# Patient Record
Sex: FEMALE | Race: WHITE | NOT HISPANIC OR LATINO | Employment: OTHER | URBAN - METROPOLITAN AREA
[De-identification: names, ages, dates, MRNs, and addresses within clinical notes are randomized per-mention and may not be internally consistent; named-entity substitution may affect disease eponyms.]

---

## 2017-03-01 ENCOUNTER — APPOINTMENT (OUTPATIENT)
Dept: GENERAL RADIOLOGY | Facility: HOSPITAL | Age: 52
End: 2017-03-01

## 2017-03-01 ENCOUNTER — HOSPITAL ENCOUNTER (INPATIENT)
Facility: HOSPITAL | Age: 52
LOS: 24 days | Discharge: HOME | End: 2017-03-25
Attending: EMERGENCY MEDICINE | Admitting: HOSPITALIST

## 2017-03-01 ENCOUNTER — APPOINTMENT (OUTPATIENT)
Dept: CT IMAGING | Facility: HOSPITAL | Age: 52
End: 2017-03-01

## 2017-03-01 DIAGNOSIS — J18.9 COMMUNITY ACQUIRED PNEUMONIA: Primary | ICD-10-CM

## 2017-03-01 DIAGNOSIS — J44.1 COPD EXACERBATION (HCC): ICD-10-CM

## 2017-03-01 DIAGNOSIS — J18.9 HCAP (HEALTHCARE-ASSOCIATED PNEUMONIA): ICD-10-CM

## 2017-03-01 DIAGNOSIS — J44.9 CHRONIC OBSTRUCTIVE PULMONARY DISEASE, UNSPECIFIED COPD TYPE (HCC): ICD-10-CM

## 2017-03-01 DIAGNOSIS — Z78.9 IMPAIRED MOBILITY AND ADLS: ICD-10-CM

## 2017-03-01 DIAGNOSIS — J96.20 ACUTE ON CHRONIC RESPIRATORY FAILURE, UNSPECIFIED WHETHER WITH HYPOXIA OR HYPERCAPNIA (HCC): ICD-10-CM

## 2017-03-01 DIAGNOSIS — Z74.09 IMPAIRED FUNCTIONAL MOBILITY, BALANCE, GAIT, AND ENDURANCE: ICD-10-CM

## 2017-03-01 DIAGNOSIS — Z74.09 IMPAIRED MOBILITY AND ADLS: ICD-10-CM

## 2017-03-01 DIAGNOSIS — R09.02 HYPOXIA: ICD-10-CM

## 2017-03-01 PROBLEM — F41.9 ANXIETY AND DEPRESSION: Status: ACTIVE | Noted: 2017-03-01

## 2017-03-01 PROBLEM — D72.829 LEUKOCYTOSIS: Status: ACTIVE | Noted: 2017-03-01

## 2017-03-01 PROBLEM — E03.9 HYPOTHYROID: Status: ACTIVE | Noted: 2017-03-01

## 2017-03-01 PROBLEM — F32.A ANXIETY AND DEPRESSION: Status: ACTIVE | Noted: 2017-03-01

## 2017-03-01 PROBLEM — Z72.0 TOBACCO ABUSE: Status: ACTIVE | Noted: 2017-03-01

## 2017-03-01 PROBLEM — Z59.00 HOMELESSNESS: Status: ACTIVE | Noted: 2017-03-01

## 2017-03-01 PROBLEM — A41.9 SEPSIS (HCC): Status: ACTIVE | Noted: 2017-03-01

## 2017-03-01 LAB
ALBUMIN SERPL-MCNC: 3.7 G/DL (ref 3.2–4.8)
ALBUMIN/GLOB SERPL: 1.1 G/DL (ref 1.5–2.5)
ALP SERPL-CCNC: 91 U/L (ref 25–100)
ALT SERPL W P-5'-P-CCNC: 11 U/L (ref 7–40)
ANION GAP SERPL CALCULATED.3IONS-SCNC: 4 MMOL/L (ref 3–11)
ARTERIAL PATENCY WRIST A: ABNORMAL
AST SERPL-CCNC: 19 U/L (ref 0–33)
ATMOSPHERIC PRESS: ABNORMAL MMHG
BASE EXCESS BLDA CALC-SCNC: 2.5 MMOL/L (ref 0–2)
BASOPHILS # BLD AUTO: 0.02 10*3/MM3 (ref 0–0.2)
BASOPHILS NFR BLD AUTO: 0.1 % (ref 0–1)
BDY SITE: ABNORMAL
BILIRUB SERPL-MCNC: 0.3 MG/DL (ref 0.3–1.2)
BNP SERPL-MCNC: 110 PG/ML (ref 0–100)
BUN BLD-MCNC: 6 MG/DL (ref 9–23)
BUN/CREAT SERPL: 6 (ref 7–25)
CALCIUM SPEC-SCNC: 9.4 MG/DL (ref 8.7–10.4)
CHLORIDE SERPL-SCNC: 98 MMOL/L (ref 99–109)
CO2 BLDA-SCNC: 28.3 MMOL/L (ref 22–33)
CO2 SERPL-SCNC: 31 MMOL/L (ref 20–31)
COHGB MFR BLD: 2.9 % (ref 0–2)
CREAT BLD-MCNC: 1 MG/DL (ref 0.6–1.3)
D-LACTATE SERPL-SCNC: 1.1 MMOL/L (ref 0.5–2)
DEPRECATED RDW RBC AUTO: 53.3 FL (ref 37–54)
EOSINOPHIL # BLD AUTO: 0 10*3/MM3 (ref 0.1–0.3)
EOSINOPHIL NFR BLD AUTO: 0 % (ref 0–3)
ERYTHROCYTE [DISTWIDTH] IN BLOOD BY AUTOMATED COUNT: 15.4 % (ref 11.3–14.5)
GFR SERPL CREATININE-BSD FRML MDRD: 58 ML/MIN/1.73
GLOBULIN UR ELPH-MCNC: 3.4 GM/DL
GLUCOSE BLD-MCNC: 118 MG/DL (ref 70–100)
GLUCOSE BLDC GLUCOMTR-MCNC: 161 MG/DL (ref 70–130)
HCO3 BLDA-SCNC: 27.1 MMOL/L (ref 20–26)
HCT VFR BLD AUTO: 38.2 % (ref 34.5–44)
HCT VFR BLD CALC: 38 %
HGB BLD-MCNC: 12.6 G/DL (ref 11.5–15.5)
HGB BLDA-MCNC: 12.4 G/DL (ref 14–18)
HOLD SPECIMEN: NORMAL
HOLD SPECIMEN: NORMAL
HOROWITZ INDEX BLD+IHG-RTO: 36 %
IMM GRANULOCYTES # BLD: 0.12 10*3/MM3 (ref 0–0.03)
IMM GRANULOCYTES NFR BLD: 0.8 % (ref 0–0.6)
LYMPHOCYTES # BLD AUTO: 1.26 10*3/MM3 (ref 0.6–4.8)
LYMPHOCYTES NFR BLD AUTO: 8.2 % (ref 24–44)
MCH RBC QN AUTO: 30.8 PG (ref 27–31)
MCHC RBC AUTO-ENTMCNC: 33 G/DL (ref 32–36)
MCV RBC AUTO: 93.4 FL (ref 80–99)
METHGB BLD QL: 0.9 % (ref 0–1.5)
MODALITY: ABNORMAL
MONOCYTES # BLD AUTO: 2.03 10*3/MM3 (ref 0–1)
MONOCYTES NFR BLD AUTO: 13.2 % (ref 0–12)
NEUTROPHILS # BLD AUTO: 11.98 10*3/MM3 (ref 1.5–8.3)
NEUTROPHILS NFR BLD AUTO: 77.7 % (ref 41–71)
OXYHGB MFR BLDV: 87.9 % (ref 94–99)
PCO2 BLDA: 40.8 MM HG (ref 35–45)
PH BLDA: 7.43 PH UNITS (ref 7.35–7.45)
PLATELET # BLD AUTO: 196 10*3/MM3 (ref 150–450)
PMV BLD AUTO: 9.2 FL (ref 6–12)
PO2 BLDA: 59 MM HG (ref 83–108)
POTASSIUM BLD-SCNC: 3.7 MMOL/L (ref 3.5–5.5)
PROT SERPL-MCNC: 7.1 G/DL (ref 5.7–8.2)
RBC # BLD AUTO: 4.09 10*6/MM3 (ref 3.89–5.14)
SODIUM BLD-SCNC: 133 MMOL/L (ref 132–146)
TROPONIN I SERPL-MCNC: <0.006 NG/ML
WBC NRBC COR # BLD: 15.41 10*3/MM3 (ref 3.5–10.8)
WHOLE BLOOD HOLD SPECIMEN: NORMAL
WHOLE BLOOD HOLD SPECIMEN: NORMAL

## 2017-03-01 PROCEDURE — 83880 ASSAY OF NATRIURETIC PEPTIDE: CPT | Performed by: EMERGENCY MEDICINE

## 2017-03-01 PROCEDURE — 82962 GLUCOSE BLOOD TEST: CPT

## 2017-03-01 PROCEDURE — 25010000002 AZITHROMYCIN: Performed by: EMERGENCY MEDICINE

## 2017-03-01 PROCEDURE — 25010000002 PIPERACILLIN-TAZOBACTAM: Performed by: INTERNAL MEDICINE

## 2017-03-01 PROCEDURE — 80053 COMPREHEN METABOLIC PANEL: CPT | Performed by: EMERGENCY MEDICINE

## 2017-03-01 PROCEDURE — 25010000002 HYDROMORPHONE PER 4 MG: Performed by: EMERGENCY MEDICINE

## 2017-03-01 PROCEDURE — 25010000002 ONDANSETRON PER 1 MG: Performed by: EMERGENCY MEDICINE

## 2017-03-01 PROCEDURE — 85025 COMPLETE CBC W/AUTO DIFF WBC: CPT | Performed by: EMERGENCY MEDICINE

## 2017-03-01 PROCEDURE — 25010000002 METHYLPREDNISOLONE PER 125 MG: Performed by: NURSE PRACTITIONER

## 2017-03-01 PROCEDURE — 86480 TB TEST CELL IMMUN MEASURE: CPT | Performed by: INTERNAL MEDICINE

## 2017-03-01 PROCEDURE — 25010000002 METHYLPREDNISOLONE PER 125 MG: Performed by: EMERGENCY MEDICINE

## 2017-03-01 PROCEDURE — 99223 1ST HOSP IP/OBS HIGH 75: CPT | Performed by: INTERNAL MEDICINE

## 2017-03-01 PROCEDURE — 94799 UNLISTED PULMONARY SVC/PX: CPT

## 2017-03-01 PROCEDURE — 94760 N-INVAS EAR/PLS OXIMETRY 1: CPT

## 2017-03-01 PROCEDURE — 99285 EMERGENCY DEPT VISIT HI MDM: CPT

## 2017-03-01 PROCEDURE — 71275 CT ANGIOGRAPHY CHEST: CPT

## 2017-03-01 PROCEDURE — 94640 AIRWAY INHALATION TREATMENT: CPT

## 2017-03-01 PROCEDURE — 84484 ASSAY OF TROPONIN QUANT: CPT | Performed by: EMERGENCY MEDICINE

## 2017-03-01 PROCEDURE — 0 IOPAMIDOL PER 1 ML: Performed by: EMERGENCY MEDICINE

## 2017-03-01 PROCEDURE — 25010000002 ENOXAPARIN PER 10 MG: Performed by: INTERNAL MEDICINE

## 2017-03-01 PROCEDURE — 25010000002 VANCOMYCIN: Performed by: INTERNAL MEDICINE

## 2017-03-01 PROCEDURE — 36600 WITHDRAWAL OF ARTERIAL BLOOD: CPT | Performed by: EMERGENCY MEDICINE

## 2017-03-01 PROCEDURE — 82805 BLOOD GASES W/O2 SATURATION: CPT | Performed by: EMERGENCY MEDICINE

## 2017-03-01 PROCEDURE — 25010000003 CEFTRIAXONE PER 250 MG: Performed by: EMERGENCY MEDICINE

## 2017-03-01 PROCEDURE — 83605 ASSAY OF LACTIC ACID: CPT | Performed by: EMERGENCY MEDICINE

## 2017-03-01 PROCEDURE — 87040 BLOOD CULTURE FOR BACTERIA: CPT | Performed by: EMERGENCY MEDICINE

## 2017-03-01 PROCEDURE — 93005 ELECTROCARDIOGRAM TRACING: CPT

## 2017-03-01 PROCEDURE — 71010 HC CHEST PA OR AP: CPT

## 2017-03-01 RX ORDER — ZIPRASIDONE HYDROCHLORIDE 60 MG/1
60 CAPSULE ORAL 2 TIMES DAILY WITH MEALS
COMMUNITY
End: 2017-03-01

## 2017-03-01 RX ORDER — METHYLPREDNISOLONE SODIUM SUCCINATE 125 MG/2ML
60 INJECTION, POWDER, LYOPHILIZED, FOR SOLUTION INTRAMUSCULAR; INTRAVENOUS EVERY 8 HOURS
Status: DISCONTINUED | OUTPATIENT
Start: 2017-03-01 | End: 2017-03-02

## 2017-03-01 RX ORDER — CEFTRIAXONE SODIUM 1 G/50ML
1 INJECTION, SOLUTION INTRAVENOUS EVERY 24 HOURS
Status: DISCONTINUED | OUTPATIENT
Start: 2017-03-02 | End: 2017-03-01

## 2017-03-01 RX ORDER — SENNA AND DOCUSATE SODIUM 50; 8.6 MG/1; MG/1
2 TABLET, FILM COATED ORAL 2 TIMES DAILY
Status: DISCONTINUED | OUTPATIENT
Start: 2017-03-01 | End: 2017-03-25 | Stop reason: HOSPADM

## 2017-03-01 RX ORDER — CEFTRIAXONE SODIUM 1 G/50ML
1 INJECTION, SOLUTION INTRAVENOUS ONCE
Status: COMPLETED | OUTPATIENT
Start: 2017-03-01 | End: 2017-03-01

## 2017-03-01 RX ORDER — TIZANIDINE 4 MG/1
4 TABLET ORAL EVERY 12 HOURS SCHEDULED
Status: DISCONTINUED | OUTPATIENT
Start: 2017-03-01 | End: 2017-03-15

## 2017-03-01 RX ORDER — GABAPENTIN 400 MG/1
400 CAPSULE ORAL EVERY 12 HOURS SCHEDULED
Status: DISCONTINUED | OUTPATIENT
Start: 2017-03-01 | End: 2017-03-12

## 2017-03-01 RX ORDER — PANTOPRAZOLE SODIUM 40 MG/1
40 TABLET, DELAYED RELEASE ORAL EVERY 24 HOURS
Status: DISCONTINUED | OUTPATIENT
Start: 2017-03-02 | End: 2017-03-22

## 2017-03-01 RX ORDER — LANSOPRAZOLE 15 MG/1
15 CAPSULE, DELAYED RELEASE ORAL DAILY
COMMUNITY
End: 2017-03-25 | Stop reason: HOSPADM

## 2017-03-01 RX ORDER — METHYLPREDNISOLONE SODIUM SUCCINATE 125 MG/2ML
125 INJECTION, POWDER, LYOPHILIZED, FOR SOLUTION INTRAMUSCULAR; INTRAVENOUS ONCE
Status: COMPLETED | OUTPATIENT
Start: 2017-03-01 | End: 2017-03-01

## 2017-03-01 RX ORDER — HYDROMORPHONE HYDROCHLORIDE 1 MG/ML
0.5 INJECTION, SOLUTION INTRAMUSCULAR; INTRAVENOUS; SUBCUTANEOUS ONCE
Status: COMPLETED | OUTPATIENT
Start: 2017-03-01 | End: 2017-03-01

## 2017-03-01 RX ORDER — DULOXETIN HYDROCHLORIDE 60 MG/1
60 CAPSULE, DELAYED RELEASE ORAL 2 TIMES DAILY
COMMUNITY
End: 2017-03-25 | Stop reason: HOSPADM

## 2017-03-01 RX ORDER — TIZANIDINE 4 MG/1
4 TABLET ORAL 3 TIMES DAILY
COMMUNITY
End: 2017-03-25 | Stop reason: HOSPADM

## 2017-03-01 RX ORDER — FUROSEMIDE 40 MG/1
40 TABLET ORAL DAILY
COMMUNITY
End: 2017-03-25 | Stop reason: HOSPADM

## 2017-03-01 RX ORDER — LEVOTHYROXINE SODIUM 0.05 MG/1
50 TABLET ORAL DAILY
COMMUNITY

## 2017-03-01 RX ORDER — DIVALPROEX SODIUM 500 MG/1
500 TABLET, EXTENDED RELEASE ORAL 2 TIMES DAILY
COMMUNITY
End: 2017-03-01

## 2017-03-01 RX ORDER — ACETAMINOPHEN 325 MG/1
650 TABLET ORAL EVERY 4 HOURS PRN
Status: DISCONTINUED | OUTPATIENT
Start: 2017-03-01 | End: 2017-03-25 | Stop reason: HOSPADM

## 2017-03-01 RX ORDER — VANCOMYCIN/0.9 % SOD CHLORIDE 1.5G/250ML
1500 PLASTIC BAG, INJECTION (ML) INTRAVENOUS EVERY 12 HOURS
Status: DISCONTINUED | OUTPATIENT
Start: 2017-03-02 | End: 2017-03-03

## 2017-03-01 RX ORDER — SODIUM CHLORIDE 0.9 % (FLUSH) 0.9 %
10 SYRINGE (ML) INJECTION AS NEEDED
Status: DISCONTINUED | OUTPATIENT
Start: 2017-03-01 | End: 2017-03-25 | Stop reason: HOSPADM

## 2017-03-01 RX ORDER — LEVOTHYROXINE SODIUM 0.03 MG/1
50 TABLET ORAL
Status: DISCONTINUED | OUTPATIENT
Start: 2017-03-02 | End: 2017-03-25 | Stop reason: HOSPADM

## 2017-03-01 RX ORDER — DULOXETIN HYDROCHLORIDE 60 MG/1
60 CAPSULE, DELAYED RELEASE ORAL EVERY 12 HOURS SCHEDULED
Status: DISCONTINUED | OUTPATIENT
Start: 2017-03-01 | End: 2017-03-25 | Stop reason: HOSPADM

## 2017-03-01 RX ORDER — ACETAMINOPHEN 500 MG
1000 TABLET ORAL ONCE
Status: DISCONTINUED | OUTPATIENT
Start: 2017-03-01 | End: 2017-03-01

## 2017-03-01 RX ORDER — IPRATROPIUM BROMIDE 42 UG/1
2 SPRAY, METERED NASAL 3 TIMES DAILY
COMMUNITY
End: 2017-03-25 | Stop reason: HOSPADM

## 2017-03-01 RX ORDER — BENZTROPINE MESYLATE 2 MG/1
2 TABLET ORAL 2 TIMES DAILY PRN
COMMUNITY
End: 2017-03-01

## 2017-03-01 RX ORDER — FLUTICASONE PROPIONATE 50 MCG
2 SPRAY, SUSPENSION (ML) NASAL DAILY
Status: DISCONTINUED | OUTPATIENT
Start: 2017-03-02 | End: 2017-03-25 | Stop reason: HOSPADM

## 2017-03-01 RX ORDER — GABAPENTIN 800 MG/1
800 TABLET ORAL 4 TIMES DAILY
COMMUNITY
End: 2017-03-25 | Stop reason: HOSPADM

## 2017-03-01 RX ORDER — ALBUTEROL SULFATE 2.5 MG/3ML
2.5 SOLUTION RESPIRATORY (INHALATION) EVERY 4 HOURS PRN
Status: ON HOLD | COMMUNITY
End: 2017-03-25

## 2017-03-01 RX ORDER — IPRATROPIUM BROMIDE AND ALBUTEROL SULFATE 2.5; .5 MG/3ML; MG/3ML
3 SOLUTION RESPIRATORY (INHALATION) ONCE
Status: COMPLETED | OUTPATIENT
Start: 2017-03-01 | End: 2017-03-01

## 2017-03-01 RX ORDER — HYDROCODONE BITARTRATE AND ACETAMINOPHEN 5; 325 MG/1; MG/1
1 TABLET ORAL EVERY 4 HOURS PRN
Status: DISPENSED | OUTPATIENT
Start: 2017-03-01 | End: 2017-03-11

## 2017-03-01 RX ORDER — QUETIAPINE 300 MG/1
300 TABLET, FILM COATED, EXTENDED RELEASE ORAL NIGHTLY
COMMUNITY
End: 2017-03-25 | Stop reason: HOSPADM

## 2017-03-01 RX ORDER — HYDROXYZINE 50 MG/1
50 TABLET, FILM COATED ORAL 4 TIMES DAILY PRN
Status: ON HOLD | COMMUNITY
End: 2017-03-25

## 2017-03-01 RX ORDER — ACETAMINOPHEN 500 MG
1000 TABLET ORAL ONCE
Status: COMPLETED | OUTPATIENT
Start: 2017-03-01 | End: 2017-03-01

## 2017-03-01 RX ORDER — ONDANSETRON 2 MG/ML
4 INJECTION INTRAMUSCULAR; INTRAVENOUS ONCE
Status: COMPLETED | OUTPATIENT
Start: 2017-03-01 | End: 2017-03-01

## 2017-03-01 RX ORDER — ONDANSETRON 2 MG/ML
4 INJECTION INTRAMUSCULAR; INTRAVENOUS EVERY 6 HOURS PRN
Status: DISCONTINUED | OUTPATIENT
Start: 2017-03-01 | End: 2017-03-25 | Stop reason: HOSPADM

## 2017-03-01 RX ORDER — NICOTINE 21 MG/24HR
1 PATCH, TRANSDERMAL 24 HOURS TRANSDERMAL
Status: DISCONTINUED | OUTPATIENT
Start: 2017-03-01 | End: 2017-03-25 | Stop reason: HOSPADM

## 2017-03-01 RX ORDER — IPRATROPIUM BROMIDE AND ALBUTEROL SULFATE 2.5; .5 MG/3ML; MG/3ML
3 SOLUTION RESPIRATORY (INHALATION)
Status: DISCONTINUED | OUTPATIENT
Start: 2017-03-01 | End: 2017-03-25 | Stop reason: HOSPADM

## 2017-03-01 RX ORDER — ONDANSETRON 4 MG/1
4 TABLET, FILM COATED ORAL EVERY 6 HOURS PRN
Status: DISCONTINUED | OUTPATIENT
Start: 2017-03-01 | End: 2017-03-25 | Stop reason: HOSPADM

## 2017-03-01 RX ORDER — MONTELUKAST SODIUM 10 MG/1
10 TABLET ORAL NIGHTLY
Status: DISCONTINUED | OUTPATIENT
Start: 2017-03-01 | End: 2017-03-25 | Stop reason: HOSPADM

## 2017-03-01 RX ORDER — FUROSEMIDE 40 MG/1
40 TABLET ORAL DAILY
Status: DISCONTINUED | OUTPATIENT
Start: 2017-03-02 | End: 2017-03-25 | Stop reason: HOSPADM

## 2017-03-01 RX ORDER — MONTELUKAST SODIUM 10 MG/1
10 TABLET ORAL NIGHTLY
Status: ON HOLD | COMMUNITY
End: 2017-03-25

## 2017-03-01 RX ORDER — CETIRIZINE HYDROCHLORIDE 10 MG/1
10 TABLET ORAL DAILY
Status: DISCONTINUED | OUTPATIENT
Start: 2017-03-02 | End: 2017-03-25 | Stop reason: HOSPADM

## 2017-03-01 RX ORDER — CLOMIPRAMINE HYDROCHLORIDE 50 MG/1
50 CAPSULE ORAL 2 TIMES DAILY
COMMUNITY
End: 2017-03-01

## 2017-03-01 RX ORDER — FLUTICASONE PROPIONATE 50 MCG
2 SPRAY, SUSPENSION (ML) NASAL DAILY
Status: ON HOLD | COMMUNITY
End: 2017-03-25

## 2017-03-01 RX ORDER — SODIUM CHLORIDE 0.9 % (FLUSH) 0.9 %
1-10 SYRINGE (ML) INJECTION AS NEEDED
Status: DISCONTINUED | OUTPATIENT
Start: 2017-03-01 | End: 2017-03-25 | Stop reason: HOSPADM

## 2017-03-01 RX ORDER — CETIRIZINE HYDROCHLORIDE 10 MG/1
10 TABLET ORAL DAILY
Status: ON HOLD | COMMUNITY
End: 2017-03-25

## 2017-03-01 RX ADMIN — GABAPENTIN 400 MG: 400 CAPSULE ORAL at 20:52

## 2017-03-01 RX ADMIN — Medication 2 TABLET: at 18:34

## 2017-03-01 RX ADMIN — QUETIAPINE 300 MG: 200 TABLET, EXTENDED RELEASE ORAL at 20:52

## 2017-03-01 RX ADMIN — TIZANIDINE 4 MG: 4 TABLET ORAL at 20:52

## 2017-03-01 RX ADMIN — CEFTRIAXONE SODIUM 1 G: 1 INJECTION, SOLUTION INTRAVENOUS at 13:47

## 2017-03-01 RX ADMIN — METHYLPREDNISOLONE SODIUM SUCCINATE 125 MG: 125 INJECTION, POWDER, FOR SOLUTION INTRAMUSCULAR; INTRAVENOUS at 13:47

## 2017-03-01 RX ADMIN — ONDANSETRON 4 MG: 2 INJECTION INTRAMUSCULAR; INTRAVENOUS at 14:38

## 2017-03-01 RX ADMIN — VANCOMYCIN HYDROCHLORIDE 2000 MG: 1 INJECTION, POWDER, LYOPHILIZED, FOR SOLUTION INTRAVENOUS at 19:47

## 2017-03-01 RX ADMIN — MONTELUKAST SODIUM 10 MG: 10 TABLET, FILM COATED ORAL at 20:52

## 2017-03-01 RX ADMIN — IPRATROPIUM BROMIDE AND ALBUTEROL SULFATE 3 ML: .5; 3 SOLUTION RESPIRATORY (INHALATION) at 13:28

## 2017-03-01 RX ADMIN — NICOTINE 1 PATCH: 21 PATCH, EXTENDED RELEASE TRANSDERMAL at 18:34

## 2017-03-01 RX ADMIN — AZITHROMYCIN 500 MG: 500 INJECTION, POWDER, LYOPHILIZED, FOR SOLUTION INTRAVENOUS at 14:30

## 2017-03-01 RX ADMIN — METHYLPREDNISOLONE SODIUM SUCCINATE 60 MG: 125 INJECTION, POWDER, FOR SOLUTION INTRAMUSCULAR; INTRAVENOUS at 20:57

## 2017-03-01 RX ADMIN — TAZOBACTAM SODIUM AND PIPERACILLIN SODIUM 4.5 G: .5; 4 INJECTION, POWDER, LYOPHILIZED, FOR SOLUTION INTRAVENOUS at 18:35

## 2017-03-01 RX ADMIN — ENOXAPARIN SODIUM 40 MG: 40 INJECTION SUBCUTANEOUS at 20:56

## 2017-03-01 RX ADMIN — HYDROMORPHONE HYDROCHLORIDE 0.5 MG: 1 INJECTION, SOLUTION INTRAMUSCULAR; INTRAVENOUS; SUBCUTANEOUS at 14:38

## 2017-03-01 RX ADMIN — IPRATROPIUM BROMIDE AND ALBUTEROL SULFATE 3 ML: .5; 3 SOLUTION RESPIRATORY (INHALATION) at 19:20

## 2017-03-01 RX ADMIN — ACETAMINOPHEN 1000 MG: 500 TABLET ORAL at 14:38

## 2017-03-01 RX ADMIN — DULOXETINE 60 MG: 60 CAPSULE, DELAYED RELEASE ORAL at 20:52

## 2017-03-01 RX ADMIN — HYDROCODONE BITARTRATE AND ACETAMINOPHEN 1 TABLET: 5; 325 TABLET ORAL at 18:34

## 2017-03-01 RX ADMIN — IOPAMIDOL 65 ML: 755 INJECTION, SOLUTION INTRAVENOUS at 13:57

## 2017-03-01 NOTE — PROGRESS NOTES
"Discharge Planning Assessment  [facility]     Patient Name: Colleen Gonzalez  MRN: [id number]  Today's Date: 3/1/2017    Admit Date: 3/1/2017          Discharge Needs Assessment       03/01/17 1548    Living Environment    Living Arrangements homeless    Provides Primary Care For no one, unable/limited ability to care for self    Quality Of Family Relationships helpful    Able to Return to Prior Living Arrangements no    Discharge Needs Assessment    Concerns To Be Addressed discharge planning concerns;basic needs concerns;coping/stress concerns;homelessness;financial/insurance concerns    Readmission Within The Last 30 Days no previous admission in last 30 days    Anticipated Changes Related to Illness none    Equipment Currently Used at Home none    Transportation Available car    Current Discharge Risk homeless            Discharge Plan       03/01/17 1550    Case Management/Social Work Plan    Plan D/C Plan    Patient/Family In Agreement With Plan yes    Additional Comments Spoke with patient. She tells me she is homeless. She stays with her brother sometimes and \"wherever I can lay my head\". She asked about assisted living and when I told her it is private pay, she responded she couldn't afford that. I spoke with Jennifer Garcia, , who said she will follow this patient and offer available resources.CM will continue to follow.        Discharge Placement     No information found                Demographic Summary       03/01/17 1546    Referral Information    Arrived From home or self-care    Referral Source emergency department    Reason For Consult discharge planning    Record Reviewed medical record    Primary Care Physician Information    Name RADHA Garcia at [facility]            Functional Status       03/01/17 1547    Functional Status Current    Current Functional Level Comment See Nursing and PT assessment    Functional Status Prior    Ambulation 0-->independent    Transferring " 0-->independent    Toileting 0-->independent    Bathing 0-->independent    Dressing 0-->independent    Eating 0-->independent    Communication 0-->understands/communicates without difficulty    Swallowing 0-->swallows foods/liquids without difficulty    IADL    Medications independent    Meal Preparation independent    Housekeeping independent    Laundry independent    Shopping independent    Oral Care independent    Activity Tolerance    Current Activity Limitations none    Usual Activity Tolerance good    Current Activity Tolerance moderate    Employment/Financial    Employment/Finance Comments Insurance info verified            Psychosocial     None            Abuse/Neglect     None            Legal     None            Substance Abuse     None            Patient Forms     None          Val Schafer, APRN

## 2017-03-01 NOTE — ED PROVIDER NOTES
"Subjective   HPI Comments: Colleen Gonzalez is a 52 y.o.female with a hx of COPD who presents to the ED c/o worsening SOA for the past month with it significantly worsening this past week. Pt has a moderately productive cough with greenish yellow sputum. Pt additionally reports severe lower back pain and is requesting \"muscle rub.\" She is a smoker and states she smokes 2 packs a day, but is trying to quit. She does not wear home O2.    PMHx of PNA.    Patient is a 52 y.o. female presenting with shortness of breath.   History provided by:  Patient  Shortness of Breath   Severity:  Moderate  Duration:  1 month  Timing:  Constant  Progression:  Worsening  Chronicity:  Chronic  Associated symptoms: cough and wheezing    Associated symptoms: no abdominal pain    Cough:     Cough characteristics:  Productive    Sputum characteristics:  Green and yellow    Severity:  Moderate    Timing:  Constant    Chronicity:  Chronic  Risk factors: tobacco use        Review of Systems   Constitutional: Positive for appetite change and fatigue.   HENT: Positive for congestion.    Respiratory: Positive for cough, shortness of breath and wheezing.    Cardiovascular: Negative for leg swelling.   Gastrointestinal: Negative for abdominal pain.   Musculoskeletal: Positive for back pain (Severe lower back pain.).   All other systems reviewed and are negative.      History reviewed. No pertinent past medical history.    Allergies   Allergen Reactions   • Aspirin GI Intolerance   • Ibuprofen GI Intolerance       History reviewed. No pertinent past surgical history.    History reviewed. No pertinent family history.    Social History     Social History   • Marital status: Single     Spouse name: N/A   • Number of children: N/A   • Years of education: N/A     Social History Main Topics   • Smoking status: Heavy Tobacco Smoker     Packs/day: 3.00   • Smokeless tobacco: None   • Alcohol use No   • Drug use: None   • Sexual activity: Defer     Other Topics " Concern   • None     Social History Narrative   • None         Objective   Physical Exam   Constitutional: She is oriented to person, place, and time. She appears well-developed and well-nourished.   Pt appears distressed, sitting in tripod position.   HENT:   Head: Normocephalic and atraumatic.   Right Ear: External ear normal.   Left Ear: External ear normal.   Nose: Nose normal.   Mouth/Throat: Mucous membranes are dry.   Eyes: Conjunctivae and EOM are normal.   Neck: Normal range of motion. Neck supple.   Cardiovascular: Regular rhythm and normal heart sounds.  Tachycardia present.  Exam reveals no gallop and no friction rub.    No murmur heard.  Pulmonary/Chest: She is in respiratory distress. She has decreased breath sounds. She has wheezes (Expiratory wheezing in all fields.). She has rhonchi (Scattered rhonchi in all lung fields.). She has no rales.   She is speaking in several word phrases. Distant breath sounds in all fields.   Abdominal: Soft. There is no tenderness.   Musculoskeletal: Normal range of motion. She exhibits tenderness (Left lower lumbar paramuscular tenderness to palpation with no palpable spasm.).   Neurological: She is alert and oriented to person, place, and time.   Skin: Skin is warm and dry.   Psychiatric: She has a normal mood and affect. Her behavior is normal. Judgment and thought content normal.   Nursing note and vitals reviewed.      Critical Care  Performed by: LISSETH LOERA  Authorized by: LISSETH LOERA   Total critical care time: 30 minutes  Critical care time was exclusive of separately billable procedures and treating other patients and teaching time.  Critical care was necessary to treat or prevent imminent or life-threatening deterioration of the following conditions: respiratory failure.  Critical care was time spent personally by me on the following activities: blood draw for specimens, development of treatment plan with patient or surrogate, interpretation of  cardiac output measurements, examination of patient, ordering and performing treatments and interventions, ordering and review of radiographic studies, re-evaluation of patient's condition, pulse oximetry, ordering and review of laboratory studies, obtaining history from patient or surrogate, evaluation of patient's response to treatment and review of old charts.               ED Course  ED Course   Comment By Time   I discussed the pt's case in detail with Dr. Sloan, hospitalist, who will admit the pt to the hospital.  OLGA LIDIA Montez 03/01 1452       Recent Results (from the past 24 hour(s))   Comprehensive Metabolic Panel    Collection Time: 03/01/17 12:48 PM   Result Value Ref Range    Glucose 118 (H) 70 - 100 mg/dL    BUN 6 (L) 9 - 23 mg/dL    Creatinine 1.00 0.60 - 1.30 mg/dL    Sodium 133 132 - 146 mmol/L    Potassium 3.7 3.5 - 5.5 mmol/L    Chloride 98 (L) 99 - 109 mmol/L    CO2 31.0 20.0 - 31.0 mmol/L    Calcium 9.4 8.7 - 10.4 mg/dL    Total Protein 7.1 5.7 - 8.2 g/dL    Albumin 3.70 3.20 - 4.80 g/dL    ALT (SGPT) 11 7 - 40 U/L    AST (SGOT) 19 0 - 33 U/L    Alkaline Phosphatase 91 25 - 100 U/L    Total Bilirubin 0.3 0.3 - 1.2 mg/dL    eGFR Non African Amer 58 (L) >60 mL/min/1.73    Globulin 3.4 gm/dL    A/G Ratio 1.1 (L) 1.5 - 2.5 g/dL    BUN/Creatinine Ratio 6.0 (L) 7.0 - 25.0    Anion Gap 4.0 3.0 - 11.0 mmol/L   BNP    Collection Time: 03/01/17 12:48 PM   Result Value Ref Range    .0 (H) 0.0 - 100.0 pg/mL   CBC Auto Differential    Collection Time: 03/01/17 12:48 PM   Result Value Ref Range    WBC 15.41 (H) 3.50 - 10.80 10*3/mm3    RBC 4.09 3.89 - 5.14 10*6/mm3    Hemoglobin 12.6 11.5 - 15.5 g/dL    Hematocrit 38.2 34.5 - 44.0 %    MCV 93.4 80.0 - 99.0 fL    MCH 30.8 27.0 - 31.0 pg    MCHC 33.0 32.0 - 36.0 g/dL    RDW 15.4 (H) 11.3 - 14.5 %    RDW-SD 53.3 37.0 - 54.0 fl    MPV 9.2 6.0 - 12.0 fL    Platelets 196 150 - 450 10*3/mm3    Neutrophil % 77.7 (H) 41.0 - 71.0 %    Lymphocyte  "% 8.2 (L) 24.0 - 44.0 %    Monocyte % 13.2 (H) 0.0 - 12.0 %    Eosinophil % 0.0 0.0 - 3.0 %    Basophil % 0.1 0.0 - 1.0 %    Immature Grans % 0.8 (H) 0.0 - 0.6 %    Neutrophils, Absolute 11.98 (H) 1.50 - 8.30 10*3/mm3    Lymphocytes, Absolute 1.26 0.60 - 4.80 10*3/mm3    Monocytes, Absolute 2.03 (H) 0.00 - 1.00 10*3/mm3    Eosinophils, Absolute 0.00 (L) 0.10 - 0.30 10*3/mm3    Basophils, Absolute 0.02 0.00 - 0.20 10*3/mm3    Immature Grans, Absolute 0.12 (H) 0.00 - 0.03 10*3/mm3   Lactic Acid, Plasma    Collection Time: 03/01/17 12:48 PM   Result Value Ref Range    Lactate 1.1 0.5 - 2.0 mmol/L   Troponin    Collection Time: 03/01/17 12:48 PM   Result Value Ref Range    Troponin I <0.006 <=0.039 ng/mL     Note: In addition to lab results from this visit, the labs listed above may include labs taken at another facility or during a different encounter within the last 24 hours. Please correlate lab times with ED admission and discharge times for further clarification of the services performed during this visit.    XR Chest 1 View    (Results Pending)   CT Angiogram Chest With Contrast    (Results Pending)     Vitals:    03/01/17 1218 03/01/17 1330 03/01/17 1430 03/01/17 1430   BP: 114/69  116/69    BP Location: Right arm      Patient Position: Sitting      Pulse: 107 105 104 105   Resp: 20   28   Temp: (!) 100.6 °F (38.1 °C)   (!) 102.3 °F (39.1 °C)   TempSrc: Oral   Oral   SpO2: 91% (!) 83% 91% 91%   Weight: 250 lb (113 kg)      Height: 68\" (172.7 cm)        Medications   sodium chloride 0.9 % flush 10 mL (not administered)   AZITHROMYCIN 500 MG/250 ML 0.9% NS IVPB (MBP) (500 mg Intravenous New Bag 3/1/17 1430)   ipratropium-albuterol (DUO-NEB) nebulizer solution 3 mL (3 mL Nebulization Given 3/1/17 1328)   methylPREDNISolone sodium succinate (SOLU-Medrol) injection 125 mg (125 mg Intravenous Given 3/1/17 1347)   cefTRIAXone (ROCEPHIN) IVPB 1 g (0 g Intravenous Stopped 3/1/17 1422)   iopamidol (ISOVUE-370) 76 % " injection 100 mL (65 mL Intravenous Given 3/1/17 1357)   acetaminophen (TYLENOL) tablet 1,000 mg (1,000 mg Oral Given 3/1/17 1438)   HYDROmorphone (DILAUDID) injection 0.5 mg (0.5 mg Intravenous Given 3/1/17 1438)   ondansetron (ZOFRAN) injection 4 mg (4 mg Intravenous Given 3/1/17 1438)     ECG/EMG Results (last 24 hours)     Procedure Component Value Units Date/Time    ECG 12 Lead [[id number]] Collected:  03/01/17 1218     Updated:  03/01/17 1228                      MDM    Final diagnoses:   Community acquired pneumonia   COPD exacerbation   Hypoxia       Documentation assistance provided by nancy Montez.  Information recorded by the nancy was done at my direction and has been verified and validated by me.     Rashid Montez  03/01/17 1337       Rashid Montez  03/01/17 1505       Rashid Montez  03/01/17 1506       Carlos Park MD  03/01/17 1786

## 2017-03-01 NOTE — H&P
"    [facility] Medicine Services  HISTORY AND PHYSICAL    Primary Care Physician: RADHA Pedraza    Subjective     Chief Complaint:  Shortness of breath    History of Present Illness:   Patient is a 52 year old  female with past medical history of COPD, HTN, HLD, IBS, chronic back pain, and a psychiatric disorder.  She reports anxiety, depression, bipolar disorder and OCD.  She sees a psychiatrist at [facility] on [address]  She reports that she stopped taking her \"crazy meds\" about 1 week ago because they were causing her to have hallucinations and she is not going to take them anymore.  She reports that she was suppose to be on home oxygen but she refuses to wear it because it aggravates her.  Patient is homeless and stays with friends and family.  She was most recently [facility] at [facility].  She smokes 2-3 packs of cigarettes a day but she states she is going to quit because it's not worth not being able to breathe.  Takes lasix daily for BLE edema.  She reports they have mention CHF in the past but she doesn't think she has it.  She states her doctor wanted her to have a heart cath in the past 2 months but she refused.  Currently denies chest pain.      Patient presents to [facility] ED via ambulance with c/o increased shortness of breath and productive cough.  She states her breathing has gotten worse over the past month but significantly worse over the past week.  She has a productive cough of yellowish brown sputum.  She reports subjective fever and chills.  She also reports N/V and has not been eating for the past week due to feeling so bad.  She also states that she has not had a BM in about a week.  She reports that she falls frequently due to her legs just giving out on her.  She is requesting assistance with placement with nursing home or assisted living.  Patient was noted to have hypoxia, tachycardia, and temperature of 102.3.  CXR showed " RUL pneumonia.  CTA completed but results pending.  WBC 15.41.  Patient will be admitted to the hospital medicine service for further evaluation and management.        Review of Systems   Constitutional: Positive for appetite change, chills and fever.   Eyes: Negative.    Respiratory: Positive for cough, shortness of breath and wheezing. Negative for chest tightness.    Cardiovascular: Positive for leg swelling. Negative for chest pain and palpitations.   Gastrointestinal: Positive for constipation, nausea and vomiting. Negative for abdominal pain.   Endocrine: Negative.    Genitourinary: Negative for difficulty urinating and dysuria.   Musculoskeletal: Positive for back pain and gait problem.   Skin: Negative.    Allergic/Immunologic: Negative.    Neurological: Positive for weakness and headaches. Negative for dizziness, tremors and seizures.   Psychiatric/Behavioral: Positive for hallucinations. Negative for agitation and confusion. The patient is nervous/anxious.         Past Medical History:   Past Medical History   Diagnosis Date   • Anxiety    • COPD (chronic obstructive pulmonary disease)    • Depression    • Hyperlipidemia    • Hypertension    • Hypothyroid    • Obesity        Past Surgical History:  Past Surgical History   Procedure Laterality Date   • Hysterectomy       partial   • Cholecystectomy     • Back surgery     • Knee surgery Bilateral    • Cyst removal         Family History: family history is not on file.    Social History:  reports that she has been smoking.  She has been smoking about 3.00 packs per day. She does not have any smokeless tobacco history on file. She reports that she does not drink alcohol or use illicit drugs.    Medications:  Prescriptions Prior to Admission   Medication Sig Dispense Refill Last Dose   • albuterol (PROVENTIL) (2.5 MG/3ML) 0.083% nebulizer solution Take 2.5 mg by nebulization Every 4 (Four) Hours As Needed for wheezing.      • cetirizine (zyrTEC) 10 MG tablet  "Take 10 mg by mouth Daily.      • DULoxetine (CYMBALTA) 60 MG capsule Take 60 mg by mouth 2 (Two) Times a Day.      • fluticasone (FLONASE) 50 MCG/ACT nasal spray 2 sprays into each nostril Daily.      • fluticasone-salmeterol (ADVAIR) 100-50 MCG/DOSE DISKUS Inhale 1 puff 2 (Two) Times a Day.      • furosemide (LASIX) 40 MG tablet Take 40 mg by mouth Daily.      • gabapentin (NEURONTIN) 800 MG tablet Take 800 mg by mouth 2 (Two) Times a Day.      • hydrOXYzine (ATARAX) 50 MG tablet Take 50 mg by mouth 4 (Four) Times a Day As Needed for itching.      • ipratropium (ATROVENT) 0.06 % nasal spray 2 sprays into each nostril 3 (Three) Times a Day.      • lansoprazole (PREVACID) 15 MG capsule Take 15 mg by mouth Daily.      • levothyroxine (SYNTHROID, LEVOTHROID) 50 MCG tablet Take 50 mcg by mouth Daily.      • montelukast (SINGULAIR) 10 MG tablet Take 10 mg by mouth Every Night.      • QUEtiapine XR (SEROquel XR) 300 MG 24 hr tablet Take 300 mg by mouth Every Night.      • tiotropium (SPIRIVA) 18 MCG per inhalation capsule Place 1 capsule into inhaler and inhale Daily.      • tiZANidine (ZANAFLEX) 4 MG tablet Take 4 mg by mouth 2 (Two) Times a Day.          Allergies:  Allergies   Allergen Reactions   • Aspirin GI Intolerance   • Ibuprofen GI Intolerance         Objective     Physical Exam:  Vital Signs:   Visit Vitals   • /69   • Pulse 105   • Temp (!) 102.3 °F (39.1 °C) (Oral)   • Resp 28   • Ht 68\" (172.7 cm)   • Wt 250 lb (113 kg)   • SpO2 91%   • BMI 38.01 kg/m2     Physical Exam   Constitutional: She is oriented to person, place, and time. She appears well-developed and well-nourished. She has a sickly appearance. She appears distressed (shortness of breath with talking).   Diaphoretic  Obese   HENT:   Head: Normocephalic and atraumatic.   Eyes: Conjunctivae are normal.   Neck: Normal range of motion. Neck supple.   Cardiovascular: Regular rhythm and normal heart sounds.    No murmur heard.  Sinus tachycardia "   Pulmonary/Chest: She is in respiratory distress. She has wheezes.   Decreased air exchange throughout, few scattered expiratory wheezes, increased labored breathing with exertion, O2 at 4 liters   Abdominal: Soft. Bowel sounds are normal. She exhibits no distension. There is no tenderness. There is no guarding.   Musculoskeletal: Normal range of motion. She exhibits edema (mild BLE).   Neurological: She is alert and oriented to person, place, and time.   Skin: Skin is warm and dry. No erythema.   Psychiatric: She has a normal mood and affect. Her behavior is normal. Judgment and thought content normal.     Results Reviewed:    Results from last 7 days  Lab Units 03/01/17  1248   WBC 10*3/mm3 15.41*   HEMOGLOBIN g/dL 12.6   PLATELETS 10*3/mm3 196       Results from last 7 days  Lab Units 03/01/17  1248   SODIUM mmol/L 133   POTASSIUM mmol/L 3.7   TOTAL CO2 mmol/L 31.0   CREATININE mg/dL 1.00   GLUCOSE mg/dL 118*   CALCIUM mg/dL 9.4       I have personally reviewed and interpreted available lab data, radiology studies and ECG obtained at time of admission.     Assessment / Plan     Assessment/Problem List:   Active Problems:    COPD (chronic obstructive pulmonary disease)    Tobacco abuse    Acute on chronic respiratory failure    Leukocytosis    Community acquired pneumonia    Hypothyroid    Sepsis    Anxiety and depression    Patient is a 52-year-old  female with history of noncompliance.  She smokes 2-3 packs of cigarettes per day.  She presented to [facility] ED with increased shortness of breath and cough progressively worse over the past week.  Reports recent hospitalization to [facility] about a week ago but has not gotten any better.      Plan:    Sepsis  --meets criteria due to febrile, hypoxia, tachycardia, leukocytosis and infectious source  --monitor    Pneumonia  --IV azithromycin, vancomycin and zosyn  --tylenol PRN   --oxygen PRN  --solumedrol 60 mg every 8 hours  --sputum cx to  "r/o TB  --CXR with opacification in [facility] suggesting either infiltrate or underlying lesion  --CTA final report pending    Leukocytosis  --2/2 above  --WBC 15.41   --monitor    Acute on chronic respiratory failure  --has been told she needs oxygen at home in the past but refuses to wear it  --PO2 59.0  --oxygen PRN    Hypothyroidism  --takes levothyroxine 50 mcg   --can't remember when last level was checked  --TSH in the AM    Tobacco abuse  --smoking cessation education  --nicotine patch    Anxiety and depression  --continue home meds  --has other psychiatric history but reports she stopped taking her \"crazy pills\" a week ago because they were causing her to hallucinate      CM consulted for help with social issues.  Patient is homeless and lives with friends and family.  Most recently has been staying at [facility].  May need help with placement at discharge.  She also is requesting assistance with finding a new PCP.  Patient reports recent admission to [facility] and have requested records.      DVT prophylaxis:  Lovenox, SCDs/TEDs  Code Status:  AND  Admission Status: Patient will be admitted to (LEXOBS or LEXINPT)     Ashlee Lucero, APRN 03/01/17 4:08 PM      Brief Attending Note       I have seen and examined the patient, performing an independent face-to-face diagnostic evaluation with plan of care reviewed and developed with the advanced practice clinician (APC).      Brief Summary Statement/HPI:   52 year old female presenting from [facility] with fever, chills, cough. Patient was discharged from [facility] around 1 month ago where she was treated for community acquired pneumonia. She was discharged on 3L NC but has not been using this. She has not felt any better since she was discharged from [facility]. She has had fever/chills and ? weight loss. She is currently still complaining of dyspnea. She does feel slightly better since arrival.       Attending Physical Exam:  Gen-mod distress, labored " breathing  HEENT-atraumatic, normocephalic, PERRLA, EOMI, moist mucous membranes present  CV-slightly tachy, no murmur  Resp-increased WOB, wheezes throughout  Abd-soft, NT, ND, +BS; no rebound or guarding, obese  Ext-tr edema bilaterally  Skin-multiple skin lesions throughout extremities presumably from picking. vitiligo on chest  Neuro-A&Ox3, CN II-XII grossly intact; equal strength in upper and lower extremities; no focal deficits  Psych-appropriate mood and behavior    CT chest personally reviewed with [facility] nodular consolidation with surrounding airspace disease. Agree with interpretation.    Brief Assessment/Plan :    Sepsis due to presumed HCAP  --Will treat for HCAP with vanc/zosyn/azithro. Obtain recent records from [facility].  --Sputum cx, sputum for AFB (given homelessness and persistent sx), urine ag, blood cultures.  --Will need repeat imaging in 3-4 weeks to ensure resolution of airspace disease.  --Would have low threshold for pulmonary consult if patient doesn't improve given recent hospitalization and concerns noted above.    COPD exacerbation  --Abx, nebs, steroids, O2    Homelessness  --SW + CM consult.    See above for further detailed assessment and plan developed with APC which I have reviewed and/or edited.    I believe this patient meets inpatient.      Effie Cardenas II, DO  03/01/17  5:28 PM

## 2017-03-02 LAB
ANION GAP SERPL CALCULATED.3IONS-SCNC: 4 MMOL/L (ref 3–11)
BUN BLD-MCNC: 12 MG/DL (ref 9–23)
BUN/CREAT SERPL: 13.3 (ref 7–25)
CALCIUM SPEC-SCNC: 9.5 MG/DL (ref 8.7–10.4)
CHLORIDE SERPL-SCNC: 101 MMOL/L (ref 99–109)
CO2 SERPL-SCNC: 32 MMOL/L (ref 20–31)
CREAT BLD-MCNC: 0.9 MG/DL (ref 0.6–1.3)
DEPRECATED RDW RBC AUTO: 54.4 FL (ref 37–54)
ERYTHROCYTE [DISTWIDTH] IN BLOOD BY AUTOMATED COUNT: 15.6 % (ref 11.3–14.5)
GFR SERPL CREATININE-BSD FRML MDRD: 65 ML/MIN/1.73
GLUCOSE BLD-MCNC: 147 MG/DL (ref 70–100)
HCT VFR BLD AUTO: 36 % (ref 34.5–44)
HGB BLD-MCNC: 11.7 G/DL (ref 11.5–15.5)
MCH RBC QN AUTO: 30.6 PG (ref 27–31)
MCHC RBC AUTO-ENTMCNC: 32.5 G/DL (ref 32–36)
MCV RBC AUTO: 94.2 FL (ref 80–99)
PLATELET # BLD AUTO: 191 10*3/MM3 (ref 150–450)
PMV BLD AUTO: 9.4 FL (ref 6–12)
POTASSIUM BLD-SCNC: 3.9 MMOL/L (ref 3.5–5.5)
RBC # BLD AUTO: 3.82 10*6/MM3 (ref 3.89–5.14)
SODIUM BLD-SCNC: 137 MMOL/L (ref 132–146)
TSH SERPL DL<=0.05 MIU/L-ACNC: 0.48 MIU/ML (ref 0.35–5.35)
WBC NRBC COR # BLD: 16.45 10*3/MM3 (ref 3.5–10.8)

## 2017-03-02 PROCEDURE — 87186 SC STD MICRODIL/AGAR DIL: CPT | Performed by: NURSE PRACTITIONER

## 2017-03-02 PROCEDURE — 25010000002 VANCOMYCIN HCL IN NACL 1.5-0.9 GM/500ML-% SOLUTION: Performed by: INTERNAL MEDICINE

## 2017-03-02 PROCEDURE — 25010000002 KETOROLAC TROMETHAMINE PER 15 MG: Performed by: INTERNAL MEDICINE

## 2017-03-02 PROCEDURE — 87449 NOS EACH ORGANISM AG IA: CPT | Performed by: INTERNAL MEDICINE

## 2017-03-02 PROCEDURE — 84443 ASSAY THYROID STIM HORMONE: CPT | Performed by: INTERNAL MEDICINE

## 2017-03-02 PROCEDURE — 99233 SBSQ HOSP IP/OBS HIGH 50: CPT | Performed by: INTERNAL MEDICINE

## 2017-03-02 PROCEDURE — 94799 UNLISTED PULMONARY SVC/PX: CPT

## 2017-03-02 PROCEDURE — 85027 COMPLETE CBC AUTOMATED: CPT | Performed by: INTERNAL MEDICINE

## 2017-03-02 PROCEDURE — 25010000002 AZITHROMYCIN: Performed by: NURSE PRACTITIONER

## 2017-03-02 PROCEDURE — 94760 N-INVAS EAR/PLS OXIMETRY 1: CPT

## 2017-03-02 PROCEDURE — 94640 AIRWAY INHALATION TREATMENT: CPT

## 2017-03-02 PROCEDURE — 87070 CULTURE OTHR SPECIMN AEROBIC: CPT | Performed by: NURSE PRACTITIONER

## 2017-03-02 PROCEDURE — 25010000002 ENOXAPARIN PER 10 MG: Performed by: INTERNAL MEDICINE

## 2017-03-02 PROCEDURE — 87205 SMEAR GRAM STAIN: CPT | Performed by: NURSE PRACTITIONER

## 2017-03-02 PROCEDURE — 87077 CULTURE AEROBIC IDENTIFY: CPT | Performed by: NURSE PRACTITIONER

## 2017-03-02 PROCEDURE — 87147 CULTURE TYPE IMMUNOLOGIC: CPT | Performed by: NURSE PRACTITIONER

## 2017-03-02 PROCEDURE — 25010000002 PIPERACILLIN-TAZOBACTAM: Performed by: INTERNAL MEDICINE

## 2017-03-02 PROCEDURE — 80048 BASIC METABOLIC PNL TOTAL CA: CPT | Performed by: INTERNAL MEDICINE

## 2017-03-02 PROCEDURE — 87206 SMEAR FLUORESCENT/ACID STAI: CPT | Performed by: INTERNAL MEDICINE

## 2017-03-02 PROCEDURE — 25010000002 METHYLPREDNISOLONE PER 40 MG: Performed by: INTERNAL MEDICINE

## 2017-03-02 PROCEDURE — 87116 MYCOBACTERIA CULTURE: CPT | Performed by: INTERNAL MEDICINE

## 2017-03-02 PROCEDURE — 25010000002 METHYLPREDNISOLONE PER 125 MG: Performed by: NURSE PRACTITIONER

## 2017-03-02 RX ORDER — KETOROLAC TROMETHAMINE 30 MG/ML
30 INJECTION, SOLUTION INTRAMUSCULAR; INTRAVENOUS EVERY 6 HOURS PRN
Status: DISCONTINUED | OUTPATIENT
Start: 2017-03-02 | End: 2017-03-02

## 2017-03-02 RX ORDER — IPRATROPIUM BROMIDE AND ALBUTEROL SULFATE 2.5; .5 MG/3ML; MG/3ML
3 SOLUTION RESPIRATORY (INHALATION) EVERY 4 HOURS PRN
Status: DISCONTINUED | OUTPATIENT
Start: 2017-03-02 | End: 2017-03-25 | Stop reason: HOSPADM

## 2017-03-02 RX ORDER — METHYLPREDNISOLONE SODIUM SUCCINATE 40 MG/ML
20 INJECTION, POWDER, LYOPHILIZED, FOR SOLUTION INTRAMUSCULAR; INTRAVENOUS EVERY 8 HOURS
Status: DISCONTINUED | OUTPATIENT
Start: 2017-03-02 | End: 2017-03-03

## 2017-03-02 RX ORDER — KETOROLAC TROMETHAMINE 15 MG/ML
15 INJECTION, SOLUTION INTRAMUSCULAR; INTRAVENOUS ONCE
Status: COMPLETED | OUTPATIENT
Start: 2017-03-02 | End: 2017-03-02

## 2017-03-02 RX ADMIN — DULOXETINE 60 MG: 60 CAPSULE, DELAYED RELEASE ORAL at 08:31

## 2017-03-02 RX ADMIN — DULOXETINE 60 MG: 60 CAPSULE, DELAYED RELEASE ORAL at 20:52

## 2017-03-02 RX ADMIN — AZITHROMYCIN 500 MG: 500 INJECTION, POWDER, LYOPHILIZED, FOR SOLUTION INTRAVENOUS at 08:30

## 2017-03-02 RX ADMIN — VANCOMYCIN HYDROCHLORIDE 1500 MG: 1 INJECTION, POWDER, LYOPHILIZED, FOR SOLUTION INTRAVENOUS at 05:42

## 2017-03-02 RX ADMIN — LEVOTHYROXINE SODIUM 50 MCG: 25 TABLET ORAL at 05:42

## 2017-03-02 RX ADMIN — TAZOBACTAM SODIUM AND PIPERACILLIN SODIUM 4.5 G: .5; 4 INJECTION, POWDER, LYOPHILIZED, FOR SOLUTION INTRAVENOUS at 01:28

## 2017-03-02 RX ADMIN — Medication 2 TABLET: at 08:29

## 2017-03-02 RX ADMIN — METHYLPREDNISOLONE SODIUM SUCCINATE 20 MG: 40 INJECTION, POWDER, FOR SOLUTION INTRAMUSCULAR; INTRAVENOUS at 20:53

## 2017-03-02 RX ADMIN — GABAPENTIN 400 MG: 400 CAPSULE ORAL at 08:31

## 2017-03-02 RX ADMIN — ENOXAPARIN SODIUM 40 MG: 40 INJECTION SUBCUTANEOUS at 08:29

## 2017-03-02 RX ADMIN — TAZOBACTAM SODIUM AND PIPERACILLIN SODIUM 4.5 G: .5; 4 INJECTION, POWDER, LYOPHILIZED, FOR SOLUTION INTRAVENOUS at 14:15

## 2017-03-02 RX ADMIN — TAZOBACTAM SODIUM AND PIPERACILLIN SODIUM 4.5 G: .5; 4 INJECTION, POWDER, LYOPHILIZED, FOR SOLUTION INTRAVENOUS at 17:21

## 2017-03-02 RX ADMIN — IPRATROPIUM BROMIDE AND ALBUTEROL SULFATE 3 ML: .5; 3 SOLUTION RESPIRATORY (INHALATION) at 13:20

## 2017-03-02 RX ADMIN — HYDROCODONE BITARTRATE AND ACETAMINOPHEN 1 TABLET: 5; 325 TABLET ORAL at 11:58

## 2017-03-02 RX ADMIN — FLUTICASONE PROPIONATE 2 SPRAY: 50 SPRAY, METERED NASAL at 08:31

## 2017-03-02 RX ADMIN — HYDROCODONE POLISTIREX AND CHLORPHENIRAMINE POLISTIREX 5 ML: 10; 8 SUSPENSION, EXTENDED RELEASE ORAL at 16:31

## 2017-03-02 RX ADMIN — QUETIAPINE 300 MG: 200 TABLET, EXTENDED RELEASE ORAL at 20:52

## 2017-03-02 RX ADMIN — VANCOMYCIN HYDROCHLORIDE 1500 MG: 1 INJECTION, POWDER, LYOPHILIZED, FOR SOLUTION INTRAVENOUS at 17:21

## 2017-03-02 RX ADMIN — HYDROCODONE BITARTRATE AND ACETAMINOPHEN 1 TABLET: 5; 325 TABLET ORAL at 01:32

## 2017-03-02 RX ADMIN — GABAPENTIN 400 MG: 400 CAPSULE ORAL at 20:52

## 2017-03-02 RX ADMIN — IPRATROPIUM BROMIDE AND ALBUTEROL SULFATE 3 ML: .5; 3 SOLUTION RESPIRATORY (INHALATION) at 20:48

## 2017-03-02 RX ADMIN — METHYLPREDNISOLONE SODIUM SUCCINATE 60 MG: 125 INJECTION, POWDER, FOR SOLUTION INTRAMUSCULAR; INTRAVENOUS at 14:15

## 2017-03-02 RX ADMIN — KETOROLAC TROMETHAMINE 15 MG: 15 INJECTION, SOLUTION INTRAMUSCULAR; INTRAVENOUS at 16:31

## 2017-03-02 RX ADMIN — CETIRIZINE HYDROCHLORIDE 10 MG: 10 TABLET, FILM COATED ORAL at 08:31

## 2017-03-02 RX ADMIN — IPRATROPIUM BROMIDE AND ALBUTEROL SULFATE 3 ML: .5; 3 SOLUTION RESPIRATORY (INHALATION) at 07:29

## 2017-03-02 RX ADMIN — MONTELUKAST SODIUM 10 MG: 10 TABLET, FILM COATED ORAL at 20:52

## 2017-03-02 RX ADMIN — NICOTINE 1 PATCH: 21 PATCH, EXTENDED RELEASE TRANSDERMAL at 08:32

## 2017-03-02 RX ADMIN — IPRATROPIUM BROMIDE AND ALBUTEROL SULFATE 3 ML: .5; 3 SOLUTION RESPIRATORY (INHALATION) at 15:31

## 2017-03-02 RX ADMIN — PANTOPRAZOLE SODIUM 40 MG: 40 TABLET, DELAYED RELEASE ORAL at 08:29

## 2017-03-02 RX ADMIN — METHYLPREDNISOLONE SODIUM SUCCINATE 60 MG: 125 INJECTION, POWDER, FOR SOLUTION INTRAMUSCULAR; INTRAVENOUS at 05:42

## 2017-03-02 RX ADMIN — TIZANIDINE 4 MG: 4 TABLET ORAL at 08:31

## 2017-03-02 RX ADMIN — ONDANSETRON 4 MG: 4 TABLET, FILM COATED ORAL at 16:47

## 2017-03-02 RX ADMIN — TAZOBACTAM SODIUM AND PIPERACILLIN SODIUM 4.5 G: .5; 4 INJECTION, POWDER, LYOPHILIZED, FOR SOLUTION INTRAVENOUS at 05:42

## 2017-03-02 RX ADMIN — TIZANIDINE 4 MG: 4 TABLET ORAL at 20:52

## 2017-03-02 RX ADMIN — FUROSEMIDE 40 MG: 40 TABLET ORAL at 08:31

## 2017-03-02 NOTE — PLAN OF CARE
Problem: Patient Care Overview (Adult)  Goal: Plan of Care Review  Outcome: Ongoing (interventions implemented as appropriate)    03/02/17 0251   Coping/Psychosocial Response Interventions   Plan Of Care Reviewed With patient   Patient Care Overview   Progress improving         Problem: Pneumonia (Adult)  Goal: Signs and Symptoms of Listed Potential Problems Will be Absent or Manageable (Pneumonia)  Outcome: Ongoing (interventions implemented as appropriate)

## 2017-03-02 NOTE — PROGRESS NOTES
Continued Stay Note  [facility]     Patient Name: Colleen Gonzalez  MRN: [id number]  Today's Date: 3/2/2017    Admit Date: 3/1/2017          Discharge Plan       03/02/17 0942    Case Management/Social Work Plan    Plan Social work met with Mrs. Gonzalez because she is homeless and she was staying at the [facility].  She said that she had her car towed and it is now in the [employer] lot at [phone].  Social work called the lot to see if they offer assistance if a person is admitted to the hospital and they do not.  They said it would cost $35 per day that the car is in the [employer] lot.  Social work provided homeless information to the patient inclusing food, shelter, clothing, and medical assistance.      Patient/Family In Agreement With Plan yes              Discharge Codes     None        Expected Discharge Date and Time     Expected Discharge Date Expected Discharge Time    Mar 4, 2017             SOPHIE Rivera

## 2017-03-02 NOTE — PROGRESS NOTES
Continued Stay Note  [facility]     Patient Name: Colleen Gonzalez  MRN: [id number]  Today's Date: 3/2/2017    Admit Date: 3/1/2017          Discharge Plan       03/02/17 1055    Case Management/Social Work Plan    Additional Comments Left VM with Monica to assist with obtaining new PCP per pt request.        03/02/17 0935    Case Management/Social Work Plan    Plan Social work met with Mrs. Gonzalez because she is homeless and she was staying at the [facility].  She said that she had her car towed and it is now in the [employer] at [phone].  Social work called the lot to see if they offer assistance if a person is admitted to the hospital and they do not.  They said it would cost $35 per day that the car is in the [employer] lot.  Social work provided homeless information to the patient inclusing food, shelter, clothing, and medical assistance.      Patient/Family In Agreement With Plan yes              Discharge Codes     None        Expected Discharge Date and Time     Expected Discharge Date Expected Discharge Time    Mar 4, 2017             Jessie Bennett

## 2017-03-02 NOTE — PAYOR COMM NOTE
"Colleen Gonzalez (52 y.o. Female) Initial notification and clinicals ( 1965)    Date of Birth Social Security Number Address Home Phone MRN    1965  [id number] [facility] [address] [phone] [id number]    Congregational Marital Status          None Single       Admission Date Admission Type Admitting Provider Attending Provider Department, Room/Bed    3/1/17 Emergency Effie Cardenas II, Effie Sr II,  [facility] 4G, S448/1    Discharge Date Discharge Disposition Discharge Destination                      Attending Provider: Effie Cardenas II, DO     Allergies:  Aspirin, Ibuprofen    Isolation:  None   Infection:  None   Code Status:  FULL    Ht:  68\" (172.7 cm)   Wt:  250 lb (113 kg)    Admission Cmt:  None   Principal Problem:  Sepsis [A41.9]                 Active Insurance as of 3/1/2017     Primary Coverage     Payor Plan Insurance Group Employer/Plan Group    [employer] [place] [employer] [place]ICA[place]      Payor Plan Address Payor Plan Phone Number Effective From Effective To    [address] [address] [phone] 3/1/2017     [place] [address]       Subscriber Name Subscriber Birth Date Member ID       COLLEEN GONZALEZ 1965 [id number]                 Emergency Contacts      (Rel.) Home Phone Work Phone Mobile Phone    Eneida Way (Sister) -- -- [phone]    Raquel Dumont (Sister) -- -- [phone]               Emergency Department Notes      Carlos Park MD at 3/1/2017  1:23 PM      Procedure Orders:    1. Critical Care [[id number]] ordered by Carlos Park MD at 17 1505                Subjective   HPI Comments: Colleen Gonzalez is a 52 y.o.female with a hx of COPD who presents to the ED c/o worsening SOA for the past month with it significantly worsening this past week. Pt has a moderately productive cough with greenish yellow sputum. Pt additionally reports severe lower back pain and is requesting \"muscle rub.\" She is a smoker and states she " smokes 2 packs a day, but is trying to quit. She does not wear home O2.    PMHx of PNA.    Patient is a 52 y.o. female presenting with shortness of breath.   History provided by:  Patient  Shortness of Breath   Severity:  Moderate  Duration:  1 month  Timing:  Constant  Progression:  Worsening  Chronicity:  Chronic  Associated symptoms: cough and wheezing    Associated symptoms: no abdominal pain    Cough:     Cough characteristics:  Productive    Sputum characteristics:  Green and yellow    Severity:  Moderate    Timing:  Constant    Chronicity:  Chronic  Risk factors: tobacco use        Review of Systems   Constitutional: Positive for appetite change and fatigue.   HENT: Positive for congestion.    Respiratory: Positive for cough, shortness of breath and wheezing.    Cardiovascular: Negative for leg swelling.   Gastrointestinal: Negative for abdominal pain.   Musculoskeletal: Positive for back pain (Severe lower back pain.). other systems reviewed and are negative.      History reviewed. No pertinent past medical history.    Allergies   Allergen Reactions   • Aspirin GI Intolerance   • Ibuprofen GI Intolerance       History reviewed. No pertinent past surgical history.    History reviewed. No pertinent family history.    Social History     Social History   • Marital status: Single     Spouse name: N/A   • Number of children: N/A   • Years of education: N/A     Social History Main Topics   • Smoking status: Heavy Tobacco Smoker     Packs/day: 3.00   • Smokeless tobacco: None   • Alcohol use No   • Drug use: None   • Sexual activity: Defer     Other Topics Concern   • None     Social History Narrative   • None         Objective   Physical Exam   Constitutional: She is oriented to person, place, and time. She appears well-developed and well-nourished.   Pt appears distressed, sitting in tripod position.   HENT:   Head: Normocephalic and atraumatic.   Right Ear: External ear normal.   Left Ear: External ear normal.    Nose: Nose normal.   Mouth/Throat: Mucous membranes are dry.   Eyes: Conjunctivae and EOM are normal.   Neck: Normal range of motion. Neck supple.   Cardiovascular: Regular rhythm and normal heart sounds.  Tachycardia present.  Exam reveals no gallop and no friction rub.  murmur heard.  Pulmonary/Chest: She is in respiratory distress. She has decreased breath sounds. She has wheezes (Expiratory wheezing in all fields.). She has rhonchi (Scattered rhonchi in all lung fields.). She has no rales.   She is speaking in several word phrases. Distant breath sounds in all fields.   Abdominal: Soft. There is no tenderness.   Musculoskeletal: Normal range of motion. She exhibits tenderness (Left lower lumbar paramuscular tenderness to palpation with no palpable spasm.).   Neurological: She is alert and oriented to person, place, and time.   Skin: Skin is warm and dry.   Psychiatric: She has a normal mood and affect. Her behavior is normal. Judgment and thought content normal. note and vitals reviewed.      Critical Careby: LISSETH LOERA  Authorized by: LISSETH LOERA   Total critical care time: 30 minutes  Critical care time was exclusive of separately billable procedures and treating other patients and teaching time.  Critical care was necessary to treat or prevent imminent or life-threatening deterioration of the following conditions: respiratory failure.  Critical care was time spent personally by me on the following activities: blood draw for specimens, development of treatment plan with patient or surrogate, interpretation of cardiac output measurements, examination of patient, ordering and performing treatments and interventions, ordering and review of radiographic studies, re-evaluation of patient's condition, pulse oximetry, ordering and review of laboratory studies, obtaining history from patient or surrogate, evaluation of patient's response to treatment and review of old charts.              ED Course  ED  Course   Comment By Time   I discussed the pt's case in detail with Dr. Sloan, hospitalist, who will admit the pt to the hospital.  OLGA LIDIA Montez 03/01 1452       Recent Results (from the past 24 hour(s))   Comprehensive Metabolic Panel    Collection Time: 03/01/17 12:48 PM   Result Value Ref Range    Glucose 118 (H) 70 - 100 mg/dL    BUN 6 (L) 9 - 23 mg/dL    Creatinine 1.00 0.60 - 1.30 mg/dL    Sodium 133 132 - 146 mmol/L    Potassium 3.7 3.5 - 5.5 mmol/L    Chloride 98 (L) 99 - 109 mmol/L    CO2 31.0 20.0 - 31.0 mmol/L    Calcium 9.4 8.7 - 10.4 mg/dL    Total Protein 7.1 5.7 - 8.2 g/dL    Albumin 3.70 3.20 - 4.80 g/dL    ALT (SGPT) 11 7 - 40 U/L    AST (SGOT) 19 0 - 33 U/L    Alkaline Phosphatase 91 25 - 100 U/L    Total Bilirubin 0.3 0.3 - 1.2 mg/dL    eGFR Non African Amer 58 (L) >60 mL/min/1.73    Globulin 3.4 gm/dL    A/G Ratio 1.1 (L) 1.5 - 2.5 g/dL    BUN/Creatinine Ratio 6.0 (L) 7.0 - 25.0    Anion Gap 4.0 3.0 - 11.0 mmol/L   BNP    Collection Time: 03/01/17 12:48 PM   Result Value Ref Range    .0 (H) 0.0 - 100.0 pg/mL   CBC Auto Differential    Collection Time: 03/01/17 12:48 PM   Result Value Ref Range    WBC 15.41 (H) 3.50 - 10.80 10*3/mm3    RBC 4.09 3.89 - 5.14 10*6/mm3    Hemoglobin 12.6 11.5 - 15.5 g/dL    Hematocrit 38.2 34.5 - 44.0 %    MCV 93.4 80.0 - 99.0 fL    MCH 30.8 27.0 - 31.0 pg    MCHC 33.0 32.0 - 36.0 g/dL    RDW 15.4 (H) 11.3 - 14.5 %    RDW-SD 53.3 37.0 - 54.0 fl    MPV 9.2 6.0 - 12.0 fL    Platelets 196 150 - 450 10*3/mm3    Neutrophil % 77.7 (H) 41.0 - 71.0 %    Lymphocyte % 8.2 (L) 24.0 - 44.0 %    Monocyte % 13.2 (H) 0.0 - 12.0 %    Eosinophil % 0.0 0.0 - 3.0 %    Basophil % 0.1 0.0 - 1.0 %    Immature Grans % 0.8 (H) 0.0 - 0.6 %    Neutrophils, Absolute 11.98 (H) 1.50 - 8.30 10*3/mm3    Lymphocytes, Absolute 1.26 0.60 - 4.80 10*3/mm3    Monocytes, Absolute 2.03 (H) 0.00 - 1.00 10*3/mm3    Eosinophils, Absolute 0.00 (L) 0.10 - 0.30 10*3/mm3    Basophils,  "Absolute 0.02 0.00 - 0.20 10*3/mm3    Immature Grans, Absolute 0.12 (H) 0.00 - 0.03 10*3/mm3   Lactic Acid, Plasma    Collection Time: 03/01/17 12:48 PM   Result Value Ref Range    Lactate 1.1 0.5 - 2.0 mmol/L   Troponin    Collection Time: 03/01/17 12:48 PM   Result Value Ref Range    Troponin I <0.006 <=0.039 ng/mL     Note: In addition to lab results from this visit, the labs listed above may include labs taken at another facility or during a different encounter within the last 24 hours. Please correlate lab times with ED admission and discharge times for further clarification of the services performed during this visit.    XR Chest 1 View    (Results Pending)   CT Angiogram Chest With Contrast    (Results Pending)     Vitals:    03/01/17 1218 03/01/17 1330 03/01/17 1430 03/01/17 1430   BP: 114/69  116/69    BP Location: Right arm      Patient Position: Sitting      Pulse: 107 105 104 105   Resp: 20   28   Temp: (!) 100.6 °F (38.1 °C)   (!) 102.3 °F (39.1 °C)   TempSrc: Oral   Oral   SpO2: 91% (!) 83% 91% 91%   Weight: 250 lb (113 kg)      Height: 68\" (172.7 cm)        Medications   sodium chloride 0.9 % flush 10 mL (not administered)   AZITHROMYCIN 500 MG/250 ML 0.9% NS IVPB (MBP) (500 mg Intravenous New Bag 3/1/17 1430)   ipratropium-albuterol (DUO-NEB) nebulizer solution 3 mL (3 mL Nebulization Given 3/1/17 1328)   methylPREDNISolone sodium succinate (SOLU-Medrol) injection 125 mg (125 mg Intravenous Given 3/1/17 1347)   cefTRIAXone (ROCEPHIN) IVPB 1 g (0 g Intravenous Stopped 3/1/17 1422)   iopamidol (ISOVUE-370) 76 % injection 100 mL (65 mL Intravenous Given 3/1/17 1357)   acetaminophen (TYLENOL) tablet 1,000 mg (1,000 mg Oral Given 3/1/17 1438)   HYDROmorphone (DILAUDID) injection 0.5 mg (0.5 mg Intravenous Given 3/1/17 1438)   ondansetron (ZOFRAN) injection 4 mg (4 mg Intravenous Given 3/1/17 1438)     ECG/EMG Results (last 24 hours)     Procedure Component Value Units Date/Time    ECG 12 Lead [[id number]] " Collected:  03/01/17 1218     Updated:  03/01/17 1228                      [facility]    Final diagnoses:   Community acquired pneumonia   COPD exacerbation   Hypoxia       Documentation assistance provided by nancy Montez.  Information recorded by the elaineibe was done at my direction and has been verified and validated by me.    Rashid Montez  03/01/17 1337      Rashid Montez  03/01/17 1505      Rashid Montez  03/01/17 1506      Carlos Park MD  03/01/17 2244       Electronically signed by Carlos Park MD at 3/1/2017 10:44 PM        Vital Signs (last 24 hours)       02/28 0700  -  03/01 0659 03/01 0700  -  03/02 0400   Most Recent    Temp (°F)   97.5 -  (!)102.3     97.5 (36.4)    Heart Rate   81 -  107     81    Resp   20 -  28     20    BP   100/70 -  116/69     108/72    SpO2 (%)   (!)83 -  92     92          Hospital Medications (all)       Dose Frequency Start End    ! Vanc trough  Once 3/3/2017     Sig - Route: 1 (One) Time. - Does not apply    acetaminophen (TYLENOL) tablet 1,000 mg 1,000 mg Once 3/1/2017 3/1/2017    Sig - Route: Take 2 tablets by mouth 1 (One) Time. - Oral    acetaminophen (TYLENOL) tablet 650 mg 650 mg Every 4 Hours PRN 3/1/2017     Sig - Route: Take 2 tablets by mouth Every 4 (Four) Hours As Needed for mild pain (1-3). - Oral    AZITHROMYCIN 500 MG/250 ML 0.9% NS IVPB (MBP) 500 mg Once 3/1/2017 3/1/2017    Sig - Route: Infuse 500 mg into a venous catheter 1 (One) Time. - Intravenous    AZITHROMYCIN 500 MG/250 ML 0.9% NS IVPB (MBP) 500 mg Every 24 Hours 3/2/2017     Sig - Route: Infuse 500 mg into a venous catheter Daily. - Intravenous    bisacodyl (DULCOLAX) EC tablet 5 mg 5 mg Daily PRN 3/1/2017     Sig - Route: Take 1 tablet by mouth Daily As Needed for constipation. - Oral    cefTRIAXone (ROCEPHIN) IVPB 1 g 1 g Once 3/1/2017 3/1/2017    Sig - Route: Infuse 50 mL into a venous catheter 1 (One) Time. - Intravenous    cetirizine (zyrTEC)  tablet 10 mg 10 mg Daily 3/2/2017     Sig - Route: Take 1 tablet by mouth Daily. - Oral    DULoxetine (CYMBALTA) DR capsule 60 mg 60 mg Every 12 Hours Scheduled 3/1/2017     Sig - Route: Take 1 capsule by mouth Every 12 (Twelve) Hours. - Oral    enoxaparin (LOVENOX) syringe 40 mg 40 mg Daily 3/1/2017     Sig - Route: Inject 0.4 mL under the skin Daily. - Subcutaneous    fluticasone (FLONASE) 50 MCG/ACT nasal spray 2 spray 2 spray Daily 3/2/2017     Sig - Route: 2 sprays into each nostril Daily. - Nasal    furosemide (LASIX) tablet 40 mg 40 mg Daily 3/2/2017     Sig - Route: Take 1 tablet by mouth Daily. - Oral    gabapentin (NEURONTIN) capsule 400 mg 400 mg Every 12 Hours Scheduled 3/1/2017     Sig - Route: Take 1 capsule by mouth Every 12 (Twelve) Hours. - Oral    HYDROcodone-acetaminophen (NORCO) 5-325 MG per tablet 1 tablet 1 tablet Every 4 Hours PRN 3/1/2017 3/11/2017    Sig - Route: Take 1 tablet by mouth Every 4 (Four) Hours As Needed for moderate pain (4-6). - Oral    HYDROmorphone (DILAUDID) injection 0.5 mg 0.5 mg Once 3/1/2017 3/1/2017    Sig - Route: Infuse 0.5 mg into a venous catheter 1 (One) Time. - Intravenous    iopamidol (ISOVUE-370) 76 % injection 100 mL 100 mL Once in Imaging 3/1/2017 3/1/2017    Sig - Route: Infuse 100 mL into a venous catheter Once. - Intravenous    ipratropium-albuterol (DUO-NEB) nebulizer solution 3 mL 3 mL Once 3/1/2017 3/1/2017    Sig - Route: Take 3 mL by nebulization 1 (One) Time. - Nebulization    ipratropium-albuterol (DUO-NEB) nebulizer solution 3 mL 3 mL 4 Times Daily - RT 3/1/2017     Sig - Route: Take 3 mL by nebulization 4 (Four) Times a Day. - Nebulization    ipratropium-albuterol (DUO-NEB) nebulizer solution 3 mL 3 mL Every 4 Hours PRN 3/2/2017     Sig - Route: Take 3 mL by nebulization Every 4 (Four) Hours As Needed for wheezing or shortness of air. - Nebulization    levothyroxine (SYNTHROID, LEVOTHROID) tablet 50 mcg 50 mcg Every Early Morning 3/2/2017     Sig -  "Route: Take 1 tablet by mouth Every Morning. - Oral    methylPREDNISolone sodium succinate (SOLU-Medrol) injection 125 mg 125 mg Once 3/1/2017 3/1/2017    Sig - Route: Infuse 2 mL into a venous catheter 1 (One) Time. - Intravenous    methylPREDNISolone sodium succinate (SOLU-Medrol) injection 60 mg 60 mg Every 8 Hours 3/1/2017     Sig - Route: Infuse 0.96 mL into a venous catheter Every 8 (Eight) Hours. - Intravenous    montelukast (SINGULAIR) tablet 10 mg 10 mg Nightly 3/1/2017     Sig - Route: Take 1 tablet by mouth Every Night. - Oral    nicotine (NICODERM CQ) 21 MG/24HR patch 1 patch 1 patch Every 24 Hours Scheduled 3/1/2017     Sig - Route: Place 1 patch on the skin Daily. - Transdermal    ondansetron (ZOFRAN) injection 4 mg 4 mg Once 3/1/2017 3/1/2017    Sig - Route: Infuse 2 mL into a venous catheter 1 (One) Time. - Intravenous    ondansetron (ZOFRAN) injection 4 mg 4 mg Every 6 Hours PRN 3/1/2017     Sig - Route: Infuse 2 mL into a venous catheter Every 6 (Six) Hours As Needed for nausea or vomiting. - Intravenous    Linked Group 1:  \"Or\" Linked Group Details        ondansetron (ZOFRAN) tablet 4 mg 4 mg Every 6 Hours PRN 3/1/2017     Sig - Route: Take 1 tablet by mouth Every 6 (Six) Hours As Needed for nausea or vomiting. - Oral    Linked Group 1:  \"Or\" Linked Group Details        pantoprazole (PROTONIX) EC tablet 40 mg 40 mg Every 24 Hours 3/2/2017     Sig - Route: Take 1 tablet by mouth Daily. - Oral    Pharmacy to dose vancomycin  Continuous PRN 3/1/2017     Sig - Route: Continuous As Needed for consult. - Does not apply    piperacillin-tazobactam (ZOSYN) 4.5 g/100 mL 0.9% NS IVPB (mbp) 4.5 g Every 6 Hours 3/1/2017     Sig - Route: Infuse 100 mL into a venous catheter Every 6 (Six) Hours. - Intravenous    QUEtiapine fumarate ER (SEROquel XR) tablet 300 mg 300 mg Nightly 3/1/2017     Sig - Route: Take 300 mg by mouth Every Night. - Oral    sennosides-docusate sodium (SENOKOT-S) 8.6-50 MG tablet 2 tablet 2 " tablet 2 Times Daily 3/1/2017     Sig - Route: Take 2 tablets by mouth 2 (Two) Times a Day. - Oral    sodium chloride 0.9 % flush 1-10 mL 1-10 mL As Needed 3/1/2017     Sig - Route: Infuse 1-10 mL into a venous catheter As Needed for line care. - Intravenous    sodium chloride 0.9 % flush 10 mL 10 mL As Needed 3/1/2017     Sig - Route: Infuse 10 mL into a venous catheter As Needed for line care. - Intravenous    Cosign for Ordering: Accepted by Carlos Park MD on 3/1/2017 10:28 PM    tiZANidine (ZANAFLEX) tablet 4 mg 4 mg Every 12 Hours Scheduled 3/1/2017     Sig - Route: Take 1 tablet by mouth Every 12 (Twelve) Hours. - Oral    vancomycin 2000 mg/500 mL 0.9% NS IVPB ([facility]) 2,000 mg Once 3/1/2017 3/1/2017    Sig - Route: Infuse 500 mL into a venous catheter 1 (One) Time. - Intravenous    vancomycin IVPB 1500 mg in 0.9% NaCl (Premix) 500 mL 1,500 mg Every 12 Hours 3/2/2017     Sig - Route: Infuse 500 mL into a venous catheter Every 12 (Twelve) Hours. - Intravenous    acetaminophen (TYLENOL) tablet 1,000 mg (Discontinued) 1,000 mg Once 3/1/2017 3/1/2017    Sig - Route: Take 2 tablets by mouth 1 (One) Time. - Oral    cefTRIAXone (ROCEPHIN) IVPB 1 g (Discontinued) 1 g Every 24 Hours 3/2/2017 3/1/2017    Sig - Route: Infuse 50 mL into a venous catheter Daily. - Intravenous    piperacillin-tazobactam (ZOSYN) 4.5 g in dextrose 100 mL IVPB (premix) (Discontinued) 4.5 g Every 6 Hours 3/1/2017 3/1/2017    Sig - Route: Infuse 100 mL into a venous catheter Every 6 (Six) Hours. - Intravenous    piperacillin-tazobactam (ZOSYN) 4.5 g/100 mL 0.9% NS IVPB (mbp) (Discontinued) 4.5 g Every 6 Hours 3/1/2017 3/1/2017    Sig - Route: Infuse 100 mL into a venous catheter Every 6 (Six) Hours. - Intravenous            Lab Results (last 24 hours)     Procedure Component Value Units Date/Time    CBC & Differential [[id number]] Collected:  03/01/17 1248    Specimen:  Blood Updated:  03/01/17 1303    Narrative:       The following orders were  created for panel order CBC & Differential.  Procedure                               Abnormality         Status                     ---------                               -----------         ------                     CBC Auto Differential[[id number]]         Abnormal            Final result                 Please view results for these tests on the individual orders.    CBC Auto Differential [[id number]]  (Abnormal) Collected:  03/01/17 1248    Specimen:  Blood Updated:  03/01/17 1303     WBC 15.41 (H) 10*3/mm3      RBC 4.09 10*6/mm3      Hemoglobin 12.6 g/dL      Hematocrit 38.2 %      MCV 93.4 fL      MCH 30.8 pg      MCHC 33.0 g/dL      RDW 15.4 (H) %      RDW-SD 53.3 fl      MPV 9.2 fL      Platelets 196 10*3/mm3      Neutrophil % 77.7 (H) %      Lymphocyte % 8.2 (L) %      Monocyte % 13.2 (H) %      Eosinophil % 0.0 %      Basophil % 0.1 %      Immature Grans % 0.8 (H) %      Neutrophils, Absolute 11.98 (H) 10*3/mm3      Lymphocytes, Absolute 1.26 10*3/mm3      Monocytes, Absolute 2.03 (H) 10*3/mm3      Eosinophils, Absolute 0.00 (L) 10*3/mm3      Basophils, Absolute 0.02 10*3/mm3      Immature Grans, Absolute 0.12 (H) 10*3/mm3     Lactic Acid, Plasma [[id number]]  (Normal) Collected:  03/01/17 1248    Specimen:  Blood Updated:  03/01/17 1320     Lactate 1.1 mmol/L       Falsely depressed results may occur on samples drawn from patients receiving N-Acetylcysteine (NAC) or Metamizole.       BNP [[id number]]  (Abnormal) Collected:  03/01/17 1248    Specimen:  Blood Updated:  03/01/17 1334     .0 (H) pg/mL     Comprehensive Metabolic Panel [[id number]]  (Abnormal) Collected:  03/01/17 1248    Specimen:  Blood Updated:  03/01/17 1337     Glucose 118 (H) mg/dL      BUN 6 (L) mg/dL      Creatinine 1.00 mg/dL      Sodium 133 mmol/L      Potassium 3.7 mmol/L      Chloride 98 (L) mmol/L      CO2 31.0 mmol/L      Calcium 9.4 mg/dL      Total Protein 7.1 g/dL      Albumin 3.70 g/dL      ALT (SGPT) 11 U/L      AST (SGOT) 19  U/L      Alkaline Phosphatase 91 U/L      Total Bilirubin 0.3 mg/dL      eGFR Non African Amer 58 (L) mL/min/1.73      Globulin 3.4 gm/dL      A/G Ratio 1.1 (L) g/dL      BUN/Creatinine Ratio 6.0 (L)      Anion Gap 4.0 mmol/L     Narrative:       National Kid[employer] Guidelines    Stage                           Description                             GFR                      1                               Normal or High                          90+  2                               Mild decrease                            60-89  3                               Moderate decrease                   30-59  4                               Severe decrease                       15-29  5                               Kidney failure                             <15    Troponin [[id number]]  (Normal) Collected:  03/01/17 1248    Specimen:  Blood Updated:  03/01/17 1337     Troponin I <0.006 ng/mL     Narrative:       Ultra Troponin I Reference Range:    <=0.039 ng/mL: Negative  0.04-0.779 ng/mL: Indeterminate Range. Clinical correlation required.  >=0.78  ng/mL: Consistent with myocardial injury. Clinical correlation required.    Blood Gas, Arterial [[id number]]  (Abnormal) Collected:  03/01/17 1510    Specimen:  Arterial Blood Updated:  03/01/17 1520     Site Arterial: left radial      Aramis's Test N/A      pH, Arterial 7.430 pH units      pCO2, Arterial 40.8 mm Hg      pO2, Arterial 59.0 (L) mm Hg      HCO3, Arterial 27.1 (H) mmol/L      Base Excess, Arterial 2.5 (H) mmol/L      Hemoglobin, Blood Gas 12.4 (L) g/dL      Hematocrit, Blood Gas 38.0 %      Oxyhemoglobin 87.9 (L) %      Methemoglobin 0.9 %      Carboxyhemoglobin 2.9 (H) %      CO2 Content 28.3      Barometric Pressure for Blood Gas -- mmHg       N/A        Modality Cannula - Nasal      FIO2 36 %     POC Glucose Fingerstick [[id number]]  (Abnormal) Collected:  03/01/17 1622    Specimen:  Blood Updated:  03/01/17 1629     Glucose 161 (H) mg/dL     Narrative:        Meter: [id number] : [id number] Raghavendra Smith    Light Blue Top [[id number]] Collected:  03/01/17 1248    Specimen:  Blood Updated:  03/01/17 1701     Extra Tube hold for add-on       Auto resulted       Green Top (Gel) [[id number]] Collected:  03/01/17 1248    Specimen:  Blood Updated:  03/01/17 1701     Extra Tube Hold for add-ons.       Auto resulted.       Lavender Top [[id number]] Collected:  03/01/17 1248    Specimen:  Blood Updated:  03/01/17 1701     Extra Tube hold for add-on       Auto resulted       Gold Top - SST [[id number]] Collected:  03/01/17 1248    Specimen:  Blood Updated:  03/01/17 1701     Extra Tube Hold for add-ons.       Auto resulted.       [place] Draw [[id number]] Collected:  03/01/17 1248    Specimen:  Blood Updated:  03/01/17 1701    Narrative:       The following orders were created for panel order [place] Draw.  Procedure                               Abnormality         Status                     ---------                               -----------         ------                     Light Blue Top[[id number]]                                    Final result               Green Top (Gel)[[id number]]                                   Final result               Lavender Top[[id number]]                                      Final result               Gold Top - SST[[id number]]                                    Final result               Green Top (No Gel)[[id number]]                                                             Please view results for these tests on the individual orders.    QuantiFERON TB Client Incubated [[id number]] Collected:  03/01/17 1934    Specimen:  Blood Updated:  03/01/17 1948    Blood Culture [[id number]]  (Normal) Collected:  03/01/17 1248    Specimen:  Blood from Hand, Left Updated:  03/02/17 0101     Blood Culture No growth at less than 24 hours     Blood Culture [[id number]]  (Normal) Collected:  03/01/17 1248    Specimen:  Blood from Hand, Right Updated:  03/02/17 0101     Blood  Culture No growth at less than 24 hours     Respiratory Culture [[id number]] Collected:  03/02/17 0251    Specimen:  Sputum from Cough Updated:  03/02/17 0251        Imaging Results (last 24 hours)     Procedure Component Value Units Date/Time    XR Chest 1 View [[id number]] Collected:  03/01/17 1625     Updated:  03/01/17 1626    Narrative:       EXAMINATION: XR CHEST 1 VW- 03/01/2017     INDICATION: SOA triage protocol      COMPARISON: NONE     FINDINGS: Portable chest reveals masslike density in the left upper lobe  which may represent infiltrate or underlying lesion. Consider CT scan of  the chest for further evaluation. Some increased markings at the lung  bases bilaterally. Heart is borderline enlarged. Pulmonary vascularity  is within normal limits. Degenerative changes seen within the spine.           Impression:       Opacification seen in the left upper lobe suggesting either  infiltrate or underlying lesion. Consider CT scan of the chest for  further evaluation.     D:  03/01/2017  E:  03/01/2017          CT Angiogram Chest With Contrast [[id number]] Collected:  03/01/17 2234     Updated:  03/01/17 2234    Narrative:       EXAMINATION: CT ANGIOGRAM CHEST W/CONTRAST - 03/01/2017     INDICATION: Left upper lobe mass.     TECHNIQUE: CT scan of the chest was performed following intravenous  contrast.     The radiation dose reduction device was turned on for each scan per the  ALARA (As Low as Reasonably Achievable) protocol.     COMPARISON: Chest x-ray of the same day.     FINDINGS: There is no axillary lymphadenopathy. There are several  minimally enlarged middle mediastinal lymph nodes. There is also mild  left hilar lymphadenopathy. There is no pericardial or pleural effusion.  Limited images of the upper abdomen are normal. Lung window settings  demonstrate a large consolidative mass in the left upper lobe measuring  6.3 x 6.6 cm. Interestingly, opacified branches of the left pulmonary  artery pass through this  "\"mass\" without occlusion or narrowing. The  overall appearance would be more consistent with an inflammatory and  neoplastic process. However this determination cannot be made with  certainty.       Impression:       There is a large, 6.3 x 6.6 cm left upper lobe area of  consolidation with mild left hilar and mediastinal lymphadenopathy. CT  appearance would be slightly more suggestive of an inflammatory as  opposed to a neoplastic process, but this distinction cannot be made  with certainty.     DICTATED:     03/01/2017  EDITED:         03/01/2017             ECG/EMG Results (last 24 hours)     Procedure Component Value Units Date/Time    ECG 12 Lead [[id number]] Collected:  03/01/17 1218     Updated:  03/01/17 1228          Orders (last 24 hrs)     Start     Ordered    03/03/17 0600  ! Vanc trough  Once      03/01/17 1731 03/03/17 0530  Vancomycin, Trough  Timed      03/01/17 1731 03/02/17 0900  cetirizine (zyrTEC) tablet 10 mg  Daily      03/01/17 1941 03/02/17 0900  fluticasone (FLONASE) 50 MCG/ACT nasal spray 2 spray  Daily      03/01/17 1941 03/02/17 0900  furosemide (LASIX) tablet 40 mg  Daily      03/01/17 1941 03/02/17 0900  pantoprazole (PROTONIX) EC tablet 40 mg  Every 24 Hours      03/01/17 1941 03/02/17 0900  cefTRIAXone (ROCEPHIN) IVPB 1 g  Every 24 Hours,   Status:  Discontinued      03/01/17 1646    03/02/17 0900  AZITHROMYCIN 500 MG/250 ML 0.9% NS IVPB (MBP)  Every 24 Hours      03/01/17 1646    03/02/17 0600  levothyroxine (SYNTHROID, LEVOTHROID) tablet 50 mcg  Every Early Morning      03/01/17 1941 03/02/17 0600  TSH  Morning Draw      03/01/17 1724    03/02/17 0600  Basic Metabolic Panel  Morning Draw      03/01/17 1724    03/02/17 0600  CBC (No Diff)  Morning Draw      03/01/17 1724    03/02/17 0600  vancomycin IVPB 1500 mg in 0.9% NaCl (Premix) 500 mL  Every 12 Hours      03/01/17 1731 03/02/17 0150  ipratropium-albuterol (DUO-NEB) nebulizer solution 3 mL  Every 4 Hours " PRN      03/02/17 0150    03/01/17 2200  methylPREDNISolone sodium succinate (SOLU-Medrol) injection 60 mg  Every 8 Hours      03/01/17 1648    03/01/17 2100  DULoxetine (CYMBALTA) DR capsule 60 mg  Every 12 Hours Scheduled      03/01/17 1941 03/01/17 2100  gabapentin (NEURONTIN) capsule 400 mg  Every 12 Hours Scheduled      03/01/17 1941 03/01/17 2100  montelukast (SINGULAIR) tablet 10 mg  Nightly      03/01/17 1941 03/01/17 2100  QUEtiapine fumarate ER (SEROquel XR) tablet 300 mg  Nightly      03/01/17 1941 03/01/17 2100  tiZANidine (ZANAFLEX) tablet 4 mg  Every 12 Hours Scheduled      03/01/17 1941 03/01/17 2030  ipratropium-albuterol (DUO-NEB) nebulizer solution 3 mL  4 Times Daily - RT      03/01/17 1648 03/01/17 2000  Vital Signs  Every 4 Hours      03/01/17 1724 03/01/17 2000  enoxaparin (LOVENOX) syringe 40 mg  Daily      03/01/17 1724 03/01/17 1816  HYDROcodone-acetaminophen (NORCO) 5-325 MG per tablet 1 tablet  Every 4 Hours PRN      03/01/17 1816    03/01/17 1815  vancomycin 2000 mg/500 mL 0.9% NS IVPB ([facility])  Once      03/01/17 1731 03/01/17 1800  Strict Intake and Output  Every Hour      03/01/17 1724    03/01/17 1800  sennosides-docusate sodium (SENOKOT-S) 8.6-50 MG tablet 2 tablet  2 Times Daily      03/01/17 1724    03/01/17 1800  piperacillin-tazobactam (ZOSYN) 4.5 g in dextrose 100 mL IVPB (premix)  Every 6 Hours,   Status:  Discontinued      03/01/17 1724    03/01/17 1800  piperacillin-tazobactam (ZOSYN) 4.5 g/100 mL 0.9% NS IVPB (mbp)  Every 6 Hours,   Status:  Discontinued      03/01/17 1731    03/01/17 1800  piperacillin-tazobactam (ZOSYN) 4.5 g/100 mL 0.9% NS IVPB (mbp)  Every 6 Hours      03/01/17 1734    03/01/17 1746  QuantiFERON TB Client Incubated  Once      03/01/17 1745    03/01/17 1730  nicotine (NICODERM CQ) 21 MG/24HR patch 1 patch  Every 24 Hours Scheduled      03/01/17 1724    03/01/17 1728  AFB Culture  Daily      03/01/17 1727    03/01/17 1728   Legionella Antigen, Urine  Once      03/01/17 1727    03/01/17 1728  S. Pneumo Ag Urine or CSF  Once      03/01/17 1727    03/01/17 1725  Weigh patient  Once      03/01/17 1724    03/01/17 1725  Oxygen Therapy-  Continuous      03/01/17 1724    03/01/17 1725  Insert Peripheral IV  Once      03/01/17 1724    03/01/17 1725  Saline Lock & Maintain IV Access  Continuous      03/01/17 1724    03/01/17 1725  Full Code  Continuous      03/01/17 1724    03/01/17 1725  VTE Risk Assessment - Moderate Risk  Once      03/01/17 1724    03/01/17 1725  Place Compression Stockings (TEDs)  Once      03/01/17 1724    03/01/17 1725  Remove & Replace Compression Stockings (TEDS) Daily  Daily      03/01/17 1724    03/01/17 1725  Place Sequential Compression Device  Once      03/01/17 1724    03/01/17 1725  Maintain Sequential Compression Device  Continuous      03/01/17 1724    03/01/17 1725  Fall Precautions  Continuous      03/01/17 1724    03/01/17 1725  Tobacco Cessation Education  Once      03/01/17 1724    03/01/17 1725  Diet Regular; Cardiac  Diet Effective Now      03/01/17 1724    03/01/17 1725  Inpatient consult to Case Management   Once     Provider:  (Not yet assigned)    03/01/17 1724    03/01/17 1725  OT Consult: Eval & Treat weakness  Once      03/01/17 1724    03/01/17 1725  PT Consult: Eval & Treat  Once      03/01/17 1724    03/01/17 1725  Tobacco Cessation Education  Prior to Discharge      03/01/17 1724    03/01/17 1725  Respiratory Culture  Once      03/01/17 1724    03/01/17 1725  Obtain Medical Records  Until Discontinued     Comments:  From recent admission to [facility], discharge summary    03/01/17 1724    03/01/17 1725  Comfort Measures, Allow Natural Death  Continuous,   Status:  Canceled      03/01/17 1724    03/01/17 1724  ondansetron (ZOFRAN) tablet 4 mg  Every 6 Hours PRN      03/01/17 1724    03/01/17 1724  ondansetron (ZOFRAN) injection 4 mg  Every 6 Hours PRN      03/01/17 6158     03/01/17 1724  sodium chloride 0.9 % flush 1-10 mL  As Needed      03/01/17 1724    03/01/17 1724  acetaminophen (TYLENOL) tablet 650 mg  Every 4 Hours PRN      03/01/17 1724    03/01/17 1724  bisacodyl (DULCOLAX) EC tablet 5 mg  Daily PRN      03/01/17 1724    03/01/17 1724  Pharmacy to dose vancomycin  Continuous PRN      03/01/17 1724    03/01/17 1641  Inpatient Admission  Once      03/01/17 1644    03/01/17 1630  POC Glucose Fingerstick  Once      03/01/17 1629    03/01/17 1506  Critical Care  Once     Comments:  This order was created via procedure documentation    03/01/17 1505    03/01/17 1506  Tele Bed Request  Once      03/01/17 1507    03/01/17 1505  Blood Gas, Arterial  STAT      03/01/17 1504    03/01/17 1434  acetaminophen (TYLENOL) tablet 1,000 mg  Once,   Status:  Discontinued      03/01/17 1432    03/01/17 1434  acetaminophen (TYLENOL) tablet 1,000 mg  Once      03/01/17 1432    03/01/17 1434  HYDROmorphone (DILAUDID) injection 0.5 mg  Once      03/01/17 1432    03/01/17 1434  ondansetron (ZOFRAN) injection 4 mg  Once      03/01/17 1432    03/01/17 1357  iopamidol (ISOVUE-370) 76 % injection 100 mL  Once in Imaging      03/01/17 1355    03/01/17 1338  cefTRIAXone (ROCEPHIN) IVPB 1 g  Once      03/01/17 1336    03/01/17 1338  AZITHROMYCIN 500 MG/250 ML 0.9% NS IVPB (MBP)  Once      03/01/17 1336    03/01/17 1336  methylPREDNISolone sodium succinate (SOLU-Medrol) injection 125 mg  Once      03/01/17 1334    03/01/17 1322  CT Angiogram Chest With Contrast  1 Time Imaging      03/01/17 1321    03/01/17 1321  ipratropium-albuterol (DUO-NEB) nebulizer solution 3 mL  Once      03/01/17 1319    03/01/17 1259  Troponin  STAT      03/01/17 1258    03/01/17 1223  Blood Culture  Once      03/01/17 1223    03/01/17 1223  Blood Culture  Once     Comments:  30 minutes after first collection, or from a different site    03/01/17 1223    03/01/17 1223  Lactic Acid, Plasma  STAT      03/01/17 1223    03/01/17 1218   NPO Diet  Diet Effective Now,   Status:  Canceled      03/01/17 1217    03/01/17 1218  Undress and Gown  Once      03/01/17 1217    03/01/17 1218  Cardiac monitoring  Per Hospital Policy      03/01/17 1217    03/01/17 1218  Continuous Pulse Oximetry  Per Hospital Policy      03/01/17 1217    03/01/17 1218  Vital Signs  Per Hospital Policy      03/01/17 1217    03/01/17 1218  ECG 12 Lead  Once      03/01/17 1217    03/01/17 1218  XR Chest 1 View  1 Time Imaging      03/01/17 1217    03/01/17 1218  Insert peripheral IV  Once      03/01/17 1217    03/01/17 1218  [place] Draw  Once      03/01/17 1217    03/01/17 1218  CBC & Differential  Once      03/01/17 1217    03/01/17 1218  Comprehensive Metabolic Panel  Once      03/01/17 1217    03/01/17 1218  BNP  Once      03/01/17 1217    03/01/17 1218  POCT Troponin, Rapid  Once,   Status:  Canceled      03/01/17 1217    03/01/17 1218  POCT pregnancy, urine  Once      03/01/17 1217    03/01/17 1218  Light Blue Top  PROCEDURE ONCE      03/01/17 1217    03/01/17 1218  Green Top (Gel)  PROCEDURE ONCE      03/01/17 1217    03/01/17 1218  Lavender Top  PROCEDURE ONCE      03/01/17 1217    03/01/17 1218  Gold Top - SST  PROCEDURE ONCE      03/01/17 1217    03/01/17 1218  Green Top (No Gel)  PROCEDURE ONCE,   Status:  Canceled      03/01/17 1217    03/01/17 1218  CBC Auto Differential  PROCEDURE ONCE      03/01/17 1217    03/01/17 1217  sodium chloride 0.9 % flush 10 mL  As Needed      03/01/17 1217    Unscheduled  Oxygen Therapy- Nasal Cannula; 2 LPM; Titrate for spO2: equal to or greater than, 92%  As Needed      03/01/17 1217    --  clomiPRAMINE (ANAFRANIL) 50 MG capsule  2 Times Daily,   Status:  Discontinued      03/01/17 1242    --  furosemide (LASIX) 40 MG tablet  Daily      03/01/17 1242    --  hydrOXYzine (ATARAX) 50 MG tablet  4 Times Daily PRN      03/01/17 1242    --  ziprasidone (GEODON) 60 MG capsule  2 Times Daily With Meals,   Status:  Discontinued      03/01/17 1242     --  fluticasone-salmeterol (ADVAIR) 100-50 MCG/DOSE DISKUS  2 Times Daily - RT      03/01/17 1242    --  tiZANidine (ZANAFLEX) 4 MG tablet  2 Times Daily      03/01/17 1242    --  QUEtiapine XR (SEROquel XR) 300 MG 24 hr tablet  Nightly      03/01/17 1242    --  benztropine (COGENTIN) 2 MG tablet  2 Times Daily PRN,   Status:  Discontinued      03/01/17 1242    --  ipratropium (ATROVENT) 0.06 % nasal spray  3 Times Daily      03/01/17 1242    --  cetirizine (zyrTEC) 10 MG tablet  Daily      03/01/17 1242    --  DULoxetine (CYMBALTA) 60 MG capsule  2 Times Daily      03/01/17 1242    --  divalproex (DEPAKOTE) 500 MG 24 hr tablet  2 Times Daily,   Status:  Discontinued      03/01/17 1242    --  gabapentin (NEURONTIN) 800 MG tablet  2 Times Daily      03/01/17 1242    --  levothyroxine (SYNTHROID, LEVOTHROID) 50 MCG tablet  Daily      03/01/17 1242    --  fluticasone (FLONASE) 50 MCG/ACT nasal spray  Daily      03/01/17 1519    --  lansoprazole (PREVACID) 15 MG capsule  Daily      03/01/17 1519    --  montelukast (SINGULAIR) 10 MG tablet  Nightly      03/01/17 1519    --  albuterol (PROVENTIL) (2.5 MG/3ML) 0.083% nebulizer solution  Every 4 Hours PRN      03/01/17 1519    --  tiotropium (SPIRIVA) 18 MCG per inhalation capsule  Daily - RT      03/01/17 1519             Physician Progress Notes (last 24 hours) (Notes from 3/1/2017  4:00 AM through 3/2/2017  4:00 AM)      RADHA Rajan at 3/1/2017  3:54 PM  Version 1 of 1         Discharge Planning Assessment   Felicia     Patient Name: Colleen Gonzalez  MRN: [id number]  Today's Date: 3/1/2017    Admit Date: 3/1/2017          Discharge Needs Assessment       03/01/17 9332    Living Environment    Living Arrangements homeless    Provides Primary Care For no one, unable/limited ability to care for self    Quality Of Family Relationships helpful    Able to Return to Prior Living Arrangements no    Discharge Needs Assessment    Concerns To Be Addressed discharge  "planning concerns;basic needs concerns;coping/stress concerns;homelessness;financial/insurance concerns    Readmission Within The Last 30 Days no previous admission in last 30 days    Anticipated Changes Related to Illness none    Equipment Currently Used at Home none    Transportation Available car    Current Discharge Risk homeless            Discharge Plan       03/01/17 1550    Case Management/Social Work Plan    Plan D/C Plan    Patient/Family In Agreement With Plan yes    Additional Comments Spoke with patient. She tells me she is homeless. She stays with her brother sometimes and \"wherever I can lay my head\". She asked about assisted living and when I told her it is private pay, she responded she couldn't afford that. I spoke with Jennifer Garcia, , who said she will follow this patient and offer available resources.CM will continue to follow.        Discharge Placement     No information found                Demographic Summary       03/01/17 1546    Referral Information    Arrived From home or self-care    Referral Source emergency department    Reason For Consult discharge planning    Record Reviewed medical record    Primary Care Physician Information    Name RADHA Garcia at [facility]            Functional Status       03/01/17 1547    Functional Status Current    Current Functional Level Comment See Nursing and PT assessment    Functional Status Prior    Ambulation 0-->independent    Transferring 0-->independent    Toileting 0-->independent    Bathing 0-->independent    Dressing 0-->independent    Eating 0-->independent    Communication 0-->understands/communicates without difficulty    Swallowing 0-->swallows foods/liquids without difficulty    IADL    Medications independent    Meal Preparation independent    Housekeeping independent    Laundry independent    Shopping independent    Oral Care independent    Activity Tolerance    Current Activity Limitations none    Usual " Activity Tolerance good    Current Activity Tolerance moderate    Employment/Financial    Employment/Finance Comments Insurance info verified            Psychosocial     None            Abuse/Neglect     None            Legal     None            Substance Abuse     None            Patient Forms     None          RADHA Jason       Electronically signed by RADHA Rajan at 3/1/2017  3:54 PM        Consult Notes (last 24 hours) (Notes from 3/1/2017  4:00 AM through 3/2/2017  4:00 AM)     No notes of this type exist for this encounter.

## 2017-03-02 NOTE — PROGRESS NOTES
"    [facility]  INPATIENT PROGRESS NOTE    Date of Admission: 3/1/2017  Length of Stay: 1  Primary Care Physician: RADHA Pedraza    Subjective   CC: soa    HPI:  Left chest hurts  Coughing   No hemoptysis no weight loss  Has stayed in [facility], denies senior care  Denies TB exposures.  Was admitted at [facility] for a week, details still unclear  xrays but no cT scans.   Feels hot/cold at home \"burning up\" at times.    Review Of Systems:   Review of Systems      Objective      Temp:  [97.5 °F (36.4 °C)-99.3 °F (37.4 °C)] 98 °F (36.7 °C)  Heart Rate:  [81-97] 90  Resp:  [18-20] 18  BP: (100-116)/(58-86) 105/69  Physical Exam  Gen:  WD/WN obese woman aslep but wakes easily   Neuro: alert and oriented, clear speech, follows commands, grossly nonfocal  HEENT:  NC/AT PERRL, OP benign  Neck:  Supple, no LAD  Heart RRR no murmur, rub, or gallop  Lungs prolonged exp phase, wheeze, difuse rhonchi.  No access muscle use.   Abd:  Soft, nontender, no rebound or guarding, pos BS  Extrem:  No c/c/e      Results Review:    I have reviewed the labs, radiology results and diagnostic studies.      Results from last 7 days  Lab Units 03/02/17  0617   WBC 10*3/mm3 16.45*   HEMOGLOBIN g/dL 11.7   PLATELETS 10*3/mm3 191       Results from last 7 days  Lab Units 03/02/17  0617   SODIUM mmol/L 137   POTASSIUM mmol/L 3.9   CHLORIDE mmol/L 101   TOTAL CO2 mmol/L 32.0*   BUN mg/dL 12   CREATININE mg/dL 0.90   GLUCOSE mg/dL 147*   CALCIUM mg/dL 9.5       Culture Data: Cultures:    BLOOD CULTURE   Date Value Ref Range Status   03/01/2017 No growth at 24 hours  Preliminary   03/01/2017 No growth at 24 hours  Preliminary       Radiology Data:     I have reviewed the medications.    Current Facility-Administered Medications:   •  [START ON 3/3/2017] ! Vanc trough, , Does not apply, Once, Effie Cardenas II, DO  •  acetaminophen (TYLENOL) tablet 650 mg, 650 mg, Oral, Q4H PRN, Effie Cardenas II, " DO  •  AZITHROMYCIN 500 MG/250 ML 0.9% NS IVPB (MBP), 500 mg, Intravenous, Q24H, Ashlee Lucero, APRN, 500 mg at 03/02/17 0830  •  bisacodyl (DULCOLAX) EC tablet 5 mg, 5 mg, Oral, Daily PRN, Effie M Theresa II, DO  •  cetirizine (zyrTEC) tablet 10 mg, 10 mg, Oral, Daily, Effie M Theresa II, DO, 10 mg at 03/02/17 0831  •  DULoxetine (CYMBALTA) DR capsule 60 mg, 60 mg, Oral, Q12H, Effie M Theresa II, DO, 60 mg at 03/02/17 0831  •  enoxaparin (LOVENOX) syringe 40 mg, 40 mg, Subcutaneous, Daily, Effie M Theresa II, DO, 40 mg at 03/02/17 0829  •  fluticasone (FLONASE) 50 MCG/ACT nasal spray 2 spray, 2 spray, Nasal, Daily, Effie M Theresa II, DO, 2 spray at 03/02/17 0831  •  furosemide (LASIX) tablet 40 mg, 40 mg, Oral, Daily, Effie M Theresa II, DO, 40 mg at 03/02/17 0831  •  gabapentin (NEURONTIN) capsule 400 mg, 400 mg, Oral, Q12H, Effie M Theresa II, DO, 400 mg at 03/02/17 0831  •  HYDROcodone-acetaminophen (NORCO) 5-325 MG per tablet 1 tablet, 1 tablet, Oral, Q4H PRN, Effie M Theresa II, DO, 1 tablet at 03/02/17 1158  •  ipratropium-albuterol (DUO-NEB) nebulizer solution 3 mL, 3 mL, Nebulization, 4x Daily - RT, Ashlee Lucero APRN, 3 mL at 03/02/17 1320  •  ipratropium-albuterol (DUO-NEB) nebulizer solution 3 mL, 3 mL, Nebulization, Q4H PRN, RADHA Sin  •  levothyroxine (SYNTHROID, LEVOTHROID) tablet 50 mcg, 50 mcg, Oral, Q AM, Effie M Theresa II, DO, 50 mcg at 03/02/17 0542  •  methylPREDNISolone sodium succinate (SOLU-Medrol) injection 60 mg, 60 mg, Intravenous, Q8H, Ashlee Lucero, APRN, 60 mg at 03/02/17 1415  •  montelukast (SINGULAIR) tablet 10 mg, 10 mg, Oral, Nightly, Effie Cardenas II, DO, 10 mg at 03/01/17 2052  •  nicotine (NICODERM CQ) 21 MG/24HR patch 1 patch, 1 patch, Transdermal, Q24H, Effie Cardenas II, DO, 1 patch at 03/02/17 0832  •  ondansetron (ZOFRAN) tablet 4 mg, 4 mg, Oral, Q6H PRN **OR** ondansetron (ZOFRAN) injection 4 mg, 4 mg, Intravenous, Q6H PRN, Effie Cardenas II, DO  •   pantoprazole (PROTONIX) EC tablet 40 mg, 40 mg, Oral, Q24H, Effie M Theresa II, DO, 40 mg at 03/02/17 0829  •  Pharmacy Consult - MT, , Does not apply, Daily, Marcelo Recinos, [facility]  •  Pharmacy to dose vancomycin, , Does not apply, Continuous PRN, RADHA Ji  •  piperacillin-tazobactam (ZOSYN) 4.5 g/100 mL 0.9% NS IVPB (mbp), 4.5 g, Intravenous, Q6H, Effie M Theresa II, DO, 4.5 g at 03/02/17 1415  •  QUEtiapine fumarate ER (SEROquel XR) tablet 300 mg, 300 mg, Oral, Nightly, Effie M Theresa II, DO, 300 mg at 03/01/17 2052  •  sennosides-docusate sodium (SENOKOT-S) 8.6-50 MG tablet 2 tablet, 2 tablet, Oral, BID, Effie M Theresa II, DO, 2 tablet at 03/02/17 0829  •  sodium chloride 0.9 % flush 1-10 mL, 1-10 mL, Intravenous, PRN, Effie M Theresa II, DO  •  sodium chloride 0.9 % flush 10 mL, 10 mL, Intravenous, PRN, Carlos Park MD  •  tiZANidine (ZANAFLEX) tablet 4 mg, 4 mg, Oral, Q12H, Effie M Theresa II, DO, 4 mg at 03/02/17 0831  •  vancomycin IVPB 1500 mg in 0.9% NaCl (Premix) 500 mL, 1,500 mg, Intravenous, Q12H, Effie M Theresa II, DO, 1,500 mg at 03/02/17 0542    Assessment/Plan     Problem List  Principal Problem:    Sepsis  Active Problems:    COPD (chronic obstructive pulmonary disease)    Tobacco abuse    Acute on chronic respiratory failure    Leukocytosis    HCAP (healthcare-associated pneumonia)    Hypothyroid    Anxiety and depression    Homelessness         Patient is a 52-year-old  female with history of noncompliance. She smokes 2-3 packs of cigarettes per day. She reently went off her psych meds.  She presented to [facility] ED with increased shortness of breath and cough and fever. Reports recent hospitalization to [facility] about a week ago but has not gotten any better.      Plan:     Sepsis  --meets criteria due to febrile, hypoxia, tachycardia, leukocytosis and infectious source  --monitor     Pneumonia NASIM( vs neoplasm less likely)  --IV azithromycin, vancomycin  "and zosyn  --solumedrol 60 mg every 8 hours:  wean  --sputum cx and AFB cx  -- will likely need followup imaging     TB:  Unlikely  -- upper lobe but deneis hemoptysis or weight loss or known exposures   - sick for about a month.  -- will send afb for completeness but suspcion so low i will not consult pulm  -- suspicion so low, will leave in regular room.     Acute on chronic respiratory failure  --has been told she needs oxygen at home in the past but refuses to wear it  --PO2 59.0  --oxygen PRN  -- oinging tob     Anxiety and depression and homeless  --continue home meds  --has other psychiatric history but reports she stopped taking her \"crazy pills\" a week ago because they were causing her to hallucinate     Left chest pain  -- prob pleurisy  -- add toradol prn     DVT prophylaxis:  Discharge Planning: I expect patient to be discharged to home in 5 days.    Aisha Lorenzo MD   03/02/17   2:38 PM    Please note that portions of this note may have been completed with a voice recognition program. Efforts were made to edit the dictations, but occasionally words are mistranscribed.    "

## 2017-03-02 NOTE — PLAN OF CARE
Problem: Patient Care Overview (Adult)  Goal: Plan of Care Review  Outcome: Ongoing (interventions implemented as appropriate)  Goal: Adult Individualization and Mutuality  Outcome: Ongoing (interventions implemented as appropriate)    03/02/17 1620   Individualization   Patient Specific Preferences red jello   Patient Specific Goals wants placement at dc   Patient Specific Interventions cm notified and sw   Mutuality/Individual Preferences   What Anxieties, Fears or Concerns Do You Have About Your Health or Care? concerned about her cough and pain    What Information Would Help Us Give You More Personalized Care? is homeless, not been treated for her cough or symptoms       Goal: Discharge Needs Assessment  Outcome: Ongoing (interventions implemented as appropriate)    03/01/17 1548 03/01/17 1610 03/02/17 1620   Discharge Needs Assessment   Concerns To Be Addressed discharge planning concerns;basic needs concerns;coping/stress concerns;homelessness;financial/insurance concerns --  --    Readmission Within The Last 30 Days no previous admission in last 30 days --  --    Current Discharge Risk homeless --  --    Discharge Disposition --  --  still a patient   Current Health   Anticipated Changes Related to Illness --  none --    Living Environment   Transportation Available --  car;family or friend will provide --    Self-Care   Equipment Currently Used at Home none --  --          Problem: Pneumonia (Adult)  Goal: Signs and Symptoms of Listed Potential Problems Will be Absent or Manageable (Pneumonia)  Outcome: Ongoing (interventions implemented as appropriate)    03/02/17 1620   Pneumonia   Problems Assessed (Pneumonia) all   Problems Present (Pneumonia) progression of infection;respiratory compromise         Problem: COPD, Chronic Bronchitis/Emphysema (Adult)  Goal: Signs and Symptoms of Listed Potential Problems Will be Absent or Manageable (COPD, Chronic Bronchitis/Emphysema)  Outcome: Ongoing (interventions  implemented as appropriate)    03/02/17 1620   COPD, Chronic Bronchitis/Emphysema   Problems Assessed (COPD, Chronic Bronchitis/Emphysema) all   Problems Present (COPD, Chronic Bronchitis/Emphysema) functional decline/self-care deficit;hypoxia/hypoxemia

## 2017-03-03 LAB
ANION GAP SERPL CALCULATED.3IONS-SCNC: 6 MMOL/L (ref 3–11)
BUN BLD-MCNC: 15 MG/DL (ref 9–23)
BUN/CREAT SERPL: 18.8 (ref 7–25)
CALCIUM SPEC-SCNC: 8.9 MG/DL (ref 8.7–10.4)
CHLORIDE SERPL-SCNC: 102 MMOL/L (ref 99–109)
CO2 SERPL-SCNC: 30 MMOL/L (ref 20–31)
CREAT BLD-MCNC: 0.8 MG/DL (ref 0.6–1.3)
DEPRECATED RDW RBC AUTO: 56.1 FL (ref 37–54)
ERYTHROCYTE [DISTWIDTH] IN BLOOD BY AUTOMATED COUNT: 15.8 % (ref 11.3–14.5)
GFR SERPL CREATININE-BSD FRML MDRD: 75 ML/MIN/1.73
GLUCOSE BLD-MCNC: 126 MG/DL (ref 70–100)
HCT VFR BLD AUTO: 35.7 % (ref 34.5–44)
HGB BLD-MCNC: 11.2 G/DL (ref 11.5–15.5)
MCH RBC QN AUTO: 30.3 PG (ref 27–31)
MCHC RBC AUTO-ENTMCNC: 31.4 G/DL (ref 32–36)
MCV RBC AUTO: 96.5 FL (ref 80–99)
PLATELET # BLD AUTO: 261 10*3/MM3 (ref 150–450)
PMV BLD AUTO: 9.7 FL (ref 6–12)
POTASSIUM BLD-SCNC: 3.9 MMOL/L (ref 3.5–5.5)
RBC # BLD AUTO: 3.7 10*6/MM3 (ref 3.89–5.14)
SODIUM BLD-SCNC: 138 MMOL/L (ref 132–146)
VANCOMYCIN TROUGH SERPL-MCNC: 25 MCG/ML (ref 10–20)
WBC NRBC COR # BLD: 17.64 10*3/MM3 (ref 3.5–10.8)

## 2017-03-03 PROCEDURE — 87206 SMEAR FLUORESCENT/ACID STAI: CPT | Performed by: INTERNAL MEDICINE

## 2017-03-03 PROCEDURE — 87116 MYCOBACTERIA CULTURE: CPT | Performed by: INTERNAL MEDICINE

## 2017-03-03 PROCEDURE — 80048 BASIC METABOLIC PNL TOTAL CA: CPT | Performed by: INTERNAL MEDICINE

## 2017-03-03 PROCEDURE — 87205 SMEAR GRAM STAIN: CPT | Performed by: INTERNAL MEDICINE

## 2017-03-03 PROCEDURE — 87147 CULTURE TYPE IMMUNOLOGIC: CPT | Performed by: INTERNAL MEDICINE

## 2017-03-03 PROCEDURE — 25010000002 METHYLPREDNISOLONE PER 40 MG: Performed by: INTERNAL MEDICINE

## 2017-03-03 PROCEDURE — 94640 AIRWAY INHALATION TREATMENT: CPT

## 2017-03-03 PROCEDURE — 25010000002 PIPERACILLIN-TAZOBACTAM: Performed by: INTERNAL MEDICINE

## 2017-03-03 PROCEDURE — 63710000001 PREDNISONE PER 5 MG: Performed by: INTERNAL MEDICINE

## 2017-03-03 PROCEDURE — 87186 SC STD MICRODIL/AGAR DIL: CPT | Performed by: INTERNAL MEDICINE

## 2017-03-03 PROCEDURE — 87070 CULTURE OTHR SPECIMN AEROBIC: CPT | Performed by: INTERNAL MEDICINE

## 2017-03-03 PROCEDURE — 25010000002 ENOXAPARIN PER 10 MG: Performed by: INTERNAL MEDICINE

## 2017-03-03 PROCEDURE — 80202 ASSAY OF VANCOMYCIN: CPT | Performed by: INTERNAL MEDICINE

## 2017-03-03 PROCEDURE — 87149 DNA/RNA DIRECT PROBE: CPT | Performed by: INTERNAL MEDICINE

## 2017-03-03 PROCEDURE — 25010000002 CEFEPIME: Performed by: INTERNAL MEDICINE

## 2017-03-03 PROCEDURE — 25010000002 ONDANSETRON PER 1 MG: Performed by: INTERNAL MEDICINE

## 2017-03-03 PROCEDURE — 25010000002 VANCOMYCIN HCL IN NACL 1.5-0.9 GM/500ML-% SOLUTION

## 2017-03-03 PROCEDURE — 87077 CULTURE AEROBIC IDENTIFY: CPT | Performed by: INTERNAL MEDICINE

## 2017-03-03 PROCEDURE — 25010000002 AZITHROMYCIN: Performed by: NURSE PRACTITIONER

## 2017-03-03 PROCEDURE — 99232 SBSQ HOSP IP/OBS MODERATE 35: CPT | Performed by: INTERNAL MEDICINE

## 2017-03-03 PROCEDURE — 94799 UNLISTED PULMONARY SVC/PX: CPT

## 2017-03-03 PROCEDURE — 85027 COMPLETE CBC AUTOMATED: CPT | Performed by: INTERNAL MEDICINE

## 2017-03-03 RX ORDER — PREDNISONE 20 MG/1
20 TABLET ORAL 2 TIMES DAILY WITH MEALS
Status: DISCONTINUED | OUTPATIENT
Start: 2017-03-03 | End: 2017-03-08

## 2017-03-03 RX ORDER — VANCOMYCIN/0.9 % SOD CHLORIDE 1.5G/250ML
1500 PLASTIC BAG, INJECTION (ML) INTRAVENOUS EVERY 24 HOURS
Status: DISCONTINUED | OUTPATIENT
Start: 2017-03-03 | End: 2017-03-07

## 2017-03-03 RX ADMIN — QUETIAPINE 300 MG: 200 TABLET, EXTENDED RELEASE ORAL at 21:32

## 2017-03-03 RX ADMIN — Medication 2 TABLET: at 09:11

## 2017-03-03 RX ADMIN — HYDROCODONE BITARTRATE AND ACETAMINOPHEN 1 TABLET: 5; 325 TABLET ORAL at 04:29

## 2017-03-03 RX ADMIN — HYDROCODONE BITARTRATE AND ACETAMINOPHEN 1 TABLET: 5; 325 TABLET ORAL at 10:48

## 2017-03-03 RX ADMIN — FUROSEMIDE 40 MG: 40 TABLET ORAL at 09:11

## 2017-03-03 RX ADMIN — Medication 2 TABLET: at 18:06

## 2017-03-03 RX ADMIN — ONDANSETRON 4 MG: 2 INJECTION INTRAMUSCULAR; INTRAVENOUS at 09:11

## 2017-03-03 RX ADMIN — DULOXETINE 60 MG: 60 CAPSULE, DELAYED RELEASE ORAL at 09:11

## 2017-03-03 RX ADMIN — NICOTINE 1 PATCH: 21 PATCH, EXTENDED RELEASE TRANSDERMAL at 09:43

## 2017-03-03 RX ADMIN — GABAPENTIN 400 MG: 400 CAPSULE ORAL at 21:33

## 2017-03-03 RX ADMIN — IPRATROPIUM BROMIDE AND ALBUTEROL SULFATE 3 ML: .5; 3 SOLUTION RESPIRATORY (INHALATION) at 21:14

## 2017-03-03 RX ADMIN — PREDNISONE 20 MG: 20 TABLET ORAL at 18:06

## 2017-03-03 RX ADMIN — FLUTICASONE PROPIONATE 2 SPRAY: 50 SPRAY, METERED NASAL at 10:41

## 2017-03-03 RX ADMIN — IPRATROPIUM BROMIDE AND ALBUTEROL SULFATE 3 ML: .5; 3 SOLUTION RESPIRATORY (INHALATION) at 07:54

## 2017-03-03 RX ADMIN — HYDROCODONE POLISTIREX AND CHLORPHENIRAMINE POLISTIREX 5 ML: 10; 8 SUSPENSION, EXTENDED RELEASE ORAL at 04:29

## 2017-03-03 RX ADMIN — TAZOBACTAM SODIUM AND PIPERACILLIN SODIUM 4.5 G: .5; 4 INJECTION, POWDER, LYOPHILIZED, FOR SOLUTION INTRAVENOUS at 01:16

## 2017-03-03 RX ADMIN — IPRATROPIUM BROMIDE AND ALBUTEROL SULFATE 3 ML: .5; 3 SOLUTION RESPIRATORY (INHALATION) at 13:49

## 2017-03-03 RX ADMIN — IPRATROPIUM BROMIDE AND ALBUTEROL SULFATE 3 ML: .5; 3 SOLUTION RESPIRATORY (INHALATION) at 15:50

## 2017-03-03 RX ADMIN — GABAPENTIN 400 MG: 400 CAPSULE ORAL at 09:11

## 2017-03-03 RX ADMIN — TIZANIDINE 4 MG: 4 TABLET ORAL at 21:33

## 2017-03-03 RX ADMIN — CEFEPIME 2 G: 2 INJECTION, POWDER, FOR SOLUTION INTRAVENOUS at 21:34

## 2017-03-03 RX ADMIN — AZITHROMYCIN 500 MG: 500 INJECTION, POWDER, LYOPHILIZED, FOR SOLUTION INTRAVENOUS at 09:11

## 2017-03-03 RX ADMIN — METHYLPREDNISOLONE SODIUM SUCCINATE 20 MG: 40 INJECTION, POWDER, FOR SOLUTION INTRAMUSCULAR; INTRAVENOUS at 07:03

## 2017-03-03 RX ADMIN — MONTELUKAST SODIUM 10 MG: 10 TABLET, FILM COATED ORAL at 21:32

## 2017-03-03 RX ADMIN — TAZOBACTAM SODIUM AND PIPERACILLIN SODIUM 4.5 G: .5; 4 INJECTION, POWDER, LYOPHILIZED, FOR SOLUTION INTRAVENOUS at 07:04

## 2017-03-03 RX ADMIN — ENOXAPARIN SODIUM 40 MG: 40 INJECTION SUBCUTANEOUS at 09:11

## 2017-03-03 RX ADMIN — CETIRIZINE HYDROCHLORIDE 10 MG: 10 TABLET, FILM COATED ORAL at 09:11

## 2017-03-03 RX ADMIN — VANCOMYCIN HYDROCHLORIDE 1500 MG: 1 INJECTION, POWDER, LYOPHILIZED, FOR SOLUTION INTRAVENOUS at 18:57

## 2017-03-03 RX ADMIN — HYDROCODONE POLISTIREX AND CHLORPHENIRAMINE POLISTIREX 5 ML: 10; 8 SUSPENSION, EXTENDED RELEASE ORAL at 16:32

## 2017-03-03 RX ADMIN — CEFEPIME 2 G: 2 INJECTION, POWDER, FOR SOLUTION INTRAVENOUS at 15:40

## 2017-03-03 RX ADMIN — TIZANIDINE 4 MG: 4 TABLET ORAL at 09:00

## 2017-03-03 RX ADMIN — TAZOBACTAM SODIUM AND PIPERACILLIN SODIUM 4.5 G: .5; 4 INJECTION, POWDER, LYOPHILIZED, FOR SOLUTION INTRAVENOUS at 12:11

## 2017-03-03 RX ADMIN — DULOXETINE 60 MG: 60 CAPSULE, DELAYED RELEASE ORAL at 21:33

## 2017-03-03 RX ADMIN — HYDROCODONE BITARTRATE AND ACETAMINOPHEN 1 TABLET: 5; 325 TABLET ORAL at 21:33

## 2017-03-03 RX ADMIN — LEVOTHYROXINE SODIUM 50 MCG: 25 TABLET ORAL at 07:04

## 2017-03-03 RX ADMIN — PANTOPRAZOLE SODIUM 40 MG: 40 TABLET, DELAYED RELEASE ORAL at 09:11

## 2017-03-03 NOTE — PLAN OF CARE
Problem: Patient Care Overview (Adult)  Goal: Adult Individualization and Mutuality  Outcome: Ongoing (interventions implemented as appropriate)    03/03/17 0650   Individualization   Patient Specific Preferences red jeyeceniao, lunch boxes   Patient Specific Goals to have a place to go when she leaves here   Patient Specific Interventions reassurance, explanation, expression of feelings/concerns encouraged   Mutuality/Individual Preferences   What Anxieties, Fears or Concerns Do You Have About Your Health or Care? concerned about medicine and where she will go when she leaves here   What Questions Do You Have About Your Health or Care? none at this time   What Information Would Help Us Give You More Personalized Care? none at this time       Goal: Discharge Needs Assessment  Outcome: Ongoing (interventions implemented as appropriate)    Problem: Pneumonia (Adult)  Goal: Signs and Symptoms of Listed Potential Problems Will be Absent or Manageable (Pneumonia)  Outcome: Outcome(s) achieved Date Met:  03/03/17    Problem: COPD, Chronic Bronchitis/Emphysema (Adult)  Goal: Signs and Symptoms of Listed Potential Problems Will be Absent or Manageable (COPD, Chronic Bronchitis/Emphysema)  Outcome: Ongoing (interventions implemented as appropriate)

## 2017-03-03 NOTE — PROGRESS NOTES
Pharmacokinetic Consult - Vancomycin Dosing    Colleen Gonzalez is a 52 y.o. female who is receiving IV azithromycin, vancomycin, and Zosyn for sepsis/PNA.  Pharmacy consulted to dose vancomycin.      Relevant clinical data and objective history reviewed:    WBC   Date Value Ref Range Status   03/03/2017 17.64 (H) 3.50 - 10.80 10*3/mm3 Final   03/02/2017 16.45 (H) 3.50 - 10.80 10*3/mm3 Final   03/01/2017 15.41 (H) 3.50 - 10.80 10*3/mm3 Final     CREATININE   Date Value Ref Range Status   03/03/2017 0.80 0.60 - 1.30 mg/dL Final   03/02/2017 0.90 0.60 - 1.30 mg/dL Final   03/01/2017 1.00 0.60 - 1.30 mg/dL Final     BUN   Date Value Ref Range Status   03/03/2017 15 9 - 23 mg/dL Final   03/02/2017 12 9 - 23 mg/dL Final   03/01/2017 6 (L) 9 - 23 mg/dL Final     Estimated Creatinine Clearance: 105.7 mL/min (by C-G formula based on Cr of 0.8).  I/O last 3 completed shifts:  In: 800 [IV Piggyback:800]  Out: 500 [Urine:500]    Temp Readings from Last 3 Encounters:   03/03/17 98.1 °F (36.7 °C) (Oral)     Weight: 243 lb 9.6 oz (110 kg)    BLOOD CULTURE   Date Value Ref Range Status   03/01/2017 No growth at 24 hours  Preliminary   03/01/2017 No growth at 24 hours  Preliminary     Lab Results   Component Value Date    [facility] 25.00 (H) 03/03/2017   This is a 12 hour level, drawn appropriately    Assessment/Plan  Pt received vancomycin 2000 mg IV x 1 loading dose, then was initiated on vancomycin 1500 mg IV q12h.  A trough was obtained today prior to 4th total dose of vancomycin.  This level is supratherapeutic and expect that there would be further accumulation on a q12h regimen as patient is not yet at steady state.      Will plan to reduce dose to vancomycin 1500 mg IV q24h.  Will plan to repeat trough on 3/6 to ensure adequate dosing.  WBC remains elevated, renal function stable.    Pharmacy will continue to follow and adjust plan as needed.    Thank you for this consult,  Angela Cedeño, PharmD  03/03/17  7:36 AM

## 2017-03-03 NOTE — PROGRESS NOTES
[facility] Medicine Services  INPATIENT PROGRESS NOTE    Date of Admission: 3/1/2017  Length of Stay: 2  Primary Care Physician: RADHA Pedraza    Subjective   CC: soa    HPI:  No change or maybe a bit better  Comfortable  Still coughing  No hemoptysis  Eating okay  History of GI intolerance to NSAIDs and got a bit nauseous yest after low dose toradol    Review Of Systems:   Review of Systems      Objective      Temp:  [98.1 °F (36.7 °C)] 98.1 °F (36.7 °C)  Heart Rate:  [86-90] 90  Resp:  [18] 18  BP: (131)/(87) 131/87  Physical Exam  Gen:  WD/WN women sitting up in bed awaiting lunch  Neuro: alert and oriented, clear speech, follows commands, grossly nonfocal  HEENT:  NC/AT PERRL, OP benign, voice hoarse  Neck:  Supple, no LAD  Heart RRR no murmur, rub, or gallop  Lungs breathing easily, no wheeze today, diffuse rhonchi.  No access muscle use.   Abd:  Soft, nontender, no rebound or guarding, pos BS  Extrem:  No c/c/e      Results Review:    I have reviewed the labs, radiology results and diagnostic studies.      Results from last 7 days  Lab Units 03/03/17  0522   WBC 10*3/mm3 17.64*   HEMOGLOBIN g/dL 11.2*   PLATELETS 10*3/mm3 261       Results from last 7 days  Lab Units 03/03/17  0522   SODIUM mmol/L 138   POTASSIUM mmol/L 3.9   CHLORIDE mmol/L 102   TOTAL CO2 mmol/L 30.0   BUN mg/dL 15   CREATININE mg/dL 0.80   GLUCOSE mg/dL 126*   CALCIUM mg/dL 8.9       Culture Data: Cultures:    BLOOD CULTURE   Date Value Ref Range Status   03/01/2017 No growth at 24 hours  Preliminary   03/01/2017 No growth at 24 hours  Preliminary       Radiology Data:     I have reviewed the medications.    Current Facility-Administered Medications:   •  [START ON 3/6/2017] !vancomycin trough 3/6 @ 1700 - please hold 1800 dose, , Does not apply, Continuous PRN, Angela Cedeño [facility]  •  acetaminophen (TYLENOL) tablet 650 mg, 650 mg, Oral, Q4H PRN, Effie Cardenas II, DO  •  AZITHROMYCIN 500 MG/250 ML 0.9%  NS IVPB (MBP), 500 mg, Intravenous, Q24H, RADHA Ji, Last Rate: 250 mL/hr at 03/03/17 0911, 500 mg at 03/03/17 0911  •  bisacodyl (DULCOLAX) EC tablet 5 mg, 5 mg, Oral, Daily PRN, Effie M Theresa II, DO  •  cefepime (MAXIPIME) 2 g/100 mL 0.9% NS (mbp), 2 g, Intravenous, Q8H, Aisha Lorenzo MD  •  cetirizine (zyrTEC) tablet 10 mg, 10 mg, Oral, Daily, Effie M Theresa II, DO, 10 mg at 03/03/17 0911  •  DULoxetine (CYMBALTA) DR capsule 60 mg, 60 mg, Oral, Q12H, Effie M Theresa II, DO, 60 mg at 03/03/17 0911  •  enoxaparin (LOVENOX) syringe 40 mg, 40 mg, Subcutaneous, Daily, Effie M Theresa II, DO, 40 mg at 03/03/17 0911  •  fluticasone (FLONASE) 50 MCG/ACT nasal spray 2 spray, 2 spray, Nasal, Daily, Effie M Theresa II, DO, 2 spray at 03/03/17 1041  •  furosemide (LASIX) tablet 40 mg, 40 mg, Oral, Daily, Effie M Theresa II, DO, 40 mg at 03/03/17 0911  •  gabapentin (NEURONTIN) capsule 400 mg, 400 mg, Oral, Q12H, Effie M Theresa II, DO, 400 mg at 03/03/17 0911  •  HYDROcod Polst-CPM Polst ER (TUSSIONEX PENNKINETIC) 10-8 MG/5ML ER suspension 5 mL, 5 mL, Oral, Q12H, Aisha Lorenzo MD, 5 mL at 03/03/17 0429  •  HYDROcodone-acetaminophen (NORCO) 5-325 MG per tablet 1 tablet, 1 tablet, Oral, Q4H PRN, Effie M Theresa II, DO, 1 tablet at 03/03/17 1048  •  ipratropium-albuterol (DUO-NEB) nebulizer solution 3 mL, 3 mL, Nebulization, 4x Daily - RT, RADHA Ji, 3 mL at 03/03/17 0754  •  ipratropium-albuterol (DUO-NEB) nebulizer solution 3 mL, 3 mL, Nebulization, Q4H PRN, RADHA Sin  •  levothyroxine (SYNTHROID, LEVOTHROID) tablet 50 mcg, 50 mcg, Oral, Q AM, Effie Cardenas II, DO, 50 mcg at 03/03/17 0704  •  methylPREDNISolone sodium succinate (SOLU-Medrol) injection 20 mg, 20 mg, Intravenous, Q8H, Aisha Lorenzo MD, 20 mg at 03/03/17 0703  •  montelukast (SINGULAIR) tablet 10 mg, 10 mg, Oral, Nightly, Effie Cardenas II, DO, 10 mg at 03/02/17 2052  •  nicotine (NICODERM CQ) 21 MG/24HR patch 1 patch, 1 patch,  Transdermal, Q24H, Effie M Theresa II, DO, 1 patch at 03/03/17 0943  •  ondansetron (ZOFRAN) tablet 4 mg, 4 mg, Oral, Q6H PRN, 4 mg at 03/02/17 1647 **OR** ondansetron (ZOFRAN) injection 4 mg, 4 mg, Intravenous, Q6H PRN, Effie M Theresa II, DO, 4 mg at 03/03/17 0911  •  pantoprazole (PROTONIX) EC tablet 40 mg, 40 mg, Oral, Q24H, Effie M Theresa II, DO, 40 mg at 03/03/17 0911  •  Pharmacy Consult - [facility], , Does not apply, Daily, Marcelo Recinos [facility]  •  Pharmacy to dose vancomycin, , Does not apply, Continuous PRN, Ashlee Lucero, APRN  •  QUEtiapine fumarate ER (SEROquel XR) tablet 300 mg, 300 mg, Oral, Nightly, Effie M Theresa II, DO, 300 mg at 03/02/17 2052  •  sennosides-docusate sodium (SENOKOT-S) 8.6-50 MG tablet 2 tablet, 2 tablet, Oral, BID, Effie M Theresa II, DO, 2 tablet at 03/03/17 0911  •  sodium chloride 0.9 % flush 1-10 mL, 1-10 mL, Intravenous, PRN, Effie M Theresa II, DO  •  sodium chloride 0.9 % flush 10 mL, 10 mL, Intravenous, PRN, Carlos Park MD  •  tiZANidine (ZANAFLEX) tablet 4 mg, 4 mg, Oral, Q12H, Effie M Theresa II, DO, 4 mg at 03/03/17 0900  •  vancomycin IVPB 1500 mg in 0.9% NaCl (Premix) 500 mL, 1,500 mg, Intravenous, Q24H, Angela Cedeño [facility]    Assessment/Plan     Problem List  Principal Problem:    Sepsis  Active Problems:    COPD (chronic obstructive pulmonary disease)    Tobacco abuse    Acute on chronic respiratory failure    Leukocytosis    HCAP (healthcare-associated pneumonia)    Hypothyroid    Anxiety and depression    Homelessness         Patient is a 52-year-old  female with history of noncompliance. She smokes 2-3 packs of cigarettes per day. She reently went off her psych meds.  She presented to [facility] ED with increased shortness of breath and cough and fever. Reports recent hospitalization to [facility] about a week ago but has not gotten any better.      Plan:     Sepsis  --meets criteria due to febrile, hypoxia, tachycardia, leukocytosis and  "infectious source  --monitor     Pneumonia NASIM( vs neoplasm less likely)  --IV azithromycin, vancomycin and zosyn - growing a nonlactose ; change to vanc/cefepime for the moment  -- no wheeze; stop steroids  --sputum cx and AFB cx pending  -- will likely need followup imaging eventually     TB:  Unlikely  -- upper lobe but deneis hemoptysis or weight loss or known exposures   - sick for about a month.  -- will send afb for completeness but suspcion so low i will not consult pulm  -- moved to [facility] room    Acute on chronic respiratory failure  --has been told she needs oxygen at home in the past but refuses to wear it  --PO2 59.0  --oxygen PRN  -- oinging tob     Anxiety and depression and homeless  --continue home meds  --has other psychiatric history but reports she stopped taking her \"crazy pills\" a week ago because they were causing her to hallucinate     Left chest pain  -- prob pleurisy  -- add toradol prn     DVT prophylaxis:  Discharge Planning: I expect patient to be discharged to home in 5 days.    Aisha Lorenzo MD   03/03/17   12:40 PM    Please note that portions of this note may have been completed with a voice recognition program. Efforts were made to edit the dictations, but occasionally words are mistranscribed.    "

## 2017-03-03 NOTE — PROGRESS NOTES
Continued Stay Note  [facility]     Patient Name: Colleen Gonzalez  MRN: [id number]  Today's Date: 3/3/2017    Admit Date: 3/1/2017          Discharge Plan       03/03/17 1423    Case Management/Social Work Plan    Plan discharge plan    Patient/Family In Agreement With Plan yes    Additional Comments Per MD note, pt not medically ready for discharge today, 3/3.  CM/SW will cont to work together on discharge planning.               Discharge Codes     None        Expected Discharge Date and Time     Expected Discharge Date Expected Discharge Time    Mar 4, 2017             Sun Jose RN

## 2017-03-04 LAB
BACTERIA FLD CULT: NORMAL
BASOPHILS # BLD AUTO: 0.02 10*3/MM3 (ref 0–0.2)
BASOPHILS NFR BLD AUTO: 0.2 % (ref 0–1)
DEPRECATED RDW RBC AUTO: 56.2 FL (ref 37–54)
EOSINOPHIL # BLD AUTO: 0 10*3/MM3 (ref 0.1–0.3)
EOSINOPHIL NFR BLD AUTO: 0 % (ref 0–3)
ERYTHROCYTE [DISTWIDTH] IN BLOOD BY AUTOMATED COUNT: 15.8 % (ref 11.3–14.5)
HCT VFR BLD AUTO: 36.1 % (ref 34.5–44)
HGB BLD-MCNC: 11.6 G/DL (ref 11.5–15.5)
IMM GRANULOCYTES # BLD: 0.13 10*3/MM3 (ref 0–0.03)
IMM GRANULOCYTES NFR BLD: 1 % (ref 0–0.6)
LYMPHOCYTES # BLD AUTO: 1.4 10*3/MM3 (ref 0.6–4.8)
LYMPHOCYTES NFR BLD AUTO: 11 % (ref 24–44)
Lab: NORMAL
MCH RBC QN AUTO: 31.2 PG (ref 27–31)
MCHC RBC AUTO-ENTMCNC: 32.1 G/DL (ref 32–36)
MCV RBC AUTO: 97 FL (ref 80–99)
MONOCYTES # BLD AUTO: 1.35 10*3/MM3 (ref 0–1)
MONOCYTES NFR BLD AUTO: 10.7 % (ref 0–12)
NEUTROPHILS # BLD AUTO: 9.77 10*3/MM3 (ref 1.5–8.3)
NEUTROPHILS NFR BLD AUTO: 77.1 % (ref 41–71)
ORGANISM ID: NORMAL
PLATELET # BLD AUTO: 281 10*3/MM3 (ref 150–450)
PMV BLD AUTO: 9.1 FL (ref 6–12)
RBC # BLD AUTO: 3.72 10*6/MM3 (ref 3.89–5.14)
S PNEUM AG SPEC QL LA: NEGATIVE
SPECIMEN SOURCE: NORMAL
WBC NRBC COR # BLD: 12.67 10*3/MM3 (ref 3.5–10.8)

## 2017-03-04 PROCEDURE — 99233 SBSQ HOSP IP/OBS HIGH 50: CPT | Performed by: HOSPITALIST

## 2017-03-04 PROCEDURE — 94799 UNLISTED PULMONARY SVC/PX: CPT

## 2017-03-04 PROCEDURE — 25010000002 AZITHROMYCIN: Performed by: NURSE PRACTITIONER

## 2017-03-04 PROCEDURE — 85025 COMPLETE CBC W/AUTO DIFF WBC: CPT | Performed by: INTERNAL MEDICINE

## 2017-03-04 PROCEDURE — 94640 AIRWAY INHALATION TREATMENT: CPT

## 2017-03-04 PROCEDURE — 97162 PT EVAL MOD COMPLEX 30 MIN: CPT

## 2017-03-04 PROCEDURE — 63710000001 PREDNISONE PER 5 MG: Performed by: INTERNAL MEDICINE

## 2017-03-04 PROCEDURE — 25010000002 ENOXAPARIN PER 10 MG: Performed by: INTERNAL MEDICINE

## 2017-03-04 PROCEDURE — 25010000002 VANCOMYCIN HCL IN NACL 1.5-0.9 GM/500ML-% SOLUTION

## 2017-03-04 PROCEDURE — 25010000002 CEFEPIME: Performed by: INTERNAL MEDICINE

## 2017-03-04 PROCEDURE — 25010000002 ONDANSETRON PER 1 MG: Performed by: INTERNAL MEDICINE

## 2017-03-04 RX ORDER — THEOPHYLLINE 600 MG/1
600 TABLET, EXTENDED RELEASE ORAL 2 TIMES DAILY WITH MEALS
COMMUNITY
End: 2017-03-25 | Stop reason: HOSPADM

## 2017-03-04 RX ADMIN — CEFEPIME 2 G: 2 INJECTION, POWDER, FOR SOLUTION INTRAVENOUS at 14:23

## 2017-03-04 RX ADMIN — HYDROCODONE BITARTRATE AND ACETAMINOPHEN 1 TABLET: 5; 325 TABLET ORAL at 08:54

## 2017-03-04 RX ADMIN — GABAPENTIN 400 MG: 400 CAPSULE ORAL at 19:33

## 2017-03-04 RX ADMIN — CETIRIZINE HYDROCHLORIDE 10 MG: 10 TABLET, FILM COATED ORAL at 08:16

## 2017-03-04 RX ADMIN — IPRATROPIUM BROMIDE AND ALBUTEROL SULFATE 3 ML: .5; 3 SOLUTION RESPIRATORY (INHALATION) at 20:08

## 2017-03-04 RX ADMIN — Medication 2 TABLET: at 08:16

## 2017-03-04 RX ADMIN — TIZANIDINE 4 MG: 4 TABLET ORAL at 08:16

## 2017-03-04 RX ADMIN — PANTOPRAZOLE SODIUM 40 MG: 40 TABLET, DELAYED RELEASE ORAL at 08:16

## 2017-03-04 RX ADMIN — HYDROCODONE BITARTRATE AND ACETAMINOPHEN 1 TABLET: 5; 325 TABLET ORAL at 13:35

## 2017-03-04 RX ADMIN — ACETAMINOPHEN 650 MG: 325 TABLET, FILM COATED ORAL at 11:21

## 2017-03-04 RX ADMIN — CEFEPIME 2 G: 2 INJECTION, POWDER, FOR SOLUTION INTRAVENOUS at 05:10

## 2017-03-04 RX ADMIN — HYDROCODONE POLISTIREX AND CHLORPHENIRAMINE POLISTIREX 5 ML: 10; 8 SUSPENSION, EXTENDED RELEASE ORAL at 03:41

## 2017-03-04 RX ADMIN — TIZANIDINE 4 MG: 4 TABLET ORAL at 19:32

## 2017-03-04 RX ADMIN — DULOXETINE 60 MG: 60 CAPSULE, DELAYED RELEASE ORAL at 08:16

## 2017-03-04 RX ADMIN — ENOXAPARIN SODIUM 40 MG: 40 INJECTION SUBCUTANEOUS at 08:17

## 2017-03-04 RX ADMIN — DULOXETINE 60 MG: 60 CAPSULE, DELAYED RELEASE ORAL at 19:33

## 2017-03-04 RX ADMIN — IPRATROPIUM BROMIDE AND ALBUTEROL SULFATE 3 ML: .5; 3 SOLUTION RESPIRATORY (INHALATION) at 07:33

## 2017-03-04 RX ADMIN — ONDANSETRON 4 MG: 2 INJECTION INTRAMUSCULAR; INTRAVENOUS at 08:54

## 2017-03-04 RX ADMIN — AZITHROMYCIN 500 MG: 500 INJECTION, POWDER, LYOPHILIZED, FOR SOLUTION INTRAVENOUS at 08:15

## 2017-03-04 RX ADMIN — GABAPENTIN 400 MG: 400 CAPSULE ORAL at 08:16

## 2017-03-04 RX ADMIN — IPRATROPIUM BROMIDE AND ALBUTEROL SULFATE 3 ML: .5; 3 SOLUTION RESPIRATORY (INHALATION) at 16:06

## 2017-03-04 RX ADMIN — Medication 2 TABLET: at 17:25

## 2017-03-04 RX ADMIN — QUETIAPINE 300 MG: 200 TABLET, EXTENDED RELEASE ORAL at 19:32

## 2017-03-04 RX ADMIN — LEVOTHYROXINE SODIUM 50 MCG: 25 TABLET ORAL at 05:10

## 2017-03-04 RX ADMIN — CEFEPIME 2 G: 2 INJECTION, POWDER, FOR SOLUTION INTRAVENOUS at 20:40

## 2017-03-04 RX ADMIN — FLUTICASONE PROPIONATE 2 SPRAY: 50 SPRAY, METERED NASAL at 14:26

## 2017-03-04 RX ADMIN — FUROSEMIDE 40 MG: 40 TABLET ORAL at 08:16

## 2017-03-04 RX ADMIN — IPRATROPIUM BROMIDE AND ALBUTEROL SULFATE 3 ML: .5; 3 SOLUTION RESPIRATORY (INHALATION) at 13:01

## 2017-03-04 RX ADMIN — VANCOMYCIN HYDROCHLORIDE 1500 MG: 1 INJECTION, POWDER, LYOPHILIZED, FOR SOLUTION INTRAVENOUS at 17:25

## 2017-03-04 RX ADMIN — HYDROCODONE BITARTRATE AND ACETAMINOPHEN 1 TABLET: 5; 325 TABLET ORAL at 19:32

## 2017-03-04 RX ADMIN — NICOTINE 1 PATCH: 21 PATCH, EXTENDED RELEASE TRANSDERMAL at 08:20

## 2017-03-04 RX ADMIN — PREDNISONE 20 MG: 20 TABLET ORAL at 17:25

## 2017-03-04 RX ADMIN — HYDROCODONE POLISTIREX AND CHLORPHENIRAMINE POLISTIREX 5 ML: 10; 8 SUSPENSION, EXTENDED RELEASE ORAL at 16:00

## 2017-03-04 RX ADMIN — MONTELUKAST SODIUM 10 MG: 10 TABLET, FILM COATED ORAL at 19:32

## 2017-03-04 RX ADMIN — PREDNISONE 20 MG: 20 TABLET ORAL at 08:16

## 2017-03-04 NOTE — PROGRESS NOTES
[facility] Medicine Services  INPATIENT PROGRESS NOTE    Date of Admission: 3/1/2017  Length of Stay: 3  Primary Care Physician: RADHA Pedraza    Subjective-- HPI/Events overnight/ROS/CC- Hospital follow visit.  Detailed ROS not detailed as performed below      C/o cough, headaches, and nausea without vomiting. No fever or chills.    Objective      Temp:  [97.6 °F (36.4 °C)] 97.6 °F (36.4 °C)  Heart Rate:  [] 82  Resp:  [16-18] 18  BP: (130-141)/(85-94) 130/94    Physical Exam:  Physical Exam    General Assessment: No acute cardiopulmonary distress. Well developed and well nourished.    HEENT: NCAT, PERRL, MM moist    Neck: Supple    CVS: RRR, S1S2 normal    Resp: + Bibasilar crackles, no wheezing, resp non-labored    Abd: soft, NT, ND, normal BS, no guarding or peritoneal signs    Ext: No edema, both calves are symmetric and NTTP    Neuro: Nonfocal    Skin: W/D/I. No rash.    Psych: Affect is appropriate      Results Review:    I have reviewed the labs, radiology results and diagnostic studies.      Results from last 7 days  Lab Units 03/04/17  0654   WBC 10*3/mm3 12.67*   HEMOGLOBIN g/dL 11.6   PLATELETS 10*3/mm3 281       Results from last 7 days  Lab Units 03/03/17  0522   SODIUM mmol/L 138   POTASSIUM mmol/L 3.9   TOTAL CO2 mmol/L 30.0   CREATININE mg/dL 0.80   GLUCOSE mg/dL 126*       Culture Data:  Radiology Data:   Cultures:    BLOOD CULTURE   Date Value Ref Range Status   03/01/2017 No growth at 3 days  Preliminary   03/01/2017 No growth at 3 days  Preliminary     RESPIRATORY CULTURE   Date Value Ref Range Status   03/03/2017 Moderate growth (3+) Staphylococcus aureus (A)  Preliminary   03/03/2017 Scant growth (1+) Normal Respiratory Shannan  Preliminary   03/02/2017 Light growth (2+) Pseudomonas aeruginosa (A)  Preliminary   03/02/2017 Moderate growth (3+) Staphylococcus aureus (A)  Preliminary       I have reviewed the medications.        Assessment/Plan      Assessment/Problem List       Patient is a 52-year-old  female with history of noncompliance. She smokes 2-3 packs of cigarettes per day. She reently went off her psych (Bipolar) meds. She presented to [facility] ED with increased shortness of breath and cough and fever. Reports recent hospitalization to [facility] about a week ago but has not gotten any better.           Sepsis  --meets criteria due to febrile, hypoxia, tachycardia, leukocytosis and infectious source  --as below, due to social situation, will get ID involve also      A/C hypoxic resp failure/Pneumonia NASIM( vs neoplasm less likely)  --IV azithromycin, vancomycin and zosyn   -- resp culture + S. Aureus and Pseudomona  -- no wheeze; stop steroids  -- AFB cx pending, cont isolation  -- will likely need followup imaging eventually     TB  -- upper lobe but deneis hemoptysis or weight loss or known exposures  - sick for about a month.  -- will send afb for completeness but suspcion so low i will not consult pulm  -- moved to [facility] pressure room     Acute on chronic respiratory failure  --has been told she needs oxygen at home in the past but refuses to wear it  --PO2 59.0  --oxygen PRN  -- on goinging tobacco abuse      Bipolar disorder/homeless  --suspect schizophrenia also given paranoia and hallucination  --pt recently stopped psych meds a week ago because they were causing her to hallucinate  --pt needs mirna lucas, but unfortunately we do not have inpt psych service.   --if stable for dc, she'll need to follow up with primary psychiatrist, otherwise, may need to have the Hammad lucas  -- SW consulted     Left chest pain  -- prob pleurisy  -- add toradol prn    Hypothyroidism  -- TSH WNL  -- cont home meds    Tobacco abuse  -- counseled, cessation advised    Nausea/HA  -- could be withdrawing from psych meds    Kristopher Samayoa MD   03/04/17   8:24 AM    Please note that portions of this note may have been completed with a  voice recognition program. Efforts were made to edit the dictations, but occasionally words are mistranscribed.

## 2017-03-04 NOTE — PLAN OF CARE
Problem: Patient Care Overview (Adult)  Goal: Plan of Care Review  Outcome: Ongoing (interventions implemented as appropriate)    03/04/17 0711   Coping/Psychosocial Response Interventions   Plan Of Care Reviewed With patient   Patient Care Overview   Progress no change       Goal: Adult Individualization and Mutuality  Outcome: Ongoing (interventions implemented as appropriate)  Goal: Discharge Needs Assessment  Outcome: Ongoing (interventions implemented as appropriate)    Problem: COPD, Chronic Bronchitis/Emphysema (Adult)  Goal: Signs and Symptoms of Listed Potential Problems Will be Absent or Manageable (COPD, Chronic Bronchitis/Emphysema)  Outcome: Ongoing (interventions implemented as appropriate)

## 2017-03-04 NOTE — PROGRESS NOTES
"Acute Care - Physical Therapy Initial Evaluation  [facility]     Patient Name: Colleen Gonzalez  : 1965  MRN: [id number]  Today's Date: 3/4/2017      Date of Referral to PT: 17  Referring Physician: DO Theresa      Admit Date: 3/1/2017     Visit Dx:    ICD-10-CM ICD-9-CM   1. Community acquired pneumonia J18.9 486   2. COPD exacerbation J44.1 491.21   3. Hypoxia R09.02 799.02   4. Impaired functional mobility, balance, gait, and endurance Z74.09 V49.89     Patient Active Problem List   Diagnosis   • COPD (chronic obstructive pulmonary disease)   • Tobacco abuse   • Acute on chronic respiratory failure   • Leukocytosis   • HCAP (healthcare-associated pneumonia)   • Hypothyroid   • Sepsis   • Anxiety and depression   • Homelessness     Past Medical History   Diagnosis Date   • Anxiety    • COPD (chronic obstructive pulmonary disease)    • Depression    • Hyperlipidemia    • Hypertension    • Hypothyroid    • Obesity      Past Surgical History   Procedure Laterality Date   • Hysterectomy       partial   • Cholecystectomy     • Back surgery     • Knee surgery Bilateral    • Cyst removal            PT ASSESSMENT (last 72 hours)      PT Evaluation       17 1432 17 1606    Rehab Evaluation    Document Type evaluation  -LS     Subjective Information agree to therapy   pt said uses RW sometimes; would like RW w/seat so can rest  -LS     Evaluation Not Performed  patient/family declined evaluation   pt again refusing eval; \"maybe tomorrow;I don't feel good today\";will check back tomorrow  -DM    Symptoms Noted Comment pt said van has been towed to [employer] & asked if there is a program to help get it out. Nsg notified & said would see if pt has a .  -LS     General Information    Patient Profile Review yes  -LS     Referring Physician DO Theresa  -LS     Pertinent History Of Current Problem To [facility] ED via ambulance with c/o increased shortness of breath and productive cough. She " said her breathing had gotten worse over the past month but significantly worse over the past week. She has had a productive cough. She reported subjective fever and chills, N/V, and inability to eat for the past week due to feeling so bad. Pt reported frequent falls due to her legs just giving out on her. Pt requesting assistance with placement with nursing home or assisted living. Patient was noted to have hypoxia, tachycardia, and temperature of 102.3. CXR showed RUL pneumonia. Adm w/bacteremia & ESRD.  -LS     Precautions/Limitations fall precautions;oxygen therapy device and L/min   4L O2/NC. testing for TB (airborne precautions)  -LS     Prior Level of Function independent:;bed mobility;transfer;gait   uses RW sometimes; hx of falls per pt  -LS     Equipment Currently Used at Home walker, rolling   pt said she would like to have a RW w/a seat  -LS     Plans/Goals Discussed With patient;agreed upon  -LS     Risks Reviewed patient:;increased discomfort;change in vital signs;LOB  -LS     Benefits Reviewed patient:;improve function;increase independence  -LS     Barriers to Rehab medically complex;previous functional deficit   homeless; no family support per pt  -LS     Living Environment    Lives With alone   has been staying at homeless shelter  -LS     Living Environment Comment pt was at her brother's on night she was brought to hospital but said that her brother does not want her there.  -LS     Clinical Impression    Date of Referral to PT 03/01/17  -LS     PT Diagnosis Impaired functional mobility  -LS     Patient/Family Goals Statement pt would like to go a [facility] home to live; said she is too sick to be alone.  -     Criteria for Skilled Therapeutic Interventions Met yes;treatment indicated  -LS     Rehab Potential good, to achieve stated therapy goals  -LS     Vital Signs    Pre SpO2 (%) 93  -LS     O2 Delivery Pre Treatment supplemental O2   4L  -LS     Intra SpO2 (%) 88   4L (88% after walk)  -LS      "Post SpO2 (%) 91  -LS     O2 Delivery Post Treatment supplemental O2   4L  -LS     Pre Patient Position Supine  -LS     Post Patient Position Sitting  -LS     Recovery Time 1 min  -LS     Pain Assessment    Pain Assessment 0-10   pt said not hurting, just feels sick.  -LS     Pain Score 0  -LS     Post Pain Score 0  -LS     ROM (Range of Motion)    General ROM no range of motion deficits identified   BLEs, BUEs  -LS     MMT (Manual Muscle Testing)    General MMT Assessment --   B sh flexors 4-. B elbow flexors & extensors 4.  -LS     General MMT Assessment Detail --   B hip flexors & B ankle dorsiflexors 4-. B knee extensors 4.  -LS     Bed Mobility, Assessment/Treatment    Bed Mob, Supine to Sit, [place] independent  -LS     Bed Mobility, Comment Pt did not demonstrated sit to supine but said she is able to lay down on her own. Based on PT's clinical judgement, pt would be indep sit to supine.  -LS     Transfer Assessment/Treatment    Transfers, Sit-Stand [place] minimum assist (75% patient effort)   CGA to min A  -LS     Transfers, Stand-Sit [place] minimum assist (75% patient effort)   CGA to min A  -LS     Gait Assessment/Treatment    Gait, [place] Level minimum assist (75% patient effort)   CGA to min A; occasional slight LOB but pt able to recover  -LS     Gait, Distance (Feet) 60  -LS     Gait, Safety Issues balance decreased during turns;sequencing ability decreased;step length decreased  -LS     Gait, Impairments impaired balance;strength decreased  -LS     Gait, Comment pt able to recover balance on her own today while walking  -LS     Positioning and Restraints    Pre-Treatment Position in bed  -LS     Post Treatment Position bed  -LS     In Bed notified nsg;sitting EOB;call light within reach  -LS       03/03/17 0915 03/02/17 1120    Rehab Evaluation    Evaluation Not Performed patient/family declined evaluation   ref. eval d/t \"not feeling well\";nsg/P.T. tried to coax but again " declined;will check in pm  -DM patient/family declined evaluation   Pt reporting she does not want therapy while she is in the hospital  -AC      03/01/17 1610 03/01/17 1548    General Information    Equipment Currently Used at Home  none  -JL    Living Environment    Lives With alone  -DAVID     Living Arrangements homeless  -DAVID homeless  -JL    Home Accessibility no concerns  -DAVID     Stair Railings at Home none  -DAVID     Type of Financial/Environmental Concern none  -DAVID     Transportation Available car;family or friend will provide  -DAVID car  -      User Key  (r) = Recorded By, (t) = Taken By, (c) = Cosigned By    Initials Name Provider Type    AC Alba Kamara, OT Occupational Therapist    ANA PAULA Redd, PT Physical Therapist    RENALDO Sheffield, PT Physical Therapist    RADHA Butcher Nurse Practitioner    DAVID Noonan, RN Registered Nurse          Physical Therapy Education     Title: PT OT SLP Therapies (Active)     Topic: Physical Therapy (Active)     Point: Home exercise program (Done)    Learning Progress Summary    Learner Readiness Method Response Comment Documented by Status   Patient Acceptance E VU Discussed benefits of activity.  03/04/17 1523 Done                      User Key     Initials Effective Dates Name Provider Type Discipline     06/19/15 -  Cassie Sheffield, PT Physical Therapist PT                PT Recommendation and Plan  Anticipated Equipment Needs At Discharge:  (to be determined. assess w/rollator walker)  Anticipated Discharge Disposition: skilled nursing facility, extended care facility (to be determined)  Planned Therapy Interventions: balance training, gait training, home exercise program, patient/family education, strengthening, transfer training  PT Frequency: daily, per priority policy  Plan of Care Review  Plan Of Care Reviewed With: patient  Outcome Summary/Follow up Plan: Pt does present with strength, balance, and mobility deficits and  a reported hx of falls. O2 sats did drop w/amb as well. Pt sometimes uses RW & said she would like to have one w/a seat so she can rest when she needs to do so; therapist agrees that this would be beneficial as O2 sats did drop today w/amb despite pt being on 4L O2/NC. Pt would benefit from skilled PT services to improve function & maximize independence.          IP PT Goals       03/04/17 1524          Transfer Training PT LTG    Transfer Training PT LTG, Date Established 03/04/17  -LS      Transfer Training PT LTG, Time to Achieve 2 wks  -LS      Transfer Training PT LTG, Activity Type sit to stand/stand to sit  -LS      Transfer Training PT LTG, [place] Level independent  -LS      Transfer Training PT LTG, Assist Device walker, rolling   walker as needed  -LS      Transfer Training PT LTG, Outcome goal ongoing  -LS      Gait Training PT LTG    Gait Training Goal PT LTG, Date Established 03/04/17  -LS      Gait Training Goal PT LTG, Time to Achieve 2 wks  -LS      Gait Training Goal PT LTG, [place] Level independent  -LS      Gait Training Goal PT LTG, Assist Device walker, rolling   RW or rollator (as needed)  -LS      Gait Training Goal PT LTG, Distance to Achieve 200   maintaining O2 sats at least 90%  -LS      Gait Training Goal PT LTG, Outcome goal ongoing  -LS      Patient Education PT STG    Patient Education PT STG, Date Established 03/04/17  -LS      Patient Education PT STG, Time to Achieve 2 wks  -LS      Patient Education PT STG, Education Type energy conservation;HEP  -LS      Patient Education PT STG Outcome goal ongoing  -LS        User Key  (r) = Recorded By, (t) = Taken By, (c) = Cosigned By    Initials Name Provider Type    LS Cassie Sheffield, PT Physical Therapist                Outcome Measures       03/04/17 1432          How much help from another person do you currently need...    Turning from your back to your side while in flat bed without using bedrails? 4  -LS      Moving  from lying on back to sitting on the side of a flat bed without bedrails? 4  -LS      Moving to and from a bed to a chair (including a wheelchair)? 3  -LS      Standing up from a chair using your arms (e.g., wheelchair, bedside chair)? 3  -LS      Climbing 3-5 steps with a railing? 3  -LS      To walk in hospital room? 3  -LS      AM-PAC 6 Clicks Score 20  -LS      Functional Assessment    Outcome Measure Options AM-PAC 6 Clicks Basic Mobility (PT)  -LS        User Key  (r) = Recorded By, (t) = Taken By, (c) = Cosigned By    Initials Name Provider Type    RENALDO Sheffield, PT Physical Therapist           Time Calculation:         PT Charges       03/04/17 1532          Time Calculation    Start Time 1432  -      PT Received On 03/04/17  -      PT Goal Re-Cert Due Date 03/14/17  -        User Key  (r) = Recorded By, (t) = Taken By, (c) = Cosigned By    Initials Name Provider Type     Cassie Sheffield, PT Physical Therapist          Therapy Charges for Today     Code Description Service Date Service Provider Modifiers Qty    [id number]  PT EVAL MOD COMPLEXITY 4 3/4/2017 Cassie Sheffield, PT GP 1          PT G-Codes  Outcome Measure Options: AM-PAC 6 Clicks Basic Mobility (PT)      Cassie Sheffield, PT  3/4/2017

## 2017-03-04 NOTE — PLAN OF CARE
Problem: Patient Care Overview (Adult)  Goal: Plan of Care Review  Outcome: Ongoing (interventions implemented as appropriate)    Problem: COPD, Chronic Bronchitis/Emphysema (Adult)  Goal: Signs and Symptoms of Listed Potential Problems Will be Absent or Manageable (COPD, Chronic Bronchitis/Emphysema)  Outcome: Ongoing (interventions implemented as appropriate)

## 2017-03-04 NOTE — PLAN OF CARE
Problem: Patient Care Overview (Adult)  Goal: Plan of Care Review  Outcome: Ongoing (interventions implemented as appropriate)    03/04/17 1524   Coping/Psychosocial Response Interventions   Plan Of Care Reviewed With patient   Outcome Evaluation   Outcome Summary/Follow up Plan Pt does present with strength, balance, and mobility deficits and a reported hx of falls. O2 sats did drop w/amb as well. Pt sometimes uses RW & said she would like to have one w/a seat so she can rest when she needs to do so; therapist agrees that this would be beneficial as O2 sats did drop today w/amb despite pt being on 4L O2/NC. Pt would benefit from skilled PT services to improve function & maximize independence.         Problem: Inpatient Physical Therapy  Goal: Transfer Training Goal 1 LTG- PT  Outcome: Ongoing (interventions implemented as appropriate)    03/04/17 1524   Transfer Training PT LTG   Transfer Training PT LTG, Date Established 03/04/17   Transfer Training PT LTG, Time to Achieve 2 wks   Transfer Training PT LTG, Activity Type sit to stand/stand to sit   Transfer Training PT LTG, [place] Level independent   Transfer Training PT LTG, Assist Device walker, rolling  (walker as needed)   Transfer Training PT LTG, Outcome goal ongoing       Goal: Gait Training Goal LTG- PT  Outcome: Ongoing (interventions implemented as appropriate)    03/04/17 1524   Gait Training PT LTG   Gait Training Goal PT LTG, Date Established 03/04/17   Gait Training Goal PT LTG, Time to Achieve 2 wks   Gait Training Goal PT LTG, [place] Level independent   Gait Training Goal PT LTG, Assist Device walker, rolling  (RW or rollator (as needed))   Gait Training Goal PT LTG, Outcome goal ongoing         03/04/17 1524   Gait Training PT LTG   Gait Training Goal PT LTG, Date Established 03/04/17   Gait Training Goal PT LTG, Time to Achieve 2 wks   Gait Training Goal PT LTG, [place] Level independent   Gait Training Goal PT LTG, Assist Device  walker, rolling  (RW or rollator (as needed))   Gait Training Goal PT LTG, Distance to Achieve 200  (maintaining O2 sats at least 90%)   Gait Training Goal PT LTG, Outcome goal ongoing       Goal: Patient Education Goal STG- PT  Outcome: Ongoing (interventions implemented as appropriate)    03/04/17 1524   Patient Education PT STG   Patient Education PT STG, Date Established 03/04/17   Patient Education PT STG, Time to Achieve 2 wks   Patient Education PT STG, Education Type energy conservation;HEP   Patient Education PT STG Outcome goal ongoing

## 2017-03-05 LAB
ANION GAP SERPL CALCULATED.3IONS-SCNC: 3 MMOL/L (ref 3–11)
BACTERIA SPEC RESP CULT: ABNORMAL
BACTERIA SPEC RESP CULT: ABNORMAL
BUN BLD-MCNC: 11 MG/DL (ref 9–23)
BUN/CREAT SERPL: 18.3 (ref 7–25)
CALCIUM SPEC-SCNC: 8.9 MG/DL (ref 8.7–10.4)
CHLORIDE SERPL-SCNC: 102 MMOL/L (ref 99–109)
CO2 SERPL-SCNC: 37 MMOL/L (ref 20–31)
CREAT BLD-MCNC: 0.6 MG/DL (ref 0.6–1.3)
GFR SERPL CREATININE-BSD FRML MDRD: 105 ML/MIN/1.73
GLUCOSE BLD-MCNC: 77 MG/DL (ref 70–100)
GRAM STN SPEC: ABNORMAL
POTASSIUM BLD-SCNC: 4.6 MMOL/L (ref 3.5–5.5)
SODIUM BLD-SCNC: 142 MMOL/L (ref 132–146)

## 2017-03-05 PROCEDURE — 94640 AIRWAY INHALATION TREATMENT: CPT

## 2017-03-05 PROCEDURE — 25010000002 ENOXAPARIN PER 10 MG: Performed by: INTERNAL MEDICINE

## 2017-03-05 PROCEDURE — 94799 UNLISTED PULMONARY SVC/PX: CPT

## 2017-03-05 PROCEDURE — 25010000002 CEFEPIME: Performed by: INTERNAL MEDICINE

## 2017-03-05 PROCEDURE — 94760 N-INVAS EAR/PLS OXIMETRY 1: CPT

## 2017-03-05 PROCEDURE — 99232 SBSQ HOSP IP/OBS MODERATE 35: CPT | Performed by: HOSPITALIST

## 2017-03-05 PROCEDURE — 25010000002 VANCOMYCIN HCL IN NACL 1.5-0.9 GM/500ML-% SOLUTION

## 2017-03-05 PROCEDURE — 25010000002 AZITHROMYCIN: Performed by: NURSE PRACTITIONER

## 2017-03-05 PROCEDURE — 80048 BASIC METABOLIC PNL TOTAL CA: CPT | Performed by: HOSPITALIST

## 2017-03-05 PROCEDURE — 63710000001 PREDNISONE PER 5 MG: Performed by: INTERNAL MEDICINE

## 2017-03-05 RX ADMIN — HYDROCODONE BITARTRATE AND ACETAMINOPHEN 1 TABLET: 5; 325 TABLET ORAL at 12:52

## 2017-03-05 RX ADMIN — TIZANIDINE 4 MG: 4 TABLET ORAL at 21:44

## 2017-03-05 RX ADMIN — CEFEPIME 2 G: 2 INJECTION, POWDER, FOR SOLUTION INTRAVENOUS at 12:53

## 2017-03-05 RX ADMIN — NICOTINE 1 PATCH: 21 PATCH, EXTENDED RELEASE TRANSDERMAL at 08:18

## 2017-03-05 RX ADMIN — AZITHROMYCIN 500 MG: 500 INJECTION, POWDER, LYOPHILIZED, FOR SOLUTION INTRAVENOUS at 08:16

## 2017-03-05 RX ADMIN — HYDROCODONE POLISTIREX AND CHLORPHENIRAMINE POLISTIREX 5 ML: 10; 8 SUSPENSION, EXTENDED RELEASE ORAL at 02:59

## 2017-03-05 RX ADMIN — LEVOTHYROXINE SODIUM 50 MCG: 25 TABLET ORAL at 05:12

## 2017-03-05 RX ADMIN — QUETIAPINE 300 MG: 200 TABLET, EXTENDED RELEASE ORAL at 21:44

## 2017-03-05 RX ADMIN — DULOXETINE 60 MG: 60 CAPSULE, DELAYED RELEASE ORAL at 08:18

## 2017-03-05 RX ADMIN — Medication 2 TABLET: at 08:17

## 2017-03-05 RX ADMIN — CEFEPIME 2 G: 2 INJECTION, POWDER, FOR SOLUTION INTRAVENOUS at 05:12

## 2017-03-05 RX ADMIN — PREDNISONE 20 MG: 20 TABLET ORAL at 08:18

## 2017-03-05 RX ADMIN — PANTOPRAZOLE SODIUM 40 MG: 40 TABLET, DELAYED RELEASE ORAL at 08:17

## 2017-03-05 RX ADMIN — CEFEPIME 2 G: 2 INJECTION, POWDER, FOR SOLUTION INTRAVENOUS at 21:43

## 2017-03-05 RX ADMIN — DULOXETINE 60 MG: 60 CAPSULE, DELAYED RELEASE ORAL at 21:44

## 2017-03-05 RX ADMIN — VANCOMYCIN HYDROCHLORIDE 1500 MG: 1 INJECTION, POWDER, LYOPHILIZED, FOR SOLUTION INTRAVENOUS at 19:25

## 2017-03-05 RX ADMIN — HYDROCODONE POLISTIREX AND CHLORPHENIRAMINE POLISTIREX 5 ML: 10; 8 SUSPENSION, EXTENDED RELEASE ORAL at 16:33

## 2017-03-05 RX ADMIN — FLUTICASONE PROPIONATE 2 SPRAY: 50 SPRAY, METERED NASAL at 08:17

## 2017-03-05 RX ADMIN — ACETAMINOPHEN 650 MG: 325 TABLET, FILM COATED ORAL at 13:55

## 2017-03-05 RX ADMIN — HYDROCODONE BITARTRATE AND ACETAMINOPHEN 1 TABLET: 5; 325 TABLET ORAL at 05:13

## 2017-03-05 RX ADMIN — IPRATROPIUM BROMIDE AND ALBUTEROL SULFATE 3 ML: .5; 3 SOLUTION RESPIRATORY (INHALATION) at 20:02

## 2017-03-05 RX ADMIN — Medication 2 TABLET: at 18:16

## 2017-03-05 RX ADMIN — CETIRIZINE HYDROCHLORIDE 10 MG: 10 TABLET, FILM COATED ORAL at 08:17

## 2017-03-05 RX ADMIN — ENOXAPARIN SODIUM 40 MG: 40 INJECTION SUBCUTANEOUS at 08:17

## 2017-03-05 RX ADMIN — IPRATROPIUM BROMIDE AND ALBUTEROL SULFATE 3 ML: .5; 3 SOLUTION RESPIRATORY (INHALATION) at 12:28

## 2017-03-05 RX ADMIN — IPRATROPIUM BROMIDE AND ALBUTEROL SULFATE 3 ML: .5; 3 SOLUTION RESPIRATORY (INHALATION) at 08:26

## 2017-03-05 RX ADMIN — GABAPENTIN 400 MG: 400 CAPSULE ORAL at 08:17

## 2017-03-05 RX ADMIN — HYDROCODONE BITARTRATE AND ACETAMINOPHEN 1 TABLET: 5; 325 TABLET ORAL at 20:12

## 2017-03-05 RX ADMIN — GABAPENTIN 400 MG: 400 CAPSULE ORAL at 21:44

## 2017-03-05 RX ADMIN — FUROSEMIDE 40 MG: 40 TABLET ORAL at 08:17

## 2017-03-05 RX ADMIN — TIZANIDINE 4 MG: 4 TABLET ORAL at 08:17

## 2017-03-05 RX ADMIN — IPRATROPIUM BROMIDE AND ALBUTEROL SULFATE 3 ML: .5; 3 SOLUTION RESPIRATORY (INHALATION) at 16:12

## 2017-03-05 RX ADMIN — PREDNISONE 20 MG: 20 TABLET ORAL at 18:16

## 2017-03-05 RX ADMIN — MONTELUKAST SODIUM 10 MG: 10 TABLET, FILM COATED ORAL at 21:44

## 2017-03-05 NOTE — CONSULTS
Colleen Gonzalez  1965  [id number]    Date of Consult: 3/5/2017    Date of Admission: 3/1/2017      Requesting Provider: Aisha Lorenzo MD  Evaluating Physician: Robert Self MD    CC:   Chief Complaint   Patient presents with   • Shortness of Breath       Reason for Consultation: NASIM pneumonia with Staph aureus    History of present illness:   Patient is a 52 y.o.  Yr old female with history of 5-6 week history of cough and sputum production which is worsening over the past 1 week and associated with chills/sweats. No fever. She reports she is homeless and her living situation is stressful although she is compliant with most of her medications- aside from her psych medications for bipolar d/o and OCD that she takes intermittently.   Patient smokes 3 PPD and has no known hx of cancer in her past. She is concerned about the possibility of cancer. No weight loss, night sweats, hemoptysis reported.   No recent antibiotics. Came to ED with SOA and BLAKE with concern for pna.   Chest CT with 6x6cm NASIM opacity and concern for inflammatory process.  The M culture with Pseudomonas aeruginosa and MRSA present.  AFB ×2 have been collected one of 2 negative thus far.  Currently on empiric antibiotic therapy with vancomycin, cefepime, azithromycin.  ID asked to see the patient for further recommendations.    Past Medical History   Diagnosis Date   • Anxiety    • COPD (chronic obstructive pulmonary disease)    • Depression    • Hyperlipidemia    • Hypertension    • Hypothyroid    • Obesity        Past Surgical History   Procedure Laterality Date   • Hysterectomy       partial   • Cholecystectomy     • Back surgery     • Knee surgery Bilateral    • Cyst removal       Social History Narrative    Patient is homeless and moves around staying with friends and family.  Recently was staying at the [facility].       Father: Heart attack    Allergies   Allergen Reactions   • Aspirin GI Intolerance   • Ibuprofen GI Intolerance        Medication:  Current Facility-Administered Medications   Medication Dose Route Frequency Provider Last Rate Last Dose   • [START ON 3/6/2017] !vancomycin trough 3/6 @ 1700 - please hold 1800 dose   Does not apply Continuous PRN Angela Cedeño DOMINGA       • acetaminophen (TYLENOL) tablet 650 mg  650 mg Oral Q4H PRN [facility] Bratton II, DO   650 mg at 03/04/17 1121   • AZITHROMYCIN 500 MG/250 ML 0.9% NS IVPB (MBP)  500 mg Intravenous Q24H RADHA Ji 250 mL/hr at 03/04/17 0815 500 mg at 03/05/17 0816   • bisacodyl (DULCOLAX) EC tablet 5 mg  5 mg Oral Daily PRN Effie M Bratton II, DO       • cefepime (MAXIPIME) 2 g/100 mL 0.9% NS (mbp)  2 g Intravenous Q8H Aisha Lorenzo MD   2 g at 03/05/17 1253   • cetirizine (zyrTEC) tablet 10 mg  10 mg Oral Daily Tracy M Bratton II, DO   10 mg at 03/05/17 0817   • DULoxetine (CYMBALTA) DR capsule 60 mg  60 mg Oral Q12H [facility] Bratton II, DO   60 mg at 03/05/17 0818   • enoxaparin (LOVENOX) syringe 40 mg  40 mg Subcutaneous Daily Tracy M Bratton II, DO   40 mg at 03/05/17 0817   • fluticasone (FLONASE) 50 MCG/ACT nasal spray 2 spray  2 spray Nasal Daily Effie M Bratton II, DO   2 spray at 03/05/17 0817   • furosemide (LASIX) tablet 40 mg  40 mg Oral Daily Effie  Bratton II, DO   40 mg at 03/05/17 0817   • gabapentin (NEURONTIN) capsule 400 mg  400 mg Oral Q12H Effie M Bratton II, DO   400 mg at 03/05/17 0817   • HYDROcod Polst-CPM Polst ER (TUSSIONEX PENNKINETIC) 10-8 MG/5ML ER suspension 5 mL  5 mL Oral Q12H Aisha Lorenzo MD   5 mL at 03/05/17 0259   • HYDROcodone-acetaminophen (NORCO) 5-325 MG per tablet 1 tablet  1 tablet Oral Q4H PRN Effie Cardenas II, DO   1 tablet at 03/05/17 1252   • ipratropium-albuterol (DUO-NEB) nebulizer solution 3 mL  3 mL Nebulization 4x Daily - RT RDAHA Ji   3 mL at 03/05/17 1228   • ipratropium-albuterol (DUO-NEB) nebulizer solution 3 mL  3 mL Nebulization Q4H PRN RADHA Sin       • levothyroxine (SYNTHROID,  LEVOTHROID) tablet 50 mcg  50 mcg Oral Q AM Tracy M Bratton II, DO   50 mcg at 03/05/17 0512   • montelukast (SINGULAIR) tablet 10 mg  10 mg Oral Nightly Effiecy M Bratton II, DO   10 mg at 03/04/17 1932   • nicotine (NICODERM CQ) 21 MG/24HR patch 1 patch  1 patch Transdermal Q24H Tracy M Bratton II, DO   1 patch at 03/05/17 0818   • ondansetron (ZOFRAN) tablet 4 mg  4 mg Oral Q6H PRN Effiecy M Bratton II, DO   4 mg at 03/02/17 1647    Or   • ondansetron (ZOFRAN) injection 4 mg  4 mg Intravenous Q6H PRN Tracy M Bratton II, DO   4 mg at 03/04/17 0854   • pantoprazole (PROTONIX) EC tablet 40 mg  40 mg Oral Q24H Tracy M Bratton II, DO   40 mg at 03/05/17 0817   • Pharmacy Consult - MTM   Does not apply Daily Marcelo Recinos [facility]       • Pharmacy to dose vancomycin   Does not apply Continuous PRN RADHA Ji       • predniSONE (DELTASONE) tablet 20 mg  20 mg Oral BID With Meals Aisha Lorenzo MD   20 mg at 03/05/17 0818   • QUEtiapine fumarate ER (SEROquel XR) tablet 300 mg  300 mg Oral Nightly Tracy M Bratton II, DO   300 mg at 03/04/17 1932   • sennosides-docusate sodium (SENOKOT-S) 8.6-50 MG tablet 2 tablet  2 tablet Oral BID Tracy M Bratton II, DO   2 tablet at 03/05/17 0817   • sodium chloride 0.9 % flush 1-10 mL  1-10 mL Intravenous PRN Effie M Theresa II, DO       • sodium chloride 0.9 % flush 10 mL  10 mL Intravenous PRN Carlos Park MD       • tiZANidine (ZANAFLEX) tablet 4 mg  4 mg Oral Q12H Effie M Theresa II, DO   4 mg at 03/05/17 0817   • vancomycin IVPB 1500 mg in 0.9% NaCl (Premix) 500 mL  1,500 mg Intravenous Q24H Angela Cedeño [facility]   1,500 mg at 03/04/17 0137       Antibiotics:  Vanc  Cefepime  Azithromycin  Prednisone 20 mg daily     Review of Systems  Full 12 point review of systems reviewed and negative except for cough, SOA, wt loss, poor appetite, productive sputum.    Physical Exam:   Vital Signs   Visit Vitals   • /99 (BP Location: Right arm, Patient Position: Lying)  Comment: nurse  "advised   • Pulse 100   • Temp 97.5 °F (36.4 °C) (Oral)   • Resp 18   • Ht 68\" (172.7 cm)   • Wt 243 lb 9.6 oz (110 kg)   • SpO2 93%   • BMI 37.04 kg/m2     Temp (24hrs), Av.7 °F (36.5 °C), Min:96.9 °F (36.1 °C), Max:98.7 °F (37.1 °C)      GENERAL: Chronically ill appearing, unkept appearance. Sitting at bedside.   HEENT: Normocephalic, atraumatic.  PERRL. EOMI. No conjunctival injection. No icterus. Oropharynx clear without evidence of thrush or exudate. Dental disease with missing teeth.   NECK: Supple without nuchal rigidity  LYMPH: No cervical, axillary or inguinal lymphadenopathy. No neck masses  HEART: RRR; No murmur, rubs, gallops.   LUNGS: Diminished throughout. Rales in bases. Exp wheezing. Supplemental O2 and increased work of breathing.   ABDOMEN: Soft, nontender, nondistended. Positive bowel sounds. No rebound or guarding.   EXT:  No cyanosis, clubbing or edema  : Normal appearing genitalia without Perdomo catheter.  MSK: FROM without joint effusions noted    SKIN: Plaque like skin lesions of the chest, arms and legs of varied healing. Some appear blister-like. Others crusted over. Healed lesions have left hypopigmentation.   NEURO: Oriented to PPT. No focal deficits.   PSYCHIATRIC: tearful although cooperative with exam     Laboratory Data      Results from last 7 days  Lab Units 17  0654 17  0522 17  0617   WBC 10*3/mm3 12.67* 17.64* 16.45*   HEMOGLOBIN g/dL 11.6 11.2* 11.7   HEMATOCRIT % 36.1 35.7 36.0   PLATELETS 10*3/mm3 281 261 191       Results from last 7 days  Lab Units 17  0614   SODIUM mmol/L 142   POTASSIUM mmol/L 4.6   CHLORIDE mmol/L 102   TOTAL CO2 mmol/L 37.0*   BUN mg/dL 11   CREATININE mg/dL 0.60   GLUCOSE mg/dL 77   CALCIUM mg/dL 8.9       Results from last 7 days  Lab Units 17  1248   ALK PHOS U/L 91   BILIRUBIN mg/dL 0.3   ALT (SGPT) U/L 11   AST (SGOT) U/L 19               Estimated Creatinine Clearance: 140.9 mL/min (by C-G formula based on Cr of " 0.6).      Microbiology:  Sputum culture on 3/2: PSA and MRSA  Sputum culture on 3/3: MRSA   AFB x 3 ordered and first is negative    Radiology:  Imaging Results (last 24 hours)     ** No results found for the last 24 hours. **        PROBLEM LIST:   NASIM pneumonia with MRSA and PSA   Leukocytosis  Concern for potential post-obstructive pneumonia with hx of cough x 6 weeks and 6x6cm nature of NASIM pna (3 PPD history)  Tobacco abuse  Chronic skin lesions  HTN  HLD  Hypothyroidism  Psych disorder    ASSESSMENT:  52 -year-old female with multiple medical conditions including hypertension, hyperlipidemia, hypothyroidism in the setting of psychiatric disease with OCD and bipolar disorder who presents with worsening cough, shortness of breath, sputum production of brown coloration of the past 5-6 weeks however worsening over the last 1 week.  She reports associated chills and sweats however hasn't checked her temperature. she came to the ED for evaluation  with leukocytosis initially noted, chest CT angiogram with a left upper lobe with a 6 x 6 cm opacity concerning for infectious versus neoplastic etiology.  Sputum production ×2 has been collected with pseudomonas and MRSA.  Blood culture negative.  AFB ×2 collected and thus far negative.    She does have some diffuse cutaneous lesions of the arms, legs, chest wall concerning for possibility of fungal pneumonia but will continue current therapy for now and f/u repeat imaging to determine need for further w/u.     With being homeless and NASIM dense consolidation agree with r/o TB before even considering bronch.    PLAN:   Continue Vanc and Cefepime  D/c Azithromycin  CBC, BMp, ESR, CRP in am   Consider histo, blasto, crypto if fails to improve.   F/u AFB      Dr. Robert Self saw the patient, performed the physical exam, reviewed the laboratory data and guided with the formulation of the above problem list, assessment and treatment plan.     Robert Self,  MD  3/5/2017

## 2017-03-05 NOTE — PROGRESS NOTES
[facility] Medicine Services  INPATIENT PROGRESS NOTE    Date of Admission: 3/1/2017  Length of Stay: 4  Primary Care Physician: RADHA Pedraza    Subjective-- HPI/Events overnight/ROS/CC- Hospital follow visit.  Detailed ROS not detailed as performed below      Feeling better today. No HA or dizziness. No chest pain or SOA. Still having cough. No nausea, vomiting or abd pain. No diarrhea.    Objective      Temp:  [96.9 °F (36.1 °C)-98.7 °F (37.1 °C)] 97.5 °F (36.4 °C)  Heart Rate:  [] 96  Resp:  [18-28] 20  BP: (140-158)/(91-99) 158/99    Physical Exam:  Physical Exam    General Assessment: No acute cardiopulmonary distress. Well developed and well nourished.     CVS: RRR, S1S2 normal     Resp: + Bibasilar crackles, no wheezing, resp non-labored     Abd: soft, NT, ND, normal BS, no guarding or peritoneal signs     Ext: No edema, both calves are symmetric and NTTP     Neuro: Nonfocal     Skin: W/D/I. No rash.     Psych: Affect is appropriate        Results Review:    I have reviewed the labs, radiology results and diagnostic studies.  Lab Results (last 24 hours)     Procedure Component Value Units Date/Time    Blood Culture [[id number]]  (Normal) Collected:  03/01/17 1248    Specimen:  Blood from Hand, Left Updated:  03/04/17 1301     Blood Culture No growth at 3 days     Blood Culture [[id number]]  (Normal) Collected:  03/01/17 1248    Specimen:  Blood from Hand, Right Updated:  03/04/17 1301     Blood Culture No growth at 3 days     Respiratory Culture [[id number]]  (Abnormal)  (Susceptibility) Collected:  03/02/17 0251    Specimen:  Sputum from Cough Updated:  03/04/17 1332     Respiratory Culture Light growth (2+) Pseudomonas aeruginosa (A)              Moderate growth (3+) Staphylococcus aureus (A)     Gram Stain Result        Few (2+) Epithelial cells per low power field      Few (2+) WBCs per low power field       Rare (1+) Endogenous respiratory patience     Susceptibility       Pseudomonas aeruginosa     AMY (Preliminary)     Aztreonam <=8 ug/ml Susceptible     Cefepime <=8 ug/ml Susceptible     Ceftazidime 4 ug/ml Susceptible     Gentamicin <=4 ug/ml Susceptible     Levofloxacin >4 ug/ml Resistant     Meropenem <=1 ug/ml Susceptible     Piperacillin + Tazobactam <=16 ug/ml Susceptible     Tobramycin <=4 ug/ml Susceptible                    S. Pneumo Ag Urine or CSF [[id number]] Collected:  03/02/17 0210    Specimen:  Urine from Urine, Clean Catch Updated:  03/04/17 1625     Specimen Source Urine      STREP PNEUMONIAE ANTIGEN Negative      Body Fluid Culture, Sterile Not Indicated      Organism ID Not indicated.      Please note Comment       College of American [employer]ologists guidelines require a culture to be  performed on CSF specimens negative by bacterial antigen testing  (CAP AMY.80470).       Narrative:       Performed at:  [address] [place] [address]  [id number]  : Ender Sanchez MD, Phone:  [phone]    Basic Metabolic Panel [[id number]]  (Abnormal) Collected:  03/05/17 0614    Specimen:  Blood Updated:  03/05/17 0705     Glucose 77 mg/dL      BUN 11 mg/dL      Creatinine 0.60 mg/dL      Sodium 142 mmol/L      Potassium 4.6 mmol/L      Chloride 102 mmol/L      CO2 37.0 (H) mmol/L      Calcium 8.9 mg/dL      eGFR Non African Amer 105 mL/min/1.73      BUN/Creatinine Ratio 18.3      Anion Gap 3.0 mmol/L     Narrative:       National Kidney Foundation Guidelines    Stage                           Description                             GFR                      1                               Normal or High                          90+  2                               Mild decrease                            60-89  3                               Moderate decrease                   30-59  4                               Severe decrease                       15-29  5                               Kidney failure                             <15     Respiratory Culture [[id number]]  (Abnormal) Collected:  03/03/17 0800    Specimen:  Sputum from Unknown Updated:  03/05/17 1005     Respiratory Culture        Moderate growth (3+) Staphylococcus aureus (A)      Scant growth (1+) Normal Respiratory Shannan      Gram Stain Result Many (4+) WBCs seen       Many (4+) Epithelial cells seen       Few (2+) Gram positive cocci in pairs             Culture Data:  Radiology Data:   Cultures:    BLOOD CULTURE   Date Value Ref Range Status   03/01/2017 No growth at 3 days  Preliminary   03/01/2017 No growth at 3 days  Preliminary     RESPIRATORY CULTURE   Date Value Ref Range Status   03/03/2017 Moderate growth (3+) Staphylococcus aureus (A)  Preliminary   03/03/2017 Scant growth (1+) Normal Respiratory Shannan  Preliminary   03/02/2017 Light growth (2+) Pseudomonas aeruginosa (A)  Preliminary   03/02/2017 Moderate growth (3+) Staphylococcus aureus (A)  Preliminary       I have reviewed the medications.        Assessment/Plan     Assessment/Problem List  Patient is a 52-year-old  female with history of noncompliance. She smokes 2-3 packs of cigarettes per day. She reently went off her psych (Bipolar) meds. She presented to [facility] ED with increased shortness of breath and cough and fever. Reports recent hospitalization to [facility] about a week ago but has not gotten any better.           Sepsis  --meets criteria due to febrile, hypoxia, tachycardia, leukocytosis and infectious source  --as below, due to social situation, and now cultures positive for MRSA/pseudomona, ID consulted, awaiting eval/rec      A/C hypoxic resp failure/Pneumonia NASIM( vs neoplasm less likely)  --IV azithromycin, vancomycin and zosyn   -- resp culture + MRSA and Pseudomona  -- no wheeze; stop steroids  -- AFB cx pending, cont isolation  -- will likely need followup imaging eventually      TB  -- upper lobe but deneis hemoptysis or weight loss or known exposures  - sick for about a  month.  -- will send afb for completeness but suspcion so low i will not consult pulm  -- moved to [facility] pressure room      Acute on chronic respiratory failure/COPD  --has been told she needs oxygen at home in the past but refuses to wear it  --PO2 59.0  --oxygen PRN  -- on goinging tobacco abuse      Bipolar disorder/homeless  --suspect schizophrenia also given paranoia and hallucination  --pt recently stopped psych meds a week ago because they were causing her to hallucinate  --pt needs spych eval, but unfortunately we do not have inpt psych service.   --if stable for dc, she'll need to follow up with primary psychiatrist, otherwise, may need to have [facility]  -- SW consulted     Left chest pain  -- prob pleurisy  -- add toradol prn     Hypothyroidism  -- TSH WNL  -- cont home meds     Tobacco abuse  -- counseled, cessation advised     Nausea/HA  -- could be withdrawing from psych meds  -- resolved now    Dispo: TBD. Will be difficult, pt homeless.     3/5/17- nothing significant to add, awaiting ID rec. Pt is improving, but had Pseudomas and MRSA in sputum, so once final abx rec from ID. CM following for DC planning.    Kristopher Samayoa MD   03/05/17   11:22 AM    Please note that portions of this note may have been completed with a voice recognition program. Efforts were made to edit the dictations, but occasionally words are mistranscribed.

## 2017-03-05 NOTE — PLAN OF CARE
Problem: Patient Care Overview (Adult)  Goal: Plan of Care Review  Outcome: Ongoing (interventions implemented as appropriate)    Problem: Pneumonia (Adult)  Goal: Signs and Symptoms of Listed Potential Problems Will be Absent or Manageable (Pneumonia)  Outcome: Ongoing (interventions implemented as appropriate)    Problem: COPD, Chronic Bronchitis/Emphysema (Adult)  Goal: Signs and Symptoms of Listed Potential Problems Will be Absent or Manageable (COPD, Chronic Bronchitis/Emphysema)  Outcome: Ongoing (interventions implemented as appropriate)

## 2017-03-05 NOTE — PLAN OF CARE
Problem: Patient Care Overview (Adult)  Goal: Plan of Care Review  Outcome: Ongoing (interventions implemented as appropriate)  Goal: Adult Individualization and Mutuality  Outcome: Ongoing (interventions implemented as appropriate)  Goal: Discharge Needs Assessment  Outcome: Ongoing (interventions implemented as appropriate)    Problem: COPD, Chronic Bronchitis/Emphysema (Adult)  Goal: Signs and Symptoms of Listed Potential Problems Will be Absent or Manageable (COPD, Chronic Bronchitis/Emphysema)  Outcome: Ongoing (interventions implemented as appropriate)

## 2017-03-06 LAB
ANION GAP SERPL CALCULATED.3IONS-SCNC: 5 MMOL/L (ref 3–11)
BACTERIA SPEC AEROBE CULT: NORMAL
BACTERIA SPEC AEROBE CULT: NORMAL
BACTERIA SPEC RESP CULT: ABNORMAL
BACTERIA SPEC RESP CULT: ABNORMAL
BASOPHILS # BLD AUTO: 0.05 10*3/MM3 (ref 0–0.2)
BASOPHILS NFR BLD AUTO: 0.4 % (ref 0–1)
BUN BLD-MCNC: 14 MG/DL (ref 9–23)
BUN/CREAT SERPL: 20 (ref 7–25)
CALCIUM SPEC-SCNC: 9 MG/DL (ref 8.7–10.4)
CHLORIDE SERPL-SCNC: 98 MMOL/L (ref 99–109)
CO2 SERPL-SCNC: 38 MMOL/L (ref 20–31)
CREAT BLD-MCNC: 0.7 MG/DL (ref 0.6–1.3)
CRP SERPL-MCNC: 46.1 MG/DL (ref 0–10)
DEPRECATED RDW RBC AUTO: 49.9 FL (ref 37–54)
EOSINOPHIL # BLD AUTO: 0.02 10*3/MM3 (ref 0.1–0.3)
EOSINOPHIL NFR BLD AUTO: 0.2 % (ref 0–3)
ERYTHROCYTE [DISTWIDTH] IN BLOOD BY AUTOMATED COUNT: 14.6 % (ref 11.3–14.5)
ERYTHROCYTE [SEDIMENTATION RATE] IN BLOOD: 104 MM/HR (ref 0–30)
GFR SERPL CREATININE-BSD FRML MDRD: 88 ML/MIN/1.73
GLUCOSE BLD-MCNC: 65 MG/DL (ref 70–100)
GRAM STN SPEC: ABNORMAL
HCT VFR BLD AUTO: 40.4 % (ref 34.5–44)
HGB BLD-MCNC: 13.2 G/DL (ref 11.5–15.5)
IMM GRANULOCYTES # BLD: 0.5 10*3/MM3 (ref 0–0.03)
IMM GRANULOCYTES NFR BLD: 3.8 % (ref 0–0.6)
L PNEUMO1 AG UR QL IA: NEGATIVE
LYMPHOCYTES # BLD AUTO: 2.14 10*3/MM3 (ref 0.6–4.8)
LYMPHOCYTES NFR BLD AUTO: 16.3 % (ref 24–44)
MCH RBC QN AUTO: 30.6 PG (ref 27–31)
MCHC RBC AUTO-ENTMCNC: 32.7 G/DL (ref 32–36)
MCV RBC AUTO: 93.7 FL (ref 80–99)
MONOCYTES # BLD AUTO: 1 10*3/MM3 (ref 0–1)
MONOCYTES NFR BLD AUTO: 7.6 % (ref 0–12)
NEUTROPHILS # BLD AUTO: 9.38 10*3/MM3 (ref 1.5–8.3)
NEUTROPHILS NFR BLD AUTO: 71.7 % (ref 41–71)
PLATELET # BLD AUTO: 404 10*3/MM3 (ref 150–450)
PMV BLD AUTO: 9 FL (ref 6–12)
POTASSIUM BLD-SCNC: 3.9 MMOL/L (ref 3.5–5.5)
RBC # BLD AUTO: 4.31 10*6/MM3 (ref 3.89–5.14)
SODIUM BLD-SCNC: 141 MMOL/L (ref 132–146)
VANCOMYCIN TROUGH SERPL-MCNC: 8.2 MCG/ML (ref 10–20)
WBC NRBC COR # BLD: 13.09 10*3/MM3 (ref 3.5–10.8)

## 2017-03-06 PROCEDURE — 99233 SBSQ HOSP IP/OBS HIGH 50: CPT | Performed by: FAMILY MEDICINE

## 2017-03-06 PROCEDURE — 25010000002 KETOROLAC TROMETHAMINE PER 15 MG: Performed by: INTERNAL MEDICINE

## 2017-03-06 PROCEDURE — 85025 COMPLETE CBC W/AUTO DIFF WBC: CPT | Performed by: HOSPITALIST

## 2017-03-06 PROCEDURE — 94640 AIRWAY INHALATION TREATMENT: CPT

## 2017-03-06 PROCEDURE — 80048 BASIC METABOLIC PNL TOTAL CA: CPT | Performed by: HOSPITALIST

## 2017-03-06 PROCEDURE — 94760 N-INVAS EAR/PLS OXIMETRY 1: CPT

## 2017-03-06 PROCEDURE — 80202 ASSAY OF VANCOMYCIN: CPT

## 2017-03-06 PROCEDURE — 97110 THERAPEUTIC EXERCISES: CPT

## 2017-03-06 PROCEDURE — 63710000001 PREDNISONE PER 5 MG: Performed by: INTERNAL MEDICINE

## 2017-03-06 PROCEDURE — 97116 GAIT TRAINING THERAPY: CPT

## 2017-03-06 PROCEDURE — 94799 UNLISTED PULMONARY SVC/PX: CPT

## 2017-03-06 PROCEDURE — 25010000002 CEFEPIME: Performed by: INTERNAL MEDICINE

## 2017-03-06 PROCEDURE — 86140 C-REACTIVE PROTEIN: CPT | Performed by: INTERNAL MEDICINE

## 2017-03-06 PROCEDURE — 85652 RBC SED RATE AUTOMATED: CPT | Performed by: INTERNAL MEDICINE

## 2017-03-06 PROCEDURE — 25010000002 VANCOMYCIN HCL IN NACL 1.5-0.9 GM/500ML-% SOLUTION

## 2017-03-06 PROCEDURE — 25010000002 ENOXAPARIN PER 10 MG: Performed by: INTERNAL MEDICINE

## 2017-03-06 RX ORDER — LORAZEPAM 0.5 MG/1
0.5 TABLET ORAL EVERY 12 HOURS PRN
Status: DISCONTINUED | OUTPATIENT
Start: 2017-03-06 | End: 2017-03-14

## 2017-03-06 RX ORDER — KETOROLAC TROMETHAMINE 30 MG/ML
30 INJECTION, SOLUTION INTRAMUSCULAR; INTRAVENOUS ONCE
Status: COMPLETED | OUTPATIENT
Start: 2017-03-06 | End: 2017-03-06

## 2017-03-06 RX ADMIN — LEVOTHYROXINE SODIUM 50 MCG: 25 TABLET ORAL at 05:03

## 2017-03-06 RX ADMIN — HYDROCODONE BITARTRATE AND ACETAMINOPHEN 1 TABLET: 5; 325 TABLET ORAL at 23:24

## 2017-03-06 RX ADMIN — DULOXETINE 60 MG: 60 CAPSULE, DELAYED RELEASE ORAL at 08:44

## 2017-03-06 RX ADMIN — PANTOPRAZOLE SODIUM 40 MG: 40 TABLET, DELAYED RELEASE ORAL at 08:44

## 2017-03-06 RX ADMIN — CETIRIZINE HYDROCHLORIDE 10 MG: 10 TABLET, FILM COATED ORAL at 08:44

## 2017-03-06 RX ADMIN — IPRATROPIUM BROMIDE AND ALBUTEROL SULFATE 3 ML: .5; 3 SOLUTION RESPIRATORY (INHALATION) at 07:34

## 2017-03-06 RX ADMIN — HYDROCODONE BITARTRATE AND ACETAMINOPHEN 1 TABLET: 5; 325 TABLET ORAL at 01:59

## 2017-03-06 RX ADMIN — VANCOMYCIN HYDROCHLORIDE 1500 MG: 1 INJECTION, POWDER, LYOPHILIZED, FOR SOLUTION INTRAVENOUS at 19:34

## 2017-03-06 RX ADMIN — LORAZEPAM 0.5 MG: 0.5 TABLET ORAL at 17:19

## 2017-03-06 RX ADMIN — HYDROCODONE BITARTRATE AND ACETAMINOPHEN 1 TABLET: 5; 325 TABLET ORAL at 10:00

## 2017-03-06 RX ADMIN — HYDROCODONE POLISTIREX AND CHLORPHENIRAMINE POLISTIREX 5 ML: 10; 8 SUSPENSION, EXTENDED RELEASE ORAL at 02:00

## 2017-03-06 RX ADMIN — PREDNISONE 20 MG: 20 TABLET ORAL at 17:19

## 2017-03-06 RX ADMIN — Medication 2 TABLET: at 08:43

## 2017-03-06 RX ADMIN — PREDNISONE 20 MG: 20 TABLET ORAL at 08:44

## 2017-03-06 RX ADMIN — ENOXAPARIN SODIUM 40 MG: 40 INJECTION SUBCUTANEOUS at 08:43

## 2017-03-06 RX ADMIN — CEFEPIME 2 G: 2 INJECTION, POWDER, FOR SOLUTION INTRAVENOUS at 23:23

## 2017-03-06 RX ADMIN — KETOROLAC TROMETHAMINE 30 MG: 30 INJECTION, SOLUTION INTRAMUSCULAR at 03:13

## 2017-03-06 RX ADMIN — TIZANIDINE 4 MG: 4 TABLET ORAL at 23:23

## 2017-03-06 RX ADMIN — TIZANIDINE 4 MG: 4 TABLET ORAL at 08:45

## 2017-03-06 RX ADMIN — GABAPENTIN 400 MG: 400 CAPSULE ORAL at 23:24

## 2017-03-06 RX ADMIN — DULOXETINE 60 MG: 60 CAPSULE, DELAYED RELEASE ORAL at 23:24

## 2017-03-06 RX ADMIN — IPRATROPIUM BROMIDE AND ALBUTEROL SULFATE 3 ML: .5; 3 SOLUTION RESPIRATORY (INHALATION) at 19:57

## 2017-03-06 RX ADMIN — CEFEPIME 2 G: 2 INJECTION, POWDER, FOR SOLUTION INTRAVENOUS at 15:09

## 2017-03-06 RX ADMIN — MONTELUKAST SODIUM 10 MG: 10 TABLET, FILM COATED ORAL at 23:24

## 2017-03-06 RX ADMIN — QUETIAPINE 300 MG: 200 TABLET, EXTENDED RELEASE ORAL at 23:23

## 2017-03-06 RX ADMIN — FLUTICASONE PROPIONATE 2 SPRAY: 50 SPRAY, METERED NASAL at 08:44

## 2017-03-06 RX ADMIN — GABAPENTIN 400 MG: 400 CAPSULE ORAL at 08:44

## 2017-03-06 RX ADMIN — CEFEPIME 2 G: 2 INJECTION, POWDER, FOR SOLUTION INTRAVENOUS at 05:03

## 2017-03-06 RX ADMIN — NICOTINE 1 PATCH: 21 PATCH, EXTENDED RELEASE TRANSDERMAL at 08:44

## 2017-03-06 RX ADMIN — IPRATROPIUM BROMIDE AND ALBUTEROL SULFATE 3 ML: .5; 3 SOLUTION RESPIRATORY (INHALATION) at 12:03

## 2017-03-06 RX ADMIN — HYDROCODONE BITARTRATE AND ACETAMINOPHEN 1 TABLET: 5; 325 TABLET ORAL at 15:57

## 2017-03-06 RX ADMIN — FUROSEMIDE 40 MG: 40 TABLET ORAL at 08:44

## 2017-03-06 RX ADMIN — IPRATROPIUM BROMIDE AND ALBUTEROL SULFATE 3 ML: .5; 3 SOLUTION RESPIRATORY (INHALATION) at 16:52

## 2017-03-06 RX ADMIN — Medication 2 TABLET: at 17:19

## 2017-03-06 RX ADMIN — HYDROCODONE POLISTIREX AND CHLORPHENIRAMINE POLISTIREX 5 ML: 10; 8 SUSPENSION, EXTENDED RELEASE ORAL at 15:09

## 2017-03-06 NOTE — PROGRESS NOTES
[facility] Medicine Services  INPATIENT PROGRESS NOTE    Date of Admission: 3/1/2017  Length of Stay: 5  Primary Care Physician: RADHA Pedraza    Subjective-- HPI/Events overnight/ROS/CC- Hospital follow visit.  Detailed ROS not detailed as performed below      Pt lying in bed, feels bad today but denies specific complaints. Denies cp or sob but is wheezing today.     Objective      Temp:  [98.3 °F (36.8 °C)] 98.3 °F (36.8 °C)  Heart Rate:  [] 96  Resp:  [16-18] 16  BP: (124-163)/(86-93) 124/86    Physical Exam:  Physical Exam    General Assessment: No acute cardiopulmonary distress. Well developed and well nourished.     CVS: RRR, S1S2 normal     Resp: + Bibasilar crackles, pos wheezing, resp non-labored     Abd: soft, NT, ND, normal BS, no guarding or peritoneal signs     Ext: No edema, both calves are symmetric and NTTP     Neuro: Nonfocal     Skin: W/D/I. No rash.     Psych: Affect is appropriate        Results Review:    I have reviewed the labs, radiology results and diagnostic studies.  Lab Results (last 24 hours)     Procedure Component Value Units Date/Time    CBC Auto Differential [[id number]]  (Abnormal) Collected:  03/06/17 0707    Specimen:  Blood Updated:  03/06/17 0809     WBC 13.09 (H) 10*3/mm3      RBC 4.31 10*6/mm3      Hemoglobin 13.2 g/dL      Hematocrit 40.4 %      MCV 93.7 fL      MCH 30.6 pg      MCHC 32.7 g/dL      RDW 14.6 (H) %      RDW-SD 49.9 fl      MPV 9.0 fL      Platelets 404 10*3/mm3      Neutrophil % 71.7 (H) %      Lymphocyte % 16.3 (L) %      Monocyte % 7.6 %      Eosinophil % 0.2 %      Basophil % 0.4 %      Immature Grans % 3.8 (H) %      Neutrophils, Absolute 9.38 (H) 10*3/mm3      Lymphocytes, Absolute 2.14 10*3/mm3      Monocytes, Absolute 1.00 10*3/mm3      Eosinophils, Absolute 0.02 (L) 10*3/mm3      Basophils, Absolute 0.05 10*3/mm3      Immature Grans, Absolute 0.50 (H) 10*3/mm3     CBC & Differential [[id number]] Collected:  03/06/17  0707    Specimen:  Blood Updated:  03/06/17 0809    Narrative:       The following orders were created for panel order CBC & Differential.  Procedure                               Abnormality         Status                     ---------                               -----------         ------                     CBC Auto Differential[[id number]]         Abnormal            Final result                 Please view results for these tests on the individual orders.    C-reactive Protein [[id number]]  (Abnormal) Collected:  03/06/17 0708    Specimen:  Blood Updated:  03/06/17 0819     C-Reactive Protein 46.10 (H) mg/dL     Sedimentation Rate [[id number]]  (Abnormal) Collected:  03/06/17 0707    Specimen:  Blood Updated:  03/06/17 0912     Sed Rate 104 (H) mm/hr     Basic Metabolic Panel [[id number]]  (Abnormal) Collected:  03/06/17 0823    Specimen:  Blood Updated:  03/06/17 0932     Glucose 65 (L) mg/dL      BUN 14 mg/dL      Creatinine 0.70 mg/dL      Sodium 141 mmol/L      Potassium 3.9 mmol/L      Chloride 98 (L) mmol/L      CO2 38.0 (H) mmol/L      Calcium 9.0 mg/dL      eGFR Non African Amer 88 mL/min/1.73      BUN/Creatinine Ratio 20.0      Anion Gap 5.0 mmol/L     Narrative:       National Kidney Foundation Guidelines    Stage                           Description                             GFR                      1                               Normal or High                          90+  2                               Mild decrease                            60-89  3                               Moderate decrease                   30-59  4                               Severe decrease                       15-29  5                               Kidney failure                             <15    Blood Culture [[id number]]  (Normal) Collected:  03/01/17 1248    Specimen:  Blood from Hand, Left Updated:  03/06/17 1301     Blood Culture No growth at 5 days     Blood Culture [[id number]]  (Normal) Collected:  03/01/17 1248     Specimen:  Blood from Hand, Right Updated:  03/06/17 1301     Blood Culture No growth at 5 days     Respiratory Culture [[id number]]  (Abnormal)  (Susceptibility) Collected:  03/03/17 0800    Specimen:  Sputum from Unknown Updated:  03/06/17 1405     Respiratory Culture        Moderate growth (3+) Staphylococcus aureus, MRSA (C)      This isolate does not demonstrate inducible clindamycin resistance in vitro.    Methicillin resistant Staphylococcus aureus, Patient may be an isolation risk.         Scant growth (1+) Normal Respiratory Shannan      Gram Stain Result Many (4+) WBCs seen       Many (4+) Epithelial cells seen       Few (2+) Gram positive cocci in pairs     Susceptibility      Staphylococcus aureus, MRSA     AMY     Ciprofloxacin >2 ug/ml Resistant     Clindamycin <=0.5 ug/ml Susceptible     Erythromycin >4 ug/ml Resistant     Gentamicin <=4 ug/ml Susceptible     Levofloxacin 4 ug/ml Intermediate     Linezolid 2 ug/ml Susceptible     Oxacillin >2 ug/ml Resistant     Penicillin G >8 ug/ml Resistant     Quinupristin + Dalfopristin <=0.5 ug/ml Susceptible     Rifampin <=1 ug/ml Susceptible     Tetracycline <=4 ug/ml Susceptible     Trimethoprim + Sulfamethoxazole <=0.5/9.5 ug/ml Susceptible     Vancomycin 1 ug/ml Susceptible                    Legionella Antigen, Urine [[id number]] Collected:  03/02/17 0210    Specimen:  Urine from Urine, Clean Catch Updated:  03/06/17 1525     L. pneumophila Serogp 1 Ur Ag Negative       Presumptive negative for L. pneumophila serogroup 1 antigen in urine,  suggesting no recent or current infection. Legionnaires' disease  cannot be ruled out since other serogroups and species may also cause  disease.       Narrative:       Performed at:  [address] [place] [address]  [id number]  : Ender Sanchez MD, Phone:  [phone]            Culture Data:  Radiology Data:   Cultures:    BLOOD CULTURE   Date Value Ref Range Status   03/01/2017 No  growth at 3 days  Preliminary   03/01/2017 No growth at 3 days  Preliminary     RESPIRATORY CULTURE   Date Value Ref Range Status   03/03/2017 Moderate growth (3+) Staphylococcus aureus (A)  Preliminary   03/03/2017 Scant growth (1+) Normal Respiratory Shannan  Preliminary   03/02/2017 Light growth (2+) Pseudomonas aeruginosa (A)  Preliminary   03/02/2017 Moderate growth (3+) Staphylococcus aureus (A)  Preliminary       I have reviewed the medications.        Assessment/Plan     Assessment/Problem List  Patient is a 52-year-old  female with history of noncompliance. She smokes 2-3 packs of cigarettes per day. She reently went off her psych (Bipolar) meds. She presented to [facility] ED with increased shortness of breath and cough and fever. Reports recent hospitalization to [facility] about a week ago but has not gotten any better.           Sepsis  --meets criteria due to febrile, hypoxia, tachycardia, leukocytosis and infectious source  --as below, due to social situation, and now cultures positive for MRSA/pseudomona, ID consulted, awaiting eval/rec      A/C hypoxic resp failure/Pneumonia NASIM( vs neoplasm less likely)  --IV cefepime and vanc   -- resp culture + MRSA and Pseudomona  -- AFB cx pending, cont isolation  -- wili will need Bronchoscopy       TB  -- upper lobe but denies hemoptysis or weight loss or known exposures  - sick for about a month.  --  AFB sent for completeness but  suspicion  low  -- moved to [facility] pressure room      Acute on chronic respiratory failure/COPD  --has been told she needs oxygen at home in the past but  Has refused to wear it  --oxygen PRN  -- on goinging tobacco abuse      Bipolar disorder/homeless  --suspect schizophrenia also given paranoia and hallucination  --pt recently stopped psych meds a week ago because they were causing her to hallucinate  --pt needs spych eval, but unfortunately we do not have inpt psych service.   --if stable for dc, she'll need  to follow up with primary psychiatrist, otherwise, may need to have [facility]  -- SW consulted     Left chest pain  -- prob pleurisy  -- add toradol prn     Hypothyroidism  -- TSH WNL  -- cont home meds     Tobacco abuse  -- counseled, cessation advised    Elevated SED and CRP      Nausea/HA  -- could  Have been withdrawing from psych meds  -- resolved now    Dispo: TBD. Will be difficult, pt homeless.     Mary Moore, DO   03/06/17   5:01 PM    Please note that portions of this note may have been completed with a voice recognition program. Efforts were made to edit the dictations, but occasionally words are mistranscribed.

## 2017-03-06 NOTE — PLAN OF CARE
Problem: Patient Care Overview (Adult)  Goal: Plan of Care Review  Outcome: Ongoing (interventions implemented as appropriate)    03/06/17 1645   Coping/Psychosocial Response Interventions   Plan Of Care Reviewed With patient   Patient Care Overview   Progress improving   Outcome Evaluation   Outcome Summary/Follow up Plan Pt increased gait distance to 75 feet with SBA and required less assist for all functional mobility. Pt still limited by fatigue. Will continue to progress mobility as able.          Problem: Inpatient Physical Therapy  Goal: Transfer Training Goal 1 LTG- PT  Outcome: Ongoing (interventions implemented as appropriate)    03/04/17 1524 03/06/17 1645   Transfer Training PT LTG   Transfer Training PT LTG, Date Established 03/04/17 --    Transfer Training PT LTG, Time to Achieve 2 wks --    Transfer Training PT LTG, Activity Type sit to stand/stand to sit --    Transfer Training PT LTG, [place] Level independent --    Transfer Training PT LTG, Assist Device walker, rolling  (walker as needed) --    Transfer Training PT LTG, Date Goal Reviewed --  03/06/17   Transfer Training PT LTG, Outcome --  goal ongoing       Goal: Gait Training Goal LTG- PT  Outcome: Ongoing (interventions implemented as appropriate)    03/04/17 1524 03/06/17 1645   Gait Training PT LTG   Gait Training Goal PT LTG, Date Established 03/04/17 --    Gait Training Goal PT LTG, Time to Achieve 2 wks --    Gait Training Goal PT LTG, [place] Level independent --    Gait Training Goal PT LTG, Assist Device walker, rolling  (RW or rollator (as needed)) --    Gait Training Goal PT LTG, Distance to Achieve 200  (maintaining O2 sats at least 90%) --    Gait Training Goal PT LTG, Date Goal Reviewed --  03/06/17   Gait Training Goal PT LTG, Outcome --  goal ongoing       Goal: Patient Education Goal STG- PT  Outcome: Ongoing (interventions implemented as appropriate)    03/04/17 1524 03/06/17 1645   Patient Education PT STG   Patient  Education PT STG, Date Established 03/04/17 --    Patient Education PT STG, Time to Achieve 2 wks --    Patient Education PT STG, Education Type energy conservation;HEP --    Patient Education PT STG, Date Goal Reviewed --  03/06/17   Patient Education PT STG Outcome --  goal ongoing

## 2017-03-06 NOTE — PROGRESS NOTES
Continued Stay Note  [facility]     Patient Name: Colleen Gonzalez  MRN: [id number]  Today's Date: 3/6/2017    Admit Date: 3/1/2017          Discharge Plan       03/06/17 1608    Case Management/Social Work Plan    Plan discharge plan    Patient/Family In Agreement With Plan yes    Additional Comments Pt not medically ready for discharge today.  CM/SW will cont to work on discharge plan.               Discharge Codes     None        Expected Discharge Date and Time     Expected Discharge Date Expected Discharge Time    Mar 4, 2017             Sun Jose RN

## 2017-03-06 NOTE — PAYOR COMM NOTE
"Colleen Gonzalez (52 y.o. Female)     UPDATED CLINICALS      Date of Birth Social Security Number Address Home Phone MRN    [id number]  [id number] [facility] [address] [phone] [id number]    Mandaen Marital Status          None Single       Admission Date Admission Type Admitting Provider Attending Provider Department, Room/Bed    3/1/17 Emergency Kristopher Samayoa MD Khamvanthong, Phonekeo, MD [facility] 5G, S567/1    Discharge Date Discharge Disposition Discharge Destination                      Attending Provider: Kristopher Samayoa MD     Allergies:  Aspirin, Ibuprofen    Isolation:  Airborne   Infection:  MRSA (03/05/17), Tuberculosis (rule out) (03/03/17)   Code Status:  FULL    Ht:  68\" (172.7 cm)   Wt:  243 lb 9.6 oz (110 kg)    Admission Cmt:  None   Principal Problem:  Sepsis [A41.9]                 Active Insurance as of 3/1/2017     Primary Coverage     Payor Plan Insurance Group Employer/Plan Group    [employer] [place] [employer] [place]ICA[place]      Payor Plan Address Payor Plan Phone Number Effective From Effective To    [address] [address] [phone] 3/1/2017     [place] [address]       Subscriber Name Subscriber Birth Date Member ID       COLLEEN GONZALEZ 1965 [id number]                 Emergency Contacts      (Rel.) Home Phone Work Phone Mobile Phone    Eneida [address] (Sister) -- -- [phone]    Raquel Dumont (Sister) -- -- [phone]            Insurance Information                [facility] MEDICAID Phone: [phone]    Subscriber: Colleen Gonzalez Subscriber#: [id number]    Group#:  Precert#:           Hospital Medications (active)       Dose Frequency Start End    !vancomycin trough 3/6 @ 1700 - please hold 1800 dose  Continuous PRN 3/6/2017     Sig - Route: Continuous As Needed for consult. - Does not apply    acetaminophen (TYLENOL) tablet 650 mg 650 mg Every 4 Hours PRN 3/1/2017     Sig - Route: Take 2 tablets by mouth Every 4 " (Four) Hours As Needed for mild pain (1-3). - Oral    bisacodyl (DULCOLAX) EC tablet 5 mg 5 mg Daily PRN 3/1/2017     Sig - Route: Take 1 tablet by mouth Daily As Needed for constipation. - Oral    cefepime (MAXIPIME) 2 g/100 mL 0.9% NS (mbp) 2 g Every 8 Hours 3/3/2017     Sig - Route: Infuse 100 mL into a venous catheter Every 8 (Eight) Hours. - Intravenous    cetirizine (zyrTEC) tablet 10 mg 10 mg Daily 3/2/2017     Sig - Route: Take 1 tablet by mouth Daily. - Oral    DULoxetine (CYMBALTA) DR capsule 60 mg 60 mg Every 12 Hours Scheduled 3/1/2017     Sig - Route: Take 1 capsule by mouth Every 12 (Twelve) Hours. - Oral    enoxaparin (LOVENOX) syringe 40 mg 40 mg Daily 3/1/2017     Sig - Route: Inject 0.4 mL under the skin Daily. - Subcutaneous    fluticasone (FLONASE) 50 MCG/ACT nasal spray 2 spray 2 spray Daily 3/2/2017     Sig - Route: 2 sprays into each nostril Daily. - Nasal    furosemide (LASIX) tablet 40 mg 40 mg Daily 3/2/2017     Sig - Route: Take 1 tablet by mouth Daily. - Oral    gabapentin (NEURONTIN) capsule 400 mg 400 mg Every 12 Hours Scheduled 3/1/2017     Sig - Route: Take 1 capsule by mouth Every 12 (Twelve) Hours. - Oral    HYDROcod Polst-CPM Polst ER (TUSSIONEX PENNKINETIC) 10-8 MG/5ML ER suspension 5 mL 5 mL Every 12 Hours 3/2/2017 3/12/2017    Sig - Route: Take 5 mL by mouth Every 12 (Twelve) Hours. - Oral    HYDROcodone-acetaminophen (NORCO) 5-325 MG per tablet 1 tablet 1 tablet Every 4 Hours PRN 3/1/2017 3/11/2017    Sig - Route: Take 1 tablet by mouth Every 4 (Four) Hours As Needed for moderate pain (4-6). - Oral    ipratropium-albuterol (DUO-NEB) nebulizer solution 3 mL 3 mL 4 Times Daily - RT 3/1/2017     Sig - Route: Take 3 mL by nebulization 4 (Four) Times a Day. - Nebulization    ipratropium-albuterol (DUO-NEB) nebulizer solution 3 mL 3 mL Every 4 Hours PRN 3/2/2017     Sig - Route: Take 3 mL by nebulization Every 4 (Four) Hours As Needed for wheezing or shortness of air. - Nebulization  "   levothyroxine (SYNTHROID, LEVOTHROID) tablet 50 mcg 50 mcg Every Early Morning 3/2/2017     Sig - Route: Take 2 tablets by mouth Every Morning. - Oral    montelukast (SINGULAIR) tablet 10 mg 10 mg Nightly 3/1/2017     Sig - Route: Take 1 tablet by mouth Every Night. - Oral    nicotine (NICODERM CQ) 21 MG/24HR patch 1 patch 1 patch Every 24 Hours Scheduled 3/1/2017     Sig - Route: Place 1 patch on the skin Daily. - Transdermal    ondansetron (ZOFRAN) injection 4 mg 4 mg Every 6 Hours PRN 3/1/2017     Sig - Route: Infuse 2 mL into a venous catheter Every 6 (Six) Hours As Needed for nausea or vomiting. - Intravenous    Linked Group 1:  \"Or\" Linked Group Details        ondansetron (ZOFRAN) tablet 4 mg 4 mg Every 6 Hours PRN 3/1/2017     Sig - Route: Take 1 tablet by mouth Every 6 (Six) Hours As Needed for nausea or vomiting. - Oral    Linked Group 1:  \"Or\" Linked Group Details        pantoprazole (PROTONIX) EC tablet 40 mg 40 mg Every 24 Hours 3/2/2017     Sig - Route: Take 1 tablet by mouth Daily. - Oral    Pharmacy Consult - [facility]  Daily 3/2/2017     Sig - Route: Daily. - Does not apply    Pharmacy to dose vancomycin  Continuous PRN 3/1/2017     Sig - Route: Continuous As Needed for consult. - Does not apply    predniSONE (DELTASONE) tablet 20 mg 20 mg 2 Times Daily With Meals 3/3/2017     Sig - Route: Take 1 tablet by mouth 2 (Two) Times a Day With Meals. - Oral    QUEtiapine fumarate ER (SEROquel XR) tablet 300 mg 300 mg Nightly 3/1/2017     Sig - Route: Take 300 mg by mouth Every Night. - Oral    sennosides-docusate sodium (SENOKOT-S) 8.6-50 MG tablet 2 tablet 2 tablet 2 Times Daily 3/1/2017     Sig - Route: Take 2 tablets by mouth 2 (Two) Times a Day. - Oral    sodium chloride 0.9 % flush 1-10 mL 1-10 mL As Needed 3/1/2017     Sig - Route: Infuse 1-10 mL into a venous catheter As Needed for line care. - Intravenous    sodium chloride 0.9 % flush 10 mL 10 mL As Needed 3/1/2017     Sig - Route: Infuse 10 mL into a " venous catheter As Needed for line care. - Intravenous    Cosign for Ordering: Accepted by Carlos Park MD on 3/1/2017 10:28 PM    tiZANidine (ZANAFLEX) tablet 4 mg 4 mg Every 12 Hours Scheduled 3/1/2017     Sig - Route: Take 1 tablet by mouth Every 12 (Twelve) Hours. - Oral    vancomycin IVPB 1500 mg in 0.9% NaCl (Premix) 500 mL 1,500 mg Every 24 Hours 3/3/2017     Sig - Route: Infuse 500 mL into a venous catheter Daily. - Intravenous    AZITHROMYCIN 500 MG/250 ML 0.9% NS IVPB (MBP) (Discontinued) 500 mg Every 24 Hours 3/2/2017 3/5/2017    Sig - Route: Infuse 500 mg into a venous catheter Daily. - Intravenous          Lab Results (last 72 hours)     Procedure Component Value Units Date/Time    CBC (No Diff) [[id number]]  (Abnormal) Collected:  03/03/17 0522    Specimen:  Blood Updated:  03/03/17 0620     WBC 17.64 (H) 10*3/mm3      RBC 3.70 (L) 10*6/mm3      Hemoglobin 11.2 (L) g/dL      Hematocrit 35.7 %      MCV 96.5 fL      MCH 30.3 pg      MCHC 31.4 (L) g/dL      RDW 15.8 (H) %      RDW-SD 56.1 (H) fl      MPV 9.7 fL      Platelets 261 10*3/mm3     Basic Metabolic Panel [[id number]]  (Abnormal) Collected:  03/03/17 0522    Specimen:  Blood Updated:  03/03/17 0640     Glucose 126 (H) mg/dL      BUN 15 mg/dL      Creatinine 0.80 mg/dL      Sodium 138 mmol/L      Potassium 3.9 mmol/L      Chloride 102 mmol/L      CO2 30.0 mmol/L      Calcium 8.9 mg/dL      eGFR Non African Amer 75 mL/min/1.73      BUN/Creatinine Ratio 18.8      Anion Gap 6.0 mmol/L     Narrative:       National Kidney Foundation Guidelines    Stage                           Description                             GFR                      1                               Normal or High                          90+  2                               Mild decrease                            60-89  3                               Moderate decrease                   30-59  4                               Severe decrease                       15-29  5                                Kidney failure                             <15    Vancomycin, Trough [[id number]]  (Abnormal) Collected:  03/03/17 0522    Specimen:  Blood Updated:  03/03/17 0741     Vancomycin Trough 25.00 (H) mcg/mL     AFB Culture [[id number]] Collected:  03/03/17 0800    Specimen:  Sputum from Oropharynx Updated:  03/03/17 0909    AFB Culture [[id number]] Collected:  03/02/17 1514    Specimen:  Sputum from Oropharynx Updated:  03/03/17 1303     AFB Stain        No acid fast bacilli seen on concentrated smear    CBC & Differential [[id number]] Collected:  03/04/17 0654    Specimen:  Blood Updated:  03/04/17 0720    Narrative:       The following orders were created for panel order CBC & Differential.  Procedure                               Abnormality         Status                     ---------                               -----------         ------                     CBC Auto Differential[[id number]]         Abnormal            Final result                 Please view results for these tests on the individual orders.    CBC Auto Differential [[id number]]  (Abnormal) Collected:  03/04/17 0654    Specimen:  Blood Updated:  03/04/17 0720     WBC 12.67 (H) 10*3/mm3      RBC 3.72 (L) 10*6/mm3      Hemoglobin 11.6 g/dL      Hematocrit 36.1 %      MCV 97.0 fL      MCH 31.2 (H) pg      MCHC 32.1 g/dL      RDW 15.8 (H) %      RDW-SD 56.2 (H) fl      MPV 9.1 fL      Platelets 281 10*3/mm3      Neutrophil % 77.1 (H) %      Lymphocyte % 11.0 (L) %      Monocyte % 10.7 %      Eosinophil % 0.0 %      Basophil % 0.2 %      Immature Grans % 1.0 (H) %      Neutrophils, Absolute 9.77 (H) 10*3/mm3      Lymphocytes, Absolute 1.40 10*3/mm3      Monocytes, Absolute 1.35 (H) 10*3/mm3      Eosinophils, Absolute 0.00 (L) 10*3/mm3      Basophils, Absolute 0.02 10*3/mm3      Immature Grans, Absolute 0.13 (H) 10*3/mm3     S. Pneumo Ag Urine or CSF [[id number]] Collected:  03/02/17 0210    Specimen:  Urine from Urine, Clean Catch Updated:   03/04/17 1625     Specimen Source Urine      STREP PNEUMONIAE ANTIGEN Negative      Body Fluid Culture, Sterile Not Indicated      Organism ID Not indicated.      Please note Comment       College of American Pathologists guidelines require a culture to be  performed on CSF specimens negative by bacterial antigen testing  (CAP [id number].[id number]).       Narrative:       Performed at:  [facility]  [id number]  : Ender Sanchez MD, Phone:  [phone]    Basic Metabolic Panel [[id number]]  (Abnormal) Collected:  03/05/17 0614    Specimen:  Blood Updated:  03/05/17 0705     Glucose 77 mg/dL      BUN 11 mg/dL      Creatinine 0.60 mg/dL      Sodium 142 mmol/L      Potassium 4.6 mmol/L      Chloride 102 mmol/L      CO2 37.0 (H) mmol/L      Calcium 8.9 mg/dL      eGFR Non African Amer 105 mL/min/1.73      BUN/Creatinine Ratio 18.3      Anion Gap 3.0 mmol/L     Narrative:       National Kidney Foundation Guidelines    Stage                           Description                             GFR                      1                               Normal or High                          90+  2                               Mild decrease                            60-89  3                               Moderate decrease                   30-59  4                               Severe decrease                       15-29  5                               Kidney failure                             <15    Respiratory Culture [[id number]]  (Abnormal) Collected:  03/03/17 0800    Specimen:  Sputum from Unknown Updated:  03/05/17 1005     Respiratory Culture        Moderate growth (3+) Staphylococcus aureus (A)      Scant growth (1+) Normal Respiratory Shannan      Gram Stain Result Many (4+) WBCs seen       Many (4+) Epithelial cells seen       Few (2+) Gram positive cocci in pairs     Blood Culture [[id number]]  (Normal) Collected:  03/01/17 1248    Specimen:  Blood from Hand, Left Updated:   03/05/17 1301     Blood Culture No growth at 4 days     Blood Culture [[id number]]  (Normal) Collected:  03/01/17 1248    Specimen:  Blood from Hand, Right Updated:  03/05/17 1301     Blood Culture No growth at 4 days     Respiratory Culture [[id number]]  (Abnormal)  (Susceptibility) Collected:  03/02/17 0251    Specimen:  Sputum from Cough Updated:  03/05/17 1338     Respiratory Culture Light growth (2+) Pseudomonas aeruginosa (A)              Moderate growth (3+) Staphylococcus aureus, MRSA (C)        Methicillin resistant Staphylococcus aureus, Patient may be an isolation risk.  This isolate does not demonstrate inducible clindamycin resistance in vitro.          Gram Stain Result        Few (2+) Epithelial cells per low power field      Few (2+) WBCs per low power field       Rare (1+) Endogenous respiratory patience     Susceptibility      Pseudomonas aeruginosa     AMY     Aztreonam <=8 ug/ml Susceptible     Cefepime <=8 ug/ml Susceptible     Ceftazidime 4 ug/ml Susceptible     Gentamicin <=4 ug/ml Susceptible     Levofloxacin >4 ug/ml Resistant     Meropenem <=1 ug/ml Susceptible     Piperacillin + Tazobactam <=16 ug/ml Susceptible     Tobramycin <=4 ug/ml Susceptible                Susceptibility      Staphylococcus aureus, MRSA     AMY     Ciprofloxacin >2 ug/ml Resistant     Clindamycin <=0.5 ug/ml Susceptible     Erythromycin >4 ug/ml Resistant     Gentamicin <=4 ug/ml Susceptible     Levofloxacin >4 ug/ml Resistant     Linezolid 2 ug/ml Susceptible     Oxacillin >2 ug/ml Resistant     Penicillin G >8 ug/ml Resistant     Quinupristin + Dalfopristin <=0.5 ug/ml Susceptible     Rifampin <=1 ug/ml Susceptible     Tetracycline <=4 ug/ml Susceptible     Trimethoprim + Sulfamethoxazole <=0.5/9.5 ug/ml Susceptible     Vancomycin 1 ug/ml Susceptible                          Imaging Results (last 72 hours)     Procedure Component Value Units Date/Time    XR Chest 1 View [[id number]] Collected:  03/01/17 1625     Updated:   03/03/17 0948    Narrative:       EXAMINATION: XR CHEST 1 VW- 03/01/2017     INDICATION: SOA triage protocol      COMPARISON: NONE     FINDINGS: Portable chest reveals masslike density in the left upper lobe  which may represent infiltrate or underlying lesion. Consider CT scan of  the chest for further evaluation. Some increased markings at the lung  bases bilaterally. Heart is borderline enlarged. Pulmonary vascularity  is within normal limits. Degenerative changes seen within the spine.           Impression:       Opacification seen in the left upper lobe suggesting either  infiltrate or underlying lesion. Consider CT scan of the chest for  further evaluation.     D:  03/01/2017  E:  03/01/2017     This report was finalized on 3/3/2017 9:46 AM by Dr. Iman Ware MD.             Operative/Procedure Notes (last 72 hours) (Notes from 3/2/2017  8:37 PM through 3/5/2017  8:37 PM)     No notes of this type exist for this encounter.           Physician Progress Notes (last 72 hours) (Notes from 3/2/2017  8:37 PM through 3/5/2017  8:37 PM)      Aisha Lorenzo MD at 3/3/2017 12:40 PM  Version 1 of 1             [facility] Medicine Services  INPATIENT PROGRESS NOTE    Date of Admission: 3/1/2017  Length of Stay: 2  Primary Care Physician: RADHA Pedraza    Subjective   CC: soa    HPI:  No change or maybe a bit better  Comfortable  Still coughing  No hemoptysis  Eating okay  History of GI intolerance to NSAIDs and got a bit nauseous yest after low dose toradol    Review Of Systems:   Review of Systems      Objective      Temp:  [98.1 °F (36.7 °C)] 98.1 °F (36.7 °C)  Heart Rate:  [86-90] 90  Resp:  [18] 18  BP: (131)/(87) 131/87  Physical Exam  Gen:  WD/WN women sitting up in bed awaiting lunch  Neuro: alert and oriented, clear speech, follows commands, grossly nonfocal  HEENT:  NC/AT PERRL, OP benign, voice hoarse  Neck:  Supple, no LAD  Heart RRR no murmur, rub, or gallop  Lungs  breathing easily, no wheeze today, diffuse rhonchi.  No access muscle use.   Abd:  Soft, nontender, no rebound or guarding, pos BS  Extrem:  No c/c/e      Results Review:    I have reviewed the labs, radiology results and diagnostic studies.      Results from last 7 days  Lab Units 03/03/17  0522   WBC 10*3/mm3 17.64*   HEMOGLOBIN g/dL 11.2*   PLATELETS 10*3/mm3 261       Results from last 7 days  Lab Units 03/03/17  0522   SODIUM mmol/L 138   POTASSIUM mmol/L 3.9   CHLORIDE mmol/L 102   TOTAL CO2 mmol/L 30.0   BUN mg/dL 15   CREATININE mg/dL 0.80   GLUCOSE mg/dL 126*   CALCIUM mg/dL 8.9       Culture Data: Cultures:    BLOOD CULTURE   Date Value Ref Range Status   03/01/2017 No growth at 24 hours  Preliminary   03/01/2017 No growth at 24 hours  Preliminary       Radiology Data:     I have reviewed the medications.    Current Facility-Administered Medications:   •  [START ON 3/6/2017] !vancomycin trough 3/6 @ 1700 - please hold 1800 dose, , Does not apply, Continuous PRN, Angela Cedeño, [facility]  •  acetaminophen (TYLENOL) tablet 650 mg, 650 mg, Oral, Q4H PRN, Effie Cardenas II, DO  •  AZITHROMYCIN 500 MG/250 ML 0.9% NS IVPB (MBP), 500 mg, Intravenous, Q24H, Ashlee Lucero APRN, Last Rate: 250 mL/hr at 03/03/17 0911, 500 mg at 03/03/17 0911  •  bisacodyl (DULCOLAX) EC tablet 5 mg, 5 mg, Oral, Daily PRN, Effie Herreraon II, DO  •  cefepime (MAXIPIME) 2 g/100 mL 0.9% NS (mbp), 2 g, Intravenous, Q8H, Aisha Lorenzo MD  •  cetirizine (zyrTEC) tablet 10 mg, 10 mg, Oral, Daily, Effie Herreraon II, DO, 10 mg at 03/03/17 0911  •  DULoxetine (CYMBALTA) DR capsule 60 mg, 60 mg, Oral, Q12H, Effie Herreraon II, DO, 60 mg at 03/03/17 0911  •  enoxaparin (LOVENOX) syringe 40 mg, 40 mg, Subcutaneous, Daily, Effie ELIOT Theresa II, DO, 40 mg at 03/03/17 0911  •  fluticasone (FLONASE) 50 MCG/ACT nasal spray 2 spray, 2 spray, Nasal, Daily, Effie M Theresa II, DO, 2 spray at 03/03/17 1041  •  furosemide (LASIX) tablet 40 mg, 40 mg, Oral,  Daily, Effie ELIOT Theresa II, DO, 40 mg at 03/03/17 0911  •  gabapentin (NEURONTIN) capsule 400 mg, 400 mg, Oral, Q12H, Effie M Theresa II, DO, 400 mg at 03/03/17 0911  •  HYDROcod Polst-CPM Polst ER (TUSSIONEX PENNKINETIC) 10-8 MG/5ML ER suspension 5 mL, 5 mL, Oral, Q12H, Aisha Lorenzo MD, 5 mL at 03/03/17 0429  •  HYDROcodone-acetaminophen (NORCO) 5-325 MG per tablet 1 tablet, 1 tablet, Oral, Q4H PRN, Effie M Theresa II, DO, 1 tablet at 03/03/17 1048  •  ipratropium-albuterol (DUO-NEB) nebulizer solution 3 mL, 3 mL, Nebulization, 4x Daily - RT, RADHA Ji, 3 mL at 03/03/17 0754  •  ipratropium-albuterol (DUO-NEB) nebulizer solution 3 mL, 3 mL, Nebulization, Q4H PRN, RADHA Sin  •  levothyroxine (SYNTHROID, LEVOTHROID) tablet 50 mcg, 50 mcg, Oral, Q AM, Effie MCCABE Theresa II, DO, 50 mcg at 03/03/17 0704  •  methylPREDNISolone sodium succinate (SOLU-Medrol) injection 20 mg, 20 mg, Intravenous, Q8H, Aisha Lorenzo MD, 20 mg at 03/03/17 0703  •  montelukast (SINGULAIR) tablet 10 mg, 10 mg, Oral, Nightly, Effie MCCABE Theresa II, DO, 10 mg at 03/02/17 2052  •  nicotine (NICODERM CQ) 21 MG/24HR patch 1 patch, 1 patch, Transdermal, Q24H, Effie MCCABE Theresa II, DO, 1 patch at 03/03/17 0943  •  ondansetron (ZOFRAN) tablet 4 mg, 4 mg, Oral, Q6H PRN, 4 mg at 03/02/17 1647 **OR** ondansetron (ZOFRAN) injection 4 mg, 4 mg, Intravenous, Q6H PRN, Effie M Theresa II, DO, 4 mg at 03/03/17 0911  •  pantoprazole (PROTONIX) EC tablet 40 mg, 40 mg, Oral, Q24H, Effie Herreraon II, DO, 40 mg at 03/03/17 0911  •  Pharmacy Consult - [facility], , Does not apply, Daily, Marcelo Recinos, [facility]  •  Pharmacy to dose vancomycin, , Does not apply, Continuous PRN, Ashlee Lucero, APRN  •  QUEtiapine fumarate ER (SEROquel XR) tablet 300 mg, 300 mg, Oral, Nightly, Effie Herreraon II, DO, 300 mg at 03/02/17 2052  •  sennosides-docusate sodium (SENOKOT-S) 8.6-50 MG tablet 2 tablet, 2 tablet, Oral, BID, Effie Herreraon II, DO, 2 tablet at 03/03/17 0911  •   sodium chloride 0.9 % flush 1-10 mL, 1-10 mL, Intravenous, PRN, Effie Herreraon II, DO  •  sodium chloride 0.9 % flush 10 mL, 10 mL, Intravenous, PRN, Carlos Park MD  •  tiZANidine (ZANAFLEX) tablet 4 mg, 4 mg, Oral, Q12H, Effie Herreraon II, DO, 4 mg at 03/03/17 0900  •  vancomycin IVPB 1500 mg in 0.9% NaCl (Premix) 500 mL, 1,500 mg, Intravenous, Q24H, Angela Cedeño [facility]    Assessment/Plan     Problem List  Principal Problem:    Sepsis  Active Problems:    COPD (chronic obstructive pulmonary disease)    Tobacco abuse    Acute on chronic respiratory failure    Leukocytosis    HCAP (healthcare-associated pneumonia)    Hypothyroid    Anxiety and depression    Homelessness         Patient is a 52-year-old  female with history of noncompliance. She smokes 2-3 packs of cigarettes per day. She reently went off her psych meds.  She presented to [facility] ED with increased shortness of breath and cough and fever. Reports recent hospitalization to [facility] about a week ago but has not gotten any better.      Plan:     Sepsis  --meets criteria due to febrile, hypoxia, tachycardia, leukocytosis and infectious source  --monitor     Pneumonia NASIM( vs neoplasm less likely)  --IV azithromycin, vancomycin and zosyn - growing a nonlactose ; change to vanc/cefepime for the moment  -- no wheeze; stop steroids  --sputum cx and AFB cx pending  -- will likely need followup imaging eventually     TB:  Unlikely  -- upper lobe but deneis hemoptysis or weight loss or known exposures   - sick for about a month.  -- will send afb for completeness but suspcion so low i will not consult pulm  -- moved to [facility] pressure room    Acute on chronic respiratory failure  --has been told she needs oxygen at home in the past but refuses to wear it  --PO2 59.0  --oxygen PRN  -- oinging tob     Anxiety and depression and homeless  --continue home meds  --has other psychiatric history but reports she stopped taking her  "\"crazy pills\" a week ago because they were causing her to hallucinate     Left chest pain  -- prob pleurisy  -- add toradol prn     DVT prophylaxis:  Discharge Planning: I expect patient to be discharged to home in 5 days.    Aisha Lorenzo MD   03/03/17   12:40 PM    Please note that portions of this note may have been completed with a voice recognition program. Efforts were made to edit the dictations, but occasionally words are mistranscribed.       Electronically signed by Aisha Lorenzo MD at 3/3/2017 12:43 PM      Kristopher Samayoa MD at 3/4/2017  8:24 AM  Version 2 of 2             [facility] Medicine Services  INPATIENT PROGRESS NOTE    Date of Admission: 3/1/2017  Length of Stay: 3  Primary Care Physician: RADHA Pedraza    Subjective-- HPI/Events overnight/ROS/CC- Hospital follow visit.  Detailed ROS not detailed as performed below      C/o cough, headaches, and nausea without vomiting. No fever or chills.    Objective      Temp:  [97.6 °F (36.4 °C)] 97.6 °F (36.4 °C)  Heart Rate:  [] 82  Resp:  [16-18] 18  BP: (130-141)/(85-94) 130/94    Physical Exam:  Physical Exam    General Assessment: No acute cardiopulmonary distress. Well developed and well nourished.    HEENT: NCAT, PERRL, MM moist    Neck: Supple    CVS: RRR, S1S2 normal    Resp: + Bibasilar crackles, no wheezing, resp non-labored    Abd: soft, NT, ND, normal BS, no guarding or peritoneal signs    Ext: No edema, both calves are symmetric and NTTP    Neuro: Nonfocal    Skin: W/D/I. No rash.    Psych: Affect is appropriate      Results Review:    I have reviewed the labs, radiology results and diagnostic studies.      Results from last 7 days  Lab Units 03/04/17  0654   WBC 10*3/mm3 12.67*   HEMOGLOBIN g/dL 11.6   PLATELETS 10*3/mm3 281       Results from last 7 days  Lab Units 03/03/17  0522   SODIUM mmol/L 138   POTASSIUM mmol/L 3.9   TOTAL CO2 mmol/L 30.0   CREATININE mg/dL 0.80   GLUCOSE mg/dL 126* "       Culture Data:  Radiology Data:   Cultures:    BLOOD CULTURE   Date Value Ref Range Status   03/01/2017 No growth at 3 days  Preliminary   03/01/2017 No growth at 3 days  Preliminary     RESPIRATORY CULTURE   Date Value Ref Range Status   03/03/2017 Moderate growth (3+) Staphylococcus aureus (A)  Preliminary   03/03/2017 Scant growth (1+) Normal Respiratory Shannan  Preliminary   03/02/2017 Light growth (2+) Pseudomonas aeruginosa (A)  Preliminary   03/02/2017 Moderate growth (3+) Staphylococcus aureus (A)  Preliminary       I have reviewed the medications.        Assessment/Plan     Assessment/Problem List       Patient is a 52-year-old  female with history of noncompliance. She smokes 2-3 packs of cigarettes per day. She reently went off her psych (Bipolar) meds. She presented to [facility] ED with increased shortness of breath and cough and fever. Reports recent hospitalization to [facility] about a week ago but has not gotten any better.           Sepsis  --meets criteria due to febrile, hypoxia, tachycardia, leukocytosis and infectious source  --as below, due to social situation, will get ID involve also      A/C hypoxic resp failure/Pneumonia NASIM( vs neoplasm less likely)  --IV azithromycin, vancomycin and zosyn   -- resp culture + S. Aureus and Pseudomona  -- no wheeze; stop steroids  -- AFB cx pending, cont isolation  -- will likely need followup imaging eventually     TB  -- upper lobe but deneis hemoptysis or weight loss or known exposures  - sick for about a month.  -- will send afb for completeness but suspcion so low i will not consult pulm  -- moved to [facility] room     Acute on chronic respiratory failure  --has been told she needs oxygen at home in the past but refuses to wear it  --PO2 59.0  --oxygen PRN  -- on goinging tobacco abuse      Bipolar disorder/homeless  --suspect schizophrenia also given paranoia and hallucination  --pt recently stopped psych meds a week ago  because they were causing her to hallucinate  --pt needs mirna lucsa, but unfortunately we do not have inpt psych service.   --if stable for dc, she'll need to follow up with primary psychiatrist, otherwise, may need to have [facility] eval  -- SW consulted     Left chest pain  -- prob pleurisy  -- add toradol prn    Hypothyroidism  -- TSH WNL  -- cont home meds    Tobacco abuse  -- counseled, cessation advised    Nausea/HA  -- could be withdrawing from psych meds    Kristopher Samayoa MD   03/04/17   8:24 AM    Please note that portions of this note may have been completed with a voice recognition program. Efforts were made to edit the dictations, but occasionally words are mistranscribed.         Electronically signed by Kristopher Samayoa MD at 3/4/2017  4:14 PM      Kristopher Samayoa MD at 3/4/2017  8:24 AM  Version 1 of 2             [facility] Medicine Services  INPATIENT PROGRESS NOTE    Date of Admission: 3/1/2017  Length of Stay: 3  Primary Care Physician: RADHA Pedraza    Subjective-- HPI/Events overnight/ROS/CC- Hospital follow visit.  Detailed ROS not detailed as performed below      C/o cough, headaches, and nausea without vomiting. No fever or chills.    Objective      Temp:  [97.6 °F (36.4 °C)] 97.6 °F (36.4 °C)  Heart Rate:  [] 82  Resp:  [16-18] 18  BP: (130-141)/(85-94) 130/94    Physical Exam:  Physical Exam    General Assessment: No acute cardiopulmonary distress. Well developed and well nourished.    HEENT: NCAT, PERRL, MM moist    Neck: Supple    CVS: RRR, S1S2 normal    Resp: + Bibasilar crackles, no wheezing, resp non-labored    Abd: soft, NT, ND, normal BS, no guarding or peritoneal signs    Ext: No edema, both calves are symmetric and NTTP    Neuro: Nonfocal    Skin: W/D/I. No rash.    Psych: Affect is appropriate      Results Review:    I have reviewed the labs, radiology results and diagnostic studies.      Results from last 7 days  Lab  Units 03/04/17  0654   WBC 10*3/mm3 12.67*   HEMOGLOBIN g/dL 11.6   PLATELETS 10*3/mm3 281       Results from last 7 days  Lab Units 03/03/17  0522   SODIUM mmol/L 138   POTASSIUM mmol/L 3.9   TOTAL CO2 mmol/L 30.0   CREATININE mg/dL 0.80   GLUCOSE mg/dL 126*       Culture Data:  Radiology Data:     I have reviewed the medications.        Assessment/Plan     Assessment/Problem List       Patient is a 52-year-old  female with history of noncompliance. She smokes 2-3 packs of cigarettes per day. She reently went off her psych (Bipolar) meds. She presented to [facility] ED with increased shortness of breath and cough and fever. Reports recent hospitalization to [facility] about a week ago but has not gotten any better.           Sepsis  --meets criteria due to febrile, hypoxia, tachycardia, leukocytosis and infectious source  --as below      A/C hypoxic resp failure/Pneumonia NASIM( vs neoplasm less likely)  --IV azithromycin, vancomycin and zosyn   -- resp culture + S. Aureus and Pseudomona  -- no wheeze; stop steroids  -- AFB cx pending, cont isolation  -- will likely need followup imaging eventually     TB  -- upper lobe but deneis hemoptysis or weight loss or known exposures  - sick for about a month.  -- will send afb for completeness but suspcion so low i will not consult pulm  -- moved to [facility] pressure room     Acute on chronic respiratory failure  --has been told she needs oxygen at home in the past but refuses to wear it  --PO2 59.0  --oxygen PRN  -- on goinging tobacco abuse      Bipolar disorder/homeless  --suspect schizophrenia also given paranoia and hallucination  --pt recently stopped psych meds a week ago because they were causing her to hallucinate  --pt needs mirna lucas, but unfortunately we do not have inpt psych service.   --if stable for dc, she'll need to follow up with primary psychiatrist, otherwise, may need to have the [facility] luc[facility]  -- SW consulted     Left chest pain  -- prob  pleurisy  -- add toradol prn    Hypothyroidism  -- TSH WNL  -- cont home meds    Tobacco abuse  -- counseled, cessation advised    Nausea/HA  -- could be withdrawing from psych meds    Kristopher Samayoa MD   03/04/17   8:24 AM    Please note that portions of this note may have been completed with a voice recognition program. Efforts were made to edit the dictations, but occasionally words are mistranscribed.         Electronically signed by Kristopher Samayoa MD at 3/4/2017  4:12 PM      Kristopher Samayoa MD at 3/5/2017 11:22 AM  Version 1 of 1             [facility] Medicine Services  INPATIENT PROGRESS NOTE    Date of Admission: 3/1/2017  Length of Stay: 4  Primary Care Physician: RADHA Pedraza    Subjective-- HPI/Events overnight/ROS/CC- Hospital follow visit.  Detailed ROS not detailed as performed below      Feeling better today. No HA or dizziness. No chest pain or SOA. Still having cough. No nausea, vomiting or abd pain. No diarrhea.    Objective      Temp:  [96.9 °F (36.1 °C)-98.7 °F (37.1 °C)] 97.5 °F (36.4 °C)  Heart Rate:  [] 96  Resp:  [18-28] 20  BP: (140-158)/(91-99) 158/99    Physical Exam:  Physical Exam    General Assessment: No acute cardiopulmonary distress. Well developed and well nourished.     CVS: RRR, S1S2 normal     Resp: + Bibasilar crackles, no wheezing, resp non-labored     Abd: soft, NT, ND, normal BS, no guarding or peritoneal signs     Ext: No edema, both calves are symmetric and NTTP     Neuro: Nonfocal     Skin: W/D/I. No rash.     Psych: Affect is appropriate        Results Review:    I have reviewed the labs, radiology results and diagnostic studies.  Lab Results (last 24 hours)     Procedure Component Value Units Date/Time    Blood Culture [[id number]]  (Normal) Collected:  03/01/17 1248    Specimen:  Blood from Hand, Left Updated:  03/04/17 1301     Blood Culture No growth at 3 days     Blood Culture [[id number]]  (Normal) Collected:   03/01/17 1248    Specimen:  Blood from Hand, Right Updated:  03/04/17 1301     Blood Culture No growth at 3 days     Respiratory Culture [[id number]]  (Abnormal)  (Susceptibility) Collected:  03/02/17 0251    Specimen:  Sputum from Cough Updated:  03/04/17 1332     Respiratory Culture Light growth (2+) Pseudomonas aeruginosa (A)              Moderate growth (3+) Staphylococcus aureus (A)     Gram Stain Result        Few (2+) Epithelial cells per low power field      Few (2+) WBCs per low power field       Rare (1+) Endogenous respiratory patience     Susceptibility      Pseudomonas aeruginosa     AMY (Preliminary)     Aztreonam <=8 ug/ml Susceptible     Cefepime <=8 ug/ml Susceptible     Ceftazidime 4 ug/ml Susceptible     Gentamicin <=4 ug/ml Susceptible     Levofloxacin >4 ug/ml Resistant     Meropenem <=1 ug/ml Susceptible     Piperacillin + Tazobactam <=16 ug/ml Susceptible     Tobramycin <=4 ug/ml Susceptible                    S. Pneumo Ag Urine or CSF [[id number]] Collected:  03/02/17 0210    Specimen:  Urine from Urine, Clean Catch Updated:  03/04/17 1625     Specimen Source Urine      STREP PNEUMONIAE ANTIGEN Negative      Body Fluid Culture, Sterile Not Indicated      Organism ID Not indicated.      Please note Comment       College of American Pathologists guidelines require a culture to be  performed on CSF specimens negative by bacterial antigen testing  (CAP [id number].34420).       Narrative:       Performed at:  [address] [place] [address]  [id number]  : Ender Sanchez MD, Phone:  [phone]    Basic Metabolic Panel [[id number]]  (Abnormal) Collected:  03/05/17 0614    Specimen:  Blood Updated:  03/05/17 0705     Glucose 77 mg/dL      BUN 11 mg/dL      Creatinine 0.60 mg/dL      Sodium 142 mmol/L      Potassium 4.6 mmol/L      Chloride 102 mmol/L      CO2 37.0 (H) mmol/L      Calcium 8.9 mg/dL      eGFR Non African Amer 105 mL/min/1.73      BUN/Creatinine Ratio 18.3       Anion Gap 3.0 mmol/L     Narrative:       National Kid[employer] Guidelines    Stage                           Description                             GFR                      1                               Normal or High                          90+  2                               Mild decrease                            60-89  3                               Moderate decrease                   30-59  4                               Severe decrease                       15-29  5                               Kidney failure                             <15    Respiratory Culture [[id number]]  (Abnormal) Collected:  03/03/17 0800    Specimen:  Sputum from Unknown Updated:  03/05/17 1005     Respiratory Culture        Moderate growth (3+) Staphylococcus aureus (A)      Scant growth (1+) Normal Respiratory Shannan      Gram Stain Result Many (4+) WBCs seen       Many (4+) Epithelial cells seen       Few (2+) Gram positive cocci in pairs             Culture Data:  Radiology Data:   Cultures:    BLOOD CULTURE   Date Value Ref Range Status   03/01/2017 No growth at 3 days  Preliminary   03/01/2017 No growth at 3 days  Preliminary     RESPIRATORY CULTURE   Date Value Ref Range Status   03/03/2017 Moderate growth (3+) Staphylococcus aureus (A)  Preliminary   03/03/2017 Scant growth (1+) Normal Respiratory Shannan  Preliminary   03/02/2017 Light growth (2+) Pseudomonas aeruginosa (A)  Preliminary   03/02/2017 Moderate growth (3+) Staphylococcus aureus (A)  Preliminary       I have reviewed the medications.        Assessment/Plan     Assessment/Problem List  Patient is a 52-year-old  female with history of noncompliance. She smokes 2-3 packs of cigarettes per day. She reently went off her psych (Bipolar) meds. She presented to [facility] ED with increased shortness of breath and cough and fever. Reports recent hospitalization to [facility] about a week ago but has not gotten any better.            Sepsis  --meets criteria due to febrile, hypoxia, tachycardia, leukocytosis and infectious source  --as below, due to social situation, and now cultures positive for MRSA/pseudomona, ID consulted, awaiting eval/rec      A/C hypoxic resp failure/Pneumonia NASIM( vs neoplasm less likely)  --IV azithromycin, vancomycin and zosyn   -- resp culture + MRSA and Pseudomona  -- no wheeze; stop steroids  -- AFB cx pending, cont isolation  -- will likely need followup imaging eventually      TB  -- upper lobe but deneis hemoptysis or weight loss or known exposures  - sick for about a month.  -- will send afb for completeness but suspcion so low i will not consult pulm  -- moved to [facility] pressure room      Acute on chronic respiratory failure/COPD  --has been told she needs oxygen at home in the past but refuses to wear it  --PO2 59.0  --oxygen PRN  -- on goinging tobacco abuse      Bipolar disorder/homeless  --suspect schizophrenia also given paranoia and hallucination  --pt recently stopped psych meds a week ago because they were causing her to hallucinate  --pt needs spych eval, but unfortunately we do not have inpt psych service.   --if stable for dc, she'll need to follow up with primary psychiatrist, otherwise, may need to have [facility] eval  -- SW consulted     Left chest pain  -- prob pleurisy  -- add toradol prn     Hypothyroidism  -- TSH WNL  -- cont home meds     Tobacco abuse  -- counseled, cessation advised     Nausea/HA  -- could be withdrawing from psych meds  -- resolved now    Dispo: TBD. Will be difficult, pt homeless.     3/5/17- nothing significant to add, awaiting ID rec. Pt is improving, but had Pseudomas and MRSA in sputum, so once final abx rec from ID. CM following for DC planning.    Kristopher Samayoa MD   03/05/17   11:22 AM    Please note that portions of this note may have been completed with a voice recognition program. Efforts were made to edit the dictations, but occasionally words  are mistranscribed.         Electronically signed by Kristopher Samayoa MD at 3/5/2017 11:30 AM           Consult Notes (last 72 hours) (Notes from 3/2/2017  8:37 PM through 3/5/2017  8:37 PM)      Robert Self MD at 3/5/2017  1:48 PM  Version 2 of 2     Consult Orders:    1. Inpatient Consult to Infectious Diseases [[id number]] ordered by Kristopher Samayoa MD at 03/05/17 0837                Colleen Gonzalez  1965  [id number]    Date of Consult: 3/5/2017    Date of Admission: 3/1/2017      Requesting Provider: Aisha Lorenzo MD  Evaluating Physician: Robert Self MD    CC:   Chief Complaint   Patient presents with   • Shortness of Breath       Reason for Consultation: NASIM pneumonia with Staph aureus    History of present illness:   Patient is a 52 y.o.  Yr old female with history of 5-6 week history of cough and sputum production which is worsening over the past 1 week and associated with chills/sweats. No fever. She reports she is homeless and her living situation is stressful although she is compliant with most of her medications- aside from her psych medications for bipolar d/o and OCD that she takes intermittently.   Patient smokes 3 PPD and has no known hx of cancer in her past. She is concerned about the possibility of cancer. No weight loss, night sweats, hemoptysis reported.   No recent antibiotics. Came to ED with SOA and BLAKE with concern for pna.   Chest CT with 6x6cm NASIM opacity and concern for inflammatory process.  The M culture with Pseudomonas aeruginosa and MRSA present.  AFB ×2 have been collected one of 2 negative thus far.  Currently on empiric antibiotic therapy with vancomycin, cefepime, azithromycin.  ID asked to see the patient for further recommendations.    Past Medical History   Diagnosis Date   • Anxiety    • COPD (chronic obstructive pulmonary disease)    • Depression    • Hyperlipidemia    • Hypertension    • Hypothyroid    • Obesity        Past Surgical History   Procedure  Laterality Date   • Hysterectomy       partial   • Cholecystectomy     • Back surgery     • Knee surgery Bilateral    • Cyst removal       Social History Narrative    Patient is homeless and moves around staying with friends and family.  Recently was staying at the [facility].       Father: Heart attack    Allergies   Allergen Reactions   • Aspirin GI Intolerance   • Ibuprofen GI Intolerance       Medication:  Current Facility-Administered Medications   Medication Dose Route Frequency Provider Last Rate Last Dose   • [START ON 3/6/2017] !vancomycin trough 3/6 @ 1700 - please hold 1800 dose   Does not apply Continuous PRN Angela Cedeño [facility]       • acetaminophen (TYLENOL) tablet 650 mg  650 mg Oral Q4H PRN [facility] Theresa II, DO   650 mg at 03/04/17 1121   • AZITHROMYCIN 500 MG/250 ML 0.9% NS IVPB (MBP)  500 mg Intravenous Q24H RADHA Ji 250 mL/hr at 03/04/17 0815 500 mg at 03/05/17 0816   • bisacodyl (DULCOLAX) EC tablet 5 mg  5 mg Oral Daily PRN [facility] Bratton II, DO       • cefepime (MAXIPIME) 2 g/100 mL 0.9% NS (mbp)  2 g Intravenous Q8H Aisha Lorenzo MD   2 g at 03/05/17 1253   • cetirizine (zyrTEC) tablet 10 mg  10 mg Oral Daily Effie  Bratton II, DO   10 mg at 03/05/17 0817   • DULoxetine (CYMBALTA) DR capsule 60 mg  60 mg Oral Q12H [facility] Bratton II, DO   60 mg at 03/05/17 0818   • enoxaparin (LOVENOX) syringe 40 mg  40 mg Subcutaneous Daily [facility] Bratton II, DO   40 mg at 03/05/17 0817   • fluticasone (FLONASE) 50 MCG/ACT nasal spray 2 spray  2 spray Nasal Daily [facility] Bratton II, DO   2 spray at 03/05/17 0817   • furosemide (LASIX) tablet 40 mg  40 mg Oral Daily Effie M Bratton II, DO   40 mg at 03/05/17 0817   • gabapentin (NEURONTIN) capsule 400 mg  400 mg Oral Q12H [facility] Bratton II, DO   400 mg at 03/05/17 0817   • HYDROcod Polst-CPM Polst ER (TUSSIONEX PENNKINETIC) 10-8 MG/5ML ER suspension 5 mL  5 mL Oral Q12H Aisha Lorenzo MD   5 mL at 03/05/17 0259   • HYDROcodone-acetaminophen  (NORCO) 5-325 MG per tablet 1 tablet  1 tablet Oral Q4H PRN [facility] Theresa II, DO   1 tablet at 03/05/17 1252   • ipratropium-albuterol (DUO-NEB) nebulizer solution 3 mL  3 mL Nebulization 4x Daily - RT RADHA Ji   3 mL at 03/05/17 1228   • ipratropium-albuterol (DUO-NEB) nebulizer solution 3 mL  3 mL Nebulization Q4H PRN RADHA Sin       • levothyroxine (SYNTHROID, LEVOTHROID) tablet 50 mcg  50 mcg Oral Q AM Tracy M Bratton II, DO   50 mcg at 03/05/17 0512   • montelukast (SINGULAIR) tablet 10 mg  10 mg Oral Nightly Tracy M Bratton II, DO   10 mg at 03/04/17 1932   • nicotine (NICODERM CQ) 21 MG/24HR patch 1 patch  1 patch Transdermal Q24H Effie  Bratton II, DO   1 patch at 03/05/17 0818   • ondansetron (ZOFRAN) tablet 4 mg  4 mg Oral Q6H PRN [facility] Theresa II, DO   4 mg at 03/02/17 1647    Or   • ondansetron (ZOFRAN) injection 4 mg  4 mg Intravenous Q6H PRN [facility] Theresa II, DO   4 mg at 03/04/17 0854   • pantoprazole (PROTONIX) EC tablet 40 mg  40 mg Oral Q24H Effie M Theresa II, DO   40 mg at 03/05/17 0817   • Pharmacy Consult - MTM   Does not apply Daily Marcelo Recinos RPH       • Pharmacy to dose vancomycin   Does not apply Continuous PRN Ashlee Lucero, APRN       • predniSONE (DELTASONE) tablet 20 mg  20 mg Oral BID With Meals Aisha Lorenzo MD   20 mg at 03/05/17 0818   • QUEtiapine fumarate ER (SEROquel XR) tablet 300 mg  300 mg Oral Nightly Effie M Theresa II, DO   300 mg at 03/04/17 1932   • sennosides-docusate sodium (SENOKOT-S) 8.6-50 MG tablet 2 tablet  2 tablet Oral BID Effie M Theresa II, DO   2 tablet at 03/05/17 0817   • sodium chloride 0.9 % flush 1-10 mL  1-10 mL Intravenous PRN Effie Cardenas II, DO       • sodium chloride 0.9 % flush 10 mL  10 mL Intravenous PRN Carlos Park MD       • tiZANidine (ZANAFLEX) tablet 4 mg  4 mg Oral Q12H Effie Cardenas II, DO   4 mg at 03/05/17 0817   • vancomycin IVPB 1500 mg in 0.9% NaCl (Premix) 500 mL  1,500 mg Intravenous Q24H Angela  "ELIOT Cedeño, [facility]   1,500 mg at 17 1726       Antibiotics:  Vanc  Cefepime  Azithromycin  Prednisone 20 mg daily     Review of Systems  Full 12 point review of systems reviewed and negative except for cough, SOA, wt loss, poor appetite, productive sputum.    Physical Exam:   Vital Signs   Visit Vitals   • /99 (BP Location: Right arm, Patient Position: Lying)  Comment: nurse advised   • Pulse 100   • Temp 97.5 °F (36.4 °C) (Oral)   • Resp 18   • Ht 68\" (172.7 cm)   • Wt 243 lb 9.6 oz (110 kg)   • SpO2 93%   • BMI 37.04 kg/m2     Temp (24hrs), Av.7 °F (36.5 °C), Min:96.9 °F (36.1 °C), Max:98.7 °F (37.1 °C)      GENERAL: Chronically ill appearing, unkept appearance. Sitting at bedside.   HEENT: Normocephalic, atraumatic.  PERRL. EOMI. No conjunctival injection. No icterus. Oropharynx clear without evidence of thrush or exudate. Dental disease with missing teeth.   NECK: Supple without nuchal rigidity  LYMPH: No cervical, axillary or inguinal lymphadenopathy. No neck masses  HEART: RRR; No murmur, rubs, gallops.   LUNGS: Diminished throughout. Rales in bases. Exp wheezing. Supplemental O2 and increased work of breathing.   ABDOMEN: Soft, nontender, nondistended. Positive bowel sounds. No rebound or guarding.   EXT:  No cyanosis, clubbing or edema  : Normal appearing genitalia without Perdomo catheter.  MSK: FROM without joint effusions noted    SKIN: Plaque like skin lesions of the chest, arms and legs of varied healing. Some appear blister-like. Others crusted over. Healed lesions have left hypopigmentation.   NEURO: Oriented to PPT. No focal deficits.   PSYCHIATRIC: tearful although cooperative with exam     Laboratory Data      Results from last 7 days  Lab Units 17  0654 17  0522 17  0617   WBC 10*3/mm3 12.67* 17.64* 16.45*   HEMOGLOBIN g/dL 11.6 11.2* 11.7   HEMATOCRIT % 36.1 35.7 36.0   PLATELETS 10*3/mm3 281 261 191       Results from last 7 days  Lab Units 17  0614   SODIUM " mmol/L 142   POTASSIUM mmol/L 4.6   CHLORIDE mmol/L 102   TOTAL CO2 mmol/L 37.0*   BUN mg/dL 11   CREATININE mg/dL 0.60   GLUCOSE mg/dL 77   CALCIUM mg/dL 8.9       Results from last 7 days  Lab Units 03/01/17  1248   ALK PHOS U/L 91   BILIRUBIN mg/dL 0.3   ALT (SGPT) U/L 11   AST (SGOT) U/L 19               Estimated Creatinine Clearance: 140.9 mL/min (by C-G formula based on Cr of 0.6).      Microbiology:  Sputum culture on 3/2: PSA and MRSA  Sputum culture on 3/3: MRSA   AFB x 3 ordered and first is negative    Radiology:  Imaging Results (last 24 hours)     ** No results found for the last 24 hours. **        PROBLEM LIST:   NASIM pneumonia with MRSA and PSA   Leukocytosis  Concern for potential post-obstructive pneumonia with hx of cough x 6 weeks and 6x6cm nature of NASIM pna (3 PPD history)  Tobacco abuse  Chronic skin lesions  HTN  HLD  Hypothyroidism  Psych disorder    ASSESSMENT:  52 -year-old female with multiple medical conditions including hypertension, hyperlipidemia, hypothyroidism in the setting of psychiatric disease with OCD and bipolar disorder who presents with worsening cough, shortness of breath, sputum production of brown coloration of the past 5-6 weeks however worsening over the last 1 week.  She reports associated chills and sweats however hasn't checked her temperature. she came to the ED for evaluation  with leukocytosis initially noted, chest CT angiogram with a left upper lobe with a 6 x 6 cm opacity concerning for infectious versus neoplastic etiology.  Sputum production ×2 has been collected with pseudomonas and MRSA.  Blood culture negative.  AFB ×2 collected and thus far negative.    She does have some diffuse cutaneous lesions of the arms, legs, chest wall concerning for possibility of fungal pneumonia but will continue current therapy for now and f/u repeat imaging to determine need for further w/u.     With being homeless and NASIM dense consolidation agree with r/o TB before even  considering bronch.    PLAN:   Continue Vanc and Cefepime  D/c Azithromycin  CBC, BMp, ESR, CRP in am   Consider histo, blasto, crypto if fails to improve.   F/u AFB      Dr. Robert Self saw the patient, performed the physical exam, reviewed the laboratory data and guided with the formulation of the above problem list, assessment and treatment plan.     Robert Self MD  3/5/2017                 Electronically signed by Robert Self MD at 3/5/2017  3:36 PM      MARY Wilcox at 3/5/2017  1:48 PM  Version 1 of 2         Colleen Gonzalez  1965  [id number]    Date of Consult: 3/5/2017    Date of Admission: 3/1/2017      Requesting Provider: Aisha Lorenzo MD  Evaluating Physician: Robert Self MD    CC:   Chief Complaint   Patient presents with   • Shortness of Breath       Reason for Consultation: NASIM pneumonia with Staph aureus    History of present illness:   Patient is a 52 y.o.  Yr old female with history of 5-6 week history of cough and sputum production which is worsening over the past 1 week and associated with chills/sweats. No fever. She reports she is homeless and her living situation is stressful although she is compliant with most of her medications- aside from her psych medications for bipolar d/o and OCD that she takes intermittently.   Patient smokes 3 PPD and has no known hx of cancer in her past. She is concerned about the possibility of cancer. No weight loss, night sweats, hemoptysis reported.   No recent antibiotics. Came to ED with SOA and BLAKE with concern for pna.   Chest CT with 6x6cm NASIM opacity and concern for inflammatory process.  The M culture with Pseudomonas aeruginosa and MRSA present.  AFB ×2 have been collected one of 2 negative thus far.  Currently on empiric antibiotic therapy with vancomycin, cefepime, azithromycin.  ID asked to see the patient for further recommendations.    Past Medical History   Diagnosis Date   • Anxiety    • COPD (chronic obstructive pulmonary disease)    •  Depression    • Hyperlipidemia    • Hypertension    • Hypothyroid    • Obesity        Past Surgical History   Procedure Laterality Date   • Hysterectomy       partial   • Cholecystectomy     • Back surgery     • Knee surgery Bilateral    • Cyst removal       Social History Narrative    Patient is homeless and moves around staying with friends and family.  [facility]       Father: Heart attack    Allergies   Allergen Reactions   • Aspirin GI Intolerance   • Ibuprofen GI Intolerance       Medication:  Current Facility-Administered Medications   Medication Dose Route Frequency Provider Last Rate Last Dose   • [START ON 3/6/2017] !vancomycin trough 3/6 @ 1700 - please hold 1800 dose   Does not apply Continuous PRN Angela Cedeño [facility]       • acetaminophen (TYLENOL) tablet 650 mg  650 mg Oral Q4H PRN [facility] II, DO   650 mg at 03/04/17 1121   • AZITHROMYCIN 500 MG/250 ML 0.9% NS IVPB (MBP)  500 mg Intravenous Q24H RADHA Ji 250 mL/hr at 03/04/17 0815 500 mg at 03/05/17 0816   • bisacodyl (DULCOLAX) EC tablet 5 mg  5 mg Oral Daily PRN [facility] II, DO       • cefepime (MAXIPIME) 2 g/100 mL 0.9% NS (mbp)  2 g Intravenous Q8H Aisha Lorenzo MD   2 g at 03/05/17 1253   • cetirizine (zyrTEC) tablet 10 mg  10 mg Oral Daily Effie  Bratton II, DO   10 mg at 03/05/17 0817   • DULoxetine (CYMBALTA) DR capsule 60 mg  60 mg Oral Q12H Effie  Bratton II, DO   60 mg at 03/05/17 0818   • enoxaparin (LOVENOX) syringe 40 mg  40 mg Subcutaneous Daily Effie  Bratton II, DO   40 mg at 03/05/17 0817   • fluticasone (FLONASE) 50 MCG/ACT nasal spray 2 spray  2 spray Nasal Daily Effie  Bratton II, DO   2 spray at 03/05/17 0817   • furosemide (LASIX) tablet 40 mg  40 mg Oral Daily Effie  Bratton II, DO   40 mg at 03/05/17 0817   • gabapentin (NEURONTIN) capsule 400 mg  400 mg Oral Q12H [facility] II, DO   400 mg at 03/05/17 0817   • HYDROcod Polst-CPM Polst ER ([facility]  PENNKINETIC) 10-8 MG/5ML ER suspension 5 mL  5 mL Oral Q12H Aisha Lorenzo MD   5 mL at 03/05/17 0259   • HYDROcodone-acetaminophen (NORCO) 5-325 MG per tablet 1 tablet  1 tablet Oral Q4H PRN Effie M Theresa II, DO   1 tablet at 03/05/17 1252   • ipratropium-albuterol (DUO-NEB) nebulizer solution 3 mL  3 mL Nebulization 4x Daily - RT RADHA Ji   3 mL at 03/05/17 1228   • ipratropium-albuterol (DUO-NEB) nebulizer solution 3 mL  3 mL Nebulization Q4H PRN RADHA Sin       • levothyroxine (SYNTHROID, LEVOTHROID) tablet 50 mcg  50 mcg Oral Q AM Effie M Theresa II, DO   50 mcg at 03/05/17 0512   • montelukast (SINGULAIR) tablet 10 mg  10 mg Oral Nightly Effie  Theresa II, DO   10 mg at 03/04/17 1932   • nicotine (NICODERM CQ) 21 MG/24HR patch 1 patch  1 patch Transdermal Q24H Effie M Theresa II, DO   1 patch at 03/05/17 0818   • ondansetron (ZOFRAN) tablet 4 mg  4 mg Oral Q6H PRN [facility] Theresa II, DO   4 mg at 03/02/17 1647    Or   • ondansetron (ZOFRAN) injection 4 mg  4 mg Intravenous Q6H PRN [facility] Theresa II, DO   4 mg at 03/04/17 0854   • pantoprazole (PROTONIX) EC tablet 40 mg  40 mg Oral Q24H Effie M Theresa II, DO   40 mg at 03/05/17 0817   • Pharmacy Consult - MTM   Does not apply Daily Marcelo Recinos RPH       • Pharmacy to dose vancomycin   Does not apply Continuous PRN RADHA Ji       • predniSONE (DELTASONE) tablet 20 mg  20 mg Oral BID With Meals Aisha Lorenzo MD   20 mg at 03/05/17 0818   • QUEtiapine fumarate ER (SEROquel XR) tablet 300 mg  300 mg Oral Nightly Tracy M Theresa II, DO   300 mg at 03/04/17 1932   • sennosides-docusate sodium (SENOKOT-S) 8.6-50 MG tablet 2 tablet  2 tablet Oral BID Effie Cardenas II DO   2 tablet at 03/05/17 0817   • sodium chloride 0.9 % flush 1-10 mL  1-10 mL Intravenous PRN Effie Cardenas II, DO       • sodium chloride 0.9 % flush 10 mL  10 mL Intravenous PRN Carlos Park MD       • tiZANidine (ZANAFLEX) tablet 4 mg  4 mg Oral Q12H Effie  "ELIOT Cardenas II, DO   4 mg at 17 0817   • vancomycin IVPB 1500 mg in 0.9% NaCl (Premix) 500 mL  1,500 mg Intravenous Q24H Angela Cedeño RPH   1,500 mg at 17 1725       Antibiotics:  Vanc  Cefepime  Azithromycin  Prednisone 20 mg daily     Review of Systems  Full 12 point review of systems reviewed and negative except for    Physical Exam:   Vital Signs   Visit Vitals   • /99 (BP Location: Right arm, Patient Position: Lying)  Comment: nurse advised   • Pulse 100   • Temp 97.5 °F (36.4 °C) (Oral)   • Resp 18   • Ht 68\" (172.7 cm)   • Wt 243 lb 9.6 oz (110 kg)   • SpO2 93%   • BMI 37.04 kg/m2     Temp (24hrs), Av.7 °F (36.5 °C), Min:96.9 °F (36.1 °C), Max:98.7 °F (37.1 °C)      GENERAL: Chronically ill appearing, unkept appearance. Sitting at bedside.   HEENT: Normocephalic, atraumatic.  PERRL. EOMI. No conjunctival injection. No icterus. Oropharynx clear without evidence of thrush or exudate. Dental disease with missing teeth.   NECK: Supple without nuchal rigidity  LYMPH: No cervical, axillary or inguinal lymphadenopathy. No neck masses  HEART: RRR; No murmur, rubs, gallops.   LUNGS: Diminished throughout. Rales in bases. Exp wheezing. Supplemental O2 and increased work of breathing.   ABDOMEN: Soft, nontender, nondistended. Positive bowel sounds. No rebound or guarding.   EXT:  No cyanosis, clubbing or edema  : Normal appearing genitalia without Perdomo catheter.  MSK: FROM without joint effusions noted    SKIN: Plaque like skin lesions of the chest, arms and legs of varied healing. Some appear blister-like. Others crusted over. Healed lesions have left hypopigmentation.   NEURO: Oriented to PPT. No focal deficits.   PSYCHIATRIC: tearful although cooperative with exam     Laboratory Data      Results from last 7 days  Lab Units 17  0654 17  0522 17  0617   WBC 10*3/mm3 12.67* 17.64* 16.45*   HEMOGLOBIN g/dL 11.6 11.2* 11.7   HEMATOCRIT % 36.1 35.7 36.0   PLATELETS 10*3/mm3 281 " 261 191       Results from last 7 days  Lab Units 03/05/17  0614   SODIUM mmol/L 142   POTASSIUM mmol/L 4.6   CHLORIDE mmol/L 102   TOTAL CO2 mmol/L 37.0*   BUN mg/dL 11   CREATININE mg/dL 0.60   GLUCOSE mg/dL 77   CALCIUM mg/dL 8.9       Results from last 7 days  Lab Units 03/01/17  1248   ALK PHOS U/L 91   BILIRUBIN mg/dL 0.3   ALT (SGPT) U/L 11   AST (SGOT) U/L 19               Estimated Creatinine Clearance: 140.9 mL/min (by C-G formula based on Cr of 0.6).      Microbiology:  Sputum culture on 3/2: PSA and MRSA  Sputum culture on 3/3: MRSA   AFB x 3 ordered and first is negative    Radiology:  Imaging Results (last 24 hours)     ** No results found for the last 24 hours. **        PROBLEM LIST:   NASIM pneumonia with MRSA and PSA   Leukocytosis  Concern for potential post-obstructive pneumonia with hx of cough x 6 weeks and 6x6cm nature of NASIM pna (3 PPD history)  Tobacco abuse  Chronic skin lesions  HTN  HLD  Hypothyroidism  Psych disorder    ASSESSMENT:  52 -year-old female with multiple medical conditions including hypertension, hyperlipidemia, hypothyroidism in the setting of psychiatric disease with OCD and bipolar disorder who presents with worsening cough, shortness of breath, sputum production of brown coloration of the past 5-6 weeks however worsening over the last 1 week.  She reports associated chills and sweats however hasn't checked her temperature. she came to the ED for evaluation  with leukocytosis initially noted, chest CT angiogram with a left upper lobe with a 6 x 6 cm opacity concerning for infectious versus neoplastic etiology.  Sputum production ×2 has been collected with pseudomonas and MRSA.  Blood culture negative.  AFB ×2 collected and thus far negative.    She does have some diffuse cutaneous lesions of the arms, legs, chest wall concerning for possibility of fungal pneumonia but will continue current therapy for now and f/u repeat imaging to determine need for further w/u.     PLAN:    Continue Vanc and Cefepime  D/c Azithromycin  CBC, BMp, ESR, CRP in am   Consider histo, blasto, crypto if fails to improve.       Dr. Robert Self saw the patient, performed the physical exam, reviewed the laboratory data and guided with the formulation of the above problem list, assessment and treatment plan.     Robert Self MD  3/5/2017                 Electronically signed by MARY Wilcox at 3/5/2017  2:29 PM

## 2017-03-06 NOTE — PROGRESS NOTES
Acute Care - Physical Therapy Treatment Note  [facility]     Patient Name: Colleen Gonzalez  : 1965  MRN: [id number]  Today's Date: 3/6/2017     Date of Referral to PT: 17  Referring Physician: DO Theresa    Admit Date: 3/1/2017    Visit Dx:    ICD-10-CM ICD-9-CM   1. Community acquired pneumonia J18.9 486   2. COPD exacerbation J44.1 491.21   3. Hypoxia R09.02 799.02   4. Impaired functional mobility, balance, gait, and endurance Z74.09 V49.89     Patient Active Problem List   Diagnosis   • COPD (chronic obstructive pulmonary disease)   • Tobacco abuse   • Acute on chronic respiratory failure   • Leukocytosis   • HCAP (healthcare-associated pneumonia)   • Hypothyroid   • Sepsis   • Anxiety and depression   • Homelessness               Adult Rehabilitation Note       17 1604          Rehab Assessment/Intervention    Discipline physical therapist  -MJ      Document Type therapy note (daily note)  -MJ      Subjective Information agree to therapy;no complaints  -MJ      Patient Effort, Rehab Treatment good  -MJ      Precautions/Limitations fall precautions;oxygen therapy device and L/min;other (see comments)   airborne precautions  -MJ      Recorded by [MJ] Abhilash Meier, PT      Vital Signs    Pre SpO2 (%) 93  -MJ      O2 Delivery Pre Treatment supplemental O2  -MJ      Post SpO2 (%) 90  -MJ      O2 Delivery Post Treatment supplemental O2  -MJ      Recorded by [MJ] Abhilash Meier, PT      Pain Assessment    Pain Assessment No/denies pain  -MJ      Recorded by [MJ] Abhilash Meier, PT      Bed Mobility, Assessment/Treatment    Bed Mob, Supine to Sit, [place] independent  -MJ      Bed Mob, Sit to Supine, [place] independent  -MJ      Recorded by [MJ] Abhilash Meier, STEPHANY      Transfer Assessment/Treatment    Transfers, Sit-Stand [place] stand by assist  -MJ      Transfers, Stand-Sit [place] stand by assist  -MJ      Recorded by [MJ] Abhilash Meier, PT      Gait Assessment/Treatment    Gait,  [place] Level stand by assist;verbal cues required  -MJ      Gait, Distance (Feet) 75   limited to room due to airborne precautions  -MJ      Gait, Gait Pattern Analysis swing-through gait  -MJ      Gait, Gait Deviations michaela decreased;step length decreased  -MJ      Gait, Safety Issues step length decreased  -MJ      Gait, Impairments strength decreased  -MJ      Gait, Comment Pt demo step through gait pattern, steady with no LOB. Cues for PLB. Gait limited by fatigue  -MJ      Recorded by [MJ] Abhilash Meier PT      Motor Skills/Interventions    Additional Documentation Balance Skills Training (Group)  -MJ      Recorded by [MJ] Abhilash Meier PT      Balance Skills Training    Sitting-Level of Assistance Independent  -MJ      Sitting-Balance Support Feet supported  -      Sitting-Balance Activities --   Sat at EOB to doff dirty gown and don clean one  -MJ      Recorded by [MJ] Abhilash Meier PT      Therapy Exercises    Bilateral Lower Extremities AROM:;15 reps;sitting;ankle pumps/circles;hip flexion;LAQ  -MJ      Recorded by [MJ] Abhilash Meier PT      Positioning and Restraints    Pre-Treatment Position in bed  -MJ      Post Treatment Position bed  -MJ      In Bed notified nsg;side lying right;call light within reach;encouraged to call for assist  -MJ      Recorded by [MJ] Abhilash Meier, PT        User Key  (r) = Recorded By, (t) = Taken By, (c) = Cosigned By    Initials Name Effective Dates    SHAHEED Meier, PT 03/14/16 -                 IP PT Goals       03/06/17 1645 03/04/17 1524       Transfer Training PT LTG    Transfer Training PT LTG, Date Established  03/04/17  -LS     Transfer Training PT LTG, Time to Achieve  2 wks  -LS     Transfer Training PT LTG, Activity Type  sit to stand/stand to sit  -LS     Transfer Training PT LTG, [place] Level  independent  -LS     Transfer Training PT LTG, Assist Device  walker, rolling   walker as needed  -LS     Transfer Training PT  LTG, Date Goal Reviewed  03/06/17  -      Transfer Training PT LTG, Outcome goal ongoing  - goal ongoing  -LS     Gait Training PT LTG    Gait Training Goal PT LTG, Date Established  03/04/17  -     Gait Training Goal PT LTG, Time to Achieve  2 wks  -LS     Gait Training Goal PT LTG, [place] Level  independent  -     Gait Training Goal PT LTG, Assist Device  walker, rolling   RW or rollator (as needed)  -     Gait Training Goal PT LTG, Distance to Achieve  200   maintaining O2 sats at least 90%  -LS     Gait Training Goal PT LTG, Date Goal Reviewed 03/06/17  -      Gait Training Goal PT LTG, Outcome goal ongoing  - goal ongoing  -LS     Patient Education PT STG    Patient Education PT STG, Date Established  03/04/17  -     Patient Education PT STG, Time to Achieve  2 wks  -LS     Patient Education PT STG, Education Type  energy conservation;HEP  -LS     Patient Education PT STG, Date Goal Reviewed 03/06/17  -      Patient Education PT STG Outcome goal ongoing  - goal ongoing  -       User Key  (r) = Recorded By, (t) = Taken By, (c) = Cosigned By    Initials Name Provider Type    LS Cassie Sheffield, PT Physical Therapist    SHAHEED Meier, PT Physical Therapist          Physical Therapy Education     Title: PT OT SLP Therapies (Done)     Topic: Physical Therapy (Done)     Point: Home exercise program (Done)    Learning Progress Summary    Learner Readiness Method Response Comment Documented by Status   Patient Acceptance ISMAEL NUR,NR   03/06/17 1645 Done    Acceptance E ALBARO Discussed benefits of activity.  03/04/17 1523 Done               Point: Precautions (Done)    Learning Progress Summary    Learner Readiness Method Response Comment Documented by Status   Patient Acceptance ISMAEL NUR,NR   03/06/17 1645 Done                      User Key     Initials Effective Dates Name Provider Type Discipline     06/19/15 -  Cassie Sheffield, PT Physical Therapist PT     03/14/16 -  Abhilash Meier, PT Physical  Therapist PT                    PT Recommendation and Plan  Anticipated Equipment Needs At Discharge:  (to be determined. assess w/rollator walker)  Anticipated Discharge Disposition: skilled nursing facility, extended care facility (to be determined)  Planned Therapy Interventions: balance training, gait training, home exercise program, patient/family education, strengthening, transfer training  PT Frequency: daily, per priority policy  Plan of Care Review  Plan Of Care Reviewed With: patient  Progress: improving  Outcome Summary/Follow up Plan: Pt increased gait distance to 75 feet with SBA and required less assist for all functional mobility. Pt still limited by fatigue. Will continue to progress mobility as able.           Outcome Measures       03/06/17 1604 03/04/17 1432       How much help from another person do you currently need...    Turning from your back to your side while in flat bed without using bedrails? 4  -MJ 4  -LS     Moving from lying on back to sitting on the side of a flat bed without bedrails? 4  -MJ 4  -LS     Moving to and from a bed to a chair (including a wheelchair)? 3  -MJ 3  -LS     Standing up from a chair using your arms (e.g., wheelchair, bedside chair)? 3  -MJ 3  -LS     Climbing 3-5 steps with a railing? 3  -MJ 3  -LS     To walk in hospital room? 3  -MJ 3  -LS     AM-PAC 6 Clicks Score 20  -MJ 20  -LS     Functional Assessment    Outcome Measure Options AM-PAC 6 Clicks Basic Mobility (PT)  -MJ AM-PAC 6 Clicks Basic Mobility (PT)  -LS       User Key  (r) = Recorded By, (t) = Taken By, (c) = Cosigned By    Initials Name Provider Type    LS Cassie Sheffield, PT Physical Therapist    SHAHEED Meier, PT Physical Therapist           Time Calculation:         PT Charges       03/06/17 1647          Time Calculation    Start Time 1604  -MJ      PT Received On 03/06/17  -MJ      PT Goal Re-Cert Due Date 03/14/17  -MJ      Time Calculation- PT    Total Timed Code Minutes- PT 25  minute(s)  -        User Key  (r) = Recorded By, (t) = Taken By, (c) = Cosigned By    Initials Name Provider Type    SHAHEED Meier PT Physical Therapist          Therapy Charges for Today     Code Description Service Date Service Provider Modifiers Qty    25901924625 HC GAIT TRAINING EA 15 MIN 3/6/2017 Abhilash Meier, PT GP 1    60778192565 HC PT THER PROC EA 15 MIN 3/6/2017 Abhilash Meier, PT GP 1          PT G-Codes  Outcome Measure Options: AM-PAC 6 Clicks Basic Mobility (PT)    Abhilash Meier PT  3/6/2017

## 2017-03-06 NOTE — PROGRESS NOTES
"[facility] Progress Note    Date of Admission: 3/1/2017      Antibiotics:  Cefepime, Vanc, Prednisone    CC:   Chief Complaint   Patient presents with   • Shortness of Breath       S: No f/c/s. Still with cough and SOA. No pleuritic CP. Sputum production. Anxious.     O:  Visit Vitals   • /86   • Pulse 64   • Temp 98.3 °F (36.8 °C) (Oral)   • Resp 16   • Ht 68\" (172.7 cm)   • Wt 243 lb 9.6 oz (110 kg)   • SpO2 93%   • BMI 37.04 kg/m2     Temp (24hrs), Av.3 °F (36.8 °C), Min:98.3 °F (36.8 °C), Max:98.3 °F (36.8 °C)      PE:   GENERAL: Chronically ill appearing, unkept appearance. Laying in bed comfortably  HEENT: Normocephalic, atraumatic. PERRL. EOMI. No conjunctival injection. No icterus. Oropharynx clear without evidence of thrush or exudate. Dental disease with missing teeth.   NECK: Supple without nuchal rigidity  LYMPH: No cervical, axillary or inguinal lymphadenopathy. No neck masses  HEART: RRR; No murmur, rubs, gallops.   LUNGS: Diminished throughout. Rales in bases. Exp wheezing. Supplemental O2 at 3L  ABDOMEN: Soft, nontender, nondistended. Positive bowel sounds. No rebound or guarding.   EXT: No cyanosis, clubbing or edema  : Normal appearing genitalia without Perdomo catheter.  MSK: FROM without joint effusions noted   SKIN: Plaque like skin lesions of the chest, arms and legs of varied healing. Some appear blister-like. Others crusted over. Healed lesions have left hypopigmentation.   NEURO: Oriented to PPT. No focal deficits.       Laboratory Data      Results from last 7 days  Lab Units 17  0707 17  0654 17  0522   WBC 10*3/mm3 13.09* 12.67* 17.64*   HEMOGLOBIN g/dL 13.2 11.6 11.2*   HEMATOCRIT % 40.4 36.1 35.7   PLATELETS 10*3/mm3 404 281 261       Results from last 7 days  Lab Units 17  0614   SODIUM mmol/L 142   POTASSIUM mmol/L 4.6   CHLORIDE mmol/L 102   TOTAL CO2 mmol/L 37.0*   BUN mg/dL 11   CREATININE mg/dL 0.60   GLUCOSE mg/dL 77   CALCIUM mg/dL 8.9       Results " from last 7 days  Lab Units 03/01/17  1248   ALK PHOS U/L 91   BILIRUBIN mg/dL 0.3   ALT (SGPT) U/L 11   AST (SGOT) U/L 19               Estimated Creatinine Clearance: 140.9 mL/min (by C-G formula based on Cr of 0.6).      Microbiology:  Sputum culture on 3/2: PSA and MRSA  Sputum culture on 3/3: MRSA   AFB x 3 ordered and first is negative  Blood culture negative        Radiology:  Imaging Results (last 24 hours)     ** No results found for the last 24 hours. **          PROBLEM LIST:   NASIM pneumonia with MRSA and PSA   Leukocytosis  Concern for potential post-obstructive pneumonia with hx of cough x 6 weeks and 6x6cm nature of NASIM pna (3 PPD history)  Tobacco abuse  Chronic skin lesions  HTN  HLD  Hypothyroidism  Psych disorder     ASSESSMENT:  52 -year-old female with multiple medical conditions including hypertension, hyperlipidemia, hypothyroidism in the setting of psychiatric disease with OCD and bipolar disorder who presents with worsening cough, shortness of breath, sputum production of brown coloration of the past 5-6 weeks however worsening over the last 1 week. She reports associated chills and sweats however hasn't checked her temperature. she came to the ED for evaluation with leukocytosis initially noted, chest CT angiogram with a left upper lobe with a 6 x 6 cm opacity concerning for infectious versus neoplastic etiology. Sputum production ×2 has been collected with pseudomonas and MRSA. Blood culture negative. AFB ×2 collected and thus far negative.     She does have some diffuse cutaneous lesions of the arms, legs, chest wall concerning for possibility of fungal pneumonia but will continue current therapy for now and f/u repeat imaging to determine need for further w/u.      With being homeless and NASIM dense consolidation agree with r/o TB before even considering bronch.     PLAN:  Continue Vanc and Cefepime for MRSA and PSA coverage  CBC in am  Get histo, blasto, crypto ag  F/u AFB and TB r/o  before considering bronchoscopy     Dr. Robert Self saw the patient, performed the physical exam, reviewed the laboratory data and guided with the formulation of the above problem list, assessment and treatment plan.     Robert Self MD  3/6/2017

## 2017-03-06 NOTE — PLAN OF CARE
Problem: Patient Care Overview (Adult)  Goal: Plan of Care Review  Outcome: Ongoing (interventions implemented as appropriate)    03/06/17 2484   Coping/Psychosocial Response Interventions   Plan Of Care Reviewed With patient   Patient Care Overview   Progress improving   Outcome Evaluation   Outcome Summary/Follow up Plan Pt c/o pain and anxiety. coughing some sputum up, new labs in per ID. VSS.       Goal: Adult Individualization and Mutuality  Outcome: Outcome(s) achieved Date Met:  03/06/17  Goal: Discharge Needs Assessment  Outcome: Ongoing (interventions implemented as appropriate)    Problem: Pneumonia (Adult)  Goal: Signs and Symptoms of Listed Potential Problems Will be Absent or Manageable (Pneumonia)  Outcome: Ongoing (interventions implemented as appropriate)    Problem: COPD, Chronic Bronchitis/Emphysema (Adult)  Goal: Signs and Symptoms of Listed Potential Problems Will be Absent or Manageable (COPD, Chronic Bronchitis/Emphysema)  Outcome: Ongoing (interventions implemented as appropriate)

## 2017-03-06 NOTE — PLAN OF CARE
Problem: Patient Care Overview (Adult)  Goal: Plan of Care Review  Outcome: Ongoing (interventions implemented as appropriate)  Goal: Adult Individualization and Mutuality  Outcome: Ongoing (interventions implemented as appropriate)  Goal: Discharge Needs Assessment  Outcome: Ongoing (interventions implemented as appropriate)    Problem: Pneumonia (Adult)  Goal: Signs and Symptoms of Listed Potential Problems Will be Absent or Manageable (Pneumonia)  Outcome: Ongoing (interventions implemented as appropriate)

## 2017-03-07 LAB
ANION GAP SERPL CALCULATED.3IONS-SCNC: 4 MMOL/L (ref 3–11)
BASOPHILS # BLD AUTO: 0.06 10*3/MM3 (ref 0–0.2)
BASOPHILS NFR BLD AUTO: 0.5 % (ref 0–1)
BUN BLD-MCNC: 17 MG/DL (ref 9–23)
BUN/CREAT SERPL: 21.3 (ref 7–25)
CALCIUM SPEC-SCNC: 9.3 MG/DL (ref 8.7–10.4)
CHLORIDE SERPL-SCNC: 99 MMOL/L (ref 99–109)
CO2 SERPL-SCNC: 37 MMOL/L (ref 20–31)
CREAT BLD-MCNC: 0.8 MG/DL (ref 0.6–1.3)
CRYPTOC AG CSF QL: NEGATIVE
DEPRECATED RDW RBC AUTO: 49.8 FL (ref 37–54)
EOSINOPHIL # BLD AUTO: 0.05 10*3/MM3 (ref 0.1–0.3)
EOSINOPHIL NFR BLD AUTO: 0.4 % (ref 0–3)
ERYTHROCYTE [DISTWIDTH] IN BLOOD BY AUTOMATED COUNT: 14.8 % (ref 11.3–14.5)
GFR SERPL CREATININE-BSD FRML MDRD: 75 ML/MIN/1.73
GLUCOSE BLD-MCNC: 71 MG/DL (ref 70–100)
HCT VFR BLD AUTO: 38.1 % (ref 34.5–44)
HGB BLD-MCNC: 12.6 G/DL (ref 11.5–15.5)
IMM GRANULOCYTES # BLD: 0.54 10*3/MM3 (ref 0–0.03)
IMM GRANULOCYTES NFR BLD: 4.1 % (ref 0–0.6)
INTERPRETATION: ABNORMAL
LYMPHOCYTES # BLD AUTO: 2.42 10*3/MM3 (ref 0.6–4.8)
LYMPHOCYTES NFR BLD AUTO: 18.2 % (ref 24–44)
M TB TUBERC IFN-G BLD QL: ABNORMAL
MCH RBC QN AUTO: 30.5 PG (ref 27–31)
MCHC RBC AUTO-ENTMCNC: 33.1 G/DL (ref 32–36)
MCV RBC AUTO: 92.3 FL (ref 80–99)
MONOCYTES # BLD AUTO: 1.39 10*3/MM3 (ref 0–1)
MONOCYTES NFR BLD AUTO: 10.5 % (ref 0–12)
NEUTROPHILS # BLD AUTO: 8.84 10*3/MM3 (ref 1.5–8.3)
NEUTROPHILS NFR BLD AUTO: 66.3 % (ref 41–71)
PLATELET # BLD AUTO: 380 10*3/MM3 (ref 150–450)
PMV BLD AUTO: 9.2 FL (ref 6–12)
POTASSIUM BLD-SCNC: 4.2 MMOL/L (ref 3.5–5.5)
QFT TB AG MINUS NIL VALUE: <0 IU/ML
QUANTIFERON CRITERIA: ABNORMAL
QUANTIFERON MITOGEN VALUE: 0.4 IU/ML
QUANTIFERON NIL VALUE: 0.04 IU/ML
QUANTIFERON TB AG VALUE: 0.03 IU/ML
RBC # BLD AUTO: 4.13 10*6/MM3 (ref 3.89–5.14)
SODIUM BLD-SCNC: 140 MMOL/L (ref 132–146)
WBC NRBC COR # BLD: 13.3 10*3/MM3 (ref 3.5–10.8)

## 2017-03-07 PROCEDURE — 87070 CULTURE OTHR SPECIMN AEROBIC: CPT | Performed by: PHYSICIAN ASSISTANT

## 2017-03-07 PROCEDURE — 87899 AGENT NOS ASSAY W/OPTIC: CPT | Performed by: INTERNAL MEDICINE

## 2017-03-07 PROCEDURE — 94799 UNLISTED PULMONARY SVC/PX: CPT

## 2017-03-07 PROCEDURE — 87077 CULTURE AEROBIC IDENTIFY: CPT | Performed by: PHYSICIAN ASSISTANT

## 2017-03-07 PROCEDURE — 87186 SC STD MICRODIL/AGAR DIL: CPT | Performed by: PHYSICIAN ASSISTANT

## 2017-03-07 PROCEDURE — 25010000002 CEFEPIME: Performed by: INTERNAL MEDICINE

## 2017-03-07 PROCEDURE — 25010000002 VANCOMYCIN PER 500 MG

## 2017-03-07 PROCEDURE — 85025 COMPLETE CBC W/AUTO DIFF WBC: CPT | Performed by: PHYSICIAN ASSISTANT

## 2017-03-07 PROCEDURE — 80048 BASIC METABOLIC PNL TOTAL CA: CPT | Performed by: FAMILY MEDICINE

## 2017-03-07 PROCEDURE — 99233 SBSQ HOSP IP/OBS HIGH 50: CPT | Performed by: FAMILY MEDICINE

## 2017-03-07 PROCEDURE — 63710000001 PREDNISONE PER 5 MG: Performed by: INTERNAL MEDICINE

## 2017-03-07 PROCEDURE — 87449 NOS EACH ORGANISM AG IA: CPT | Performed by: INTERNAL MEDICINE

## 2017-03-07 PROCEDURE — 87205 SMEAR GRAM STAIN: CPT | Performed by: PHYSICIAN ASSISTANT

## 2017-03-07 PROCEDURE — 25010000002 ENOXAPARIN PER 10 MG: Performed by: INTERNAL MEDICINE

## 2017-03-07 PROCEDURE — 94640 AIRWAY INHALATION TREATMENT: CPT

## 2017-03-07 PROCEDURE — 87147 CULTURE TYPE IMMUNOLOGIC: CPT | Performed by: PHYSICIAN ASSISTANT

## 2017-03-07 RX ORDER — VANCOMYCIN HYDROCHLORIDE 1 G/200ML
1000 INJECTION, SOLUTION INTRAVENOUS EVERY 12 HOURS
Status: DISCONTINUED | OUTPATIENT
Start: 2017-03-07 | End: 2017-03-15

## 2017-03-07 RX ORDER — BENZONATATE 100 MG/1
200 CAPSULE ORAL 3 TIMES DAILY PRN
Status: DISCONTINUED | OUTPATIENT
Start: 2017-03-07 | End: 2017-03-25 | Stop reason: HOSPADM

## 2017-03-07 RX ADMIN — TIZANIDINE 4 MG: 4 TABLET ORAL at 08:36

## 2017-03-07 RX ADMIN — IPRATROPIUM BROMIDE AND ALBUTEROL SULFATE 3 ML: .5; 3 SOLUTION RESPIRATORY (INHALATION) at 12:16

## 2017-03-07 RX ADMIN — Medication 2 TABLET: at 17:30

## 2017-03-07 RX ADMIN — DULOXETINE 60 MG: 60 CAPSULE, DELAYED RELEASE ORAL at 21:10

## 2017-03-07 RX ADMIN — FUROSEMIDE 40 MG: 40 TABLET ORAL at 08:36

## 2017-03-07 RX ADMIN — VANCOMYCIN HYDROCHLORIDE 1000 MG: 1 INJECTION, SOLUTION INTRAVENOUS at 09:12

## 2017-03-07 RX ADMIN — CETIRIZINE HYDROCHLORIDE 10 MG: 10 TABLET, FILM COATED ORAL at 08:36

## 2017-03-07 RX ADMIN — DULOXETINE 60 MG: 60 CAPSULE, DELAYED RELEASE ORAL at 08:36

## 2017-03-07 RX ADMIN — Medication 2 TABLET: at 08:35

## 2017-03-07 RX ADMIN — LORAZEPAM 0.5 MG: 0.5 TABLET ORAL at 17:30

## 2017-03-07 RX ADMIN — ENOXAPARIN SODIUM 40 MG: 40 INJECTION SUBCUTANEOUS at 08:35

## 2017-03-07 RX ADMIN — LORAZEPAM 0.5 MG: 0.5 TABLET ORAL at 05:05

## 2017-03-07 RX ADMIN — MONTELUKAST SODIUM 10 MG: 10 TABLET, FILM COATED ORAL at 21:09

## 2017-03-07 RX ADMIN — GABAPENTIN 400 MG: 400 CAPSULE ORAL at 08:36

## 2017-03-07 RX ADMIN — IPRATROPIUM BROMIDE AND ALBUTEROL SULFATE 3 ML: .5; 3 SOLUTION RESPIRATORY (INHALATION) at 08:20

## 2017-03-07 RX ADMIN — TIZANIDINE 4 MG: 4 TABLET ORAL at 21:09

## 2017-03-07 RX ADMIN — HYDROCODONE BITARTRATE AND ACETAMINOPHEN 1 TABLET: 5; 325 TABLET ORAL at 05:04

## 2017-03-07 RX ADMIN — NICOTINE 1 PATCH: 21 PATCH, EXTENDED RELEASE TRANSDERMAL at 09:11

## 2017-03-07 RX ADMIN — CEFEPIME 2 G: 2 INJECTION, POWDER, FOR SOLUTION INTRAVENOUS at 21:09

## 2017-03-07 RX ADMIN — QUETIAPINE 300 MG: 200 TABLET, EXTENDED RELEASE ORAL at 21:10

## 2017-03-07 RX ADMIN — PREDNISONE 20 MG: 20 TABLET ORAL at 08:36

## 2017-03-07 RX ADMIN — CEFEPIME 2 G: 2 INJECTION, POWDER, FOR SOLUTION INTRAVENOUS at 05:04

## 2017-03-07 RX ADMIN — FLUTICASONE PROPIONATE 2 SPRAY: 50 SPRAY, METERED NASAL at 08:35

## 2017-03-07 RX ADMIN — GABAPENTIN 400 MG: 400 CAPSULE ORAL at 21:09

## 2017-03-07 RX ADMIN — HYDROCODONE BITARTRATE AND ACETAMINOPHEN 1 TABLET: 5; 325 TABLET ORAL at 16:05

## 2017-03-07 RX ADMIN — PANTOPRAZOLE SODIUM 40 MG: 40 TABLET, DELAYED RELEASE ORAL at 08:35

## 2017-03-07 RX ADMIN — HYDROCODONE POLISTIREX AND CHLORPHENIRAMINE POLISTIREX 5 ML: 10; 8 SUSPENSION, EXTENDED RELEASE ORAL at 14:58

## 2017-03-07 RX ADMIN — IPRATROPIUM BROMIDE AND ALBUTEROL SULFATE 3 ML: .5; 3 SOLUTION RESPIRATORY (INHALATION) at 15:52

## 2017-03-07 RX ADMIN — CEFEPIME 2 G: 2 INJECTION, POWDER, FOR SOLUTION INTRAVENOUS at 14:16

## 2017-03-07 RX ADMIN — LEVOTHYROXINE SODIUM 50 MCG: 25 TABLET ORAL at 05:05

## 2017-03-07 RX ADMIN — PREDNISONE 20 MG: 20 TABLET ORAL at 17:30

## 2017-03-07 RX ADMIN — ACETAMINOPHEN 650 MG: 325 TABLET, FILM COATED ORAL at 08:36

## 2017-03-07 RX ADMIN — VANCOMYCIN HYDROCHLORIDE 1000 MG: 1 INJECTION, SOLUTION INTRAVENOUS at 21:09

## 2017-03-07 RX ADMIN — HYDROCODONE POLISTIREX AND CHLORPHENIRAMINE POLISTIREX 5 ML: 10; 8 SUSPENSION, EXTENDED RELEASE ORAL at 05:05

## 2017-03-07 RX ADMIN — IPRATROPIUM BROMIDE AND ALBUTEROL SULFATE 3 ML: .5; 3 SOLUTION RESPIRATORY (INHALATION) at 19:59

## 2017-03-07 NOTE — PROGRESS NOTES
[facility] Medicine Services  INPATIENT PROGRESS NOTE    Date of Admission: 3/1/2017  Length of Stay: 6  Primary Care Physician: RADHA Pedraza    Subjective-- HPI/Events overnight/ROS/CC- Hospital follow visit.  Detailed ROS not detailed as performed below      Sitting up on side of bed, not feeling well again today. C/o achy. Denies significant sob. Denies cp.     Objective      Temp:  [98.4 °F (36.9 °C)] 98.4 °F (36.9 °C)  Heart Rate:  [] 100  Resp:  [16-24] 20  BP: (133-140)/() 133/84    Physical Exam:  Physical Exam    General Assessment: No acute cardiopulmonary distress. Well developed and well nourished. Malaise      CVS: RRR, S1S2 normal     Resp: + Bibasilar crackles, pos wheezing, resp non-labored     Abd: soft, NT, ND, normal BS, no guarding or peritoneal signs     Ext: No edema, both calves are symmetric and NTTP     Neuro: Nonfocal     Skin: W/D/I. No rash.     Psych: flattened affect, anxious        Results Review:    I have reviewed the labs, radiology results and diagnostic studies.  Lab Results (last 24 hours)     Procedure Component Value Units Date/Time    Vancomycin, Trough Please draw at 1700 prior to 1800 dose [[id number]]  (Abnormal) Collected:  03/06/17 1728    Specimen:  Blood Updated:  03/06/17 1817     Vancomycin Trough 8.20 (L) mcg/mL     Histoplasma Galactomannan Ag Ur [[id number]] Collected:  03/06/17 1835    Specimen:  Urine Updated:  03/06/17 1835    CBC & Differential [[id number]] Collected:  03/07/17 0615    Specimen:  Blood Updated:  03/07/17 0736    Narrative:       The following orders were created for panel order CBC & Differential.  Procedure                               Abnormality         Status                     ---------                               -----------         ------                     CBC Auto Differential[[id number]]         Abnormal            Final result                 Please view results for these tests on the  individual orders.    CBC Auto Differential [[id number]]  (Abnormal) Collected:  03/07/17 0615    Specimen:  Blood Updated:  03/07/17 0736     WBC 13.30 (H) 10*3/mm3      RBC 4.13 10*6/mm3      Hemoglobin 12.6 g/dL      Hematocrit 38.1 %      MCV 92.3 fL      MCH 30.5 pg      MCHC 33.1 g/dL      RDW 14.8 (H) %      RDW-SD 49.8 fl      MPV 9.2 fL      Platelets 380 10*3/mm3      Neutrophil % 66.3 %      Lymphocyte % 18.2 (L) %      Monocyte % 10.5 %      Eosinophil % 0.4 %      Basophil % 0.5 %      Immature Grans % 4.1 (H) %      Neutrophils, Absolute 8.84 (H) 10*3/mm3      Lymphocytes, Absolute 2.42 10*3/mm3      Monocytes, Absolute 1.39 (H) 10*3/mm3      Eosinophils, Absolute 0.05 (L) 10*3/mm3      Basophils, Absolute 0.06 10*3/mm3      Immature Grans, Absolute 0.54 (H) 10*3/mm3     Basic Metabolic Panel [[id number]]  (Abnormal) Collected:  03/07/17 0615    Specimen:  Blood Updated:  03/07/17 0750     Glucose 71 mg/dL      BUN 17 mg/dL      Creatinine 0.80 mg/dL      Sodium 140 mmol/L      Potassium 4.2 mmol/L      Chloride 99 mmol/L      CO2 37.0 (H) mmol/L      Calcium 9.3 mg/dL      eGFR Non African Amer 75 mL/min/1.73      BUN/Creatinine Ratio 21.3      Anion Gap 4.0 mmol/L     Narrative:       National Kid[employer] Guidelines    Stage                           Description                             GFR                      1                               Normal or High                          90+  2                               Mild decrease                            60-89  3                               Moderate decrease                   30-59  4                               Severe decrease                       15-29  5                               Kidney failure                             <15    Cryptococcal Antigen [[id number]]  (Normal) Collected:  03/07/17 0615    Specimen:  Blood Updated:  03/07/17 0830     Crypto Ag Negative     QuantiFERON TB Client Incubated [[id number]]  (Abnormal) Collected:   03/01/17 1934    Specimen:  Blood Updated:  03/07/17 1311     QuantiFERON-TB Gold In Tube Indeterminate (A)       Mitogen (positive control) gave low response.  This may indicate anergy or immune suppression. Early draws and  extended transit time may also result in low positive control and  indeterminate results.  The specimen received for QuantiFERON testing was incubated by the  ordering institution.  Specific procedures outlined in our Directory  of Services and in the package insert for the QuantiFERON Gold  (In Tube) test must be followed to enable for proper stimulation of  cells for the production of interferon gamma.        QuantiFERON Criteria Comment       To be considered positive a specimen should have a TB Ag minus Nil  value greater than or equal to 0.35 IU/mL and in addition the TB Ag  minus Nil value must be greater than or equal to 25% of the Nil  value. There may be insufficient information in these values to  differentiate between some negative and some indeterminate test  values.        QuantiFERON TB Ag Value 0.03 IU/mL      QuantiFERON Nil Value 0.04 IU/mL      QuantiFERON Mitogen Value 0.40 IU/mL      QFT TB Ag minus Nil Value <0.00 IU/mL      Interpretation Comment       The QuantiFERON TB Gold (in Tube) assay is intended for use as an aid  in the diagnosis of TB infection. Negative results suggest that there  is no TB infection. In patients with high suspicion of exposure, a  negative test should be repeated. A positive test indicates infection  with Mycobacterium tuberculosis. Among individuals without  tuberculosis infection, a positive test may be due to exposure to  M. kansasii, M. szulgai or M. marinum. On the Internet, go to  cdc.gov/tb for further details.       Narrative:       Performed at:  [address]  [id number]  : Artie Wang PhD, Phone:  [phone]    Respiratory Culture [[id number]] Collected:  03/07/17 1000    Specimen:   Sputum from Cough Updated:  03/07/17 1422     Gram Stain Result Many (4+) WBCs seen       Rare (1+) Epithelial cells seen       Rare (1+) Yeast       Rare (1+) Endogenous respiratory patience     Blastomyces Antigen [[id number]] Collected:  03/07/17 1530    Specimen:  Urine from Urine, Clean Catch Updated:  03/07/17 1530            Culture Data:  Radiology Data:   Cultures:    BLOOD CULTURE   Date Value Ref Range Status   03/01/2017 No growth at 3 days  Preliminary   03/01/2017 No growth at 3 days  Preliminary     RESPIRATORY CULTURE   Date Value Ref Range Status   03/03/2017 Moderate growth (3+) Staphylococcus aureus (A)  Preliminary   03/03/2017 Scant growth (1+) Normal Respiratory Patience  Preliminary   03/02/2017 Light growth (2+) Pseudomonas aeruginosa (A)  Preliminary   03/02/2017 Moderate growth (3+) Staphylococcus aureus (A)  Preliminary       I have reviewed the medications.        Assessment/Plan     Assessment/Problem List  Patient is a 52-year-old  female with history of noncompliance. She smokes 2-3 packs of cigarettes per day. She reently went off her psych (Bipolar) meds. She presented to [facility] ED with increased shortness of breath and cough and fever. Reports recent hospitalization to [facility] about a week ago but has not gotten any better.           Sepsis  --meets criteria due to febrile, hypoxia, tachycardia, leukocytosis and infectious source  --as below, due to social situation, and now cultures positive for MRSA/pseudomona, ID consulted, awaiting eval/rec      A/C hypoxic resp failure/Pneumonia NASIM( vs neoplasm less likely)  --IV cefepime and vanc   -- resp culture + MRSA and Pseudomona  -- AFB cx pending, cont isolation  -- wili will need Bronchoscopy   --CXr in am     Concern for TB  -- upper lobe but denies hemoptysis or weight loss or known exposures  - sick for about a month.  --  AFB sent, follow AFB sputum #2 and #3  --  neg pressure room      Acute on chronic  respiratory failure/COPD  --has been told she needs oxygen at home in the past but  Has refused to wear it  --oxygen PRN  -- on goinging tobacco abuse      Bipolar disorder/homeless  --suspect schizophrenia also given paranoia and hallucination  --pt recently stopped psych meds a week ago because they were causing her to hallucinate  --pt needs psych eval, but unfortunately we do not have inpt psych service.   --if stable for dc, she'll need to follow up with primary psychiatrist, otherwise, may need to have the [facility] eval. i do feel at this time she will need [facility] eval.  She seems withdrawn and Depressed. I dont believe it would be safe to let her go home without eval.   -- SW consulted     Left chest pain  -- likely pleurisy  -push IS/FLutter   -- add toradol prn     Hypothyroidism  -- TSH WNL  -- cont home meds     Tobacco abuse  -- counseled, cessation advised    Elevated SED and CRP      Nausea/HA  -- could  Have been withdrawing from psych meds  -- resolved now    Dispo: TBD. Will be difficult, pt homeless.     Mary Moore,    03/07/17   3:48 PM    Please note that portions of this note may have been completed with a voice recognition program. Efforts were made to edit the dictations, but occasionally words are mistranscribed.

## 2017-03-07 NOTE — PLAN OF CARE
Problem: Patient Care Overview (Adult)  Goal: Plan of Care Review  Outcome: Ongoing (interventions implemented as appropriate)  Goal: Discharge Needs Assessment  Outcome: Ongoing (interventions implemented as appropriate)    Problem: Pneumonia (Adult)  Goal: Signs and Symptoms of Listed Potential Problems Will be Absent or Manageable (Pneumonia)  Outcome: Ongoing (interventions implemented as appropriate)    Problem: COPD, Chronic Bronchitis/Emphysema (Adult)  Goal: Signs and Symptoms of Listed Potential Problems Will be Absent or Manageable (COPD, Chronic Bronchitis/Emphysema)  Outcome: Ongoing (interventions implemented as appropriate)

## 2017-03-07 NOTE — PROGRESS NOTES
Pharmacokinetic Consult - Vancomycin Dosing    Colleen Gonzalez is a 52 y.o. female who is receiving vancomycin and cefepime for NASIM PNA with MRSA and P. Aeruginosa.  Pharmacy dosing vancomycin.      Relevant clinical data and objective history reviewed:    WBC   Date Value Ref Range Status   03/07/2017 13.30 (H) 3.50 - 10.80 10*3/mm3 Final   03/06/2017 13.09 (H) 3.50 - 10.80 10*3/mm3 Final     CREATININE   Date Value Ref Range Status   03/07/2017 0.80 0.60 - 1.30 mg/dL Final   03/06/2017 0.70 0.60 - 1.30 mg/dL Final   03/05/2017 0.60 0.60 - 1.30 mg/dL Final     BUN   Date Value Ref Range Status   03/07/2017 17 9 - 23 mg/dL Final   03/06/2017 14 9 - 23 mg/dL Final   03/05/2017 11 9 - 23 mg/dL Final     Estimated Creatinine Clearance: 105.7 mL/min (by C-G formula based on Cr of 0.8).  I/O last 3 completed shifts:  In: 2760 [P.O.:1360; IV Piggyback:1400]  Out: 5751 [Urine:5750; Stool:1]    Temp Readings from Last 3 Encounters:   03/07/17 98.4 °F (36.9 °C) (Oral)     Weight: 243 lb 9.6 oz (110 kg)    BLOOD CULTURE   Date Value Ref Range Status   03/01/2017 No growth at 24 hours  Preliminary   03/01/2017 No growth at 24 hours  Preliminary     Lab Results   Component Value Date    VANCOTROUGH 8.20 (L) 03/06/2017   This is ~22 hour level    Assessment/Plan  Pt received vancomycin 2000 mg IV x 1 loading dose, then was initiated on vancomycin 1500 mg IV q12h.  A trough was obtained today prior to 4th total dose of vancomycin and was supratherapeutic.  Vancomycin was subsequently decreased to vancomyin 1500 mg IV q24h.    A repeat trough was obtained to evaluate this regimen and now is subtherapeutic for indication with goal of 15-20 mcg/ml.      Will increase vancomycin to 1000 mg IV q12h.  Will reevaluate trough 3/9 @ 0830.    Pharmacy will continue to follow and adjust plan as needed.    Thank you for this consult,  Angela Cedeño, PharmD  03/07/17  7:36 AM

## 2017-03-07 NOTE — PROGRESS NOTES
"[facility] Progress Note    Date of Admission: 3/1/2017      Antibiotics:  Cefepime, Vanc, Prednisone    CC:   Chief Complaint   Patient presents with   • Shortness of Breath       S: No f/c/s. Still with cough and SOA. O2 sats between 90-94%. Trialing RA today.   Anxious and tearful at times  O:  Visit Vitals   • /84 (BP Location: Right arm, Patient Position: Lying)   • Pulse 88   • Temp 98.4 °F (36.9 °C) (Oral)   • Resp 24   • Ht 68\" (172.7 cm)   • Wt 243 lb 9.6 oz (110 kg)   • SpO2 91%   • BMI 37.04 kg/m2     Temp (24hrs), Av.4 °F (36.9 °C), Min:98.4 °F (36.9 °C), Max:98.4 °F (36.9 °C)      PE:   GENERAL: Chronically ill appearing, unkept appearance.  Supplemental oxygen  HEENT: Normocephalic, atraumatic. PERRL. EOMI. No conjunctival injection. No icterus. Oropharynx clear without evidence of thrush or exudate. Dental disease with missing teeth.   NECK: Supple without nuchal rigidity  LYMPH: No cervical, axillary or inguinal lymphadenopathy. No neck masses  HEART: RRR; No murmur, rubs, gallops.   LUNGS: Coarse breath sounds and Diminished throughout. Rales in bases. Exp wheezing. Supplemental O2 at 3L  ABDOMEN: Soft, nontender, nondistended. Positive bowel sounds. No rebound or guarding.   EXT: No cyanosis, clubbing or edema  : Normal appearing genitalia without Perdomo catheter.  MSK: FROM without joint effusions noted   SKIN: Plaque like skin lesions of the chest, arms and legs of varied healing. Some appear blister-like. Others crusted over. Healed lesions have left hypopigmentation.   NEURO: Oriented to PPT. No focal deficits.       Laboratory Data      Results from last 7 days  Lab Units 17  0615 17  0707 17  0654   WBC 10*3/mm3 13.30* 13.09* 12.67*   HEMOGLOBIN g/dL 12.6 13.2 11.6   HEMATOCRIT % 38.1 40.4 36.1   PLATELETS 10*3/mm3 380 404 281       Results from last 7 days  Lab Units 17  0615   SODIUM mmol/L 140   POTASSIUM mmol/L 4.2   CHLORIDE mmol/L 99   TOTAL CO2 mmol/L 37.0* "   BUN mg/dL 17   CREATININE mg/dL 0.80   GLUCOSE mg/dL 71   CALCIUM mg/dL 9.3       Results from last 7 days  Lab Units 03/01/17  1248   ALK PHOS U/L 91   BILIRUBIN mg/dL 0.3   ALT (SGPT) U/L 11   AST (SGOT) U/L 19       Results from last 7 days  Lab Units 03/06/17  0707   SED RATE mm/hr 104*       Results from last 7 days  Lab Units 03/06/17  0708   CRP mg/dL 46.10*       Estimated Creatinine Clearance: 105.7 mL/min (by C-G formula based on Cr of 0.8).      Microbiology:  Sputum culture on 3/2: PSA and MRSA  Sputum culture on 3/3: MRSA   AFB x 3 ordered and first is negative  Blood culture negative    Crypto negative  Strep pneumo and Legionella Uag negative    Radiology:  Imaging Results (last 24 hours)     ** No results found for the last 24 hours. **          PROBLEM LIST:   NASIM pneumonia with MRSA and PSA   Leukocytosis  Concern for potential post-obstructive pneumonia with hx of cough x 6 weeks and 6x6cm nature of NASIM pna (3 PPD history)  Tobacco abuse  Chronic skin lesions  HTN  HLD  Hypothyroidism  Psych disorder     ASSESSMENT:  52 -year-old female with multiple medical conditions including hypertension, hyperlipidemia, hypothyroidism in the setting of psychiatric disease with OCD and bipolar disorder who presents with worsening cough, shortness of breath, sputum production of brown coloration of the past 5-6 weeks however worsening over the last 1 week. She reports associated chills and sweats however hasn't checked her temperature. she came to the ED for evaluation with leukocytosis initially noted, chest CT angiogram with a left upper lobe with a 6 x 6 cm opacity concerning for infectious versus neoplastic etiology. Sputum production ×2 has been collected with pseudomonas and MRSA. Blood culture negative. AFB ×2 collected and thus far negative.     She does have some diffuse cutaneous lesions of the arms, legs, chest wall concerning for possibility of fungal pneumonia but will continue current therapy  for now and f/u repeat imaging to determine need for further w/u.      With being homeless and NASIM dense consolidation agree with r/o TB before even considering bronch.     PLAN:  Continue Vanc and Cefepime for MRSA and PSA coverage  F/u histo, blasto, crypto ag (neg)  F/u AFB and TB r/o before considering bronchoscopy   Will call lab for updates on AFB sputum #2 and #3  Strep pneumo and legionella Uag negative.   Patient adamantly refuses bronchoscopy at this time.  Our plan would be to treat for 2-3 weeks with antibiotics before repeating CT scan of the chest.  Follow-up all of her fungal and AFB workup    Dr. Robert Self saw the patient, performed the physical exam, reviewed the laboratory data and guided with the formulation of the above problem list, assessment and treatment plan.     Robert Self MD  3/7/2017

## 2017-03-07 NOTE — PLAN OF CARE
Problem: Patient Care Overview (Adult)  Goal: Plan of Care Review  Outcome: Ongoing (interventions implemented as appropriate)    03/07/17 0451   Coping/Psychosocial Response Interventions   Plan Of Care Reviewed With patient   Patient Care Overview   Progress improving         Problem: Pneumonia (Adult)  Goal: Signs and Symptoms of Listed Potential Problems Will be Absent or Manageable (Pneumonia)  Outcome: Ongoing (interventions implemented as appropriate)    03/07/17 0451   Pneumonia   Problems Assessed (Pneumonia) all   Problems Present (Pneumonia) respiratory compromise         Problem: COPD, Chronic Bronchitis/Emphysema (Adult)  Goal: Signs and Symptoms of Listed Potential Problems Will be Absent or Manageable (COPD, Chronic Bronchitis/Emphysema)  Outcome: Ongoing (interventions implemented as appropriate)    03/07/17 0451   COPD, Chronic Bronchitis/Emphysema   Problems Assessed (COPD, Chronic Bronchitis/Emphysema) all   Problems Present (COPD, Chronic Bronchitis/Emphysema) atelectasis;functional decline/self-care deficit

## 2017-03-08 LAB
ANION GAP SERPL CALCULATED.3IONS-SCNC: 3 MMOL/L (ref 3–11)
BUN BLD-MCNC: 19 MG/DL (ref 9–23)
BUN/CREAT SERPL: 23.8 (ref 7–25)
CALCIUM SPEC-SCNC: 10.3 MG/DL (ref 8.7–10.4)
CHLORIDE SERPL-SCNC: 102 MMOL/L (ref 99–109)
CO2 SERPL-SCNC: 34 MMOL/L (ref 20–31)
CREAT BLD-MCNC: 0.8 MG/DL (ref 0.6–1.3)
DEPRECATED RDW RBC AUTO: 50.9 FL (ref 37–54)
ERYTHROCYTE [DISTWIDTH] IN BLOOD BY AUTOMATED COUNT: 14.9 % (ref 11.3–14.5)
GFR SERPL CREATININE-BSD FRML MDRD: 75 ML/MIN/1.73
GLUCOSE BLD-MCNC: 116 MG/DL (ref 70–100)
HCT VFR BLD AUTO: 40.4 % (ref 34.5–44)
HGB BLD-MCNC: 13.2 G/DL (ref 11.5–15.5)
MCH RBC QN AUTO: 30.5 PG (ref 27–31)
MCHC RBC AUTO-ENTMCNC: 32.7 G/DL (ref 32–36)
MCV RBC AUTO: 93.3 FL (ref 80–99)
PLATELET # BLD AUTO: 407 10*3/MM3 (ref 150–450)
PMV BLD AUTO: 9.2 FL (ref 6–12)
POTASSIUM BLD-SCNC: 4.4 MMOL/L (ref 3.5–5.5)
RBC # BLD AUTO: 4.33 10*6/MM3 (ref 3.89–5.14)
SODIUM BLD-SCNC: 139 MMOL/L (ref 132–146)
WBC NRBC COR # BLD: 10.86 10*3/MM3 (ref 3.5–10.8)

## 2017-03-08 PROCEDURE — 25010000002 VANCOMYCIN PER 500 MG

## 2017-03-08 PROCEDURE — 99232 SBSQ HOSP IP/OBS MODERATE 35: CPT | Performed by: FAMILY MEDICINE

## 2017-03-08 PROCEDURE — 87206 SMEAR FLUORESCENT/ACID STAI: CPT | Performed by: FAMILY MEDICINE

## 2017-03-08 PROCEDURE — 94799 UNLISTED PULMONARY SVC/PX: CPT

## 2017-03-08 PROCEDURE — 25010000002 ENOXAPARIN PER 10 MG: Performed by: INTERNAL MEDICINE

## 2017-03-08 PROCEDURE — 25010000002 CEFEPIME: Performed by: INTERNAL MEDICINE

## 2017-03-08 PROCEDURE — 85027 COMPLETE CBC AUTOMATED: CPT | Performed by: FAMILY MEDICINE

## 2017-03-08 PROCEDURE — 87116 MYCOBACTERIA CULTURE: CPT | Performed by: FAMILY MEDICINE

## 2017-03-08 PROCEDURE — 63710000001 PREDNISONE PER 5 MG: Performed by: INTERNAL MEDICINE

## 2017-03-08 PROCEDURE — 80048 BASIC METABOLIC PNL TOTAL CA: CPT | Performed by: FAMILY MEDICINE

## 2017-03-08 RX ORDER — PREDNISONE 20 MG/1
20 TABLET ORAL
Status: DISCONTINUED | OUTPATIENT
Start: 2017-03-09 | End: 2017-03-20

## 2017-03-08 RX ORDER — ATENOLOL 50 MG/1
50 TABLET ORAL DAILY
COMMUNITY
End: 2017-03-25 | Stop reason: HOSPADM

## 2017-03-08 RX ORDER — PRAVASTATIN SODIUM 40 MG
80 TABLET ORAL DAILY
COMMUNITY
End: 2017-03-25 | Stop reason: HOSPADM

## 2017-03-08 RX ORDER — LORAZEPAM 0.5 MG/1
0.5 TABLET ORAL ONCE
Status: COMPLETED | OUTPATIENT
Start: 2017-03-08 | End: 2017-03-08

## 2017-03-08 RX ADMIN — CEFEPIME 2 G: 2 INJECTION, POWDER, FOR SOLUTION INTRAVENOUS at 05:36

## 2017-03-08 RX ADMIN — ENOXAPARIN SODIUM 40 MG: 40 INJECTION SUBCUTANEOUS at 09:17

## 2017-03-08 RX ADMIN — LORAZEPAM 0.5 MG: 0.5 TABLET ORAL at 01:25

## 2017-03-08 RX ADMIN — LORAZEPAM 0.5 MG: 0.5 TABLET ORAL at 18:56

## 2017-03-08 RX ADMIN — LEVOTHYROXINE SODIUM 50 MCG: 25 TABLET ORAL at 05:37

## 2017-03-08 RX ADMIN — BISACODYL 5 MG: 5 TABLET, COATED ORAL at 15:51

## 2017-03-08 RX ADMIN — QUETIAPINE 300 MG: 200 TABLET, EXTENDED RELEASE ORAL at 20:54

## 2017-03-08 RX ADMIN — Medication 2 TABLET: at 17:41

## 2017-03-08 RX ADMIN — GABAPENTIN 400 MG: 400 CAPSULE ORAL at 22:23

## 2017-03-08 RX ADMIN — CEFEPIME 2 G: 2 INJECTION, POWDER, FOR SOLUTION INTRAVENOUS at 22:23

## 2017-03-08 RX ADMIN — HYDROCODONE BITARTRATE AND ACETAMINOPHEN 1 TABLET: 5; 325 TABLET ORAL at 14:53

## 2017-03-08 RX ADMIN — PREDNISONE 20 MG: 20 TABLET ORAL at 09:17

## 2017-03-08 RX ADMIN — HYDROCODONE POLISTIREX AND CHLORPHENIRAMINE POLISTIREX 5 ML: 10; 8 SUSPENSION, EXTENDED RELEASE ORAL at 14:54

## 2017-03-08 RX ADMIN — DULOXETINE 60 MG: 60 CAPSULE, DELAYED RELEASE ORAL at 20:55

## 2017-03-08 RX ADMIN — TIZANIDINE 4 MG: 4 TABLET ORAL at 20:55

## 2017-03-08 RX ADMIN — TIZANIDINE 4 MG: 4 TABLET ORAL at 09:18

## 2017-03-08 RX ADMIN — GABAPENTIN 400 MG: 400 CAPSULE ORAL at 09:17

## 2017-03-08 RX ADMIN — FLUTICASONE PROPIONATE 2 SPRAY: 50 SPRAY, METERED NASAL at 09:18

## 2017-03-08 RX ADMIN — FUROSEMIDE 40 MG: 40 TABLET ORAL at 09:17

## 2017-03-08 RX ADMIN — HYDROCODONE POLISTIREX AND CHLORPHENIRAMINE POLISTIREX 5 ML: 10; 8 SUSPENSION, EXTENDED RELEASE ORAL at 04:11

## 2017-03-08 RX ADMIN — VANCOMYCIN HYDROCHLORIDE 1000 MG: 1 INJECTION, SOLUTION INTRAVENOUS at 20:54

## 2017-03-08 RX ADMIN — Medication 2 TABLET: at 09:17

## 2017-03-08 RX ADMIN — MONTELUKAST SODIUM 10 MG: 10 TABLET, FILM COATED ORAL at 22:23

## 2017-03-08 RX ADMIN — LORAZEPAM 0.5 MG: 0.5 TABLET ORAL at 06:03

## 2017-03-08 RX ADMIN — VANCOMYCIN HYDROCHLORIDE 1000 MG: 1 INJECTION, SOLUTION INTRAVENOUS at 09:29

## 2017-03-08 RX ADMIN — CETIRIZINE HYDROCHLORIDE 10 MG: 10 TABLET, FILM COATED ORAL at 09:18

## 2017-03-08 RX ADMIN — PANTOPRAZOLE SODIUM 40 MG: 40 TABLET, DELAYED RELEASE ORAL at 09:17

## 2017-03-08 RX ADMIN — CEFEPIME 2 G: 2 INJECTION, POWDER, FOR SOLUTION INTRAVENOUS at 14:21

## 2017-03-08 RX ADMIN — BENZONATATE 200 MG: 100 CAPSULE, LIQUID FILLED ORAL at 00:26

## 2017-03-08 RX ADMIN — HYDROCODONE BITARTRATE AND ACETAMINOPHEN 1 TABLET: 5; 325 TABLET ORAL at 05:36

## 2017-03-08 RX ADMIN — NICOTINE 1 PATCH: 21 PATCH, EXTENDED RELEASE TRANSDERMAL at 09:00

## 2017-03-08 RX ADMIN — DULOXETINE 60 MG: 60 CAPSULE, DELAYED RELEASE ORAL at 09:17
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
## 2017-03-08 NOTE — PROGRESS NOTES
[facility] Medicine Services  INPATIENT PROGRESS NOTE    Date of Admission: 3/1/2017  Length of Stay: 7  Primary Care Physician: RADHA Pedraza    Subjective-- HPI/Events overnight/ROS/CC- Hospital follow visit.  Detailed ROS not detailed as performed below      Lying in bed, feels better today, smiling. Would like more pain meds.      Objective      Temp:  [96.6 °F (35.9 °C)-98.9 °F (37.2 °C)] 96.6 °F (35.9 °C)  Heart Rate:  [] 102  Resp:  [20] 20  BP: (113-148)/(78-95) 113/78    Physical Exam:  Physical Exam    General Assessment: No acute cardiopulmonary distress. Well developed and well nourished. Malaise      CVS: RRR, S1S2 normal     Resp: + Bibasilar crackles, pos wheezing, resp non-labored     Abd: soft, NT, ND, normal BS, no guarding or peritoneal signs     Ext: No edema, both calves are symmetric and NTTP     Neuro: Nonfocal     Skin: W/D/I. No rash.     Psych: flattened affect, anxious        Results Review:    I have reviewed the labs, radiology results and diagnostic studies.  Lab Results (last 24 hours)     Procedure Component Value Units Date/Time    CBC (No Diff) [[id number]]  (Abnormal) Collected:  03/08/17 0505    Specimen:  Blood Updated:  03/08/17 0535     WBC 10.86 (H) 10*3/mm3      RBC 4.33 10*6/mm3      Hemoglobin 13.2 g/dL      Hematocrit 40.4 %      MCV 93.3 fL      MCH 30.5 pg      MCHC 32.7 g/dL      RDW 14.9 (H) %      RDW-SD 50.9 fl      MPV 9.2 fL      Platelets 407 10*3/mm3     Basic Metabolic Panel [[id number]]  (Abnormal) Collected:  03/08/17 0505    Specimen:  Blood Updated:  03/08/17 0619     Glucose 116 (H) mg/dL      BUN 19 mg/dL      Creatinine 0.80 mg/dL      Sodium 139 mmol/L      Potassium 4.4 mmol/L      Chloride 102 mmol/L      CO2 34.0 (H) mmol/L      Calcium 10.3 mg/dL      eGFR Non African Amer 75 mL/min/1.73      BUN/Creatinine Ratio 23.8      Anion Gap 3.0 mmol/L     Narrative:       National Kid[employer] Guidelines    Stage                            Description                             GFR                      1                               Normal or High                          90+  2                               Mild decrease                            60-89  3                               Moderate decrease                   30-59  4                               Severe decrease                       15-29  5                               Kidney failure                             <15    AFB Culture [[id number]]  (Normal) Collected:  03/03/17 0800    Specimen:  Sputum from Oropharynx Updated:  03/08/17 1001     AFB Culture No AFB isolated at less than 1 week      AFB Stain        No acid fast bacilli seen on concentrated smear    Respiratory Culture [[id number]] Collected:  03/07/17 1000    Specimen:  Sputum from Cough Updated:  03/08/17 1052     Respiratory Culture Culture in progress      Gram Stain Result Many (4+) WBCs seen       Rare (1+) Epithelial cells seen       Rare (1+) Yeast       Rare (1+) Endogenous respiratory patience             Culture Data:  Radiology Data:   Cultures:    BLOOD CULTURE   Date Value Ref Range Status   03/01/2017 No growth at 3 days  Preliminary   03/01/2017 No growth at 3 days  Preliminary     RESPIRATORY CULTURE   Date Value Ref Range Status   03/03/2017 Moderate growth (3+) Staphylococcus aureus (A)  Preliminary   03/03/2017 Scant growth (1+) Normal Respiratory Patience  Preliminary   03/02/2017 Light growth (2+) Pseudomonas aeruginosa (A)  Preliminary   03/02/2017 Moderate growth (3+) Staphylococcus aureus (A)  Preliminary       I have reviewed the medications.        Assessment/Plan     Assessment/Problem List  Patient is a 52-year-old  female with history of noncompliance. She smokes 2-3 packs of cigarettes per day. She reently went off her psych (Bipolar) meds. She presented to [facility] ED with increased shortness of breath and cough and fever. Reports recent hospitalization  to [facility] about a week ago but has not gotten any better.           Sepsis  --meets criteria due to febrile, hypoxia, tachycardia, leukocytosis and infectious source  --as below, due to social situation, and now cultures positive for MRSA/pseudomona, ID consulted, following       A/C hypoxic resp failure/Pneumonia NASIM( vs neoplasm less likely)  --IV cefepime and vanc per ID   -- resp culture + MRSA and Pseudomona  -- AFB sputum negative x 2, quant TB negative.   --needs Bronchoscopy , but hesitant at this time.  --check cxr today     Concern for TB  -- work up negative       Acute on chronic respiratory failure/COPD  --has been told she needs oxygen at home in the past but  Has refused to wear it  --oxygen PRN  -- ongoing tobacco abuse      Bipolar disorder/homeless  --suspect schizophrenia also given paranoia and hallucination  --pt recently stopped psych meds a week ago because they were causing her to hallucinate  --pt needs psych eval, but unfortunately we do not have inpt psych service. when stable for dc, may need to have the [facility] eval. i do feel at this time she will need [facility] eval.  She seems withdrawn and Depressed. I dont believe it would be safe to let her go home without eval.   -- SW consulted     Left chest pain  -- likely pleurisy  -push IS/FLutter   -- dc toradol, improved.      Hypothyroidism  -- TSH WNL  -- cont home meds     Tobacco abuse  -- counseled, cessation advised    Elevated SED and CRP      Nausea/HA  -- could  Have been withdrawing from psych meds  -- resolved now    Labial Cyst  --needs to follow up with Gyn     PT/OT  Dispo: TBD. Will be difficult, pt homeless. SS working on placement    Mary Moore, DO   03/08/17   4:09 PM    Please note that portions of this note may have been completed with a voice recognition program. Efforts were made to edit the dictations, but occasionally words are mistranscribed.

## 2017-03-08 NOTE — NURSING NOTE
"Pt requesting additional doses of seroquel, ativan and \"something to make me sleep.\"  I informed pt that she already received seroquel and ativan is once Q12H and will be due in morning.  Pt has been angry and yelling at myself and techs all evening.    "

## 2017-03-09 LAB
ANION GAP SERPL CALCULATED.3IONS-SCNC: 4 MMOL/L (ref 3–11)
BUN BLD-MCNC: 15 MG/DL (ref 9–23)
BUN/CREAT SERPL: 18.8 (ref 7–25)
CALCIUM SPEC-SCNC: 10.1 MG/DL (ref 8.7–10.4)
CHLORIDE SERPL-SCNC: 102 MMOL/L (ref 99–109)
CO2 SERPL-SCNC: 32 MMOL/L (ref 20–31)
CREAT BLD-MCNC: 0.8 MG/DL (ref 0.6–1.3)
DEPRECATED RDW RBC AUTO: 51.5 FL (ref 37–54)
ERYTHROCYTE [DISTWIDTH] IN BLOOD BY AUTOMATED COUNT: 14.9 % (ref 11.3–14.5)
GFR SERPL CREATININE-BSD FRML MDRD: 75 ML/MIN/1.73
GLUCOSE BLD-MCNC: 95 MG/DL (ref 70–100)
H CAPSUL AG UR-MCNC: 0 NG/ML (ref 0–0.49)
HCT VFR BLD AUTO: 42.3 % (ref 34.5–44)
HGB BLD-MCNC: 13.6 G/DL (ref 11.5–15.5)
MCH RBC QN AUTO: 30.3 PG (ref 27–31)
MCHC RBC AUTO-ENTMCNC: 32.2 G/DL (ref 32–36)
MCV RBC AUTO: 94.2 FL (ref 80–99)
PLATELET # BLD AUTO: 379 10*3/MM3 (ref 150–450)
PMV BLD AUTO: 9.3 FL (ref 6–12)
POTASSIUM BLD-SCNC: 4 MMOL/L (ref 3.5–5.5)
RBC # BLD AUTO: 4.49 10*6/MM3 (ref 3.89–5.14)
SODIUM BLD-SCNC: 138 MMOL/L (ref 132–146)
VANCOMYCIN TROUGH SERPL-MCNC: 16.6 MCG/ML (ref 10–20)
WBC NRBC COR # BLD: 12.57 10*3/MM3 (ref 3.5–10.8)

## 2017-03-09 PROCEDURE — 85027 COMPLETE CBC AUTOMATED: CPT | Performed by: FAMILY MEDICINE

## 2017-03-09 PROCEDURE — 25010000002 CEFEPIME: Performed by: INTERNAL MEDICINE

## 2017-03-09 PROCEDURE — 25010000002 ENOXAPARIN PER 10 MG: Performed by: INTERNAL MEDICINE

## 2017-03-09 PROCEDURE — 25010000002 VANCOMYCIN PER 500 MG

## 2017-03-09 PROCEDURE — 80048 BASIC METABOLIC PNL TOTAL CA: CPT | Performed by: FAMILY MEDICINE

## 2017-03-09 PROCEDURE — 94760 N-INVAS EAR/PLS OXIMETRY 1: CPT

## 2017-03-09 PROCEDURE — 94640 AIRWAY INHALATION TREATMENT: CPT

## 2017-03-09 PROCEDURE — 97110 THERAPEUTIC EXERCISES: CPT

## 2017-03-09 PROCEDURE — 99232 SBSQ HOSP IP/OBS MODERATE 35: CPT | Performed by: INTERNAL MEDICINE

## 2017-03-09 PROCEDURE — 94799 UNLISTED PULMONARY SVC/PX: CPT

## 2017-03-09 PROCEDURE — 80202 ASSAY OF VANCOMYCIN: CPT

## 2017-03-09 PROCEDURE — 63710000001 PREDNISONE PER 5 MG: Performed by: FAMILY MEDICINE

## 2017-03-09 RX ORDER — TROLAMINE SALICYLATE 10 G/100G
1 CREAM TOPICAL 2 TIMES DAILY PRN
Status: DISCONTINUED | OUTPATIENT
Start: 2017-03-09 | End: 2017-03-25 | Stop reason: HOSPADM

## 2017-03-09 RX ADMIN — GABAPENTIN 400 MG: 400 CAPSULE ORAL at 08:14

## 2017-03-09 RX ADMIN — VANCOMYCIN HYDROCHLORIDE 1000 MG: 1 INJECTION, SOLUTION INTRAVENOUS at 09:20

## 2017-03-09 RX ADMIN — LORAZEPAM 0.5 MG: 0.5 TABLET ORAL at 21:26

## 2017-03-09 RX ADMIN — QUETIAPINE 300 MG: 200 TABLET, EXTENDED RELEASE ORAL at 21:26

## 2017-03-09 RX ADMIN — HYDROCODONE BITARTRATE AND ACETAMINOPHEN 1 TABLET: 5; 325 TABLET ORAL at 19:32

## 2017-03-09 RX ADMIN — IPRATROPIUM BROMIDE AND ALBUTEROL SULFATE 3 ML: .5; 3 SOLUTION RESPIRATORY (INHALATION) at 19:58

## 2017-03-09 RX ADMIN — CETIRIZINE HYDROCHLORIDE 10 MG: 10 TABLET, FILM COATED ORAL at 08:14

## 2017-03-09 RX ADMIN — TIZANIDINE 4 MG: 4 TABLET ORAL at 21:26

## 2017-03-09 RX ADMIN — HYDROCODONE BITARTRATE AND ACETAMINOPHEN 1 TABLET: 5; 325 TABLET ORAL at 00:42

## 2017-03-09 RX ADMIN — MONTELUKAST SODIUM 10 MG: 10 TABLET, FILM COATED ORAL at 21:26

## 2017-03-09 RX ADMIN — ENOXAPARIN SODIUM 40 MG: 40 INJECTION SUBCUTANEOUS at 08:13

## 2017-03-09 RX ADMIN — DULOXETINE 60 MG: 60 CAPSULE, DELAYED RELEASE ORAL at 21:26

## 2017-03-09 RX ADMIN — GABAPENTIN 400 MG: 400 CAPSULE ORAL at 21:26

## 2017-03-09 RX ADMIN — IPRATROPIUM BROMIDE AND ALBUTEROL SULFATE 3 ML: .5; 3 SOLUTION RESPIRATORY (INHALATION) at 09:52

## 2017-03-09 RX ADMIN — FLUTICASONE PROPIONATE 2 SPRAY: 50 SPRAY, METERED NASAL at 08:15

## 2017-03-09 RX ADMIN — PANTOPRAZOLE SODIUM 40 MG: 40 TABLET, DELAYED RELEASE ORAL at 08:13

## 2017-03-09 RX ADMIN — CEFEPIME 2 G: 2 INJECTION, POWDER, FOR SOLUTION INTRAVENOUS at 21:25

## 2017-03-09 RX ADMIN — Medication 2 TABLET: at 08:14

## 2017-03-09 RX ADMIN — Medication 2 TABLET: at 17:04

## 2017-03-09 RX ADMIN — HYDROCODONE POLISTIREX AND CHLORPHENIRAMINE POLISTIREX 5 ML: 10; 8 SUSPENSION, EXTENDED RELEASE ORAL at 06:18

## 2017-03-09 RX ADMIN — FUROSEMIDE 40 MG: 40 TABLET ORAL at 08:14

## 2017-03-09 RX ADMIN — CEFEPIME 2 G: 2 INJECTION, POWDER, FOR SOLUTION INTRAVENOUS at 13:10

## 2017-03-09 RX ADMIN — TIZANIDINE 4 MG: 4 TABLET ORAL at 08:14

## 2017-03-09 RX ADMIN — MICONAZOLE NITRATE 200 MG: 200 SUPPOSITORY VAGINAL at 21:26

## 2017-03-09 RX ADMIN — CEFEPIME 2 G: 2 INJECTION, POWDER, FOR SOLUTION INTRAVENOUS at 06:18

## 2017-03-09 RX ADMIN — NICOTINE 1 PATCH: 21 PATCH, EXTENDED RELEASE TRANSDERMAL at 08:14

## 2017-03-09 RX ADMIN — VANCOMYCIN HYDROCHLORIDE 1000 MG: 1 INJECTION, SOLUTION INTRAVENOUS at 21:25

## 2017-03-09 RX ADMIN — HYDROCODONE BITARTRATE AND ACETAMINOPHEN 1 TABLET: 5; 325 TABLET ORAL at 07:46

## 2017-03-09 RX ADMIN — LORAZEPAM 0.5 MG: 0.5 TABLET ORAL at 09:19

## 2017-03-09 RX ADMIN — IPRATROPIUM BROMIDE AND ALBUTEROL SULFATE 3 ML: .5; 3 SOLUTION RESPIRATORY (INHALATION) at 13:30

## 2017-03-09 RX ADMIN — DULOXETINE 60 MG: 60 CAPSULE, DELAYED RELEASE ORAL at 08:14

## 2017-03-09 RX ADMIN — LEVOTHYROXINE SODIUM 50 MCG: 25 TABLET ORAL at 06:19

## 2017-03-09 RX ADMIN — HYDROCODONE POLISTIREX AND CHLORPHENIRAMINE POLISTIREX 5 ML: 10; 8 SUSPENSION, EXTENDED RELEASE ORAL at 17:08

## 2017-03-09 RX ADMIN — PREDNISONE 20 MG: 20 TABLET ORAL at 08:14

## 2017-03-09 NOTE — PROGRESS NOTES
"Adult Nutrition  Assessment/PES    Patient Name:  Colleen Gonzalez  YOB: 1965  MRN: [id number]  Admit Date:  3/1/2017    Assessment Date:  3/9/2017        Reason for Assessment       03/09/17 1159    Reason for Assessment    Reason For Assessment/Visit length of stay    Time Spent (min) 15    Diagnosis Diagnosis    Cardiac HTN;Hypercholesterolemia    Endocrine Hypothyroid    Infectious Disease Sepsis    Psychosocial Depression   Bipolar disorder    Pulmonary/Critical Care A/C respiratory failure;Pneumonia;COPD    Substance Use Tobacco   ongoing use 2/3 PPD              Nutrition/Diet History       03/09/17 1204    Nutrition/Diet History    Reported/Observed By Patient    Other Patient with airborne precaution - discussed over phone. Patient reports appetite is OK; reported eating most of breakfast. Gave preferences.            Anthropometrics       03/09/17 1205    Anthropometrics    Height 172.7 cm (68\")    Weight 110 kg (242 lb 8.1 oz)    Ideal Body Weight (IBW)    Ideal Body Weight (IBW), Female 64.55    % Ideal Body Weight 170.77    Body Mass Index (BMI)    BMI (kg/m2) 36.95            Labs/Tests/Procedures/Meds       03/09/17 1205    Labs/Tests/Procedures/Meds    Labs/Tests Review Reviewed                Nutrition Prescription Ordered       03/09/17 1205    Nutrition Prescription PO    Current PO Diet Regular    Common Modifiers Cardiac            Evaluation of Received Nutrient/Fluid Intake       03/09/17 1206    PO Evaluation    Number of Meals 5    % PO Intake 80%              Problem/Interventions:        Problem 1       03/09/17 1206    Nutrition Diagnoses Problem 1    Problem 1 Nutrition Appropriate for Condition at this Time    Etiology (related to) Medical Diagnosis   clinical condition    Signs/Symptoms (evidenced by) PO Intake    Percent (%) intake recorded 80 %    Over number of meals 5                Intervention Goal       03/09/17 1206    Intervention Goal    General Nutrition support " treatment            Nutrition Intervention       03/09/17 1206    Nutrition Intervention    RD/Tech Action Follow Tx progress;Care plan reviewd;Interview for preference              Education/Evaluation       03/09/17 1206    Monitor/Evaluation    Monitor Per protocol;PO intake        Electronically signed by:  Megan Duffy  03/09/17 12:07 PM

## 2017-03-09 NOTE — PROGRESS NOTES
"      HOSPITALIST DAILY PROGRESS NOTE    Chief Complaint: f/u for SOA    Subjective   SUBJECTIVE/OVERNIGHT EVENTS   No acute events overnight. Patient states that she had an ok night, requesting for more pain meds    Review of Systems:  Gen-no fevers, no chills  CV-no chest pain, no palpitations  Resp-no cough, no dyspnea  GI-no N/V/D, no abd pain    Objective   OBJECTIVE   I have reviewed the vital signs.  Visit Vitals   • /91 (BP Location: Right arm, Patient Position: Lying)   • Pulse 113   • Temp 98.6 °F (37 °C) (Oral)   • Resp 18   • Ht 68\" (172.7 cm)   • Wt 242 lb 8.1 oz (110 kg)   • SpO2 92%   • BMI 36.87 kg/m2       Physical Exam:  Gen-unkempt, no acute distress, seated in bed  CV-RRR, S1 S2 normal, no m/r/g  Resp-CTAB, no wheezes  Abd-obese, soft, NT, ND, +BS  Ext-no edema  Neuro-A&Ox3, no focal deficits  Psych-appropriate mood and affect    Results:  I have reviewed the labs, culture data      Results from last 7 days  Lab Units 03/09/17  0509 03/08/17  0505 03/07/17  0615   WBC 10*3/mm3 12.57* 10.86* 13.30*   HEMOGLOBIN g/dL 13.6 13.2 12.6   HEMATOCRIT % 42.3 40.4 38.1   PLATELETS 10*3/mm3 379 407 380       Results from last 7 days  Lab Units 03/09/17  0509   SODIUM mmol/L 138   POTASSIUM mmol/L 4.0   CHLORIDE mmol/L 102   TOTAL CO2 mmol/L 32.0*   BUN mg/dL 15   CREATININE mg/dL 0.80   GLUCOSE mg/dL 95   CALCIUM mg/dL 10.1       Culture Data:  Cultures:    RESPIRATORY CULTURE   Date Value Ref Range Status   03/07/2017 Culture in progress  Preliminary   03/03/2017 Moderate growth (3+) Staphylococcus aureus, MRSA (C)  Final     Comment:     This isolate does not demonstrate inducible clindamycin resistance in vitro.    Methicillin resistant Staphylococcus aureus, Patient may be an isolation risk.   03/03/2017 Scant growth (1+) Normal Respiratory Shannan  Final     I have reviewed the medications.    Assessment/Plan   ASSESSMENT/PLAN      52-year-old  female with history of noncompliance. She " smokes 2-3 packs of cigarettes per day. She reently went off her psych (Bipolar) meds. She presented to [facility] ED with increased shortness of breath and cough and fever. Reports recent hospitalization to [facility] about a week ago but has not gotten any better.       # Sepsis  --meets criteria due to febrile, hypoxia, tachycardia, leukocytosis and infectious source  --as below, due to social situation, and now cultures positive for MRSA/pseudomona, ID consulted, following       # A/C hypoxic resp failure- multifactorial COPD vs Pneumonia NASIM( vs neoplasm less likely)  --IV cefepime and vanc per ID   -- resp culture + MRSA and Pseudomona  -- AFB sputum negative x 2, quant TB negative.   --needs Bronchoscopy , but hesitant at this time.     # Concern for TB- work up negative, awaiting final sputum AFB     # Bipolar disorder/homeless  --suspect schizophrenia also given paranoia and hallucination  --pt recently stopped psych meds a week ago because they were causing her to hallucinate  --pt needs psych eval, but unfortunately we do not have inpt psych service. when stable for dc, may need to have the [facility] eval. i do feel at this time she will need [facility] eval. She seems withdrawn and Depressed. I dont believe it would be safe to let her go home without eval.   -- SW consulted      # Hypothyroidism- cont home meds      # Tobacco abuse  -- counseled, cessation advised      # Labial Cyst--needs to follow up with Gyn      PT/OT    Dispo: TBD. Will be difficult, pt homeless. SS working on placement    Robb Casiano MD  03/09/17  1:34 PM

## 2017-03-09 NOTE — PLAN OF CARE
Problem: Patient Care Overview (Adult)  Goal: Plan of Care Review  Outcome: Ongoing (interventions implemented as appropriate)    03/09/17 1520   Coping/Psychosocial Response Interventions   Plan Of Care Reviewed With patient   Patient Care Overview   Progress unable to show any progress toward functional goals   Outcome Evaluation   Outcome Summary/Follow up Plan During this visit SpO2 decreased during ther ex after return from bathroom with NSG. PT to continue as able.         Problem: Inpatient Physical Therapy  Goal: Transfer Training Goal 1 LTG- PT  Outcome: Ongoing (interventions implemented as appropriate)    03/04/17 1524 03/06/17 1645 03/09/17 1520   Transfer Training PT LTG   Transfer Training PT LTG, Date Established 03/04/17 --  --    Transfer Training PT LTG, Time to Achieve 2 wks --  --    Transfer Training PT LTG, Activity Type sit to stand/stand to sit --  --    Transfer Training PT LTG, [place] Level independent --  --    Transfer Training PT LTG, Assist Device walker, rolling  (walker as needed) --  --    Transfer Training PT LTG, Date Goal Reviewed --  03/06/17 --    Transfer Training PT LTG, Outcome --  --  goal ongoing       Goal: Gait Training Goal LTG- PT  Outcome: Ongoing (interventions implemented as appropriate)    03/04/17 1524 03/06/17 1645 03/09/17 1520   Gait Training PT LTG   Gait Training Goal PT LTG, Date Established 03/04/17 --  --    Gait Training Goal PT LTG, Time to Achieve 2 wks --  --    Gait Training Goal PT LTG, [place] Level independent --  --    Gait Training Goal PT LTG, Assist Device walker, rolling  (RW or rollator (as needed)) --  --    Gait Training Goal PT LTG, Distance to Achieve 200  (maintaining O2 sats at least 90%) --  --    Gait Training Goal PT LTG, Date Goal Reviewed --  03/06/17 --    Gait Training Goal PT LTG, Outcome --  --  goal ongoing       Goal: Patient Education Goal STG- PT  Outcome: Ongoing (interventions implemented as appropriate)     03/04/17 1524 03/06/17 1645 03/09/17 1520   Patient Education PT STG   Patient Education PT STG, Date Established 03/04/17 --  --    Patient Education PT STG, Time to Achieve 2 wks --  --    Patient Education PT STG, Education Type energy conservation;HEP --  --    Patient Education PT STG, Date Goal Reviewed --  03/06/17 --    Patient Education PT STG Outcome --  --  goal ongoing

## 2017-03-09 NOTE — PROGRESS NOTES
Acute Care - Physical Therapy Treatment Note  [facility]     Patient Name: Colleen Gonzalez  : 1965  MRN: [id number]  Today's Date: 3/9/2017     Date of Referral to PT: 17  Referring Physician: DO Theresa    Admit Date: 3/1/2017    Visit Dx:    ICD-10-CM ICD-9-CM   1. Community acquired pneumonia J18.9 486   2. COPD exacerbation J44.1 491.21   3. Hypoxia R09.02 799.02   4. Impaired functional mobility, balance, gait, and endurance Z74.09 V49.89     Patient Active Problem List   Diagnosis   • COPD (chronic obstructive pulmonary disease)   • Tobacco abuse   • Acute on chronic respiratory failure   • Leukocytosis   • HCAP (healthcare-associated pneumonia)   • Hypothyroid   • Sepsis   • Anxiety and depression   • Homelessness               Adult Rehabilitation Note       17 1443 17 1604       Rehab Assessment/Intervention    Discipline physical therapist  - physical therapist  -     Document Type therapy note (daily note)  - therapy note (daily note)  -     Subjective Information no complaints;agree to therapy   had just returned from bathroom with [facility]  - agree to therapy;no complaints  -     Patient Effort, Rehab Treatment good  - good  -MJ     Symptoms Noted During/After Treatment shortness of breath;significant change in vital signs  -      Precautions/Limitations  fall precautions;oxygen therapy device and L/min;other (see comments)   airborne precautions  -     Recorded by [] Ale Taylor, PT [MJ] Abhilash Meier, PT     Vital Signs    Pre SpO2 (%) 91  -EH 93  -MJ     O2 Delivery Pre Treatment supplemental O2  -EH supplemental O2  -MJ     Intra SpO2 (%) 79   with talking and ther ex  -EH      O2 Delivery Intra Treatment supplemental O2  -EH      Post SpO2 (%) 89  -EH 90  -MJ     O2 Delivery Post Treatment supplemental O2  -EH supplemental O2  -MJ     Pre Patient Position Sitting  -EH      Intra Patient Position Sitting  -EH      Post Patient Position Sitting  -EH       Recorded by [] Ale Taylor, PT [MJ] Abhilash Meier, PT     Pain Assessment    Pain Assessment No/denies pain  - No/denies pain  -     Pain Score 0  -      Post Pain Score 0  -      Recorded by [] Ale Taylor, PT [] Abhilash Meier, PT     Cognitive Assessment/Intervention    Current Cognitive/Communication Assessment functional  -      Orientation Status oriented x 4  -      Follows Commands/Answers Questions 100% of the time;able to follow single-step instructions  -      Recorded by [] Ale Taylor, PT      Bed Mobility, Assessment/Treatment    Bed Mob, Supine to Sit, [place]  independent  -     Bed Mob, Sit to Supine, [place]  independent  -     Recorded by  [] Abhilash Meier, PT     Transfer Assessment/Treatment    Transfers, Sit-Stand [place] stand by assist  - stand by assist  -     Transfers, Stand-Sit [place] stand by assist  - stand by assist  -     Recorded by [] Ale Taylor, PT [] Abhilash Meier, PT     Gait Assessment/Treatment    Gait, [place] Level  stand by assist;verbal cues required  -     Gait, Distance (Feet)  75   limited to room due to airborne precautions  -     Gait, Gait Pattern Analysis  swing-through gait  -     Gait, Gait Deviations  michaela decreased;step length decreased  -     Gait, Safety Issues  step length decreased  -     Gait, Impairments  strength decreased  -     Gait, Comment had just returned to sitting with NSG; ther ex dropped sats  - Pt demo step through gait pattern, steady with no LOB. Cues for PLB. Gait limited by fatigue  -     Recorded by [] Ale Taylor, PT [] Abhilash Meier, PT     Motor Skills/Interventions    Additional Documentation  Balance Skills Training (Group)  -     Recorded by  [] Abhilash Meier, PT     Balance Skills Training    Sitting-Level of Assistance Independent  - Independent  -     Sitting-Balance Support Feet supported;Feet unsupported;Left  upper extremity supported;Right upper extremity supported  - Feet supported  -     Sitting-Balance Activities Reaching for objects;Trunk control activities  - --   Sat at EOB to doff dirty gown and don clean one  -     Sitting # of Minutes 25  -      Recorded by [] Ale Taylor, PT [MJ] Abhilash Meier, PT     Therapy Exercises    Bilateral Lower Extremities AROM:;20 reps;ankle pumps/circles;hip flexion;LAQ   SPO2 drops to 29%  - AROM:;15 reps;sitting;ankle pumps/circles;hip flexion;LAQ  -MJ     Bilateral Upper Extremity 10 reps;elbow flexion/extension;hand pumps;shoulder extension/flexion;AROM:   SpO2 drops to 83 %  -      Exercise Protocols --   brushing hair; BUE; PLB technique with biofedback SpO2.  -      Recorded by [] Ale Taylor, PT [MJ] Abhilash Meier, PT     Positioning and Restraints    Pre-Treatment Position in bed  - in bed  -     Post Treatment Position bed  - bed  -     In Bed sitting EOB;call light within reach;encouraged to call for assist  - notified nsg;side lying right;call light within reach;encouraged to call for assist  -     Recorded by [EH] Ale Taylor, PT [MJ] Abhilash Meier, PT       User Key  (r) = Recorded By, (t) = Taken By, (c) = Cosigned By    Initials Name Effective Dates     Ale Taylor, PT 06/19/15 -     MJ Abhilash Meier, PT 03/14/16 -                 IP PT Goals       03/09/17 1520 03/06/17 1645 03/04/17 1524    Transfer Training PT LTG    Transfer Training PT LTG, Date Established   03/04/17  -LS    Transfer Training PT LTG, Time to Achieve   2 wks  -LS    Transfer Training PT LTG, Activity Type   sit to stand/stand to sit  -LS    Transfer Training PT LTG, [place] Level   independent  -LS    Transfer Training PT LTG, Assist Device   walker, rolling   walker as needed  -LS    Transfer Training PT  LTG, Date Goal Reviewed  03/06/17  -MJ     Transfer Training PT LTG, Outcome goal ongoing  -EH goal ongoing  - goal ongoing  -LS     Gait Training PT LTG    Gait Training Goal PT LTG, Date Established   03/04/17  -    Gait Training Goal PT LTG, Time to Achieve   2 wks  -LS    Gait Training Goal PT LTG, [place] Level   independent  -    Gait Training Goal PT LTG, Assist Device   walker, rolling   RW or rollator (as needed)  -    Gait Training Goal PT LTG, Distance to Achieve   200   maintaining O2 sats at least 90%  -    Gait Training Goal PT LTG, Date Goal Reviewed  03/06/17  -     Gait Training Goal PT LTG, Outcome goal ongoing  - goal ongoing  - goal ongoing  -LS    Patient Education PT STG    Patient Education PT STG, Date Established   03/04/17  -    Patient Education PT STG, Time to Achieve   2 wks  -LS    Patient Education PT STG, Education Type   energy conservation;HEP  -LS    Patient Education PT STG, Date Goal Reviewed  03/06/17  -     Patient Education PT STG Outcome goal ongoing  -EH goal ongoing  - goal ongoing  -LS      User Key  (r) = Recorded By, (t) = Taken By, (c) = Cosigned By    Initials Name Provider Type     Ale Taylor, PT Physical Therapist     Cassie Sheffield, PT Physical Therapist     Abhilash Meier, PT Physical Therapist          Physical Therapy Education     Title: PT OT SLP Therapies (Done)     Topic: Physical Therapy (Done)     Point: Home exercise program (Done)    Learning Progress Summary    Learner Readiness Method Response Comment Documented by Status   Patient Acceptance E VU,NR   03/09/17 1520 Done    Acceptance E,D VU,NR   03/06/17 1645 Done    Acceptance E VU Discussed benefits of activity.  03/04/17 1523 Done               Point: Precautions (Done)    Learning Progress Summary    Learner Readiness Method Response Comment Documented by Status   Patient Acceptance E VU,NR   03/09/17 1520 Done    Acceptance E,D VU,NR   03/06/17 1645 Done                      User Key     Initials Effective Dates Name Provider Type [facility] 06/19/15 -  Ale THOMPSON  Brandon, PT Physical Therapist PT    LS 06/19/15 -  Cassie Sheffield, PT Physical Therapist PT     03/14/16 -  Abhilash Meier, PT Physical Therapist PT                    PT Recommendation and Plan  Anticipated Equipment Needs At Discharge:  (to be determined. assess w/rollator walker)  Anticipated Discharge Disposition: skilled nursing facility, extended care facility (to be determined)  Planned Therapy Interventions: balance training, gait training, home exercise program, patient/family education, strengthening, transfer training  PT Frequency: daily, per priority policy  Plan of Care Review  Plan Of Care Reviewed With: patient  Progress: unable to show any progress toward functional goals  Outcome Summary/Follow up Plan: During this visit SpO2 decreased during ther ex after return from bathroom with NSG. PT to continue as able.          Outcome Measures       03/09/17 1443 03/06/17 1604       How much help from another person do you currently need...    Turning from your back to your side while in flat bed without using bedrails? 4  -EH 4  -MJ     Moving from lying on back to sitting on the side of a flat bed without bedrails? 4  -EH 4  -MJ     Moving to and from a bed to a chair (including a wheelchair)? 3  -EH 3  -MJ     Standing up from a chair using your arms (e.g., wheelchair, bedside chair)? 4  -EH 3  -MJ     Climbing 3-5 steps with a railing? 3  -EH 3  -MJ     To walk in hospital room? 3  -EH 3  -MJ     AM-PAC 6 Clicks Score 21  -EH 20  -MJ     Functional Assessment    Outcome Measure Options AM-PAC 6 Clicks Basic Mobility (PT)  -EH AM-PAC 6 Clicks Basic Mobility (PT)  -MJ       User Key  (r) = Recorded By, (t) = Taken By, (c) = Cosigned By    Initials Name Provider Type    RUMA Taylor, PT Physical Therapist    MJ Abhilash Meier, PT Physical Therapist           Time Calculation:         PT Charges       03/09/17 1522          Time Calculation    Start Time 1443  -EH      PT Received On 03/09/17   -      PT Goal Re-Cert Due Date 03/14/17  -      Time Calculation- PT    Total Timed Code Minutes- PT 25 minute(s)  -        User Key  (r) = Recorded By, (t) = Taken By, (c) = Cosigned By    Initials Name Provider Type     Ale Taylor, PT Physical Therapist          Therapy Charges for Today     Code Description Service Date Service Provider Modifiers Qty    [id number] HC PT THER PROC EA 15 MIN 3/9/2017 Ale Taylor, PT GP 2          PT G-Codes  Outcome Measure Options: AM-PAC 6 Clicks Basic Mobility (PT)    Ale Taylor, PT  3/9/2017

## 2017-03-09 NOTE — PROGRESS NOTES
"[facility] Progress Note    Date of Admission: 3/1/2017      Antibiotics:  Cefepime, Vanc, Prednisone 20 mg PO daily     CC:   Chief Complaint   Patient presents with   • Shortness of Breath       S: No f/c/s. Still with cough and SOA. Still with some pleuritic type CP. No n/v/d. Good appetite  O:  Visit Vitals   • /77   • Pulse 108   • Temp 98.7 °F (37.1 °C)   • Resp 18   • Ht 68\" (172.7 cm)   • Wt 242 lb 8.1 oz (110 kg)   • SpO2 92%   • BMI 36.87 kg/m2     Temp (24hrs), Av.7 °F (37.1 °C), Min:98.6 °F (37 °C), Max:98.7 °F (37.1 °C)      PE:   GENERAL: Chronically ill appearing, unkept appearance.  Emotional about current hospitalization.  HEENT: Normocephalic, atraumatic. PERRL. EOMI. No conjunctival injection. Moist MM  .  NECK: Supple without nuchal rigidity  LYMPH: No cervical, axillary or inguinal lymphadenopathy. No neck masses  HEART: Tachy; No murmur, rubs, gallops.   LUNGS: Coarse breath sounds and Diminished throughout.  Exp wheezing. Supplemental O2  present  ABDOMEN: Soft, nontender, nondistended. Positive bowel sounds. No rebound or guarding.   EXT: No cyanosis, clubbing or edema  : Normal appearing genitalia without Perdomo catheter.  MSK: FROM without joint effusions noted   SKIN: Plaque like skin lesions of the chest, arms and legs of varied healing.  NEURO: Oriented to PPT. No focal deficits.       Laboratory Data      Results from last 7 days  Lab Units 17  0509 17  0505 17  0615   WBC 10*3/mm3 12.57* 10.86* 13.30*   HEMOGLOBIN g/dL 13.6 13.2 12.6   HEMATOCRIT % 42.3 40.4 38.1   PLATELETS 10*3/mm3 379 407 380       Results from last 7 days  Lab Units 17  0509   SODIUM mmol/L 138   POTASSIUM mmol/L 4.0   CHLORIDE mmol/L 102   TOTAL CO2 mmol/L 32.0*   BUN mg/dL 15   CREATININE mg/dL 0.80   GLUCOSE mg/dL 95   CALCIUM mg/dL 10.1           Results from last 7 days  Lab Units 17  0707   SED RATE mm/hr 104*       Results from last 7 days  Lab Units 17  0708   CRP " mg/dL 46.10*       Estimated Creatinine Clearance: 105.7 mL/min (by C-G formula based on Cr of 0.8).      Microbiology:  Sputum culture on 3/2: PSA and MRSA  Sputum culture on 3/3: MRSA   AFB x 3 ordered  are negative  Sputum culture 3/7/17: Non-LFR, Staph aureus, Yeast  Blood culture negative    Crypto negative  Strep pneumo and Legionella Uag negative  Blasto P  Quant TB negative    Radiology:  Imaging Results (last 24 hours)     ** No results found for the last 24 hours. **          PROBLEM LIST:   NASIM pneumonia with MRSA and PSA   Leukocytosis  Concern for potential post-obstructive pneumonia with hx of cough x 6 weeks and 6x6cm nature of NASIM pna (3 PPD history)  Tobacco abuse  Chronic skin lesions  HTN  HLD  Hypothyroidism  Psych disorder     ASSESSMENT:  52 -year-old female with multiple medical conditions including hypertension, hyperlipidemia, hypothyroidism in the setting of psychiatric disease with OCD and bipolar disorder who presents with worsening cough, shortness of breath, sputum production of brown coloration of the past 5-6 weeks however worsening over the last 1 week. She reports associated chills and sweats however hasn't checked her temperature. she came to the ED for evaluation with leukocytosis initially noted, chest CT angiogram with a left upper lobe with a 6 x 6 cm opacity concerning for infectious versus neoplastic etiology. Sputum production ×3 has been collected with pseudomonas and MRSA.     Quant TB negative. Crypto Negative. Blast Pending.      PLAN:  Continue Vanc and Cefepime for MRSA and PSA coverage  F/u histo, blasto, crypto ag (neg)  AFB sputum x 3 negative  Quant TB negative.   Patient adamantly refuses bronchoscopy at this time.  Our plan would be to treat for 2-3 weeks with antibiotics before repeating CT scan of the chest.    Difficult dispo with social issues and will continue to follow  LTACH referral placed    Please call partners over weekend with any questions or  concerns. Otherwise, will see patient again on Monday for repeat evaluation.     Dr. Robert Self saw the patient, performed the physical exam, reviewed the laboratory data and guided with the formulation of the above problem list, assessment and treatment plan.     Robert Self MD  3/9/2017

## 2017-03-09 NOTE — PLAN OF CARE
Problem: Patient Care Overview (Adult)  Goal: Plan of Care Review  Outcome: Ongoing (interventions implemented as appropriate)    03/09/17 1230   Coping/Psychosocial Response Interventions   Plan Of Care Reviewed With patient   Patient Care Overview   Progress unable to show any progress toward functional goals   Outcome Evaluation   Outcome Summary/Follow up Plan Pt seen for wounds on RLE that per the pateint have been there for quite some time and keep reopening. She had surgery in this area and the scar tissue continues to reopen. There are two small open areas that were cleaned, covered with therahoney and covered with a nonadherant dressing. LEONA Burgess and LEONA reyes notified of POC. WOCN will s/o but please reconsult for any needs or concerns.         Problem: Wound, Traumatic, Nonburn (Adult)  Goal: Signs and Symptoms of Listed Potential Problems Will be Absent or Manageable (Wound, Traumatic, Nonburn)  Outcome: Ongoing (interventions implemented as appropriate)    03/09/17 1230   Wound, Traumatic, Nonburn   Problems Assessed (Wound) all   Problems Present (Wound) none

## 2017-03-09 NOTE — PROGRESS NOTES
Pharmacokinetic Consult - Vancomycin Dosing    Colleen Gonzalez is a 52 y.o. female who has been consulted for vancomycin dosing for pneumonia .    Relevant clinical data and objective history reviewed:  CREATININE   Date Value Ref Range Status   03/09/2017 0.80 0.60 - 1.30 mg/dL Final   03/08/2017 0.80 0.60 - 1.30 mg/dL Final   03/07/2017 0.80 0.60 - 1.30 mg/dL Final     BUN   Date Value Ref Range Status   03/09/2017 15 9 - 23 mg/dL Final   03/08/2017 19 9 - 23 mg/dL Final   03/07/2017 17 9 - 23 mg/dL Final     Estimated Creatinine Clearance: 105.7 mL/min (by C-G formula based on Cr of 0.8).  I/O last 3 completed shifts:  In: 860 [P.O.:360; IV Piggyback:500]  Out: 1700 [Urine:1700]  Lab Results   Component Value Date/Time    WBC 12.57 (H) 03/09/2017 05:09 AM    HGB 13.6 03/09/2017 05:09 AM    HCT 42.3 03/09/2017 05:09 AM    MCV 94.2 03/09/2017 05:09 AM     03/09/2017 05:09 AM     Temp Readings from Last 3 Encounters:   03/09/17 98.6 °F (37 °C) (Oral)     Weight: 243 lb 9.6 oz (110 kg)      Respiratory Culture 3/7  Order: [id number]   Status:  Preliminary result   Visible to patient:  No (Not Released)   Specimen Information: Cough; Sputum        Culture   Scant growth (1+) Non-Lactose  (A)      Light growth (2+) Staphylococcus aureus (A)      Light growth (2+) Yeast isolated (A)                Assessment  Lab Results   Component Value Date    VANCOTROUGH 16.60 03/09/2017     Vancomycin trough is therapeutic today at 16.6. The patients renal function remains stable.     Plan    Continue current dose of vancomycin 1 g IV every 12 hours. Recommend getting trough in 4-5 days. Will continue to monitor for signs of renal dysfunction. Pharmacy will continue to follow.    Ken Reaves, PharmD Candidate 2017    I have reviewed this and agree with everything written.   Hollie Pappas PharmD  3/9/2017  12:02 PM

## 2017-03-09 NOTE — PLAN OF CARE
Problem: Patient Care Overview (Adult)  Goal: Plan of Care Review  Outcome: Ongoing (interventions implemented as appropriate)    03/09/17 174   Coping/Psychosocial Response Interventions   Plan Of Care Reviewed With patient   Patient Care Overview   Progress progress towards functional goals is fair       Goal: Discharge Needs Assessment  Outcome: Ongoing (interventions implemented as appropriate)    Problem: Pneumonia (Adult)  Goal: Signs and Symptoms of Listed Potential Problems Will be Absent or Manageable (Pneumonia)  Outcome: Ongoing (interventions implemented as appropriate)    Problem: COPD, Chronic Bronchitis/Emphysema (Adult)  Goal: Signs and Symptoms of Listed Potential Problems Will be Absent or Manageable (COPD, Chronic Bronchitis/Emphysema)  Outcome: Ongoing (interventions implemented as appropriate)    Problem: Wound, Traumatic, Nonburn (Adult)  Goal: Signs and Symptoms of Listed Potential Problems Will be Absent or Manageable (Wound, Traumatic, Nonburn)  Outcome: Ongoing (interventions implemented as appropriate)

## 2017-03-10 LAB
BACTERIA SPEC RESP CULT: ABNORMAL
GRAM STN SPEC: ABNORMAL

## 2017-03-10 PROCEDURE — 97166 OT EVAL MOD COMPLEX 45 MIN: CPT

## 2017-03-10 PROCEDURE — 25010000002 VANCOMYCIN PER 500 MG

## 2017-03-10 PROCEDURE — 94760 N-INVAS EAR/PLS OXIMETRY 1: CPT

## 2017-03-10 PROCEDURE — 97116 GAIT TRAINING THERAPY: CPT

## 2017-03-10 PROCEDURE — 99232 SBSQ HOSP IP/OBS MODERATE 35: CPT | Performed by: INTERNAL MEDICINE

## 2017-03-10 PROCEDURE — 94799 UNLISTED PULMONARY SVC/PX: CPT

## 2017-03-10 PROCEDURE — 63710000001 PREDNISONE PER 5 MG: Performed by: FAMILY MEDICINE

## 2017-03-10 PROCEDURE — 97110 THERAPEUTIC EXERCISES: CPT

## 2017-03-10 PROCEDURE — 25010000002 CEFEPIME: Performed by: INTERNAL MEDICINE

## 2017-03-10 PROCEDURE — 94640 AIRWAY INHALATION TREATMENT: CPT

## 2017-03-10 RX ORDER — NYSTATIN 100000 [USP'U]/G
POWDER TOPICAL EVERY 8 HOURS SCHEDULED
Status: DISCONTINUED | OUTPATIENT
Start: 2017-03-10 | End: 2017-03-12

## 2017-03-10 RX ADMIN — CEFEPIME 2 G: 2 INJECTION, POWDER, FOR SOLUTION INTRAVENOUS at 05:38

## 2017-03-10 RX ADMIN — LORAZEPAM 0.5 MG: 0.5 TABLET ORAL at 20:13

## 2017-03-10 RX ADMIN — BENZONATATE 200 MG: 100 CAPSULE, LIQUID FILLED ORAL at 07:56

## 2017-03-10 RX ADMIN — VANCOMYCIN HYDROCHLORIDE 1000 MG: 1 INJECTION, SOLUTION INTRAVENOUS at 07:33

## 2017-03-10 RX ADMIN — HYDROCODONE BITARTRATE AND ACETAMINOPHEN 1 TABLET: 5; 325 TABLET ORAL at 05:38

## 2017-03-10 RX ADMIN — IPRATROPIUM BROMIDE AND ALBUTEROL SULFATE 3 ML: .5; 3 SOLUTION RESPIRATORY (INHALATION) at 20:22

## 2017-03-10 RX ADMIN — IPRATROPIUM BROMIDE AND ALBUTEROL SULFATE 3 ML: .5; 3 SOLUTION RESPIRATORY (INHALATION) at 11:02

## 2017-03-10 RX ADMIN — TIZANIDINE 4 MG: 4 TABLET ORAL at 20:13

## 2017-03-10 RX ADMIN — TIZANIDINE 4 MG: 4 TABLET ORAL at 07:25

## 2017-03-10 RX ADMIN — FUROSEMIDE 40 MG: 40 TABLET ORAL at 07:27

## 2017-03-10 RX ADMIN — CEFEPIME 2 G: 2 INJECTION, POWDER, FOR SOLUTION INTRAVENOUS at 12:56

## 2017-03-10 RX ADMIN — FLUTICASONE PROPIONATE 2 SPRAY: 50 SPRAY, METERED NASAL at 07:33

## 2017-03-10 RX ADMIN — HYDROCODONE BITARTRATE AND ACETAMINOPHEN 1 TABLET: 5; 325 TABLET ORAL at 17:02

## 2017-03-10 RX ADMIN — HYDROCODONE BITARTRATE AND ACETAMINOPHEN 1 TABLET: 5; 325 TABLET ORAL at 09:17

## 2017-03-10 RX ADMIN — DULOXETINE 60 MG: 60 CAPSULE, DELAYED RELEASE ORAL at 20:13

## 2017-03-10 RX ADMIN — HYDROCODONE POLISTIREX AND CHLORPHENIRAMINE POLISTIREX 5 ML: 10; 8 SUSPENSION, EXTENDED RELEASE ORAL at 15:28

## 2017-03-10 RX ADMIN — PANTOPRAZOLE SODIUM 40 MG: 40 TABLET, DELAYED RELEASE ORAL at 07:27

## 2017-03-10 RX ADMIN — CEFEPIME 2 G: 2 INJECTION, POWDER, FOR SOLUTION INTRAVENOUS at 20:15

## 2017-03-10 RX ADMIN — MICONAZOLE NITRATE 200 MG: 200 SUPPOSITORY VAGINAL at 22:45

## 2017-03-10 RX ADMIN — CETIRIZINE HYDROCHLORIDE 10 MG: 10 TABLET, FILM COATED ORAL at 07:27

## 2017-03-10 RX ADMIN — QUETIAPINE 300 MG: 200 TABLET, EXTENDED RELEASE ORAL at 20:13

## 2017-03-10 RX ADMIN — Medication 2 TABLET: at 17:12

## 2017-03-10 RX ADMIN — GABAPENTIN 400 MG: 400 CAPSULE ORAL at 07:25

## 2017-03-10 RX ADMIN — MONTELUKAST SODIUM 10 MG: 10 TABLET, FILM COATED ORAL at 20:13

## 2017-03-10 RX ADMIN — PREDNISONE 20 MG: 20 TABLET ORAL at 07:25

## 2017-03-10 RX ADMIN — GABAPENTIN 400 MG: 400 CAPSULE ORAL at 20:13

## 2017-03-10 RX ADMIN — NYSTATIN: 100000 POWDER TOPICAL at 23:30

## 2017-03-10 RX ADMIN — HYDROCODONE BITARTRATE AND ACETAMINOPHEN 1 TABLET: 5; 325 TABLET ORAL at 12:56

## 2017-03-10 RX ADMIN — IPRATROPIUM BROMIDE AND ALBUTEROL SULFATE 3 ML: .5; 3 SOLUTION RESPIRATORY (INHALATION) at 15:41

## 2017-03-10 RX ADMIN — VANCOMYCIN HYDROCHLORIDE 1000 MG: 1 INJECTION, SOLUTION INTRAVENOUS at 20:12

## 2017-03-10 RX ADMIN — HYDROCODONE POLISTIREX AND CHLORPHENIRAMINE POLISTIREX 5 ML: 10; 8 SUSPENSION, EXTENDED RELEASE ORAL at 03:44

## 2017-03-10 RX ADMIN — Medication 2 TABLET: at 07:27

## 2017-03-10 RX ADMIN — LORAZEPAM 0.5 MG: 0.5 TABLET ORAL at 07:56

## 2017-03-10 RX ADMIN — LEVOTHYROXINE SODIUM 50 MCG: 25 TABLET ORAL at 05:38

## 2017-03-10 RX ADMIN — FUROSEMIDE 40 MG: 40 TABLET ORAL at 07:24

## 2017-03-10 NOTE — PLAN OF CARE
Problem: Patient Care Overview (Adult)  Goal: Plan of Care Review  Outcome: Ongoing (interventions implemented as appropriate)    03/10/17 1146   Coping/Psychosocial Response Interventions   Plan Of Care Reviewed With patient   Outcome Evaluation   Outcome Summary/Follow up Plan Pt presents with deficits in occupational endurance, knowledge deficits re pulmonary techniques, fxl mobility; will benefit from skilled OT services to address deficits, facilitate increased fxl I, safe transition to discharge disposition. Recommend IP pulmonary rehab.         Problem: Inpatient Occupational Therapy  Goal: Transfer Training Goal 1 LTG- OT  Outcome: Ongoing (interventions implemented as appropriate)    03/10/17 1146   Transfer Training OT LTG   Transfer Training OT LTG, Date Established 03/10/17   Transfer Training OT LTG, Time to Achieve 1 wk   Transfer Training OT LTG, Activity Type bed to chair /chair to bed;sit to stand/stand to sit;toilet   Transfer Training OT LTG, [place] Level supervision required   Transfer Training OT LTG, Assist Device (AAD)   Transfer Training OT LTG, Outcome goal ongoing       Goal: Toileting Goal LTG- OT  Outcome: Ongoing (interventions implemented as appropriate)    03/10/17 1146   Toileting OT LTG   Toileting Goal OT LTG, Date Established 03/10/17   Toileting Goal OT LTG, Time to Achieve 1 wk   Toileting Goal OT LTG, [place] Level conditional independence   Toileting Goal OT LTG, Outcome goal ongoing       Goal: Activity Tolerance Goal LTG- OT  Outcome: Ongoing (interventions implemented as appropriate)    03/10/17 1146   Activity Tolerance OT LTG   Activity Tolerance Goal OT LTG, Date Established 03/10/17   Activity Tolerance Goal OT LTG, Time to Achieve 1 wk   Activity Tolerance Goal OT LTG, Activity Level 10 min activity  (with 1 rest break)   Activity Tolerance Goal OT LTG, Outcome goal ongoing

## 2017-03-10 NOTE — PROGRESS NOTES
"      HOSPITALIST DAILY PROGRESS NOTE    Chief Complaint: f/u for SOA    Subjective   SUBJECTIVE/OVERNIGHT EVENTS   No acute events overnight, patient endorses back pain, no n/v/d    Review of Systems:  Gen-no fevers, no chills  CV-no chest pain, no palpitations  Resp-no cough, no dyspnea  GI-no N/V/D, no abd pain    Objective   OBJECTIVE   I have reviewed the vital signs.  Visit Vitals   • /92 (BP Location: Right arm, Patient Position: Lying)   • Pulse 99   • Temp 98.2 °F (36.8 °C) (Oral)   • Resp 17   • Ht 68\" (172.7 cm)   • Wt 242 lb 8.1 oz (110 kg)   • SpO2 92%   • BMI 36.87 kg/m2       Physical Exam:  Gen-no acute distress, uncomfortable with back pain  CV-RRR, S1 S2 normal, no m/r/g  Resp-CTAB, no wheezes  Abd-soft, NT, ND, +BS  Ext-no edema  Neuro-A&Ox3, no focal deficits  Psych-appropriate mood and affect    Results:  I have reviewed the labs, culture data,      Results from last 7 days  Lab Units 03/09/17  0509 03/08/17  0505 03/07/17  0615   WBC 10*3/mm3 12.57* 10.86* 13.30*   HEMOGLOBIN g/dL 13.6 13.2 12.6   HEMATOCRIT % 42.3 40.4 38.1   PLATELETS 10*3/mm3 379 407 380       Results from last 7 days  Lab Units 03/09/17  0509   SODIUM mmol/L 138   POTASSIUM mmol/L 4.0   CHLORIDE mmol/L 102   TOTAL CO2 mmol/L 32.0*   BUN mg/dL 15   CREATININE mg/dL 0.80   GLUCOSE mg/dL 95   CALCIUM mg/dL 10.1       Culture Data:  Cultures:    RESPIRATORY CULTURE   Date Value Ref Range Status   03/07/2017 Scant growth (1+) Non-Lactose  (A)  Preliminary   03/07/2017 Light growth (2+) Staphylococcus aureus (A)  Preliminary   03/07/2017 Light growth (2+) Yeast isolated (A)  Preliminary   03/03/2017 Moderate growth (3+) Staphylococcus aureus, MRSA (C)  Final     Comment:     This isolate does not demonstrate inducible clindamycin resistance in vitro.    Methicillin resistant Staphylococcus aureus, Patient may be an isolation risk.   03/03/2017 Scant growth (1+) Normal Respiratory Shannan  Final     I have reviewed " the medications.    Assessment/Plan   ASSESSMENT/PLAN      52-year-old  female with history of noncompliance. She smokes 2-3 packs of cigarettes per day. She reently went off her psych (Bipolar) meds. She presented to [facility] ED with increased shortness of breath and cough and fever. Reports recent hospitalization to [facility] about a week with no improvement      # Sepsis  --meets criteria due to febrile, hypoxia, tachycardia, leukocytosis and infectious source  --as below, due to social situation, and now cultures positive for MRSA/pseudomona, ID consulted, following       # A/C hypoxic resp failure- multifactorial COPD vs Pneumonia NASIM( vs neoplasm less likely)  --IV cefepime and vanc per ID   -- resp culture + MRSA and Pseudomona  -- AFB sputum negative x 2, quant TB negative.   --needs Bronchoscopy , but declines.      # Concern for TB- work up negative      # Bipolar disorder/homeless  --suspect schizophrenia also given paranoia and hallucination  --pt recently stopped psych meds a week ago because they were causing her to hallucinate  --pt needs psych eval, but unfortunately we do not have inpt psych service. when stable for dc, may need to have the [facility] eval. i do feel at this time she will need [facility] eval. She seems withdrawn and Depressed. I dont believe it would be safe to let her go home without eval.   -- SW consulted and following      # Hypothyroidism- cont home meds      # Tobacco abuse  -- counseled, cessation advised      # Labial Cyst--needs to follow up with Gyn       PT/OT     Dispo: TBD. Will be difficult, pt homeless. SS working on placement    Robb Casiano MD  03/10/17  10:01 AM

## 2017-03-10 NOTE — PROGRESS NOTES
Acute Care - Physical Therapy Treatment Note  [facility]     Patient Name: Colleen Gonzalez  : 1965  MRN: [id number]  Today's Date: 3/10/2017  Onset of Illness/Injury or Date of Surgery Date: 17  Date of Referral to PT: 17  Referring Physician: Dr Moore    Admit Date: 3/1/2017    Visit Dx:    ICD-10-CM ICD-9-CM   1. Community acquired pneumonia J18.9 486   2. COPD exacerbation J44.1 491.21   3. Hypoxia R09.02 799.02   4. Impaired functional mobility, balance, gait, and endurance Z74.09 V49.89   5. Impaired mobility and ADLs Z74.09 799.89     Patient Active Problem List   Diagnosis   • COPD (chronic obstructive pulmonary disease)   • Tobacco abuse   • Acute on chronic respiratory failure   • Leukocytosis   • HCAP (healthcare-associated pneumonia)   • Hypothyroid   • Sepsis   • Anxiety and depression   • Homelessness               Adult Rehabilitation Note       03/10/17 1515 17 1443       Rehab Assessment/Intervention    Discipline physical therapist  -SR physical therapist  -EH     Document Type therapy note (daily note)  -SR therapy note (daily note)  -     Subjective Information agree to therapy;no complaints  -SR no complaints;agree to therapy   had just returned from bathroom with NSG  -EH     Patient Effort, Rehab Treatment good  -SR good  -EH     Symptoms Noted During/After Treatment fatigue   pt denies SOA  -SR shortness of breath;significant change in vital signs  -     Precautions/Limitations fall precautions;oxygen therapy device and L/min  -SR      Recorded by [SR] Jenifer Witt, PT [EH] Ale Taylor, PT     Vital Signs    Pretreatment Heart Rate (beats/min) 95  -SR      Intratreatment Heart Rate (beats/min) 130  -SR      Posttreatment Heart Rate (beats/min) 114  -SR      Pre SpO2 (%)  91  -EH     O2 Delivery Pre Treatment  supplemental O2  -EH     Intra SpO2 (%) 89  -SR 79   with talking and ther ex  -EH     O2 Delivery Intra Treatment supplemental O2  -SR supplemental  O2  -EH     Post SpO2 (%) 94  -SR 89  -EH     O2 Delivery Post Treatment supplemental O2  -SR supplemental O2  -EH     Pre Patient Position Supine  -SR Sitting  -EH     Intra Patient Position Standing  -SR Sitting  -EH     Post Patient Position Sitting  -SR Sitting  -EH     Recorded by [SR] Jenifer Witt, PT [] Ale Taylor, PT     Pain Assessment    Pain Assessment No/denies pain  -SR No/denies pain  -EH     Pain Score  0  -EH     Post Pain Score  0  -EH     Recorded by [SR] Jenifer Witt, PT [] Ale Taylor PT     Cognitive Assessment/Intervention    Current Cognitive/Communication Assessment functional  -SR functional  -EH     Orientation Status oriented x 4  -SR oriented x 4  -EH     Follows Commands/Answers Questions 100% of the time  -% of the time;able to follow single-step instructions  -     Personal Safety mild impairment;decreased awareness, need for assist;decreased awareness, need for safety  -SR      Personal Safety Interventions gait belt;nonskid shoes/slippers when out of bed  -SR      Recorded by [SR] Jenifer Witt, PT [] Ale Taylor, PT     Bed Mobility, Assessment/Treatment    Bed Mob, Supine to Sit, [place] independent  -SR      Recorded by [SR] Jenifer Witt PT      Transfer Assessment/Treatment    Transfers, Sit-Stand [place] supervision required;verbal cues required  -SR stand by assist  -     Transfers, Stand-Sit [place] supervision required;verbal cues required  -SR stand by assist  -     Transfer, Comment cues for safety/line awareness  -SR      Recorded by [SR] Jenifer Witt, PT [] Ale Taylor PT     Gait Assessment/Treatment    Gait, Comment  had just returned to sitting with NSG; ther ex dropped sats  -EH     Recorded by  [] Ale Taylor PT     Motor Skills/Interventions    Additional Documentation Balance Skills Training (Group)  -SR      Recorded by [SR] Jenifer Witt PT      Balance Skills Training     Sitting-Level of Assistance Independent  -SR Independent  -     Sitting-Balance Support Feet supported  -SR Feet supported;Feet unsupported;Left upper extremity supported;Right upper extremity supported  -     Sitting-Balance Activities  Reaching for objects;Trunk control activities  -     Sitting # of Minutes  25  -     Standing-Level of Assistance Close supervision  -SR      Static Standing Balance Support No upper extremity supported  -SR      Standing-Balance Activities Weight Shift A-P;Weight Shift R-L  -SR      Gait Balance-Level of Assistance Close supervision  -SR      Gait Balance Support No upper extremity supported  -SR      Gait Balance Activities scanning environment R/L;side-stepping  -SR      Recorded by [SR] Jenifer Witt, PT [] Ale Taylor, PT     Therapy Exercises    Bilateral Lower Extremities  AROM:;20 reps;ankle pumps/circles;hip flexion;LAQ   SPO2 drops to 29%  -     Bilateral Upper Extremity  10 reps;elbow flexion/extension;hand pumps;shoulder extension/flexion;AROM:   SpO2 drops to 83 %  -     Exercise Protocols  --   brushing hair; BUE; PLB technique with biofedback SpO2.  -EH     Recorded by  [] Ale Taylor PT     Positioning and Restraints    Pre-Treatment Position in bed  -SR in bed  -EH     Post Treatment Position chair  -SR bed  -EH     In Bed  sitting EOB;call light within reach;encouraged to call for assist  -     In Chair notified nsg;sitting;call light within reach;encouraged to call for assist;reclined;with nsg  -SR      Recorded by [SR] Jenifer Witt, PT [] Ale Taylor PT       User Key  (r) = Recorded By, (t) = Taken By, (c) = Cosigned By    Initials Name Effective Dates     Ale Taylor, PT 06/19/15 -     SR Jenifer Witt PT 06/19/15 -                 IP PT Goals       03/10/17 1557 03/09/17 1520 03/06/17 1645    Transfer Training PT LTG    Transfer Training PT  LTG, Date Goal Reviewed 03/10/17  -SR  03/06/17  -     Transfer Training PT LTG, Outcome  goal ongoing  - goal ongoing  -MJ    Gait Training PT LTG    Gait Training Goal PT LTG, Date Goal Reviewed 03/10/17  -SR  03/06/17  -MJ    Gait Training Goal PT LTG, Outcome  goal ongoing  - goal ongoing  -MJ    Patient Education PT STG    Patient Education PT STG, Date Goal Reviewed 03/10/17  -SR  03/06/17  -MJ    Patient Education PT STG Outcome  goal ongoing  - goal ongoing  -MJ      03/04/17 1524          Transfer Training PT LTG    Transfer Training PT LTG, Date Established 03/04/17  -LS      Transfer Training PT LTG, Time to Achieve 2 wks  -LS      Transfer Training PT LTG, Activity Type sit to stand/stand to sit  -LS      Transfer Training PT LTG, [place] Level independent  -LS      Transfer Training PT LTG, Assist Device walker, rolling   walker as needed  -LS      Transfer Training PT LTG, Outcome goal ongoing  -LS      Gait Training PT LTG    Gait Training Goal PT LTG, Date Established 03/04/17  -LS      Gait Training Goal PT LTG, Time to Achieve 2 wks  -LS      Gait Training Goal PT LTG, [place] Level independent  -LS      Gait Training Goal PT LTG, Assist Device walker, rolling   RW or rollator (as needed)  -LS      Gait Training Goal PT LTG, Distance to Achieve 200   maintaining O2 sats at least 90%  -LS      Gait Training Goal PT LTG, Outcome goal ongoing  -LS      Patient Education PT STG    Patient Education PT STG, Date Established 03/04/17  -LS      Patient Education PT STG, Time to Achieve 2 wks  -LS      Patient Education PT STG, Education Type energy conservation;HEP  -LS      Patient Education PT STG Outcome goal ongoing  -LS        User Key  (r) = Recorded By, (t) = Taken By, (c) = Cosigned By    Initials Name Provider Type    EH Ale Taylor, PT Physical Therapist    SR Jenifer Witt, PT Physical Therapist    LS Cassie Sheffield, PT Physical Therapist    MJ Abhilash Meier, PT Physical Therapist          Physical Therapy Education      Title: PT OT SLP Therapies (Active)     Topic: Physical Therapy (Active)     Point: Home exercise program (Active)    Learning Progress Summary    Learner Readiness Method Response Comment Documented by Status   Patient Acceptance E NR   03/10/17 1557 Active    Acceptance E VU,NR   03/09/17 1520 Done    Acceptance E,D VU,NR   03/06/17 1645 Done    Acceptance E VU Discussed benefits of activity.  03/04/17 1523 Done               Point: Precautions (Active)    Learning Progress Summary    Learner Readiness Method Response Comment Documented by Status   Patient Acceptance E NR   03/10/17 1557 Active    Acceptance E VU,NR   03/09/17 1520 Done    Acceptance E,D VU,NR   03/06/17 1645 Done                      User Key     Initials Effective Dates Name Provider Type Discipline     06/19/15 -  Ale Taylor, PT Physical Therapist PT     06/19/15 -  Jenifer Witt, PT Physical Therapist PT     06/19/15 -  Cassie Sheffield, PT Physical Therapist PT     03/14/16 -  Abhilash Meier, PT Physical Therapist PT                    PT Recommendation and Plan  Anticipated Equipment Needs At Discharge:  (to be determined. assess w/rollator walker)  Anticipated Discharge Disposition: skilled nursing facility, extended care facility (to be determined)  Planned Therapy Interventions: balance training, gait training, home exercise program, patient/family education, strengthening, transfer training  PT Frequency: daily, per priority policy  Plan of Care Review  Plan Of Care Reviewed With: patient  Progress: improving  Outcome Summary/Follow up Plan: Pt demo improved endurance with gait training, O2 sats did not drop below 89% on 3L O2 with gait training. PT educated pt with BLE ther ex to complete on her own to further improve endurance and strength. Cont with IPPT to increase strength and safety with mobility.           Outcome Measures       03/10/17 1515 03/10/17 1105 03/09/17 1443    How much help from another  person do you currently need...    Turning from your back to your side while in flat bed without using bedrails? 4  -SR  4  -EH    Moving from lying on back to sitting on the side of a flat bed without bedrails? 4  -SR  4  -EH    Moving to and from a bed to a chair (including a wheelchair)? 3  -SR  3  -EH    Standing up from a chair using your arms (e.g., wheelchair, bedside chair)? 3  -SR  4  -EH    Climbing 3-5 steps with a railing? 3  -SR  3  -EH    To walk in hospital room? 3  -SR  3  -EH    AM-PAC 6 Clicks Score 20  -SR  21  -EH    How much help from another is currently needed...    Putting on and taking off regular lower body clothing?  4  -TA     Bathing (including washing, rinsing, and drying)  3  -TA     Toileting (which includes using toilet bed pan or urinal)  3  -TA     Putting on and taking off regular upper body clothing  4  -TA     Taking care of personal grooming (such as brushing teeth)  3  -TA     Eating meals  4  -TA     Score  21  -TA     Functional Assessment    Outcome Measure Options AM-PAC 6 Clicks Basic Mobility (PT)  -SR AM-PAC 6 Clicks Daily Activity (OT)  -TA AM-PAC 6 Clicks Basic Mobility (PT)  -      User Key  (r) = Recorded By, (t) = Taken By, (c) = Cosigned By    Initials Name Provider Type     Ale Taylor, PT Physical Therapist    SR Jenifer Witt, PT Physical Therapist    TA Mike Eric, OT Occupational Therapist           Time Calculation:         PT Charges       03/10/17 1600          Time Calculation    Start Time 1515  -SR      PT Received On 03/10/17  -SR      Time Calculation- PT    Total Timed Code Minutes- PT 24 minute(s)  -SR        User Key  (r) = Recorded By, (t) = Taken By, (c) = Cosigned By    Initials Name Provider Type     Jenifer Witt, PT Physical Therapist          Therapy Charges for Today     Code Description Service Date Service Provider Modifiers Qty    [id number] HC GAIT TRAINING EA 15 MIN 3/10/2017 Jenifer Witt, PT GP 1     [id number]  PT THER PROC EA 15 MIN 3/10/2017 Jenifer Witt, PT GP 1          PT G-Codes  Outcome Measure Options: AM-PAC 6 Clicks Basic Mobility (PT)    Jenifer Witt, PT  3/10/2017

## 2017-03-10 NOTE — PROGRESS NOTES
Acute Care - Occupational Therapy Initial Evaluation  [facility]     Patient Name: Colleen Gonzalez  : 1965  MRN: [id number]  Today's Date: 3/10/2017  Onset of Illness/Injury or Date of Surgery Date: 17  Date of Referral to OT: 17  Referring Physician: Dr Moore    Admit Date: 3/1/2017       ICD-10-CM ICD-9-CM   1. Community acquired pneumonia J18.9 486   2. COPD exacerbation J44.1 491.21   3. Hypoxia R09.02 799.02   4. Impaired functional mobility, balance, gait, and endurance Z74.09 V49.89   5. Impaired mobility and ADLs Z74.09 799.89     Patient Active Problem List   Diagnosis   • COPD (chronic obstructive pulmonary disease)   • Tobacco abuse   • Acute on chronic respiratory failure   • Leukocytosis   • HCAP (healthcare-associated pneumonia)   • Hypothyroid   • Sepsis   • Anxiety and depression   • Homelessness     Past Medical History   Diagnosis Date   • Anxiety    • COPD (chronic obstructive pulmonary disease)    • Depression    • Hyperlipidemia    • Hypertension    • Hypothyroid    • Obesity      Past Surgical History   Procedure Laterality Date   • Hysterectomy       partial   • Cholecystectomy     • Back surgery     • Knee surgery Bilateral    • Cyst removal            OT ASSESSMENT FLOWSHEET (last 72 hours)      OT Evaluation       03/10/17 1105 17 1743 17 1443          Rehab Evaluation    Document Type evaluation  -TA  therapy note (daily note)  -      Subjective Information agree to therapy;complains of;pain  -TA  no complaints;agree to therapy   had just returned from bathroom with NSG  -      Patient Effort, Rehab Treatment good  -TA  good  -EH      Symptoms Noted During/After Treatment shortness of breath  -TA  shortness of breath;significant change in vital signs  -      General Information    Patient Profile Review yes  -TA        Onset of Illness/Injury or Date of Surgery Date 17  -TA        Referring Physician Dr Moore  -TA        General Observations Pt in  R geoff, 2L )2 per NC, IV intact LUE  -TA        Pertinent History Of Current Problem Pt presented to ED with dyspnea and productive cough; reported frequent falls d/t weakness over past few weeks; diagnosed with PNA, bacteremia, ESRD.  -TA        Precautions/Limitations fall precautions;oxygen therapy device and L/min  -TA        Prior Level of Function independent:;all household mobility;gait;transfer;bed mobility;ADL's  -TA        Equipment Currently Used at Home walker, rolling  -TA walker, rolling  -JOHN       Plans/Goals Discussed With patient;agreed upon  -TA        Risks Reviewed patient:;LOB;dizziness;increased discomfort;change in vital signs;lines disloged  -TA        Benefits Reviewed patient:;improve function;increase independence;increase strength;increase balance;decrease pain;increase knowledge  -TA        Barriers to Rehab medically complex;previous functional deficit   homeless, no family support per pt.  -TA        Living Environment    Lives With alone   Has been staying at homeless shelter  -TA        Living Arrangements homeless  -TA        Home Accessibility no concerns  -TA        Transportation Available  car;family or friend will provide  -JOHN       Clinical Impression    Date of Referral to OT 03/08/17  -TA        OT Diagnosis Impaired mobility and ADLs  -TA        Impairments Found (describe specific impairments) aerobic capacity/endurance;gait, locomotion, and balance;muscle performance  -TA        Patient/Family Goals Statement Breathe better  -TA        Criteria for Skilled Therapeutic Interventions Met yes;treatment indicated  -TA        Rehab Potential good, to achieve stated therapy goals  -TA        Therapy Frequency daily   Per priority policy  -TA        Anticipated Discharge Disposition inpatient rehabilitation facility;other (see comments)   Pulmonary rehab  -TA        Vital Signs    Post Systolic BP Rehab 117  -TA        Post Treatment Diastolic BP 92  -TA         Posttreatment Heart Rate (beats/min) 116  -TA        Pre SpO2 (%) 91  -TA  91  -EH      O2 Delivery Pre Treatment supplemental O2  -TA  supplemental O2  -EH      Intra SpO2 (%) 86  -TA  79   with talking and ther ex  -EH      O2 Delivery Intra Treatment supplemental O2  -TA  supplemental O2  -EH      Post SpO2 (%) 93  -TA  89  -EH      O2 Delivery Post Treatment supplemental O2  -TA  supplemental O2  -EH      Pre Patient Position Supine  -TA  Sitting  -EH      Intra Patient Position Standing  -TA  Sitting  -EH      Post Patient Position Side Lying  -TA  Sitting  -EH      Recovery Time 1 minute  -TA        Pain Assessment    Pain Assessment 0-10  -TA  No/denies pain  -EH      Pain Score 6  -TA  0  -EH      Post Pain Score 6  -TA  0  -EH      Pain Type Chronic pain  -TA        Pain Location Back  -TA        Pain Intervention(s) Medication (See MAR);Repositioned;Ambulation/increased activity  -TA        Response to Interventions tolerated  -TA        Vision Assessment/Intervention    Visual Impairment WFL  -TA        Cognitive Assessment/Intervention    Current Cognitive/Communication Assessment functional  -TA  functional  -EH      Orientation Status oriented x 4  -TA  oriented x 4  -EH      Follows Commands/Answers Questions 100% of the time  -TA  100% of the time;able to follow single-step instructions  -EH      Personal Safety mild impairment;decreased awareness, need for safety  -TA        Personal Safety Interventions elopement precautions initiated;fall prevention program maintained;gait belt;nonskid shoes/slippers when out of bed;supervised activity  -TA        ROM (Range of Motion)    General ROM no range of motion deficits identified  -TA        General ROM Detail BUEs  -TA        MMT (Manual Muscle Testing)    General MMT Assessment upper extremity strength deficits identified  -TA        General MMT Assessment Detail 4+/5 BUEs  -TA        Bed Mobility, Assessment/Treatment    Bed Mob, Supine to Sit,  [place] independent  -TA        Bed Mob, Sit to Supine, [place] independent  -TA        Transfer Assessment/Treatment    Transfers, Sit-Stand [place] stand by assist  -TA  stand by assist  -EH      Transfers, Stand-Sit [place] stand by assist  -TA  stand by assist  -EH      Transfer, Impairments other (see comments)   O2 desats with exertion  -TA        Lower Body Dressing Assessment/Training    LB Dressing Assess/Train, Clothing Type donning:;pants;slipper socks  -TA        LB Dressing Assess/Train, Position sitting;standing  -TA        LB Dressing Assess/Train, [place] set up required;supervision required  -TA        LB Dressing Assess/Train, Comment Pt's O2 desats with exertion on supplemental O2  -TA        Motor Skills/Interventions    Additional Documentation Balance Skills Training (Group)  -TA        Balance Skills Training    Sitting-Level of Assistance Independent  -TA  Independent  -      Sitting-Balance Support Feet supported;Feet unsupported  -TA  Feet supported;Feet unsupported;Left upper extremity supported;Right upper extremity supported  -      Sitting-Balance Activities Lateral lean;Forward lean;Reaching across midline;Trunk control activities  -TA  Reaching for objects;Trunk control activities  -      Sitting # of Minutes 14  -TA  25  -EH      Standing-Level of Assistance Close supervision  -TA        Static Standing Balance Support No upper extremity supported  -TA        Standing-Balance Activities Weight Shift A-P;Weight Shift R-L  -TA        Therapy Exercises    Bilateral Lower Extremities   AROM:;20 reps;ankle pumps/circles;hip flexion;LAQ   SPO2 drops to 29%  -      Bilateral Upper Extremity AROM:;10 reps;sitting;elbow flexion/extension;hand pumps;shoulder extension/flexion  -TA  10 reps;elbow flexion/extension;hand pumps;shoulder extension/flexion;AROM:   SpO2 drops to 83 %  -      Exercise Protocols   --   brushing hair; BUE; PLB technique with biofedback  SpO2.  -EH      Sensory Assessment/Intervention    Light Touch LUE;RUE  -TA        LUE Light Touch WNL  -TA        RUE Light Touch WNL  -TA        General Therapy Interventions    Planned Therapy Interventions activity intolerance;ADL retraining;energy conservation;home exercise program;strengthening;transfer training  -TA        Positioning and Restraints    Pre-Treatment Position in bed  -TA  in bed  -EH      Post Treatment Position bed  -TA  bed  -EH      In Bed side lying right;call light within reach;encouraged to call for assist;exit alarm on;side rails up x2  -TA  sitting EOB;call light within reach;encouraged to call for assist  -EH        User Key  (r) = Recorded By, (t) = Taken By, (c) = Cosigned By    Initials Name Effective Dates     Ale Taylor, PT 06/19/15 -     TA Mike Eric OT 03/14/16 -     JOHN Worrell RN 02/22/17 -            Occupational Therapy Education     Title: PT OT SLP Therapies (Active)     Topic: Occupational Therapy (Active)     Point: Home exercise program (Done)    Description: Instruct learner(s) on appropriate technique for monitoring, assisting and/or progressing therapeutic exercises/activities.    Learning Progress Summary    Learner Readiness Method Response Comment Documented by Status   Patient Acceptance E,TB,D VU,NR Initiated education with adaptive breathing, BUE HEP; reinforced need for call for assist with OOB activities. TA 03/10/17 1145 Done               Point: Precautions (Done)    Description: Instruct learner(s) on prescribed precautions during self-care and functional transfers.    Learning Progress Summary    Learner Readiness Method Response Comment Documented by Status   Patient Acceptance E,TB,D VU,NR Initiated education with adaptive breathing, BUE HEP; reinforced need for call for assist with OOB activities. TA 03/10/17 1145 Done                      User Key     Initials Effective Dates Name Provider Type Discipline    TA 03/14/16 -   Mike Eric, OT Occupational Therapist OT                  OT Recommendation and Plan  Anticipated Discharge Disposition: inpatient rehabilitation facility, other (see comments) (Pulmonary rehab)  Planned Therapy Interventions: activity intolerance, ADL retraining, energy conservation, home exercise program, strengthening, transfer training  Therapy Frequency: daily (Per priority policy)  Plan of Care Review  Plan Of Care Reviewed With: patient  Outcome Summary/Follow up Plan: Pt presents with deficits in occupational endurance, knowledge deficits re pulmonary techniques, fxl mobility; will benefit from skilled OT services to address deficits, facilitate increased fxl I, safe transition to discharge disposition. Recommend IP pulmonary rehab.          OT Goals       03/10/17 1146          Transfer Training OT LTG    Transfer Training OT LTG, Date Established 03/10/17  -TA      Transfer Training OT LTG, Time to Achieve 1 wk  -TA      Transfer Training OT LTG, Activity Type bed to chair /chair to bed;sit to stand/stand to sit;toilet  -TA      Transfer Training OT LTG, [place] Level supervision required  -TA      Transfer Training OT LTG, Assist Device --   AAD  -TA      Transfer Training OT LTG, Outcome goal ongoing  -TA      Toileting OT LTG    Toileting Goal OT LTG, Date Established 03/10/17  -TA      Toileting Goal OT LTG, Time to Achieve 1 wk  -TA      Toileting Goal OT LTG, [place] Level conditional independence  -TA      Toileting Goal OT LTG, Outcome goal ongoing  -TA      Activity Tolerance OT LTG    Activity Tolerance Goal OT LTG, Date Established 03/10/17  -TA      Activity Tolerance Goal OT LTG, Time to Achieve 1 wk  -TA      Activity Tolerance Goal OT LTG, Activity Level 10 min activity   with 1 rest break  -TA      Activity Tolerance Goal OT LTG, Outcome goal ongoing  -TA        User Key  (r) = Recorded By, (t) = Taken By, (c) = Cosigned By    Initials Name Provider Type    MICAH FLOOD  GRIFFIN Eric Occupational Therapist                Outcome Measures       03/10/17 1105 03/09/17 1443       How much help from another person do you currently need...    Turning from your back to your side while in flat bed without using bedrails?  4  -EH     Moving from lying on back to sitting on the side of a flat bed without bedrails?  4  -EH     Moving to and from a bed to a chair (including a wheelchair)?  3  -EH     Standing up from a chair using your arms (e.g., wheelchair, bedside chair)?  4  -EH     Climbing 3-5 steps with a railing?  3  -EH     To walk in hospital room?  3  -EH     AM-PAC 6 Clicks Score  21  -EH     How much help from another is currently needed...    Putting on and taking off regular lower body clothing? 4  -TA      Bathing (including washing, rinsing, and drying) 3  -TA      Toileting (which includes using toilet bed pan or urinal) 3  -TA      Putting on and taking off regular upper body clothing 4  -TA      Taking care of personal grooming (such as brushing teeth) 3  -TA      Eating meals 4  -TA      Score 21  -TA      Functional Assessment    Outcome Measure Options AM-PAC 6 Clicks Daily Activity (OT)  -TA AM-PAC 6 Clicks Basic Mobility (PT)  -EH       User Key  (r) = Recorded By, (t) = Taken By, (c) = Cosigned By    Initials Name Provider Type    RUMA Taylor, PT Physical Therapist    TA Mike Eric OT Occupational Therapist          Time Calculation:   OT Start Time: 1105 (ttc 0 minutes)    Therapy Charges for Today     Code Description Service Date Service Provider Modifiers Qty    [id number]  OT EVAL MOD COMPLEXITY 4 3/10/2017 Mike Eric OT GO 1               Mike Eric OT  3/10/2017

## 2017-03-10 NOTE — PROGRESS NOTES
"Continued Stay Note  [facility]     Patient Name: Colleen Gonzalez  MRN: [id number]  Today's Date: 3/10/2017    Admit Date: 3/1/2017          Discharge Plan       03/10/17 1518    Case Management/Social Work Plan    Plan discharge plan    Patient/Family In Agreement With Plan yes    Additional Comments SHANITA and CM have discussed discharge plans.  ID note indicated possible LTACH transfer which pt. also stated was a plan.  SHANITA spoke with Dr. Casiano regarding this discharge plan; he reported that pt. will likely be ready for discharge on Monday.  SHANITA gave report to CM.  SHANITA spoke with pt. about possible discharge on Monday, which pt. seemed agreeable with.  She reports that she must be able to pay for her storage unit 3/17/17 or she will lose all of her belongings.  Pt. states that she hopes she is able to stay with her brother when she is discharged so that she can work towards getting her car back and look for an apartment.  SHANITA stated that if pt. does not have a place to go stay, she would have to consider a homeless shelter.  Pt. stated she would never return to the homeless shelter because \"criminals\" are there.  Pt. also reported some difficulty with her SSDI income.  SHANITA will assist pt. in calling the [employer] on Monday.              Discharge Codes     None        Expected Discharge Date and Time     Expected Discharge Date Expected Discharge Time    Mar 4, 2017             SOPHIE Betancourt Dr. still requests the [facility] evaluate patient prior to discharge.  [facility] will be called to evaluate pt. On day of discharge.  "

## 2017-03-10 NOTE — PAYOR COMM NOTE
"Colleen Gonzalez (52 y.o. Female)     UPDATED CLINICALS FOR ADDITIONAL DAYS   THANK YOU   [phone]       Date of Birth Social Security Number Address Home Phone MRN    [id number]  [id number] [facility] [address] [phone] [id number]    [employer] Marital Status          None Single       Admission Date Admission Type Admitting Provider Attending Provider Department, Room/Bed    3/1/17 Emergency Robb Casiano MD Opii, [place], MD [facility] 5G, S567/1    Discharge Date Discharge Disposition Discharge Destination                      Attending Provider: Robb Casiano MD     Allergies:  Aspirin, Ibuprofen    Isolation:  None   Infection:  MRSA (03/09/17)   Code Status:  FULL    Ht:  68\" (172.7 cm)   Wt:  242 lb 8.1 oz (110 kg)    Admission Cmt:  None   Principal Problem:  Sepsis [A41.9]                 Active Insurance as of 3/1/2017     Primary Coverage     Payor Plan Insurance Group Employer/Plan Group    [employer] [place] [employer] MEDICAID      Payor Plan Address Payor Plan Phone Number Effective From Effective To    [address] [address] [phone] 3/1/2017     [place] [address]       Subscriber Name Subscriber Birth Date Member ID       COLLEEN GONZALEZ 1965 [id number]                 Emergency Contacts      (Rel.) Home Phone Work Phone Mobile Phone    Eneida Way (Sister) -- -- [phone]    Raquel Dumont (Sister) -- -- [phone]            Vital Signs (last 24 hours)       03/09 0700  -  03/10 0659 03/10 0700  -  03/10 1541   Most Recent    Temp (°F) 98.2 -  98.7       98.2 (36.8)    Heart Rate 99 -  113      113     113    Resp 17 -  18      18     18    /77 -  117/92       117/92    SpO2 (%) 90 -  92      93     93          Lab Results (last 72 hours)     Procedure Component Value Units Date/Time    CBC (No Diff) [[id number]]  (Abnormal) Collected:  03/08/17 0505    Specimen:  Blood Updated:  03/08/17 0535     WBC 10.86 (H) 10*3/mm3      RBC 4.33 " 10*6/mm3      Hemoglobin 13.2 g/dL      Hematocrit 40.4 %      MCV 93.3 fL      MCH 30.5 pg      MCHC 32.7 g/dL      RDW 14.9 (H) %      RDW-SD 50.9 fl      MPV 9.2 fL      Platelets 407 10*3/mm3     Basic Metabolic Panel [[id number]]  (Abnormal) Collected:  03/08/17 0505    Specimen:  Blood Updated:  03/08/17 0619     Glucose 116 (H) mg/dL      BUN 19 mg/dL      Creatinine 0.80 mg/dL      Sodium 139 mmol/L      Potassium 4.4 mmol/L      Chloride 102 mmol/L      CO2 34.0 (H) mmol/L      Calcium 10.3 mg/dL      eGFR Non African Amer 75 mL/min/1.73      BUN/Creatinine Ratio 23.8      Anion Gap 3.0 mmol/L     Narrative:       National Kidney Foundation Guidelines    Stage                           Description                             GFR                      1                               Normal or High                          90+  2                               Mild decrease                            60-89  3                               Moderate decrease                   30-59  4                               Severe decrease                       15-29  5                               Kidney failure                             <15    CBC (No Diff) [[id number]]  (Abnormal) Collected:  03/09/17 0509    Specimen:  Blood Updated:  03/09/17 0535     WBC 12.57 (H) 10*3/mm3      RBC 4.49 10*6/mm3      Hemoglobin 13.6 g/dL      Hematocrit 42.3 %      MCV 94.2 fL      MCH 30.3 pg      MCHC 32.2 g/dL      RDW 14.9 (H) %      RDW-SD 51.5 fl      MPV 9.3 fL      Platelets 379 10*3/mm3     Basic Metabolic Panel [[id number]]  (Abnormal) Collected:  03/09/17 0509    Specimen:  Blood Updated:  03/09/17 0609     Glucose 95 mg/dL      BUN 15 mg/dL      Creatinine 0.80 mg/dL      Sodium 138 mmol/L      Potassium 4.0 mmol/L      Chloride 102 mmol/L      CO2 32.0 (H) mmol/L      Calcium 10.1 mg/dL      eGFR Non African Amer 75 mL/min/1.73      BUN/Creatinine Ratio 18.8      Anion Gap 4.0 mmol/L     Narrative:       National Kidney  Foundation Guidelines    Stage                           Description                             GFR                      1                               Normal or High                          90+  2                               Mild decrease                            60-89  3                               Moderate decrease                   30-59  4                               Severe decrease                       15-29  5                               Kidney failure                             <15    Vancomycin, Trough Please draw at 0830 prior to 0900 dose [[id number]]  (Normal) Collected:  03/09/17 0830    Specimen:  Blood Updated:  03/09/17 0908     Vancomycin Trough 16.60 mcg/mL     AFB Culture [[id number]] Collected:  03/08/17 1615    Specimen:  Sputum from Lung Updated:  03/09/17 1053     AFB Stain        No acid fast bacilli seen on concentrated smear    Histoplasma Galactomannan Ag Ur [[id number]] Collected:  03/06/17 1835    Specimen:  Urine Updated:  03/09/17 1519     Histoplasma Galactomannan Ag Ur 0.00 ng/mL       The quantitative range of the assay is 0.30-25.00 ng/mL.  Antigen concentrations below 0.30 ng/mL or greater than 25.00 ng/mL  fall outside the linear range of the assay and therefore cannot be  quantified.  This test was developed and its performance characteristics  determined by [employer]. It has not been cleared or approved by the  Food and Drug Administration.       Narrative:       Performed at:  [address] [place] [address]  [id number]  : Ender Sanchez MD, Phone:  [phone]    AFB Culture [[id number]]  (Normal) Collected:  03/02/17 1514    Specimen:  Sputum from Oropharynx Updated:  03/09/17 1601     AFB Culture No AFB isolated at 1 week      AFB Stain        No acid fast bacilli seen on concentrated smear    AFB Culture [[id number]]  (Normal) Collected:  03/03/17 0800    Specimen:  Sputum from Oropharynx Updated:  03/10/17 1001     AFB Culture No  AFB isolated at 1 week      AFB Stain        No acid fast bacilli seen on concentrated smear    Respiratory Culture [[id number]]  (Abnormal)  (Susceptibility) Collected:  03/07/17 1000    Specimen:  Sputum from Cough Updated:  03/10/17 1329     Respiratory Culture Scant growth (1+) Pseudomonas aeruginosa (A)              Light growth (2+) Staphylococcus aureus, MRSA (C)        Methicillin resistant Staphylococcus aureus, Patient may be an isolation risk.  This isolate does not demonstrate inducible clindamycin resistance in vitro.           Light growth (2+) Yeast isolated (A)      Gram Stain Result Many (4+) WBCs seen       Rare (1+) Epithelial cells seen       Rare (1+) Yeast       Rare (1+) Endogenous respiratory patience     Susceptibility      Pseudomonas aeruginosa     AMY     Aztreonam <=8 ug/ml Susceptible     Cefepime <=8 ug/ml Susceptible     Ceftazidime 4 ug/ml Susceptible     Gentamicin <=4 ug/ml Susceptible     Levofloxacin >4 ug/ml Resistant     Meropenem <=1 ug/ml Susceptible     Piperacillin + Tazobactam <=16 ug/ml Susceptible     Tobramycin <=4 ug/ml Susceptible                Susceptibility      Staphylococcus aureus, MRSA     AMY     Ciprofloxacin >2 ug/ml Resistant     Clindamycin <=0.5 ug/ml Susceptible     Erythromycin >4 ug/ml Resistant     Gentamicin <=4 ug/ml Susceptible     Levofloxacin >4 ug/ml Resistant     Linezolid 2 ug/ml Susceptible     Oxacillin >2 ug/ml Resistant     Penicillin G >8 ug/ml Resistant     Quinupristin + Dalfopristin <=0.5 ug/ml Susceptible     Rifampin <=1 ug/ml Susceptible     Tetracycline <=4 ug/ml Susceptible     Trimethoprim + Sulfamethoxazole <=0.5/9.5 ug/ml Susceptible     Vancomycin 1 ug/ml Susceptible                          Imaging Results (last 72 hours)     ** No results found for the last 72 hours. **        Orders (last 24 hrs)     Start     Ordered    03/10/17 1400  nystatin (MYCOSTATIN) powder  Every 8 Hours Scheduled      03/10/17 1116    03/09/17 2100   miconazole (MICOTIN) vaginal suppository 200 mg  Nightly      03/09/17 1112    03/09/17 1800  DIET MESSAGE 3/9/17 Please send red jello and sprite with lunch and dinner trays. Thanks!  Daily With Lunch & Dinner     Comments:  3/9/17 Please send red jello and sprite with lunch and dinner trays. Thanks!    03/09/17 1208    03/09/17 1209  DIET MESSAGE 3/9/17 Please send oatmeal, rice krispies, milk, coffee, and muffin for bfast. Thanks!  Daily With Breakfast     Comments:  3/9/17 Please send oatmeal, rice krispies, milk, coffee, and muffin for bfast. Thanks!    03/09/17 1208    03/09/17 1045  trolamine salicylate (ASPERCREME) 10 % cream 1 application  2 Times Daily PRN      03/09/17 1045    03/09/17 0800  predniSONE (DELTASONE) tablet 20 mg  Daily With Breakfast      03/08/17 1618    03/07/17 2338  benzonatate (TESSALON) capsule 200 mg  3 Times Daily PRN      03/07/17 2338    03/07/17 0900  vancomycin (VANCOCIN) IVPB 1 g (premix) in Dextrose 5% 200 mL  Every 12 Hours      03/07/17 0749    03/06/17 1620  LORazepam (ATIVAN) tablet 0.5 mg  Every 12 Hours PRN      03/06/17 1701    03/03/17 1315  cefepime (MAXIPIME) 2 g/100 mL 0.9% NS (mbp)  Every 8 Hours      03/03/17 1236    03/02/17 1545  HYDROcod Polst-CPM Polst ER (TUSSIONEX PENNKINETIC) 10-8 MG/5ML ER suspension 5 mL  Every 12 Hours      03/02/17 1509    03/02/17 1215  Pharmacy Consult - MT  Daily      03/02/17 1132    03/02/17 0900  cetirizine (zyrTEC) tablet 10 mg  Daily      03/01/17 1941 03/02/17 0900  fluticasone (FLONASE) 50 MCG/ACT nasal spray 2 spray  Daily      03/01/17 1941 03/02/17 0900  furosemide (LASIX) tablet 40 mg  Daily      03/01/17 1941 03/02/17 0900  pantoprazole (PROTONIX) EC tablet 40 mg  Every 24 Hours      03/01/17 1941 03/02/17 0600  levothyroxine (SYNTHROID, LEVOTHROID) tablet 50 mcg  Every Early Morning      03/01/17 1941 03/02/17 0150  ipratropium-albuterol (DUO-NEB) nebulizer solution 3 mL  Every 4 Hours PRN      03/02/17  0150    03/01/17 2100  DULoxetine (CYMBALTA) DR capsule 60 mg  Every 12 Hours Scheduled      03/01/17 1941 03/01/17 2100  gabapentin (NEURONTIN) capsule 400 mg  Every 12 Hours Scheduled      03/01/17 1941 03/01/17 2100  montelukast (SINGULAIR) tablet 10 mg  Nightly      03/01/17 1941 03/01/17 2100  QUEtiapine fumarate ER (SEROquel XR) tablet 300 mg  Nightly      03/01/17 1941 03/01/17 2100  tiZANidine (ZANAFLEX) tablet 4 mg  Every 12 Hours Scheduled      03/01/17 1941 03/01/17 2030  ipratropium-albuterol (DUO-NEB) nebulizer solution 3 mL  4 Times Daily - RT      03/01/17 1648    03/01/17 2000  enoxaparin (LOVENOX) syringe 40 mg  Daily      03/01/17 1724 03/01/17 1816  HYDROcodone-acetaminophen (NORCO) 5-325 MG per tablet 1 tablet  Every 4 Hours PRN      03/01/17 1816    03/01/17 1800  sennosides-docusate sodium (SENOKOT-S) 8.6-50 MG tablet 2 tablet  2 Times Daily      03/01/17 1724    03/01/17 1730  nicotine (NICODERM CQ) 21 MG/24HR patch 1 patch  Every 24 Hours Scheduled      03/01/17 1724    03/01/17 1724  ondansetron (ZOFRAN) tablet 4 mg  Every 6 Hours PRN      03/01/17 1724    03/01/17 1724  ondansetron (ZOFRAN) injection 4 mg  Every 6 Hours PRN      03/01/17 1724    03/01/17 1724  sodium chloride 0.9 % flush 1-10 mL  As Needed      03/01/17 1724    03/01/17 1724  acetaminophen (TYLENOL) tablet 650 mg  Every 4 Hours PRN      03/01/17 1724    03/01/17 1724  bisacodyl (DULCOLAX) EC tablet 5 mg  Daily PRN      03/01/17 1724    03/01/17 1724  Pharmacy to dose vancomycin  Continuous PRN      03/01/17 1724    03/01/17 1217  sodium chloride 0.9 % flush 10 mL  As Needed      03/01/17 1217    Unscheduled  Oxygen Therapy- Nasal Cannula; 2 LPM; Titrate for spO2: equal to or greater than, 92%  As Needed      03/01/17 1217    Unscheduled  Wound care  As Needed      03/09/17 1301    --  furosemide (LASIX) 40 MG tablet  Daily      03/01/17 1242    --  hydrOXYzine (ATARAX) 50 MG tablet  4 Times Daily PRN       03/01/17 1242    --  tiZANidine (ZANAFLEX) 4 MG tablet  2 Times Daily      03/01/17 1242    --  QUEtiapine XR (SEROquel XR) 300 MG 24 hr tablet  Nightly      03/01/17 1242    --  ipratropium (ATROVENT) 0.06 % nasal spray  3 Times Daily      03/01/17 1242    --  cetirizine (zyrTEC) 10 MG tablet  Daily      03/01/17 1242    --  DULoxetine (CYMBALTA) 60 MG capsule  2 Times Daily      03/01/17 1242    --  gabapentin (NEURONTIN) 800 MG tablet  2 Times Daily      03/01/17 1242    --  levothyroxine (SYNTHROID, LEVOTHROID) 50 MCG tablet  Daily      03/01/17 1242    --  fluticasone (FLONASE) 50 MCG/ACT nasal spray  Daily      03/01/17 1519    --  lansoprazole (PREVACID) 15 MG capsule  Daily      03/01/17 1519    --  montelukast (SINGULAIR) 10 MG tablet  Nightly      03/01/17 1519    --  albuterol (PROVENTIL) (2.5 MG/3ML) 0.083% nebulizer solution  Every 4 Hours PRN      03/01/17 1519    --  tiotropium (SPIRIVA) 18 MCG per inhalation capsule  Daily - RT      03/01/17 1519    --  SCANNED - TELEMETRY        03/01/17 0000    --  fluticasone-salmeterol (ADVAIR) 250-50 MCG/DOSE DISKUS  2 Times Daily      03/02/17 1024    --  SCANNED - TELEMETRY        03/01/17 0000    --  SCANNED - TELEMETRY        03/01/17 0000    --  theophylline (UNIPHYL) 600 MG 24 hr tablet  2 Times Daily With Meals      03/04/17 0826    --  SCANNED - TELEMETRY        03/01/17 0000    --  SCANNED - TELEMETRY        03/01/17 0000    --  SCANNED - TELEMETRY        03/01/17 0000    --  SCANNED - TELEMETRY        03/01/17 0000    --  SCANNED - TELEMETRY        03/01/17 0000    --  SCANNED - TELEMETRY        03/01/17 0000    --  pravastatin (PRAVACHOL) 40 MG tablet  Daily      03/08/17 2313    --  atenolol (TENORMIN) 50 MG tablet  Daily      03/08/17 2313    --  SCANNED - TELEMETRY        03/01/17 0000             Physician Progress Notes (last 72 hours) (Notes from 3/7/2017  3:41 PM through 3/10/2017  3:41 PM)      Mary Moore DO at 3/7/2017  3:48 PM   Version 1 of 1             [facility] Medicine Services  INPATIENT PROGRESS NOTE    Date of Admission: 3/1/2017  Length of Stay: 6  Primary Care Physician: RADHA Pedraza    Subjective-- HPI/Events overnight/ROS/CC- Hospital follow visit.  Detailed ROS not detailed as performed below      Sitting up on side of bed, not feeling well again today. C/o achy. Denies significant sob. Denies cp.     Objective      Temp:  [98.4 °F (36.9 °C)] 98.4 °F (36.9 °C)  Heart Rate:  [] 100  Resp:  [16-24] 20  BP: (133-140)/() 133/84    Physical Exam:  Physical Exam    General Assessment: No acute cardiopulmonary distress. Well developed and well nourished. Malaise      CVS: RRR, S1S2 normal     Resp: + Bibasilar crackles, pos wheezing, resp non-labored     Abd: soft, NT, ND, normal BS, no guarding or peritoneal signs     Ext: No edema, both calves are symmetric and NTTP     Neuro: Nonfocal     Skin: W/D/I. No rash.     Psych: flattened affect, anxious        Results Review:    I have reviewed the labs, radiology results and diagnostic studies.  Lab Results (last 24 hours)     Procedure Component Value Units Date/Time    Vancomycin, Trough Please draw at 1700 prior to 1800 dose [[id number]]  (Abnormal) Collected:  03/06/17 1728    Specimen:  Blood Updated:  03/06/17 1817     Vancomycin Trough 8.20 (L) mcg/mL     Histoplasma Galactomannan Ag Ur [[id number]] Collected:  03/06/17 1835    Specimen:  Urine Updated:  03/06/17 1835    CBC & Differential [[id number]] Collected:  03/07/17 0615    Specimen:  Blood Updated:  03/07/17 0736    Narrative:       The following orders were created for panel order CBC & Differential.  Procedure                               Abnormality         Status                     ---------                               -----------         ------                     CBC Auto Differential[[id number]]         Abnormal            Final result                 Please view results for  these tests on the individual orders.    CBC Auto Differential [[id number]]  (Abnormal) Collected:  03/07/17 0615    Specimen:  Blood Updated:  03/07/17 0736     WBC 13.30 (H) 10*3/mm3      RBC 4.13 10*6/mm3      Hemoglobin 12.6 g/dL      Hematocrit 38.1 %      MCV 92.3 fL      MCH 30.5 pg      MCHC 33.1 g/dL      RDW 14.8 (H) %      RDW-SD 49.8 fl      MPV 9.2 fL      Platelets 380 10*3/mm3      Neutrophil % 66.3 %      Lymphocyte % 18.2 (L) %      Monocyte % 10.5 %      Eosinophil % 0.4 %      Basophil % 0.5 %      Immature Grans % 4.1 (H) %      Neutrophils, Absolute 8.84 (H) 10*3/mm3      Lymphocytes, Absolute 2.42 10*3/mm3      Monocytes, Absolute 1.39 (H) 10*3/mm3      Eosinophils, Absolute 0.05 (L) 10*3/mm3      Basophils, Absolute 0.06 10*3/mm3      Immature Grans, Absolute 0.54 (H) 10*3/mm3     Basic Metabolic Panel [[id number]]  (Abnormal) Collected:  03/07/17 0615    Specimen:  Blood Updated:  03/07/17 0750     Glucose 71 mg/dL      BUN 17 mg/dL      Creatinine 0.80 mg/dL      Sodium 140 mmol/L      Potassium 4.2 mmol/L      Chloride 99 mmol/L      CO2 37.0 (H) mmol/L      Calcium 9.3 mg/dL      eGFR Non African Amer 75 mL/min/1.73      BUN/Creatinine Ratio 21.3      Anion Gap 4.0 mmol/L     Narrative:       National Kidney Foundation Guidelines    Stage                           Description                             GFR                      1                               Normal or High                          90+  2                               Mild decrease                            60-89  3                               Moderate decrease                   30-59  4                               Severe decrease                       15-29  5                               Kidney failure                             <15    Cryptococcal Antigen [[id number]]  (Normal) Collected:  03/07/17 0615    Specimen:  Blood Updated:  03/07/17 0830     Crypto Ag Negative     QuantiFERON TB Client Incubated [[id number]]   (Abnormal) Collected:  03/01/17 1934    Specimen:  Blood Updated:  03/07/17 1311     QuantiFERON-TB Gold In Tube Indeterminate (A)       Mitogen (positive control) gave low response.  This may indicate anergy or immune suppression. Early draws and  extended transit time may also result in low positive control and  indeterminate results.  The specimen received for QuantiFERON testing was incubated by the  ordering institution.  Specific procedures outlined in our Directory  of Services and in the package insert for the QuantiFERON Gold  (In Tube) test must be followed to enable for proper stimulation of  cells for the production of interferon gamma.        QuantiFERON Criteria Comment       To be considered positive a specimen should have a TB Ag minus Nil  value greater than or equal to 0.35 IU/mL and in addition the TB Ag  minus Nil value must be greater than or equal to 25% of the Nil  value. There may be insufficient information in these values to  differentiate between some negative and some indeterminate test  values.        QuantiFERON TB Ag Value 0.03 IU/mL      QuantiFERON Nil Value 0.04 IU/mL      QuantiFERON Mitogen Value 0.40 IU/mL      QFT TB Ag minus Nil Value <0.00 IU/mL      Interpretation Comment       The QuantiFERON TB Gold (in Tube) assay is intended for use as an aid  in the diagnosis of TB infection. Negative results suggest that there  is no TB infection. In patients with high suspicion of exposure, a  negative test should be repeated. A positive test indicates infection  with Mycobacterium tuberculosis. Among individuals without  tuberculosis infection, a positive test may be due to exposure to  M. kansasii, M. szulgai or M. marinum. On the Internet, go to  cdc.gov/tb for further details.       Narrative:       Performed at:  [address]  [id number]  : Artie Wang PhD, Phone:  [phone]    Respiratory Culture [[id number]] Collected:   03/07/17 1000    Specimen:  Sputum from Cough Updated:  03/07/17 1422     Gram Stain Result Many (4+) WBCs seen       Rare (1+) Epithelial cells seen       Rare (1+) Yeast       Rare (1+) Endogenous respiratory patience     Blastomyces Antigen [[id number]] Collected:  03/07/17 1530    Specimen:  Urine from Urine, Clean Catch Updated:  03/07/17 1530            Culture Data:  Radiology Data:   Cultures:    BLOOD CULTURE   Date Value Ref Range Status   03/01/2017 No growth at 3 days  Preliminary   03/01/2017 No growth at 3 days  Preliminary     RESPIRATORY CULTURE   Date Value Ref Range Status   03/03/2017 Moderate growth (3+) Staphylococcus aureus (A)  Preliminary   03/03/2017 Scant growth (1+) Normal Respiratory Patience  Preliminary   03/02/2017 Light growth (2+) Pseudomonas aeruginosa (A)  Preliminary   03/02/2017 Moderate growth (3+) Staphylococcus aureus (A)  Preliminary       I have reviewed the medications.        Assessment/Plan     Assessment/Problem List  Patient is a 52-year-old  female with history of noncompliance. She smokes 2-3 packs of cigarettes per day. She reently went off her psych (Bipolar) meds. She [facility] ED with increased shortness of breath and cough and fever. Reports recent hospitalization to [facility] about a week ago but has not gotten any better.           Sepsis  --meets criteria due to febrile, hypoxia, tachycardia, leukocytosis and infectious source  --as below, due to social situation, and now cultures positive for MRSA/pseudomona, ID consulted, awaiting eval/rec      A/C hypoxic resp failure/Pneumonia NASIM( vs neoplasm less likely)  --IV cefepime and vanc   -- resp culture + MRSA and Pseudomona  -- AFB cx pending, cont isolation  -- wili will need Bronchoscopy   --CXr in am     Concern for TB  -- upper lobe but denies hemoptysis or weight loss or known exposures  - sick for about a month.  --  AFB sent, follow AFB sputum #2 and #3  --  neg pressure  "room      Acute on chronic respiratory failure/COPD  --has been told she needs oxygen at home in the past but  Has refused to wear it  --oxygen PRN  -- on goinging tobacco abuse      Bipolar disorder/homeless  --suspect schizophrenia also given paranoia and hallucination  --pt recently stopped psych meds a week ago because they were causing her to hallucinate  --pt needs psych eval, but unfortunately we do not have inpt psych service.   --if stable for dc, she'll need to follow up with primary psychiatrist, otherwise, may need to have the [facility] eval. i do feel at this time she will need [facility] eval.  She seems withdrawn and Depressed. I dont believe it would be safe to let her go home without eval.   -- SW consulted     Left chest pain  -- likely pleurisy  -push IS/FLutter   -- add toradol prn     Hypothyroidism  -- TSH WNL  -- cont home meds     Tobacco abuse  -- counseled, cessation advised    Elevated SED and CRP      Nausea/HA  -- could  Have been withdrawing from psych meds  -- resolved now    Dispo: TBD. Will be difficult, pt homeless.     Mary Moore DO   03/07/17   3:48 PM    Please note that portions of this note may have been completed with a voice recognition program. Efforts were made to edit the dictations, but occasionally words are mistranscribed.         Electronically signed by Mary Moore DO at 3/7/2017  4:00 PM      Robert Self MD at 3/8/2017 10:12 AM  Version 3 of 3         [facility] Progress Note    Date of Admission: 3/1/2017      Antibiotics:  Cefepime, Vanc, Prednisone    CC:   Chief Complaint   Patient presents with   • Shortness of Breath       S: No f/c/s. Still with cough and SOA. Has a hard time sleeping at night. O2 sats improving. Patient wants to go to her brother's house to complete IV ABX therapy. Concerned about her impounded car. \"Has all of my belongings in it\". Has pleuritic CP.   O:  Visit Vitals   • /78 (BP Location: Right arm, Patient Position: Lying)   • Pulse " "102   • Temp 96.6 °F (35.9 °C) (Oral)   • Resp 20   • Ht 68\" (172.7 cm)   • Wt 243 lb 9.6 oz (110 kg)   • SpO2 95%   • BMI 37.04 kg/m2     Temp (24hrs), Av.8 °F (36.6 °C), Min:96.6 °F (35.9 °C), Max:98.9 °F (37.2 °C)      PE:   GENERAL: Chronically ill appearing, unkept appearance.  Supplemental oxygen  HEENT: Normocephalic, atraumatic. PERRL. EOMI. No conjunctival injection. No icterus. Oropharynx clear without evidence of thrush or exudate. Dental disease with missing teeth.   NECK: Supple without nuchal rigidity  LYMPH: No cervical, axillary or inguinal lymphadenopathy. No neck masses  HEART: Tachy; No murmur, rubs, gallops.   LUNGS: Coarse breath sounds and Diminished throughout.  Exp wheezing. Supplemental O2 at 3L  ABDOMEN: Soft, nontender, nondistended. Positive bowel sounds. No rebound or guarding.   EXT: No cyanosis, clubbing or edema  : Normal appearing genitalia without Perdomo catheter.  MSK: FROM without joint effusions noted   SKIN: Plaque like skin lesions of the chest, arms and legs of varied healing. Some appear blister-like. Others crusted over. Healed lesions have left hypopigmentation.   NEURO: Oriented to PPT. No focal deficits.       Laboratory Data      Results from last 7 days  Lab Units 17  0505 17  0615 17  0707   WBC 10*3/mm3 10.86* 13.30* 13.09*   HEMOGLOBIN g/dL 13.2 12.6 13.2   HEMATOCRIT % 40.4 38.1 40.4   PLATELETS 10*3/mm3 407 380 404       Results from last 7 days  Lab Units 17  0505   SODIUM mmol/L 139   POTASSIUM mmol/L 4.4   CHLORIDE mmol/L 102   TOTAL CO2 mmol/L 34.0*   BUN mg/dL 19   CREATININE mg/dL 0.80   GLUCOSE mg/dL 116*   CALCIUM mg/dL 10.3       Results from last 7 days  Lab Units 17  1248   ALK PHOS U/L 91   BILIRUBIN mg/dL 0.3   ALT (SGPT) U/L 11   AST (SGOT) U/L 19       Results from last 7 days  Lab Units 17  0707   SED RATE mm/hr 104*       Results from last 7 days  Lab Units 17  0708   CRP mg/dL 46.10*       Estimated " Creatinine Clearance: 105.7 mL/min (by C-G formula based on Cr of 0.8).      Microbiology:  Sputum culture on 3/2: PSA and MRSA  Sputum culture on 3/3: MRSA   AFB x 3 ordered and first is negative  Blood culture negative    Crypto negative  Strep pneumo and Legionella Uag negative  Blasto P  Quant TB negative    Radiology:  Imaging Results (last 24 hours)     ** No results found for the last 24 hours. **          PROBLEM LIST:   NASIM pneumonia with MRSA and PSA   Leukocytosis  Concern for potential post-obstructive pneumonia with hx of cough x 6 weeks and 6x6cm nature of NASIM pna (3 PPD history)  Tobacco abuse  Chronic skin lesions  HTN  HLD  Hypothyroidism  Psych disorder     ASSESSMENT:  52 -year-old female with multiple medical conditions including hypertension, hyperlipidemia, hypothyroidism in the setting of psychiatric disease with OCD and bipolar disorder who presents with worsening cough, shortness of breath, sputum production of brown coloration of the past 5-6 weeks however worsening over the last 1 week. She reports associated chills and sweats however hasn't checked her temperature. she came to the ED for evaluation with leukocytosis initially noted, chest CT angiogram with a left upper lobe with a 6 x 6 cm opacity concerning for infectious versus neoplastic etiology. Sputum production ×2 has been collected with pseudomonas and MRSA.     Quant TB negative. Crypto Negative. Blast Pending.      PLAN:  Continue Vanc and Cefepime for MRSA and PSA coverage  F/u histo, blasto, crypto ag (neg)  AFB sputum x 2 negative  Quant TB negative.   Patient adamantly refuses bronchoscopy at this time.  Our plan would be to treat for 2-3 weeks with antibiotics before repeating CT scan of the chest.    Can d/c airborne precautions  May require [facility] referral when a bit more stable    Dr. Robert Self saw the patient, performed the physical exam, reviewed the laboratory data and guided with the formulation of the above problem  "list, assessment and treatment plan.     Robert Self MD  3/8/2017         Electronically signed by Robert Self MD at 3/8/2017  4:23 PM      MARY Wilcox at 3/8/2017 10:12 AM  Version 2 of 3         [facility] Progress Note    Date of Admission: 3/1/2017      Antibiotics:  Cefepime, Vanc, Prednisone    CC:   Chief Complaint   Patient presents with   • Shortness of Breath       S: No f/c/s. Still with cough and SOA. Has a hard time sleeping at night. O2 sats improving. Patient wants to go to her brother's house to complete IV ABX therapy. Concerned about her impounded car. \"Has all of my belongings in it\". Has pleuritic CP.   O:  Visit Vitals   • /78 (BP Location: Right arm, Patient Position: Lying)   • Pulse 102   • Temp 96.6 °F (35.9 °C) (Oral)   • Resp 20   • Ht 68\" (172.7 cm)   • Wt 243 lb 9.6 oz (110 kg)   • SpO2 95%   • BMI 37.04 kg/m2     Temp (24hrs), Av.8 °F (36.6 °C), Min:96.6 °F (35.9 °C), Max:98.9 °F (37.2 °C)      PE:   GENERAL: Chronically ill appearing, unkept appearance.  Supplemental oxygen  HEENT: Normocephalic, atraumatic. PERRL. EOMI. No conjunctival injection. No icterus. Oropharynx clear without evidence of thrush or exudate. Dental disease with missing teeth.   NECK: Supple without nuchal rigidity  LYMPH: No cervical, axillary or inguinal lymphadenopathy. No neck masses  HEART: Tachy; No murmur, rubs, gallops.   LUNGS: Coarse breath sounds and Diminished throughout.  Exp wheezing. Supplemental O2 at 3L  ABDOMEN: Soft, nontender, nondistended. Positive bowel sounds. No rebound or guarding.   EXT: No cyanosis, clubbing or edema  : Normal appearing genitalia without Perdomo catheter.  MSK: FROM without joint effusions noted   SKIN: Plaque like skin lesions of the chest, arms and legs of varied healing. Some appear blister-like. Others crusted over. Healed lesions have left hypopigmentation.   NEURO: Oriented to PPT. No focal deficits.       Laboratory Data      Results from last 7 " days  Lab Units 03/08/17  0505 03/07/17  0615 03/06/17  0707   WBC 10*3/mm3 10.86* 13.30* 13.09*   HEMOGLOBIN g/dL 13.2 12.6 13.2   HEMATOCRIT % 40.4 38.1 40.4   PLATELETS 10*3/mm3 407 380 404       Results from last 7 days  Lab Units 03/08/17  0505   SODIUM mmol/L 139   POTASSIUM mmol/L 4.4   CHLORIDE mmol/L 102   TOTAL CO2 mmol/L 34.0*   BUN mg/dL 19   CREATININE mg/dL 0.80   GLUCOSE mg/dL 116*   CALCIUM mg/dL 10.3       Results from last 7 days  Lab Units 03/01/17  1248   ALK PHOS U/L 91   BILIRUBIN mg/dL 0.3   ALT (SGPT) U/L 11   AST (SGOT) U/L 19       Results from last 7 days  Lab Units 03/06/17  0707   SED RATE mm/hr 104*       Results from last 7 days  Lab Units 03/06/17  0708   CRP mg/dL 46.10*       Estimated Creatinine Clearance: 105.7 mL/min (by C-G formula based on Cr of 0.8).      Microbiology:  Sputum culture on 3/2: PSA and MRSA  Sputum culture on 3/3: MRSA   AFB x 3 ordered and first is negative  Blood culture negative    Crypto negative  Strep pneumo and Legionella Uag negative  Blasto P  Quant TB negative    Radiology:  Imaging Results (last 24 hours)     ** No results found for the last 24 hours. **          PROBLEM LIST:   NASIM pneumonia with MRSA and PSA   Leukocytosis  Concern for potential post-obstructive pneumonia with hx of cough x 6 weeks and 6x6cm nature of NASIM pna (3 PPD history)  Tobacco abuse  Chronic skin lesions  HTN  HLD  Hypothyroidism  Psych disorder     ASSESSMENT:  52 -year-old female with multiple medical conditions including hypertension, hyperlipidemia, hypothyroidism in the setting of psychiatric disease with OCD and bipolar disorder who presents with worsening cough, shortness of breath, sputum production of brown coloration of the past 5-6 weeks however worsening over the last 1 week. She reports associated chills and sweats however hasn't checked her temperature. she came to the ED for evaluation with leukocytosis initially noted, chest CT angiogram with a left upper lobe  "with a 6 x 6 cm opacity concerning for infectious versus neoplastic etiology. Sputum production ×2 has been collected with pseudomonas and MRSA.     Quant TB negative. Crypto Negative. Blast Pending.      PLAN:  Continue Vanc and Cefepime for MRSA and PSA coverage  F/u histo, blasto, crypto ag (neg)  AFB sputum x 2 negative  Quant TB negative.   Patient adamantly refuses bronchoscopy at this time.  Our plan would be to treat for 2-3 weeks with antibiotics before repeating CT scan of the chest.    Can d/c airborne precautions  May require [facility] referral when a bit more stable    Dr. Robert Self saw the patient, performed the physical exam, reviewed the laboratory data and guided with the formulation of the above problem list, assessment and treatment plan.     Robert Self MD  3/8/2017         Electronically signed by MARY Wilcox at 3/8/2017 12:13 PM      MARY Wilcox at 3/8/2017 10:12 AM  Version 1 of 3         [facility] Progress Note    Date of Admission: 3/1/2017      Antibiotics:  Cefepime, Vanc, Prednisone    CC:   Chief Complaint   Patient presents with   • Shortness of Breath       S: No f/c/s. Still with cough and SOA. Has a hard time sleeping at night. O2 sats improving.   O:  Visit Vitals   • /78 (BP Location: Right arm, Patient Position: Lying)   • Pulse 102   • Temp 96.6 °F (35.9 °C) (Oral)   • Resp 20   • Ht 68\" (172.7 cm)   • Wt 243 lb 9.6 oz (110 kg)   • SpO2 95%   • BMI 37.04 kg/m2     Temp (24hrs), Av.8 °F (36.6 °C), Min:96.6 °F (35.9 °C), Max:98.9 °F (37.2 °C)      PE:   GENERAL: Chronically ill appearing, unkept appearance.  Supplemental oxygen  HEENT: Normocephalic, atraumatic. PERRL. EOMI. No conjunctival injection. No icterus. Oropharynx clear without evidence of thrush or exudate. Dental disease with missing teeth.   NECK: Supple without nuchal rigidity  LYMPH: No cervical, axillary or inguinal lymphadenopathy. No neck masses  HEART: RRR; No murmur, rubs, gallops.   LUNGS: " Coarse breath sounds and Diminished throughout.  Exp wheezing. Supplemental O2 at 3L  ABDOMEN: Soft, nontender, nondistended. Positive bowel sounds. No rebound or guarding.   EXT: No cyanosis, clubbing or edema  : Normal appearing genitalia without Perdomo catheter.  MSK: FROM without joint effusions noted   SKIN: Plaque like skin lesions of the chest, arms and legs of varied healing. Some appear blister-like. Others crusted over. Healed lesions have left hypopigmentation.   NEURO: Oriented to PPT. No focal deficits.       Laboratory Data      Results from last 7 days  Lab Units 03/08/17  0505 03/07/17  0615 03/06/17  0707   WBC 10*3/mm3 10.86* 13.30* 13.09*   HEMOGLOBIN g/dL 13.2 12.6 13.2   HEMATOCRIT % 40.4 38.1 40.4   PLATELETS 10*3/mm3 407 380 404       Results from last 7 days  Lab Units 03/08/17  0505   SODIUM mmol/L 139   POTASSIUM mmol/L 4.4   CHLORIDE mmol/L 102   TOTAL CO2 mmol/L 34.0*   BUN mg/dL 19   CREATININE mg/dL 0.80   GLUCOSE mg/dL 116*   CALCIUM mg/dL 10.3       Results from last 7 days  Lab Units 03/01/17  1248   ALK PHOS U/L 91   BILIRUBIN mg/dL 0.3   ALT (SGPT) U/L 11   AST (SGOT) U/L 19       Results from last 7 days  Lab Units 03/06/17  0707   SED RATE mm/hr 104*       Results from last 7 days  Lab Units 03/06/17  0708   CRP mg/dL 46.10*       Estimated Creatinine Clearance: 105.7 mL/min (by C-G formula based on Cr of 0.8).      Microbiology:  Sputum culture on 3/2: PSA and MRSA  Sputum culture on 3/3: MRSA   AFB x 3 ordered and first is negative  Blood culture negative    Crypto negative  Strep pneumo and Legionella Uag negative  Blasto P  Quant TB negative    Radiology:  Imaging Results (last 24 hours)     ** No results found for the last 24 hours. **          PROBLEM LIST:   NASIM pneumonia with MRSA and PSA   Leukocytosis  Concern for potential post-obstructive pneumonia with hx of cough x 6 weeks and 6x6cm nature of NASIM pna (3 PPD history)  Tobacco abuse  Chronic skin  lesions  HTN  HLD  Hypothyroidism  Psych disorder     ASSESSMENT:  52 -year-old female with multiple medical conditions including hypertension, hyperlipidemia, hypothyroidism in the setting of psychiatric disease with OCD and bipolar disorder who presents with worsening cough, shortness of breath, sputum production of brown coloration of the past 5-6 weeks however worsening over the last 1 week. She reports associated chills and sweats however hasn't checked her temperature. she came to the ED for evaluation with leukocytosis initially noted, chest CT angiogram with a left upper lobe with a 6 x 6 cm opacity concerning for infectious versus neoplastic etiology. Sputum production ×2 has been collected with pseudomonas and MRSA.     Quant TB negative. Crypto Negative. Blast Pending.      PLAN:  Continue Vanc and Cefepime for MRSA and PSA coverage  F/u histo, blasto, crypto ag (neg)  AFB sputum x 2 negative  Quant TB negative.   Patient adamantly refuses bronchoscopy at this time.  Our plan would be to treat for 2-3 weeks with antibiotics before repeating CT scan of the chest.    Can d/c airborne precautions    Dr. Roebrt Self saw the patient, performed the physical exam, reviewed the laboratory data and guided with the formulation of the above problem list, assessment and treatment plan.     Robert Self MD  3/8/2017         Electronically signed by MARY Wilcox at 3/8/2017 10:18 AM      Mary Moore DO at 3/8/2017  4:09 PM  Version 3 of 3             [facility] Medicine Services  INPATIENT PROGRESS NOTE    Date of Admission: 3/1/2017  Length of Stay: 7  Primary Care Physician: RADHA Pedraza    Subjective-- HPI/Events overnight/ROS/CC- Hospital follow visit.  Detailed ROS not detailed as performed below      Lying in bed, feels better today, smiling. Would like more pain meds.      Objective      Temp:  [96.6 °F (35.9 °C)-98.9 °F (37.2 °C)] 96.6 °F (35.9 °C)  Heart Rate:   [] 102  Resp:  [20] 20  BP: (113-148)/(78-95) 113/78    Physical Exam:  Physical Exam    General Assessment: No acute cardiopulmonary distress. Well developed and well nourished. Malaise      CVS: RRR, S1S2 normal     Resp: + Bibasilar crackles, pos wheezing, resp non-labored     Abd: soft, NT, ND, normal BS, no guarding or peritoneal signs     Ext: No edema, both calves are symmetric and NTTP     Neuro: Nonfocal     Skin: W/D/I. No rash.     Psych: flattened affect, anxious        Results Review:    I have reviewed the labs, radiology results and diagnostic studies.  Lab Results (last 24 hours)     Procedure Component Value Units Date/Time    CBC (No Diff) [[id number]]  (Abnormal) Collected:  03/08/17 0505    Specimen:  Blood Updated:  03/08/17 0535     WBC 10.86 (H) 10*3/mm3      RBC 4.33 10*6/mm3      Hemoglobin 13.2 g/dL      Hematocrit 40.4 %      MCV 93.3 fL      MCH 30.5 pg      MCHC 32.7 g/dL      RDW 14.9 (H) %      RDW-SD 50.9 fl      MPV 9.2 fL      Platelets 407 10*3/mm3     Basic Metabolic Panel [[id number]]  (Abnormal) Collected:  03/08/17 0505    Specimen:  Blood Updated:  03/08/17 0619     Glucose 116 (H) mg/dL      BUN 19 mg/dL      Creatinine 0.80 mg/dL      Sodium 139 mmol/L      Potassium 4.4 mmol/L      Chloride 102 mmol/L      CO2 34.0 (H) mmol/L      Calcium 10.3 mg/dL      eGFR Non African Amer 75 mL/min/1.73      BUN/Creatinine Ratio 23.8      Anion Gap 3.0 mmol/L     Narrative:       National Kidney [employer] Guidelines    Stage                           Description                             GFR                      1                               Normal or High                          90+  2                               Mild decrease                            60-89  3                               Moderate decrease                   30-59  4                               Severe decrease                       15-29  5                               Kidney failure                              <15    AFB Culture [[id number]]  (Normal) Collected:  03/03/17 0800    Specimen:  Sputum from Oropharynx Updated:  03/08/17 1001     AFB Culture No AFB isolated at less than 1 week      AFB Stain        No acid fast bacilli seen on concentrated smear    Respiratory Culture [[id number]] Collected:  03/07/17 1000    Specimen:  Sputum from Cough Updated:  03/08/17 1052     Respiratory Culture Culture in progress      Gram Stain Result Many (4+) WBCs seen       Rare (1+) Epithelial cells seen       Rare (1+) Yeast       Rare (1+) Endogenous respiratory patience             Culture Data:  Radiology Data:   Cultures:    BLOOD CULTURE   Date Value Ref Range Status   03/01/2017 No growth at 3 days  Preliminary   03/01/2017 No growth at 3 days  Preliminary     RESPIRATORY CULTURE   Date Value Ref Range Status   03/03/2017 Moderate growth (3+) Staphylococcus aureus (A)  Preliminary   03/03/2017 Scant growth (1+) Normal Respiratory Patience  Preliminary   03/02/2017 Light growth (2+) Pseudomonas aeruginosa (A)  Preliminary   03/02/2017 Moderate growth (3+) Staphylococcus aureus (A)  Preliminary       I have reviewed the medications.        Assessment/Plan     Assessment/Problem List  Patient is a 52-year-old  female with history of noncompliance. She smokes 2-3 packs of cigarettes per day. She reently went off her psych (Bipolar) meds. She presented to [facility] ED with increased shortness of breath and cough and fever. Reports recent hospitalization to [facility] about a week ago but has not gotten any better.           Sepsis  --meets criteria due to febrile, hypoxia, tachycardia, leukocytosis and infectious source  --as below, due to social situation, and now cultures positive for MRSA/pseudomona, ID consulted, following       A/C hypoxic resp failure/Pneumonia NASIM( vs neoplasm less likely)  --IV cefepime and vanc per ID   -- resp culture + MRSA and Pseudomona  -- AFB sputum negative x 2, quant TB  negative.   --needs Bronchoscopy , but hesitant at this time.  --check cxr today     Concern for TB  -- work up negative       Acute on chronic respiratory failure/COPD  --has been told she needs oxygen at home in the past but  Has refused to wear it  --oxygen PRN  -- ongoing tobacco abuse      Bipolar disorder/homeless  --suspect schizophrenia also given paranoia and hallucination  --pt recently stopped psych meds a week ago because they were causing her to hallucinate  --pt needs psych eval, but unfortunately we do not have inpt psych service. when stable for dc, may need to have [facility] eval. i do feel at this time she will need [facility] eval.  She seems withdrawn and Depressed. I dont believe it would be safe to let her go home without eval.   -- SW consulted     Left chest pain  -- likely pleurisy  -push IS/FLutter   -- dc toradol, improved.      Hypothyroidism  -- TSH WNL  -- cont home meds     Tobacco abuse  -- counseled, cessation advised    Elevated SED and CRP      Nausea/HA  -- could  Have been withdrawing from psych meds  -- resolved now    Labial Cyst  --needs to follow up with Gyn     PT/OT  Dispo: TBD. Will be difficult, pt homeless. SS working on placement    Mary Moore DO   03/08/17   4:09 PM    Please note that portions of this note may have been completed with a voice recognition program. Efforts were made to edit the dictations, but occasionally words are mistranscribed.         Electronically signed by Mary Moore DO at 3/8/2017  4:22 PM      Mary Moore DO at 3/8/2017  4:09 PM  Version 2 of 3             [facility] Medicine Services  INPATIENT PROGRESS NOTE    Date of Admission: 3/1/2017  Length of Stay: 7  Primary Care Physician: RADHA Pedraza    Subjective-- HPI/Events overnight/ROS/CC- Hospital follow visit.  Detailed ROS not detailed as performed below      Lying in bed, feels better today, smiling. Would like more pain meds.      Objective       Temp:  [96.6 °F (35.9 °C)-98.9 °F (37.2 °C)] 96.6 °F (35.9 °C)  Heart Rate:  [] 102  Resp:  [20] 20  BP: (113-148)/(78-95) 113/78    Physical Exam:  Physical Exam    General Assessment: No acute cardiopulmonary distress. Well developed and well nourished. Malaise      CVS: RRR, S1S2 normal     Resp: + Bibasilar crackles, pos wheezing, resp non-labored     Abd: soft, NT, ND, normal BS, no guarding or peritoneal signs     Ext: No edema, both calves are symmetric and NTTP     Neuro: Nonfocal     Skin: W/D/I. No rash.     Psych: flattened affect, anxious        Results Review:    I have reviewed the labs, radiology results and diagnostic studies.  Lab Results (last 24 hours)     Procedure Component Value Units Date/Time    CBC (No Diff) [[id number]]  (Abnormal) Collected:  03/08/17 0505    Specimen:  Blood Updated:  03/08/17 0535     WBC 10.86 (H) 10*3/mm3      RBC 4.33 10*6/mm3      Hemoglobin 13.2 g/dL      Hematocrit 40.4 %      MCV 93.3 fL      MCH 30.5 pg      MCHC 32.7 g/dL      RDW 14.9 (H) %      RDW-SD 50.9 fl      MPV 9.2 fL      Platelets 407 10*3/mm3     Basic Metabolic Panel [[id number]]  (Abnormal) Collected:  03/08/17 0505    Specimen:  Blood Updated:  03/08/17 0619     Glucose 116 (H) mg/dL      BUN 19 mg/dL      Creatinine 0.80 mg/dL      Sodium 139 mmol/L      Potassium 4.4 mmol/L      Chloride 102 mmol/L      CO2 34.0 (H) mmol/L      Calcium 10.3 mg/dL      eGFR Non African Amer 75 mL/min/1.73      BUN/Creatinine Ratio 23.8      Anion Gap 3.0 mmol/L     Narrative:       National Kidney Foundation Guidelines    Stage                           Description                             GFR                      1                               Normal or High                          90+  2                               Mild decrease                            60-89  3                               Moderate decrease                   30-59  4                               Severe decrease                        15-29  5                               Kidney failure                             <15    AFB Culture [[id number]]  (Normal) Collected:  03/03/17 0800    Specimen:  Sputum from Oropharynx Updated:  03/08/17 1001     AFB Culture No AFB isolated at less than 1 week      AFB Stain        No acid fast bacilli seen on concentrated smear    Respiratory Culture [[id number]] Collected:  03/07/17 1000    Specimen:  Sputum from Cough Updated:  03/08/17 1052     Respiratory Culture Culture in progress      Gram Stain Result Many (4+) WBCs seen       Rare (1+) Epithelial cells seen       Rare (1+) Yeast       Rare (1+) Endogenous respiratory patience             Culture Data:  Radiology Data:   Cultures:    BLOOD CULTURE   Date Value Ref Range Status   03/01/2017 No growth at 3 days  Preliminary   03/01/2017 No growth at 3 days  Preliminary     RESPIRATORY CULTURE   Date Value Ref Range Status   03/03/2017 Moderate growth (3+) Staphylococcus aureus (A)  Preliminary   03/03/2017 Scant growth (1+) Normal Respiratory Patience  Preliminary   03/02/2017 Light growth (2+) Pseudomonas aeruginosa (A)  Preliminary   03/02/2017 Moderate growth (3+) Staphylococcus aureus (A)  Preliminary       I have reviewed the medications.        Assessment/Plan     Assessment/Problem List  Patient is a 52-year-old  female with history of noncompliance. She smokes 2-3 packs of cigarettes per day. She reently went off her psych (Bipolar) meds. She presented to [facility] ED with increased shortness of breath and cough and fever. Reports recent hospitalization to [facility] about a week ago but has not gotten any better.           Sepsis  --meets criteria due to febrile, hypoxia, tachycardia, leukocytosis and infectious source  --as below, due to social situation, and now cultures positive for MRSA/pseudomona, ID consulted, following       A/C hypoxic resp failure/Pneumonia NASIM( vs neoplasm less likely)  --IV cefepime and vanc per ID    -- resp culture + MRSA and Pseudomona  -- AFB sputum negative x 2, quant TB negative.   --needs Bronchoscopy , but hesitant at this time.  --check cxr today     Concern for TB  -- work up negative       Acute on chronic respiratory failure/COPD  --has been told she needs oxygen at home in the past but  Has refused to wear it  --oxygen PRN  -- ongoing tobacco abuse      Bipolar disorder/homeless  --suspect schizophrenia also given paranoia and hallucination  --pt recently stopped psych meds a week ago because they were causing her to hallucinate  --pt needs psych eval, but unfortunately we do not have inpt psych service. when stable for dc, may need to have [facility] eval. i do feel at this time she will need [facility] eval.  She seems withdrawn and Depressed. I dont believe it would be safe to let her go home without eval.   -- SW consulted     Left chest pain  -- likely pleurisy  -push IS/FLutter   -- dc toradol, improved.      Hypothyroidism  -- TSH WNL  -- cont home meds     Tobacco abuse  -- counseled, cessation advised    Elevated SED and CRP      Nausea/HA  -- could  Have been withdrawing from psych meds  -- resolved now  PT/OT  Dispo: TBD. Will be difficult, pt homeless. SS working on placement    Mary Moore DO   03/08/17   4:09 PM    Please note that portions of this note may have been completed with a voice recognition program. Efforts were made to edit the dictations, but occasionally words are mistranscribed.         Electronically signed by Mary Moore DO at 3/8/2017  4:21 PM      Mary Moore DO at 3/8/2017  4:09 PM  Version 1 of 3             [facility] Medicine Services  INPATIENT PROGRESS NOTE    Date of Admission: 3/1/2017  Length of Stay: 7  Primary Care Physician: RADHA Pedraza    Subjective-- HPI/Events overnight/ROS/CC- Hospital follow visit.  Detailed ROS not detailed as performed below      Lying in bed, feels better today, smiling. Would like  more pain meds.      Objective      Temp:  [96.6 °F (35.9 °C)-98.9 °F (37.2 °C)] 96.6 °F (35.9 °C)  Heart Rate:  [] 102  Resp:  [20] 20  BP: (113-148)/(78-95) 113/78    Physical Exam:  Physical Exam    General Assessment: No acute cardiopulmonary distress. Well developed and well nourished. Malaise      CVS: RRR, S1S2 normal     Resp: + Bibasilar crackles, pos wheezing, resp non-labored     Abd: soft, NT, ND, normal BS, no guarding or peritoneal signs     Ext: No edema, both calves are symmetric and NTTP     Neuro: Nonfocal     Skin: W/D/I. No rash.     Psych: flattened affect, anxious        Results Review:    I have reviewed the labs, radiology results and diagnostic studies.  Lab Results (last 24 hours)     Procedure Component Value Units Date/Time    CBC (No Diff) [[id number]]  (Abnormal) Collected:  03/08/17 0505    Specimen:  Blood Updated:  03/08/17 0535     WBC 10.86 (H) 10*3/mm3      RBC 4.33 10*6/mm3      Hemoglobin 13.2 g/dL      Hematocrit 40.4 %      MCV 93.3 fL      MCH 30.5 pg      MCHC 32.7 g/dL      RDW 14.9 (H) %      RDW-SD 50.9 fl      MPV 9.2 fL      Platelets 407 10*3/mm3     Basic Metabolic Panel [[id number]]  (Abnormal) Collected:  03/08/17 0505    Specimen:  Blood Updated:  03/08/17 0619     Glucose 116 (H) mg/dL      BUN 19 mg/dL      Creatinine 0.80 mg/dL      Sodium 139 mmol/L      Potassium 4.4 mmol/L      Chloride 102 mmol/L      CO2 34.0 (H) mmol/L      Calcium 10.3 mg/dL      eGFR Non African Amer 75 mL/min/1.73      BUN/Creatinine Ratio 23.8      Anion Gap 3.0 mmol/L     Narrative:       [employer] Guidelines    Stage                           Description                             GFR                      1                               Normal or High                          90+  2                               Mild decrease                            60-89  3                               Moderate decrease                   30-59  4                                Severe decrease                       15-29  5                               Kidney failure                             <15    AFB Culture [[id number]]  (Normal) Collected:  03/03/17 0800    Specimen:  Sputum from Oropharynx Updated:  03/08/17 1001     AFB Culture No AFB isolated at less than 1 week      AFB Stain        No acid fast bacilli seen on concentrated smear    Respiratory Culture [[id number]] Collected:  03/07/17 1000    Specimen:  Sputum from Cough Updated:  03/08/17 1052     Respiratory Culture Culture in progress      Gram Stain Result Many (4+) WBCs seen       Rare (1+) Epithelial cells seen       Rare (1+) Yeast       Rare (1+) Endogenous respiratory patience             Culture Data:  Radiology Data:   Cultures:    BLOOD CULTURE   Date Value Ref Range Status   03/01/2017 No growth at 3 days  Preliminary   03/01/2017 No growth at 3 days  Preliminary     RESPIRATORY CULTURE   Date Value Ref Range Status   03/03/2017 Moderate growth (3+) Staphylococcus aureus (A)  Preliminary   03/03/2017 Scant growth (1+) Normal Respiratory Patience  Preliminary   03/02/2017 Light growth (2+) Pseudomonas aeruginosa (A)  Preliminary   03/02/2017 Moderate growth (3+) Staphylococcus aureus (A)  Preliminary       I have reviewed the medications.        Assessment/Plan     Assessment/Problem List  Patient is a 52-year-old  female with history of noncompliance. She smokes 2-3 packs of cigarettes per day. She reently went off her psych (Bipolar) meds. She presented to [facility] ED with increased shortness of breath and cough and fever. Reports recent hospitalization to [facility] about a week ago but has not gotten any better.           Sepsis  --meets criteria due to febrile, hypoxia, tachycardia, leukocytosis and infectious source  --as below, due to social situation, and now cultures positive for MRSA/pseudomona, ID consulted, following       A/C hypoxic resp failure/Pneumonia NASIM( vs neoplasm less  likely)  --IV cefepime and vanc per ID   -- resp culture + MRSA and Pseudomona  -- AFB sputum negative x 2, quant TB negative.   --needs Bronchoscopy , but hesitant at this time.  --check cxr today     Concern for TB  -- work up negative       Acute on chronic respiratory failure/COPD  --has been told she needs oxygen at home in the past but  Has refused to wear it  --oxygen PRN  -- ongoing tobacco abuse      Bipolar disorder/homeless  --suspect schizophrenia also given paranoia and hallucination  --pt recently stopped psych meds a week ago because they were causing her to hallucinate  --pt needs psych eval, but unfortunately we do not have inpt psych service.   --when stable for dc, she'll need to follow up with primary psychiatrist, otherwise, may need to have the [facility] eval. i do feel at this time she will need [facility] eval.  She seems withdrawn and Depressed. I dont believe it would be safe to let her go home without eval.   -- SW consulted     Left chest pain  -- likely pleurisy  -push IS/FLutter   -- dc toradol, improved.      Hypothyroidism  -- TSH WNL  -- cont home meds     Tobacco abuse  -- counseled, cessation advised    Elevated SED and CRP      Nausea/HA  -- could  Have been withdrawing from psych meds  -- resolved now    Dispo: TBD. Will be difficult, pt homeless. SS working on placement    Mary Moore DO   03/08/17   4:09 PM    Please note that portions of this note may have been completed with a voice recognition program. Efforts were made to edit the dictations, but occasionally words are mistranscribed.         Electronically signed by Mary Moore DO at 3/8/2017  4:16 PM      Robb Casiano MD at 3/9/2017  8:38 AM  Version 1 of 1               HOSPITALIST DAILY PROGRESS NOTE    Chief Complaint: f/u for SOA    Subjective   SUBJECTIVE/OVERNIGHT EVENTS   No acute events overnight. Patient states that she had an ok night, requesting for more pain meds    Review of Systems:  Gen-no fevers, no  "chills  CV-no chest pain, no palpitations  Resp-no cough, no dyspnea  GI-no N/V/D, no abd pain    Objective   OBJECTIVE   I have reviewed the vital signs.  Visit Vitals   • /91 (BP Location: Right arm, Patient Position: Lying)   • Pulse 113   • Temp 98.6 °F (37 °C) (Oral)   • Resp 18   • Ht 68\" (172.7 cm)   • Wt 242 lb 8.1 oz (110 kg)   • SpO2 92%   • BMI 36.87 kg/m2       Physical Exam:  Gen-unkempt, no acute distress, seated in bed  CV-RRR, S1 S2 normal, no m/r/g  Resp-CTAB, no wheezes  Abd-obese, soft, NT, ND, +BS  Ext-no edema  Neuro-A&Ox3, no focal deficits  Psych-appropriate mood and affect    Results:  I have reviewed the labs, culture data      Results from last 7 days  Lab Units 03/09/17  0509 03/08/17  0505 03/07/17  0615   WBC 10*3/mm3 12.57* 10.86* 13.30*   HEMOGLOBIN g/dL 13.6 13.2 12.6   HEMATOCRIT % 42.3 40.4 38.1   PLATELETS 10*3/mm3 379 407 380       Results from last 7 days  Lab Units 03/09/17  0509   SODIUM mmol/L 138   POTASSIUM mmol/L 4.0   CHLORIDE mmol/L 102   TOTAL CO2 mmol/L 32.0*   BUN mg/dL 15   CREATININE mg/dL 0.80   GLUCOSE mg/dL 95   CALCIUM mg/dL 10.1       Culture Data:  Cultures:    RESPIRATORY CULTURE   Date Value Ref Range Status   03/07/2017 Culture in progress  Preliminary   03/03/2017 Moderate growth (3+) Staphylococcus aureus, MRSA (C)  Final     Comment:     This isolate does not demonstrate inducible clindamycin resistance in vitro.    Methicillin resistant Staphylococcus aureus, Patient may be an isolation risk.   03/03/2017 Scant growth (1+) Normal Respiratory Shannan  Final     I have reviewed the medications.    Assessment&#47;Plan   ASSESSMENT/PLAN      52-year-old  female with history of noncompliance. She smokes 2-3 packs of cigarettes per day. She reently went off her psych (Bipolar) meds. She presented to [facility] ED with increased shortness of breath and cough and fever. Reports recent hospitalization to [facility] about a week ago but " "has not gotten any better.       # Sepsis  --meets criteria due to febrile, hypoxia, tachycardia, leukocytosis and infectious source  --as below, due to social situation, and now cultures positive for MRSA/pseudomona, ID consulted, following       # A/C hypoxic resp failure- multifactorial COPD vs Pneumonia NASIM( vs neoplasm less likely)  --IV cefepime and vanc per ID   -- resp culture + MRSA and Pseudomona  -- AFB sputum negative x 2, quant TB negative.   --needs Bronchoscopy , but hesitant at this time.     # Concern for TB- work up negative, awaiting final sputum AFB     # Bipolar disorder/homeless  --suspect schizophrenia also given paranoia and hallucination  --pt recently stopped psych meds a week ago because they were causing her to hallucinate  --pt needs psych eval, but unfortunately we do not have inpt psych service. when stable for dc, may need to have the [facility] eval. i do feel at this time she will need [facility] eval. She seems withdrawn and Depressed. I dont believe it would be safe to let her go home without eval.   -- SW consulted      # Hypothyroidism- cont home meds      # Tobacco abuse  -- counseled, cessation advised      # Labial Cyst--needs to follow up with Gyn      PT/OT    Dispo: TBD. Will be difficult, pt homeless. SS working on placement    Robb Casiano MD  17  1:34 PM             Electronically signed by Robb Casiano MD at 3/9/2017  1:38 PM      Robert Self MD at 3/9/2017 11:07 AM  Version 4 of 4         [facility] Progress Note    Date of Admission: 3/1/2017      Antibiotics:  Cefepime, Vanc, Prednisone 20 mg PO daily     CC:   Chief Complaint   Patient presents with   • Shortness of Breath       S: No f/c/s. Still with cough and SOA. Still with some pleuritic type CP. No n/v/d. Good appetite  O:  Visit Vitals   • /77   • Pulse 108   • Temp 98.7 °F (37.1 °C)   • Resp 18   • Ht 68\" (172.7 cm)   • Wt 242 lb 8.1 oz (110 kg)   • SpO2 92%   • BMI 36.87 kg/m2     Temp (24hrs), Av.7 " °F (37.1 °C), Min:98.6 °F (37 °C), Max:98.7 °F (37.1 °C)      PE:   GENERAL: Chronically ill appearing, unkept appearance.  Emotional about current hospitalization.  HEENT: Normocephalic, atraumatic. PERRL. EOMI. No conjunctival injection. Moist MM  .  NECK: Supple without nuchal rigidity  LYMPH: No cervical, axillary or inguinal lymphadenopathy. No neck masses  HEART: Tachy; No murmur, rubs, gallops.   LUNGS: Coarse breath sounds and Diminished throughout.  Exp wheezing. Supplemental O2  present  ABDOMEN: Soft, nontender, nondistended. Positive bowel sounds. No rebound or guarding.   EXT: No cyanosis, clubbing or edema  : Normal appearing genitalia without Perdomo catheter.  MSK: FROM without joint effusions noted   SKIN: Plaque like skin lesions of the chest, arms and legs of varied healing.  NEURO: Oriented to PPT. No focal deficits.       Laboratory Data      Results from last 7 days  Lab Units 03/09/17  0509 03/08/17  0505 03/07/17  0615   WBC 10*3/mm3 12.57* 10.86* 13.30*   HEMOGLOBIN g/dL 13.6 13.2 12.6   HEMATOCRIT % 42.3 40.4 38.1   PLATELETS 10*3/mm3 379 407 380       Results from last 7 days  Lab Units 03/09/17  0509   SODIUM mmol/L 138   POTASSIUM mmol/L 4.0   CHLORIDE mmol/L 102   TOTAL CO2 mmol/L 32.0*   BUN mg/dL 15   CREATININE mg/dL 0.80   GLUCOSE mg/dL 95   CALCIUM mg/dL 10.1           Results from last 7 days  Lab Units 03/06/17  0707   SED RATE mm/hr 104*       Results from last 7 days  Lab Units 03/06/17  0708   CRP mg/dL 46.10*       Estimated Creatinine Clearance: 105.7 mL/min (by C-G formula based on Cr of 0.8).      Microbiology:  Sputum culture on 3/2: PSA and MRSA  Sputum culture on 3/3: MRSA   AFB x 3 ordered  are negative  Sputum culture 3/7/17: Non-LFR, Staph aureus, Yeast  Blood culture negative    Crypto negative  Strep pneumo and Legionella Uag negative  Blasto P  Quant TB negative    Radiology:  Imaging Results (last 24 hours)     ** No results found for the last 24 hours. **           PROBLEM LIST:   NASIM pneumonia with MRSA and PSA   Leukocytosis  Concern for potential post-obstructive pneumonia with hx of cough x 6 weeks and 6x6cm nature of NASIM pna (3 PPD history)  Tobacco abuse  Chronic skin lesions  HTN  HLD  Hypothyroidism  Psych disorder     ASSESSMENT:  52 -year-old female with multiple medical conditions including hypertension, hyperlipidemia, hypothyroidism in the setting of psychiatric disease with OCD and bipolar disorder who presents with worsening cough, shortness of breath, sputum production of brown coloration of the past 5-6 weeks however worsening over the last 1 week. She reports associated chills and sweats however hasn't checked her temperature. she came to the ED for evaluation with leukocytosis initially noted, chest CT angiogram with a left upper lobe with a 6 x 6 cm opacity concerning for infectious versus neoplastic etiology. Sputum production × 3 has been collected with pseudomonas and MRSA.     Quant TB negative. Crypto Negative. Blast Pending.      PLAN:  Continue Vanc and Cefepime for MRSA and PSA coverage  F/u histo, blasto, crypto ag (neg)  AFB sputum x 3 negative  Quant TB negative.   Patient adamantly refuses bronchoscopy at this time.  Our plan would be to treat for 2-3 weeks with antibiotics before repeating CT scan of the chest.    Difficult dispo with social issues and will continue to follow  LTACH referral placed    Please call partners over weekend with any questions or concerns. Otherwise, will see patient again on Monday for repeat evaluation.     Dr. Robert Self saw the patient, performed the physical exam, reviewed the laboratory data and guided with the formulation of the above problem list, assessment and treatment plan.     Robert Self MD  3/9/2017         Electronically signed by Robert Self MD at 3/9/2017  8:25 PM      MARY Wilcox at 3/9/2017 11:07 AM  Version 3 of 4         [facility] Progress Note    Date of Admission:  "3/1/2017      Antibiotics:  Cefepime, Vanc, Prednisone 20 mg PO daily     CC:   Chief Complaint   Patient presents with   • Shortness of Breath       S: No f/c/s. Still with cough and SOA. Still with some pleuritic type CP. No n/v/d. Good appetite  O:  Visit Vitals   • /91 (BP Location: Right arm, Patient Position: Lying)   • Pulse 113   • Temp 98.6 °F (37 °C) (Oral)   • Resp 18   • Ht 68\" (172.7 cm)   • Wt 243 lb 9.6 oz (110 kg)   • SpO2 92%   • BMI 37.04 kg/m2     Temp (24hrs), Av.5 °F (36.9 °C), Min:98.4 °F (36.9 °C), Max:98.6 °F (37 °C)      PE:   GENERAL: Chronically ill appearing, unkept appearance.  Emotional about current hospitalization.  HEENT: Normocephalic, atraumatic. PERRL. EOMI. No conjunctival injection. Moist MM  .  NECK: Supple without nuchal rigidity  LYMPH: No cervical, axillary or inguinal lymphadenopathy. No neck masses  HEART: Tachy; No murmur, rubs, gallops.   LUNGS: Coarse breath sounds and Diminished throughout.  Exp wheezing. Supplemental O2  present  ABDOMEN: Soft, nontender, nondistended. Positive bowel sounds. No rebound or guarding.   EXT: No cyanosis, clubbing or edema  : Normal appearing genitalia without Perdomo catheter.  MSK: FROM without joint effusions noted   SKIN: Plaque like skin lesions of the chest, arms and legs of varied healing.  NEURO: Oriented to PPT. No focal deficits.       Laboratory Data      Results from last 7 days  Lab Units 17  0509 17  0505 17  0615   WBC 10*3/mm3 12.57* 10.86* 13.30*   HEMOGLOBIN g/dL 13.6 13.2 12.6   HEMATOCRIT % 42.3 40.4 38.1   PLATELETS 10*3/mm3 379 407 380       Results from last 7 days  Lab Units 17  0509   SODIUM mmol/L 138   POTASSIUM mmol/L 4.0   CHLORIDE mmol/L 102   TOTAL CO2 mmol/L 32.0*   BUN mg/dL 15   CREATININE mg/dL 0.80   GLUCOSE mg/dL 95   CALCIUM mg/dL 10.1           Results from last 7 days  Lab Units 17  0707   SED RATE mm/hr 104*       Results from last 7 days  Lab Units " 03/06/17  0708   CRP mg/dL 46.10*       Estimated Creatinine Clearance: 105.7 mL/min (by C-G formula based on Cr of 0.8).      Microbiology:  Sputum culture on 3/2: PSA and MRSA  Sputum culture on 3/3: MRSA   AFB x 3 ordered  are negative  Sputum culture 3/7/17: Non-LFR, Staph aureus, Yeast  Blood culture negative    Crypto negative  Strep pneumo and Legionella Uag negative  Blasto P  Quant TB negative    Radiology:  Imaging Results (last 24 hours)     ** No results found for the last 24 hours. **          PROBLEM LIST:   NASIM pneumonia with MRSA and PSA   Leukocytosis  Concern for potential post-obstructive pneumonia with hx of cough x 6 weeks and 6x6cm nature of NASIM pna (3 PPD history)  Tobacco abuse  Chronic skin lesions  HTN  HLD  Hypothyroidism  Psych disorder     ASSESSMENT:  52 -year-old female with multiple medical conditions including hypertension, hyperlipidemia, hypothyroidism in the setting of psychiatric disease with OCD and bipolar disorder who presents with worsening cough, shortness of breath, sputum production of brown coloration of the past 5-6 weeks however worsening over the last 1 week. She reports associated chills and sweats however hasn't checked her temperature. she came to the ED for evaluation with leukocytosis initially noted, chest CT angiogram with a left upper lobe with a 6 x 6 cm opacity concerning for infectious versus neoplastic etiology. Sputum production × 3 has been collected with pseudomonas and MRSA.     Quant TB negative. Crypto Negative. Blast Pending.      PLAN:  Continue Vanc and Cefepime for MRSA and PSA coverage  F/u histo, blasto, crypto ag (neg)  AFB sputum x 3 negative  Quant TB negative.   Patient adamantly refuses bronchoscopy at this time.  Our plan would be to treat for 2-3 weeks with antibiotics before repeating CT scan of the chest.    Difficult dispo with social issues and will continue to follow  LTACH referral placed  Please call partners over weekend with any  "questions or concerns. Otherwise, will see patient again on Monday for repeat evaluation.     Dr. Robert Self saw the patient, performed the physical exam, reviewed the laboratory data and guided with the formulation of the above problem list, assessment and treatment plan.     Robert Self MD  3/9/2017         Electronically signed by MARY Wilcox at 3/9/2017  2:14 PM      MARY Wilcox at 3/9/2017 11:07 AM  Version 2 of 4         [facility] Progress Note    Date of Admission: 3/1/2017      Antibiotics:  Cefepime, Vanc, Prednisone 20 mg PO daily     CC:   Chief Complaint   Patient presents with   • Shortness of Breath       S: No f/c/s. Still with cough and SOA. Still with some pleuritic type CP. No n/v/d. Good appetite  O:  Visit Vitals   • /91 (BP Location: Right arm, Patient Position: Lying)   • Pulse 113   • Temp 98.6 °F (37 °C) (Oral)   • Resp 18   • Ht 68\" (172.7 cm)   • Wt 243 lb 9.6 oz (110 kg)   • SpO2 92%   • BMI 37.04 kg/m2     Temp (24hrs), Av.5 °F (36.9 °C), Min:98.4 °F (36.9 °C), Max:98.6 °F (37 °C)      PE:   GENERAL: Chronically ill appearing, unkept appearance.  Emotional about current hospitalization.  HEENT: Normocephalic, atraumatic. PERRL. EOMI. No conjunctival injection. Moist MM  .  NECK: Supple without nuchal rigidity  LYMPH: No cervical, axillary or inguinal lymphadenopathy. No neck masses  HEART: Tachy; No murmur, rubs, gallops.   LUNGS: Coarse breath sounds and Diminished throughout.  Exp wheezing. Supplemental O2  present  ABDOMEN: Soft, nontender, nondistended. Positive bowel sounds. No rebound or guarding.   EXT: No cyanosis, clubbing or edema  : Normal appearing genitalia without Perdomo catheter.  MSK: FROM without joint effusions noted   SKIN: Plaque like skin lesions of the chest, arms and legs of varied healing.  NEURO: Oriented to PPT. No focal deficits.       Laboratory Data      Results from last 7 days  Lab Units 17  0509 17  0505 17  0615 "   WBC 10*3/mm3 12.57* 10.86* 13.30*   HEMOGLOBIN g/dL 13.6 13.2 12.6   HEMATOCRIT % 42.3 40.4 38.1   PLATELETS 10*3/mm3 379 407 380       Results from last 7 days  Lab Units 03/09/17  0509   SODIUM mmol/L 138   POTASSIUM mmol/L 4.0   CHLORIDE mmol/L 102   TOTAL CO2 mmol/L 32.0*   BUN mg/dL 15   CREATININE mg/dL 0.80   GLUCOSE mg/dL 95   CALCIUM mg/dL 10.1           Results from last 7 days  Lab Units 03/06/17  0707   SED RATE mm/hr 104*       Results from last 7 days  Lab Units 03/06/17  0708   CRP mg/dL 46.10*       Estimated Creatinine Clearance: 105.7 mL/min (by C-G formula based on Cr of 0.8).      Microbiology:  Sputum culture on 3/2: PSA and MRSA  Sputum culture on 3/3: MRSA   AFB x 3 ordered  are negative  Sputum culture 3/7/17: Non-LFR, Staph aureus, Yeast  Blood culture negative    Crypto negative  Strep pneumo and Legionella Uag negative  Blasto P  Quant TB negative    Radiology:  Imaging Results (last 24 hours)     ** No results found for the last 24 hours. **          PROBLEM LIST:   NASIM pneumonia with MRSA and PSA   Leukocytosis  Concern for potential post-obstructive pneumonia with hx of cough x 6 weeks and 6x6cm nature of NASIM pna (3 PPD history)  Tobacco abuse  Chronic skin lesions  HTN  HLD  Hypothyroidism  Psych disorder     ASSESSMENT:  52 -year-old female with multiple medical conditions including hypertension, hyperlipidemia, hypothyroidism in the setting of psychiatric disease with OCD and bipolar disorder who presents with worsening cough, shortness of breath, sputum production of brown coloration of the past 5-6 weeks however worsening over the last 1 week. She reports associated chills and sweats however hasn't checked her temperature. she came to the ED for evaluation with leukocytosis initially noted, chest CT angiogram with a left upper lobe with a 6 x 6 cm opacity concerning for infectious versus neoplastic etiology. Sputum production × 3 has been collected with pseudomonas and MRSA.  "    Quant TB negative. Crypto Negative. Blast Pending.      PLAN:  Continue Vanc and Cefepime for MRSA and PSA coverage  F/u histo, blasto, crypto ag (neg)  AFB sputum x 3 negative  Quant TB negative.   Patient adamantly refuses bronchoscopy at this time.  Our plan would be to treat for 2-3 weeks with antibiotics before repeating CT scan of the chest.    Difficult dispo with social issues and will continue to follow  LTACH referral placed    Dr. Robert Self saw the patient, performed the physical exam, reviewed the laboratory data and guided with the formulation of the above problem list, assessment and treatment plan.     Robert Self MD  3/9/2017         Electronically signed by MARY Wilcox at 3/9/2017  2:12 PM      MARY Wilcox at 3/9/2017 11:07 AM  Version 1 of 4         [facility] Progress Note    Date of Admission: 3/1/2017      Antibiotics:  Cefepime, Vanc, Prednisone 20 mg PO daily     CC:   Chief Complaint   Patient presents with   • Shortness of Breath       S: No f/c/s. Still with cough and SOA. Still with some pleuritic type CP. No n/v/d. Good appetite  O:  Visit Vitals   • /91 (BP Location: Right arm, Patient Position: Lying)   • Pulse 113   • Temp 98.6 °F (37 °C) (Oral)   • Resp 18   • Ht 68\" (172.7 cm)   • Wt 243 lb 9.6 oz (110 kg)   • SpO2 92%   • BMI 37.04 kg/m2     Temp (24hrs), Av.5 °F (36.9 °C), Min:98.4 °F (36.9 °C), Max:98.6 °F (37 °C)      PE:   GENERAL: Chronically ill appearing, unkept appearance.  Emotional about current hospitalization   HEENT: Normocephalic, atraumatic. PERRL. EOMI. No conjunctival injection. No icterus. Oropharynx clear without evidence of thrush or exudate.  NECK: Supple without nuchal rigidity  LYMPH: No cervical, axillary or inguinal lymphadenopathy. No neck masses  HEART: Tachy; No murmur, rubs, gallops.   LUNGS: Coarse breath sounds and Diminished throughout.  Exp wheezing. Supplemental O2  present  ABDOMEN: Soft, nontender, nondistended. " Positive bowel sounds. No rebound or guarding.   EXT: No cyanosis, clubbing or edema  : Normal appearing genitalia without Perdomo catheter.  MSK: FROM without joint effusions noted   SKIN: Plaque like skin lesions of the chest, arms and legs of varied healing.  NEURO: Oriented to PPT. No focal deficits.       Laboratory Data      Results from last 7 days  Lab Units 03/09/17  0509 03/08/17  0505 03/07/17  0615   WBC 10*3/mm3 12.57* 10.86* 13.30*   HEMOGLOBIN g/dL 13.6 13.2 12.6   HEMATOCRIT % 42.3 40.4 38.1   PLATELETS 10*3/mm3 379 407 380       Results from last 7 days  Lab Units 03/09/17  0509   SODIUM mmol/L 138   POTASSIUM mmol/L 4.0   CHLORIDE mmol/L 102   TOTAL CO2 mmol/L 32.0*   BUN mg/dL 15   CREATININE mg/dL 0.80   GLUCOSE mg/dL 95   CALCIUM mg/dL 10.1           Results from last 7 days  Lab Units 03/06/17  0707   SED RATE mm/hr 104*       Results from last 7 days  Lab Units 03/06/17  0708   CRP mg/dL 46.10*       Estimated Creatinine Clearance: 105.7 mL/min (by C-G formula based on Cr of 0.8).      Microbiology:  Sputum culture on 3/2: PSA and MRSA  Sputum culture on 3/3: MRSA   AFB x 3 ordered  are negative  Sputum culture 3/7/17: Non-LFR, Staph aureus, Yeast  Blood culture negative    Crypto negative  Strep pneumo and Legionella Uag negative  Blasto P  Quant TB negative    Radiology:  Imaging Results (last 24 hours)     ** No results found for the last 24 hours. **          PROBLEM LIST:   NASIM pneumonia with MRSA and PSA   Leukocytosis  Concern for potential post-obstructive pneumonia with hx of cough x 6 weeks and 6x6cm nature of NASIM pna (3 PPD history)  Tobacco abuse  Chronic skin lesions  HTN  HLD  Hypothyroidism  Psych disorder     ASSESSMENT:  52 -year-old female with multiple medical conditions including hypertension, hyperlipidemia, hypothyroidism in the setting of psychiatric disease with OCD and bipolar disorder who presents with worsening cough, shortness of breath, sputum production of brown  "coloration of the past 5-6 weeks however worsening over the last 1 week. She reports associated chills and sweats however hasn't checked her temperature. she came to the ED for evaluation with leukocytosis initially noted, chest CT angiogram with a left upper lobe with a 6 x 6 cm opacity concerning for infectious versus neoplastic etiology. Sputum production × 3 has been collected with pseudomonas and MRSA.     Quant TB negative. Crypto Negative. Blast Pending.      PLAN:  Continue Vanc and Cefepime for MRSA and PSA coverage  F/u histo, blasto, crypto ag (neg)  AFB sputum x 3 negative  Quant TB negative.   Patient adamantly refuses bronchoscopy at this time.  Our plan would be to treat for 2-3 weeks with antibiotics before repeating CT scan of the chest.    Difficult dispo with social issues and will continue to follow    . Robert Self saw the patient, performed the physical exam, reviewed the laboratory data and guided with the formulation of the above problem list, assessment and treatment plan.     Robert Self MD  3/9/2017         Electronically signed by MARY Wilcox at 3/9/2017 11:11 AM      Robb Casiano MD at 3/10/2017  7:37 AM  Version 1 of 1               HOSPITALIST DAILY PROGRESS NOTE    Chief Complaint: f/u for SOA    Subjective   SUBJECTIVE/OVERNIGHT EVENTS   No acute events overnight, patient endorses back pain, no n/v/d    Review of Systems:  Gen-no fevers, no chills  CV-no chest pain, no palpitations  Resp-no cough, no dyspnea  GI-no N/V/D, no abd pain    Objective   OBJECTIVE   I have reviewed the vital signs.  Visit Vitals   • /92 (BP Location: Right arm, Patient Position: Lying)   • Pulse 99   • Temp 98.2 °F (36.8 °C) (Oral)   • Resp 17   • Ht 68\" (172.7 cm)   • Wt 242 lb 8.1 oz (110 kg)   • SpO2 92%   • BMI 36.87 kg/m2       Physical Exam:  Gen-no acute distress, uncomfortable with back pain  CV-RRR, S1 S2 normal, no m/r/g  Resp-CTAB, no wheezes  Abd-soft, NT, ND, +BS  Ext-no " edema  Neuro-A&Ox3, no focal deficits  Psych-appropriate mood and affect    Results:  I have reviewed the labs, culture data,      Results from last 7 days  Lab Units 03/09/17  0509 03/08/17  0505 03/07/17  0615   WBC 10*3/mm3 12.57* 10.86* 13.30*   HEMOGLOBIN g/dL 13.6 13.2 12.6   HEMATOCRIT % 42.3 40.4 38.1   PLATELETS 10*3/mm3 379 407 380       Results from last 7 days  Lab Units 03/09/17  0509   SODIUM mmol/L 138   POTASSIUM mmol/L 4.0   CHLORIDE mmol/L 102   TOTAL CO2 mmol/L 32.0*   BUN mg/dL 15   CREATININE mg/dL 0.80   GLUCOSE mg/dL 95   CALCIUM mg/dL 10.1       Culture Data:  Cultures:    RESPIRATORY CULTURE   Date Value Ref Range Status   03/07/2017 Scant growth (1+) Non-Lactose  (A)  Preliminary   03/07/2017 Light growth (2+) Staphylococcus aureus (A)  Preliminary   03/07/2017 Light growth (2+) Yeast isolated (A)  Preliminary   03/03/2017 Moderate growth (3+) Staphylococcus aureus, MRSA (C)  Final     Comment:     This isolate does not demonstrate inducible clindamycin resistance in vitro.    Methicillin resistant Staphylococcus aureus, Patient may be an isolation risk.   03/03/2017 Scant growth (1+) Normal Respiratory Shannan  Final     I have reviewed the medications.    Assessment&#47;Plan   ASSESSMENT/PLAN      52-year-old  female with history of noncompliance. She smokes 2-3 packs of cigarettes per day. She reently went off her psych (Bipolar) meds. She presented to [facility] ED with increased shortness of breath and cough and fever. Reports recent hospitalization to [facility] about a week with no improvement      # Sepsis  --meets criteria due to febrile, hypoxia, tachycardia, leukocytosis and infectious source  --as below, due to social situation, and now cultures positive for MRSA/pseudomona, ID consulted, following       # A/C hypoxic resp failure- multifactorial COPD vs Pneumonia NASIM( vs neoplasm less likely)  --IV cefepime and vanc per ID   -- resp culture + MRSA  and Pseudomona  -- AFB sputum negative x 2, quant TB negative.   --needs Bronchoscopy , but declines.      # Concern for TB- work up negative      # Bipolar disorder/homeless  --suspect schizophrenia also given paranoia and hallucination  --pt recently stopped psych meds a week ago because they were causing her to hallucinate  --pt needs psych eval, but unfortunately we do not have inpt psych service. when stable for dc, may need to have the [facility] eval. i do feel at this time she will need [facility] eval. She seems withdrawn and Depressed. I dont believe it would be safe to let her go home without eval.   -- SW consulted and following      # Hypothyroidism- cont home meds      # Tobacco abuse  -- counseled, cessation advised      # Labial Cyst--needs to follow up with Gyn       PT/OT     Dispo: TBD. Will be difficult, pt homeless. SS working on placement    Robb Casiano MD  03/10/17  10:01 AM             Electronically signed by Robb Casiano MD at 3/10/2017 10:03 AM        Consult Notes (last 72 hours) (Notes from 3/7/2017  3:41 PM through 3/10/2017  3:41 PM)     No notes of this type exist for this encounter.        Nutrition Notes (last 72 hours) (Notes from 3/7/2017  3:41 PM through 3/10/2017  3:41 PM)     No notes of this type exist for this encounter.

## 2017-03-10 NOTE — PLAN OF CARE
Problem: Patient Care Overview (Adult)  Goal: Plan of Care Review  Outcome: Ongoing (interventions implemented as appropriate)    03/10/17 1557   Coping/Psychosocial Response Interventions   Plan Of Care Reviewed With patient   Patient Care Overview   Progress improving   Outcome Evaluation   Outcome Summary/Follow up Plan Pt demo improved endurance with gait training, O2 sats did not drop below 89% on 3L O2 with gait training. PT educated pt with BLE ther ex to complete on her own to further improve endurance and strength. Cont with IPPT to increase strength and safety with mobility.          Problem: Inpatient Physical Therapy  Goal: Transfer Training Goal 1 LTG- PT  Outcome: Ongoing (interventions implemented as appropriate)    03/10/17 1557   Transfer Training PT LTG   Transfer Training PT LTG, Date Goal Reviewed 03/10/17       Goal: Gait Training Goal LTG- PT  Outcome: Ongoing (interventions implemented as appropriate)    03/10/17 1557   Gait Training PT LTG   Gait Training Goal PT LTG, Date Goal Reviewed 03/10/17       Goal: Patient Education Goal STG- PT  Outcome: Ongoing (interventions implemented as appropriate)    03/10/17 1557   Patient Education PT STG   Patient Education PT STG, Date Goal Reviewed 03/10/17
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
## 2017-03-10 NOTE — PLAN OF CARE
Problem: Patient Care Overview (Adult)  Goal: Plan of Care Review  Outcome: Ongoing (interventions implemented as appropriate)    03/09/17 2200 03/10/17 0443   Coping/Psychosocial Response Interventions   Plan Of Care Reviewed With patient --    Patient Care Overview   Progress --  improving       Goal: Discharge Needs Assessment  Outcome: Ongoing (interventions implemented as appropriate)    03/01/17 1548 03/03/17 0650 03/09/17 1743   Discharge Needs Assessment   Concerns To Be Addressed --  homelessness;financial/insurance concerns;mental health concerns;transportation;medication concerns --    Readmission Within The Last 30 Days --  --  no previous admission in last 30 days   Current Discharge Risk homeless --  --    Discharge Disposition --  still a patient --    Current Health   Anticipated Changes Related to Illness --  --  none   Self-Care   Equipment Currently Used at Home --  --  walker, rolling   Living Environment   Transportation Available --  --  car;family or friend will provide         Problem: Pneumonia (Adult)  Goal: Signs and Symptoms of Listed Potential Problems Will be Absent or Manageable (Pneumonia)  Outcome: Ongoing (interventions implemented as appropriate)    03/09/17 1743   Pneumonia   Problems Assessed (Pneumonia) all   Problems Present (Pneumonia) respiratory compromise         Problem: COPD, Chronic Bronchitis/Emphysema (Adult)  Goal: Signs and Symptoms of Listed Potential Problems Will be Absent or Manageable (COPD, Chronic Bronchitis/Emphysema)  Outcome: Ongoing (interventions implemented as appropriate)    03/09/17 1743   COPD, Chronic Bronchitis/Emphysema   Problems Assessed (COPD, Chronic Bronchitis/Emphysema) all   Problems Present (COPD, Chronic Bronchitis/Emphysema) depression         Problem: Wound, Traumatic, Nonburn (Adult)  Goal: Signs and Symptoms of Listed Potential Problems Will be Absent or Manageable (Wound, Traumatic, Nonburn)  Outcome: Ongoing (interventions  implemented as appropriate)    03/09/17 1421   Wound, Traumatic, Nonburn   Problems Assessed (Wound) all   Problems Present (Wound) none

## 2017-03-10 NOTE — PROGRESS NOTES
Continued Stay Note  [facility]     Patient Name: Colleen Gonzalez  MRN: [id number]  Today's Date: 3/10/2017    Admit Date: 3/1/2017          Discharge Plan     CM and SW cont to work on discharge plan. Pt not medically ready for discharge today on 3/9              Discharge Codes     None        Expected Discharge Date and Time     Expected Discharge Date Expected Discharge Time    Mar 4, 2017             Sun Jose RN

## 2017-03-11 PROCEDURE — 63710000001 PREDNISONE PER 5 MG: Performed by: FAMILY MEDICINE

## 2017-03-11 PROCEDURE — 94799 UNLISTED PULMONARY SVC/PX: CPT

## 2017-03-11 PROCEDURE — 99232 SBSQ HOSP IP/OBS MODERATE 35: CPT | Performed by: INTERNAL MEDICINE

## 2017-03-11 PROCEDURE — 25010000002 VANCOMYCIN PER 500 MG

## 2017-03-11 PROCEDURE — 25010000002 CEFEPIME: Performed by: INTERNAL MEDICINE

## 2017-03-11 PROCEDURE — 25010000002 ENOXAPARIN PER 10 MG: Performed by: INTERNAL MEDICINE

## 2017-03-11 PROCEDURE — 94640 AIRWAY INHALATION TREATMENT: CPT

## 2017-03-11 RX ORDER — HYDROCODONE BITARTRATE AND ACETAMINOPHEN 5; 325 MG/1; MG/1
1 TABLET ORAL EVERY 4 HOURS PRN
Status: DISPENSED | OUTPATIENT
Start: 2017-03-11 | End: 2017-03-21

## 2017-03-11 RX ADMIN — IPRATROPIUM BROMIDE AND ALBUTEROL SULFATE 3 ML: .5; 3 SOLUTION RESPIRATORY (INHALATION) at 07:31

## 2017-03-11 RX ADMIN — NYSTATIN: 100000 POWDER TOPICAL at 22:30

## 2017-03-11 RX ADMIN — HYDROCODONE POLISTIREX AND CHLORPHENIRAMINE POLISTIREX 5 ML: 10; 8 SUSPENSION, EXTENDED RELEASE ORAL at 15:32

## 2017-03-11 RX ADMIN — HYDROCODONE BITARTRATE AND ACETAMINOPHEN 1 TABLET: 5; 325 TABLET ORAL at 05:29

## 2017-03-11 RX ADMIN — GABAPENTIN 400 MG: 400 CAPSULE ORAL at 20:07

## 2017-03-11 RX ADMIN — HYDROCODONE POLISTIREX AND CHLORPHENIRAMINE POLISTIREX 5 ML: 10; 8 SUSPENSION, EXTENDED RELEASE ORAL at 05:21

## 2017-03-11 RX ADMIN — TROLAMINE SALICYLATE 1 APPLICATION: 10 CREAM TOPICAL at 17:04

## 2017-03-11 RX ADMIN — NICOTINE 1 PATCH: 21 PATCH, EXTENDED RELEASE TRANSDERMAL at 09:49

## 2017-03-11 RX ADMIN — HYDROCODONE BITARTRATE AND ACETAMINOPHEN 1 TABLET: 5; 325 TABLET ORAL at 10:36

## 2017-03-11 RX ADMIN — LEVOTHYROXINE SODIUM 50 MCG: 25 TABLET ORAL at 05:21

## 2017-03-11 RX ADMIN — CETIRIZINE HYDROCHLORIDE 10 MG: 10 TABLET, FILM COATED ORAL at 09:51

## 2017-03-11 RX ADMIN — Medication 2 TABLET: at 17:10

## 2017-03-11 RX ADMIN — CEFEPIME 2 G: 2 INJECTION, POWDER, FOR SOLUTION INTRAVENOUS at 12:56

## 2017-03-11 RX ADMIN — PREDNISONE 20 MG: 20 TABLET ORAL at 09:51

## 2017-03-11 RX ADMIN — TROLAMINE SALICYLATE 1 APPLICATION: 10 CREAM TOPICAL at 12:58

## 2017-03-11 RX ADMIN — Medication 2 TABLET: at 09:50

## 2017-03-11 RX ADMIN — DULOXETINE 60 MG: 60 CAPSULE, DELAYED RELEASE ORAL at 20:08

## 2017-03-11 RX ADMIN — MICONAZOLE NITRATE 200 MG: 200 SUPPOSITORY VAGINAL at 20:08

## 2017-03-11 RX ADMIN — LORAZEPAM 0.5 MG: 0.5 TABLET ORAL at 17:10

## 2017-03-11 RX ADMIN — VANCOMYCIN HYDROCHLORIDE 1000 MG: 1 INJECTION, SOLUTION INTRAVENOUS at 20:09

## 2017-03-11 RX ADMIN — NYSTATIN: 100000 POWDER TOPICAL at 14:56

## 2017-03-11 RX ADMIN — FLUTICASONE PROPIONATE 2 SPRAY: 50 SPRAY, METERED NASAL at 09:49

## 2017-03-11 RX ADMIN — IPRATROPIUM BROMIDE AND ALBUTEROL SULFATE 3 ML: .5; 3 SOLUTION RESPIRATORY (INHALATION) at 16:13

## 2017-03-11 RX ADMIN — GABAPENTIN 400 MG: 400 CAPSULE ORAL at 09:51

## 2017-03-11 RX ADMIN — TIZANIDINE 4 MG: 4 TABLET ORAL at 20:08

## 2017-03-11 RX ADMIN — QUETIAPINE 300 MG: 200 TABLET, EXTENDED RELEASE ORAL at 20:07

## 2017-03-11 RX ADMIN — TIZANIDINE 4 MG: 4 TABLET ORAL at 09:50

## 2017-03-11 RX ADMIN — CEFEPIME 2 G: 2 INJECTION, POWDER, FOR SOLUTION INTRAVENOUS at 05:21

## 2017-03-11 RX ADMIN — VANCOMYCIN HYDROCHLORIDE 1000 MG: 1 INJECTION, SOLUTION INTRAVENOUS at 09:48

## 2017-03-11 RX ADMIN — PANTOPRAZOLE SODIUM 40 MG: 40 TABLET, DELAYED RELEASE ORAL at 09:49

## 2017-03-11 RX ADMIN — DULOXETINE 60 MG: 60 CAPSULE, DELAYED RELEASE ORAL at 09:51

## 2017-03-11 RX ADMIN — ENOXAPARIN SODIUM 40 MG: 40 INJECTION SUBCUTANEOUS at 09:49

## 2017-03-11 RX ADMIN — IPRATROPIUM BROMIDE AND ALBUTEROL SULFATE 3 ML: .5; 3 SOLUTION RESPIRATORY (INHALATION) at 22:16

## 2017-03-11 RX ADMIN — FUROSEMIDE 40 MG: 40 TABLET ORAL at 09:50

## 2017-03-11 RX ADMIN — HYDROCODONE BITARTATE AND ACETAMINOPHEN 1 TABLET: 5; 325 TABLET ORAL at 20:13

## 2017-03-11 RX ADMIN — MONTELUKAST SODIUM 10 MG: 10 TABLET, FILM COATED ORAL at 20:08

## 2017-03-11 RX ADMIN — CEFEPIME 2 G: 2 INJECTION, POWDER, FOR SOLUTION INTRAVENOUS at 20:18

## 2017-03-11 NOTE — PLAN OF CARE
Problem: Patient Care Overview (Adult)  Goal: Plan of Care Review  Outcome: Ongoing (interventions implemented as appropriate)    03/10/17 1557 03/11/17 0000   Coping/Psychosocial Response Interventions   Plan Of Care Reviewed With --  patient   Patient Care Overview   Progress improving --        Goal: Discharge Needs Assessment  Outcome: Ongoing (interventions implemented as appropriate)    03/01/17 1548 03/03/17 0650 03/09/17 1743   Discharge Needs Assessment   Concerns To Be Addressed --  homelessness;financial/insurance concerns;mental health concerns;transportation;medication concerns --    Readmission Within The Last 30 Days --  --  no previous admission in last 30 days   Current Discharge Risk homeless --  --    Discharge Disposition --  still a patient --    Current Health   Anticipated Changes Related to Illness --  --  none   Self-Care   Equipment Currently Used at Home --  --  --    Living Environment   Transportation Available --  --  car;family or friend will provide     03/10/17 1105   Discharge Needs Assessment   Concerns To Be Addressed --    Readmission Within The Last 30 Days --    Current Discharge Risk --    Discharge Disposition --    Current Health   Anticipated Changes Related to Illness --    Self-Care   Equipment Currently Used at Home walker, rolling   Living Environment   Transportation Available --          Problem: Pneumonia (Adult)  Goal: Signs and Symptoms of Listed Potential Problems Will be Absent or Manageable (Pneumonia)  Outcome: Ongoing (interventions implemented as appropriate)    03/10/17 2019   Pneumonia   Problems Assessed (Pneumonia) all   Problems Present (Pneumonia) fluid/electrolyte imbalance;progression of infection;respiratory compromise         Problem: COPD, Chronic Bronchitis/Emphysema (Adult)  Goal: Signs and Symptoms of Listed Potential Problems Will be Absent or Manageable (COPD, Chronic Bronchitis/Emphysema)  Outcome: Ongoing (interventions implemented as  appropriate)    03/09/17 1743   COPD, Chronic Bronchitis/Emphysema   Problems Assessed (COPD, Chronic Bronchitis/Emphysema) all   Problems Present (COPD, Chronic Bronchitis/Emphysema) depression         Problem: Wound, Traumatic, Nonburn (Adult)  Goal: Signs and Symptoms of Listed Potential Problems Will be Absent or Manageable (Wound, Traumatic, Nonburn)  Outcome: Ongoing (interventions implemented as appropriate)    03/09/17 1743   Wound, Traumatic, Nonburn   Problems Assessed (Wound) all   Problems Present (Wound) none

## 2017-03-11 NOTE — PROGRESS NOTES
"      HOSPITALIST DAILY PROGRESS NOTE    Chief Complaint: f/u for SOA    Subjective   SUBJECTIVE/OVERNIGHT EVENTS   No acute events overnight, patient with multiple complaints, needs muscle relaxant changes, want more pain meds for her back, requesting increase of her Seroquel dose.    Review of Systems:  Gen-no fevers, no chills  CV-no chest pain, no palpitations  Resp-no cough, no dyspnea  GI-no N/V/D, no abd pain    Objective   OBJECTIVE   I have reviewed the vital signs.  Visit Vitals   • /88 (BP Location: Right arm, Patient Position: Lying)   • Pulse 90   • Temp 97.8 °F (36.6 °C) (Oral)   • Resp 18   • Ht 68\" (172.7 cm)   • Wt 242 lb 8.1 oz (110 kg)   • SpO2 93%   • BMI 36.87 kg/m2     Physical Exam:  Gen-no acute distress, laying in bed comfortably  CV-RRR, S1 S2 normal, no m/r/g  Resp-CTAB, no wheezes  Abd-soft, NT, ND, +BS  Ext-BLE edema 1+  Neuro-A&Ox3, no focal deficits  Psych-appropriate mood and affect    Results:  I have reviewed the labs, culture data.      Results from last 7 days  Lab Units 03/09/17  0509 03/08/17  0505 03/07/17  0615   WBC 10*3/mm3 12.57* 10.86* 13.30*   HEMOGLOBIN g/dL 13.6 13.2 12.6   HEMATOCRIT % 42.3 40.4 38.1   PLATELETS 10*3/mm3 379 407 380       Results from last 7 days  Lab Units 03/09/17  0509   SODIUM mmol/L 138   POTASSIUM mmol/L 4.0   CHLORIDE mmol/L 102   TOTAL CO2 mmol/L 32.0*   BUN mg/dL 15   CREATININE mg/dL 0.80   GLUCOSE mg/dL 95   CALCIUM mg/dL 10.1       Culture Data:  Cultures:    RESPIRATORY CULTURE   Date Value Ref Range Status   03/07/2017 Scant growth (1+) Pseudomonas aeruginosa (A)  Final   03/07/2017 Light growth (2+) Staphylococcus aureus, MRSA (C)  Final     Comment:       Methicillin resistant Staphylococcus aureus, Patient may be an isolation risk.  This isolate does not demonstrate inducible clindamycin resistance in vitro.     03/07/2017 Light growth (2+) Yeast isolated (A)  Final     I have reviewed the medications.    Assessment/Plan "   ASSESSMENT/PLAN      52-year-old  female with history of noncompliance. She smokes 2-3 packs of cigarettes per day. She reently went off her psych (Bipolar) meds. She presented to [facility] ED with increased shortness of breath and cough and fever. Reports recent hospitalization to [facility] about a week with no improvement      # Sepsis  --met criteria due to fever, hypoxia, tachycardia, leukocytosis and cultures positive for MRSA/pseudomona, ID consulted, following, on abx as below      # A/C hypoxic resp failure-improving  -- multifactorial COPD vs Pneumonia NASIM( vs neoplasm less likely)  --IV cefepime and vanc per ID   --resp culture + MRSA and Pseudomona  --needs Bronchoscopy , but declines.     # Bipolar disorder/homeless  --suspect schizophrenia also given paranoia and hallucination  --pt recently stopped psych meds a week ago because they were causing her to hallucinate  --pt needs psych eval, but unfortunately we do not have inpt psych service. when stable for dc, may need to have the [facility] eval. i do feel at this time she will need [facility] eval. She seems withdrawn and Depressed. I dont believe it would be safe to let her go home without eval.   -- SW consulted and following      # Hypothyroidism- cont home meds      # Tobacco abuse-- counseled, cessation advised      # Labial Cyst--needs to follow up with Gyn       PT/OT      Dispo: difficult secondary to homeless. SS working on placement    Robb Casiano MD  03/11/17  5:56 AM

## 2017-03-11 NOTE — PLAN OF CARE
Problem: Patient Care Overview (Adult)  Goal: Plan of Care Review  Outcome: Ongoing (interventions implemented as appropriate)        03/11/17 1335   Coping/Psychosocial Response Interventions   Plan Of Care Reviewed With patient   Patient Care Overview   Progress improving       Goal: Discharge Needs Assessment  Outcome: Ongoing (interventions implemented as appropriate)    Problem: Pneumonia (Adult)  Goal: Signs and Symptoms of Listed Potential Problems Will be Absent or Manageable (Pneumonia)  Outcome: Ongoing (interventions implemented as appropriate)    Problem: COPD, Chronic Bronchitis/Emphysema (Adult)  Goal: Signs and Symptoms of Listed Potential Problems Will be Absent or Manageable (COPD, Chronic Bronchitis/Emphysema)  Outcome: Ongoing (interventions implemented as appropriate)    Problem: Wound, Traumatic, Nonburn (Adult)  Goal: Signs and Symptoms of Listed Potential Problems Will be Absent or Manageable (Wound, Traumatic, Nonburn)  Outcome: Ongoing (interventions implemented as appropriate)

## 2017-03-12 PROCEDURE — 25010000002 CEFEPIME: Performed by: INTERNAL MEDICINE

## 2017-03-12 PROCEDURE — 99233 SBSQ HOSP IP/OBS HIGH 50: CPT | Performed by: NURSE PRACTITIONER

## 2017-03-12 PROCEDURE — 94799 UNLISTED PULMONARY SVC/PX: CPT

## 2017-03-12 PROCEDURE — 25010000002 ENOXAPARIN PER 10 MG: Performed by: INTERNAL MEDICINE

## 2017-03-12 PROCEDURE — 94640 AIRWAY INHALATION TREATMENT: CPT

## 2017-03-12 PROCEDURE — 63710000001 PREDNISONE PER 5 MG: Performed by: FAMILY MEDICINE

## 2017-03-12 PROCEDURE — 25010000002 VANCOMYCIN PER 500 MG

## 2017-03-12 RX ORDER — BUDESONIDE AND FORMOTEROL FUMARATE DIHYDRATE 80; 4.5 UG/1; UG/1
2 AEROSOL RESPIRATORY (INHALATION)
Status: DISCONTINUED | OUTPATIENT
Start: 2017-03-12 | End: 2017-03-25 | Stop reason: HOSPADM

## 2017-03-12 RX ORDER — ATENOLOL 25 MG/1
12.5 TABLET ORAL DAILY
Status: DISCONTINUED | OUTPATIENT
Start: 2017-03-12 | End: 2017-03-14

## 2017-03-12 RX ORDER — THEOPHYLLINE 300 MG/1
600 TABLET, EXTENDED RELEASE ORAL EVERY 12 HOURS SCHEDULED
Status: DISCONTINUED | OUTPATIENT
Start: 2017-03-12 | End: 2017-03-25 | Stop reason: HOSPADM

## 2017-03-12 RX ORDER — PRAVASTATIN SODIUM 40 MG
80 TABLET ORAL NIGHTLY
Status: DISCONTINUED | OUTPATIENT
Start: 2017-03-12 | End: 2017-03-25 | Stop reason: HOSPADM

## 2017-03-12 RX ORDER — GABAPENTIN 400 MG/1
800 CAPSULE ORAL EVERY 12 HOURS SCHEDULED
Status: DISCONTINUED | OUTPATIENT
Start: 2017-03-12 | End: 2017-03-17

## 2017-03-12 RX ADMIN — HYDROCODONE POLISTIREX AND CHLORPHENIRAMINE POLISTIREX 5 ML: 10; 8 SUSPENSION, EXTENDED RELEASE ORAL at 04:13

## 2017-03-12 RX ADMIN — NYSTATIN: 100000 POWDER TOPICAL at 06:00

## 2017-03-12 RX ADMIN — BUDESONIDE AND FORMOTEROL FUMARATE DIHYDRATE 2 PUFF: 80; 4.5 AEROSOL RESPIRATORY (INHALATION) at 20:00

## 2017-03-12 RX ADMIN — GABAPENTIN 400 MG: 400 CAPSULE ORAL at 10:08

## 2017-03-12 RX ADMIN — TIZANIDINE 4 MG: 4 TABLET ORAL at 21:05

## 2017-03-12 RX ADMIN — TIZANIDINE 4 MG: 4 TABLET ORAL at 10:08

## 2017-03-12 RX ADMIN — DULOXETINE 60 MG: 60 CAPSULE, DELAYED RELEASE ORAL at 10:09

## 2017-03-12 RX ADMIN — LORAZEPAM 0.5 MG: 0.5 TABLET ORAL at 23:35

## 2017-03-12 RX ADMIN — TROLAMINE SALICYLATE 1 APPLICATION: 10 CREAM TOPICAL at 05:25

## 2017-03-12 RX ADMIN — HYDROCODONE BITARTATE AND ACETAMINOPHEN 1 TABLET: 5; 325 TABLET ORAL at 05:23

## 2017-03-12 RX ADMIN — HYDROCODONE BITARTATE AND ACETAMINOPHEN 1 TABLET: 5; 325 TABLET ORAL at 01:26

## 2017-03-12 RX ADMIN — IPRATROPIUM BROMIDE AND ALBUTEROL SULFATE 3 ML: .5; 3 SOLUTION RESPIRATORY (INHALATION) at 07:59

## 2017-03-12 RX ADMIN — HYDROCODONE BITARTATE AND ACETAMINOPHEN 1 TABLET: 5; 325 TABLET ORAL at 10:08

## 2017-03-12 RX ADMIN — CEFEPIME 2 G: 2 INJECTION, POWDER, FOR SOLUTION INTRAVENOUS at 14:42

## 2017-03-12 RX ADMIN — ONDANSETRON 4 MG: 4 TABLET, FILM COATED ORAL at 06:37

## 2017-03-12 RX ADMIN — CETIRIZINE HYDROCHLORIDE 10 MG: 10 TABLET, FILM COATED ORAL at 10:08

## 2017-03-12 RX ADMIN — PREDNISONE 20 MG: 20 TABLET ORAL at 10:08

## 2017-03-12 RX ADMIN — QUETIAPINE 300 MG: 200 TABLET, EXTENDED RELEASE ORAL at 21:05

## 2017-03-12 RX ADMIN — ENOXAPARIN SODIUM 40 MG: 40 INJECTION SUBCUTANEOUS at 10:09

## 2017-03-12 RX ADMIN — PRAVASTATIN SODIUM 80 MG: 40 TABLET ORAL at 21:05

## 2017-03-12 RX ADMIN — THEOPHYLLINE 600 MG: 300 TABLET, EXTENDED RELEASE ORAL at 21:05

## 2017-03-12 RX ADMIN — Medication 2 TABLET: at 10:08

## 2017-03-12 RX ADMIN — HYDROCODONE BITARTATE AND ACETAMINOPHEN 1 TABLET: 5; 325 TABLET ORAL at 21:05

## 2017-03-12 RX ADMIN — CEFEPIME 2 G: 2 INJECTION, POWDER, FOR SOLUTION INTRAVENOUS at 23:35

## 2017-03-12 RX ADMIN — DULOXETINE 60 MG: 60 CAPSULE, DELAYED RELEASE ORAL at 21:05

## 2017-03-12 RX ADMIN — NICOTINE 1 PATCH: 21 PATCH, EXTENDED RELEASE TRANSDERMAL at 10:09

## 2017-03-12 RX ADMIN — VANCOMYCIN HYDROCHLORIDE 1000 MG: 1 INJECTION, SOLUTION INTRAVENOUS at 10:09

## 2017-03-12 RX ADMIN — IPRATROPIUM BROMIDE AND ALBUTEROL SULFATE 3 ML: .5; 3 SOLUTION RESPIRATORY (INHALATION) at 16:23

## 2017-03-12 RX ADMIN — IPRATROPIUM BROMIDE AND ALBUTEROL SULFATE 3 ML: .5; 3 SOLUTION RESPIRATORY (INHALATION) at 20:00

## 2017-03-12 RX ADMIN — FLUTICASONE PROPIONATE 2 SPRAY: 50 SPRAY, METERED NASAL at 10:09

## 2017-03-12 RX ADMIN — PANTOPRAZOLE SODIUM 40 MG: 40 TABLET, DELAYED RELEASE ORAL at 10:08

## 2017-03-12 RX ADMIN — MONTELUKAST SODIUM 10 MG: 10 TABLET, FILM COATED ORAL at 21:05

## 2017-03-12 RX ADMIN — FUROSEMIDE 40 MG: 40 TABLET ORAL at 10:07

## 2017-03-12 RX ADMIN — MICONAZOLE NITRATE: 2 CREAM TOPICAL at 21:46

## 2017-03-12 RX ADMIN — LEVOTHYROXINE SODIUM 50 MCG: 25 TABLET ORAL at 05:23

## 2017-03-12 RX ADMIN — VANCOMYCIN HYDROCHLORIDE 1000 MG: 1 INJECTION, SOLUTION INTRAVENOUS at 21:05

## 2017-03-12 RX ADMIN — GABAPENTIN 800 MG: 400 CAPSULE ORAL at 21:05

## 2017-03-12 RX ADMIN — CEFEPIME 2 G: 2 INJECTION, POWDER, FOR SOLUTION INTRAVENOUS at 05:23

## 2017-03-12 RX ADMIN — LORAZEPAM 0.5 MG: 0.5 TABLET ORAL at 10:08

## 2017-03-12 RX ADMIN — IPRATROPIUM BROMIDE AND ALBUTEROL SULFATE 3 ML: .5; 3 SOLUTION RESPIRATORY (INHALATION) at 12:19

## 2017-03-12 RX ADMIN — HYDROCODONE BITARTATE AND ACETAMINOPHEN 1 TABLET: 5; 325 TABLET ORAL at 14:43

## 2017-03-12 RX ADMIN — ATENOLOL 12.5 MG: 25 TABLET ORAL at 14:43

## 2017-03-12 NOTE — PROGRESS NOTES
"      HOSPITALIST DAILY PROGRESS NOTE    Chief Complaint: f/u for SOA    Subjective   SUBJECTIVE/OVERNIGHT EVENTS   No acute events overnight, patient with multiple questions about home medications.  Requesting zanaflex and neurontin be increased to QID which is how she took them at home.  Also requesting more meds to rest during the day.      Review of Systems:  Gen-no fevers, no chills  CV-no chest pain, no palpitations  Resp-+ cough, no dyspnea  GI-no N/V/D, no abd pain    Objective   OBJECTIVE   I have reviewed the vital signs.  Visit Vitals   • /59 (BP Location: Right arm, Patient Position: Lying)   • Pulse 111   • Temp 98.1 °F (36.7 °C) (Axillary)   • Resp 18   • Ht 68\" (172.7 cm)   • Wt 242 lb 8.1 oz (110 kg)   • SpO2 92%   • BMI 36.87 kg/m2     Physical Exam:  Gen-no acute distress, laying in bed comfortably  CV-RRR, S1/S2 normal, no m/r/g  Resp-CTAB, no wheezes, coarse cough  Abd-soft, NT, ND, +BS, obese  Ext-minimal BLE edema  Neuro-A&Ox3, no focal deficits  Psych-appropriate mood and affect, seems depressed    Results:    No new labs/imaging      Results from last 7 days  Lab Units 03/09/17  0509 03/08/17  0505 03/07/17  0615   WBC 10*3/mm3 12.57* 10.86* 13.30*   HEMOGLOBIN g/dL 13.6 13.2 12.6   HEMATOCRIT % 42.3 40.4 38.1   PLATELETS 10*3/mm3 379 407 380       Results from last 7 days  Lab Units 03/09/17  0509   SODIUM mmol/L 138   POTASSIUM mmol/L 4.0   CHLORIDE mmol/L 102   TOTAL CO2 mmol/L 32.0*   BUN mg/dL 15   CREATININE mg/dL 0.80   GLUCOSE mg/dL 95   CALCIUM mg/dL 10.1       Culture Data:  Cultures:    RESPIRATORY CULTURE   Date Value Ref Range Status   03/07/2017 Scant growth (1+) Pseudomonas aeruginosa (A)  Final   03/07/2017 Light growth (2+) Staphylococcus aureus, MRSA (C)  Final     Comment:       Methicillin resistant Staphylococcus aureus, Patient may be an isolation risk.  This isolate does not demonstrate inducible clindamycin resistance in vitro.     03/07/2017 Light growth (2+) " Yeast isolated (A)  Final     I have reviewed the medications.    Assessment/Plan   ASSESSMENT/PLAN      52-year-old  female with history of noncompliance. She smokes 2-3 packs of cigarettes per day. She reently went off her psych (Bipolar) meds. She presented to [facility] ED with increased shortness of breath and cough and fever. Reports recent hospitalization to [facility] about a week with no improvement      # Sepsis  --met criteria due to fever, hypoxia, tachycardia, leukocytosis and cultures positive for MRSA/pseudomona, ID consulted, following, on abx as below      # A/C hypoxic resp failure-improving  --multifactorial COPD vs Pneumonia NASIM( vs neoplasm less likely)  --IV cefepime and vanc per ID 2-3 more weeks  --resp culture + MRSA and Pseudomona  --needs Bronchoscopy, but declines (she attributes brother's death to his bronchoscopy)  --restart theophylline    # Bipolar disorder/homeless  --suspect schizophrenia also given paranoia and hallucination  --pt recently stopped psych meds a week ago because they were causing her to hallucinate  --pt needs psych eval, but unfortunately we do not have inpt psych service. when stable for dc, may need to have the [facility] eval. i do feel at this time she will need [facility] eval. She seems withdrawn and Depressed. I dont believe it would be safe to let her go home without eval.   --SW consulted and following  --patient states she must check on her car, which is impounded, before she goes anywhere after she is discharged.    --nurse to call and confirm zanaflex/neurontin doses      # Hypothyroidism- cont home meds      # Tobacco abuse-- counseled, cessation advised      # Labial Cyst--needs to follow up with Gyn     # Tachycardia--restart atenolol    # Vaginal Yeast--s/p 3d of vaginal suppository.  Still with external symptoms--try micotin cream      PT/OT      Dispo: difficult secondary to homeless. SS working on placement...    Alison Jamison,  APRN  03/12/17  11:43 AM

## 2017-03-12 NOTE — PLAN OF CARE
Problem: Pneumonia (Adult)  Intervention: Maximize Oxygenation/Ventilation/Perfusion    03/04/17 0800 03/12/17 1000 03/12/17 1513   Activity   Activity Type --  --  activity adjusted per tolerance   Promote Aggressive Pulmonary Hygiene/Secretion Management   Cough And Deep Breathing --  done independently per patient --    Incentive Spirometer   Administration (Incentive Spirometer) done with encouragement --  --        Intervention: Prevent/Manage Infection Progression    03/12/17 1513   Safety Interventions   Infection Prevention environmental surveillance performed;single patient room provided       Intervention: Monitor/Manage Fluid Electrolyte Balance    03/12/17 1513   Positioning   Positioning semi-Fowlers   Nutrition Interventions   Fluid/Electrolyte Management fluids provided         Goal: Signs and Symptoms of Listed Potential Problems Will be Absent or Manageable (Pneumonia)  Outcome: Ongoing (interventions implemented as appropriate)    03/12/17 1513   Pneumonia   Problems Assessed (Pneumonia) all   Problems Present (Pneumonia) none

## 2017-03-12 NOTE — PLAN OF CARE
Problem: Patient Care Overview (Adult)  Goal: Plan of Care Review  Outcome: Ongoing (interventions implemented as appropriate)    03/11/17 1845 03/12/17 0126   Coping/Psychosocial Response Interventions   Plan Of Care Reviewed With --  patient   Patient Care Overview   Progress improving --        Goal: Discharge Needs Assessment  Outcome: Ongoing (interventions implemented as appropriate)    03/01/17 1548 03/03/17 0650 03/09/17 1743   Discharge Needs Assessment   Concerns To Be Addressed --  homelessness;financial/insurance concerns;mental health concerns;transportation;medication concerns --    Readmission Within The Last 30 Days --  --  no previous admission in last 30 days   Equipment Needed After Discharge --  --  --    Current Discharge Risk homeless --  --    Discharge Disposition --  still a patient --    Current Health   Anticipated Changes Related to Illness --  --  none   Self-Care   Equipment Currently Used at Home --  --  --    Living Environment   Transportation Available --  --  car;family or friend will provide     03/10/17 1105 03/11/17 1845   Discharge Needs Assessment   Concerns To Be Addressed --  --    Readmission Within The Last 30 Days --  --    Equipment Needed After Discharge --  none   Current Discharge Risk --  --    Discharge Disposition --  --    Current Health   Anticipated Changes Related to Illness --  --    Self-Care   Equipment Currently Used at Home walker, rolling --    Living Environment   Transportation Available --  --          Problem: Pneumonia (Adult)  Goal: Signs and Symptoms of Listed Potential Problems Will be Absent or Manageable (Pneumonia)  Outcome: Ongoing (interventions implemented as appropriate)    03/11/17 1845   Pneumonia   Problems Assessed (Pneumonia) all   Problems Present (Pneumonia) respiratory compromise         Problem: COPD, Chronic Bronchitis/Emphysema (Adult)  Goal: Signs and Symptoms of Listed Potential Problems Will be Absent or Manageable (COPD,  Chronic Bronchitis/Emphysema)  Outcome: Ongoing (interventions implemented as appropriate)    03/11/17 1845   COPD, Chronic Bronchitis/Emphysema   Problems Assessed (COPD, Chronic Bronchitis/Emphysema) all   Problems Present (COPD, Chronic Bronchitis/Emphysema) depression;functional decline/self-care deficit         Problem: Wound, Traumatic, Nonburn (Adult)  Goal: Signs and Symptoms of Listed Potential Problems Will be Absent or Manageable (Wound, Traumatic, Nonburn)  Outcome: Ongoing (interventions implemented as appropriate)    03/11/17 1845   Wound, Traumatic, Nonburn   Problems Assessed (Wound) all   Problems Present (Wound) none

## 2017-03-13 LAB
ANION GAP SERPL CALCULATED.3IONS-SCNC: 7 MMOL/L (ref 3–11)
BUN BLD-MCNC: 13 MG/DL (ref 9–23)
BUN/CREAT SERPL: 16.3 (ref 7–25)
CALCIUM SPEC-SCNC: 9.8 MG/DL (ref 8.7–10.4)
CHLORIDE SERPL-SCNC: 98 MMOL/L (ref 99–109)
CO2 SERPL-SCNC: 31 MMOL/L (ref 20–31)
CREAT BLD-MCNC: 0.8 MG/DL (ref 0.6–1.3)
GFR SERPL CREATININE-BSD FRML MDRD: 75 ML/MIN/1.73
GLUCOSE BLD-MCNC: 79 MG/DL (ref 70–100)
MVISTA(R) BLASTOMYCES AG: NORMAL NG/ML
POTASSIUM BLD-SCNC: 3.7 MMOL/L (ref 3.5–5.5)
SODIUM BLD-SCNC: 136 MMOL/L (ref 132–146)
SPECIMEN SOURCE: NORMAL

## 2017-03-13 PROCEDURE — 25010000002 VANCOMYCIN PER 500 MG

## 2017-03-13 PROCEDURE — 94799 UNLISTED PULMONARY SVC/PX: CPT

## 2017-03-13 PROCEDURE — 25010000002 CEFEPIME: Performed by: INTERNAL MEDICINE

## 2017-03-13 PROCEDURE — 94640 AIRWAY INHALATION TREATMENT: CPT

## 2017-03-13 PROCEDURE — 63710000001 PREDNISONE PER 5 MG: Performed by: FAMILY MEDICINE

## 2017-03-13 PROCEDURE — 99232 SBSQ HOSP IP/OBS MODERATE 35: CPT | Performed by: INTERNAL MEDICINE

## 2017-03-13 PROCEDURE — 97530 THERAPEUTIC ACTIVITIES: CPT

## 2017-03-13 PROCEDURE — 80048 BASIC METABOLIC PNL TOTAL CA: CPT | Performed by: NURSE PRACTITIONER

## 2017-03-13 PROCEDURE — 25010000002 ONDANSETRON PER 1 MG: Performed by: INTERNAL MEDICINE

## 2017-03-13 PROCEDURE — 25010000002 ENOXAPARIN PER 10 MG: Performed by: INTERNAL MEDICINE

## 2017-03-13 RX ADMIN — MONTELUKAST SODIUM 10 MG: 10 TABLET, FILM COATED ORAL at 20:31

## 2017-03-13 RX ADMIN — LORAZEPAM 0.5 MG: 0.5 TABLET ORAL at 13:52

## 2017-03-13 RX ADMIN — ONDANSETRON 4 MG: 2 INJECTION INTRAMUSCULAR; INTRAVENOUS at 06:33

## 2017-03-13 RX ADMIN — Medication 2 TABLET: at 08:34

## 2017-03-13 RX ADMIN — IPRATROPIUM BROMIDE AND ALBUTEROL SULFATE 3 ML: .5; 3 SOLUTION RESPIRATORY (INHALATION) at 08:00

## 2017-03-13 RX ADMIN — IPRATROPIUM BROMIDE AND ALBUTEROL SULFATE 3 ML: .5; 3 SOLUTION RESPIRATORY (INHALATION) at 04:44

## 2017-03-13 RX ADMIN — IPRATROPIUM BROMIDE AND ALBUTEROL SULFATE 3 ML: .5; 3 SOLUTION RESPIRATORY (INHALATION) at 12:44

## 2017-03-13 RX ADMIN — PREDNISONE 20 MG: 20 TABLET ORAL at 08:34

## 2017-03-13 RX ADMIN — VANCOMYCIN HYDROCHLORIDE 1000 MG: 1 INJECTION, SOLUTION INTRAVENOUS at 08:37

## 2017-03-13 RX ADMIN — PRAVASTATIN SODIUM 80 MG: 40 TABLET ORAL at 20:31

## 2017-03-13 RX ADMIN — CEFEPIME 2 G: 2 INJECTION, POWDER, FOR SOLUTION INTRAVENOUS at 20:31

## 2017-03-13 RX ADMIN — CEFEPIME 2 G: 2 INJECTION, POWDER, FOR SOLUTION INTRAVENOUS at 05:37

## 2017-03-13 RX ADMIN — GABAPENTIN 800 MG: 400 CAPSULE ORAL at 20:31

## 2017-03-13 RX ADMIN — HYDROCODONE BITARTATE AND ACETAMINOPHEN 1 TABLET: 5; 325 TABLET ORAL at 22:21

## 2017-03-13 RX ADMIN — LEVOTHYROXINE SODIUM 50 MCG: 25 TABLET ORAL at 05:36

## 2017-03-13 RX ADMIN — MICONAZOLE NITRATE: 2 CREAM TOPICAL at 08:39

## 2017-03-13 RX ADMIN — THEOPHYLLINE 600 MG: 300 TABLET, EXTENDED RELEASE ORAL at 20:31

## 2017-03-13 RX ADMIN — PANTOPRAZOLE SODIUM 40 MG: 40 TABLET, DELAYED RELEASE ORAL at 08:34

## 2017-03-13 RX ADMIN — QUETIAPINE 300 MG: 200 TABLET, EXTENDED RELEASE ORAL at 20:31

## 2017-03-13 RX ADMIN — HYDROCODONE BITARTATE AND ACETAMINOPHEN 1 TABLET: 5; 325 TABLET ORAL at 18:30

## 2017-03-13 RX ADMIN — MICONAZOLE NITRATE: 2 CREAM TOPICAL at 20:32

## 2017-03-13 RX ADMIN — TIZANIDINE 4 MG: 4 TABLET ORAL at 20:31

## 2017-03-13 RX ADMIN — CETIRIZINE HYDROCHLORIDE 10 MG: 10 TABLET, FILM COATED ORAL at 08:35

## 2017-03-13 RX ADMIN — CEFEPIME 2 G: 2 INJECTION, POWDER, FOR SOLUTION INTRAVENOUS at 13:39

## 2017-03-13 RX ADMIN — VANCOMYCIN HYDROCHLORIDE 1000 MG: 1 INJECTION, SOLUTION INTRAVENOUS at 20:30

## 2017-03-13 RX ADMIN — THEOPHYLLINE 600 MG: 300 TABLET, EXTENDED RELEASE ORAL at 08:33

## 2017-03-13 RX ADMIN — Medication 2 TABLET: at 18:29

## 2017-03-13 RX ADMIN — DULOXETINE 60 MG: 60 CAPSULE, DELAYED RELEASE ORAL at 08:34

## 2017-03-13 RX ADMIN — DULOXETINE 60 MG: 60 CAPSULE, DELAYED RELEASE ORAL at 20:32

## 2017-03-13 RX ADMIN — BUDESONIDE AND FORMOTEROL FUMARATE DIHYDRATE 2 PUFF: 80; 4.5 AEROSOL RESPIRATORY (INHALATION) at 20:10

## 2017-03-13 RX ADMIN — HYDROCODONE BITARTATE AND ACETAMINOPHEN 1 TABLET: 5; 325 TABLET ORAL at 13:39

## 2017-03-13 RX ADMIN — LORAZEPAM 0.5 MG: 0.5 TABLET ORAL at 22:21

## 2017-03-13 RX ADMIN — ATENOLOL 12.5 MG: 25 TABLET ORAL at 08:35

## 2017-03-13 RX ADMIN — NICOTINE 1 PATCH: 21 PATCH, EXTENDED RELEASE TRANSDERMAL at 08:38

## 2017-03-13 RX ADMIN — ENOXAPARIN SODIUM 40 MG: 40 INJECTION SUBCUTANEOUS at 08:36

## 2017-03-13 RX ADMIN — FUROSEMIDE 40 MG: 40 TABLET ORAL at 08:34

## 2017-03-13 RX ADMIN — IPRATROPIUM BROMIDE AND ALBUTEROL SULFATE 3 ML: .5; 3 SOLUTION RESPIRATORY (INHALATION) at 20:10

## 2017-03-13 RX ADMIN — TIZANIDINE 4 MG: 4 TABLET ORAL at 08:35

## 2017-03-13 RX ADMIN — FLUTICASONE PROPIONATE 2 SPRAY: 50 SPRAY, METERED NASAL at 08:33

## 2017-03-13 RX ADMIN — GABAPENTIN 800 MG: 400 CAPSULE ORAL at 08:34

## 2017-03-13 NOTE — PROGRESS NOTES
Continued Stay Note  [facility]     Patient Name: Colleen Gonzalez  MRN: [id number]  Today's Date: 3/13/2017    Admit Date: 3/1/2017          Discharge Plan       03/13/17 1204    Case Management/Social Work Plan    Plan discharge plan    Patient/Family In Agreement With Plan yes    Additional Comments Notes from [facility] indicated possible referral to LTAC. Spoke with Maria Antonia from [facility] and Gloria from [facility] and no referrals to LTAC facilities have been made.  Pt is homeless and both facilities are unable to accept pts with no discharge plan in place. Dr Self(at [facility])  and Dr Casiano  aware LTACs are unable to accept pt with no discharge plan in place and pt is homeless. Spoke with Serena Flores and  CM/SW will cont to work on discharge plan.              Discharge Codes     None        Expected Discharge Date and Time     Expected Discharge Date Expected Discharge Time    Mar 4, 2017             Sun Jose RN

## 2017-03-13 NOTE — PROGRESS NOTES
"      HOSPITALIST DAILY PROGRESS NOTE    Chief Complaint: f/u for soa    Subjective   SUBJECTIVE/OVERNIGHT EVENTS   No acute events overnight, patient more awake and alert, feels better, denies any fevers, no n/v    Review of Systems:  Gen-no fevers, no chills  CV-no chest pain, no palpitations  Resp-no cough, no dyspnea  GI-no N/V/D, no abd pain    Objective   OBJECTIVE   I have reviewed the vital signs.  Visit Vitals   • /75 (BP Location: Right arm, Patient Position: Lying)   • Pulse 80   • Temp 98.2 °F (36.8 °C) (Axillary)   • Resp 14   • Ht 68\" (172.7 cm)   • Wt 242 lb 8.1 oz (110 kg)   • SpO2 92%   • BMI 36.87 kg/m2       Physical Exam:  Gen-no acute distress, comfortable  CV-RRR, S1 S2 normal, no m/r/g  Resp-CTAB, no wheezes  Abd-soft, NT, ND, +BS  Ext-no edema  Neuro-A&Ox3, no focal deficits  Psych-appropriate mood and affect    Results:  I have reviewed the labs, culture data.      Results from last 7 days  Lab Units 03/09/17  0509 03/08/17  0505 03/07/17  0615   WBC 10*3/mm3 12.57* 10.86* 13.30*   HEMOGLOBIN g/dL 13.6 13.2 12.6   HEMATOCRIT % 42.3 40.4 38.1   PLATELETS 10*3/mm3 379 407 380       Results from last 7 days  Lab Units 03/13/17  0515   SODIUM mmol/L 136   POTASSIUM mmol/L 3.7   CHLORIDE mmol/L 98*   TOTAL CO2 mmol/L 31.0   BUN mg/dL 13   CREATININE mg/dL 0.80   GLUCOSE mg/dL 79   CALCIUM mg/dL 9.8     Culture Data:  Cultures:    RESPIRATORY CULTURE   Date Value Ref Range Status   03/07/2017 Scant growth (1+) Pseudomonas aeruginosa (A)  Final   03/07/2017 Light growth (2+) Staphylococcus aureus, MRSA (C)  Final     Comment:       Methicillin resistant Staphylococcus aureus, Patient may be an isolation risk.  This isolate does not demonstrate inducible clindamycin resistance in vitro.     03/07/2017 Light growth (2+) Yeast isolated (A)  Final     I have reviewed the medications.    Assessment/Plan   ASSESSMENT/PLAN    52-year-old  female with history of noncompliance. She smokes 2-3 " packs of cigarettes per day. She reently went off her psych (Bipolar) meds. She presented to [facility] ED with increased shortness of breath and cough and fever. Reports recent hospitalization to [facility] about a week with no improvement      # Sepsis  --met criteria due to fever, hypoxia, tachycardia, leukocytosis and cultures positive for MRSA/pseudomona, ID consulted, following, on abx as below      # A/C hypoxic resp failure-improving  --multifactorial COPD vs Pneumonia NASIM( vs neoplasm less likely)  --IV cefepime and vanc per ID 2-3 more weeks  --resp culture + MRSA and Pseudomona  --needs Bronchoscopy, but declines (she attributes brother's death to his bronchoscopy)  --restart theophylline     # Bipolar disorder/homeless  --suspect schizophrenia also given paranoia and hallucination  --pt recently stopped psych meds a week ago because they were causing her to hallucinate  --pt needs psych eval, but unfortunately we do not have inpt psych service. when stable for dc, may need to have the [facility] eval. i do feel at this time she will need [facility] eval. She seems withdrawn and Depressed. I dont believe it would be safe to let her go home without eval.   --SW consulted and following  --patient states she must check on her car, which is impounded, before she goes anywhere after she is discharged.   --nurse to call and confirm zanaflex/neurontin doses      # Hypothyroidism- cont home meds      # Tobacco abuse-- counseled, cessation advised      # Labial Cyst--needs to follow up with Gyn      # Tachycardia--restart atenolol     # Vaginal Yeast--s/p 3d of vaginal suppository. Still with external symptoms--try micotin cream     PT/OT      Dispo: difficult secondary to homeless. SS working on placement...    Robb Casiano MD  03/13/17  12:03 PM

## 2017-03-13 NOTE — PROGRESS NOTES
Acute Care - Occupational Therapy Treatment Note  [facility]     Patient Name: Colleen Gonzalez  : 1965  MRN: [id number]  Today's Date: 3/13/2017  Onset of Illness/Injury or Date of Surgery Date: 17  Date of Referral to OT: 17  Referring Physician: Dr Moore      Admit Date: 3/1/2017    Visit Dx:     ICD-10-CM ICD-9-CM   1. Community acquired pneumonia J18.9 486   2. COPD exacerbation J44.1 491.21   3. Hypoxia R09.02 799.02   4. Impaired functional mobility, balance, gait, and endurance Z74.09 V49.89   5. Impaired mobility and ADLs Z74.09 799.89     Patient Active Problem List   Diagnosis   • COPD (chronic obstructive pulmonary disease)   • Tobacco abuse   • Acute on chronic respiratory failure   • Leukocytosis   • HCAP (healthcare-associated pneumonia)   • Hypothyroid   • Sepsis   • Anxiety and depression   • Homelessness             Adult Rehabilitation Note       17 1310 03/10/17 1515       Rehab Assessment/Intervention    Discipline occupational therapist  -TA physical therapist  -SR     Document Type therapy note (daily note)  -TA therapy note (daily note)  -SR     Subjective Information agree to therapy;no complaints  -TA agree to therapy;no complaints  -SR     Patient Effort, Rehab Treatment good  -TA good  -SR     Symptoms Noted During/After Treatment  fatigue   pt denies SOA  -SR     Precautions/Limitations fall precautions  -TA fall precautions;oxygen therapy device and L/min  -SR     Recorded by [TA] Mike Eric, OT [SR] Jenifer Witt, PT     Vital Signs    Pre Systolic BP Rehab --   Nurse cleared pt for tx, vitals stable.  -TA      Pretreatment Heart Rate (beats/min)  95  -SR     Intratreatment Heart Rate (beats/min)  130  -SR     Posttreatment Heart Rate (beats/min)  114  -SR     Pre SpO2 (%) 92  -TA      O2 Delivery Pre Treatment room air  -TA      Intra SpO2 (%)  89  -SR     O2 Delivery Intra Treatment  supplemental O2  -SR     Post SpO2 (%) 93  -TA 94  -SR     O2  Delivery Post Treatment room air  -TA supplemental O2  -SR     Pre Patient Position Supine  -TA Supine  -SR     Intra Patient Position Standing  -TA Standing  -SR     Post Patient Position Sitting  -TA Sitting  -SR     Recorded by [TA] Mike Eric OT [SR] Jenifer Witt, PT     Pain Assessment    Pain Assessment 0-10  -TA No/denies pain  -SR     Pain Score 5  -TA      Post Pain Score 5  -TA      Pain Type Chronic pain  -TA      Pain Location Back  -TA      Pain Intervention(s) Repositioned;Ambulation/increased activity  -TA      Response to Interventions tolerated  -TA      Recorded by [TA] Mike Eric OT [SR] Jenifer Witt, PT     Cognitive Assessment/Intervention    Current Cognitive/Communication Assessment functional  -TA functional  -SR     Orientation Status oriented x 4  -TA oriented x 4  -SR     Follows Commands/Answers Questions 100% of the time  -% of the time  -SR     Personal Safety WNL/WFL  -TA mild impairment;decreased awareness, need for assist;decreased awareness, need for safety  -SR     Personal Safety Interventions gait belt;nonskid shoes/slippers when out of bed  -TA gait belt;nonskid shoes/slippers when out of bed  -SR     Recorded by [TA] Mike Eric OT [SR] Jenifer Witt, PT     Bed Mobility, Assessment/Treatment    Bed Mob, Supine to Sit, [place] independent  -TA independent  -SR     Bed Mob, Sit to Supine, [place] independent  -TA      Recorded by [TA] Mike Eric OT [SR] Jenifer Witt, PT     Transfer Assessment/Treatment    Transfers, Sit-Stand [place] supervision required  -TA supervision required;verbal cues required  -SR     Transfers, Stand-Sit [place] supervision required  -TA supervision required;verbal cues required  -SR     Transfers, Sit-Stand-Sit, Assist Device other (see comments)   none  -TA      Transfer, Comment Good safety awareness this date  -TA cues for safety/line awareness  -SR     Recorded by [TA] Mike FLOOD  GRIFFIN Eric [SR] Jenifer Witt, PT     Functional Mobility    Functional Mobility- Ind. Level supervision required;verbal cues required  -TA      Functional Mobility- Device other (see comments)   gait belt  -TA      Functional Mobility-Distance (Feet) 500  -TA      Functional Mobility- Comment Good safety awareness; VCs25% for adaptive breathing with ambulation  -TA      Recorded by [TA] Mike Eric OT      Toileting Assessment/Training    Toileting Assess/Train, Assistive Device grab bars  -TA      Toileting Assess/Train, Indepen Level conditional independence  -TA      Toileting Assess/Train, Comment Pt completed all aspects of toileting with Mod Ind.  -TA      Recorded by [TA] Mike Eric OT      Motor Skills/Interventions    Additional Documentation Balance Skills Training (Group)  -TA Balance Skills Training (Group)  -SR     Recorded by [TA] Mike Eric OT [SR] Jenifer Witt, PT     Balance Skills Training    Sitting-Level of Assistance Independent  -TA Independent  -SR     Sitting-Balance Support Feet supported  -TA Feet supported  -SR     Sitting-Balance Activities Lateral lean;Forward lean  -TA      Standing-Level of Assistance Distant supervision  -TA Close supervision  -SR     Static Standing Balance Support No upper extremity supported  -TA No upper extremity supported  -SR     Standing-Balance Activities Weight Shift A-P;Weight Shift R-L;Reaching for objects  -TA Weight Shift A-P;Weight Shift R-L  -SR     Gait Balance-Level of Assistance  Close supervision  -SR     Gait Balance Support  No upper extremity supported  -SR     Gait Balance Activities  scanning environment R/L;side-stepping  -SR     Recorded by [TA] Mike Eric OT [SR] Jenifer Witt, PT     Positioning and Restraints    Pre-Treatment Position in bed  -TA in bed  -SR     Post Treatment Position bed  -TA chair  -SR     In Bed sitting;call light within reach;encouraged to call for assist;side rails up x2   -TA      In Chair  notified nsg;sitting;call light within reach;encouraged to call for assist;reclined;with nsg  -SR     Recorded by [TA] Mike Eric OT [SR] Jenifer Witt PT       User Key  (r) = Recorded By, (t) = Taken By, (c) = Cosigned By    Initials Name Effective Dates    SR Jenifer Witt, PT 06/19/15 -     TA Mike Eric OT 03/14/16 -                 OT Goals       03/13/17 1332 03/10/17 1146       Transfer Training OT LTG    Transfer Training OT LTG, Date Established  03/10/17  -TA     Transfer Training OT LTG, Time to Achieve  1 wk  -TA     Transfer Training OT LTG, Activity Type  bed to chair /chair to bed;sit to stand/stand to sit;toilet  -TA     Transfer Training OT LTG, [place] Level  supervision required  -TA     Transfer Training OT LTG, Assist Device  --   AAD  -TA     Transfer Training OT LTG, Outcome goal partially met   Met for STS, toilet this date.  -TA goal ongoing  -TA     Toileting OT LTG    Toileting Goal OT LTG, Date Established  03/10/17  -TA     Toileting Goal OT LTG, Time to Achieve  1 wk  -TA     Toileting Goal OT LTG, [place] Level  conditional independence  -TA     Toileting Goal OT LTG, Outcome goal met  -TA goal ongoing  -TA     Activity Tolerance OT LTG    Activity Tolerance Goal OT LTG, Date Established  03/10/17  -TA     Activity Tolerance Goal OT LTG, Time to Achieve  1 wk  -TA     Activity Tolerance Goal OT LTG, Activity Level  10 min activity   with 1 rest break  -TA     Activity Tolerance Goal OT LTG, Outcome goal partially met   Met for 10 mins this date; cont' for consistency.  -TA goal ongoing  -TA       User Key  (r) = Recorded By, (t) = Taken By, (c) = Cosigned By    Initials Name Provider Type    TA Mike Eric OT Occupational Therapist          Occupational Therapy Education     Title: PT OT SLP Therapies (Active)     Topic: Occupational Therapy (Active)     Point: Home exercise program (Done)    Description: Instruct learner(s) on  appropriate technique for monitoring, assisting and/or progressing therapeutic exercises/activities.    Learning Progress Summary    Learner Readiness Method Response Comment Documented by Status   Patient Acceptance E,TB,D VU,DU,NR Didactic education re adaptive breathing with fxl ambulation this date. TA 03/13/17 1331 Done    Acceptance E,TB,D VU,NR Initiated education with adaptive breathing, BUE HEP; reinforced need for call for assist with OOB activities. TA 03/10/17 1145 Done               Point: Precautions (Done)    Description: Instruct learner(s) on prescribed precautions during self-care and functional transfers.    Learning Progress Summary    Learner Readiness Method Response Comment Documented by Status   Patient Acceptance E,TB,D VU,NR Initiated education with adaptive breathing, BUE HEP; reinforced need for call for assist with OOB activities. TA 03/10/17 1145 Done                      User Key     Initials Effective Dates Name Provider Type Discipline    TA 03/14/16 -  Mike Eric OT Occupational Therapist OT                  OT Recommendation and Plan  Anticipated Discharge Disposition: inpatient rehabilitation facility, other (see comments) (Pulmonary rehab)  Planned Therapy Interventions: activity intolerance, ADL retraining, energy conservation, home exercise program, strengthening, transfer training  Therapy Frequency: daily (Per priority policy)  Plan of Care Review  Plan Of Care Reviewed With: patient  Progress: improving  Outcome Summary/Follow up Plan: Pt progressing well with adaptive breathing, fxl transfers;  met goal for Mod [place] with toileting; will continue to benefit from skilled OT services to address deficits, facilitate increased fxl I, safe transition to next level of care.        Outcome Measures       03/13/17 1310 03/10/17 1515       How much help from another person do you currently need...    Turning from your back to your side while in flat bed without using  bedrails?  4  -SR     Moving from lying on back to sitting on the side of a flat bed without bedrails?  4  -SR     Moving to and from a bed to a chair (including a wheelchair)?  3  -SR     Standing up from a chair using your arms (e.g., wheelchair, bedside chair)?  3  -SR     Climbing 3-5 steps with a railing?  3  -SR     To walk in hospital room?  3  -SR     AM-PAC 6 Clicks Score  20  -SR     How much help from another is currently needed...    Putting on and taking off regular lower body clothing? 4  -TA      Bathing (including washing, rinsing, and drying) 3  -TA      Toileting (which includes using toilet bed pan or urinal) 4  -TA      Putting on and taking off regular upper body clothing 4  -TA      Taking care of personal grooming (such as brushing teeth) 3  -TA      Eating meals 4  -TA      Score 22  -TA      Functional Assessment    Outcome Measure Options AM-PAC 6 Clicks Daily Activity (OT)  -TA AM-PAC 6 Clicks Basic Mobility (PT)  -SR       User Key  (r) = Recorded By, (t) = Taken By, (c) = Cosigned By    Initials Name Provider Type    SR Jenifer Witt, PT Physical Therapist    TA Mike Eric OT Occupational Therapist           Time Calculation:         Time Calculation- OT       03/13/17 1336          Time Calculation- OT    OT Start Time 1310   ttc 15 minutes  -TA      Total Timed Code Minutes- OT 15 minute(s)  -TA      OT Received On 03/13/17  -TA      OT Goal Re-Cert Due Date 03/20/17  -TA        User Key  (r) = Recorded By, (t) = Taken By, (c) = Cosigned By    Initials Name Provider Type    TA Mike Eric OT Occupational Therapist           Therapy Charges for Today     Code Description Service Date Service Provider Modifiers Qty    [id number]  OT THERAPEUTIC ACT EA 15 MIN 3/13/2017 Mike Eric OT GO 1               Mike Eric OT  3/13/2017

## 2017-03-13 NOTE — PLAN OF CARE
Problem: Patient Care Overview (Adult)  Goal: Plan of Care Review  Outcome: Ongoing (interventions implemented as appropriate)    03/13/17 0250   Coping/Psychosocial Response Interventions   Plan Of Care Reviewed With patient   Patient Care Overview   Progress improving   Outcome Evaluation   Outcome Summary/Follow up Plan PT ambulated in room, didnt feel dyspnic on exertion       Goal: Adult Individualization and Mutuality  Outcome: Ongoing (interventions implemented as appropriate)    03/13/17 0250   Individualization   Patient Specific Interventions Patient states she feels better and will stop smoking after DCd

## 2017-03-13 NOTE — PLAN OF CARE
Problem: Patient Care Overview (Adult)  Goal: Plan of Care Review  Outcome: Ongoing (interventions implemented as appropriate)    03/13/17 0249   Coping/Psychosocial Response Interventions   Plan Of Care Reviewed With patient   Patient Care Overview   Progress improving         Problem: Pneumonia (Adult)  Goal: Signs and Symptoms of Listed Potential Problems Will be Absent or Manageable (Pneumonia)  Outcome: Ongoing (interventions implemented as appropriate)    03/13/17 0249   Pneumonia   Problems Assessed (Pneumonia) all   Problems Present (Pneumonia) none         Problem: COPD, Chronic Bronchitis/Emphysema (Adult)  Goal: Signs and Symptoms of Listed Potential Problems Will be Absent or Manageable (COPD, Chronic Bronchitis/Emphysema)  Outcome: Ongoing (interventions implemented as appropriate)    03/13/17 0249   COPD, Chronic Bronchitis/Emphysema   Problems Assessed (COPD, Chronic Bronchitis/Emphysema) all   Problems Present (COPD, Chronic Bronchitis/Emphysema) none         Problem: Wound, Traumatic, Nonburn (Adult)  Goal: Signs and Symptoms of Listed Potential Problems Will be Absent or Manageable (Wound, Traumatic, Nonburn)    03/13/17 0249   Wound, Traumatic, Nonburn   Problems Assessed (Wound) all   Problems Present (Wound) none

## 2017-03-13 NOTE — PAYOR COMM NOTE
"Colleen Gonzalez (52 y.o. Female)     UPDATED CLINICALS      Date of Birth Social Security Number Address Home Phone MRN    1965  [id number] [facility] [address] [phone] [id number]    Adventism Marital Status          None Single       Admission Date Admission Type Admitting Provider Attending Provider Department, Room/Bed    3/1/17 Emergency Robb Casiano MD Opii, [place], MD [facility] 5G, S567/1    Discharge Date Discharge Disposition Discharge Destination                      Attending Provider: Robb Casiano MD     Allergies:  Aspirin, Ibuprofen    Isolation:  None   Infection:  MRSA (03/09/17)   Code Status:  FULL    Ht:  68\" (172.7 cm)   Wt:  242 lb 8.1 oz (110 kg)    Admission Cmt:  None   Principal Problem:  Sepsis [A41.9]                 Active Insurance as of 3/1/2017     Primary Coverage     Payor Plan Insurance Group Employer/Plan Group    [employer] [place] [employer] [place]ICA[place]      Payor Plan Address Payor Plan Phone Number Effective From Effective To    [address] [address] [phone] 3/1/2017     [place] [address]       Subscriber Name Subscriber Birth Date Member ID       COLLEEN GONZALEZ 1965 [id number]                 Emergency Contacts      (Rel.) Home Phone Work Phone Mobile Phone    Eneida [address] (Sister) -- -- [phone]    Raquel Dumont (Sister) -- -- [phone]            Insurance Information                [facility]/WELLCARE MEDICAID Phone: [phone]    Subscriber: Colleen Gonzalez Subscriber#: [id number]    Group#:  Precert#:           Lab Results (last 72 hours)     Procedure Component Value Units Date/Time    AFB Culture [[id number]]  (Normal) Collected:  03/03/17 0800    Specimen:  Sputum from Oropharynx Updated:  03/10/17 1001     AFB Culture No AFB isolated at 1 week      AFB Stain        No acid fast bacilli seen on concentrated smear    Respiratory Culture [[id number]]  (Abnormal)  (Susceptibility) Collected:  03/07/17 1000 "    Specimen:  Sputum from Cough Updated:  03/10/17 1329     Respiratory Culture Scant growth (1+) Pseudomonas aeruginosa (A)              Light growth (2+) Staphylococcus aureus, MRSA (C)        Methicillin resistant Staphylococcus aureus, Patient may be an isolation risk.  This isolate does not demonstrate inducible clindamycin resistance in vitro.           Light growth (2+) Yeast isolated (A)      Gram Stain Result Many (4+) WBCs seen       Rare (1+) Epithelial cells seen       Rare (1+) Yeast       Rare (1+) Endogenous respiratory patience     Susceptibility      Pseudomonas aeruginosa     AMY     Aztreonam <=8 ug/ml Susceptible     Cefepime <=8 ug/ml Susceptible     Ceftazidime 4 ug/ml Susceptible     Gentamicin <=4 ug/ml Susceptible     Levofloxacin >4 ug/ml Resistant     Meropenem <=1 ug/ml Susceptible     Piperacillin + Tazobactam <=16 ug/ml Susceptible     Tobramycin <=4 ug/ml Susceptible                Susceptibility      Staphylococcus aureus, MRSA     AMY     Ciprofloxacin >2 ug/ml Resistant     Clindamycin <=0.5 ug/ml Susceptible     Erythromycin >4 ug/ml Resistant     Gentamicin <=4 ug/ml Susceptible     Levofloxacin >4 ug/ml Resistant     Linezolid 2 ug/ml Susceptible     Oxacillin >2 ug/ml Resistant     Penicillin G >8 ug/ml Resistant     Quinupristin + Dalfopristin <=0.5 ug/ml Susceptible     Rifampin <=1 ug/ml Susceptible     Tetracycline <=4 ug/ml Susceptible     Trimethoprim + Sulfamethoxazole <=0.5/9.5 ug/ml Susceptible     Vancomycin 1 ug/ml Susceptible                          Imaging Results (last 72 hours)     ** No results found for the last 72 hours. **        Operative/Procedure Notes (last 72 hours) (Notes from 3/9/2017  8:09 PM through 3/12/2017  8:09 PM)     No notes of this type exist for this encounter.           Physician Progress Notes (last 72 hours) (Notes from 3/9/2017  8:09 PM through 3/12/2017  8:09 PM)      Robb Casiano MD at 3/10/2017  7:37 AM  Version 1 of 1      "          HOSPITALIST DAILY PROGRESS NOTE    Chief Complaint: f/u for SOA    Subjective   SUBJECTIVE/OVERNIGHT EVENTS   No acute events overnight, patient endorses back pain, no n/v/d    Review of Systems:  Gen-no fevers, no chills  CV-no chest pain, no palpitations  Resp-no cough, no dyspnea  GI-no N/V/D, no abd pain    Objective   OBJECTIVE   I have reviewed the vital signs.  Visit Vitals   • /92 (BP Location: Right arm, Patient Position: Lying)   • Pulse 99   • Temp 98.2 °F (36.8 °C) (Oral)   • Resp 17   • Ht 68\" (172.7 cm)   • Wt 242 lb 8.1 oz (110 kg)   • SpO2 92%   • BMI 36.87 kg/m2       Physical Exam:  Gen-no acute distress, uncomfortable with back pain  CV-RRR, S1 S2 normal, no m/r/g  Resp-CTAB, no wheezes  Abd-soft, NT, ND, +BS  Ext-no edema  Neuro-A&Ox3, no focal deficits  Psych-appropriate mood and affect    Results:  I have reviewed the labs, culture data,      Results from last 7 days  Lab Units 03/09/17  0509 03/08/17  0505 03/07/17  0615   WBC 10*3/mm3 12.57* 10.86* 13.30*   HEMOGLOBIN g/dL 13.6 13.2 12.6   HEMATOCRIT % 42.3 40.4 38.1   PLATELETS 10*3/mm3 379 407 380       Results from last 7 days  Lab Units 03/09/17  0509   SODIUM mmol/L 138   POTASSIUM mmol/L 4.0   CHLORIDE mmol/L 102   TOTAL CO2 mmol/L 32.0*   BUN mg/dL 15   CREATININE mg/dL 0.80   GLUCOSE mg/dL 95   CALCIUM mg/dL 10.1       Culture Data:  Cultures:    RESPIRATORY CULTURE   Date Value Ref Range Status   03/07/2017 Scant growth (1+) Non-Lactose  (A)  Preliminary   03/07/2017 Light growth (2+) Staphylococcus aureus (A)  Preliminary   03/07/2017 Light growth (2+) Yeast isolated (A)  Preliminary   03/03/2017 Moderate growth (3+) Staphylococcus aureus, MRSA (C)  Final     Comment:     This isolate does not demonstrate inducible clindamycin resistance in vitro.    Methicillin resistant Staphylococcus aureus, Patient may be an isolation risk.   03/03/2017 Scant growth (1+) Normal Respiratory Shannan  Final     I have " reviewed the medications.    Assessment&#47;Plan   ASSESSMENT/PLAN      52-year-old  female with history of noncompliance. She smokes 2-3 packs of cigarettes per day. She reently went off her psych (Bipolar) meds. She presented to [facility] ED with increased shortness of breath and cough and fever. Reports recent hospitalization to [facility] about a week with no improvement      # Sepsis  --meets criteria due to febrile, hypoxia, tachycardia, leukocytosis and infectious source  --as below, due to social situation, and now cultures positive for MRSA/pseudomona, ID consulted, following       # A/C hypoxic resp failure- multifactorial COPD vs Pneumonia NASIM( vs neoplasm less likely)  --IV cefepime and vanc per ID   -- resp culture + MRSA and Pseudomona  -- AFB sputum negative x 2, quant TB negative.   --needs Bronchoscopy , but declines.      # Concern for TB- work up negative      # Bipolar disorder/homeless  --suspect schizophrenia also given paranoia and hallucination  --pt recently stopped psych meds a week ago because they were causing her to hallucinate  --pt needs psych eval, but unfortunately we do not have inpt psych service. when stable for dc, may need to have the [facility] eval. i do feel at this time she will need ridge eval. She seems withdrawn and Depressed. I dont believe it would be safe to let her go home without eval.   -- SW consulted and following      # Hypothyroidism- cont home meds      # Tobacco abuse  -- counseled, cessation advised      # Labial Cyst--needs to follow up with Gyn       PT/OT     Dispo: TBD. Will be difficult, pt homeless. SS working on placement    Robb Casiano MD  03/10/17  10:01 AM             Electronically signed by Robb Casiano MD at 3/10/2017 10:03 AM      Robb Casiano MD at 3/11/2017  5:56 AM  Version 1 of 1               HOSPITALIST DAILY PROGRESS NOTE    Chief Complaint: f/u for SOA    Subjective   SUBJECTIVE/OVERNIGHT EVENTS   No acute events  "overnight, patient with multiple complaints, needs muscle relaxant changes, want more pain meds for her back, requesting increase of her Seroquel dose.    Review of Systems:  Gen-no fevers, no chills  CV-no chest pain, no palpitations  Resp-no cough, no dyspnea  GI-no N/V/D, no abd pain    Objective   OBJECTIVE   I have reviewed the vital signs.  Visit Vitals   • /88 (BP Location: Right arm, Patient Position: Lying)   • Pulse 90   • Temp 97.8 °F (36.6 °C) (Oral)   • Resp 18   • Ht 68\" (172.7 cm)   • Wt 242 lb 8.1 oz (110 kg)   • SpO2 93%   • BMI 36.87 kg/m2     Physical Exam:  Gen-no acute distress, laying in bed comfortably  CV-RRR, S1 S2 normal, no m/r/g  Resp-CTAB, no wheezes  Abd-soft, NT, ND, +BS  Ext-BLE edema 1+  Neuro-A&Ox3, no focal deficits  Psych-appropriate mood and affect    Results:  I have reviewed the labs, culture data.      Results from last 7 days  Lab Units 03/09/17  0509 03/08/17  0505 03/07/17  0615   WBC 10*3/mm3 12.57* 10.86* 13.30*   HEMOGLOBIN g/dL 13.6 13.2 12.6   HEMATOCRIT % 42.3 40.4 38.1   PLATELETS 10*3/mm3 379 407 380       Results from last 7 days  Lab Units 03/09/17  0509   SODIUM mmol/L 138   POTASSIUM mmol/L 4.0   CHLORIDE mmol/L 102   TOTAL CO2 mmol/L 32.0*   BUN mg/dL 15   CREATININE mg/dL 0.80   GLUCOSE mg/dL 95   CALCIUM mg/dL 10.1       Culture Data:  Cultures:    RESPIRATORY CULTURE   Date Value Ref Range Status   03/07/2017 Scant growth (1+) Pseudomonas aeruginosa (A)  Final   03/07/2017 Light growth (2+) Staphylococcus aureus, MRSA (C)  Final     Comment:       Methicillin resistant Staphylococcus aureus, Patient may be an isolation risk.  This isolate does not demonstrate inducible clindamycin resistance in vitro.     03/07/2017 Light growth (2+) Yeast isolated (A)  Final     I have reviewed the medications.    Assessment&#47;Plan   ASSESSMENT/PLAN      52-year-old  female with history of noncompliance. She smokes 2-3 packs of cigarettes per day. She " reently went off her psych (Bipolar) meds. She presented to [facility] ED with increased shortness of breath and cough and fever. Reports recent hospitalization to [facility] about a week with no improvement      # Sepsis  --met criteria due to fever, hypoxia, tachycardia, leukocytosis and cultures positive for MRSA/pseudomona, ID consulted, following, on abx as below      # A/C hypoxic resp failure-improving  -- multifactorial COPD vs Pneumonia NASIM( vs neoplasm less likely)  --IV cefepime and vanc per ID   --resp culture + MRSA and Pseudomona  --needs Bronchoscopy , but declines.     # Bipolar disorder/homeless  --suspect schizophrenia also given paranoia and hallucination  --pt recently stopped psych meds a week ago because they were causing her to hallucinate  --pt needs psych eval, but unfortunately we do not have inpt psych service. when stable for dc, may need to have the [facility] eval. i do feel at this time she will need ridge eval. She seems withdrawn and Depressed. I dont believe it would be safe to let her go home without eval.   -- SW consulted and following      # Hypothyroidism- cont home meds      # Tobacco abuse-- counseled, cessation advised      # Labial Cyst--needs to follow up with Gyn       PT/OT      Dispo: difficult secondary to homeless. SS working on placement    Robb Casiano MD  03/11/17  5:56 AM             Electronically signed by Robb Casiano MD at 3/11/2017 10:34 AM      RADHA Amanda at 3/12/2017 11:00 AM  Version 1 of 1               HOSPITALIST DAILY PROGRESS NOTE    Chief Complaint: f/u for SOA    Subjective   SUBJECTIVE/OVERNIGHT EVENTS   No acute events overnight, patient with multiple questions about home medications.  Requesting zanaflex and neurontin be increased to QID which is how she took them at home.  Also requesting more meds to rest during the day.      Review of Systems:  Gen-no fevers, no chills  CV-no chest pain, no palpitations  Resp-+ cough, no  "dyspnea  GI-no N/V/D, no abd pain    Objective   OBJECTIVE   I have reviewed the vital signs.  Visit Vitals   • /59 (BP Location: Right arm, Patient Position: Lying)   • Pulse 111   • Temp 98.1 °F (36.7 °C) (Axillary)   • Resp 18   • Ht 68\" (172.7 cm)   • Wt 242 lb 8.1 oz (110 kg)   • SpO2 92%   • BMI 36.87 kg/m2     Physical Exam:  Gen-no acute distress, laying in bed comfortably  CV-RRR, S1/S2 normal, no m/r/g  Resp-CTAB, no wheezes, coarse cough  Abd-soft, NT, ND, +BS, obese  Ext-minimal BLE edema  Neuro-A&Ox3, no focal deficits  Psych-appropriate mood and affect, seems depressed    Results:    No new labs/imaging      Results from last 7 days  Lab Units 03/09/17  0509 03/08/17  0505 03/07/17  0615   WBC 10*3/mm3 12.57* 10.86* 13.30*   HEMOGLOBIN g/dL 13.6 13.2 12.6   HEMATOCRIT % 42.3 40.4 38.1   PLATELETS 10*3/mm3 379 407 380       Results from last 7 days  Lab Units 03/09/17  0509   SODIUM mmol/L 138   POTASSIUM mmol/L 4.0   CHLORIDE mmol/L 102   TOTAL CO2 mmol/L 32.0*   BUN mg/dL 15   CREATININE mg/dL 0.80   GLUCOSE mg/dL 95   CALCIUM mg/dL 10.1       Culture Data:  Cultures:    RESPIRATORY CULTURE   Date Value Ref Range Status   03/07/2017 Scant growth (1+) Pseudomonas aeruginosa (A)  Final   03/07/2017 Light growth (2+) Staphylococcus aureus, MRSA (C)  Final     Comment:       Methicillin resistant Staphylococcus aureus, Patient may be an isolation risk.  This isolate does not demonstrate inducible clindamycin resistance in vitro.     03/07/2017 Light growth (2+) Yeast isolated (A)  Final     I have reviewed the medications.    Assessment&#47;Plan   ASSESSMENT/PLAN      52-year-old  female with history of noncompliance. She smokes 2-3 packs of cigarettes per day. She reently went off her psych (Bipolar) meds. She presented to [facility] ED with increased shortness of breath and cough and fever. Reports recent hospitalization to [facility] about a week with no improvement      # " Sepsis  --met criteria due to fever, hypoxia, tachycardia, leukocytosis and cultures positive for MRSA/pseudomona, ID consulted, following, on abx as below      # A/C hypoxic resp failure-improving  --multifactorial COPD vs Pneumonia NASIM( vs neoplasm less likely)  --IV cefepime and vanc per ID 2-3 more weeks  --resp culture + MRSA and Pseudomona  --needs Bronchoscopy, but declines (she attributes brother's death to his bronchoscopy)  --restart theophylline    # Bipolar disorder/homeless  --suspect schizophrenia also given paranoia and hallucination  --pt recently stopped psych meds a week ago because they were causing her to hallucinate  --pt needs psych eval, but unfortunately we do not have inpt psych service. when stable for dc, may need to have the [facility] eval. i do feel at this time she will need ridge eval. She seems withdrawn and Depressed. I dont believe it would be safe to let her go home without eval.   --SW consulted and following  -patient states she must check on her car, which is impounded, before she goes anywhere after she is discharged.    --nurse to call and confirm zanaflex/neurontin doses      # Hypothyroidism- cont home meds      # Tobacco abuse-- counseled, cessation advised      # Labial Cyst--needs to follow up with Gyn     # Tachycardia--restart atenolol    # Vaginal Yeast--s/p 3d of vaginal suppository.  Still with external symptoms--try micotin cream      PT/OT      Dispo: difficult secondary to homeless. SS working on placement...    RADHA Amanda  03/12/17  11:43 AM             Electronically signed by RADHA Amanda at 3/12/2017 11:59 AM        Consult Notes (last 72 hours) (Notes from 3/9/2017  8:09 PM through 3/12/2017  8:09 PM)     No notes of this type exist for this encounter.

## 2017-03-13 NOTE — PROGRESS NOTES
Continued Stay Note  [facility]     Patient Name: Colleen Gonzalez  MRN: [id number]  Today's Date: 3/13/2017    Admit Date: 3/1/2017          Discharge Plan       03/13/17 1229    Case Management/Social Work Plan    Plan discharge plan    Patient/Family In Agreement With Plan yes    Additional Comments Per Dr Slef([facility]), pt may be able to transition to oral antibiotics mid week. CM/SW will cont to work on discharge plan.      03/13/17 1204    Case Management/Social Work Plan    Plan discharge plan    Patient/Family In Agreement With Plan yes    Additional Comments Notes from [facility] indicated possible referral to LTAC. Spoke with Maria Antonia from [facility] and Gloria from [facility] and no referrals to LTAC facilities have been made.  Pt is homeless and both facilities are unable to accept pts with no discharge plan in place. Dr Self(at [facility])  and Dr Casiano  aware LTACs are unable to accept pt with no discharge plan. Spoke with Serena Flores and  CM/SW will cont to work on discharge plan              Discharge Codes     None        Expected Discharge Date and Time     Expected Discharge Date Expected Discharge Time    Mar 4, 2017             Sun Jose, RN

## 2017-03-13 NOTE — PROGRESS NOTES
"[facility] Progress Note    Date of Admission: 3/1/2017      Antibiotics:  Cefepime, Vanc, Prednisone 20 mg PO daily     CC:   Chief Complaint   Patient presents with   • Shortness of Breath       S: No f/c/s. Still with cough and SOA. Still with some pleuritic type CP. No n/v/d. Good appetite  O:  Visit Vitals   • /75 (BP Location: Right arm, Patient Position: Lying)   • Pulse 80   • Temp 98.2 °F (36.8 °C) (Axillary)   • Resp 14   • Ht 68\" (172.7 cm)   • Wt 242 lb 8.1 oz (110 kg)   • SpO2 92%   • BMI 36.87 kg/m2     Temp (24hrs), Av.1 °F (36.7 °C), Min:98 °F (36.7 °C), Max:98.2 °F (36.8 °C)      PE:   GENERAL: Chronically ill appearing, unkept appearance.  HEENT: Normocephalic, atraumatic. PERRL. EOMI. No conjunctival injection. Moist MM   NECK: Supple without nuchal rigidity  LYMPH: No cervical, axillary or inguinal lymphadenopathy. No neck masses  HEART: Tachy; No murmur, rubs, gallops.   LUNGS: Coarse breath sounds and Diminished throughout.  Exp wheezing. Supplemental O2  present  ABDOMEN: Soft, nontender, nondistended. Positive bowel sounds. No rebound or guarding.   EXT: No cyanosis, clubbing or edema  MSK: FROM without joint effusions noted   SKIN: Plaque like skin lesions of the chest, arms and legs of varied healing.  NEURO: Oriented to PPT. No focal deficits.       Laboratory Data      Results from last 7 days  Lab Units 17  0509 17  0505 17  0615   WBC 10*3/mm3 12.57* 10.86* 13.30*   HEMOGLOBIN g/dL 13.6 13.2 12.6   HEMATOCRIT % 42.3 40.4 38.1   PLATELETS 10*3/mm3 379 407 380       Results from last 7 days  Lab Units 17  0515   SODIUM mmol/L 136   POTASSIUM mmol/L 3.7   CHLORIDE mmol/L 98*   TOTAL CO2 mmol/L 31.0   BUN mg/dL 13   CREATININE mg/dL 0.80   GLUCOSE mg/dL 79   CALCIUM mg/dL 9.8                   Estimated Creatinine Clearance: 105.7 mL/min (by C-G formula based on Cr of 0.8).      Microbiology:  Sputum culture on 3/2: PSA and MRSA  Sputum culture on 3/3: MRSA   AFB " x 3 ordered  are negative  Sputum culture 3/7/17: Non-LFR, Staph aureus, Yeast  Blood culture negative    Crypto negative  Strep pneumo and Legionella Uag negative  Blasto P  Quant TB indeterminate    Radiology:  Imaging Results (last 24 hours)     ** No results found for the last 24 hours. **          PROBLEM LIST:   NASIM pneumonia with MRSA and PSA   Leukocytosis  Concern for potential post-obstructive pneumonia with hx of cough x 6 weeks and 6x6cm nature of NASIM pna (3 PPD history)  Tobacco abuse  Chronic skin lesions  HTN  HLD  Hypothyroidism  Psych disorder     ASSESSMENT:  52 -year-old female with multiple medical conditions including hypertension, hyperlipidemia, hypothyroidism in the setting of psychiatric disease with OCD and bipolar disorder who presents with worsening cough, shortness of breath, sputum production of brown coloration of the past 5-6 weeks however worsening over the last 1 week. She reports associated chills and sweats however hasn't checked her temperature. she came to the ED for evaluation with leukocytosis initially noted, chest CT angiogram with a left upper lobe with a 6 x 6 cm opacity concerning for infectious versus neoplastic etiology. Sputum production ×3 has been collected with pseudomonas and MRSA.     Quant TB negative. Crypto Negative. Histo negative. Blast Pending.      PLAN:  Continue Vanc and Cefepime for MRSA and PSA coverage  Patient adamantly refuses bronchoscopy at this time.  Our plan would be to treat for 2-3 weeks with antibiotics before repeating CT scan of the chest.     Difficult dispo with social issues and will continue to follow    CBC, BMP, CRP  In am     CCH not options so work to get on oral abx over next 24-48 hours and outpatient w/u to continue.    Dr. Robert Self saw the patient, performed the physical exam, reviewed the laboratory data and guided with the formulation of the above problem list, assessment and treatment plan.     Robert Self,  MD  3/13/2017

## 2017-03-13 NOTE — PLAN OF CARE
Problem: Patient Care Overview (Adult)  Goal: Plan of Care Review  Outcome: Ongoing (interventions implemented as appropriate)    Problem: Pneumonia (Adult)  Goal: Signs and Symptoms of Listed Potential Problems Will be Absent or Manageable (Pneumonia)  Outcome: Ongoing (interventions implemented as appropriate)    Problem: COPD, Chronic Bronchitis/Emphysema (Adult)  Goal: Signs and Symptoms of Listed Potential Problems Will be Absent or Manageable (COPD, Chronic Bronchitis/Emphysema)  Outcome: Ongoing (interventions implemented as appropriate)    Problem: Wound, Traumatic, Nonburn (Adult)  Goal: Signs and Symptoms of Listed Potential Problems Will be Absent or Manageable (Wound, Traumatic, Nonburn)  Outcome: Ongoing (interventions implemented as appropriate)

## 2017-03-13 NOTE — PLAN OF CARE
Problem: Patient Care Overview (Adult)  Goal: Plan of Care Review  Outcome: Ongoing (interventions implemented as appropriate)    03/13/17 1332   Coping/Psychosocial Response Interventions   Plan Of Care Reviewed With patient   Patient Care Overview   Progress improving   Outcome Evaluation   Outcome Summary/Follow up Plan Pt progressing well with adaptive breathing, fxl transfers; met goal for Mod Collier with toileting; will continue to benefit from skilled OT services to address deficits, facilitate increased fxl I, safe transition to next level of care.         Problem: Inpatient Occupational Therapy  Goal: Transfer Training Goal 1 LTG- OT  Outcome: Ongoing (interventions implemented as appropriate)    03/10/17 1146 03/13/17 1332   Transfer Training OT LTG   Transfer Training OT LTG, Date Established 03/10/17 --    Transfer Training OT LTG, Time to Achieve 1 wk --    Transfer Training OT LTG, Activity Type bed to chair /chair to bed;sit to stand/stand to sit;toilet --    Transfer Training OT LTG, Collier Level supervision required --    Transfer Training OT LTG, Assist Device (AAD) --    Transfer Training OT LTG, Outcome --  goal partially met  (Met for STS, toilet this date.)       Goal: Toileting Goal LTG- OT  Outcome: Outcome(s) achieved Date Met:  03/13/17    03/10/17 1146 03/13/17 1332   Toileting OT LTG   Toileting Goal OT LTG, Date Established 03/10/17 --    Toileting Goal OT LTG, Time to Achieve 1 wk --    Toileting Goal OT LTG, Collier Level conditional independence --    Toileting Goal OT LTG, Outcome --  goal met       Goal: Activity Tolerance Goal LTG- OT  Outcome: Ongoing (interventions implemented as appropriate)    03/10/17 1146 03/13/17 1332   Activity Tolerance OT LTG   Activity Tolerance Goal OT LTG, Date Established 03/10/17 --    Activity Tolerance Goal OT LTG, Time to Achieve 1 wk --    Activity Tolerance Goal OT LTG, Activity Level 10 min activity  (with 1 rest break) --     Activity Tolerance Goal OT LTG, Outcome --  goal partially met  (Met for 10 mins this date; cont' for consistency.)

## 2017-03-14 PROBLEM — R13.10 DYSPHAGIA: Status: ACTIVE | Noted: 2017-03-14

## 2017-03-14 LAB
ANION GAP SERPL CALCULATED.3IONS-SCNC: 7 MMOL/L (ref 3–11)
BASOPHILS # BLD MANUAL: 0.1 10*3/MM3 (ref 0–0.2)
BASOPHILS NFR BLD AUTO: 1 % (ref 0–1)
BUN BLD-MCNC: 14 MG/DL (ref 9–23)
BUN/CREAT SERPL: 17.5 (ref 7–25)
CALCIUM SPEC-SCNC: 10 MG/DL (ref 8.7–10.4)
CHLORIDE SERPL-SCNC: 97 MMOL/L (ref 99–109)
CO2 SERPL-SCNC: 36 MMOL/L (ref 20–31)
CREAT BLD-MCNC: 0.8 MG/DL (ref 0.6–1.3)
CRP SERPL-MCNC: 31.9 MG/DL (ref 0–10)
DEPRECATED RDW RBC AUTO: 49.5 FL (ref 37–54)
EOSINOPHIL # BLD MANUAL: 0.1 10*3/MM3 (ref 0.1–0.3)
EOSINOPHIL NFR BLD MANUAL: 1 % (ref 0–3)
ERYTHROCYTE [DISTWIDTH] IN BLOOD BY AUTOMATED COUNT: 14.2 % (ref 11.3–14.5)
GFR SERPL CREATININE-BSD FRML MDRD: 75 ML/MIN/1.73
GLUCOSE BLD-MCNC: 90 MG/DL (ref 70–100)
HCT VFR BLD AUTO: 37.3 % (ref 34.5–44)
HGB BLD-MCNC: 12 G/DL (ref 11.5–15.5)
LYMPHOCYTES # BLD MANUAL: 2.7 10*3/MM3 (ref 0.6–4.8)
LYMPHOCYTES NFR BLD MANUAL: 28 % (ref 24–44)
LYMPHOCYTES NFR BLD MANUAL: 8 % (ref 0–12)
MCH RBC QN AUTO: 30.4 PG (ref 27–31)
MCHC RBC AUTO-ENTMCNC: 32.2 G/DL (ref 32–36)
MCV RBC AUTO: 94.4 FL (ref 80–99)
METAMYELOCYTES NFR BLD MANUAL: 1 % (ref 0–0)
MONOCYTES # BLD AUTO: 0.77 10*3/MM3 (ref 0–1)
NEUTROPHILS # BLD AUTO: 5.49 10*3/MM3 (ref 1.5–8.3)
NEUTROPHILS NFR BLD MANUAL: 54 % (ref 41–71)
NEUTS BAND NFR BLD MANUAL: 3 % (ref 0–5)
PLAT MORPH BLD: NORMAL
PLATELET # BLD AUTO: 285 10*3/MM3 (ref 150–450)
PMV BLD AUTO: 10.5 FL (ref 6–12)
POTASSIUM BLD-SCNC: 3.5 MMOL/L (ref 3.5–5.5)
POTASSIUM BLD-SCNC: 4 MMOL/L (ref 3.5–5.5)
RBC # BLD AUTO: 3.95 10*6/MM3 (ref 3.89–5.14)
RBC MORPH BLD: NORMAL
SODIUM BLD-SCNC: 140 MMOL/L (ref 132–146)
VARIANT LYMPHS NFR BLD MANUAL: 4 % (ref 0–5)
WBC MORPH BLD: NORMAL
WBC NRBC COR # BLD: 9.63 10*3/MM3 (ref 3.5–10.8)

## 2017-03-14 PROCEDURE — 94640 AIRWAY INHALATION TREATMENT: CPT

## 2017-03-14 PROCEDURE — 85007 BL SMEAR W/DIFF WBC COUNT: CPT | Performed by: PHYSICIAN ASSISTANT

## 2017-03-14 PROCEDURE — 25010000002 ENOXAPARIN PER 10 MG: Performed by: INTERNAL MEDICINE

## 2017-03-14 PROCEDURE — 63710000001 PREDNISONE PER 5 MG: Performed by: FAMILY MEDICINE

## 2017-03-14 PROCEDURE — 25010000002 VANCOMYCIN PER 500 MG

## 2017-03-14 PROCEDURE — 94760 N-INVAS EAR/PLS OXIMETRY 1: CPT

## 2017-03-14 PROCEDURE — 86140 C-REACTIVE PROTEIN: CPT | Performed by: PHYSICIAN ASSISTANT

## 2017-03-14 PROCEDURE — 80048 BASIC METABOLIC PNL TOTAL CA: CPT | Performed by: PHYSICIAN ASSISTANT

## 2017-03-14 PROCEDURE — 94799 UNLISTED PULMONARY SVC/PX: CPT

## 2017-03-14 PROCEDURE — 25010000002 CEFEPIME: Performed by: INTERNAL MEDICINE

## 2017-03-14 PROCEDURE — 25010000002 ONDANSETRON PER 1 MG: Performed by: INTERNAL MEDICINE

## 2017-03-14 PROCEDURE — 99233 SBSQ HOSP IP/OBS HIGH 50: CPT | Performed by: HOSPITALIST

## 2017-03-14 PROCEDURE — 85025 COMPLETE CBC W/AUTO DIFF WBC: CPT | Performed by: PHYSICIAN ASSISTANT

## 2017-03-14 PROCEDURE — 84132 ASSAY OF SERUM POTASSIUM: CPT | Performed by: HOSPITALIST

## 2017-03-14 PROCEDURE — 99254 IP/OBS CNSLTJ NEW/EST MOD 60: CPT | Performed by: INTERNAL MEDICINE

## 2017-03-14 RX ORDER — POTASSIUM CHLORIDE 1.5 G/1.77G
40 POWDER, FOR SOLUTION ORAL AS NEEDED
Status: DISCONTINUED | OUTPATIENT
Start: 2017-03-14 | End: 2017-03-25 | Stop reason: HOSPADM

## 2017-03-14 RX ORDER — LORAZEPAM 0.5 MG/1
0.5 TABLET ORAL EVERY 6 HOURS PRN
Status: DISCONTINUED | OUTPATIENT
Start: 2017-03-14 | End: 2017-03-16

## 2017-03-14 RX ORDER — POTASSIUM CHLORIDE 750 MG/1
40 CAPSULE, EXTENDED RELEASE ORAL AS NEEDED
Status: DISCONTINUED | OUTPATIENT
Start: 2017-03-14 | End: 2017-03-25 | Stop reason: HOSPADM

## 2017-03-14 RX ORDER — ATENOLOL 50 MG/1
50 TABLET ORAL DAILY
Status: DISCONTINUED | OUTPATIENT
Start: 2017-03-14 | End: 2017-03-23

## 2017-03-14 RX ORDER — POTASSIUM CHLORIDE 7.45 MG/ML
10 INJECTION INTRAVENOUS
Status: DISCONTINUED | OUTPATIENT
Start: 2017-03-14 | End: 2017-03-25 | Stop reason: HOSPADM

## 2017-03-14 RX ADMIN — POTASSIUM CHLORIDE 40 MEQ: 750 CAPSULE, EXTENDED RELEASE ORAL at 18:07

## 2017-03-14 RX ADMIN — Medication 2 TABLET: at 17:16

## 2017-03-14 RX ADMIN — ONDANSETRON 4 MG: 2 INJECTION INTRAMUSCULAR; INTRAVENOUS at 14:51

## 2017-03-14 RX ADMIN — MICONAZOLE NITRATE: 2 CREAM TOPICAL at 08:27

## 2017-03-14 RX ADMIN — LORAZEPAM 0.5 MG: 0.5 TABLET ORAL at 10:09

## 2017-03-14 RX ADMIN — NICOTINE 1 PATCH: 21 PATCH, EXTENDED RELEASE TRANSDERMAL at 10:08

## 2017-03-14 RX ADMIN — Medication 2 TABLET: at 08:25

## 2017-03-14 RX ADMIN — THEOPHYLLINE 600 MG: 300 TABLET, EXTENDED RELEASE ORAL at 08:25

## 2017-03-14 RX ADMIN — HYDROCODONE BITARTATE AND ACETAMINOPHEN 1 TABLET: 5; 325 TABLET ORAL at 18:07

## 2017-03-14 RX ADMIN — BUDESONIDE AND FORMOTEROL FUMARATE DIHYDRATE 2 PUFF: 80; 4.5 AEROSOL RESPIRATORY (INHALATION) at 21:43

## 2017-03-14 RX ADMIN — ENOXAPARIN SODIUM 40 MG: 40 INJECTION SUBCUTANEOUS at 08:26

## 2017-03-14 RX ADMIN — VANCOMYCIN HYDROCHLORIDE 1000 MG: 1 INJECTION, SOLUTION INTRAVENOUS at 08:45

## 2017-03-14 RX ADMIN — PRAVASTATIN SODIUM 80 MG: 40 TABLET ORAL at 20:20

## 2017-03-14 RX ADMIN — MICONAZOLE NITRATE: 2 CREAM TOPICAL at 20:20

## 2017-03-14 RX ADMIN — ACETAMINOPHEN 650 MG: 325 TABLET, FILM COATED ORAL at 23:02

## 2017-03-14 RX ADMIN — PREDNISONE 20 MG: 20 TABLET ORAL at 08:26

## 2017-03-14 RX ADMIN — VANCOMYCIN HYDROCHLORIDE 1000 MG: 1 INJECTION, SOLUTION INTRAVENOUS at 20:19

## 2017-03-14 RX ADMIN — FUROSEMIDE 40 MG: 40 TABLET ORAL at 08:25

## 2017-03-14 RX ADMIN — IPRATROPIUM BROMIDE AND ALBUTEROL SULFATE 3 ML: .5; 3 SOLUTION RESPIRATORY (INHALATION) at 16:57

## 2017-03-14 RX ADMIN — CEFEPIME 2 G: 2 INJECTION, POWDER, FOR SOLUTION INTRAVENOUS at 13:53

## 2017-03-14 RX ADMIN — THEOPHYLLINE 600 MG: 300 TABLET, EXTENDED RELEASE ORAL at 20:20

## 2017-03-14 RX ADMIN — ACETAMINOPHEN 650 MG: 325 TABLET, FILM COATED ORAL at 14:31

## 2017-03-14 RX ADMIN — IPRATROPIUM BROMIDE AND ALBUTEROL SULFATE 3 ML: .5; 3 SOLUTION RESPIRATORY (INHALATION) at 21:43

## 2017-03-14 RX ADMIN — PANTOPRAZOLE SODIUM 40 MG: 40 TABLET, DELAYED RELEASE ORAL at 08:25

## 2017-03-14 RX ADMIN — LEVOTHYROXINE SODIUM 50 MCG: 25 TABLET ORAL at 05:36

## 2017-03-14 RX ADMIN — CEFEPIME 2 G: 2 INJECTION, POWDER, FOR SOLUTION INTRAVENOUS at 05:36

## 2017-03-14 RX ADMIN — CEFEPIME 2 G: 2 INJECTION, POWDER, FOR SOLUTION INTRAVENOUS at 20:19

## 2017-03-14 RX ADMIN — LORAZEPAM 0.5 MG: 0.5 TABLET ORAL at 16:09

## 2017-03-14 RX ADMIN — POTASSIUM CHLORIDE 40 MEQ: 750 CAPSULE, EXTENDED RELEASE ORAL at 13:53

## 2017-03-14 RX ADMIN — FLUTICASONE PROPIONATE 2 SPRAY: 50 SPRAY, METERED NASAL at 08:26

## 2017-03-14 RX ADMIN — TIZANIDINE 4 MG: 4 TABLET ORAL at 20:20

## 2017-03-14 RX ADMIN — ATENOLOL 50 MG: 50 TABLET ORAL at 08:45

## 2017-03-14 RX ADMIN — LORAZEPAM 0.5 MG: 0.5 TABLET ORAL at 23:02

## 2017-03-14 RX ADMIN — MONTELUKAST SODIUM 10 MG: 10 TABLET, FILM COATED ORAL at 20:20

## 2017-03-14 RX ADMIN — QUETIAPINE 300 MG: 200 TABLET, EXTENDED RELEASE ORAL at 20:19

## 2017-03-14 RX ADMIN — HYDROCODONE BITARTATE AND ACETAMINOPHEN 1 TABLET: 5; 325 TABLET ORAL at 08:27

## 2017-03-14 RX ADMIN — GABAPENTIN 800 MG: 400 CAPSULE ORAL at 20:20

## 2017-03-14 RX ADMIN — CETIRIZINE HYDROCHLORIDE 10 MG: 10 TABLET, FILM COATED ORAL at 08:26

## 2017-03-14 RX ADMIN — DULOXETINE 60 MG: 60 CAPSULE, DELAYED RELEASE ORAL at 20:20

## 2017-03-14 RX ADMIN — TIZANIDINE 4 MG: 4 TABLET ORAL at 08:25

## 2017-03-14 RX ADMIN — IPRATROPIUM BROMIDE AND ALBUTEROL SULFATE 3 ML: .5; 3 SOLUTION RESPIRATORY (INHALATION) at 13:26

## 2017-03-14 RX ADMIN — GABAPENTIN 800 MG: 400 CAPSULE ORAL at 08:25

## 2017-03-14 RX ADMIN — HYDROCODONE BITARTATE AND ACETAMINOPHEN 1 TABLET: 5; 325 TABLET ORAL at 13:53

## 2017-03-14 RX ADMIN — DULOXETINE 60 MG: 60 CAPSULE, DELAYED RELEASE ORAL at 08:25

## 2017-03-14 NOTE — PLAN OF CARE
Problem: Patient Care Overview (Adult)  Goal: Plan of Care Review  Outcome: Ongoing (interventions implemented as appropriate)  Goal: Adult Individualization and Mutuality  Outcome: Ongoing (interventions implemented as appropriate)  Goal: Discharge Needs Assessment  Outcome: Ongoing (interventions implemented as appropriate)    Problem: Pneumonia (Adult)  Goal: Signs and Symptoms of Listed Potential Problems Will be Absent or Manageable (Pneumonia)  Outcome: Ongoing (interventions implemented as appropriate)    Problem: COPD, Chronic Bronchitis/Emphysema (Adult)  Goal: Signs and Symptoms of Listed Potential Problems Will be Absent or Manageable (COPD, Chronic Bronchitis/Emphysema)  Outcome: Ongoing (interventions implemented as appropriate)    Problem: Wound, Traumatic, Nonburn (Adult)  Goal: Signs and Symptoms of Listed Potential Problems Will be Absent or Manageable (Wound, Traumatic, Nonburn)  Outcome: Ongoing (interventions implemented as appropriate)

## 2017-03-14 NOTE — CONSULTS
Pulmonary Medicine Consultation     Patient Care Team:  RADHA Santana as PCP - General (Nurse Practitioner)    Reason for Consultation: Shortness of Breath      Subjective   History of Present Illness:  Ms. Colleen Gonzalez is a 52 y.o. female  current smoker of upwards of 3 packs per day up until the point of her admission on 03/01/2017 for shortness of breath. She is noted to have a left upper lobe infiltrate as well as infectious symptoms and she was placed on antibiotics. She was producing sputum at that time and this has since grown MRSA as well as pseudomonas. CT scan of the chest revealed left upper lobe infiltrate with underlying emphysematous changes consistent with pneumonia however a mass could not be excluded at that time. There is minimal mediastinal adenopathy. I been asked to review her case and continue her workup as indicated.    The patient states that she has at least a 4-5 year history of recurrent pneumonias. She believes that these may have been on the left side although she is not certain. She thinks that she has had at least chest x-rays if not CAT scans at [facility] or [facility]. We do not have these currently available to us. She also has a history of oral pharyngeal dysphagia and has been advised to eat pudding thick foods as well as to tuck her chin during swallowing. She states that she occasionally does aspirate and she does not want to follow these precautions. She has complained of some left-sided chest pain during her stay here and states that she has felt this before in the past even as far back as several years ago. She denies problems with weight loss. She is not currently febrile. She does feel short of breath from time to time. This is not consistent however. She denies hemoptysis.    Review of Systems:  Pertinent items are noted in HPI, all other systems reviewed and negative    Past Medical History:  Past Medical History   Diagnosis Date   • Anxiety     • COPD (chronic obstructive pulmonary disease)    • Depression    • Hyperlipidemia    • Hypertension    • Hypothyroid    • Obesity      Past Surgical History   Procedure Laterality Date   • Hysterectomy       partial   • Cholecystectomy     • Back surgery     • Knee surgery Bilateral    • Cyst removal         Allergies:  Allergies   Allergen Reactions   • Aspirin GI Intolerance   • Ibuprofen GI Intolerance       Medications:  No current facility-administered medications on file prior to encounter.      No current outpatient prescriptions on file prior to encounter.       Pharmacy to dose vancomycin        [START ON 3/16/2017] Pharmacy Consult  Does not apply Once   atenolol 50 mg Oral Daily   budesonide-formoterol 2 puff Inhalation BID - RT   cefepime 2 g Intravenous Q8H   cetirizine 10 mg Oral Daily   DULoxetine 60 mg Oral Q12H   enoxaparin 40 mg Subcutaneous Daily   fluticasone 2 spray Nasal Daily   furosemide 40 mg Oral Daily   gabapentin 800 mg Oral Q12H   ipratropium-albuterol 3 mL Nebulization 4x Daily - RT   levothyroxine 50 mcg Oral Q AM   miconazole  Topical Q12H   montelukast 10 mg Oral Nightly   nicotine 1 patch Transdermal Q24H   pantoprazole 40 mg Oral Q24H   pharmacy consult - MTM  Does not apply Daily   pravastatin 80 mg Oral Nightly   predniSONE 20 mg Oral Daily With Breakfast   QUEtiapine  mg Oral Nightly   sennosides-docusate sodium 2 tablet Oral BID   theophylline 600 mg Oral Q12H   tiZANidine 4 mg Oral Q12H   vancomycin 1,000 mg Intravenous Q12H       Social History:  Social History     Social History   • Marital status: Single     Spouse name: N/A   • Number of children: N/A   • Years of education: N/A     Occupational History   • Not on file.     Social History Main Topics   • Smoking status: Heavy Tobacco Smoker     Packs/day: 3.00   • Smokeless tobacco: Not on file   • Alcohol use No   • Drug use: No   • Sexual activity: Defer     Other Topics Concern   • Not on file     Social  History Narrative    Patient is homeless and moves around staying with friends and family.  Recently was staying at the [facility].         Family History:  History reviewed. No pertinent family history.  Family history is reviewed and is not contributory to the case.    Objective   Objective     Physical Exam:  Vitals:    03/14/17 1700   BP:    Pulse: 94   Resp: 14   Temp:    SpO2: 94%     Physical Exam   Constitutional: She is oriented to person, place, and time. She appears well-developed and well-nourished. No distress.   Obese   HENT:   Head: Normocephalic and atraumatic.   Nose: Nose normal.   Mouth/Throat: Oropharynx is clear and moist. No oropharyngeal exudate.   F IV airway   Eyes: Conjunctivae and EOM are normal. Pupils are equal, round, and reactive to light. Right eye exhibits no discharge. Left eye exhibits no discharge. No scleral icterus.   Neck: Normal range of motion. Neck supple. No JVD present. No tracheal deviation present. No thyromegaly present.   Cardiovascular: Normal rate, regular rhythm and normal heart sounds.  Exam reveals no gallop and no friction rub.    No murmur heard.  Pulmonary/Chest: Effort normal. No stridor.   There are focal respiratory neck which were rhonchi and wheezes over the left upper anterior chest. There are lighter wheezes on the right lung.   Abdominal: Soft. Bowel sounds are normal. She exhibits no distension and no mass. There is no tenderness. There is no rebound and no guarding.   Musculoskeletal: Normal range of motion. She exhibits no edema, tenderness or deformity.   Lymphadenopathy:     She has no cervical adenopathy.   Neurological: She is alert and oriented to person, place, and time.   Skin: Skin is warm. No rash noted. She is not diaphoretic. No erythema. No pallor.   She has previous scars from lesions on her arms that she states were staph infections.   Psychiatric: She has a normal mood and affect. Her behavior is normal. Thought content normal.    Nursing note and vitals reviewed.      Lab Results/Imaging/Diagnostics:    Results from last 7 days  Lab Units 03/14/17  0438 03/09/17  0509 03/08/17  0505   WBC 10*3/mm3 9.63 12.57* 10.86*   HEMOGLOBIN g/dL 12.0 13.6 13.2   PLATELETS 10*3/mm3 285 379 407       Results from last 7 days  Lab Units 03/14/17  0438 03/13/17  0515 03/09/17  0509   SODIUM mmol/L 140 136 138   POTASSIUM mmol/L 3.5 3.7 4.0   TOTAL CO2 mmol/L 36.0* 31.0 32.0*   BUN mg/dL 14 13 15   CREATININE mg/dL 0.80 0.80 0.80   GLUCOSE mg/dL 90 79 95     Estimated Creatinine Clearance: 105.7 mL/min (by C-G formula based on Cr of 0.8).              Assessment/Plan   Impression & Plan     Impression:  Principal Problem:    HCAP (healthcare-associated pneumonia), RUL, with history of recurrent PNA, however no data on location; Currently growing MRSA and PSA in sputum.  Active Problems:    COPD (chronic obstructive pulmonary disease)    Tobacco abuse    Acute on chronic respiratory failure    Leukocytosis    Hypothyroid    Sepsis    Anxiety and depression    Homelessness    Dysphagia, by history; does not comply with swallowing instructions      Plan:    The patient has been quite a heavy smoker up until her admission and gives me a history of multiple pneumonias over the past for 5 years. She is not sure which side they have been on however she did complain about some discomfort in the left side of her chest from time to time. She also has a history of aspiration and has been told in the past to eat a certain consistency of food as well as tuck her chin which she refuses to do. I feel that it is likely that recurrent aspiration could certainly be a culprit in this particular case.    However, I cannot exclude an obstructive lesion leading to postobstructive pneumonia based upon the CAT scan images and I do not have prior films from [place] or [place] to compare to at this time. I feel that there is enough risk at this point to losing her to follow-up  in this been going on for quite some time that we need to go ahead and exclude the possibility of an obstructing airway lesion or possible neoplasm. To that end I have advised her that we should perform bronchoscopy for airway exploration as well as possible biopsy is indicated.    In the meantime, continue antibiotics. She will made nothing by mouth after midnight and we will plan to perform this bronchoscopy tomorrow morning. I have explained the risks, benefits, and alternatives to the procedure and she agrees to have this performed.    Thank you very much for asking me to help in the care of this patient.    I discussed these findings and my recommendations with patient       Ankur Salomon MD, [facility]  Pulmonary and Critical Care Medicine  03/14/17 5:39 PM

## 2017-03-14 NOTE — PROGRESS NOTES
Continued Stay Note  [facility]     Patient Name: Colleen Gonzalez  MRN: [id number]  Today's Date: 3/14/2017    Admit Date: 3/1/2017          Discharge Plan       03/14/17 1423    Case Management/Social Work Plan    Plan SHANITA follow-up    Patient/Family In Agreement With Plan yes    Additional Comments SHANITA met with pt. at bedside. Pt. stated she called the Social Security office and was informed that her income was being further reduced.  She stated that she argued with the office staff person, but did not get a clear response as to why she is getting less income.  Pt. stated that she will check with her brother to see if she can stay with him at time of discharge, however, she reported she needs to get her car ASAP and she plans to stay in her car until she can work on housing options.  SHANITA will provide pt. with additional housing information, however pt. will need to work on housing following her discharge.  Pt. stated she will be evaluated by pulmonology tomorrow and have a bronchoscopy completed. SHANITA gave CM update and will continue to follow.                Discharge Codes     None        Expected Discharge Date and Time     Expected Discharge Date Expected Discharge Time    Mar 4, 2017             SOPHIE Betancourt

## 2017-03-14 NOTE — PROGRESS NOTES
"[facility] Progress Note    Date of Admission: 3/1/2017      Antibiotics:  Cefepime, Vanc, Prednisone 20 mg PO daily     CC:   Chief Complaint   Patient presents with   • Shortness of Breath       S: Some difficulty with placement options. She is homeless with not dischargeable outpatient resources. Has reported in the past that her brother is willing to help her out for short duration. No f/c/s, No ADR from abx.   O:  Visit Vitals   • /84 (BP Location: Right arm, Patient Position: Lying)   • Pulse 96   • Temp 97.3 °F (36.3 °C) (Oral)   • Resp 16   • Ht 68\" (172.7 cm)   • Wt 242 lb 8.1 oz (110 kg)   • SpO2 92%   • BMI 36.87 kg/m2     Temp (24hrs), Av.9 °F (36.6 °C), Min:97.3 °F (36.3 °C), Max:98.4 °F (36.9 °C)      PE:   GENERAL: Chronically ill appearing, unkept appearance. Overweight.   HEENT: Normocephalic, atraumatic. PERRL. EOMI. No conjunctival injection. Moist MM. Some dental disease present.    NECK: Supple without nuchal rigidity  LYMPH: No cervical, axillary or inguinal lymphadenopathy. No neck masses  HEART: Tachy; No murmur, rubs, gallops.   LUNGS: Coarse breath sounds and Diminished throughout.  Diminished wheezing. Some improvement.   ABDOMEN: Soft, nontender, nondistended. Positive bowel sounds. No rebound or guarding.   EXT: No cyanosis, clubbing or edema  MSK: FROM without joint effusions noted   SKIN: Plaque like skin lesions of the chest, arms and legs of varied healing.  NEURO: Oriented to PPT. No focal deficits.       Laboratory Data      Results from last 7 days  Lab Units 17  0438 17  0509 17  0505   WBC 10*3/mm3 9.63 12.57* 10.86*   HEMOGLOBIN g/dL 12.0 13.6 13.2   HEMATOCRIT % 37.3 42.3 40.4   PLATELETS 10*3/mm3 285 379 407       Results from last 7 days  Lab Units 17  0438   SODIUM mmol/L 140   POTASSIUM mmol/L 3.5   CHLORIDE mmol/L 97*   TOTAL CO2 mmol/L 36.0*   BUN mg/dL 14   CREATININE mg/dL 0.80   GLUCOSE mg/dL 90   CALCIUM mg/dL 10.0               Results " from last 7 days  Lab Units 03/14/17  0438   CRP mg/dL 31.90*       Estimated Creatinine Clearance: 105.7 mL/min (by C-G formula based on Cr of 0.8).      Microbiology:  Sputum culture on 3/2: PSA and MRSA  Sputum culture on 3/3: MRSA   AFB x 3 ordered  are negative  Sputum culture 3/7/17: Non-LFR, Staph aureus, Yeast  Blood culture negative    Crypto negative  Strep pneumo and Legionella Uag negative  Blasto P  Quant TB indeterminate    Radiology:  Imaging Results (last 24 hours)     ** No results found for the last 24 hours. **          PROBLEM LIST:   NASIM pneumonia with MRSA and PSA   Leukocytosis  Concern for potential post-obstructive pneumonia with hx of cough x 6 weeks and 6x6cm nature of NASIM pna (3 PPD history)  Tobacco abuse  Chronic skin lesions  HTN  HLD  Hypothyroidism  Psych disorder     ASSESSMENT:  52 -year-old female with multiple medical conditions including hypertension, hyperlipidemia, hypothyroidism in the setting of psychiatric disease with OCD and bipolar disorder who presents with worsening cough, shortness of breath, sputum production of brown coloration of the past 5-6 weeks however worsening over the last 1 week. She reports associated chills and sweats however hasn't checked her temperature. she came to the ED for evaluation with leukocytosis initially noted, chest CT angiogram with a left upper lobe with a 6 x 6 cm opacity concerning for infectious versus neoplastic etiology. Sputum production ×3 has been collected with pseudomonas and MRSA.     Quant TB negative. Crypto Negative. Histo negative. Blasto negative. She has completed 2 weeks of therapy at this time and we will plan for repeat CT chest without contrast in the morning to determine her need for antibiotics at discharge.      PLAN:  Continue Vanc and Cefepime for MRSA and PSA coverage for now  Will d/w CM and attempt d/c in the next 24 hours and possibly off of antibiotics as she has completed 2 weeks off of therapy   CT chest  in the am and if improved can possibly d/c off abx.    Dr. Robert Self saw the patient, performed the physical exam, reviewed the laboratory data and guided with the formulation of the above problem list, assessment and treatment plan.     Robert Self MD  3/14/2017

## 2017-03-14 NOTE — PLAN OF CARE
Problem: Patient Care Overview (Adult)  Goal: Plan of Care Review  Outcome: Ongoing (interventions implemented as appropriate)    03/14/17 0240   Coping/Psychosocial Response Interventions   Plan Of Care Reviewed With patient   Patient Care Overview   Progress progress toward functional goals as expected       Goal: Adult Individualization and Mutuality  Outcome: Ongoing (interventions implemented as appropriate)    03/03/17 0650 03/04/17 0711 03/13/17 0250   Individualization   Patient Specific Preferences --  red jello, chocolate icecream for snacks --    Patient Specific Goals to have a place to go when she leaves here --  --    Patient Specific Interventions --  --  Patient states she feels better and will stop smoking after DCd   Mutuality/Individual Preferences   What Anxieties, Fears or Concerns Do You Have About Your Health or Care? concerned about medicine and where she will go when she leaves here --  --    What Questions Do You Have About Your Health or Care? none at this time --  --    What Information Would Help Us Give You More Personalized Care? none at this time --  --          Problem: Pneumonia (Adult)  Goal: Signs and Symptoms of Listed Potential Problems Will be Absent or Manageable (Pneumonia)  Outcome: Ongoing (interventions implemented as appropriate)    03/14/17 0240   Pneumonia   Problems Assessed (Pneumonia) all   Problems Present (Pneumonia) none         Problem: COPD, Chronic Bronchitis/Emphysema (Adult)  Goal: Signs and Symptoms of Listed Potential Problems Will be Absent or Manageable (COPD, Chronic Bronchitis/Emphysema)  Outcome: Ongoing (interventions implemented as appropriate)    03/14/17 0240   COPD, Chronic Bronchitis/Emphysema   Problems Assessed (COPD, Chronic Bronchitis/Emphysema) all   Problems Present (COPD, Chronic Bronchitis/Emphysema) none         Problem: Wound, Traumatic, Nonburn (Adult)  Goal: Signs and Symptoms of Listed Potential Problems Will be Absent or Manageable  (Wound, Traumatic, Nonburn)  Outcome: Ongoing (interventions implemented as appropriate)    03/14/17 0240   Wound, Traumatic, Nonburn   Problems Assessed (Wound) all   Problems Present (Wound) none

## 2017-03-14 NOTE — PROGRESS NOTES
"      HOSPITALIST DAILY PROGRESS NOTE    Chief Complaint: f/u for soa    Subjective   SUBJECTIVE/OVERNIGHT EVENTS   Slept poorly. Noted occ palpitations. Coughing. No f/c. Some chronic back pain.    Review of Systems:  Gen-no fevers, no chills  CV-no chest pain  Resp-no cough, no dyspnea  GI-no N/V/D, no abd pain    Objective   OBJECTIVE   I have reviewed the vital signs.  Visit Vitals   • /84 (BP Location: Right arm, Patient Position: Lying)   • Pulse 96   • Temp 97.3 °F (36.3 °C) (Oral)   • Resp 16   • Ht 68\" (172.7 cm)   • Wt 242 lb 8.1 oz (110 kg)   • SpO2 92%   • BMI 36.87 kg/m2       Physical Exam:  Gen-no acute distress, comfortable  CV-RRR, S1 S2 normal, no m/r/g  Resp-CTAB, no wheezes  Abd-soft, NT, ND, +BS  Ext-no edema  Neuro-A&Ox3, no focal deficits  Psych-appropriate mood and affect    Results:  I have reviewed the labs, culture data.      Results from last 7 days  Lab Units 03/14/17  0438 03/09/17  0509 03/08/17  0505   WBC 10*3/mm3 9.63 12.57* 10.86*   HEMOGLOBIN g/dL 12.0 13.6 13.2   HEMATOCRIT % 37.3 42.3 40.4   PLATELETS 10*3/mm3 285 379 407       Results from last 7 days  Lab Units 03/14/17  0438   SODIUM mmol/L 140   POTASSIUM mmol/L 3.5   CHLORIDE mmol/L 97*   TOTAL CO2 mmol/L 36.0*   BUN mg/dL 14   CREATININE mg/dL 0.80   GLUCOSE mg/dL 90   CALCIUM mg/dL 10.0     Culture Data:  Cultures:    RESPIRATORY CULTURE   Date Value Ref Range Status   03/07/2017 Scant growth (1+) Pseudomonas aeruginosa (A)  Final   03/07/2017 Light growth (2+) Staphylococcus aureus, MRSA (C)  Final     Comment:       Methicillin resistant Staphylococcus aureus, Patient may be an isolation risk.  This isolate does not demonstrate inducible clindamycin resistance in vitro.     03/07/2017 Light growth (2+) Yeast isolated (A)  Final     I have reviewed the medications.    Assessment/Plan   ASSESSMENT/PLAN    52-year-old  female with history of noncompliance. She smokes 2-3 packs of cigarettes per day. She " reently went off her psych (Bipolar) meds. She presented to [facility] ED with increased shortness of breath and cough and fever. Reports recent hospitalization to [facility] about a week with no improvement      # Sepsis  --met criteria due to fever, hypoxia, tachycardia, leukocytosis and cultures positive for MRSA/pseudomona, ID consulted, following, on abx as below      # A/C hypoxic resp failure-improving  --multifactorial COPD vs Pneumonia NASIM( vs neoplasm less likely)  --abx per ID  --resp culture + MRSA and Pseudomona  --agreeable to pulmonary evaluation/bronchoscopy    # Bipolar disorder/homeless  --suspect schizophrenia also given paranoia and hallucination  --pt recently stopped psych meds a week ago because they were causing her to hallucinate  --pt needs psych eval, but unfortunately we do not have inpt psych service, when ready for DC, may benefit from [facility] evaluation  --SW consulted and following  --patient states she must check on her car, which is impounded, before she goes anywhere after she is discharged.   --nurse to call and confirm zanaflex/neurontin doses      # Hypothyroidism- cont home meds      # Tobacco abuse-- counseled, cessation advised      # Labial Cyst--needs to follow up with Gyn      # Tachycardia--increase atenolol     # Vaginal Yeast--s/p 3d of vaginal suppository. Still with external symptoms--try micotin cream     PT/OT      Dispo: difficult secondary to homeless. SS working on placement...    Mauricio Reaves MD  03/14/17  8:34 AM

## 2017-03-15 ENCOUNTER — APPOINTMENT (OUTPATIENT)
Dept: GENERAL RADIOLOGY | Facility: HOSPITAL | Age: 52
End: 2017-03-15

## 2017-03-15 ENCOUNTER — APPOINTMENT (OUTPATIENT)
Dept: CT IMAGING | Facility: HOSPITAL | Age: 52
End: 2017-03-15

## 2017-03-15 PROCEDURE — 71250 CT THORAX DX C-: CPT

## 2017-03-15 PROCEDURE — 25010000002 DIPHENHYDRAMINE PER 50 MG: Performed by: INTERNAL MEDICINE

## 2017-03-15 PROCEDURE — 0BCB8ZZ EXTIRPATION OF MATTER FROM LEFT LOWER LOBE BRONCHUS, VIA NATURAL OR ARTIFICIAL OPENING ENDOSCOPIC: ICD-10-PCS | Performed by: INTERNAL MEDICINE

## 2017-03-15 PROCEDURE — 87077 CULTURE AEROBIC IDENTIFY: CPT | Performed by: INTERNAL MEDICINE

## 2017-03-15 PROCEDURE — 63710000001 PREDNISONE PER 5 MG: Performed by: FAMILY MEDICINE

## 2017-03-15 PROCEDURE — 87556 M.TUBERCULO DNA AMP PROBE: CPT | Performed by: INTERNAL MEDICINE

## 2017-03-15 PROCEDURE — 87070 CULTURE OTHR SPECIMN AEROBIC: CPT | Performed by: INTERNAL MEDICINE

## 2017-03-15 PROCEDURE — 87205 SMEAR GRAM STAIN: CPT | Performed by: INTERNAL MEDICINE

## 2017-03-15 PROCEDURE — 88305 TISSUE EXAM BY PATHOLOGIST: CPT | Performed by: INTERNAL MEDICINE

## 2017-03-15 PROCEDURE — 31635 BRONCHOSCOPY W/FB REMOVAL: CPT | Performed by: INTERNAL MEDICINE

## 2017-03-15 PROCEDURE — 99233 SBSQ HOSP IP/OBS HIGH 50: CPT | Performed by: HOSPITALIST

## 2017-03-15 PROCEDURE — 87206 SMEAR FLUORESCENT/ACID STAI: CPT | Performed by: INTERNAL MEDICINE

## 2017-03-15 PROCEDURE — 25010000002 VANCOMYCIN PER 500 MG

## 2017-03-15 PROCEDURE — 25010000002 MIDAZOLAM PER 1 MG: Performed by: INTERNAL MEDICINE

## 2017-03-15 PROCEDURE — 25010000002 CEFEPIME: Performed by: INTERNAL MEDICINE

## 2017-03-15 PROCEDURE — 87102 FUNGUS ISOLATION CULTURE: CPT | Performed by: INTERNAL MEDICINE

## 2017-03-15 PROCEDURE — 25010000002 FENTANYL CITRATE (PF) 100 MCG/2ML SOLUTION: Performed by: INTERNAL MEDICINE

## 2017-03-15 PROCEDURE — 87116 MYCOBACTERIA CULTURE: CPT | Performed by: INTERNAL MEDICINE

## 2017-03-15 PROCEDURE — 87186 SC STD MICRODIL/AGAR DIL: CPT | Performed by: INTERNAL MEDICINE

## 2017-03-15 PROCEDURE — 99152 MOD SED SAME PHYS/QHP 5/>YRS: CPT | Performed by: INTERNAL MEDICINE

## 2017-03-15 RX ORDER — LIDOCAINE HYDROCHLORIDE 40 MG/ML
SOLUTION TOPICAL ONCE AS NEEDED
Status: COMPLETED | OUTPATIENT
Start: 2017-03-15 | End: 2017-03-15

## 2017-03-15 RX ORDER — MIDAZOLAM HYDROCHLORIDE 5 MG/ML
INJECTION INTRAMUSCULAR; INTRAVENOUS AS NEEDED
Status: COMPLETED | OUTPATIENT
Start: 2017-03-15 | End: 2017-03-15

## 2017-03-15 RX ORDER — FENTANYL CITRATE 50 UG/ML
INJECTION, SOLUTION INTRAMUSCULAR; INTRAVENOUS AS NEEDED
Status: COMPLETED | OUTPATIENT
Start: 2017-03-15 | End: 2017-03-15

## 2017-03-15 RX ORDER — SODIUM CHLORIDE 0.9 % (FLUSH) 0.9 %
1-10 SYRINGE (ML) INJECTION AS NEEDED
Status: DISCONTINUED | OUTPATIENT
Start: 2017-03-15 | End: 2017-03-25 | Stop reason: HOSPADM

## 2017-03-15 RX ORDER — DIPHENHYDRAMINE HYDROCHLORIDE 50 MG/ML
INJECTION INTRAMUSCULAR; INTRAVENOUS AS NEEDED
Status: COMPLETED | OUTPATIENT
Start: 2017-03-15 | End: 2017-03-15

## 2017-03-15 RX ORDER — VANCOMYCIN HYDROCHLORIDE 1 G/200ML
1000 INJECTION, SOLUTION INTRAVENOUS EVERY 12 HOURS
Status: DISCONTINUED | OUTPATIENT
Start: 2017-03-15 | End: 2017-03-15

## 2017-03-15 RX ORDER — CYCLOBENZAPRINE HCL 10 MG
10 TABLET ORAL 3 TIMES DAILY
Status: DISCONTINUED | OUTPATIENT
Start: 2017-03-15 | End: 2017-03-25 | Stop reason: HOSPADM

## 2017-03-15 RX ORDER — TEMAZEPAM 15 MG/1
30 CAPSULE ORAL NIGHTLY PRN
Status: DISCONTINUED | OUTPATIENT
Start: 2017-03-15 | End: 2017-03-23

## 2017-03-15 RX ADMIN — FENTANYL CITRATE 25 MCG: 50 INJECTION, SOLUTION INTRAMUSCULAR; INTRAVENOUS at 10:34

## 2017-03-15 RX ADMIN — FUROSEMIDE 40 MG: 40 TABLET ORAL at 08:31

## 2017-03-15 RX ADMIN — THEOPHYLLINE 600 MG: 300 TABLET, EXTENDED RELEASE ORAL at 20:07

## 2017-03-15 RX ADMIN — MIDAZOLAM HYDROCHLORIDE 1 MG: 5 INJECTION, SOLUTION INTRAMUSCULAR; INTRAVENOUS at 10:29

## 2017-03-15 RX ADMIN — LIDOCAINE HYDROCHLORIDE: 40 SOLUTION TOPICAL at 10:09

## 2017-03-15 RX ADMIN — DULOXETINE 60 MG: 60 CAPSULE, DELAYED RELEASE ORAL at 08:30

## 2017-03-15 RX ADMIN — CEFEPIME 2 G: 2 INJECTION, POWDER, FOR SOLUTION INTRAVENOUS at 13:50

## 2017-03-15 RX ADMIN — Medication 2 TABLET: at 08:30

## 2017-03-15 RX ADMIN — MIDAZOLAM HYDROCHLORIDE 1 MG: 5 INJECTION, SOLUTION INTRAMUSCULAR; INTRAVENOUS at 10:37

## 2017-03-15 RX ADMIN — Medication 10 ML: at 17:04

## 2017-03-15 RX ADMIN — LORAZEPAM 0.5 MG: 0.5 TABLET ORAL at 13:49

## 2017-03-15 RX ADMIN — DULOXETINE 60 MG: 60 CAPSULE, DELAYED RELEASE ORAL at 20:08

## 2017-03-15 RX ADMIN — FLUTICASONE PROPIONATE 2 SPRAY: 50 SPRAY, METERED NASAL at 08:30

## 2017-03-15 RX ADMIN — MONTELUKAST SODIUM 10 MG: 10 TABLET, FILM COATED ORAL at 20:08

## 2017-03-15 RX ADMIN — PREDNISONE 20 MG: 20 TABLET ORAL at 08:31

## 2017-03-15 RX ADMIN — Medication 10 ML: at 13:49

## 2017-03-15 RX ADMIN — LEVOTHYROXINE SODIUM 50 MCG: 25 TABLET ORAL at 05:35

## 2017-03-15 RX ADMIN — ATENOLOL 50 MG: 50 TABLET ORAL at 13:45

## 2017-03-15 RX ADMIN — DIPHENHYDRAMINE HYDROCHLORIDE 50 MG: 50 INJECTION INTRAMUSCULAR; INTRAVENOUS at 10:34

## 2017-03-15 RX ADMIN — MIDAZOLAM HYDROCHLORIDE 3 MG: 5 INJECTION, SOLUTION INTRAMUSCULAR; INTRAVENOUS at 10:27

## 2017-03-15 RX ADMIN — CETIRIZINE HYDROCHLORIDE 10 MG: 10 TABLET, FILM COATED ORAL at 08:31

## 2017-03-15 RX ADMIN — FENTANYL CITRATE 50 MCG: 50 INJECTION, SOLUTION INTRAMUSCULAR; INTRAVENOUS at 10:27

## 2017-03-15 RX ADMIN — LORAZEPAM 0.5 MG: 0.5 TABLET ORAL at 06:17

## 2017-03-15 RX ADMIN — MICONAZOLE NITRATE: 2 CREAM TOPICAL at 20:20

## 2017-03-15 RX ADMIN — MICONAZOLE NITRATE: 2 CREAM TOPICAL at 08:43

## 2017-03-15 RX ADMIN — GABAPENTIN 800 MG: 400 CAPSULE ORAL at 08:30

## 2017-03-15 RX ADMIN — QUETIAPINE 300 MG: 200 TABLET, EXTENDED RELEASE ORAL at 20:07

## 2017-03-15 RX ADMIN — CYCLOBENZAPRINE HYDROCHLORIDE 10 MG: 10 TABLET, FILM COATED ORAL at 17:03

## 2017-03-15 RX ADMIN — TEMAZEPAM 30 MG: 15 CAPSULE ORAL at 20:21

## 2017-03-15 RX ADMIN — TROLAMINE SALICYLATE 1 APPLICATION: 10 CREAM TOPICAL at 08:43

## 2017-03-15 RX ADMIN — TIZANIDINE 4 MG: 4 TABLET ORAL at 08:31

## 2017-03-15 RX ADMIN — FENTANYL CITRATE 25 MCG: 50 INJECTION, SOLUTION INTRAMUSCULAR; INTRAVENOUS at 10:37

## 2017-03-15 RX ADMIN — Medication 5 MG: at 20:21

## 2017-03-15 RX ADMIN — Medication 2 TABLET: at 17:03

## 2017-03-15 RX ADMIN — NICOTINE 1 PATCH: 21 PATCH, EXTENDED RELEASE TRANSDERMAL at 08:30

## 2017-03-15 RX ADMIN — PANTOPRAZOLE SODIUM 40 MG: 40 TABLET, DELAYED RELEASE ORAL at 08:30

## 2017-03-15 RX ADMIN — HYDROCODONE BITARTATE AND ACETAMINOPHEN 1 TABLET: 5; 325 TABLET ORAL at 06:17

## 2017-03-15 RX ADMIN — HYDROCODONE BITARTATE AND ACETAMINOPHEN 1 TABLET: 5; 325 TABLET ORAL at 14:41

## 2017-03-15 RX ADMIN — VANCOMYCIN HYDROCHLORIDE 1000 MG: 1 INJECTION, SOLUTION INTRAVENOUS at 12:40

## 2017-03-15 RX ADMIN — FENTANYL CITRATE 25 MCG: 50 INJECTION, SOLUTION INTRAMUSCULAR; INTRAVENOUS at 10:29

## 2017-03-15 RX ADMIN — GABAPENTIN 800 MG: 400 CAPSULE ORAL at 20:07

## 2017-03-15 RX ADMIN — MIDAZOLAM HYDROCHLORIDE 1 MG: 5 INJECTION, SOLUTION INTRAMUSCULAR; INTRAVENOUS at 10:31

## 2017-03-15 RX ADMIN — FENTANYL CITRATE 25 MCG: 50 INJECTION, SOLUTION INTRAMUSCULAR; INTRAVENOUS at 10:31

## 2017-03-15 RX ADMIN — LORAZEPAM 0.5 MG: 0.5 TABLET ORAL at 20:21

## 2017-03-15 RX ADMIN — HYDROCODONE BITARTATE AND ACETAMINOPHEN 1 TABLET: 5; 325 TABLET ORAL at 20:21

## 2017-03-15 RX ADMIN — CYCLOBENZAPRINE HYDROCHLORIDE 10 MG: 10 TABLET, FILM COATED ORAL at 20:08

## 2017-03-15 RX ADMIN — CEFEPIME 2 G: 2 INJECTION, POWDER, FOR SOLUTION INTRAVENOUS at 05:35

## 2017-03-15 RX ADMIN — THEOPHYLLINE 600 MG: 300 TABLET, EXTENDED RELEASE ORAL at 08:30

## 2017-03-15 RX ADMIN — PRAVASTATIN SODIUM 80 MG: 40 TABLET ORAL at 20:08

## 2017-03-15 NOTE — SIGNIFICANT NOTE
Lab called at 1502 and notified myself that patient's sputum culture was AFB (+). Notified Dr Reaves, Dr Salomon, and Dr Self. Notified charge nurse. Patient placed on airborne precautions.

## 2017-03-15 NOTE — PLAN OF CARE
Problem: Pain, Acute (Adult)  Goal: Identify Related Risk Factors and Signs and Symptoms  Outcome: Ongoing (interventions implemented as appropriate)    03/15/17 0355   Pain, Acute   Related Risk Factors (Acute Pain) surgery   Signs and Symptoms (Acute Pain) fear of reinjury;facial mask of pain/grimace;verbalization of pain descriptors       Goal: Acceptable Pain Control/Comfort Level  Outcome: Ongoing (interventions implemented as appropriate)

## 2017-03-15 NOTE — PROGRESS NOTES
"[facility] Progress Note    Date of Admission: 3/1/2017      Antibiotics:  Cefepime, Vanc, Prednisone 20 mg PO daily     CC:   Chief Complaint   Patient presents with   • Shortness of Breath       S: Chest CT performed this morning with decreased size in NASIM lesion but still quite present. Patient is now agreeable to pulmonary evaluation, who saw her yesterday, and are planning a bronch exam this morning. Patient without f/c/s. No n/v/d.   O:  Visit Vitals   • /82   • Pulse (!) 132   • Temp 99.5 °F (37.5 °C) (Temporal Artery )   • Resp 20   • Ht 68\" (172.7 cm)   • Wt 265 lb (120 kg)   • SpO2 91%   • BMI 40.29 kg/m2     Temp (24hrs), Av.3 °F (36.8 °C), Min:96.8 °F (36 °C), Max:99.5 °F (37.5 °C)      PE:   GENERAL: Chronically ill appearing, unkept appearance. Overweight.   HEENT: Normocephalic, atraumatic. PERRL. EOMI. No conjunctival injection. Moist MM. Some dental disease present.    NECK: Supple without nuchal rigidity  LYMPH: No cervical, axillary or inguinal lymphadenopathy. No neck masses  HEART: Tachy; No murmur, rubs, gallops.   LUNGS: Coarse breath sounds and Diminished throughout.  Diminished wheezing. Some improvement.   ABDOMEN: Soft, nontender, nondistended. Positive bowel sounds. No rebound or guarding.   EXT: No cyanosis, clubbing or edema  MSK: FROM without joint effusions noted   SKIN: Plaque like skin lesions of the chest, arms and legs of varied healing.  NEURO: Oriented to PPT. No focal deficits.       Laboratory Data      Results from last 7 days  Lab Units 17  0438 17  0509   WBC 10*3/mm3 9.63 12.57*   HEMOGLOBIN g/dL 12.0 13.6   HEMATOCRIT % 37.3 42.3   PLATELETS 10*3/mm3 285 379       Results from last 7 days  Lab Units 17  2146 17  0438   SODIUM mmol/L  --  140   POTASSIUM mmol/L 4.0 3.5   CHLORIDE mmol/L  --  97*   TOTAL CO2 mmol/L  --  36.0*   BUN mg/dL  --  14   CREATININE mg/dL  --  0.80   GLUCOSE mg/dL  --  90   CALCIUM mg/dL  --  10.0               Results " from last 7 days  Lab Units 03/14/17  0438   CRP mg/dL 31.90*       Estimated Creatinine Clearance: 110.8 mL/min (by C-G formula based on Cr of 0.8).      Microbiology:  Sputum culture on 3/2: PSA and MRSA  Sputum culture on 3/3: MRSA   AFB x 3 ordered  are negative  Sputum culture 3/7/17: Non-LFR, Staph aureus, Yeast  Blood culture negative    Crypto negative  Strep pneumo and Legionella Uag negative  Blasto Negative   Quant TB indeterminate    Radiology:  Imaging Results (last 24 hours)     ** No results found for the last 24 hours. **          PROBLEM LIST:   NASIM pneumonia with MRSA and PSA   Leukocytosis  Concern for potential post-obstructive pneumonia with hx of cough x 6 weeks and 6x6cm nature of NASIM pna (3 PPD history)  Tobacco abuse  Chronic skin lesions  HTN  HLD  Hypothyroidism  Psych disorder     ASSESSMENT:  52 -year-old female with multiple medical conditions including hypertension, hyperlipidemia, hypothyroidism in the setting of psychiatric disease with OCD and bipolar disorder who presents with worsening cough, shortness of breath, sputum production of brown coloration of the past 5-6 weeks however worsening over the last 1 week. She reports associated chills and sweats however hasn't checked her temperature. she came to the ED for evaluation with leukocytosis initially noted, chest CT angiogram with a left upper lobe with a 6 x 6 cm opacity concerning for infectious versus neoplastic etiology. Sputum production ×3 has been collected with pseudomonas and MRSA.     Quant TB negative. Crypto Negative. Histo negative. Blasto negative. She has completed 2 weeks of therapy at this time with some improvement in respiratory effort and now agreeable to pulm evaluation and bronchoscopy, scheduled for today. CT chest with some improvement in size of NASIM consolidative lesion but still concern for post-obstructive disease in heavy smoker and will continue IV antibiotics and f/u scope results.        PLAN:  Continue Vanc and Cefepime for MRSA and PSA coverage for now  Bronchoscopy scheduled for today   F/u path but expect not back until Thurs or Friday and if no malignancy plan to d/c off abx.    Dr. Robert Self saw the patient, performed the physical exam, reviewed the laboratory data and guided with the formulation of the above problem list, assessment and treatment plan.     Addendum today:  AFB growth on 2 of sputums from 3/3/17 noted today and sent to state lab for ID, will await identification,  Consider mycobacterial treatment soon.  Hopefully will be non tuberculosis mycobacterium  D/c vanc and cefepime today    Robert Self MD  3/15/2017

## 2017-03-15 NOTE — PLAN OF CARE
Problem: Anxiety (Adult)  Goal: Identify Related Risk Factors and Signs and Symptoms  Outcome: Ongoing (interventions implemented as appropriate)    03/15/17 0406   Anxiety   Related Risk Factors (Anxiety) cognitive status;prognosis/treatment plan;procedure/treatment   Signs and Symptoms (Anxiety) concentration decreased;dependence increased;nervousness/tension/restlessness;sleep disturbance       Goal: Reduction/Resolution  Outcome: Ongoing (interventions implemented as appropriate)

## 2017-03-15 NOTE — PROCEDURES
Bronchoscopy Procedural Note       Date of Operation: 3/15/2017    Indication: This is a 52 y.o. with a known history of aspiration with noncompliance with her prescribed dysphagia precautions as well as left upper lobe pneumonia presumed to be from cultures staph aureus as well as pseudomonas. She also is heavy smoker of approximately 4-5 packs of cigarettes per day. Due to history of recurrent pneumonias, possibly on the left, but elected to perform bronchoscopy to examine the airways look for obstruction. Of note however the repeat CT scan from today showed interval improvement in the left upper lobe infiltrate however not resolution since March 1.    Pre-op Diagnosis: Left upper lobe pneumonia    Post-op Diagnosis: Left upper lobe pneumonia                                     Retained foreign body in the anterior segment of the left lower lobe, presumed to                                                  be a peanut or almond.    Surgeon: Ankur Salomon MD    Referring Physician: Mauricio Reaves M.D.    Procedures Performed:  Flexible fiberoptic bronchoscopy  Bronchial washings  Foreign body removal from the anterior segment of the left lower lobe with tripod grasper.    Anesthesia:  Conscious sedation was performed with a total of 200 µg IV fentanyl, 10 mg IV Versed, and 50 mg of IV diphenhydramine.  Procedure was preceded by 5 mL 4% lidocaine nebulizer treatment. A total of 18 mL of 1% lidocaine were utilized topically in the airway.    Estimated Blood Loss: <5 ml    Description of Procedure:  After obtaining informed consent and completing a procedural pause, the flex fiberoptic bronchoscope was passed through the right naris without difficulty. She did take quite some time to eventually get sedated. I was able to pass to level the vocal cords without any difficulty. The interarytenoid area and surrounding larynx structures including the false vocal cords were very erythematous however no lesions were  noted. I passed through the vocal cords without difficulty and surveyed the trachea to the level of the bernadette which was seen to be sharp. There is mild background erythema consistent with tracheitis in the trachea. I first surveyed the right side and explored the right upper, the right middle, and the right lower lobes. She was coughing profusely through this. There is a background of bronchitis but no endobronchial lesions were noted. There are minimal secretions primarily in the right lower lobe. I then proceeded to the left side and again saw evidence of bronchitis throughout all lobes. The left upper lobe and the lingula were clear of any lesions and there were only minimal secretions at this time. There is no bleeding. I then proceeded to the left lower lobe and at the entrance of the anterior segment of the left lower lobe encountered a white and black object that was soft and was consistent with an aspirated foreign body. Suctioning was unable to remove this. I attempted multiple times with biopsy forceps to dislodge this however was only able to take tiny pieces. She also began to cough profusely at this time and visualization was difficult. We then switched to a therapeutic bronchoscope with a larger suctioning channel and were still unable to suction this free. I was able to utilize a tripod grasper to secure this object and remove it without difficulty. This was pulled free and was placed in our biopsy sample container. It did have the appearance of nut or vegetable matter. I then returned to the airway without difficulty and suctioned several small pieces of this. The airways were seen to be clear otherwise. The scope was removed and due to excessive coughing afterwards she actually expectorated a bit more of this material which had not been visible at that time.    She tolerated the procedure without any immediate complications.      Findings and Recommendations:    -Inflamed laryngeal  "structures  -Retained foreign object which is likely secondary to aspiration of food stuff in the anterior basal bronchus of the left lower lobe.  -Bilateral erythema consistent with bronchitis.    I recommend continuing antibiotics as before. We will need to follow up on the cultures as well as cytology. I did advise the patient that this was likely caused by aspiration during eating and she states that she does not want to follow the previous dysphagia instructions and precautions because they make her look \"like an idiot.\" I did emphasize to her that this could lead to her developing further pneumonia.    I would also recommend a follow-up CT scan in approximately 6 weeks without contrast to look for full clearance of this left upper lobe pneumonia. It is interesting that the bronchus that was blocked did not correspond to the left upper lobe pneumonia on CT scan however may explain recurrence with airway contamination.      Ankur Salomon MD, [facility]  Pulmonary and Critical Care Medicine   03/15/17 5:30 PM    "

## 2017-03-15 NOTE — PROGRESS NOTES
"      HOSPITALIST DAILY PROGRESS NOTE    Chief Complaint: f/u for soa    Subjective   SUBJECTIVE/OVERNIGHT EVENTS   Difficulty sleeping. Some L pleurisy. No n/v. Some sores/bumps in her mouth. Minimal cough/congestion.    Review of Systems:  Gen-no fevers, no chills  CV-no chest pain  Resp-no cough, no dyspnea  GI-no N/V/D, no abd pain    Objective   OBJECTIVE   I have reviewed the vital signs.  Visit Vitals   • BP 99/75   • Pulse 86   • Temp 96.8 °F (36 °C) (Oral)   • Resp 16   • Ht 68\" (172.7 cm)   • Wt 242 lb 8.1 oz (110 kg)   • SpO2 92%   • BMI 36.87 kg/m2       Physical Exam:  Gen-no acute distress, comfortable  CV-RRR, S1 S2 normal, no m/r/g  Resp-CTAB, no wheezes  Abd-soft, NT, ND, +BS  Ext-no edema  Neuro-A&Ox3, no focal deficits  Psych-appropriate mood and affect    Results:  I have reviewed the labs, culture data.      Results from last 7 days  Lab Units 03/14/17  0438 03/09/17  0509   WBC 10*3/mm3 9.63 12.57*   HEMOGLOBIN g/dL 12.0 13.6   HEMATOCRIT % 37.3 42.3   PLATELETS 10*3/mm3 285 379       Results from last 7 days  Lab Units 03/14/17  2146 03/14/17  0438   SODIUM mmol/L  --  140   POTASSIUM mmol/L 4.0 3.5   CHLORIDE mmol/L  --  97*   TOTAL CO2 mmol/L  --  36.0*   BUN mg/dL  --  14   CREATININE mg/dL  --  0.80   GLUCOSE mg/dL  --  90   CALCIUM mg/dL  --  10.0     Culture Data:  Cultures:    RESPIRATORY CULTURE   Date Value Ref Range Status   03/07/2017 Scant growth (1+) Pseudomonas aeruginosa (A)  Final   03/07/2017 Light growth (2+) Staphylococcus aureus, MRSA (C)  Final     Comment:       Methicillin resistant Staphylococcus aureus, Patient may be an isolation risk.  This isolate does not demonstrate inducible clindamycin resistance in vitro.     03/07/2017 Light growth (2+) Yeast isolated (A)  Final     I have reviewed the medications.    Assessment/Plan   ASSESSMENT/PLAN    52-year-old  female with history of noncompliance. She smokes 2-3 packs of cigarettes per day. She reently went off " her psych (Bipolar) meds. She presented to [facility] ED with increased shortness of breath and cough and fever. Reports recent hospitalization to [facility] about a week with no improvement      # Sepsis  --met criteria due to fever, hypoxia, tachycardia, leukocytosis and cultures positive for MRSA/pseudomona, ID consulted, following, on abx as below      # A/C hypoxic resp failure-improving  --multifactorial COPD vs Pneumonia NASIM( vs neoplasm less likely)  --abx per ID  --resp culture + MRSA and Pseudomona  --agreeable to pulmonary evaluation/bronchoscopy, today tentatively    # Bipolar disorder/homeless  --suspect schizophrenia also given paranoia and hallucination  --pt recently stopped psych meds a week ago because they were causing her to hallucinate  --pt needs psych eval, but unfortunately we do not have inpt psych service, when ready for DC, may benefit from [facility] evaluation  --SW consulted and following  --patient states she must check on her car, which is impounded, before she goes anywhere after she is discharged.   --nurse to call and confirm zanaflex/neurontin doses      # Hypothyroidism- cont home meds      # Tobacco abuse-- counseled, cessation advised      # Labial Cyst--needs to follow up with Gyn      # Tachycardia--increase atenolol     # Vaginal Yeast--s/p 3d of vaginal suppository. Still with external symptoms--try micotin cream     PT/OT      Dispo: difficult secondary to homeless. SS working on placement...    Mauricio Reaves MD  03/15/17  8:39 AM

## 2017-03-15 NOTE — PLAN OF CARE
Problem: Patient Care Overview (Adult)  Goal: Plan of Care Review  Outcome: Ongoing (interventions implemented as appropriate)    03/15/17 1736   Coping/Psychosocial Response Interventions   Plan Of Care Reviewed With patient   Patient Care Overview   Progress progress toward functional goals as expected   Outcome Evaluation   Outcome Summary/Follow up Plan Pt had bronchoscopy today. Object was removed from lung and sent to lab. Atenolol was held in AM due to SBP < 100. Atenolol was later given due to HR > 120 and SBP had increased to > 100. Pt has received pain and anxiety PRNs. Sputum cultures from 3/2 and 3/3 came back AFB (+). MDs notified and pt placed back on airborne precautions.        Goal: Adult Individualization and Mutuality  Outcome: Ongoing (interventions implemented as appropriate)    03/03/17 0650 03/15/17 1736   Individualization   Patient Specific Preferences --  red jello, gatorade, chocolate ice cream for snacks   Patient Specific Goals to have a place to go when she leaves here --    Patient Specific Interventions --  airborne precautions   Mutuality/Individual Preferences   What Anxieties, Fears or Concerns Do You Have About Your Health or Care? concerned about medicine and where she will go when she leaves here --        Goal: Discharge Needs Assessment  Outcome: Ongoing (interventions implemented as appropriate)    03/01/17 1548 03/03/17 0650 03/09/17 1743   Discharge Needs Assessment   Concerns To Be Addressed --  homelessness;financial/insurance concerns;mental health concerns;transportation;medication concerns --    Readmission Within The Last 30 Days --  --  no previous admission in last 30 days   Equipment Needed After Discharge --  --  --    Current Discharge Risk homeless --  --    Discharge Disposition --  still a patient --    Current Health   Anticipated Changes Related to Illness --  --  none   Self-Care   Equipment Currently Used at Home --  --  --    Living Environment    Transportation Available --  --  car;family or friend will provide     03/11/17 1845 03/15/17 0904   Discharge Needs Assessment   Concerns To Be Addressed --  --    Readmission Within The Last 30 Days --  --    Equipment Needed After Discharge none --    Current Discharge Risk --  --    Discharge Disposition --  --    Current Health   Anticipated Changes Related to Illness --  --    Self-Care   Equipment Currently Used at Home --  bath bench   Living Environment   Transportation Available --  --          Problem: Pneumonia (Adult)  Goal: Signs and Symptoms of Listed Potential Problems Will be Absent or Manageable (Pneumonia)  Outcome: Ongoing (interventions implemented as appropriate)    03/15/17 1736   Pneumonia   Problems Assessed (Pneumonia) all   Problems Present (Pneumonia) none         Problem: COPD, Chronic Bronchitis/Emphysema (Adult)  Goal: Signs and Symptoms of Listed Potential Problems Will be Absent or Manageable (COPD, Chronic Bronchitis/Emphysema)  Outcome: Ongoing (interventions implemented as appropriate)    03/15/17 1736   COPD, Chronic Bronchitis/Emphysema   Problems Assessed (COPD, Chronic Bronchitis/Emphysema) all   Problems Present (COPD, Chronic Bronchitis/Emphysema) depression         Problem: Wound, Traumatic, Nonburn (Adult)  Goal: Signs and Symptoms of Listed Potential Problems Will be Absent or Manageable (Wound, Traumatic, Nonburn)  Outcome: Ongoing (interventions implemented as appropriate)    03/15/17 1736   Wound, Traumatic, Nonburn   Problems Assessed (Wound) all   Problems Present (Wound) none         Problem: Pain, Acute (Adult)  Goal: Identify Related Risk Factors and Signs and Symptoms  Outcome: Ongoing (interventions implemented as appropriate)    03/15/17 0355 03/15/17 1736   Pain, Acute   Related Risk Factors (Acute Pain) --  disease process;persistent pain;fear   Signs and Symptoms (Acute Pain) fear of reinjury;facial mask of pain/grimace;verbalization of pain descriptors --         Goal: Acceptable Pain Control/Comfort Level  Outcome: Ongoing (interventions implemented as appropriate)    03/15/17 1736   Pain, Acute (Adult)   Acceptable Pain Control/Comfort Level making progress toward outcome         Problem: Anxiety (Adult)  Goal: Identify Related Risk Factors and Signs and Symptoms  Outcome: Ongoing (interventions implemented as appropriate)  Goal: Reduction/Resolution  Outcome: Ongoing (interventions implemented as appropriate)    03/15/17 1736   Anxiety (Adult)   Reduction/Resolution making progress toward outcome         Problem: Bronchoscopy (Adult)  Goal: Signs and Symptoms of Listed Potential Problems Will be Absent or Manageable (Bronchoscopy)  Outcome: Ongoing (interventions implemented as appropriate)    03/15/17 1736   Bronchoscopy   Problems Assessed (Bronchoscopy) all   Problems Present (Bronchoscopy) none

## 2017-03-16 LAB
ANION GAP SERPL CALCULATED.3IONS-SCNC: 9 MMOL/L (ref 3–11)
BASOPHILS # BLD AUTO: 0.11 10*3/MM3 (ref 0–0.2)
BASOPHILS NFR BLD AUTO: 1.3 % (ref 0–1)
BUN BLD-MCNC: 14 MG/DL (ref 9–23)
BUN/CREAT SERPL: 17.5 (ref 7–25)
CALCIUM SPEC-SCNC: 10 MG/DL (ref 8.7–10.4)
CHLORIDE SERPL-SCNC: 96 MMOL/L (ref 99–109)
CO2 SERPL-SCNC: 33 MMOL/L (ref 20–31)
CREAT BLD-MCNC: 0.8 MG/DL (ref 0.6–1.3)
CYTO UR: NORMAL
DEPRECATED RDW RBC AUTO: 49.1 FL (ref 37–54)
EOSINOPHIL # BLD AUTO: 0.16 10*3/MM3 (ref 0.1–0.3)
EOSINOPHIL NFR BLD AUTO: 1.8 % (ref 0–3)
ERYTHROCYTE [DISTWIDTH] IN BLOOD BY AUTOMATED COUNT: 14.2 % (ref 11.3–14.5)
GFR SERPL CREATININE-BSD FRML MDRD: 75 ML/MIN/1.73
GLUCOSE BLD-MCNC: 95 MG/DL (ref 70–100)
HCT VFR BLD AUTO: 38.3 % (ref 34.5–44)
HGB BLD-MCNC: 12.2 G/DL (ref 11.5–15.5)
IMM GRANULOCYTES # BLD: 0.08 10*3/MM3 (ref 0–0.03)
IMM GRANULOCYTES NFR BLD: 0.9 % (ref 0–0.6)
LAB AP CASE REPORT: NORMAL
LAB AP CASE REPORT: NORMAL
LAB AP CLINICAL INFORMATION: NORMAL
LYMPHOCYTES # BLD AUTO: 2.89 10*3/MM3 (ref 0.6–4.8)
LYMPHOCYTES NFR BLD AUTO: 33.2 % (ref 24–44)
Lab: NORMAL
Lab: NORMAL
MCH RBC QN AUTO: 30.1 PG (ref 27–31)
MCHC RBC AUTO-ENTMCNC: 31.9 G/DL (ref 32–36)
MCV RBC AUTO: 94.6 FL (ref 80–99)
MONOCYTES # BLD AUTO: 1.31 10*3/MM3 (ref 0–1)
MONOCYTES NFR BLD AUTO: 15 % (ref 0–12)
NEUTROPHILS # BLD AUTO: 4.16 10*3/MM3 (ref 1.5–8.3)
NEUTROPHILS NFR BLD AUTO: 47.8 % (ref 41–71)
PATH REPORT.FINAL DX SPEC: NORMAL
PATH REPORT.FINAL DX SPEC: NORMAL
PATH REPORT.GROSS SPEC: NORMAL
PLAT MORPH BLD: NORMAL
PLATELET # BLD AUTO: 302 10*3/MM3 (ref 150–450)
PMV BLD AUTO: 10.3 FL (ref 6–12)
POTASSIUM BLD-SCNC: 3.5 MMOL/L (ref 3.5–5.5)
RBC # BLD AUTO: 4.05 10*6/MM3 (ref 3.89–5.14)
RBC MORPH BLD: NORMAL
SODIUM BLD-SCNC: 138 MMOL/L (ref 132–146)
WBC MORPH BLD: NORMAL
WBC NRBC COR # BLD: 8.71 10*3/MM3 (ref 3.5–10.8)

## 2017-03-16 PROCEDURE — 94799 UNLISTED PULMONARY SVC/PX: CPT

## 2017-03-16 PROCEDURE — 85007 BL SMEAR W/DIFF WBC COUNT: CPT | Performed by: HOSPITALIST

## 2017-03-16 PROCEDURE — 25010000002 ONDANSETRON PER 1 MG: Performed by: INTERNAL MEDICINE

## 2017-03-16 PROCEDURE — 80048 BASIC METABOLIC PNL TOTAL CA: CPT | Performed by: HOSPITALIST

## 2017-03-16 PROCEDURE — 63710000001 PREDNISONE PER 5 MG: Performed by: FAMILY MEDICINE

## 2017-03-16 PROCEDURE — 25010000002 ENOXAPARIN PER 10 MG: Performed by: INTERNAL MEDICINE

## 2017-03-16 PROCEDURE — 99232 SBSQ HOSP IP/OBS MODERATE 35: CPT | Performed by: HOSPITALIST

## 2017-03-16 PROCEDURE — 85025 COMPLETE CBC W/AUTO DIFF WBC: CPT | Performed by: HOSPITALIST

## 2017-03-16 PROCEDURE — 94640 AIRWAY INHALATION TREATMENT: CPT

## 2017-03-16 RX ORDER — DIAZEPAM 5 MG/1
5 TABLET ORAL ONCE
Status: COMPLETED | OUTPATIENT
Start: 2017-03-16 | End: 2017-03-16

## 2017-03-16 RX ORDER — DIAZEPAM 5 MG/1
5 TABLET ORAL EVERY 12 HOURS PRN
Status: DISCONTINUED | OUTPATIENT
Start: 2017-03-16 | End: 2017-03-16

## 2017-03-16 RX ORDER — DIAZEPAM 5 MG/1
5 TABLET ORAL EVERY 12 HOURS PRN
Status: DISCONTINUED | OUTPATIENT
Start: 2017-03-16 | End: 2017-03-18

## 2017-03-16 RX ADMIN — HYDROCODONE BITARTATE AND ACETAMINOPHEN 1 TABLET: 5; 325 TABLET ORAL at 20:06

## 2017-03-16 RX ADMIN — FUROSEMIDE 40 MG: 40 TABLET ORAL at 08:56

## 2017-03-16 RX ADMIN — NICOTINE 1 PATCH: 21 PATCH, EXTENDED RELEASE TRANSDERMAL at 08:58

## 2017-03-16 RX ADMIN — LORAZEPAM 0.5 MG: 0.5 TABLET ORAL at 11:17

## 2017-03-16 RX ADMIN — IPRATROPIUM BROMIDE AND ALBUTEROL SULFATE 3 ML: .5; 3 SOLUTION RESPIRATORY (INHALATION) at 19:56

## 2017-03-16 RX ADMIN — TEMAZEPAM 30 MG: 15 CAPSULE ORAL at 20:06

## 2017-03-16 RX ADMIN — Medication 2 TABLET: at 17:19

## 2017-03-16 RX ADMIN — DULOXETINE 60 MG: 60 CAPSULE, DELAYED RELEASE ORAL at 20:07

## 2017-03-16 RX ADMIN — Medication 10 ML: at 11:12

## 2017-03-16 RX ADMIN — ATENOLOL 50 MG: 50 TABLET ORAL at 11:11

## 2017-03-16 RX ADMIN — GABAPENTIN 800 MG: 400 CAPSULE ORAL at 20:07

## 2017-03-16 RX ADMIN — CYCLOBENZAPRINE HYDROCHLORIDE 10 MG: 10 TABLET, FILM COATED ORAL at 20:07

## 2017-03-16 RX ADMIN — DIAZEPAM 5 MG: 5 TABLET ORAL at 20:06

## 2017-03-16 RX ADMIN — POTASSIUM CHLORIDE 40 MEQ: 750 CAPSULE, EXTENDED RELEASE ORAL at 08:56

## 2017-03-16 RX ADMIN — Medication 10 ML: at 17:19

## 2017-03-16 RX ADMIN — ONDANSETRON 4 MG: 2 INJECTION INTRAMUSCULAR; INTRAVENOUS at 12:32

## 2017-03-16 RX ADMIN — HYDROCODONE BITARTATE AND ACETAMINOPHEN 1 TABLET: 5; 325 TABLET ORAL at 13:49

## 2017-03-16 RX ADMIN — PREDNISONE 20 MG: 20 TABLET ORAL at 08:56

## 2017-03-16 RX ADMIN — DIAZEPAM 5 MG: 5 TABLET ORAL at 14:22

## 2017-03-16 RX ADMIN — DULOXETINE 60 MG: 60 CAPSULE, DELAYED RELEASE ORAL at 08:56

## 2017-03-16 RX ADMIN — MICONAZOLE NITRATE: 2 CREAM TOPICAL at 20:07

## 2017-03-16 RX ADMIN — MICONAZOLE NITRATE: 2 CREAM TOPICAL at 08:58

## 2017-03-16 RX ADMIN — CYCLOBENZAPRINE HYDROCHLORIDE 10 MG: 10 TABLET, FILM COATED ORAL at 08:57

## 2017-03-16 RX ADMIN — Medication 2 TABLET: at 08:56

## 2017-03-16 RX ADMIN — IPRATROPIUM BROMIDE AND ALBUTEROL SULFATE 3 ML: .5; 3 SOLUTION RESPIRATORY (INHALATION) at 13:06

## 2017-03-16 RX ADMIN — PRAVASTATIN SODIUM 80 MG: 40 TABLET ORAL at 20:05

## 2017-03-16 RX ADMIN — IPRATROPIUM BROMIDE AND ALBUTEROL SULFATE 3 ML: .5; 3 SOLUTION RESPIRATORY (INHALATION) at 16:49

## 2017-03-16 RX ADMIN — CYCLOBENZAPRINE HYDROCHLORIDE 10 MG: 10 TABLET, FILM COATED ORAL at 17:19

## 2017-03-16 RX ADMIN — HYDROCODONE BITARTATE AND ACETAMINOPHEN 1 TABLET: 5; 325 TABLET ORAL at 08:57

## 2017-03-16 RX ADMIN — THEOPHYLLINE 600 MG: 300 TABLET, EXTENDED RELEASE ORAL at 08:56

## 2017-03-16 RX ADMIN — THEOPHYLLINE 600 MG: 300 TABLET, EXTENDED RELEASE ORAL at 20:07

## 2017-03-16 RX ADMIN — POTASSIUM CHLORIDE 40 MEQ: 750 CAPSULE, EXTENDED RELEASE ORAL at 13:50

## 2017-03-16 RX ADMIN — QUETIAPINE 300 MG: 200 TABLET, EXTENDED RELEASE ORAL at 20:06

## 2017-03-16 RX ADMIN — PANTOPRAZOLE SODIUM 40 MG: 40 TABLET, DELAYED RELEASE ORAL at 08:56

## 2017-03-16 RX ADMIN — LEVOTHYROXINE SODIUM 50 MCG: 25 TABLET ORAL at 05:23

## 2017-03-16 RX ADMIN — HYDROCODONE BITARTATE AND ACETAMINOPHEN 1 TABLET: 5; 325 TABLET ORAL at 03:09

## 2017-03-16 RX ADMIN — MONTELUKAST SODIUM 10 MG: 10 TABLET, FILM COATED ORAL at 20:07

## 2017-03-16 RX ADMIN — FLUTICASONE PROPIONATE 2 SPRAY: 50 SPRAY, METERED NASAL at 08:55

## 2017-03-16 RX ADMIN — LORAZEPAM 0.5 MG: 0.5 TABLET ORAL at 03:09

## 2017-03-16 RX ADMIN — CETIRIZINE HYDROCHLORIDE 10 MG: 10 TABLET, FILM COATED ORAL at 08:56

## 2017-03-16 RX ADMIN — Medication 10 ML: at 08:57

## 2017-03-16 RX ADMIN — GABAPENTIN 800 MG: 400 CAPSULE ORAL at 08:56

## 2017-03-16 RX ADMIN — ENOXAPARIN SODIUM 40 MG: 40 INJECTION SUBCUTANEOUS at 08:56

## 2017-03-16 NOTE — PLAN OF CARE
Problem: Patient Care Overview (Adult)  Goal: Plan of Care Review  Outcome: Ongoing (interventions implemented as appropriate)  Goal: Adult Individualization and Mutuality  Outcome: Ongoing (interventions implemented as appropriate)  Goal: Discharge Needs Assessment  Outcome: Ongoing (interventions implemented as appropriate)    Problem: Pneumonia (Adult)  Goal: Signs and Symptoms of Listed Potential Problems Will be Absent or Manageable (Pneumonia)  Outcome: Ongoing (interventions implemented as appropriate)    Problem: COPD, Chronic Bronchitis/Emphysema (Adult)  Goal: Signs and Symptoms of Listed Potential Problems Will be Absent or Manageable (COPD, Chronic Bronchitis/Emphysema)  Outcome: Ongoing (interventions implemented as appropriate)    Problem: Pain, Acute (Adult)  Goal: Identify Related Risk Factors and Signs and Symptoms  Outcome: Ongoing (interventions implemented as appropriate)  Goal: Acceptable Pain Control/Comfort Level  Outcome: Ongoing (interventions implemented as appropriate)    Problem: Anxiety (Adult)  Goal: Identify Related Risk Factors and Signs and Symptoms  Outcome: Ongoing (interventions implemented as appropriate)  Goal: Reduction/Resolution  Outcome: Ongoing (interventions implemented as appropriate)

## 2017-03-16 NOTE — PLAN OF CARE
Problem: Patient Care Overview (Adult)  Goal: Plan of Care Review  Outcome: Ongoing (interventions implemented as appropriate)    03/15/17 1736 03/15/17 2020   Coping/Psychosocial Response Interventions   Plan Of Care Reviewed With --  patient   Patient Care Overview   Progress progress toward functional goals as expected --        Goal: Adult Individualization and Mutuality  Outcome: Ongoing (interventions implemented as appropriate)    03/03/17 0650 03/15/17 1736   Individualization   Patient Specific Preferences --  red jello, gatorade, chocolate ice cream for snacks   Patient Specific Goals to have a place to go when she leaves here --    Patient Specific Interventions --  airborne precautions   Mutuality/Individual Preferences   What Anxieties, Fears or Concerns Do You Have About Your Health or Care? concerned about medicine and where she will go when she leaves here --    What Questions Do You Have About Your Health or Care? none at this time --    What Information Would Help Us Give You More Personalized Care? none at this time --        Goal: Discharge Needs Assessment  Outcome: Ongoing (interventions implemented as appropriate)    03/01/17 1548 03/03/17 0650 03/09/17 1743   Discharge Needs Assessment   Concerns To Be Addressed --  homelessness;financial/insurance concerns;mental health concerns;transportation;medication concerns --    Readmission Within The Last 30 Days --  --  no previous admission in last 30 days   Equipment Needed After Discharge --  --  --    Current Discharge Risk homeless --  --    Discharge Disposition --  still a patient --    Current Health   Anticipated Changes Related to Illness --  --  none   Self-Care   Equipment Currently Used at Home --  --  --    Living Environment   Transportation Available --  --  car;family or friend will provide     03/11/17 1845 03/15/17 0904   Discharge Needs Assessment   Concerns To Be Addressed --  --    Readmission Within The Last 30 Days --  --     Equipment Needed After Discharge none --    Current Discharge Risk --  --    Discharge Disposition --  --    Current Health   Anticipated Changes Related to Illness --  --    Self-Care   Equipment Currently Used at Home --  bath bench   Living Environment   Transportation Available --  --          Problem: Pneumonia (Adult)  Goal: Signs and Symptoms of Listed Potential Problems Will be Absent or Manageable (Pneumonia)  Outcome: Ongoing (interventions implemented as appropriate)    03/15/17 1736   Pneumonia   Problems Assessed (Pneumonia) all   Problems Present (Pneumonia) none         Problem: COPD, Chronic Bronchitis/Emphysema (Adult)  Goal: Signs and Symptoms of Listed Potential Problems Will be Absent or Manageable (COPD, Chronic Bronchitis/Emphysema)  Outcome: Ongoing (interventions implemented as appropriate)    03/15/17 1736   COPD, Chronic Bronchitis/Emphysema   Problems Assessed (COPD, Chronic Bronchitis/Emphysema) all   Problems Present (COPD, Chronic Bronchitis/Emphysema) depression         Problem: Wound, Traumatic, Nonburn (Adult)  Goal: Signs and Symptoms of Listed Potential Problems Will be Absent or Manageable (Wound, Traumatic, Nonburn)  Outcome: Ongoing (interventions implemented as appropriate)    03/15/17 1736   Wound, Traumatic, Nonburn   Problems Assessed (Wound) all   Problems Present (Wound) none         Problem: Pain, Acute (Adult)  Goal: Identify Related Risk Factors and Signs and Symptoms  Outcome: Ongoing (interventions implemented as appropriate)    03/15/17 0355 03/15/17 1736   Pain, Acute   Related Risk Factors (Acute Pain) --  disease process;persistent pain;fear   Signs and Symptoms (Acute Pain) fear of reinjury;facial mask of pain/grimace;verbalization of pain descriptors --        Goal: Acceptable Pain Control/Comfort Level  Outcome: Ongoing (interventions implemented as appropriate)    03/15/17 1736   Pain, Acute (Adult)   Acceptable Pain Control/Comfort Level making progress  toward outcome         Problem: Anxiety (Adult)  Goal: Identify Related Risk Factors and Signs and Symptoms  Outcome: Ongoing (interventions implemented as appropriate)    03/15/17 0406   Anxiety   Related Risk Factors (Anxiety) cognitive status;prognosis/treatment plan;procedure/treatment   Signs and Symptoms (Anxiety) concentration decreased;dependence increased;nervousness/tension/restlessness;sleep disturbance       Goal: Reduction/Resolution  Outcome: Ongoing (interventions implemented as appropriate)    03/15/17 1736   Anxiety (Adult)   Reduction/Resolution making progress toward outcome         Problem: Bronchoscopy (Adult)  Goal: Signs and Symptoms of Listed Potential Problems Will be Absent or Manageable (Bronchoscopy)  Outcome: Ongoing (interventions implemented as appropriate)    03/15/17 1736   Bronchoscopy   Problems Assessed (Bronchoscopy) all   Problems Present (Bronchoscopy) none

## 2017-03-16 NOTE — PROGRESS NOTES
Adult Nutrition  Assessment/PES    Patient Name:  Colleen Gonzalez  YOB: 1965  MRN: [id number]  Admit Date:  3/1/2017    Assessment Date:  3/16/2017        Reason for Assessment       03/16/17 1221    Reason for Assessment    Reason For Assessment/Visit follow up protocol    Time Spent (min) 5                        Nutrition Prescription Ordered       03/16/17 1221    Nutrition Prescription PO    Current PO Diet Regular    Common Modifiers Cardiac            Evaluation of Received Nutrient/Fluid Intake       03/16/17 1222    PO Evaluation    Number of Meals 10    % PO Intake 93              Problem/Interventions:        Problem 1       03/16/17 1224    Nutrition Diagnoses Problem 1    Problem 1 Nutrition Appropriate for Condition at this Time    Etiology (related to) Factors Affecting Nutrition    Appetite Good    Percent (%) intake recorded 93 %    Over number of meals 10                    Intervention Goal       03/16/17 1225    Intervention Goal    General Nutrition support treatment    PO Continue positive trend            Nutrition Intervention       03/16/17 1225    Nutrition Intervention    RD/Tech Action Follow Tx progress    assisting pt. with menu selections.              Education/Evaluation       03/16/17 1226    Monitor/Evaluation    Monitor Per protocol            Electronically signed by:  Chelsea Ruffin RD  03/16/17 12:26 PM

## 2017-03-16 NOTE — PROGRESS NOTES
Continued Stay Note  [facility]     Patient Name: Colleen Gonzalez  MRN: [id number]  Today's Date: 3/16/2017    Admit Date: 3/1/2017          Discharge Plan       03/16/17 1414    Case Management/Social Work Plan    Additional Comments Pt requesting [facility], spoke with [employer] [facility] will notify I[facility] regarding pt's situation and being homeless but may be going to stay with her brother. Iwona will follow up with pt when she returns to the office on Monday., phone number listed provided to [facility].               Discharge Codes     None        Expected Discharge Date and Time     Expected Discharge Date Expected Discharge Time    Mar 4, 2017             Cheyenne Ryan

## 2017-03-16 NOTE — PROGRESS NOTES
"      HOSPITALIST DAILY PROGRESS NOTE    Chief Complaint: f/u for soa    Subjective   SUBJECTIVE/OVERNIGHT EVENTS   Difficulty sleeping. L pleurisy better. No n/v. No other issues.    Review of Systems:  Gen-no fevers, no chills  CV-no chest pain  Resp-no cough, no dyspnea  GI-no N/V/D, no abd pain    Objective   OBJECTIVE   I have reviewed the vital signs.  Visit Vitals   • /82 (BP Location: Right arm, Patient Position: Sitting)   • Pulse 97   • Temp 97.8 °F (36.6 °C) (Oral)   • Resp 18   • Ht 68\" (172.7 cm)   • Wt 265 lb (120 kg)   • SpO2 91%   • BMI 40.29 kg/m2       Physical Exam:  Gen-no acute distress, comfortable  CV-RRR, S1 S2 normal, no m/r/g  Resp-CTAB, no wheezes  Abd-soft, NT, ND, +BS  Ext-no edema  Neuro-A&Ox3, no focal deficits  Psych-appropriate mood and affect    Results:  I have reviewed the labs, culture data.      Results from last 7 days  Lab Units 03/16/17  0535 03/14/17  0438   WBC 10*3/mm3 8.71 9.63   HEMOGLOBIN g/dL 12.2 12.0   HEMATOCRIT % 38.3 37.3   PLATELETS 10*3/mm3 302 285       Results from last 7 days  Lab Units 03/16/17  0535   SODIUM mmol/L 138   POTASSIUM mmol/L 3.5   CHLORIDE mmol/L 96*   TOTAL CO2 mmol/L 33.0*   BUN mg/dL 14   CREATININE mg/dL 0.80   GLUCOSE mg/dL 95   CALCIUM mg/dL 10.0     Culture Data:  Cultures:    RESPIRATORY CULTURE   Date Value Ref Range Status   03/07/2017 Scant growth (1+) Pseudomonas aeruginosa (A)  Final   03/07/2017 Light growth (2+) Staphylococcus aureus, MRSA (C)  Final     Comment:       Methicillin resistant Staphylococcus aureus, Patient may be an isolation risk.  This isolate does not demonstrate inducible clindamycin resistance in vitro.     03/07/2017 Light growth (2+) Yeast isolated (A)  Final     I have reviewed the medications.    Assessment/Plan   ASSESSMENT/PLAN    52-year-old  female with history of noncompliance. She smokes 2-3 packs of cigarettes per day. She reently went off her psych (Bipolar) meds. She presented to " [facility] ED with increased shortness of breath and cough and fever. Reports recent hospitalization to [facility] about a week with no improvement      # Sepsis  --met criteria due to fever, hypoxia, tachycardia, leukocytosis and cultures positive for MRSA/pseudomona, ID consulted, following, on abx as below      # A/C hypoxic resp failure-improving  --multifactorial COPD vs Pneumonia NASIM( vs neoplasm less likely)  --abx per ID, completed treatment for MRSA/PSA  --resp culture + MRSA and Pseudomona    # Bipolar disorder/homeless  --suspect schizophrenia also given paranoia and hallucination  --pt recently stopped psych meds a week ago because they were causing her to hallucinate  --pt needs psych eval, but unfortunately we do not have inpt psych service, when ready for DC, may benefit from [facility] evaluation  --SW consulted and following  --patient states she must check on her car, which is impounded, before she goes anywhere after she is discharged.   --nurse to call and confirm zanaflex/neurontin doses      # Hypothyroidism- cont home meds      # Tobacco abuse-- counseled, cessation advised      # Labial Cyst--needs to follow up with Gyn      # Tachycardia--increase atenolol     # Vaginal Yeast--s/p 3d of vaginal suppository. Still with external symptoms--try micotin cream    # +AFB, further characterization pending, in isolation, further recommendations per ID/pulmonary     PT/OT      Dispo: difficult secondary to homeless. SS working on placement...    Mauricio Reaves MD  03/16/17  8:20 AM

## 2017-03-17 LAB
M TB DNA SPEC QL NAA+PROBE: NEGATIVE
POTASSIUM BLD-SCNC: 3.6 MMOL/L (ref 3.5–5.5)
SPECIMEN PREPARATION: NORMAL

## 2017-03-17 PROCEDURE — 84132 ASSAY OF SERUM POTASSIUM: CPT | Performed by: HOSPITALIST

## 2017-03-17 PROCEDURE — 94640 AIRWAY INHALATION TREATMENT: CPT

## 2017-03-17 PROCEDURE — 94799 UNLISTED PULMONARY SVC/PX: CPT

## 2017-03-17 PROCEDURE — 99232 SBSQ HOSP IP/OBS MODERATE 35: CPT | Performed by: HOSPITALIST

## 2017-03-17 PROCEDURE — 63710000001 PREDNISONE PER 5 MG: Performed by: FAMILY MEDICINE

## 2017-03-17 PROCEDURE — 25010000002 ONDANSETRON PER 1 MG: Performed by: INTERNAL MEDICINE

## 2017-03-17 RX ORDER — GABAPENTIN 400 MG/1
800 CAPSULE ORAL EVERY 8 HOURS SCHEDULED
Status: DISCONTINUED | OUTPATIENT
Start: 2017-03-17 | End: 2017-03-19

## 2017-03-17 RX ORDER — DIAZEPAM 5 MG/1
5 TABLET ORAL ONCE
Status: COMPLETED | OUTPATIENT
Start: 2017-03-17 | End: 2017-03-17

## 2017-03-17 RX ADMIN — PANTOPRAZOLE SODIUM 40 MG: 40 TABLET, DELAYED RELEASE ORAL at 07:54

## 2017-03-17 RX ADMIN — BUDESONIDE AND FORMOTEROL FUMARATE DIHYDRATE 2 PUFF: 80; 4.5 AEROSOL RESPIRATORY (INHALATION) at 19:30

## 2017-03-17 RX ADMIN — Medication 10 ML: at 16:52

## 2017-03-17 RX ADMIN — MICONAZOLE NITRATE 1 APPLICATION: 2 CREAM TOPICAL at 07:57

## 2017-03-17 RX ADMIN — IPRATROPIUM BROMIDE AND ALBUTEROL SULFATE 3 ML: .5; 3 SOLUTION RESPIRATORY (INHALATION) at 16:43

## 2017-03-17 RX ADMIN — DIAZEPAM 5 MG: 5 TABLET ORAL at 18:38

## 2017-03-17 RX ADMIN — HYDROCODONE BITARTATE AND ACETAMINOPHEN 1 TABLET: 5; 325 TABLET ORAL at 08:40

## 2017-03-17 RX ADMIN — CYCLOBENZAPRINE HYDROCHLORIDE 10 MG: 10 TABLET, FILM COATED ORAL at 15:49

## 2017-03-17 RX ADMIN — BUDESONIDE AND FORMOTEROL FUMARATE DIHYDRATE 2 PUFF: 80; 4.5 AEROSOL RESPIRATORY (INHALATION) at 08:04

## 2017-03-17 RX ADMIN — PREDNISONE 20 MG: 20 TABLET ORAL at 07:53

## 2017-03-17 RX ADMIN — DIAZEPAM 5 MG: 5 TABLET ORAL at 07:51

## 2017-03-17 RX ADMIN — Medication 10 ML: at 13:33

## 2017-03-17 RX ADMIN — MONTELUKAST SODIUM 10 MG: 10 TABLET, FILM COATED ORAL at 21:09

## 2017-03-17 RX ADMIN — MICONAZOLE NITRATE: 2 CREAM TOPICAL at 22:00

## 2017-03-17 RX ADMIN — CETIRIZINE HYDROCHLORIDE 10 MG: 10 TABLET, FILM COATED ORAL at 07:54

## 2017-03-17 RX ADMIN — BENZONATATE 200 MG: 100 CAPSULE, LIQUID FILLED ORAL at 16:51

## 2017-03-17 RX ADMIN — THEOPHYLLINE 600 MG: 300 TABLET, EXTENDED RELEASE ORAL at 07:53

## 2017-03-17 RX ADMIN — DULOXETINE 60 MG: 60 CAPSULE, DELAYED RELEASE ORAL at 07:55

## 2017-03-17 RX ADMIN — THEOPHYLLINE 600 MG: 300 TABLET, EXTENDED RELEASE ORAL at 21:07

## 2017-03-17 RX ADMIN — QUETIAPINE 300 MG: 200 TABLET, EXTENDED RELEASE ORAL at 21:07

## 2017-03-17 RX ADMIN — HYDROCODONE BITARTATE AND ACETAMINOPHEN 1 TABLET: 5; 325 TABLET ORAL at 05:11

## 2017-03-17 RX ADMIN — PRAVASTATIN SODIUM 80 MG: 40 TABLET ORAL at 21:09

## 2017-03-17 RX ADMIN — CYCLOBENZAPRINE HYDROCHLORIDE 10 MG: 10 TABLET, FILM COATED ORAL at 07:54

## 2017-03-17 RX ADMIN — Medication 2 TABLET: at 16:52

## 2017-03-17 RX ADMIN — CYCLOBENZAPRINE HYDROCHLORIDE 10 MG: 10 TABLET, FILM COATED ORAL at 21:09

## 2017-03-17 RX ADMIN — HYDROCODONE BITARTATE AND ACETAMINOPHEN 1 TABLET: 5; 325 TABLET ORAL at 21:09

## 2017-03-17 RX ADMIN — ATENOLOL 50 MG: 50 TABLET ORAL at 07:55

## 2017-03-17 RX ADMIN — LEVOTHYROXINE SODIUM 50 MCG: 25 TABLET ORAL at 05:11

## 2017-03-17 RX ADMIN — FLUTICASONE PROPIONATE 2 SPRAY: 50 SPRAY, METERED NASAL at 07:57

## 2017-03-17 RX ADMIN — DIAZEPAM 5 MG: 5 TABLET ORAL at 21:07

## 2017-03-17 RX ADMIN — GABAPENTIN 800 MG: 400 CAPSULE ORAL at 07:54

## 2017-03-17 RX ADMIN — FUROSEMIDE 40 MG: 40 TABLET ORAL at 07:52

## 2017-03-17 RX ADMIN — POTASSIUM CHLORIDE 40 MEQ: 750 CAPSULE, EXTENDED RELEASE ORAL at 12:15

## 2017-03-17 RX ADMIN — Medication 2 TABLET: at 07:56

## 2017-03-17 RX ADMIN — IPRATROPIUM BROMIDE AND ALBUTEROL SULFATE 3 ML: .5; 3 SOLUTION RESPIRATORY (INHALATION) at 08:04

## 2017-03-17 RX ADMIN — GABAPENTIN 800 MG: 400 CAPSULE ORAL at 21:07

## 2017-03-17 RX ADMIN — HYDROCODONE BITARTATE AND ACETAMINOPHEN 1 TABLET: 5; 325 TABLET ORAL at 12:15

## 2017-03-17 RX ADMIN — HYDROCODONE BITARTATE AND ACETAMINOPHEN 1 TABLET: 5; 325 TABLET ORAL at 15:49

## 2017-03-17 RX ADMIN — IPRATROPIUM BROMIDE AND ALBUTEROL SULFATE 3 ML: .5; 3 SOLUTION RESPIRATORY (INHALATION) at 13:09

## 2017-03-17 RX ADMIN — POTASSIUM CHLORIDE 40 MEQ: 750 CAPSULE, EXTENDED RELEASE ORAL at 07:55

## 2017-03-17 RX ADMIN — TEMAZEPAM 30 MG: 15 CAPSULE ORAL at 21:07

## 2017-03-17 RX ADMIN — IPRATROPIUM BROMIDE AND ALBUTEROL SULFATE 3 ML: .5; 3 SOLUTION RESPIRATORY (INHALATION) at 19:30

## 2017-03-17 RX ADMIN — DULOXETINE 60 MG: 60 CAPSULE, DELAYED RELEASE ORAL at 21:09

## 2017-03-17 RX ADMIN — GABAPENTIN 800 MG: 400 CAPSULE ORAL at 13:33

## 2017-03-17 RX ADMIN — ONDANSETRON 4 MG: 2 INJECTION INTRAMUSCULAR; INTRAVENOUS at 12:15

## 2017-03-17 RX ADMIN — Medication 10 ML: at 07:57

## 2017-03-17 NOTE — PROGRESS NOTES
"      HOSPITALIST DAILY PROGRESS NOTE    Chief Complaint: f/u for soa    Subjective   SUBJECTIVE/OVERNIGHT EVENTS   Difficulty sleeping. L pleurisy better. Slept better however. Notes sweats. Coughing up/mobilizing more sputum. No other issues. Wants to return to her usual dose of neurontin.    Review of Systems:  Gen-no fevers, no chills  CV-no chest pain  Resp-no cough, no dyspnea  GI-no N/V/D, no abd pain    Objective   OBJECTIVE   I have reviewed the vital signs.  Visit Vitals   • BP 98/79 (BP Location: Right arm, Patient Position: Lying)   • Pulse 92   • Temp 96.3 °F (35.7 °C) (Oral)   • Resp 14   • Ht 68\" (172.7 cm)   • Wt 265 lb (120 kg)   • SpO2 91%   • BMI 40.29 kg/m2       Physical Exam:  Gen-no acute distress, comfortable  CV-RRR, S1 S2 normal, no m/r/g  Resp-CTAB, no wheezes  Abd-soft, NT, ND, +BS  Ext-no edema  Neuro-A&Ox3, no focal deficits  Psych-appropriate mood and affect    Results:  I have reviewed the labs, culture data.      Results from last 7 days  Lab Units 03/16/17  0535 03/14/17  0438   WBC 10*3/mm3 8.71 9.63   HEMOGLOBIN g/dL 12.2 12.0   HEMATOCRIT % 38.3 37.3   PLATELETS 10*3/mm3 302 285       Results from last 7 days  Lab Units 03/17/17  0402 03/16/17  0535   SODIUM mmol/L  --  138   POTASSIUM mmol/L 3.6 3.5   CHLORIDE mmol/L  --  96*   TOTAL CO2 mmol/L  --  33.0*   BUN mg/dL  --  14   CREATININE mg/dL  --  0.80   GLUCOSE mg/dL  --  95   CALCIUM mg/dL  --  10.0     Culture Data:  Cultures:    RESPIRATORY CULTURE   Date Value Ref Range Status   03/07/2017 Scant growth (1+) Pseudomonas aeruginosa (A)  Final   03/07/2017 Light growth (2+) Staphylococcus aureus, MRSA (C)  Final     Comment:       Methicillin resistant Staphylococcus aureus, Patient may be an isolation risk.  This isolate does not demonstrate inducible clindamycin resistance in vitro.     03/07/2017 Light growth (2+) Yeast isolated (A)  Final     I have reviewed the medications.    Assessment/Plan   ASSESSMENT/PLAN  "   52-year-old  female with history of noncompliance. She smokes 2-3 packs of cigarettes per day. She reently went off her psych (Bipolar) meds. She presented to [facility] ED with increased shortness of breath and cough and fever. Reports recent hospitalization to [facility] about a week with no improvement      # Sepsis  --met criteria due to fever, hypoxia, tachycardia, leukocytosis and cultures positive for MRSA/pseudomona, ID consulted, following, on abx as below      # A/C hypoxic resp failure-improving  --multifactorial COPD vs Pneumonia NASIM( vs neoplasm less likely)  --abx per ID, completed treatment for MRSA/PSA  --resp culture + MRSA and Pseudomona    # Bipolar disorder/homeless  --suspect schizophrenia also given paranoia and hallucination  --pt recently stopped psych meds a week ago because they were causing her to hallucinate  --pt needs psych eval, but unfortunately we do not have inpt psych service, when ready for DC, may benefit from [facility] evaluation  --SW consulted and following  --patient states she must check on her car, which is impounded, before she goes anywhere after she is discharged.   --nurse to call and confirm zanaflex/neurontin doses      # Hypothyroidism- cont home meds      # Tobacco abuse-- counseled, cessation advised      # Labial Cyst--needs to follow up with Gyn      # Tachycardia--increased atenolol, better     # Vaginal Yeast--better per patient    # +AFB, further characterization pending, in isolation, further recommendations per ID/pulmonary     PT/OT      Dispo: difficult secondary to homeless. SS working on placement...    Mauricio Reaves MD  03/17/17  10:17 AM

## 2017-03-17 NOTE — PLAN OF CARE
Problem: Patient Care Overview (Adult)  Goal: Plan of Care Review    03/16/17 1637 03/17/17 0000   Coping/Psychosocial Response Interventions   Plan Of Care Reviewed With --  patient   Patient Care Overview   Progress progress toward functional goals is gradual --        Goal: Adult Individualization and Mutuality  Outcome: Ongoing (interventions implemented as appropriate)    03/03/17 0650 03/15/17 1736   Individualization   Patient Specific Preferences --  red jello, gatorade, chocolate ice cream for snacks   Patient Specific Goals to have a place to go when she leaves here --    Patient Specific Interventions --  airborne precautions   Mutuality/Individual Preferences   What Anxieties, Fears or Concerns Do You Have About Your Health or Care? concerned about medicine and where she will go when she leaves here --    What Questions Do You Have About Your Health or Care? none at this time --    What Information Would Help Us Give You More Personalized Care? none at this time --        Goal: Discharge Needs Assessment  Outcome: Ongoing (interventions implemented as appropriate)    03/01/17 1548 03/03/17 0650 03/09/17 1743   Discharge Needs Assessment   Concerns To Be Addressed --  homelessness;financial/insurance concerns;mental health concerns;transportation;medication concerns --    Readmission Within The Last 30 Days --  --  no previous admission in last 30 days   Equipment Needed After Discharge --  --  --    Current Discharge Risk homeless --  --    Discharge Disposition --  still a patient --    Current Health   Anticipated Changes Related to Illness --  --  none   Self-Care   Equipment Currently Used at Home --  --  --    Living Environment   Transportation Available --  --  car;family or friend will provide     03/11/17 1845 03/15/17 0904   Discharge Needs Assessment   Concerns To Be Addressed --  --    Readmission Within The Last 30 Days --  --    Equipment Needed After Discharge none --    Current Discharge  Risk --  --    Discharge Disposition --  --    Current Health   Anticipated Changes Related to Illness --  --    Self-Care   Equipment Currently Used at Home --  bath bench   Living Environment   Transportation Available --  --          Problem: Pneumonia (Adult)  Goal: Signs and Symptoms of Listed Potential Problems Will be Absent or Manageable (Pneumonia)  Outcome: Ongoing (interventions implemented as appropriate)    03/16/17 1637   Pneumonia   Problems Assessed (Pneumonia) all   Problems Present (Pneumonia) none         Problem: COPD, Chronic Bronchitis/Emphysema (Adult)  Goal: Signs and Symptoms of Listed Potential Problems Will be Absent or Manageable (COPD, Chronic Bronchitis/Emphysema)  Outcome: Ongoing (interventions implemented as appropriate)    03/16/17 1637   COPD, Chronic Bronchitis/Emphysema   Problems Assessed (COPD, Chronic Bronchitis/Emphysema) all   Problems Present (COPD, Chronic Bronchitis/Emphysema) depression         Problem: Wound, Traumatic, Nonburn (Adult)  Goal: Signs and Symptoms of Listed Potential Problems Will be Absent or Manageable (Wound, Traumatic, Nonburn)  Outcome: Ongoing (interventions implemented as appropriate)    03/15/17 1736   Wound, Traumatic, Nonburn   Problems Assessed (Wound) all   Problems Present (Wound) none         Problem: Pain, Acute (Adult)  Goal: Identify Related Risk Factors and Signs and Symptoms  Outcome: Ongoing (interventions implemented as appropriate)    03/15/17 0355 03/15/17 1736   Pain, Acute   Related Risk Factors (Acute Pain) --  disease process;persistent pain;fear   Signs and Symptoms (Acute Pain) fear of reinjury;facial mask of pain/grimace;verbalization of pain descriptors --        Goal: Acceptable Pain Control/Comfort Level  Outcome: Ongoing (interventions implemented as appropriate)    03/16/17 1637   Pain, Acute (Adult)   Acceptable Pain Control/Comfort Level making progress toward outcome         Problem: Anxiety (Adult)  Goal: Identify  Related Risk Factors and Signs and Symptoms  Outcome: Ongoing (interventions implemented as appropriate)    03/15/17 0406   Anxiety   Related Risk Factors (Anxiety) cognitive status;prognosis/treatment plan;procedure/treatment   Signs and Symptoms (Anxiety) concentration decreased;dependence increased;nervousness/tension/restlessness;sleep disturbance       Goal: Reduction/Resolution  Outcome: Ongoing (interventions implemented as appropriate)    03/16/17 1637   Anxiety (Adult)   Reduction/Resolution making progress toward outcome         Problem: Bronchoscopy (Adult)  Goal: Signs and Symptoms of Listed Potential Problems Will be Absent or Manageable (Bronchoscopy)  Outcome: Ongoing (interventions implemented as appropriate)    03/15/17 1736   Bronchoscopy   Problems Assessed (Bronchoscopy) all   Problems Present (Bronchoscopy) none

## 2017-03-17 NOTE — PROGRESS NOTES
"[facility] Progress Note    Date of Admission: 3/1/2017      Antibiotics:  None    CC:   Chief Complaint   Patient presents with   • Shortness of Breath       S: No new complaints. No further O2 use. No n/v/d. No f/c/s. Still with cough.   O:  Visit Vitals   • BP 98/79 (BP Location: Right arm, Patient Position: Lying)   • Pulse 92   • Temp 96.3 °F (35.7 °C) (Oral)   • Resp 14   • Ht 68\" (172.7 cm)   • Wt 265 lb (120 kg)   • SpO2 91%   • BMI 40.29 kg/m2     Temp (24hrs), Av.3 °F (36.3 °C), Min:96.3 °F (35.7 °C), Max:98.1 °F (36.7 °C)      PE:   GENERAL: Chronically ill appearing, unkept appearance. Overweight.   HEENT: Normocephalic, atraumatic. PERRL. EOMI. No conjunctival injection. Moist MM.    NECK: Supple without nuchal rigidity  LYMPH: No cervical, axillary or inguinal lymphadenopathy. No neck masses  HEART: Tachy; No murmur, rubs, gallops.   LUNGS: Coarse breath sounds and Diminished throughout.  Diminished wheezing. Some improvement and no use of supplemental O2  ABDOMEN: Soft, nontender, nondistended. Positive bowel sounds. No rebound or guarding.   EXT: No cyanosis, clubbing or edema  MSK: FROM without joint effusions noted   SKIN: No rash or bleeding. PIV in place  NEURO: Oriented to PPT. No focal deficits.       Laboratory Data      Results from last 7 days  Lab Units 17  0535 17  0438   WBC 10*3/mm3 8.71 9.63   HEMOGLOBIN g/dL 12.2 12.0   HEMATOCRIT % 38.3 37.3   PLATELETS 10*3/mm3 302 285       Results from last 7 days  Lab Units 17  0402 17  0535   SODIUM mmol/L  --  138   POTASSIUM mmol/L 3.6 3.5   CHLORIDE mmol/L  --  96*   TOTAL CO2 mmol/L  --  33.0*   BUN mg/dL  --  14   CREATININE mg/dL  --  0.80   GLUCOSE mg/dL  --  95   CALCIUM mg/dL  --  10.0               Results from last 7 days  Lab Units 17  0438   CRP mg/dL 31.90*       Estimated Creatinine Clearance: 110.8 mL/min (by C-G formula based on Cr of 0.8).  Bronch wash 3/15:   Negative for malignancy  Reactive " bronchial cells, pulmonary macrophages and acute inflammation.  Plant material present compatible with food particle.     Microbiology:  Sputum culture on 3/2: PSA and MRSA  Sputum culture on 3/3: MRSA   AFB x 3 ordered  are negative  Sputum culture 3/7/17: Non-LFR, Staph aureus, Yeast  Blood culture negative    Crypto negative  Strep pneumo and Legionella Uag negative  Blasto Negative   Quant TB indeterminate    Radiology:  Imaging Results (last 24 hours)     ** No results found for the last 24 hours. **          PROBLEM LIST:   NASIM pneumonia with MRSA and PSA   Leukocytosis  Concern for potential post-obstructive pneumonia with hx of cough x 6 weeks and 6x6cm nature of NASIM pna (3 PPD history)  AFB x 2 and hopeful for non-tubercular mycobacterial infection   AFB on broth culture from BAL cultures identified.   Foreign Body in LLL c/w aspiration  ?Chronic Dysphagia?- refusing w/u   Tobacco abuse  Chronic skin lesions  HTN  HLD  Hypothyroidism  Psych disorder     ASSESSMENT:  52 -year-old female with multiple medical conditions including hypertension, hyperlipidemia, hypothyroidism in the setting of psychiatric disease with OCD and bipolar disorder who presents with worsening cough, shortness of breath, sputum production of brown coloration of the past 5-6 weeks however worsening over the last 1 week. She reports associated chills and sweats however hasn't checked her temperature. she came to the ED for evaluation with leukocytosis initially noted, chest CT angiogram with a left upper lobe with a 6 x 6 cm opacity concerning for infectious versus neoplastic etiology. Sputum production ×3 has been collected with pseudomonas and MRSA.     Quant TB negative. Crypto Negative. Histo negative. Blasto negative. She has completed 2 weeks of therapy at this time with some improvement in respiratory effort and now s/p bronchoscopy with cytology negative for malignancy. Off of antibiotics. AFB x 2 sputum positive and BAL wash  fluid with positive AFB in broth. ID pending from state lab.     PLAN:   No antibiotics for now and f/u culture results from path completed 2 weeks iv abx.  AFB x 2 sputum positive and sent to state lab for ID.   Hopefully will be non tuberculosis mycobacterium but still will be difficult to treat and await on ID of mycobacterial species  Monitor off antibiotics    I will see again on Monday please call if any questions over the weekend    Dr. Robert Self saw the patient, performed the physical exam, reviewed the laboratory data and guided with the formulation of the above problem list, assessment and treatment plan.        Robert Self MD  3/17/2017

## 2017-03-17 NOTE — PLAN OF CARE
Problem: Pain, Acute (Adult)  Goal: Acceptable Pain Control/Comfort Level  Outcome: Unable to achieve outcome(s) by discharge Date Met:  03/17/17

## 2017-03-18 LAB — POTASSIUM BLD-SCNC: 3.7 MMOL/L (ref 3.5–5.5)

## 2017-03-18 PROCEDURE — 94640 AIRWAY INHALATION TREATMENT: CPT

## 2017-03-18 PROCEDURE — 63710000001 PREDNISONE PER 5 MG: Performed by: FAMILY MEDICINE

## 2017-03-18 PROCEDURE — 99232 SBSQ HOSP IP/OBS MODERATE 35: CPT | Performed by: NURSE PRACTITIONER

## 2017-03-18 PROCEDURE — 25010000002 ENOXAPARIN PER 10 MG: Performed by: INTERNAL MEDICINE

## 2017-03-18 PROCEDURE — 94799 UNLISTED PULMONARY SVC/PX: CPT

## 2017-03-18 PROCEDURE — 84132 ASSAY OF SERUM POTASSIUM: CPT | Performed by: INTERNAL MEDICINE

## 2017-03-18 RX ORDER — DIAZEPAM 5 MG/1
5 TABLET ORAL EVERY 8 HOURS PRN
Status: DISCONTINUED | OUTPATIENT
Start: 2017-03-18 | End: 2017-03-19

## 2017-03-18 RX ADMIN — BENZONATATE 200 MG: 100 CAPSULE, LIQUID FILLED ORAL at 03:50

## 2017-03-18 RX ADMIN — CYCLOBENZAPRINE HYDROCHLORIDE 10 MG: 10 TABLET, FILM COATED ORAL at 07:51

## 2017-03-18 RX ADMIN — MICONAZOLE NITRATE: 2 CREAM TOPICAL at 21:24

## 2017-03-18 RX ADMIN — NICOTINE 1 PATCH: 21 PATCH, EXTENDED RELEASE TRANSDERMAL at 09:16

## 2017-03-18 RX ADMIN — CETIRIZINE HYDROCHLORIDE 10 MG: 10 TABLET, FILM COATED ORAL at 08:00

## 2017-03-18 RX ADMIN — IPRATROPIUM BROMIDE AND ALBUTEROL SULFATE 3 ML: .5; 3 SOLUTION RESPIRATORY (INHALATION) at 15:50

## 2017-03-18 RX ADMIN — HYDROCODONE BITARTATE AND ACETAMINOPHEN 1 TABLET: 5; 325 TABLET ORAL at 14:57

## 2017-03-18 RX ADMIN — PANTOPRAZOLE SODIUM 40 MG: 40 TABLET, DELAYED RELEASE ORAL at 07:51

## 2017-03-18 RX ADMIN — GABAPENTIN 800 MG: 400 CAPSULE ORAL at 13:43

## 2017-03-18 RX ADMIN — DIAZEPAM 5 MG: 5 TABLET ORAL at 07:48

## 2017-03-18 RX ADMIN — DULOXETINE 60 MG: 60 CAPSULE, DELAYED RELEASE ORAL at 21:24

## 2017-03-18 RX ADMIN — HYDROCODONE BITARTATE AND ACETAMINOPHEN 1 TABLET: 5; 325 TABLET ORAL at 11:13

## 2017-03-18 RX ADMIN — DULOXETINE 60 MG: 60 CAPSULE, DELAYED RELEASE ORAL at 07:52

## 2017-03-18 RX ADMIN — ATENOLOL 50 MG: 50 TABLET ORAL at 07:51

## 2017-03-18 RX ADMIN — HYDROCODONE BITARTATE AND ACETAMINOPHEN 1 TABLET: 5; 325 TABLET ORAL at 22:57

## 2017-03-18 RX ADMIN — GABAPENTIN 800 MG: 400 CAPSULE ORAL at 21:23

## 2017-03-18 RX ADMIN — CYCLOBENZAPRINE HYDROCHLORIDE 10 MG: 10 TABLET, FILM COATED ORAL at 21:24

## 2017-03-18 RX ADMIN — FUROSEMIDE 40 MG: 40 TABLET ORAL at 07:50

## 2017-03-18 RX ADMIN — HYDROCODONE BITARTATE AND ACETAMINOPHEN 1 TABLET: 5; 325 TABLET ORAL at 07:48

## 2017-03-18 RX ADMIN — MICONAZOLE NITRATE: 2 CREAM TOPICAL at 07:56

## 2017-03-18 RX ADMIN — FLUTICASONE PROPIONATE 2 SPRAY: 50 SPRAY, METERED NASAL at 07:52

## 2017-03-18 RX ADMIN — HYDROCODONE BITARTATE AND ACETAMINOPHEN 1 TABLET: 5; 325 TABLET ORAL at 18:38

## 2017-03-18 RX ADMIN — DIAZEPAM 5 MG: 5 TABLET ORAL at 15:00

## 2017-03-18 RX ADMIN — Medication 10 ML: at 21:23

## 2017-03-18 RX ADMIN — DIAZEPAM 5 MG: 5 TABLET ORAL at 22:57

## 2017-03-18 RX ADMIN — MONTELUKAST SODIUM 10 MG: 10 TABLET, FILM COATED ORAL at 21:24

## 2017-03-18 RX ADMIN — THEOPHYLLINE 600 MG: 300 TABLET, EXTENDED RELEASE ORAL at 07:52

## 2017-03-18 RX ADMIN — ENOXAPARIN SODIUM 40 MG: 40 INJECTION SUBCUTANEOUS at 07:50

## 2017-03-18 RX ADMIN — GABAPENTIN 800 MG: 400 CAPSULE ORAL at 05:07

## 2017-03-18 RX ADMIN — IPRATROPIUM BROMIDE AND ALBUTEROL SULFATE 3 ML: .5; 3 SOLUTION RESPIRATORY (INHALATION) at 12:42

## 2017-03-18 RX ADMIN — THEOPHYLLINE 600 MG: 300 TABLET, EXTENDED RELEASE ORAL at 21:23

## 2017-03-18 RX ADMIN — PREDNISONE 20 MG: 20 TABLET ORAL at 07:48

## 2017-03-18 RX ADMIN — Medication 2 TABLET: at 07:50

## 2017-03-18 RX ADMIN — HYDROCODONE BITARTATE AND ACETAMINOPHEN 1 TABLET: 5; 325 TABLET ORAL at 02:20

## 2017-03-18 RX ADMIN — Medication 10 ML: at 11:13

## 2017-03-18 RX ADMIN — IPRATROPIUM BROMIDE AND ALBUTEROL SULFATE 3 ML: .5; 3 SOLUTION RESPIRATORY (INHALATION) at 09:20

## 2017-03-18 RX ADMIN — PRAVASTATIN SODIUM 80 MG: 40 TABLET ORAL at 21:23

## 2017-03-18 RX ADMIN — BUDESONIDE AND FORMOTEROL FUMARATE DIHYDRATE 2 PUFF: 80; 4.5 AEROSOL RESPIRATORY (INHALATION) at 09:20

## 2017-03-18 RX ADMIN — Medication 10 ML: at 17:57

## 2017-03-18 RX ADMIN — CYCLOBENZAPRINE HYDROCHLORIDE 10 MG: 10 TABLET, FILM COATED ORAL at 15:00

## 2017-03-18 RX ADMIN — LEVOTHYROXINE SODIUM 50 MCG: 25 TABLET ORAL at 05:07

## 2017-03-18 RX ADMIN — QUETIAPINE 300 MG: 200 TABLET, EXTENDED RELEASE ORAL at 21:23

## 2017-03-18 RX ADMIN — Medication 2 TABLET: at 17:57

## 2017-03-18 NOTE — PROGRESS NOTES
"      HOSPITALIST DAILY PROGRESS NOTE    Chief Complaint: f/u for soa    Subjective   SUBJECTIVE/OVERNIGHT EVENTS   Patient up walking in the room.  States that she has been feeling very anxious and breaks out in sweats occasionally.  She is tired of being cooped up in her room and wants to walk in the hallway.  She is requesting that her valium be increased to every 6 hours and that her gabapentin be increased to 4 times per day.      Review of Systems:  Gen-no fevers, no chills  CV-no chest pain  Resp-no cough, no dyspnea  GI-no N/V/D, no abd pain    Objective   OBJECTIVE   I have reviewed the vital signs.  Visit Vitals   • /74 (BP Location: Right arm, Patient Position: Sitting)   • Pulse 100   • Temp 97.9 °F (36.6 °C) (Oral)   • Resp 16   • Ht 68\" (172.7 cm)   • Wt 265 lb (120 kg)   • SpO2 92%   • BMI 40.29 kg/m2       Physical Exam:    General: Up on side of bed, NAD  HEENT: Normocephalic, mucous membranes moist, pupils equal  Neck: Supple, trachea midline  Cardiovascular: Regular rate and rhythm, S1-S2, no murmur  Respiratory: coarse breath sounds throughout, even unlabored breathing on room air  Abdomen: Soft, nontender, nondistended, bowel sounds +   Skin: Clean, dry, intact, no lesions or sores  Extremities: CHOU, Symmetrical, pulses +, no edema noted  Neuro: Alert, oriented ×4, appropriate and cooperative,  and pedal pushes equal, sensation intact      Results:  I have reviewed the labs, culture data.      Results from last 7 days  Lab Units 03/16/17  0535 03/14/17  0438   WBC 10*3/mm3 8.71 9.63   HEMOGLOBIN g/dL 12.2 12.0   HEMATOCRIT % 38.3 37.3   PLATELETS 10*3/mm3 302 285       Results from last 7 days  Lab Units 03/18/17  0608  03/16/17  0535   SODIUM mmol/L  --   --  138   POTASSIUM mmol/L 3.7  < > 3.5   CHLORIDE mmol/L  --   --  96*   TOTAL CO2 mmol/L  --   --  33.0*   BUN mg/dL  --   --  14   CREATININE mg/dL  --   --  0.80   GLUCOSE mg/dL  --   --  95   CALCIUM mg/dL  --   --  10.0   < > " = values in this interval not displayed.  Culture Data:  Cultures:    RESPIRATORY CULTURE   Date Value Ref Range Status   03/07/2017 Scant growth (1+) Pseudomonas aeruginosa (A)  Final   03/07/2017 Light growth (2+) Staphylococcus aureus, MRSA (C)  Final     Comment:       Methicillin resistant Staphylococcus aureus, Patient may be an isolation risk.  This isolate does not demonstrate inducible clindamycin resistance in vitro.     03/07/2017 Light growth (2+) Yeast isolated (A)  Final     : 3/3/2017 Department: [address] Released By/Authorizing: Effie Cardenas II, DO (auto-released)          AFB Culture   Order: [id number]   Status:  Preliminary result   Visible to patient:  No (Not Released)   Specimen Information: Oropharynx; Sputum        Culture   Scant growth (1+) Acid Fast Bacilli isolated (C)   Sent to [facility] laboratory for confirmation.        Stain   No acid fast bacilli seen on concentrated smear      Resulting Agency: [facility]      Specimen Collected: 03/03/17  8:00 AM Last Resulted: 03/15/17  3:15 PM                      I have reviewed the medications.    Assessment/Plan   ASSESSMENT/PLAN    52-year-old  female with history of noncompliance. She smokes 2-3 packs of cigarettes per day. She reently went off her psych (Bipolar) meds. She presented to [facility] ED with increased shortness of breath and cough and fever. Reports recent hospitalization to [facility] about a week with no improvement      # Sepsis  --met criteria due to fever, hypoxia, tachycardia, leukocytosis and cultures positive for MRSA/pseudomona, ID consulted, following, currently monitoring off of antibiotics per ID last note      # A/C hypoxic resp failure-improving  --multifactorial COPD vs Pneumonia NASIM( vs neoplasm less likely)  --monitor off of abx per ID, completed treatment for MRSA/PSA  --resp culture + MRSA and Pseudomona    # Bipolar disorder/homeless  --suspect schizophrenia also given  paranoia and hallucination  --pt recently stopped psych meds because they were causing her to hallucinate  --pt needs psych eval, but unfortunately we do not have inpt psych service, when ready for DC, may benefit from [facility] evaluation  --SW consulted and following  --patient states she must check on her car, which is impounded, before she goes anywhere after she is discharged.   --nurse to call and confirm zanaflex/neurontin doses  --complex social case  --will increase valium to Q 8 hrs today.       # Hypothyroidism- cont home meds      # Tobacco abuse-- counseled, cessation advised      # Labial Cyst--needs to follow up with Gyn      # Tachycardia--improved     # Vaginal Yeast--better per patient    # +AFB, further characterization pending, in isolation, further recommendations per ID/pulmonary  -awaiting confirmation/ID from state laboratory.      PT/OT      Dispo: difficult secondary to homeless. Patient may go home with her brother vs. Back to her van which is currently impounded.  She can't afford to pay the fee to get it out.      Evelyn Beck, APRN  03/18/17  2:17 PM

## 2017-03-18 NOTE — PLAN OF CARE
Problem: Patient Care Overview (Adult)  Goal: Plan of Care Review  Outcome: Ongoing (interventions implemented as appropriate)    03/16/17 1637 03/18/17 0200   Coping/Psychosocial Response Interventions   Plan Of Care Reviewed With --  patient   Patient Care Overview   Progress progress toward functional goals is gradual --        Goal: Adult Individualization and Mutuality  Outcome: Ongoing (interventions implemented as appropriate)    03/03/17 0650 03/15/17 1736   Individualization   Patient Specific Preferences --  red jello, gatorade, chocolate ice cream for snacks   Patient Specific Goals to have a place to go when she leaves here --    Patient Specific Interventions --  airborne precautions   Mutuality/Individual Preferences   What Anxieties, Fears or Concerns Do You Have About Your Health or Care? concerned about medicine and where she will go when she leaves here --    What Questions Do You Have About Your Health or Care? none at this time --    What Information Would Help Us Give You More Personalized Care? none at this time --        Goal: Discharge Needs Assessment  Outcome: Ongoing (interventions implemented as appropriate)    03/01/17 1548 03/03/17 0650 03/09/17 1743   Discharge Needs Assessment   Concerns To Be Addressed --  homelessness;financial/insurance concerns;mental health concerns;transportation;medication concerns --    Readmission Within The Last 30 Days --  --  no previous admission in last 30 days   Equipment Needed After Discharge --  --  --    Current Discharge Risk homeless --  --    Discharge Disposition --  still a patient --    Current Health   Anticipated Changes Related to Illness --  --  none   Self-Care   Equipment Currently Used at Home --  --  --    Living Environment   Transportation Available --  --  car;family or friend will provide     03/11/17 1845 03/15/17 0904   Discharge Needs Assessment   Concerns To Be Addressed --  --    Readmission Within The Last 30 Days --  --     Equipment Needed After Discharge none --    Current Discharge Risk --  --    Discharge Disposition --  --    Current Health   Anticipated Changes Related to Illness --  --    Self-Care   Equipment Currently Used at Home --  bath bench   Living Environment   Transportation Available --  --          Problem: Pneumonia (Adult)  Goal: Signs and Symptoms of Listed Potential Problems Will be Absent or Manageable (Pneumonia)  Outcome: Ongoing (interventions implemented as appropriate)    03/16/17 1637   Pneumonia   Problems Assessed (Pneumonia) all   Problems Present (Pneumonia) none         Problem: COPD, Chronic Bronchitis/Emphysema (Adult)  Goal: Signs and Symptoms of Listed Potential Problems Will be Absent or Manageable (COPD, Chronic Bronchitis/Emphysema)  Outcome: Ongoing (interventions implemented as appropriate)    03/17/17 1235   COPD, Chronic Bronchitis/Emphysema   Problems Assessed (COPD, Chronic Bronchitis/Emphysema) all   Problems Present (COPD, Chronic Bronchitis/Emphysema) depression;hypoxia/hypoxemia;skin breakdown;undernutrition         Problem: Wound, Traumatic, Nonburn (Adult)  Goal: Signs and Symptoms of Listed Potential Problems Will be Absent or Manageable (Wound, Traumatic, Nonburn)  Outcome: Ongoing (interventions implemented as appropriate)    03/15/17 1736   Wound, Traumatic, Nonburn   Problems Assessed (Wound) all   Problems Present (Wound) none         Problem: Pain, Acute (Adult)  Goal: Identify Related Risk Factors and Signs and Symptoms  Outcome: Ongoing (interventions implemented as appropriate)    03/15/17 0355 03/15/17 1736   Pain, Acute   Related Risk Factors (Acute Pain) --  disease process;persistent pain;fear   Signs and Symptoms (Acute Pain) fear of reinjury;facial mask of pain/grimace;verbalization of pain descriptors --          Problem: Anxiety (Adult)  Goal: Identify Related Risk Factors and Signs and Symptoms  Outcome: Ongoing (interventions implemented as appropriate)     03/15/17 0406   Anxiety   Related Risk Factors (Anxiety) cognitive status;prognosis/treatment plan;procedure/treatment   Signs and Symptoms (Anxiety) concentration decreased;dependence increased;nervousness/tension/restlessness;sleep disturbance       Goal: Reduction/Resolution  Outcome: Ongoing (interventions implemented as appropriate)    03/16/17 1637   Anxiety (Adult)   Reduction/Resolution making progress toward outcome         Problem: Bronchoscopy (Adult)  Goal: Signs and Symptoms of Listed Potential Problems Will be Absent or Manageable (Bronchoscopy)  Outcome: Ongoing (interventions implemented as appropriate)    03/15/17 1736   Bronchoscopy   Problems Assessed (Bronchoscopy) all   Problems Present (Bronchoscopy) none

## 2017-03-19 LAB
BACTERIA SPEC RESP CULT: ABNORMAL
BACTERIA SPEC RESP CULT: ABNORMAL
GRAM STN SPEC: ABNORMAL

## 2017-03-19 PROCEDURE — 94640 AIRWAY INHALATION TREATMENT: CPT

## 2017-03-19 PROCEDURE — 97110 THERAPEUTIC EXERCISES: CPT

## 2017-03-19 PROCEDURE — 94799 UNLISTED PULMONARY SVC/PX: CPT

## 2017-03-19 PROCEDURE — 63710000001 PREDNISONE PER 5 MG: Performed by: FAMILY MEDICINE

## 2017-03-19 PROCEDURE — 25010000002 ENOXAPARIN PER 10 MG: Performed by: INTERNAL MEDICINE

## 2017-03-19 PROCEDURE — 99232 SBSQ HOSP IP/OBS MODERATE 35: CPT | Performed by: HOSPITALIST

## 2017-03-19 RX ORDER — GABAPENTIN 400 MG/1
800 CAPSULE ORAL EVERY 6 HOURS
Status: DISCONTINUED | OUTPATIENT
Start: 2017-03-19 | End: 2017-03-22 | Stop reason: SDUPTHER

## 2017-03-19 RX ORDER — DIAZEPAM 5 MG/1
5 TABLET ORAL EVERY 6 HOURS PRN
Status: DISCONTINUED | OUTPATIENT
Start: 2017-03-19 | End: 2017-03-23

## 2017-03-19 RX ADMIN — PRAVASTATIN SODIUM 80 MG: 40 TABLET ORAL at 21:57

## 2017-03-19 RX ADMIN — IPRATROPIUM BROMIDE AND ALBUTEROL SULFATE 3 ML: .5; 3 SOLUTION RESPIRATORY (INHALATION) at 15:55

## 2017-03-19 RX ADMIN — CYCLOBENZAPRINE HYDROCHLORIDE 10 MG: 10 TABLET, FILM COATED ORAL at 22:19

## 2017-03-19 RX ADMIN — Medication 10 ML: at 21:56

## 2017-03-19 RX ADMIN — IPRATROPIUM BROMIDE AND ALBUTEROL SULFATE 3 ML: .5; 3 SOLUTION RESPIRATORY (INHALATION) at 20:44

## 2017-03-19 RX ADMIN — PANTOPRAZOLE SODIUM 40 MG: 40 TABLET, DELAYED RELEASE ORAL at 09:08

## 2017-03-19 RX ADMIN — DIAZEPAM 5 MG: 5 TABLET ORAL at 13:02

## 2017-03-19 RX ADMIN — Medication 2 TABLET: at 09:08

## 2017-03-19 RX ADMIN — CYCLOBENZAPRINE HYDROCHLORIDE 10 MG: 10 TABLET, FILM COATED ORAL at 17:01

## 2017-03-19 RX ADMIN — LEVOTHYROXINE SODIUM 50 MCG: 25 TABLET ORAL at 06:53

## 2017-03-19 RX ADMIN — DULOXETINE 60 MG: 60 CAPSULE, DELAYED RELEASE ORAL at 21:57

## 2017-03-19 RX ADMIN — Medication 10 ML: at 09:07

## 2017-03-19 RX ADMIN — HYDROCODONE BITARTATE AND ACETAMINOPHEN 1 TABLET: 5; 325 TABLET ORAL at 11:59

## 2017-03-19 RX ADMIN — HYDROCODONE BITARTATE AND ACETAMINOPHEN 1 TABLET: 5; 325 TABLET ORAL at 17:01

## 2017-03-19 RX ADMIN — HYDROCODONE BITARTATE AND ACETAMINOPHEN 1 TABLET: 5; 325 TABLET ORAL at 21:57

## 2017-03-19 RX ADMIN — QUETIAPINE 300 MG: 200 TABLET, EXTENDED RELEASE ORAL at 21:57

## 2017-03-19 RX ADMIN — Medication 10 ML: at 17:01

## 2017-03-19 RX ADMIN — DULOXETINE 60 MG: 60 CAPSULE, DELAYED RELEASE ORAL at 09:08

## 2017-03-19 RX ADMIN — THEOPHYLLINE 600 MG: 300 TABLET, EXTENDED RELEASE ORAL at 21:56

## 2017-03-19 RX ADMIN — ENOXAPARIN SODIUM 40 MG: 40 INJECTION SUBCUTANEOUS at 09:07

## 2017-03-19 RX ADMIN — HYDROCODONE BITARTATE AND ACETAMINOPHEN 1 TABLET: 5; 325 TABLET ORAL at 06:53

## 2017-03-19 RX ADMIN — BUDESONIDE AND FORMOTEROL FUMARATE DIHYDRATE 2 PUFF: 80; 4.5 AEROSOL RESPIRATORY (INHALATION) at 08:24

## 2017-03-19 RX ADMIN — GABAPENTIN 800 MG: 400 CAPSULE ORAL at 06:53

## 2017-03-19 RX ADMIN — GABAPENTIN 800 MG: 400 CAPSULE ORAL at 12:00

## 2017-03-19 RX ADMIN — ONDANSETRON 4 MG: 4 TABLET, FILM COATED ORAL at 13:02

## 2017-03-19 RX ADMIN — PREDNISONE 20 MG: 20 TABLET ORAL at 09:08

## 2017-03-19 RX ADMIN — ATENOLOL 50 MG: 50 TABLET ORAL at 09:07

## 2017-03-19 RX ADMIN — CYCLOBENZAPRINE HYDROCHLORIDE 10 MG: 10 TABLET, FILM COATED ORAL at 09:08

## 2017-03-19 RX ADMIN — MICONAZOLE NITRATE: 2 CREAM TOPICAL at 09:09

## 2017-03-19 RX ADMIN — ONDANSETRON 4 MG: 4 TABLET, FILM COATED ORAL at 23:35

## 2017-03-19 RX ADMIN — Medication 2 TABLET: at 17:01

## 2017-03-19 RX ADMIN — FUROSEMIDE 40 MG: 40 TABLET ORAL at 09:08

## 2017-03-19 RX ADMIN — MONTELUKAST SODIUM 10 MG: 10 TABLET, FILM COATED ORAL at 21:56

## 2017-03-19 RX ADMIN — NICOTINE 1 PATCH: 21 PATCH, EXTENDED RELEASE TRANSDERMAL at 09:09

## 2017-03-19 RX ADMIN — MICONAZOLE NITRATE: 2 CREAM TOPICAL at 22:17

## 2017-03-19 RX ADMIN — CETIRIZINE HYDROCHLORIDE 10 MG: 10 TABLET, FILM COATED ORAL at 09:08

## 2017-03-19 RX ADMIN — DIAZEPAM 5 MG: 5 TABLET ORAL at 18:54

## 2017-03-19 RX ADMIN — IPRATROPIUM BROMIDE AND ALBUTEROL SULFATE 3 ML: .5; 3 SOLUTION RESPIRATORY (INHALATION) at 08:17

## 2017-03-19 RX ADMIN — THEOPHYLLINE 600 MG: 300 TABLET, EXTENDED RELEASE ORAL at 09:07

## 2017-03-19 RX ADMIN — DIAZEPAM 5 MG: 5 TABLET ORAL at 06:53

## 2017-03-19 RX ADMIN — GABAPENTIN 800 MG: 400 CAPSULE ORAL at 18:54

## 2017-03-19 RX ADMIN — Medication 10 ML: at 11:59

## 2017-03-19 RX ADMIN — BUDESONIDE AND FORMOTEROL FUMARATE DIHYDRATE 2 PUFF: 80; 4.5 AEROSOL RESPIRATORY (INHALATION) at 20:43

## 2017-03-19 NOTE — PLAN OF CARE
Problem: Patient Care Overview (Adult)  Goal: Adult Individualization and Mutuality  Outcome: Ongoing (interventions implemented as appropriate)    03/03/17 0650 03/15/17 1736   Individualization   Patient Specific Preferences --  red jello, gatorade, chocolate ice cream for snacks   Patient Specific Goals to have a place to go when she leaves here --    Patient Specific Interventions --  airborne precautions   Mutuality/Individual Preferences   What Anxieties, Fears or Concerns Do You Have About Your Health or Care? concerned about medicine and where she will go when she leaves here --    What Questions Do You Have About Your Health or Care? none at this time --    What Information Would Help Us Give You More Personalized Care? none at this time --        Goal: Discharge Needs Assessment  Outcome: Ongoing (interventions implemented as appropriate)    03/03/17 0650   Discharge Needs Assessment   Concerns To Be Addressed homelessness;financial/insurance concerns;mental health concerns;transportation;medication concerns         Problem: Pneumonia (Adult)  Goal: Signs and Symptoms of Listed Potential Problems Will be Absent or Manageable (Pneumonia)  Outcome: Ongoing (interventions implemented as appropriate)    03/19/17 1524   Pneumonia   Problems Assessed (Pneumonia) all   Problems Present (Pneumonia) none         Problem: COPD, Chronic Bronchitis/Emphysema (Adult)  Goal: Signs and Symptoms of Listed Potential Problems Will be Absent or Manageable (COPD, Chronic Bronchitis/Emphysema)  Outcome: Ongoing (interventions implemented as appropriate)    03/19/17 1524   COPD, Chronic Bronchitis/Emphysema   Problems Assessed (COPD, Chronic Bronchitis/Emphysema) all   Problems Present (COPD, Chronic Bronchitis/Emphysema) depression;situational response         Problem: Wound, Traumatic, Nonburn (Adult)  Goal: Signs and Symptoms of Listed Potential Problems Will be Absent or Manageable (Wound, Traumatic, Nonburn)  Outcome:  Ongoing (interventions implemented as appropriate)    03/19/17 1524   Wound, Traumatic, Nonburn   Problems Assessed (Wound) all   Problems Present (Wound) pain         Problem: Pain, Acute (Adult)  Goal: Identify Related Risk Factors and Signs and Symptoms  Outcome: Ongoing (interventions implemented as appropriate)    03/19/17 0402   Pain, Acute   Related Risk Factors (Acute Pain) disease process;knowledge deficit;persistent pain;fear   Signs and Symptoms (Acute Pain) fear of reinjury;verbalization of pain descriptors;facial mask of pain/grimace         Problem: Anxiety (Adult)  Goal: Identify Related Risk Factors and Signs and Symptoms  Outcome: Ongoing (interventions implemented as appropriate)    03/19/17 1524   Anxiety   Related Risk Factors (Anxiety) coping ineffective;economic status change;environment change;pain;psychosocial factor   Signs and Symptoms (Anxiety) apprehension/being worried       Goal: Reduction/Resolution  Outcome: Ongoing (interventions implemented as appropriate)    03/19/17 1524   Anxiety (Adult)   Reduction/Resolution making progress toward outcome

## 2017-03-19 NOTE — THERAPY DISCHARGE NOTE
Acute Care - Physical Therapy Treatment Note/Discharge  [facility]     Patient Name: Colleen Gonzalez  : 1965  MRN: [id number]  Today's Date: 3/19/2017  Onset of Illness/Injury or Date of Surgery Date: 17  Date of Referral to PT: 17  Referring Physician: Dr Moore    Admit Date: 3/1/2017    Visit Dx:    ICD-10-CM ICD-9-CM   1. Community acquired pneumonia J18.9 486   2. COPD exacerbation J44.1 491.21   3. Hypoxia R09.02 799.02   4. Impaired functional mobility, balance, gait, and endurance Z74.09 V49.89   5. Impaired mobility and ADLs Z74.09 799.89     Patient Active Problem List   Diagnosis   • COPD (chronic obstructive pulmonary disease)   • Tobacco abuse   • Acute on chronic respiratory failure   • Leukocytosis   • HCAP (healthcare-associated pneumonia), RUL, with history of recurrent PNA, however no data on location; Currently growing MRSA and PSA in sputum.   • Hypothyroid   • Sepsis   • Anxiety and depression   • Homelessness   • Dysphagia, by history; does not comply with swallowing instructions       Physical Therapy Education     Title: PT OT SLP Therapies (Active)     Topic: Physical Therapy (Active)     Point: Home exercise program (Active)    Learning Progress Summary    Learner Readiness Method Response Comment Documented by Status   Patient Acceptance E NR  SR 03/10/17 1557 Active    Acceptance E VU,NR   17 1520 Done    Acceptance E,D VU,NR   17 1645 Done    Acceptance E VU Discussed benefits of activity. LS 17 1523 Done               Point: Precautions (Done)    Learning Progress Summary    Learner Readiness Method Response Comment Documented by Status   Patient Acceptance E VU BENEFITS OF CONTINUED OOB ACTIVITY IN ROOM. CD 17 1357 Done    Acceptance E NR  SR 03/10/17 1557 Active    Acceptance E VU,NR   17 1520 Done    Acceptance E,D VU,NR   17 1645 Done                      User Key     Initials Effective Dates Name Provider Type Discipline     CD 06/19/15 -  Radha Lind, PT Physical Therapist PT    EH 06/19/15 -  Ale Taylor, PT Physical Therapist PT    SR 06/19/15 -  Jenifer Witt, PT Physical Therapist PT    LS 06/19/15 -  Cassie Sheffield, PT Physical Therapist PT    MJ 03/14/16 -  Abhilash Meier, PT Physical Therapist PT                    IP PT Goals       03/19/17 1358 03/10/17 1557 03/09/17 1520    Transfer Training PT LTG    Transfer Training PT  LTG, Date Goal Reviewed  03/10/17  -SR     Transfer Training PT LTG, Outcome   goal ongoing  -EH    Gait Training PT LTG    Gait Training Goal PT LTG, Date Goal Reviewed  03/10/17  -SR     Gait Training Goal PT LTG, Outcome goal partially met   DISTANCE LIMITED TO IN ROOM ONLY DUE TO AIRBORNE PRECAUTIONS  -CD  goal ongoing  -EH    Patient Education PT STG    Patient Education PT STG, Date Goal Reviewed  03/10/17  -SR     Patient Education PT STG Outcome goal met  -CD  goal ongoing  -EH      03/06/17 1645          Transfer Training PT LTG    Transfer Training PT  LTG, Date Goal Reviewed 03/06/17  -MJ      Transfer Training PT LTG, Outcome goal ongoing  -MJ      Gait Training PT LTG    Gait Training Goal PT LTG, Date Goal Reviewed 03/06/17  -MJ      Gait Training Goal PT LTG, Outcome goal ongoing  -MJ      Patient Education PT STG    Patient Education PT STG, Date Goal Reviewed 03/06/17  -MJ      Patient Education PT STG Outcome goal ongoing  -MJ        User Key  (r) = Recorded By, (t) = Taken By, (c) = Cosigned By    Initials Name Provider Type    CD Radha Lind, PT Physical Therapist    EH Ale Taylor, PT Physical Therapist    SR Jenifer Witt, PT Physical Therapist    MJ Abhilash Meier, PT Physical Therapist              Adult Rehabilitation Note       03/19/17 1335          Rehab Assessment/Intervention    Discipline physical therapist  -CD      Document Type discharge summary  -CD      Treatment Not Performed patient/family declined treatment   PT UP IN ROOM AD JOSE ANTONIO, INDEPENDENT WITH  SHOWER, ETC.   -CD      Recorded by [CD] Radha Lind PT        User Key  (r) = Recorded By, (t) = Taken By, (c) = Cosigned By    Initials Name Effective Dates    CD Radha Lind, PT 06/19/15 -           PT Recommendation and Plan  Anticipated Equipment Needs At Discharge:  (to be determined. assess w/rollator walker)  Anticipated Discharge Disposition: other (see comments) (PT IS HOMELESS, CM WORKING ON PLACEMENT. )  Planned Therapy Interventions: balance training, gait training, home exercise program, patient/family education, strengthening, transfer training  PT Frequency: daily, per priority policy  Plan of Care Review  Plan Of Care Reviewed With: patient  Progress: improving  Outcome Summary/Follow up Plan: PT IS NOW UP IN ROOM AD-JOSE ANTONIO. ABLE TO MOVE EQUIPMENT AROUND, TIDY ROOM AND TAKE SHOWER WITHOUT ASSISTANCE. DECLINES NEED FOR ONGOING P.T. D/C P.T. AT THIS TIME. MIGHT BENEFIT FROM ROLLATOR AT D/C FOR ENERGY CONSERVATION DUE TO COPD.           Outcome Measures       03/19/17 1335          How much help from another person do you currently need...    Turning from your back to your side while in flat bed without using bedrails? 4  -CD      Moving from lying on back to sitting on the side of a flat bed without bedrails? 4  -CD      Moving to and from a bed to a chair (including a wheelchair)? 4   PER PT/NSG  REPORT  -CD      Standing up from a chair using your arms (e.g., wheelchair, bedside chair)? 4   PER PT/NSG REPORT.   -CD      Climbing 3-5 steps with a railing? 3   ANTICIPATE  -CD      To walk in hospital room? 4   PER PT/NSG REPORT.   -CD      AM-PAC 6 Clicks Score 23  -CD      Functional Assessment    Outcome Measure Options AM-PAC 6 Clicks Basic Mobility (PT)  -CD        User Key  (r) = Recorded By, (t) = Taken By, (c) = Cosigned By    Initials Name Provider Type    CD Radha Lind PT Physical Therapist           Time Calculation:         PT Charges       03/19/17 1401 03/19/17 1357       Time  Calculation    PT Received On  03/19/17  -CD     Time Calculation- PT    Total Timed Code Minutes- PT 15 minute(s)  -CD        User Key  (r) = Recorded By, (t) = Taken By, (c) = Cosigned By    Initials Name Provider Type    CD Radha Lind, PT Physical Therapist          Therapy Charges for Today     Code Description Service Date Service Provider Modifiers Qty    71028417608 HC PT THER PROC EA 15 MIN 3/19/2017 Radha Lind, PT GP 1          PT G-Codes  Outcome Measure Options: AM-PAC 6 Clicks Basic Mobility (PT)    PT Discharge Summary  Anticipated Discharge Disposition: other (see comments) (PT IS HOMELESS, CM WORKING ON PLACEMENT. )  Reason for Discharge: At baseline function  Outcomes Achieved: Patient able to partially acheive established goals  Discharge Destination: other (comment) (CM WORKING ON PLACEMENT AS PT IS HOMELESS.)    Radha Lind, PT  3/19/2017

## 2017-03-19 NOTE — PLAN OF CARE
Problem: Patient Care Overview (Adult)  Goal: Plan of Care Review  Outcome: Outcome(s) achieved Date Met:  03/19/17 03/19/17 1358   Coping/Psychosocial Response Interventions   Plan Of Care Reviewed With patient   Patient Care Overview   Progress improving   Outcome Evaluation   Outcome Summary/Follow up Plan PT IS NOW UP IN ROOM AD-JOSE ANTONIO. ABLE TO MOVE EQUIPMENT AROUND, TIDY ROOM AND TAKE SHOWER WITHOUT ASSISTANCE. DECLINES NEED FOR ONGOING P.T. D/C P.T. AT THIS TIME. MIGHT BENEFIT FROM ROLLATOR AT D/C FOR ENERGY CONSERVATION DUE TO COPD.          Problem: Inpatient Physical Therapy  Goal: Transfer Training Goal 1 LTG- PT  Outcome: Outcome(s) achieved Date Met:  03/19/17 03/04/17 1524 03/09/17 1520 03/10/17 1557   Transfer Training PT LTG   Transfer Training PT LTG, Date Established 03/04/17 --  --    Transfer Training PT LTG, Time to Achieve 2 wks --  --    Transfer Training PT LTG, Activity Type sit to stand/stand to sit --  --    Transfer Training PT LTG, [place] Level independent --  --    Transfer Training PT LTG, Assist Device walker, rolling  (walker as needed) --  --    Transfer Training PT LTG, Date Goal Reviewed --  --  03/10/17   Transfer Training PT LTG, Outcome --  goal ongoing --        Goal: Gait Training Goal LTG- PT  Outcome: Outcome(s) achieved Date Met:  03/19/17 03/04/17 1524 03/10/17 1557 03/19/17 1358   Gait Training PT LTG   Gait Training Goal PT LTG, Date Established 03/04/17 --  --    Gait Training Goal PT LTG, Time to Achieve 2 wks --  --    Gait Training Goal PT LTG, [place] Level independent --  --    Gait Training Goal PT LTG, Assist Device walker, rolling  (RW or rollator (as needed)) --  --    Gait Training Goal PT LTG, Distance to Achieve 200  (maintaining O2 sats at least 90%) --  --    Gait Training Goal PT LTG, Date Goal Reviewed --  03/10/17 --    Gait Training Goal PT LTG, Outcome --  --  goal partially met  (DISTANCE LIMITED TO IN ROOM ONLY DUE TO AIRBORNE  PRECAUTIONS)       Goal: Patient Education Goal STG- PT  Outcome: Outcome(s) achieved Date Met:  03/19/17 03/04/17 1524 03/10/17 1557 03/19/17 1358   Patient Education PT STG   Patient Education PT STG, Date Established 03/04/17 --  --    Patient Education PT STG, Time to Achieve 2 wks --  --    Patient Education PT STG, Education Type energy conservation;HEP --  --    Patient Education PT STG, Date Goal Reviewed --  03/10/17 --    Patient Education PT STG Outcome --  --  goal met

## 2017-03-19 NOTE — PROGRESS NOTES
"      HOSPITALIST DAILY PROGRESS NOTE    Chief Complaint: f/u for soa    Subjective   SUBJECTIVE/OVERNIGHT EVENTS   Difficulty sleeping. L pleurisy better. Some sore throat and congestion. No other issues.    Review of Systems:  Gen-no fevers, no chills  CV-no chest pain  Resp-no cough, no dyspnea  GI-no N/V/D, no abd pain    Objective   OBJECTIVE   I have reviewed the vital signs.  Visit Vitals   • /84   • Pulse 90   • Temp 98.8 °F (37.1 °C) (Oral)   • Resp 20   • Ht 68\" (172.7 cm)   • Wt 265 lb (120 kg)   • SpO2 92%   • BMI 40.29 kg/m2       Physical Exam:  Gen-no acute distress, comfortable  CV-RRR, S1 S2 normal, no m/r/g  Resp-CTAB, no wheezes  Abd-soft, NT, ND, +BS  Ext-no edema  Neuro-A&Ox3, no focal deficits  Psych-appropriate mood and affect    Results:  I have reviewed the labs, culture data.      Results from last 7 days  Lab Units 03/16/17  0535 03/14/17  0438   WBC 10*3/mm3 8.71 9.63   HEMOGLOBIN g/dL 12.2 12.0   HEMATOCRIT % 38.3 37.3   PLATELETS 10*3/mm3 302 285       Results from last 7 days  Lab Units 03/18/17  0608  03/16/17  0535   SODIUM mmol/L  --   --  138   POTASSIUM mmol/L 3.7  < > 3.5   CHLORIDE mmol/L  --   --  96*   TOTAL CO2 mmol/L  --   --  33.0*   BUN mg/dL  --   --  14   CREATININE mg/dL  --   --  0.80   GLUCOSE mg/dL  --   --  95   CALCIUM mg/dL  --   --  10.0   < > = values in this interval not displayed.  Culture Data:  Cultures:    RESPIRATORY CULTURE   Date Value Ref Range Status   03/07/2017 Scant growth (1+) Pseudomonas aeruginosa (A)  Final   03/07/2017 Light growth (2+) Staphylococcus aureus, MRSA (C)  Final     Comment:       Methicillin resistant Staphylococcus aureus, Patient may be an isolation risk.  This isolate does not demonstrate inducible clindamycin resistance in vitro.     03/07/2017 Light growth (2+) Yeast isolated (A)  Final     I have reviewed the medications.    Assessment/Plan   ASSESSMENT/PLAN    52-year-old  female with history of " noncompliance. She smokes 2-3 packs of cigarettes per day. She reently went off her psych (Bipolar) meds. She presented to [facility] ED with increased shortness of breath and cough and fever. Reports recent hospitalization to [facility] about a week with no improvement      # Sepsis  --met criteria due to fever, hypoxia, tachycardia, leukocytosis and cultures positive for MRSA/pseudomona, ID consulted, following, on abx as below      # A/C hypoxic resp failure-improving  --multifactorial COPD vs Pneumonia NASIM( vs neoplasm less likely)  --abx per ID, completed treatment for MRSA/PSA  --resp culture + MRSA and Pseudomona    # Bipolar disorder/homeless  --suspect schizophrenia also given paranoia and hallucination  --pt recently stopped psych meds a week ago because they were causing her to hallucinate  --pt needs psych eval, but unfortunately we do not have inpt psych service, when ready for DC, may benefit from [facility] evaluation  --SW consulted and following  --patient states she must check on her car, which is impounded, before she goes anywhere after she is discharged.   --nurse to call and confirm zanaflex/neurontin doses      # Hypothyroidism- cont home meds      # Tobacco abuse-- counseled, cessation advised      # Labial Cyst--needs to follow up with Gyn      # Tachycardia--increased atenolol, better     # Vaginal Yeast--better per patient    # +AFB, further characterization pending, in isolation, further recommendations per ID/pulmonary, once state lab testing complete    #Chronic pain-resumed her home neurontin     PT/OT      Dispo: difficult secondary to homeless. SS working on placement...    Mauricio Reaves MD  03/19/17  10:36 AM

## 2017-03-19 NOTE — PLAN OF CARE
Problem: Patient Care Overview (Adult)  Goal: Plan of Care Review  Outcome: Ongoing (interventions implemented as appropriate)    03/16/17 1637 03/18/17 2257 03/19/17 0402   Coping/Psychosocial Response Interventions   Plan Of Care Reviewed With --  patient --    Patient Care Overview   Progress progress toward functional goals is gradual --  --    Outcome Evaluation   Outcome Summary/Follow up Plan --  --  Waiting for confirmation from state labs on positive AFB       Goal: Adult Individualization and Mutuality  Outcome: Ongoing (interventions implemented as appropriate)    03/03/17 0650 03/15/17 1736   Individualization   Patient Specific Preferences --  red jello, gatorade, chocolate ice cream for snacks   Patient Specific Goals to have a place to go when she leaves here --    Patient Specific Interventions --  airborne precautions   Mutuality/Individual Preferences   What Anxieties, Fears or Concerns Do You Have About Your Health or Care? concerned about medicine and where she will go when she leaves here --    What Questions Do You Have About Your Health or Care? none at this time --    What Information Would Help Us Give You More Personalized Care? none at this time --        Goal: Discharge Needs Assessment  Outcome: Ongoing (interventions implemented as appropriate)    03/01/17 1548 03/03/17 0650 03/09/17 1743   Discharge Needs Assessment   Concerns To Be Addressed --  homelessness;financial/insurance concerns;mental health concerns;transportation;medication concerns --    Readmission Within The Last 30 Days --  --  no previous admission in last 30 days   Equipment Needed After Discharge --  --  --    Current Discharge Risk homeless --  --    Discharge Disposition --  still a patient --    Current Health   Anticipated Changes Related to Illness --  --  none   Self-Care   Equipment Currently Used at Home --  --  --    Living Environment   Transportation Available --  --  car;family or friend will provide      03/11/17 1845 03/15/17 0904   Discharge Needs Assessment   Concerns To Be Addressed --  --    Readmission Within The Last 30 Days --  --    Equipment Needed After Discharge none --    Current Discharge Risk --  --    Discharge Disposition --  --    Current Health   Anticipated Changes Related to Illness --  --    Self-Care   Equipment Currently Used at Home --  bath bench   Living Environment   Transportation Available --  --          Problem: Pneumonia (Adult)  Goal: Signs and Symptoms of Listed Potential Problems Will be Absent or Manageable (Pneumonia)  Outcome: Ongoing (interventions implemented as appropriate)    03/19/17 0402   Pneumonia   Problems Assessed (Pneumonia) all   Problems Present (Pneumonia) respiratory compromise         Problem: COPD, Chronic Bronchitis/Emphysema (Adult)  Goal: Signs and Symptoms of Listed Potential Problems Will be Absent or Manageable (COPD, Chronic Bronchitis/Emphysema)  Outcome: Ongoing (interventions implemented as appropriate)    03/19/17 0402   COPD, Chronic Bronchitis/Emphysema   Problems Assessed (COPD, Chronic Bronchitis/Emphysema) all   Problems Present (COPD, Chronic Bronchitis/Emphysema) depression;dyspnea;hypoxia/hypoxemia;undernutrition         Problem: Wound, Traumatic, Nonburn (Adult)  Goal: Signs and Symptoms of Listed Potential Problems Will be Absent or Manageable (Wound, Traumatic, Nonburn)  Outcome: Ongoing (interventions implemented as appropriate)    03/19/17 0402   Wound, Traumatic, Nonburn   Problems Assessed (Wound) all   Problems Present (Wound) situational response         Problem: Pain, Acute (Adult)  Goal: Identify Related Risk Factors and Signs and Symptoms  Outcome: Ongoing (interventions implemented as appropriate)    03/19/17 0402   Pain, Acute   Related Risk Factors (Acute Pain) disease process;knowledge deficit;persistent pain;fear   Signs and Symptoms (Acute Pain) fear of reinjury;verbalization of pain descriptors;facial mask of pain/grimace          Problem: Anxiety (Adult)  Goal: Identify Related Risk Factors and Signs and Symptoms  Outcome: Ongoing (interventions implemented as appropriate)    03/19/17 0402   Anxiety   Related Risk Factors (Anxiety) cognitive status;coping ineffective;prognosis/treatment plan;procedure/treatment   Signs and Symptoms (Anxiety) apprehension/being worried;concentration decreased;dependence increased;nervousness/tension/restlessness;sleep disturbance       Goal: Reduction/Resolution  Outcome: Ongoing (interventions implemented as appropriate)    03/19/17 0402   Anxiety (Adult)   Reduction/Resolution making progress toward outcome         Problem: Bronchoscopy (Adult)  Goal: Signs and Symptoms of Listed Potential Problems Will be Absent or Manageable (Bronchoscopy)  Outcome: Ongoing (interventions implemented as appropriate)    03/19/17 0402   Bronchoscopy   Problems Assessed (Bronchoscopy) all   Problems Present (Bronchoscopy) none

## 2017-03-20 LAB
GIE STN SPEC: NORMAL
GIE STN SPEC: NORMAL

## 2017-03-20 PROCEDURE — 99232 SBSQ HOSP IP/OBS MODERATE 35: CPT | Performed by: FAMILY MEDICINE

## 2017-03-20 PROCEDURE — 94640 AIRWAY INHALATION TREATMENT: CPT

## 2017-03-20 PROCEDURE — 94799 UNLISTED PULMONARY SVC/PX: CPT

## 2017-03-20 PROCEDURE — 63710000001 PREDNISONE PER 5 MG: Performed by: FAMILY MEDICINE

## 2017-03-20 PROCEDURE — 97530 THERAPEUTIC ACTIVITIES: CPT

## 2017-03-20 PROCEDURE — 25010000002 ENOXAPARIN PER 10 MG: Performed by: INTERNAL MEDICINE

## 2017-03-20 RX ORDER — PREDNISONE 10 MG/1
10 TABLET ORAL
Status: DISCONTINUED | OUTPATIENT
Start: 2017-03-21 | End: 2017-03-23

## 2017-03-20 RX ADMIN — GABAPENTIN 800 MG: 400 CAPSULE ORAL at 12:25

## 2017-03-20 RX ADMIN — ENOXAPARIN SODIUM 40 MG: 40 INJECTION SUBCUTANEOUS at 09:14

## 2017-03-20 RX ADMIN — IPRATROPIUM BROMIDE AND ALBUTEROL SULFATE 3 ML: .5; 3 SOLUTION RESPIRATORY (INHALATION) at 13:10

## 2017-03-20 RX ADMIN — CYCLOBENZAPRINE HYDROCHLORIDE 10 MG: 10 TABLET, FILM COATED ORAL at 17:14

## 2017-03-20 RX ADMIN — IPRATROPIUM BROMIDE AND ALBUTEROL SULFATE 3 ML: .5; 3 SOLUTION RESPIRATORY (INHALATION) at 21:55

## 2017-03-20 RX ADMIN — CYCLOBENZAPRINE HYDROCHLORIDE 10 MG: 10 TABLET, FILM COATED ORAL at 21:49

## 2017-03-20 RX ADMIN — FUROSEMIDE 40 MG: 40 TABLET ORAL at 09:16

## 2017-03-20 RX ADMIN — BUDESONIDE AND FORMOTEROL FUMARATE DIHYDRATE 2 PUFF: 80; 4.5 AEROSOL RESPIRATORY (INHALATION) at 21:56

## 2017-03-20 RX ADMIN — GABAPENTIN 800 MG: 400 CAPSULE ORAL at 06:23

## 2017-03-20 RX ADMIN — GABAPENTIN 800 MG: 400 CAPSULE ORAL at 00:23

## 2017-03-20 RX ADMIN — MONTELUKAST SODIUM 10 MG: 10 TABLET, FILM COATED ORAL at 21:49

## 2017-03-20 RX ADMIN — GABAPENTIN 800 MG: 400 CAPSULE ORAL at 18:31

## 2017-03-20 RX ADMIN — DULOXETINE 60 MG: 60 CAPSULE, DELAYED RELEASE ORAL at 09:15

## 2017-03-20 RX ADMIN — CYCLOBENZAPRINE HYDROCHLORIDE 10 MG: 10 TABLET, FILM COATED ORAL at 09:14

## 2017-03-20 RX ADMIN — THEOPHYLLINE 600 MG: 300 TABLET, EXTENDED RELEASE ORAL at 21:49

## 2017-03-20 RX ADMIN — PANTOPRAZOLE SODIUM 40 MG: 40 TABLET, DELAYED RELEASE ORAL at 09:15

## 2017-03-20 RX ADMIN — QUETIAPINE 300 MG: 200 TABLET, EXTENDED RELEASE ORAL at 21:49

## 2017-03-20 RX ADMIN — CETIRIZINE HYDROCHLORIDE 10 MG: 10 TABLET, FILM COATED ORAL at 09:15

## 2017-03-20 RX ADMIN — LEVOTHYROXINE SODIUM 50 MCG: 25 TABLET ORAL at 06:23

## 2017-03-20 RX ADMIN — BUDESONIDE AND FORMOTEROL FUMARATE DIHYDRATE 2 PUFF: 80; 4.5 AEROSOL RESPIRATORY (INHALATION) at 13:11

## 2017-03-20 RX ADMIN — MICONAZOLE NITRATE: 2 CREAM TOPICAL at 09:19

## 2017-03-20 RX ADMIN — PREDNISONE 20 MG: 20 TABLET ORAL at 09:15

## 2017-03-20 RX ADMIN — DIAZEPAM 5 MG: 5 TABLET ORAL at 12:22

## 2017-03-20 RX ADMIN — Medication 10 ML: at 17:14

## 2017-03-20 RX ADMIN — THEOPHYLLINE 600 MG: 300 TABLET, EXTENDED RELEASE ORAL at 09:14

## 2017-03-20 RX ADMIN — ACETAMINOPHEN 650 MG: 325 TABLET, FILM COATED ORAL at 09:15

## 2017-03-20 RX ADMIN — ONDANSETRON 4 MG: 4 TABLET, FILM COATED ORAL at 12:22

## 2017-03-20 RX ADMIN — Medication 2 TABLET: at 17:15

## 2017-03-20 RX ADMIN — Medication 2 TABLET: at 09:15

## 2017-03-20 RX ADMIN — ATENOLOL 50 MG: 50 TABLET ORAL at 09:16

## 2017-03-20 RX ADMIN — Medication 10 ML: at 21:49

## 2017-03-20 RX ADMIN — NICOTINE 1 PATCH: 21 PATCH, EXTENDED RELEASE TRANSDERMAL at 09:19

## 2017-03-20 RX ADMIN — DULOXETINE 60 MG: 60 CAPSULE, DELAYED RELEASE ORAL at 21:49

## 2017-03-20 RX ADMIN — FLUTICASONE PROPIONATE 2 SPRAY: 50 SPRAY, METERED NASAL at 09:19

## 2017-03-20 RX ADMIN — PRAVASTATIN SODIUM 80 MG: 40 TABLET ORAL at 21:50

## 2017-03-20 RX ADMIN — MICONAZOLE NITRATE: 2 CREAM TOPICAL at 21:52

## 2017-03-20 RX ADMIN — HYDROCODONE BITARTATE AND ACETAMINOPHEN 1 TABLET: 5; 325 TABLET ORAL at 12:21

## 2017-03-20 RX ADMIN — DIAZEPAM 5 MG: 5 TABLET ORAL at 18:31

## 2017-03-20 RX ADMIN — DIAZEPAM 5 MG: 5 TABLET ORAL at 06:23

## 2017-03-20 RX ADMIN — HYDROCODONE BITARTATE AND ACETAMINOPHEN 1 TABLET: 5; 325 TABLET ORAL at 17:14

## 2017-03-20 RX ADMIN — HYDROCODONE BITARTATE AND ACETAMINOPHEN 1 TABLET: 5; 325 TABLET ORAL at 06:23

## 2017-03-20 RX ADMIN — Medication 10 ML: at 12:21

## 2017-03-20 RX ADMIN — DIAZEPAM 5 MG: 5 TABLET ORAL at 00:23

## 2017-03-20 NOTE — PLAN OF CARE
Problem: Patient Care Overview (Adult)  Goal: Plan of Care Review  Outcome: Ongoing (interventions implemented as appropriate)    03/20/17 1612 03/20/17 1640   Coping/Psychosocial Response Interventions   Plan Of Care Reviewed With patient --    Patient Care Overview   Progress --  no change       Goal: Adult Individualization and Mutuality  Outcome: Ongoing (interventions implemented as appropriate)    03/03/17 0650 03/15/17 1736 03/20/17 0404   Individualization   Patient Specific Preferences --  red jello, gatorade, chocolate ice cream for snacks --    Patient Specific Goals to have a place to go when she leaves here --  --    Patient Specific Interventions --  airborne precautions --    Mutuality/Individual Preferences   What Anxieties, Fears or Concerns Do You Have About Your Health or Care? concerned about medicine and where she will go when she leaves here --  --    What Questions Do You Have About Your Health or Care? --  --  Has concerns what her ultimate diagnosis is going to be and how that will effect the rest of her life   What Information Would Help Us Give You More Personalized Care? none at this time --  --        Goal: Discharge Needs Assessment  Outcome: Ongoing (interventions implemented as appropriate)    03/03/17 0650   Discharge Needs Assessment   Concerns To Be Addressed homelessness;financial/insurance concerns;mental health concerns;transportation;medication concerns         Problem: Pneumonia (Adult)  Goal: Signs and Symptoms of Listed Potential Problems Will be Absent or Manageable (Pneumonia)  Outcome: Ongoing (interventions implemented as appropriate)    03/20/17 0404   Pneumonia   Problems Assessed (Pneumonia) all   Problems Present (Pneumonia) none         Problem: COPD, Chronic Bronchitis/Emphysema (Adult)  Goal: Signs and Symptoms of Listed Potential Problems Will be Absent or Manageable (COPD, Chronic Bronchitis/Emphysema)  Outcome: Ongoing (interventions implemented as  appropriate)    03/20/17 0404   COPD, Chronic Bronchitis/Emphysema   Problems Assessed (COPD, Chronic Bronchitis/Emphysema) all   Problems Present (COPD, Chronic Bronchitis/Emphysema) depression;situational response         Problem: Wound, Traumatic, Nonburn (Adult)  Goal: Signs and Symptoms of Listed Potential Problems Will be Absent or Manageable (Wound, Traumatic, Nonburn)  Outcome: Ongoing (interventions implemented as appropriate)    03/20/17 0404   Wound, Traumatic, Nonburn   Problems Assessed (Wound) all   Problems Present (Wound) pain         Problem: Pain, Acute (Adult)  Goal: Identify Related Risk Factors and Signs and Symptoms  Outcome: Ongoing (interventions implemented as appropriate)    03/20/17 0404   Pain, Acute   Related Risk Factors (Acute Pain) disease process;knowledge deficit;persistent pain;fear;patient perception   Signs and Symptoms (Acute Pain) verbalization of pain descriptors;pacing/restlessness;facial mask of pain/grimace;fear of reinjury;sleep pattern alteration         Problem: Anxiety (Adult)  Goal: Identify Related Risk Factors and Signs and Symptoms  Outcome: Ongoing (interventions implemented as appropriate)    03/20/17 0404   Anxiety   Related Risk Factors (Anxiety) coping ineffective;economic status change;environment change;health status change;knowledge deficit;loss of control;psychosocial factor;situational/maturational crisis   Signs and Symptoms (Anxiety) apprehension/being worried;dependence increased;nervousness/tension/restlessness;preoccupation;sleep disturbance       Goal: Reduction/Resolution  Outcome: Ongoing (interventions implemented as appropriate)    03/20/17 1640   Anxiety (Adult)   Reduction/Resolution making progress toward outcome

## 2017-03-20 NOTE — PROGRESS NOTES
Acute Care - Occupational Therapy Progress Note  [facility]     Patient Name: Colleen Gonzalez  : 1965  MRN: [id number]  Today's Date: 3/20/2017  Onset of Illness/Injury or Date of Surgery Date: 17  Date of Referral to OT: 17  Referring Physician: Dr Moore      Admit Date: 3/1/2017    Visit Dx:     ICD-10-CM ICD-9-CM   1. Community acquired pneumonia J18.9 486   2. COPD exacerbation J44.1 491.21   3. Hypoxia R09.02 799.02   4. Impaired functional mobility, balance, gait, and endurance Z74.09 V49.89   5. Impaired mobility and ADLs Z74.09 799.89     Patient Active Problem List   Diagnosis   • COPD (chronic obstructive pulmonary disease)   • Tobacco abuse   • Acute on chronic respiratory failure   • Leukocytosis   • HCAP (healthcare-associated pneumonia), RUL, with history of recurrent PNA, however no data on location; Currently growing MRSA and PSA in sputum.   • Hypothyroid   • Sepsis   • Anxiety and depression   • Homelessness   • Dysphagia, by history; does not comply with swallowing instructions             Adult Rehabilitation Note       17 1512 17 1335       Rehab Assessment/Intervention    Discipline occupational therapist  -EL physical therapist  -CD     Document Type progress note  -EL discharge summary  -CD     Subjective Information agree to therapy;complains of;fatigue  -EL      Patient Effort, Rehab Treatment adequate  -EL      Treatment Not Performed  patient/family declined treatment   PT UP IN ROOM AD JOSE ANTONIO, INDEPENDENT WITH SHOWER, ETC.   -CD     Symptoms Noted During/After Treatment fatigue  -EL      Symptoms Noted Comment Pt agreeable only to ther ex in bed due to fatigue and plan for toenail treatment following OT   -EL      Precautions/Limitations fall precautions   airborne precautions (r/o tuberculosis)  -EL      Recorded by [EL] Talisha Mattson, OT [CD] Radha Lind, PT     Vital Signs    Pre Systolic BP Rehab --   vitals stable throughout; pt asymptomatic   -EL       "Recorded by [EL] Talisha Mattson, OT      Pain Assessment    Pain Assessment Sands-Ramachandran FACES  -EL      Pain Score 4  -EL      Post Pain Score 4  -EL      Pain Type Chronic pain  -EL      Pain Location Back  -EL      Pain Intervention(s) Repositioned  -EL      Response to Interventions tolerated   -EL      Recorded by [EL] Talisha Mattson, OT      Cognitive Assessment/Intervention    Current Cognitive/Communication Assessment functional  -EL      Orientation Status oriented x 4  -EL      Follows Commands/Answers Questions able to follow single-step instructions;100% of the time  -EL      Personal Safety WNL/WFL  -EL      Personal Safety Interventions fall prevention program maintained;muscle strengthening facilitated  -EL      Recorded by [EL] Talisha Mattson, OT      Bed Mobility, Assessment/Treatment    Bed Mobility, Comment pt declined due to fatigue and reported \"I have already been up moving a lot today.\"  -EL      Recorded by [EL] Talisha Mattson, OT      Transfer Assessment/Treatment    Transfer, Comment pt declined   -EL      Recorded by [EL] Talisha Mattson, OT      Functional Mobility    Functional Mobility- Comment pt declined due to fatigue   -EL      Recorded by [EL] Talisha Mattson, OT      Therapy Exercises    Bilateral Upper Extremity AROM:;10 reps;sitting;elbow flexion/extension;hand pumps;shoulder circles;shoulder extension/flexion;shoulder rolls/shrugs  -EL      Recorded by [EL] Talisha Mattson, OT      Positioning and Restraints    Pre-Treatment Position in bed  -EL      Post Treatment Position bed  -EL      In Bed notified nsg;fowlers;call light within reach;encouraged to call for assist;patient within staff view;with other staff  -EL      Recorded by [EL] Talisha Mattson, OT        User Key  (r) = Recorded By, (t) = Taken By, (c) = Cosigned By    Initials Name Effective Dates    CD Radha Lind, PT 06/19/15 -     EL Talisha Mattson, OT 06/08/16 -                 OT Goals       03/20/17 1612 03/13/17 " 1332 03/10/17 1146    Transfer Training OT LTG    Transfer Training OT LTG, Date Established   03/10/17  -TA    Transfer Training OT LTG, Time to Achieve   1 wk  -TA    Transfer Training OT LTG, Activity Type   bed to chair /chair to bed;sit to stand/stand to sit;toilet  -TA    Transfer Training OT LTG, [place] Level   supervision required  -TA    Transfer Training OT LTG, Assist Device   --   AAD  -TA    Transfer Training OT LTG, Outcome goal partially met  -EL goal partially met   Met for STS, toilet this date.  -TA goal ongoing  -TA    Toileting OT LTG    Toileting Goal OT LTG, Date Established   03/10/17  -TA    Toileting Goal OT LTG, Time to Achieve   1 wk  -TA    Toileting Goal OT LTG, [place] Level   conditional independence  -TA    Toileting Goal OT LTG, Outcome  goal met  -TA goal ongoing  -TA    Activity Tolerance OT LTG    Activity Tolerance Goal OT LTG, Date Established   03/10/17  -TA    Activity Tolerance Goal OT LTG, Time to Achieve   1 wk  -TA    Activity Tolerance Goal OT LTG, Activity Level   10 min activity   with 1 rest break  -TA    Activity Tolerance Goal OT LTG, Outcome goal partially met  -EL goal partially met   Met for 10 mins this date; cont' for consistency.  -TA goal ongoing  -TA      User Key  (r) = Recorded By, (t) = Taken By, (c) = Cosigned By    Initials Name Provider Type    TA Mike Eric, OT Occupational Therapist    MANUEL Mattson, OT Occupational Therapist          Occupational Therapy Education     Title: PT OT SLP Therapies (Active)     Topic: Occupational Therapy (Active)     Point: Home exercise program (Done)    Description: Instruct learner(s) on appropriate technique for monitoring, assisting and/or progressing therapeutic exercises/activities.    Learning Progress Summary    Learner Readiness Method Response Comment Documented by Status   Patient Acceptance E VU,DU Educated on benefits of activity and HEP for B UE strengthening. EL 03/20/17 1611 Done     Acceptance E VU  CE 03/20/17 0037 Done    Acceptance E VU  CE 03/18/17 2352 Done    Acceptance E,TB,D VU,DU,NR Didactic education re adaptive breathing with fxl ambulation this date. TA 03/13/17 1331 Done    Acceptance E,TB,D VU,NR Initiated education with adaptive breathing, BUE HEP; reinforced need for call for assist with OOB activities. TA 03/10/17 1145 Done               Point: Precautions (Done)    Description: Instruct learner(s) on prescribed precautions during self-care and functional transfers.    Learning Progress Summary    Learner Readiness Method Response Comment Documented by Status   Patient Acceptance E VU  CE 03/20/17 0037 Done    Acceptance E VU  CE 03/18/17 2352 Done    Acceptance E,TB,D VU,NR Initiated education with adaptive breathing, BUE HEP; reinforced need for call for assist with OOB activities. TA 03/10/17 1145 Done                      User Key     Initials Effective Dates Name Provider Type Discipline    TA 03/14/16 -  Mike Eric, OT Occupational Therapist OT    EL 06/08/16 -  Talisha Mattson, OT Occupational Therapist OT     10/17/16 -  Nathalie Mcelroy, RN Registered Nurse Nurse                  OT Recommendation and Plan  Anticipated Discharge Disposition: inpatient rehabilitation facility, other (see comments) (Pulmonary rehab)  Planned Therapy Interventions: activity intolerance, ADL retraining, energy conservation, home exercise program, strengthening, transfer training  Therapy Frequency: daily (Per priority policy)  Plan of Care Review  Plan Of Care Reviewed With: patient  Progress: progress toward functional goals as expected  Outcome Summary/Follow up Plan: Pt is now able to move around in room ad janis; declined OOB activity with OT this date due to fatigue and reports she has already been up frequently today. Good participation with ther ex sitting in bed.         Outcome Measures       03/20/17 1512 03/19/17 1335       How much help from another person do you  currently need...    Turning from your back to your side while in flat bed without using bedrails?  4  -CD     Moving from lying on back to sitting on the side of a flat bed without bedrails?  4  -CD     Moving to and from a bed to a chair (including a wheelchair)?  4   PER PT/NSG  REPORT  -CD     Standing up from a chair using your arms (e.g., wheelchair, bedside chair)?  4   PER PT/NSG REPORT.   -CD     Climbing 3-5 steps with a railing?  3   ANTICIPATE  -CD     To walk in hospital room?  4   PER PT/NSG REPORT.   -CD     AM-PAC 6 Clicks Score  23  -CD     How much help from another is currently needed...    Putting on and taking off regular lower body clothing? 4  -EL      Bathing (including washing, rinsing, and drying) 3  -EL      Toileting (which includes using toilet bed pan or urinal) 4  -EL      Putting on and taking off regular upper body clothing 4  -EL      Taking care of personal grooming (such as brushing teeth) 3  -EL      Eating meals 4  -EL      Score 22  -EL      Functional Assessment    Outcome Measure Options AM-PAC 6 Clicks Daily Activity (OT)  -EL AM-PAC 6 Clicks Basic Mobility (PT)  -CD       User Key  (r) = Recorded By, (t) = Taken By, (c) = Cosigned By    Initials Name Provider Type    CD Radha Lind, PT Physical Therapist    MANUEL Mattson OT Occupational Therapist           Time Calculation:         Time Calculation- OT       03/20/17 1614          Time Calculation- OT    OT Start Time 1512  -EL      Total Timed Code Minutes- OT 9 minute(s)  -EL      OT Received On 03/20/17  -EL      OT Goal Re-Cert Due Date 03/30/17  -EL        User Key  (r) = Recorded By, (t) = Taken By, (c) = Cosigned By    Initials Name Provider Type    EL Talisha Mattson, OT Occupational Therapist           Therapy Charges for Today     Code Description Service Date Service Provider Modifiers Qty    [id number]  OT THERAPEUTIC ACT EA 15 MIN 3/20/2017 Talisha Mattson OT GO 1               Talisha Mattson,  OT  3/20/2017
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
## 2017-03-20 NOTE — PROGRESS NOTES
Continued Stay Note  [facility]     Patient Name: Colleen Gonzalez  MRN: [id number]  Today's Date: 3/20/2017    Admit Date: 3/1/2017          Discharge Plan       03/20/17 1455    Case Management/Social Work Plan    Plan discharge plan    Patient/Family In Agreement With Plan yes    Additional Comments CM and SW cont to follow.              Discharge Codes     None        Expected Discharge Date and Time     Expected Discharge Date Expected Discharge Time    Mar 4, 2017             Sun Jose RN
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
## 2017-03-20 NOTE — PAYOR COMM NOTE
"Colleen Gonzalez (52 y.o. Female)     UPDATED CLINICALS-STILL NEED ADDITIONAL DAYS FROM LAST CLINICALS FAXED      Date of Birth Social Security Number Address Home Phone MRN    [id number]  [address] [facility] [address] [phone] [id number]    [facility] Mar[employer] Status          None Single       Admission Date Admission Type Admitting Provider Attending Provider Department, Room/Bed    3/1/17 Emergency Mauricio Reaves MD Russell, Marc P, MD [facility] 5H, S567/1    Discharge Date Discharge Disposition Discharge Destination                      Attending Provider: Mauricio Reaves MD     Allergies:  Aspirin, Codeine, Ibuprofen    Isolation:  Airborne   Infection:  MRSA (03/09/17), Tuberculosis (rule out) (03/15/17)   Code Status:  FULL    Ht:  68\" (172.7 cm)   Wt:  265 lb (120 kg)    Admission Cmt:  None   Principal Problem:  HCAP (healthcare-associated pneumonia), RUL, with history of recurrent PNA, however no data on location; Currently growing MRSA and PSA in sputum. [J18.9]                 Active Insurance as of 3/1/2017     Primary Coverage     Payor Plan Insurance Group Employer/Plan Group    [employer] [place] [employer]ICA[place]      Payor Plan Address Payor Plan Phone Number Effective From Effective To    [address] [address] [phone] 3/1/2017     [place] [address]       Subscriber Name Subscriber Birth Date Member ID       COLLEEN GONZALEZ [id number]                 Emergency Contacts      (Rel.) Home Phone Work Phone Mobile Phone    Eneida [address] (Sister) -- -- [phone]    Raquel Dumont (Sister) -- -- [phone]            Insurance Information                [facility] MEDICAID Phone: [phone]    Subscriber: Colleen Gonzalez Subscriber#: [id number]    Group#:  Precert#:           Hospital Medications (active)       Dose Frequency Start End    acetaminophen (TYLENOL) tablet 650 mg 650 mg Every 4 Hours PRN 3/1/2017     Sig - Route: Take 2 " tablets by mouth Every 4 (Four) Hours As Needed for Mild Pain (1-3). - Oral    atenolol (TENORMIN) tablet 50 mg 50 mg Daily 3/14/2017     Sig - Route: Take 1 tablet by mouth Daily. - Oral    benzonatate (TESSALON) capsule 200 mg 200 mg 3 Times Daily PRN 3/7/2017     Sig - Route: Take 2 capsules by mouth 3 (Three) Times a Day As Needed for Cough. - Oral    bisacodyl (DULCOLAX) EC tablet 5 mg 5 mg Daily PRN 3/1/2017     Sig - Route: Take 1 tablet by mouth Daily As Needed for Constipation. - Oral    budesonide-formoterol (SYMBICORT) 80-4.5 MCG/ACT inhaler 2 puff 2 puff 2 Times Daily - RT 3/12/2017     Sig - Route: Inhale 2 puffs 2 (Two) Times a Day. - Inhalation    cetirizine (zyrTEC) tablet 10 mg 10 mg Daily 3/2/2017     Sig - Route: Take 1 tablet by mouth Daily. - Oral    cyclobenzaprine (FLEXERIL) tablet 10 mg 10 mg 3 Times Daily 3/15/2017     Sig - Route: Take 1 tablet by mouth 3 (Three) Times a Day. - Oral    diazePAM (VALIUM) tablet 5 mg 5 mg Every 6 Hours PRN 3/19/2017     Sig - Route: Take 1 tablet by mouth Every 6 (Six) Hours As Needed for Anxiety. - Oral    DULoxetine (CYMBALTA) DR capsule 60 mg 60 mg Every 12 Hours Scheduled 3/1/2017     Sig - Route: Take 1 capsule by mouth Every 12 (Twelve) Hours. - Oral    enoxaparin (LOVENOX) syringe 40 mg 40 mg Daily 3/1/2017     Sig - Route: Inject 0.4 mL under the skin Daily. - Subcutaneous    fluticasone (FLONASE) 50 MCG/ACT nasal spray 2 spray 2 spray Daily 3/2/2017     Sig - Route: 2 sprays into each nostril Daily. - Nasal    furosemide (LASIX) tablet 40 mg 40 mg Daily 3/2/2017     Sig - Route: Take 1 tablet by mouth Daily. - Oral    gabapentin (NEURONTIN) capsule 800 mg 800 mg Every 6 Hours 3/19/2017     Sig - Route: Take 2 capsules by mouth Every 6 (Six) Hours. - Oral    HYDROcodone-acetaminophen (NORCO) 5-325 MG per tablet 1 tablet 1 tablet Every 4 Hours PRN 3/11/2017 3/21/2017    Sig - Route: Take 1 tablet by mouth Every 4 (Four) Hours As Needed for Moderate  "Pain (4-6). - Oral    ipratropium-albuterol (DUO-NEB) nebulizer solution 3 mL 3 mL 4 Times Daily - RT 3/1/2017     Sig - Route: Take 3 mL by nebulization 4 (Four) Times a Day. - Nebulization    ipratropium-albuterol (DUO-NEB) nebulizer solution 3 mL 3 mL Every 4 Hours PRN 3/2/2017     Sig - Route: Take 3 mL by nebulization Every 4 (Four) Hours As Needed for Wheezing or Shortness of Air. - Nebulization    levothyroxine (SYNTHROID, LEVOTHROID) tablet 50 mcg 50 mcg Every Early Morning 3/2/2017     Sig - Route: Take 2 tablets by mouth Every Morning. - Oral    magic mouthwash oral supsension 10 mL 10 mL 4 Times Daily 3/15/2017     Sig - Route: Swish and swallow 10 mL 4 (Four) Times a Day. - Swish & Swallow    melatonin sublingual tablet 5 mg 5 mg Nightly PRN 3/14/2017     Sig - Route: Place 1 tablet under the tongue At Night As Needed for Sleep. - Sublingual    miconazole (MICOTIN) 2 % cream  Every 12 Hours Scheduled 3/12/2017     Sig - Route: Apply  topically Every 12 (Twelve) Hours. - Topical    montelukast (SINGULAIR) tablet 10 mg 10 mg Nightly 3/1/2017     Sig - Route: Take 1 tablet by mouth Every Night. - Oral    nicotine (NICODERM CQ) 21 MG/24HR patch 1 patch 1 patch Every 24 Hours Scheduled 3/1/2017     Sig - Route: Place 1 patch on the skin Daily. - Transdermal    ondansetron (ZOFRAN) injection 4 mg 4 mg Every 6 Hours PRN 3/1/2017     Sig - Route: Infuse 2 mL into a venous catheter Every 6 (Six) Hours As Needed for Nausea or Vomiting. - Intravenous    Linked Group 1:  \"Or\" Linked Group Details        ondansetron (ZOFRAN) tablet 4 mg 4 mg Every 6 Hours PRN 3/1/2017     Sig - Route: Take 1 tablet by mouth Every 6 (Six) Hours As Needed for Nausea or Vomiting. - Oral    Linked Group 1:  \"Or\" Linked Group Details        pantoprazole (PROTONIX) EC tablet 40 mg 40 mg Every 24 Hours 3/2/2017     Sig - Route: Take 1 tablet by mouth Daily. - Oral    Pharmacy Consult - [facility]  Daily 3/2/2017     Sig - Route: Daily. - Does not " "apply    potassium chloride (KLOR-CON) packet 40 mEq 40 mEq As Needed 3/14/2017     Sig - Route: Take 40 mEq by mouth As Needed (potassium replacement, see admin instructions). - Oral    Linked Group 2:  \"Or\" Linked Group Details        potassium chloride (MICRO-K) CR capsule 40 mEq 40 mEq As Needed 3/14/2017     Sig - Route: Take 4 capsules by mouth As Needed (potassium replacement.  see admin instructions). - Oral    Linked Group 2:  \"Or\" Linked Group Details        potassium chloride 10 mEq in 100 mL IVPB 10 mEq Every 1 Hour PRN 3/14/2017     Sig - Route: Infuse 100 mL into a venous catheter Every 1 (One) Hour As Needed (potassium protocol PERIPHERAL - see admin instructions). - Intravenous    Linked Group 2:  \"Or\" Linked Group Details        pravastatin (PRAVACHOL) tablet 80 mg 80 mg Nightly 3/12/2017     Sig - Route: Take 2 tablets by mouth Every Night. - Oral    predniSONE (DELTASONE) tablet 20 mg 20 mg Daily With Breakfast 3/9/2017     Sig - Route: Take 1 tablet by mouth Daily With Breakfast. - Oral    QUEtiapine fumarate ER (SEROquel XR) tablet 300 mg 300 mg Nightly 3/1/2017     Sig - Route: Take 300 mg by mouth Every Night. - Oral    sennosides-docusate sodium (SENOKOT-S) 8.6-50 MG tablet 2 tablet 2 tablet 2 Times Daily 3/1/2017     Sig - Route: Take 2 tablets by mouth 2 (Two) Times a Day. - Oral    sodium chloride 0.9 % flush 1-10 mL 1-10 mL As Needed 3/1/2017     Sig - Route: Infuse 1-10 mL into a venous catheter As Needed for Line Care. - Intravenous    sodium chloride 0.9 % flush 1-10 mL 1-10 mL As Needed 3/15/2017     Sig - Route: Infuse 1-10 mL into a venous catheter As Needed for Line Care. - Intravenous    sodium chloride 0.9 % flush 10 mL 10 mL As Needed 3/1/2017     Sig - Route: Infuse 10 mL into a venous catheter As Needed for Line Care. - Intravenous    Cosign for Ordering: Accepted by Carlos Park MD on 3/1/2017 10:28 PM    temazepam (RESTORIL) capsule 30 mg 30 mg Nightly PRN 3/15/2017 " 3/25/2017    Sig - Route: Take 2 capsules by mouth At Night As Needed for Sleep. - Oral    theophylline (THEODUR) 12 hr tablet 600 mg 600 mg Every 12 Hours Scheduled 3/12/2017     Sig - Route: Take 2 tablets by mouth Every 12 (Twelve) Hours. - Oral    trolamine salicylate (ASPERCREME) 10 % cream 1 application 1 application 2 Times Daily PRN 3/9/2017     Sig - Route: Apply 1 application topically 2 (Two) Times a Day As Needed for Muscle / Joint Pain. - Topical    diazePAM (VALIUM) tablet 5 mg (Discontinued) 5 mg Every 8 Hours PRN 3/18/2017 3/19/2017    Sig - Route: Take 1 tablet by mouth Every 8 (Eight) Hours As Needed for Anxiety. - Oral    gabapentin (NEURONTIN) capsule 800 mg (Discontinued) 800 mg Every 8 Hours Scheduled 3/17/2017 3/19/2017    Sig - Route: Take 2 capsules by mouth Every 8 (Eight) Hours. - Oral          Lab Results (last 72 hours)     Procedure Component Value Units Date/Time    Potassium [[id number]]  (Normal) Collected:  03/17/17 0402    Specimen:  Blood Updated:  03/17/17 0421     Potassium 3.6 mmol/L     MTB AARON Without AFB Culture [[id number]] Collected:  03/15/17 1105    Specimen:  Cerebrospinal Fluid from Lung, Left Lower Lobe Updated:  03/17/17 1429     M tuberculosis, AARON Negative       Validation studies at [facility] have demonstrated that PCR testing is  highly sensitive for the detection of Mycobacterium tuberculosis  complex in smear negative or smear positive specimens. PCR results  should be used in conjunction with laboratory test results and the  patient's clinical profile.  Per the College of American Pathologists (CAP) guidelines, a culture  should be performed on all samples regardless of the molecular test  result. By ordering this test code, the client has assumed  responsibility for the performance of the culture.  This test was developed and its performance characteristics determined  by [facility]. It has not been cleared or approved by the Food and Drug  Administration.        AFB  Specimen Processing Concentration     Narrative:       Performed at:  01 - [facility]  [id number]  : Ender Sanchez MD, Phone:  [phone]    Potassium [[id number]]  (Normal) Collected:  03/18/17 0608    Specimen:  Blood Updated:  03/18/17 0655     Potassium 3.7 mmol/L     Respiratory Culture [[id number]]  (Abnormal)  (Susceptibility) Collected:  03/15/17 1105    Specimen:  Wash from Bronchus Updated:  03/19/17 0852     Respiratory Culture Scant growth (1+) Normal Respiratory Patience       Scant growth (1+) Pseudomonas aeruginosa (A)      Gram Stain Result Few (2+) WBCs seen       Few (2+) Epithelial cells seen              Rare (1+) Gram positive cocci in pairs and clusters      Rare (1+) Endogenous respiratory patience     Susceptibility      Pseudomonas aeruginosa     AMY     Aztreonam <=8 ug/ml Susceptible     Cefepime <=8 ug/ml Susceptible     Ceftazidime 4 ug/ml Susceptible     Gentamicin <=4 ug/ml Susceptible     Levofloxacin <=2 ug/ml Susceptible     Meropenem <=1 ug/ml Susceptible     Piperacillin + Tazobactam <=16 ug/ml Susceptible     Tobramycin <=4 ug/ml Susceptible                          Imaging Results (last 72 hours)     ** No results found for the last 72 hours. **        Operative/Procedure Notes (last 72 hours) (Notes from 3/16/2017  8:12 PM through 3/19/2017  8:12 PM)     No notes of this type exist for this encounter.           Physician Progress Notes (last 72 hours) (Notes from 3/16/2017  8:12 PM through 3/19/2017  8:12 PM)      Mauricio Reaves MD at 3/17/2017 10:17 AM  Version 1 of 1               HOSPITALIST DAILY PROGRESS NOTE    Chief Complaint: f/u for soa    Subjective   SUBJECTIVE/OVERNIGHT EVENTS   Difficulty sleeping. L pleurisy better. Slept better however. Notes sweats. Coughing up/mobilizing more sputum. No other issues. Wants to return to her usual dose of neurontin.    Review of Systems:  Gen-no fevers, no chills  CV-no chest  "pain  Resp-no cough, no dyspnea  GI-no N/V/D, no abd pain    Objective   OBJECTIVE   I have reviewed the vital signs.  Visit Vitals   • BP 98/79 (BP Location: Right arm, Patient Position: Lying)   • Pulse 92   • Temp 96.3 °F (35.7 °C) (Oral)   • Resp 14   • Ht 68\" (172.7 cm)   • Wt 265 lb (120 kg)   • SpO2 91%   • BMI 40.29 kg/m2       Physical Exam:  Gen-no acute distress, comfortable  CV-RRR, S1 S2 normal, no m/r/g  Resp-CTAB, no wheezes  Abd-soft, NT, ND, +BS  Ext-no edema  Neuro-A&Ox3, no focal deficits  Psych-appropriate mood and affect    Results:  I have reviewed the labs, culture data.      Results from last 7 days  Lab Units 03/16/17  0535 03/14/17  0438   WBC 10*3/mm3 8.71 9.63   HEMOGLOBIN g/dL 12.2 12.0   HEMATOCRIT % 38.3 37.3   PLATELETS 10*3/mm3 302 285       Results from last 7 days  Lab Units 03/17/17  0402 03/16/17  0535   SODIUM mmol/L  --  138   POTASSIUM mmol/L 3.6 3.5   CHLORIDE mmol/L  --  96*   TOTAL CO2 mmol/L  --  33.0*   BUN mg/dL  --  14   CREATININE mg/dL  --  0.80   GLUCOSE mg/dL  --  95   CALCIUM mg/dL  --  10.0     Culture Data:  Cultures:    RESPIRATORY CULTURE   Date Value Ref Range Status   03/07/2017 Scant growth (1+) Pseudomonas aeruginosa (A)  Final   03/07/2017 Light growth (2+) Staphylococcus aureus, MRSA (C)  Final     Comment:       Methicillin resistant Staphylococcus aureus, Patient may be an isolation risk.  This isolate does not demonstrate inducible clindamycin resistance in vitro.     03/07/2017 Light growth (2+) Yeast isolated (A)  Final     I have reviewed the medications.    Assessment&#47;Plan   ASSESSMENT/PLAN    52-year-old  female with history of noncompliance. She smokes 2-3 packs of cigarettes per day. She reently went off her psych (Bipolar) meds. She presented to [facility] ED with increased shortness of breath and cough and fever. Reports recent hospitalization to [facility] about a week with no improvement      # Sepsis  --met " "criteria due to fever, hypoxia, tachycardia, leukocytosis and cultures positive for MRSA/pseudomona, ID consulted, following, on abx as below      # A/C hypoxic resp failure-improving  --multifactorial COPD vs Pneumonia NASIM( vs neoplasm less likely)  --abx per ID, completed treatment for MRSA/PSA  --resp culture + MRSA and Pseudomona    # Bipolar disorder/homeless  --suspect schizophrenia also given paranoia and hallucination  --pt recently stopped psych meds a week ago because they were causing her to hallucinate  --pt needs psych eval, but unfortunately we do not have inpt psych service, when ready for DC, may benefit from [facility] evaluation  --SW consulted and following  --patient states she must check on her car, which is impounded, before she goes anywhere after she is discharged.   --nurse to call and confirm zanaflex/neurontin doses      # Hypothyroidism- cont home meds      # Tobacco abuse-- counseled, cessation advised      # Labial Cyst--needs to follow up with Gyn      # Tachycardia--increased atenolol, better     # Vaginal Yeast--better per patient    # +AFB, further characterization pending, in isolation, further recommendations per ID/pulmonary     PT/OT      Dispo: difficult secondary to homeless. SS working on placement...    Mauricio Reaves MD  17  10:17 AM             Electronically signed by Mauricio Reaves MD at 3/17/2017 10:18 AM      Robert Self MD at 3/17/2017 11:15 AM  Version 2 of 2         [facility] Progress Note    Date of Admission: 3/1/2017      Antibiotics:  None    CC:   Chief Complaint   Patient presents with   • Shortness of Breath       S: No new complaints. No further O2 use. No n/v/d. No f/c/s. Still with cough.   O:  Visit Vitals   • BP 98/79 (BP Location: Right arm, Patient Position: Lying)   • Pulse 92   • Temp 96.3 °F (35.7 °C) (Oral)   • Resp 14   • Ht 68\" (172.7 cm)   • Wt 265 lb (120 kg)   • SpO2 91%   • BMI 40.29 kg/m2     Temp (24hrs), Av.3 °F (36.3 °C), Min:96.3 °F " (35.7 °C), Max:98.1 °F (36.7 °C)      PE:   GENERAL: Chronically ill appearing, unkept appearance. Overweight.   HEENT: Normocephalic, atraumatic. PERRL. EOMI. No conjunctival injection. Moist MM.    NECK: Supple without nuchal rigidity  LYMPH: No cervical, axillary or inguinal lymphadenopathy. No neck masses  HEART: Tachy; No murmur, rubs, gallops.   LUNGS: Coarse breath sounds and Diminished throughout.  Diminished wheezing. Some improvement and no use of supplemental O2  ABDOMEN: Soft, nontender, nondistended. Positive bowel sounds. No rebound or guarding.   EXT: No cyanosis, clubbing or edema  MSK: FROM without joint effusions noted   SKIN: No rash or bleeding. PIV in place  NEURO: Oriented to PPT. No focal deficits.       Laboratory Data      Results from last 7 days  Lab Units 03/16/17  0535 03/14/17  0438   WBC 10*3/mm3 8.71 9.63   HEMOGLOBIN g/dL 12.2 12.0   HEMATOCRIT % 38.3 37.3   PLATELETS 10*3/mm3 302 285       Results from last 7 days  Lab Units 03/17/17  0402 03/16/17  0535   SODIUM mmol/L  --  138   POTASSIUM mmol/L 3.6 3.5   CHLORIDE mmol/L  --  96*   TOTAL CO2 mmol/L  --  33.0*   BUN mg/dL  --  14   CREATININE mg/dL  --  0.80   GLUCOSE mg/dL  --  95   CALCIUM mg/dL  --  10.0               Results from last 7 days  Lab Units 03/14/17  0438   CRP mg/dL 31.90*       Estimated Creatinine Clearance: 110.8 mL/min (by C-G formula based on Cr of 0.8).  Bronch wash 3/15:   Negative for malignancy  Reactive bronchial cells, pulmonary macrophages and acute inflammation.  Plant material present compatible with food particle.     Microbiology:  Sputum culture on 3/2: PSA and MRSA  Sputum culture on 3/3: MRSA   AFB x 3 ordered  are negative  Sputum culture 3/7/17: Non-LFR, Staph aureus, Yeast  Blood culture negative    Crypto negative  Strep pneumo and Legionella Uag negative  Blasto Negative   Quant TB indeterminate    Radiology:  Imaging Results (last 24 hours)     ** No results found for the last 24 hours. **           PROBLEM LIST:   NASIM pneumonia with MRSA and PSA   Leukocytosis  Concern for potential post-obstructive pneumonia with hx of cough x 6 weeks and 6x6cm nature of NASIM pna (3 PPD history)  AFB x 2 and hopeful for non-tubercular mycobacterial infection   AFB on broth culture from BAL cultures identified.   Foreign Body in LLL c/w aspiration  ?Chronic Dysphagia?- refusing w/u   Tobacco abuse  Chronic skin lesions  HTN  HLD  Hypothyroidism  Psych disorder     ASSESSMENT:  52 -year-old female with multiple medical conditions including hypertension, hyperlipidemia, hypothyroidism in the setting of psychiatric disease with OCD and bipolar disorder who presents with worsening cough, shortness of breath, sputum production of brown coloration of the past 5-6 weeks however worsening over the last 1 week. She reports associated chills and sweats however hasn't checked her temperature. she came to the ED for evaluation with leukocytosis initially noted, chest CT angiogram with a left upper lobe with a 6 x 6 cm opacity concerning for infectious versus neoplastic etiology. Sputum production ×3 has been collected with pseudomonas and MRSA.     Quant TB negative. Crypto Negative. Histo negative. Blasto negative. She has completed 2 weeks of therapy at this time with some improvement in respiratory effort and now s/p bronchoscopy with cytology negative for malignancy. Off of antibiotics. AFB x 2 sputum positive and BAL wash fluid with positive AFB in broth. ID pending from state lab.     PLAN:   No antibiotics for now and f/u culture results from path completed 2 weeks iv abx.  AFB x 2 sputum positive and sent to state lab for ID.   Hopefully will be non tuberculosis mycobacterium but still will be difficult to treat and await on ID of mycobacterial species  Monitor off antibiotics    I will see again on Monday please call if any questions over the weekend    Dr. Robert Self saw the patient, performed the physical exam,  "reviewed the laboratory data and guided with the formulation of the above problem list, assessment and treatment plan.        Robert Self MD  3/17/2017         Electronically signed by Robert Self MD at 3/17/2017  2:29 PM      MARY Wilcox at 3/17/2017 11:15 AM  Version 1 of 2         [facility] Progress Note    Date of Admission: 3/1/2017      Antibiotics:  None    CC:   Chief Complaint   Patient presents with   • Shortness of Breath       S: No new complaints. No further O2 use. No n/v/d. No f/c/s. Still with cough.   O:  Visit Vitals   • BP 98/79 (BP Location: Right arm, Patient Position: Lying)   • Pulse 92   • Temp 96.3 °F (35.7 °C) (Oral)   • Resp 14   • Ht 68\" (172.7 cm)   • Wt 265 lb (120 kg)   • SpO2 91%   • BMI 40.29 kg/m2     Temp (24hrs), Av.3 °F (36.3 °C), Min:96.3 °F (35.7 °C), Max:98.1 °F (36.7 °C)      PE:   GENERAL: Chronically ill appearing, unkept appearance. Overweight.   HEENT: Normocephalic, atraumatic. PERRL. EOMI. No conjunctival injection. Moist MM.    NECK: Supple without nuchal rigidity  LYMPH: No cervical, axillary or inguinal lymphadenopathy. No neck masses  HEART: Tachy; No murmur, rubs, gallops.   LUNGS: Coarse breath sounds and Diminished throughout.  Diminished wheezing. Some improvement and no use of supplemental O2  ABDOMEN: Soft, nontender, nondistended. Positive bowel sounds. No rebound or guarding.   EXT: No cyanosis, clubbing or edema  MSK: FROM without joint effusions noted   SKIN: No rash or bleeding. PIV in place  NEURO: Oriented to PPT. No focal deficits.       Laboratory Data      Results from last 7 days  Lab Units 17  0535 17  0438   WBC 10*3/mm3 8.71 9.63   HEMOGLOBIN g/dL 12.2 12.0   HEMATOCRIT % 38.3 37.3   PLATELETS 10*3/mm3 302 285       Results from last 7 days  Lab Units 17  0402 17  0535   SODIUM mmol/L  --  138   POTASSIUM mmol/L 3.6 3.5   CHLORIDE mmol/L  --  96*   TOTAL CO2 mmol/L  --  33.0*   BUN mg/dL  --  14   CREATININE " mg/dL  --  0.80   GLUCOSE mg/dL  --  95   CALCIUM mg/dL  --  10.0               Results from last 7 days  Lab Units 03/14/17  0438   CRP mg/dL 31.90*       Estimated Creatinine Clearance: 110.8 mL/min (by C-G formula based on Cr of 0.8).  Bronch wash 3/15:   Negative for malignancy  Reactive bronchial cells, pulmonary macrophages and acute inflammation.  Plant material present compatible with food particle.     Microbiology:  Sputum culture on 3/2: PSA and MRSA  Sputum culture on 3/3: MRSA   AFB x 3 ordered  are negative  Sputum culture 3/7/17: Non-LFR, Staph aureus, Yeast  Blood culture negative    Crypto negative  Strep pneumo and Legionella Uag negative  Blasto Negative   Quant TB indeterminate    Radiology:  Imaging Results (last 24 hours)     ** No results found for the last 24 hours. **          PROBLEM LIST:   NASIM pneumonia with MRSA and PSA   Leukocytosis  Concern for potential post-obstructive pneumonia with hx of cough x 6 weeks and 6x6cm nature of NASIM pna (3 PPD history)  AFB x 2 and hopeful for non-tubercular mycobacterial infection   AFB on broth culture from BAL cultures identified.   Foreign Body in LLL c/w aspiration  ?Chronic Dysphagia?- refusing w/u   Tobacco abuse  Chronic skin lesions  HTN  HLD  Hypothyroidism  Psych disorder     ASSESSMENT:  52 -year-old female with multiple medical conditions including hypertension, hyperlipidemia, hypothyroidism in the setting of psychiatric disease with OCD and bipolar disorder who presents with worsening cough, shortness of breath, sputum production of brown coloration of the past 5-6 weeks however worsening over the last 1 week. She reports associated chills and sweats however hasn't checked her temperature. she came to the ED for evaluation with leukocytosis initially noted, chest CT angiogram with a left upper lobe with a 6 x 6 cm opacity concerning for infectious versus neoplastic etiology. Sputum production ×3 has been collected with pseudomonas and  "MRSA.     Quant TB negative. Crypto Negative. Histo negative. Blasto negative. She has completed 2 weeks of therapy at this time with some improvement in respiratory effort and now s/p bronchoscopy with cytology negative for malignancy. Off of antibiotics. AFB x 2 sputum positive and BAL wash fluid with positive AFB in broth. ID pending from state lab     PLAN:   No antibiotics for now and f/u culture results from path completed 2 weeks iv abx.  AFB x 2 sputum positive and sent to state lab for ID.   Hopefully will be non tuberculosis mycobacterium but still will be difficult to treat and await on ID    Dr. Robert Self saw the patient, performed the physical exam, reviewed the laboratory data and guided with the formulation of the above problem list, assessment and treatment plan.        Robert Self MD  3/17/2017         Electronically signed by MARY Wilcox at 3/17/2017 11:21 AM      RADHA Sarmiento at 3/18/2017  2:17 PM  Version 1 of 1               HOSPITALIST DAILY PROGRESS NOTE    Chief Complaint: f/u for soa    Subjective   SUBJECTIVE/OVERNIGHT EVENTS   Patient up walking in the room.  States that she has been feeling very anxious and breaks out in sweats occasionally.  She is tired of being cooped up in her room and wants to walk in the hallway.  She is requesting that her valium be increased to every 6 hours and that her gabapentin be increased to 4 times per day.      Review of Systems:  Gen-no fevers, no chills  CV-no chest pain  Resp-no cough, no dyspnea  GI-no N/V/D, no abd pain    Objective   OBJECTIVE   I have reviewed the vital signs.  Visit Vitals   • /74 (BP Location: Right arm, Patient Position: Sitting)   • Pulse 100   • Temp 97.9 °F (36.6 °C) (Oral)   • Resp 16   • Ht 68\" (172.7 cm)   • Wt 265 lb (120 kg)   • SpO2 92%   • BMI 40.29 kg/m2       Physical Exam:    General: Up on side of bed, NAD  HEENT: Normocephalic, mucous membranes moist, pupils equal  Neck: Supple, trachea " midline  Cardiovascular: Regular rate and rhythm, S1-S2, no murmur  Respiratory: coarse breath sounds throughout, even unlabored breathing on room air  Abdomen: Soft, nontender, nondistended, bowel sounds +   Skin: Clean, dry, intact, no lesions or sores  Extremities: CHOU, Symmetrical, pulses +, no edema noted  Neuro: Alert, oriented ×4, appropriate and cooperative,  and pedal pushes equal, sensation intact      Results:  I have reviewed the labs, culture data.      Results from last 7 days  Lab Units 03/16/17  0535 03/14/17  0438   WBC 10*3/mm3 8.71 9.63   HEMOGLOBIN g/dL 12.2 12.0   HEMATOCRIT % 38.3 37.3   PLATELETS 10*3/mm3 302 285       Results from last 7 days  Lab Units 03/18/17  0608  03/16/17  0535   SODIUM mmol/L  --   --  138   POTASSIUM mmol/L 3.7  < > 3.5   CHLORIDE mmol/L  --   --  96*   TOTAL CO2 mmol/L  --   --  33.0*   BUN mg/dL  --   --  14   CREATININE mg/dL  --   --  0.80   GLUCOSE mg/dL  --   --  95   CALCIUM mg/dL  --   --  10.0   < > = values in this interval not displayed.  Culture Data:  Cultures:    RESPIRATORY CULTURE   Date Value Ref Range Status   03/07/2017 Scant growth (1+) Pseudomonas aeruginosa (A)  Final   03/07/2017 Light growth (2+) Staphylococcus aureus, MRSA (C)  Final     Comment:       Methicillin resistant Staphylococcus aureus, Patient may be an isolation risk.  This isolate does not demonstrate inducible clindamycin resistance in vitro.     03/07/2017 Light growth (2+) Yeast isolated (A)  Final     : 3/3/2017 Department: [address] Released By/Authorizing: Effie Cardenas II, DO (auto-released)          AFB Culture   Order: [id number]   Status:  Preliminary result   Visible to patient:  No (Not Released)   Specimen Information: Oropharynx; Sputum        Culture   Scant growth (1+) Acid Fast Bacilli isolated (C)   Sent to [facility] laboratory for confirmation.        Stain   No acid fast bacilli seen on concentrated smear      Resulting Agency: [facility]       Specimen Collected: 03/03/17  8:00 AM Last Resulted: 03/15/17  3:15 PM                      I have reviewed the medications.    Assessment&#47;Plan   ASSESSMENT/PLAN    52-year-old  female with history of noncompliance. She smokes 2-3 packs of cigarettes per day. She reently went off her psych (Bipolar) meds. She presented to [facility] ED with increased shortness of breath and cough and fever. Reports recent hospitalization to [facility] about a week with no improvement      # Sepsis  --met criteria due to fever, hypoxia, tachycardia, leukocytosis and cultures positive for MRSA/pseudomona, ID consulted, following, currently monitoring off of antibiotics per ID last note      # A/C hypoxic resp failure-improving  --multifactorial COPD vs Pneumonia NASIM( vs neoplasm less likely)  --monitor off of abx per ID, completed treatment for MRSA/PSA  --resp culture + MRSA and Pseudomona    # Bipolar disorder/homeless  --suspect schizophrenia also given paranoia and hallucination  --pt recently stopped psych meds because they were causing her to hallucinate  --pt needs psych eval, but unfortunately we do not have inpt psych service, when ready for DC, may benefit from [facility] evaluation  --SW consulted and following  --patient states she must check on her car, which is impounded, before she goes anywhere after she is discharged.   --nurse to call and confirm zanaflex/neurontin doses  -complex social case  --will increase valium to Q 8 hrs today.       # Hypothyroidism- cont home meds      # Tobacco abuse-- counseled, cessation advised      # Labial Cyst--needs to follow up with Gyn      # Tachycardia--improved     # Vaginal Yeast--better per patient    # +AFB, further characterization pending, in isolation, further recommendations per ID/pulmonary  awaiting confirmation/ID from state laboratory.      PT/OT      Dispo: difficult secondary to homeless. Patient may go home with her brother vs. Back to her van  "which is currently impounded.  She can't afford to pay the fee to get it out.      RADHA Sarmiento  03/18/17  2:17 PM             Electronically signed by RADHA Sarmiento at 3/18/2017  2:24 PM      Mauricio Reaves MD at 3/19/2017 10:36 AM  Version 1 of 1               HOSPITALIST DAILY PROGRESS NOTE    Chief Complaint: f/u for soa    Subjective   SUBJECTIVE/OVERNIGHT EVENTS   Difficulty sleeping. L pleurisy better. Some sore throat and congestion. No other issues.    Review of Systems:  Gen-no fevers, no chills  CV-no chest pain  Resp-no cough, no dyspnea  GI-no N/V/D, no abd pain    Objective   OBJECTIVE   I have reviewed the vital signs.  Visit Vitals   • /84   • Pulse 90   • Temp 98.8 °F (37.1 °C) (Oral)   • Resp 20   • Ht 68\" (172.7 cm)   • Wt 265 lb (120 kg)   • SpO2 92%   • BMI 40.29 kg/m2       Physical Exam:  Gen-no acute distress, comfortable  CV-RRR, S1 S2 normal, no m/r/g  Resp-CTAB, no wheezes  Abd-soft, NT, ND, +BS  Ext-no edema  Neuro-A&Ox3, no focal deficits  Psych-appropriate mood and affect    Results:  I have reviewed the labs, culture data.      Results from last 7 days  Lab Units 03/16/17  0535 03/14/17  0438   WBC 10*3/mm3 8.71 9.63   HEMOGLOBIN g/dL 12.2 12.0   HEMATOCRIT % 38.3 37.3   PLATELETS 10*3/mm3 302 285       Results from last 7 days  Lab Units 03/18/17  0608  03/16/17  0535   SODIUM mmol/L  --   --  138   POTASSIUM mmol/L 3.7  < > 3.5   CHLORIDE mmol/L  --   --  96*   TOTAL CO2 mmol/L  --   --  33.0*   BUN mg/dL  --   --  14   CREATININE mg/dL  --   --  0.80   GLUCOSE mg/dL  --   --  95   CALCIUM mg/dL  --   --  10.0   < > = values in this interval not displayed.  Culture Data:  Cultures:    RESPIRATORY CULTURE   Date Value Ref Range Status   03/07/2017 Scant growth (1+) Pseudomonas aeruginosa (A)  Final   03/07/2017 Light growth (2+) Staphylococcus aureus, MRSA (C)  Final     Comment:       Methicillin resistant Staphylococcus aureus, Patient may be an isolation risk.  This " isolate does not demonstrate inducible clindamycin resistance in vitro.     03/07/2017 Light growth (2+) Yeast isolated (A)  Final     I have reviewed the medications.    Assessment&#47;Plan   ASSESSMENT/PLAN    52-year-old  female with history of noncompliance. She smokes 2-3 packs of cigarettes per day. She reently went off her psych (Bipolar) meds. She presented to [facility] ED with increased shortness of breath and cough and fever. Reports recent hospitalization to [facility] about a week with no improvement      # Sepsis  --met criteria due to fever, hypoxia, tachycardia, leukocytosis and cultures positive for MRSA/pseudomona, ID consulted, following, on abx as below      # A/C hypoxic resp failure-improving  --multifactorial COPD vs Pneumonia NASIM( vs neoplasm less likely)  --abx per ID, completed treatment for MRSA/PSA  --resp culture + MRSA and Pseudomona    # Bipolar disorder/homeless  --suspect schizophrenia also given paranoia and hallucination  --pt recently stopped psych meds a week ago because they were causing her to hallucinate  --pt needs psych eval, but unfortunately we do not have inpt psych service, when ready for DC, may benefit from [facility] evaluation  --SW consulted and following  --patient states she must check on her car, which is impounded, before she goes anywhere after she is discharged.   --nurse to call and confirm zanaflex/neurontin doses      # Hypothyroidism- cont home meds      # Tobacco abuse-- counseled, cessation advised      # Labial Cyst--needs to follow up with Gyn      # Tachycardia--increased atenolol, better     # Vaginal Yeast--better per patient    # +AFB, further characterization pending, in isolation, further recommendations per ID/pulmonary, once state lab testing complete    #Chronic pain-resumed her home neurontin     PT/OT      Dispo: difficult secondary to homeless. SS working on placement...    Mauricio Reaves MD  03/19/17  10:36  AM             Electronically signed by Mauricio Reaves MD at 3/19/2017 10:37 AM        Consult Notes (last 72 hours) (Notes from 3/16/2017  8:12 PM through 3/19/2017  8:12 PM)     No notes of this type exist for this encounter.

## 2017-03-20 NOTE — PLAN OF CARE
Problem: Patient Care Overview (Adult)  Goal: Adult Individualization and Mutuality  Outcome: Ongoing (interventions implemented as appropriate)    03/03/17 0650 03/15/17 1736 03/20/17 0404   Individualization   Patient Specific Preferences --  red jello, gatorade, chocolate ice cream for snacks --    Patient Specific Goals to have a place to go when she leaves here --  --    Patient Specific Interventions --  airborne precautions --    Mutuality/Individual Preferences   What Anxieties, Fears or Concerns Do You Have About Your Health or Care? concerned about medicine and where she will go when she leaves here --  --    What Questions Do You Have About Your Health or Care? --  --  Has concerns what her ultimate diagnosis is going to be and how that will effect the rest of her life       Goal: Discharge Needs Assessment  Outcome: Ongoing (interventions implemented as appropriate)    03/01/17 1548 03/03/17 0650 03/09/17 1743   Discharge Needs Assessment   Concerns To Be Addressed --  homelessness;financial/insurance concerns;mental health concerns;transportation;medication concerns --    Readmission Within The Last 30 Days --  --  no previous admission in last 30 days   Equipment Needed After Discharge --  --  --    Current Discharge Risk homeless --  --    Discharge Disposition --  still a patient --    Living Environment   Transportation Available --  --  car;family or friend will provide     03/11/17 1845   Discharge Needs Assessment   Concerns To Be Addressed --    Readmission Within The Last 30 Days --    Equipment Needed After Discharge none   Current Discharge Risk --    Discharge Disposition --    Living Environment   Transportation Available --          Problem: Pneumonia (Adult)  Goal: Signs and Symptoms of Listed Potential Problems Will be Absent or Manageable (Pneumonia)  Outcome: Ongoing (interventions implemented as appropriate)    03/20/17 0404   Pneumonia   Problems Assessed (Pneumonia) all   Problems  Present (Pneumonia) none         Problem: COPD, Chronic Bronchitis/Emphysema (Adult)  Goal: Signs and Symptoms of Listed Potential Problems Will be Absent or Manageable (COPD, Chronic Bronchitis/Emphysema)  Outcome: Ongoing (interventions implemented as appropriate)    03/20/17 0404   COPD, Chronic Bronchitis/Emphysema   Problems Assessed (COPD, Chronic Bronchitis/Emphysema) all   Problems Present (COPD, Chronic Bronchitis/Emphysema) depression;situational response         Problem: Wound, Traumatic, Nonburn (Adult)  Goal: Signs and Symptoms of Listed Potential Problems Will be Absent or Manageable (Wound, Traumatic, Nonburn)  Outcome: Ongoing (interventions implemented as appropriate)    03/20/17 0404   Wound, Traumatic, Nonburn   Problems Assessed (Wound) all   Problems Present (Wound) pain         Problem: Pain, Acute (Adult)  Goal: Identify Related Risk Factors and Signs and Symptoms  Outcome: Ongoing (interventions implemented as appropriate)    03/20/17 0404   Pain, Acute   Related Risk Factors (Acute Pain) disease process;knowledge deficit;persistent pain;fear;patient perception   Signs and Symptoms (Acute Pain) verbalization of pain descriptors;pacing/restlessness;facial mask of pain/grimace;fear of reinjury;sleep pattern alteration         Problem: Anxiety (Adult)  Goal: Identify Related Risk Factors and Signs and Symptoms  Outcome: Ongoing (interventions implemented as appropriate)    03/20/17 0404   Anxiety   Related Risk Factors (Anxiety) coping ineffective;economic status change;environment change;health status change;knowledge deficit;loss of control;psychosocial factor;situational/maturational crisis   Signs and Symptoms (Anxiety) apprehension/being worried;dependence increased;nervousness/tension/restlessness;preoccupation;sleep disturbance       Goal: Reduction/Resolution  Outcome: Ongoing (interventions implemented as appropriate)    03/20/17 0404   Anxiety (Adult)   Reduction/Resolution making  progress toward outcome         Problem: Bronchoscopy (Adult)  Goal: Signs and Symptoms of Listed Potential Problems Will be Absent or Manageable (Bronchoscopy)  Outcome: Ongoing (interventions implemented as appropriate)

## 2017-03-20 NOTE — PLAN OF CARE
Problem: Patient Care Overview (Adult)  Goal: Plan of Care Review  Outcome: Ongoing (interventions implemented as appropriate)    03/20/17 1612   Coping/Psychosocial Response Interventions   Plan Of Care Reviewed With patient   Patient Care Overview   Progress progress toward functional goals as expected   Outcome Evaluation   Outcome Summary/Follow up Plan Pt is now able to move around in room ad janis; declined OOB activity with OT this date due to fatigue and reports she has already been up frequently today. Good participation with ther ex sitting in bed.          Problem: Inpatient Occupational Therapy  Goal: Transfer Training Goal 1 LTG- OT  Outcome: Ongoing (interventions implemented as appropriate)    03/10/17 1146 03/20/17 1612   Transfer Training OT LTG   Transfer Training OT LTG, Date Established 03/10/17 --    Transfer Training OT LTG, Time to Achieve 1 wk --    Transfer Training OT LTG, Activity Type bed to chair /chair to bed;sit to stand/stand to sit;toilet --    Transfer Training OT LTG, [place] Level supervision required --    Transfer Training OT LTG, Assist Device (AAD) --    Transfer Training OT LTG, Outcome --  goal partially met       Goal: Activity Tolerance Goal LTG- OT  Outcome: Ongoing (interventions implemented as appropriate)    03/10/17 1146 03/20/17 1612   Activity Tolerance OT LTG   Activity Tolerance Goal OT LTG, Date Established 03/10/17 --    Activity Tolerance Goal OT LTG, Time to Achieve 1 wk --    Activity Tolerance Goal OT LTG, Activity Level 10 min activity  (with 1 rest break) --    Activity Tolerance Goal OT LTG, Outcome --  goal partially met

## 2017-03-21 PROCEDURE — 25010000002 ENOXAPARIN PER 10 MG: Performed by: INTERNAL MEDICINE

## 2017-03-21 PROCEDURE — 94799 UNLISTED PULMONARY SVC/PX: CPT

## 2017-03-21 PROCEDURE — 63710000001 PREDNISONE PER 5 MG: Performed by: FAMILY MEDICINE

## 2017-03-21 PROCEDURE — 94640 AIRWAY INHALATION TREATMENT: CPT

## 2017-03-21 PROCEDURE — 99232 SBSQ HOSP IP/OBS MODERATE 35: CPT | Performed by: NURSE PRACTITIONER

## 2017-03-21 RX ORDER — IBUPROFEN 600 MG/1
600 TABLET ORAL EVERY 8 HOURS PRN
Status: DISCONTINUED | OUTPATIENT
Start: 2017-03-21 | End: 2017-03-21

## 2017-03-21 RX ORDER — GUAIFENESIN 600 MG/1
1200 TABLET, EXTENDED RELEASE ORAL 2 TIMES DAILY PRN
Status: DISCONTINUED | OUTPATIENT
Start: 2017-03-21 | End: 2017-03-25 | Stop reason: HOSPADM

## 2017-03-21 RX ORDER — SIMETHICONE 80 MG
80 TABLET,CHEWABLE ORAL 4 TIMES DAILY PRN
Status: DISCONTINUED | OUTPATIENT
Start: 2017-03-21 | End: 2017-03-25 | Stop reason: HOSPADM

## 2017-03-21 RX ADMIN — MICONAZOLE NITRATE: 2 CREAM TOPICAL at 09:21

## 2017-03-21 RX ADMIN — LEVOTHYROXINE SODIUM 50 MCG: 25 TABLET ORAL at 06:30

## 2017-03-21 RX ADMIN — IPRATROPIUM BROMIDE AND ALBUTEROL SULFATE 3 ML: .5; 3 SOLUTION RESPIRATORY (INHALATION) at 11:54

## 2017-03-21 RX ADMIN — Medication 10 ML: at 17:20

## 2017-03-21 RX ADMIN — BENZONATATE 200 MG: 100 CAPSULE, LIQUID FILLED ORAL at 07:36

## 2017-03-21 RX ADMIN — Medication 10 ML: at 20:31

## 2017-03-21 RX ADMIN — HYDROCODONE BITARTATE AND ACETAMINOPHEN 1 TABLET: 5; 325 TABLET ORAL at 09:23

## 2017-03-21 RX ADMIN — BISACODYL 10 MG: 5 TABLET, COATED ORAL at 17:25

## 2017-03-21 RX ADMIN — CETIRIZINE HYDROCHLORIDE 10 MG: 10 TABLET, FILM COATED ORAL at 09:23

## 2017-03-21 RX ADMIN — SIMETHICONE CHEW TAB 80 MG 80 MG: 80 TABLET ORAL at 20:30

## 2017-03-21 RX ADMIN — Medication 10 ML: at 11:26

## 2017-03-21 RX ADMIN — PREDNISONE 10 MG: 10 TABLET ORAL at 09:23

## 2017-03-21 RX ADMIN — GABAPENTIN 800 MG: 400 CAPSULE ORAL at 17:24

## 2017-03-21 RX ADMIN — GABAPENTIN 800 MG: 400 CAPSULE ORAL at 06:30

## 2017-03-21 RX ADMIN — ATENOLOL 50 MG: 50 TABLET ORAL at 09:23

## 2017-03-21 RX ADMIN — ENOXAPARIN SODIUM 40 MG: 40 INJECTION SUBCUTANEOUS at 09:22

## 2017-03-21 RX ADMIN — MICONAZOLE NITRATE: 2 CREAM TOPICAL at 20:31

## 2017-03-21 RX ADMIN — BUDESONIDE AND FORMOTEROL FUMARATE DIHYDRATE 2 PUFF: 80; 4.5 AEROSOL RESPIRATORY (INHALATION) at 11:54

## 2017-03-21 RX ADMIN — Medication 2 TABLET: at 17:21

## 2017-03-21 RX ADMIN — ACETAMINOPHEN 650 MG: 325 TABLET, FILM COATED ORAL at 11:26

## 2017-03-21 RX ADMIN — FUROSEMIDE 40 MG: 40 TABLET ORAL at 09:23

## 2017-03-21 RX ADMIN — CYCLOBENZAPRINE HYDROCHLORIDE 10 MG: 10 TABLET, FILM COATED ORAL at 20:30

## 2017-03-21 RX ADMIN — Medication 5 MG: at 22:19

## 2017-03-21 RX ADMIN — IPRATROPIUM BROMIDE AND ALBUTEROL SULFATE 3 ML: .5; 3 SOLUTION RESPIRATORY (INHALATION) at 16:09

## 2017-03-21 RX ADMIN — DIAZEPAM 5 MG: 5 TABLET ORAL at 22:18

## 2017-03-21 RX ADMIN — FLUTICASONE PROPIONATE 2 SPRAY: 50 SPRAY, METERED NASAL at 09:22

## 2017-03-21 RX ADMIN — HYDROCODONE BITARTATE AND ACETAMINOPHEN 1 TABLET: 5; 325 TABLET ORAL at 02:56

## 2017-03-21 RX ADMIN — NICOTINE 1 PATCH: 21 PATCH, EXTENDED RELEASE TRANSDERMAL at 09:25

## 2017-03-21 RX ADMIN — BUDESONIDE AND FORMOTEROL FUMARATE DIHYDRATE 2 PUFF: 80; 4.5 AEROSOL RESPIRATORY (INHALATION) at 20:07

## 2017-03-21 RX ADMIN — THEOPHYLLINE 600 MG: 300 TABLET, EXTENDED RELEASE ORAL at 09:23

## 2017-03-21 RX ADMIN — Medication 10 ML: at 09:21

## 2017-03-21 RX ADMIN — HYDROCODONE BITARTATE AND ACETAMINOPHEN 1 TABLET: 5; 325 TABLET ORAL at 13:04

## 2017-03-21 RX ADMIN — GABAPENTIN 800 MG: 400 CAPSULE ORAL at 13:04

## 2017-03-21 RX ADMIN — THEOPHYLLINE 600 MG: 300 TABLET, EXTENDED RELEASE ORAL at 20:30

## 2017-03-21 RX ADMIN — PANTOPRAZOLE SODIUM 40 MG: 40 TABLET, DELAYED RELEASE ORAL at 09:23

## 2017-03-21 RX ADMIN — MONTELUKAST SODIUM 10 MG: 10 TABLET, FILM COATED ORAL at 20:30

## 2017-03-21 RX ADMIN — HYDROCODONE BITARTATE AND ACETAMINOPHEN 1 TABLET: 5; 325 TABLET ORAL at 17:23

## 2017-03-21 RX ADMIN — DULOXETINE 60 MG: 60 CAPSULE, DELAYED RELEASE ORAL at 09:22

## 2017-03-21 RX ADMIN — DIAZEPAM 5 MG: 5 TABLET ORAL at 02:56

## 2017-03-21 RX ADMIN — IPRATROPIUM BROMIDE AND ALBUTEROL SULFATE 3 ML: .5; 3 SOLUTION RESPIRATORY (INHALATION) at 20:07

## 2017-03-21 RX ADMIN — CYCLOBENZAPRINE HYDROCHLORIDE 10 MG: 10 TABLET, FILM COATED ORAL at 09:23

## 2017-03-21 RX ADMIN — CYCLOBENZAPRINE HYDROCHLORIDE 10 MG: 10 TABLET, FILM COATED ORAL at 17:23

## 2017-03-21 RX ADMIN — PRAVASTATIN SODIUM 80 MG: 40 TABLET ORAL at 20:30

## 2017-03-21 RX ADMIN — QUETIAPINE 300 MG: 200 TABLET, EXTENDED RELEASE ORAL at 20:29

## 2017-03-21 RX ADMIN — DIAZEPAM 5 MG: 5 TABLET ORAL at 09:23

## 2017-03-21 RX ADMIN — GUAIFENESIN 1200 MG: 600 TABLET, EXTENDED RELEASE ORAL at 17:25

## 2017-03-21 RX ADMIN — DIAZEPAM 5 MG: 5 TABLET ORAL at 17:23

## 2017-03-21 RX ADMIN — Medication 2 TABLET: at 09:23

## 2017-03-21 RX ADMIN — DULOXETINE 60 MG: 60 CAPSULE, DELAYED RELEASE ORAL at 20:30

## 2017-03-21 NOTE — PLAN OF CARE
Problem: Patient Care Overview (Adult)  Goal: Plan of Care Review  Outcome: Ongoing (interventions implemented as appropriate)    03/20/17 1640 03/21/17 0800   Coping/Psychosocial Response Interventions   Plan Of Care Reviewed With --  patient   Patient Care Overview   Progress no change --        Goal: Adult Individualization and Mutuality  Outcome: Ongoing (interventions implemented as appropriate)    03/03/17 0650 03/15/17 1736 03/20/17 0404   Individualization   Patient Specific Preferences --  red jello, gatorade, chocolate ice cream for snacks --    Patient Specific Goals to have a place to go when she leaves here --  --    Patient Specific Interventions --  airborne precautions --    Mutuality/Individual Preferences   What Anxieties, Fears or Concerns Do You Have About Your Health or Care? concerned about medicine and where she will go when she leaves here --  --    What Questions Do You Have About Your Health or Care? --  --  Has concerns what her ultimate diagnosis is going to be and how that will effect the rest of her life   What Information Would Help Us Give You More Personalized Care? none at this time --  --        Goal: Discharge Needs Assessment  Outcome: Ongoing (interventions implemented as appropriate)    03/03/17 0650   Discharge Needs Assessment   Concerns To Be Addressed homelessness;financial/insurance concerns;mental health concerns;transportation;medication concerns         Problem: Pneumonia (Adult)  Goal: Signs and Symptoms of Listed Potential Problems Will be Absent or Manageable (Pneumonia)  Outcome: Ongoing (interventions implemented as appropriate)    03/20/17 0404   Pneumonia   Problems Assessed (Pneumonia) all   Problems Present (Pneumonia) none         Problem: COPD, Chronic Bronchitis/Emphysema (Adult)  Goal: Signs and Symptoms of Listed Potential Problems Will be Absent or Manageable (COPD, Chronic Bronchitis/Emphysema)  Outcome: Ongoing (interventions implemented as  appropriate)    03/20/17 0404   COPD, Chronic Bronchitis/Emphysema   Problems Assessed (COPD, Chronic Bronchitis/Emphysema) all   Problems Present (COPD, Chronic Bronchitis/Emphysema) depression;situational response         Problem: Wound, Traumatic, Nonburn (Adult)  Goal: Signs and Symptoms of Listed Potential Problems Will be Absent or Manageable (Wound, Traumatic, Nonburn)  Outcome: Ongoing (interventions implemented as appropriate)    03/20/17 0404   Wound, Traumatic, Nonburn   Problems Assessed (Wound) all   Problems Present (Wound) pain         Problem: Pain, Acute (Adult)  Goal: Identify Related Risk Factors and Signs and Symptoms  Outcome: Ongoing (interventions implemented as appropriate)    03/20/17 0404   Pain, Acute   Related Risk Factors (Acute Pain) disease process;knowledge deficit;persistent pain;fear;patient perception   Signs and Symptoms (Acute Pain) verbalization of pain descriptors;pacing/restlessness;facial mask of pain/grimace;fear of reinjury;sleep pattern alteration         Problem: Anxiety (Adult)  Goal: Identify Related Risk Factors and Signs and Symptoms  Outcome: Ongoing (interventions implemented as appropriate)    03/20/17 0404   Anxiety   Related Risk Factors (Anxiety) coping ineffective;economic status change;environment change;health status change;knowledge deficit;loss of control;psychosocial factor;situational/maturational crisis   Signs and Symptoms (Anxiety) apprehension/being worried;dependence increased;nervousness/tension/restlessness;preoccupation;sleep disturbance       Goal: Reduction/Resolution  Outcome: Ongoing (interventions implemented as appropriate)    03/21/17 1233   Anxiety (Adult)   Reduction/Resolution making progress toward outcome

## 2017-03-21 NOTE — PROGRESS NOTES
"      HOSPITALIST DAILY PROGRESS NOTE    Chief Complaint: f/u for soa    Subjective   SUBJECTIVE/OVERNIGHT EVENTS   Multiple complaints -- feels constipated, would like home Linzess restarted.  Complains of difficulty sleeping. Complains of sciatic pain and states the current treatment (aspercreme, lortab, heating pad) is not working. Breathing is fine.  Would like to walk in the halls with a mask.    Review of Systems:  Gen-no fevers, no chills, anxious   CV-no chest pain  Resp-no cough, no dyspnea  GI-no N/V/D, no abd pain    Objective   OBJECTIVE   I have reviewed the vital signs.  Visit Vitals   • /67   • Pulse 88   • Temp 97.9 °F (36.6 °C) (Oral)   • Resp 18   • Ht 68\" (172.7 cm)   • Wt 265 lb (120 kg)   • SpO2 92%   • BMI 40.29 kg/m2       Physical Exam:  Gen-no acute distress, comfortable, pressured speech   CV-RRR, S1 S2 normal, no m/r/g  Resp- expiratory wheezes throughout  Abd-soft, NT, ND, +BS  Ext-no edema  Neuro-A&Ox3, no focal deficits  Psych-anxious     Results:  I have reviewed the labs, culture data.      Results from last 7 days  Lab Units 03/16/17  0535   WBC 10*3/mm3 8.71   HEMOGLOBIN g/dL 12.2   HEMATOCRIT % 38.3   PLATELETS 10*3/mm3 302       Results from last 7 days  Lab Units 03/18/17  0608  03/16/17  0535   SODIUM mmol/L  --   --  138   POTASSIUM mmol/L 3.7  < > 3.5   CHLORIDE mmol/L  --   --  96*   TOTAL CO2 mmol/L  --   --  33.0*   BUN mg/dL  --   --  14   CREATININE mg/dL  --   --  0.80   GLUCOSE mg/dL  --   --  95   CALCIUM mg/dL  --   --  10.0   < > = values in this interval not displayed.  Culture Data:  Cultures:    RESPIRATORY CULTURE   Date Value Ref Range Status   03/07/2017 Scant growth (1+) Pseudomonas aeruginosa (A)  Final   03/07/2017 Light growth (2+) Staphylococcus aureus, MRSA (C)  Final     Comment:       Methicillin resistant Staphylococcus aureus, Patient may be an isolation risk.  This isolate does not demonstrate inducible clindamycin resistance in vitro.   " "  03/07/2017 Light growth (2+) Yeast isolated (A)  Final     I have reviewed the medications.    Assessment/Plan   ASSESSMENT/PLAN    52-year-old  female with history of noncompliance. She smokes 2-3 packs of cigarettes per day. She reently went off her psych (Bipolar) meds. She presented to [facility] ED with increased shortness of breath and cough and fever. Reports recent hospitalization to [facility] about a week with no improvement      # Sepsis  --met criteria due to fever, hypoxia, tachycardia, leukocytosis and cultures positive for MRSA/pseudomona, ID consulted, following, on abx completed      # A/C hypoxic resp failure-improving  --multifactorial COPD vs Pneumonia NASIM( vs neoplasm less likely)  --abx per ID, completed treatment for MRSA/PSA  --resp culture + MRSA and Pseudomona    # Bipolar disorder/homeless  --suspect schizophrenia also given paranoia and hallucination  --pt recently stopped psych meds a week ago because they were causing her to hallucinate  --pt needs psych eval, but unfortunately we do not have inpt psych service, when ready for DC, may benefit from [facility] evaluation  --SW consulted and following  --patient states she must check on her car, which is impounded, before she goes anywhere after she is discharged.         # Hypothyroidism- cont home meds      # Tobacco abuse-- counseled, cessation advised      # Labial Cyst--needs to follow up with Gyn      # Tachycardia--increased atenolol, better     # Vaginal Yeast--better per patient    # Insomnia - seroquel 300, melatonin -- per nursing, she slept very heavily \"didn't move\" all night.    # +AFB, further characterization pending, in isolation, further recommendations per ID/pulmonary, once state lab testing complete    #Chronic pain-resumed her home neurontin     Acute on chronic back pain   - lortab, flexeril, aspercreme, heating pad, will add ibuprofen    Constipation  - continue bowel regimen  - nursing to check " on home linzess dose      - will add mucinex per request  - requests sleeping aid, but nursing reports she is sleeping very heavily, on seroquel XL 300mg + melatonin      Dispo: difficult secondary to homeless. SS working on placement...DON[facility] ID    Lexus Klein, APRN  03/21/17  5:06 PM

## 2017-03-21 NOTE — NURSING NOTE
Patient removed her own IV and refuses to let nurse replace it until there is a need she states. MD aware.

## 2017-03-21 NOTE — PLAN OF CARE
Problem: Anxiety (Adult)  Goal: Reduction/Resolution  Outcome: Ongoing (interventions implemented as appropriate)

## 2017-03-22 LAB
ALBUMIN SERPL-MCNC: 3.6 G/DL (ref 3.2–4.8)
ALBUMIN/GLOB SERPL: 1 G/DL (ref 1.5–2.5)
ALP SERPL-CCNC: 82 U/L (ref 25–100)
ALT SERPL W P-5'-P-CCNC: 15 U/L (ref 7–40)
ANION GAP SERPL CALCULATED.3IONS-SCNC: 5 MMOL/L (ref 3–11)
AST SERPL-CCNC: 13 U/L (ref 0–33)
BASOPHILS # BLD AUTO: 0.06 10*3/MM3 (ref 0–0.2)
BASOPHILS NFR BLD AUTO: 0.7 % (ref 0–1)
BILIRUB SERPL-MCNC: 0.2 MG/DL (ref 0.3–1.2)
BUN BLD-MCNC: 19 MG/DL (ref 9–23)
BUN/CREAT SERPL: 23.8 (ref 7–25)
CALCIUM SPEC-SCNC: 9.6 MG/DL (ref 8.7–10.4)
CHLORIDE SERPL-SCNC: 96 MMOL/L (ref 99–109)
CO2 SERPL-SCNC: 39 MMOL/L (ref 20–31)
CREAT BLD-MCNC: 0.8 MG/DL (ref 0.6–1.3)
DEPRECATED RDW RBC AUTO: 52.1 FL (ref 37–54)
EOSINOPHIL # BLD AUTO: 0.17 10*3/MM3 (ref 0.1–0.3)
EOSINOPHIL NFR BLD AUTO: 1.9 % (ref 0–3)
ERYTHROCYTE [DISTWIDTH] IN BLOOD BY AUTOMATED COUNT: 14.9 % (ref 11.3–14.5)
GFR SERPL CREATININE-BSD FRML MDRD: 75 ML/MIN/1.73
GLOBULIN UR ELPH-MCNC: 3.5 GM/DL
GLUCOSE BLD-MCNC: 76 MG/DL (ref 70–100)
HCT VFR BLD AUTO: 35.9 % (ref 34.5–44)
HGB BLD-MCNC: 11.3 G/DL (ref 11.5–15.5)
IMM GRANULOCYTES # BLD: 0.29 10*3/MM3 (ref 0–0.03)
IMM GRANULOCYTES NFR BLD: 3.2 % (ref 0–0.6)
LYMPHOCYTES # BLD AUTO: 4.42 10*3/MM3 (ref 0.6–4.8)
LYMPHOCYTES NFR BLD AUTO: 49.1 % (ref 24–44)
MAGNESIUM SERPL-MCNC: 1.8 MG/DL (ref 1.3–2.7)
MCH RBC QN AUTO: 30.2 PG (ref 27–31)
MCHC RBC AUTO-ENTMCNC: 31.5 G/DL (ref 32–36)
MCV RBC AUTO: 96 FL (ref 80–99)
MONOCYTES # BLD AUTO: 0.73 10*3/MM3 (ref 0–1)
MONOCYTES NFR BLD AUTO: 8.1 % (ref 0–12)
NEUTROPHILS # BLD AUTO: 3.33 10*3/MM3 (ref 1.5–8.3)
NEUTROPHILS NFR BLD AUTO: 37 % (ref 41–71)
PLATELET # BLD AUTO: 277 10*3/MM3 (ref 150–450)
PMV BLD AUTO: 9.3 FL (ref 6–12)
POTASSIUM BLD-SCNC: 3.7 MMOL/L (ref 3.5–5.5)
PROT SERPL-MCNC: 7.1 G/DL (ref 5.7–8.2)
RBC # BLD AUTO: 3.74 10*6/MM3 (ref 3.89–5.14)
SODIUM BLD-SCNC: 140 MMOL/L (ref 132–146)
WBC NRBC COR # BLD: 9 10*3/MM3 (ref 3.5–10.8)

## 2017-03-22 PROCEDURE — 94799 UNLISTED PULMONARY SVC/PX: CPT

## 2017-03-22 PROCEDURE — 85025 COMPLETE CBC W/AUTO DIFF WBC: CPT | Performed by: NURSE PRACTITIONER

## 2017-03-22 PROCEDURE — 63710000001 PREDNISONE PER 5 MG: Performed by: FAMILY MEDICINE

## 2017-03-22 PROCEDURE — 94640 AIRWAY INHALATION TREATMENT: CPT

## 2017-03-22 PROCEDURE — 99233 SBSQ HOSP IP/OBS HIGH 50: CPT | Performed by: NURSE PRACTITIONER

## 2017-03-22 PROCEDURE — 83735 ASSAY OF MAGNESIUM: CPT | Performed by: NURSE PRACTITIONER

## 2017-03-22 PROCEDURE — 80053 COMPREHEN METABOLIC PANEL: CPT | Performed by: NURSE PRACTITIONER

## 2017-03-22 PROCEDURE — 25010000002 ENOXAPARIN PER 10 MG: Performed by: INTERNAL MEDICINE

## 2017-03-22 RX ORDER — PANTOPRAZOLE SODIUM 40 MG/1
40 TABLET, DELAYED RELEASE ORAL
Status: DISCONTINUED | OUTPATIENT
Start: 2017-03-22 | End: 2017-03-25 | Stop reason: HOSPADM

## 2017-03-22 RX ORDER — CAPSAICIN 0.75 MG/G
CREAM TOPICAL EVERY 8 HOURS
Status: DISCONTINUED | OUTPATIENT
Start: 2017-03-22 | End: 2017-03-25 | Stop reason: HOSPADM

## 2017-03-22 RX ORDER — MAGNESIUM CARB/ALUMINUM HYDROX 105-160MG
150 TABLET,CHEWABLE ORAL ONCE
Status: COMPLETED | OUTPATIENT
Start: 2017-03-22 | End: 2017-03-22

## 2017-03-22 RX ORDER — GABAPENTIN 400 MG/1
800 CAPSULE ORAL 4 TIMES DAILY
Status: DISCONTINUED | OUTPATIENT
Start: 2017-03-22 | End: 2017-03-23

## 2017-03-22 RX ORDER — HYDROCODONE BITARTRATE AND ACETAMINOPHEN 5; 325 MG/1; MG/1
1 TABLET ORAL EVERY 6 HOURS PRN
Status: DISCONTINUED | OUTPATIENT
Start: 2017-03-22 | End: 2017-03-25 | Stop reason: HOSPADM

## 2017-03-22 RX ADMIN — GABAPENTIN 800 MG: 400 CAPSULE ORAL at 01:00

## 2017-03-22 RX ADMIN — HYDROCODONE BITARTRATE AND ACETAMINOPHEN 1 TABLET: 5; 325 TABLET ORAL at 17:37

## 2017-03-22 RX ADMIN — Medication 400 MG: at 12:30

## 2017-03-22 RX ADMIN — DULOXETINE 60 MG: 60 CAPSULE, DELAYED RELEASE ORAL at 08:34

## 2017-03-22 RX ADMIN — FLUTICASONE PROPIONATE 2 SPRAY: 50 SPRAY, METERED NASAL at 08:34

## 2017-03-22 RX ADMIN — PANTOPRAZOLE SODIUM 40 MG: 40 TABLET, DELAYED RELEASE ORAL at 17:37

## 2017-03-22 RX ADMIN — PANTOPRAZOLE SODIUM 40 MG: 40 TABLET, DELAYED RELEASE ORAL at 08:33

## 2017-03-22 RX ADMIN — ENOXAPARIN SODIUM 40 MG: 40 INJECTION SUBCUTANEOUS at 08:34

## 2017-03-22 RX ADMIN — GABAPENTIN 800 MG: 400 CAPSULE ORAL at 12:30

## 2017-03-22 RX ADMIN — Medication: at 22:53

## 2017-03-22 RX ADMIN — THEOPHYLLINE 600 MG: 300 TABLET, EXTENDED RELEASE ORAL at 08:33

## 2017-03-22 RX ADMIN — TEMAZEPAM 30 MG: 15 CAPSULE ORAL at 23:29

## 2017-03-22 RX ADMIN — NICOTINE 1 PATCH: 21 PATCH, EXTENDED RELEASE TRANSDERMAL at 08:33

## 2017-03-22 RX ADMIN — THEOPHYLLINE 600 MG: 300 TABLET, EXTENDED RELEASE ORAL at 22:50

## 2017-03-22 RX ADMIN — Medication 2 TABLET: at 17:37

## 2017-03-22 RX ADMIN — BENZONATATE 200 MG: 100 CAPSULE, LIQUID FILLED ORAL at 23:29

## 2017-03-22 RX ADMIN — IPRATROPIUM BROMIDE AND ALBUTEROL SULFATE 3 ML: .5; 3 SOLUTION RESPIRATORY (INHALATION) at 20:25

## 2017-03-22 RX ADMIN — GABAPENTIN 800 MG: 400 CAPSULE ORAL at 23:29

## 2017-03-22 RX ADMIN — MICONAZOLE NITRATE: 2 CREAM TOPICAL at 08:35

## 2017-03-22 RX ADMIN — ONDANSETRON 4 MG: 4 TABLET, FILM COATED ORAL at 23:29

## 2017-03-22 RX ADMIN — MICONAZOLE NITRATE: 2 CREAM TOPICAL at 22:52

## 2017-03-22 RX ADMIN — GUAIFENESIN 1200 MG: 600 TABLET, EXTENDED RELEASE ORAL at 10:07

## 2017-03-22 RX ADMIN — DIAZEPAM 5 MG: 5 TABLET ORAL at 23:30

## 2017-03-22 RX ADMIN — BUDESONIDE AND FORMOTEROL FUMARATE DIHYDRATE 2 PUFF: 80; 4.5 AEROSOL RESPIRATORY (INHALATION) at 08:31

## 2017-03-22 RX ADMIN — CYCLOBENZAPRINE HYDROCHLORIDE 10 MG: 10 TABLET, FILM COATED ORAL at 17:37

## 2017-03-22 RX ADMIN — DULOXETINE 60 MG: 60 CAPSULE, DELAYED RELEASE ORAL at 22:50

## 2017-03-22 RX ADMIN — CYCLOBENZAPRINE HYDROCHLORIDE 10 MG: 10 TABLET, FILM COATED ORAL at 08:33

## 2017-03-22 RX ADMIN — Medication: at 12:30

## 2017-03-22 RX ADMIN — MONTELUKAST SODIUM 10 MG: 10 TABLET, FILM COATED ORAL at 22:51

## 2017-03-22 RX ADMIN — Medication 400 MG: at 17:37

## 2017-03-22 RX ADMIN — BUDESONIDE AND FORMOTEROL FUMARATE DIHYDRATE 2 PUFF: 80; 4.5 AEROSOL RESPIRATORY (INHALATION) at 20:24

## 2017-03-22 RX ADMIN — SIMETHICONE CHEW TAB 80 MG 80 MG: 80 TABLET ORAL at 15:17

## 2017-03-22 RX ADMIN — DIAZEPAM 5 MG: 5 TABLET ORAL at 15:17

## 2017-03-22 RX ADMIN — PREDNISONE 10 MG: 10 TABLET ORAL at 08:33

## 2017-03-22 RX ADMIN — QUETIAPINE 300 MG: 200 TABLET, EXTENDED RELEASE ORAL at 22:50

## 2017-03-22 RX ADMIN — Medication 2 TABLET: at 08:33

## 2017-03-22 RX ADMIN — Medication 10 ML: at 22:52

## 2017-03-22 RX ADMIN — GABAPENTIN 800 MG: 400 CAPSULE ORAL at 07:00

## 2017-03-22 RX ADMIN — PRAVASTATIN SODIUM 80 MG: 40 TABLET ORAL at 22:51

## 2017-03-22 RX ADMIN — MAGNESIUM CITRATE 150 ML: 1.75 LIQUID ORAL at 17:37

## 2017-03-22 RX ADMIN — GABAPENTIN 800 MG: 400 CAPSULE ORAL at 17:37

## 2017-03-22 RX ADMIN — CETIRIZINE HYDROCHLORIDE 10 MG: 10 TABLET, FILM COATED ORAL at 08:33

## 2017-03-22 RX ADMIN — LEVOTHYROXINE SODIUM 50 MCG: 25 TABLET ORAL at 06:00

## 2017-03-22 RX ADMIN — ACETAMINOPHEN 650 MG: 325 TABLET, FILM COATED ORAL at 02:00

## 2017-03-22 RX ADMIN — FUROSEMIDE 40 MG: 40 TABLET ORAL at 08:34

## 2017-03-22 RX ADMIN — CYCLOBENZAPRINE HYDROCHLORIDE 10 MG: 10 TABLET, FILM COATED ORAL at 22:51

## 2017-03-22 RX ADMIN — IPRATROPIUM BROMIDE AND ALBUTEROL SULFATE 3 ML: .5; 3 SOLUTION RESPIRATORY (INHALATION) at 08:31

## 2017-03-22 NOTE — PLAN OF CARE
Problem: Anxiety (Adult)  Intervention: Promote Anxiety Reduction/Resolution    03/21/17 2000   Coping Strategies   Supportive Measures active listening utilized;relaxation techniques promoted;self-care encouraged   Family/Support System Care self-care encouraged;support provided   Coping/Psychosocial Interventions   Environmental Support calm environment promoted;rest periods encouraged   Cognitive Interventions   Sensory Stimulation Regulation lighting decreased;care clustered;quiet environment promoted         Goal: Identify Related Risk Factors and Signs and Symptoms  Outcome: Ongoing (interventions implemented as appropriate)    03/20/17 0404   Anxiety   Related Risk Factors (Anxiety) coping ineffective;economic status change;environment change;health status change;knowledge deficit;loss of control;psychosocial factor;situational/maturational crisis   Signs and Symptoms (Anxiety) apprehension/being worried;dependence increased;nervousness/tension/restlessness;preoccupation;sleep disturbance       Goal: Reduction/Resolution  Outcome: Ongoing (interventions implemented as appropriate)    03/21/17 1555   Anxiety (Adult)   Reduction/Resolution making progress toward outcome         Problem: Bronchoscopy (Adult)  Intervention: Monitor/Manage Procedure Recovery    03/13/17 0800 03/21/17 2000 03/22/17 0148   Activity   Activity Type --  --  up ad janis   Respiratory Interventions   Airway/Ventilation Management airway patency maintained --  --    Coping/Psychosocial Interventions   Environmental Support --  calm environment promoted;rest periods encouraged --        Intervention: Promote Effective Ventilation During Bronchoscopy    03/13/17 0800 03/21/17 0600   Positioning   Head Of Bed (HOB) Position --  HOB elevated   Respiratory Interventions   Airway/Ventilation Management airway patency maintained --          Goal: Signs and Symptoms of Listed Potential Problems Will be Absent or Manageable (Bronchoscopy)  Outcome:  Ongoing (interventions implemented as appropriate)    03/20/17 0404   Bronchoscopy   Problems Assessed (Bronchoscopy) all   Problems Present (Bronchoscopy) none

## 2017-03-22 NOTE — PLAN OF CARE
Problem: Patient Care Overview (Adult)  Goal: Plan of Care Review    03/22/17 1604   Coping/Psychosocial Response Interventions   Plan Of Care Reviewed With patient   Patient Care Overview   Progress no change       Goal: Adult Individualization and Mutuality  Outcome: Ongoing (interventions implemented as appropriate)    03/03/17 0650 03/15/17 1736 03/20/17 0404   Individualization   Patient Specific Preferences --  red jello, gatorade, chocolate ice cream for snacks --    Patient Specific Goals to have a place to go when she leaves here --  --    Patient Specific Interventions --  airborne precautions --    Mutuality/Individual Preferences   What Anxieties, Fears or Concerns Do You Have About Your Health or Care? concerned about medicine and where she will go when she leaves here --  --    What Questions Do You Have About Your Health or Care? --  --  Has concerns what her ultimate diagnosis is going to be and how that will effect the rest of her life   What Information Would Help Us Give You More Personalized Care? none at this time --  --        Goal: Discharge Needs Assessment  Outcome: Ongoing (interventions implemented as appropriate)    03/01/17 1548 03/03/17 0650 03/09/17 1743   Discharge Needs Assessment   Concerns To Be Addressed --  homelessness;financial/insurance concerns;mental health concerns;transportation;medication concerns --    Readmission Within The Last 30 Days --  --  no previous admission in last 30 days   Equipment Needed After Discharge --  --  --    Current Discharge Risk homeless --  --    Discharge Disposition --  --  --    Current Health   Anticipated Changes Related to Illness --  --  none   Self-Care   Equipment Currently Used at Home --  --  --    Living Environment   Transportation Available --  --  --      03/11/17 1845 03/15/17 0904 03/22/17 1604   Discharge Needs Assessment   Concerns To Be Addressed --  --  --    Readmission Within The Last 30 Days --  --  --    Equipment Needed  After Discharge none --  --    Current Discharge Risk --  --  --    Discharge Disposition --  --  still a patient   Current Health   Anticipated Changes Related to Illness --  --  --    Self-Care   Equipment Currently Used at Home --  bath bench --    Living Environment   Transportation Available --  --  taxi         Problem: Pneumonia (Adult)  Goal: Signs and Symptoms of Listed Potential Problems Will be Absent or Manageable (Pneumonia)  Outcome: Ongoing (interventions implemented as appropriate)    03/22/17 1604   Pneumonia   Problems Assessed (Pneumonia) all   Problems Present (Pneumonia) respiratory compromise;other (see comments)  (awaiting AF[employer] results)         Problem: COPD, Chronic Bronchitis/Emphysema (Adult)  Goal: Signs and Symptoms of Listed Potential Problems Will be Absent or Manageable (COPD, Chronic Bronchitis/Emphysema)  Outcome: Ongoing (interventions implemented as appropriate)    03/22/17 1604   COPD, Chronic Bronchitis/Emphysema   Problems Assessed (COPD, Chronic Bronchitis/Emphysema) all   Problems Present (COPD, Chronic Bronchitis/Emphysema) depression;functional decline/self-care deficit         Problem: Wound, Traumatic, Nonburn (Adult)  Goal: Signs and Symptoms of Listed Potential Problems Will be Absent or Manageable (Wound, Traumatic, Nonburn)  Outcome: Ongoing (interventions implemented as appropriate)    03/22/17 1604   Wound, Traumatic, Nonburn   Problems Assessed (Wound) all   Problems Present (Wound) pain         Problem: Pain, Acute (Adult)  Goal: Identify Related Risk Factors and Signs and Symptoms  Outcome: Ongoing (interventions implemented as appropriate)    03/20/17 0404   Pain, Acute   Related Risk Factors (Acute Pain) disease process;knowledge deficit;persistent pain;fear;patient perception   Signs and Symptoms (Acute Pain) verbalization of pain descriptors;pacing/restlessness;facial mask of pain/grimace;fear of reinjury;sleep pattern alteration         Problem: Anxiety  (Adult)  Goal: Identify Related Risk Factors and Signs and Symptoms  Outcome: Ongoing (interventions implemented as appropriate)    03/20/17 0404   Anxiety   Related Risk Factors (Anxiety) coping ineffective;economic status change;environment change;health status change;knowledge deficit;loss of control;psychosocial factor;situational/maturational crisis   Signs and Symptoms (Anxiety) apprehension/being worried;dependence increased;nervousness/tension/restlessness;preoccupation;sleep disturbance       Goal: Reduction/Resolution  Outcome: Ongoing (interventions implemented as appropriate)    03/22/17 1604   Anxiety (Adult)   Reduction/Resolution making progress toward outcome         Problem: Bronchoscopy (Adult)  Goal: Signs and Symptoms of Listed Potential Problems Will be Absent or Manageable (Bronchoscopy)  Outcome: Ongoing (interventions implemented as appropriate)    03/22/17 1604   Bronchoscopy   Problems Assessed (Bronchoscopy) all   Problems Present (Bronchoscopy) other (see comments);none

## 2017-03-23 ENCOUNTER — APPOINTMENT (OUTPATIENT)
Dept: GENERAL RADIOLOGY | Facility: HOSPITAL | Age: 52
End: 2017-03-23

## 2017-03-23 LAB
ANION GAP SERPL CALCULATED.3IONS-SCNC: 2 MMOL/L (ref 3–11)
BUN BLD-MCNC: 18 MG/DL (ref 9–23)
BUN/CREAT SERPL: 20 (ref 7–25)
CALCIUM SPEC-SCNC: 9.6 MG/DL (ref 8.7–10.4)
CHLORIDE SERPL-SCNC: 95 MMOL/L (ref 99–109)
CO2 SERPL-SCNC: 40 MMOL/L (ref 20–31)
CREAT BLD-MCNC: 0.9 MG/DL (ref 0.6–1.3)
GFR SERPL CREATININE-BSD FRML MDRD: 65 ML/MIN/1.73
GLUCOSE BLD-MCNC: 80 MG/DL (ref 70–100)
GLUCOSE BLDC GLUCOMTR-MCNC: 183 MG/DL (ref 70–130)
POTASSIUM BLD-SCNC: 4.1 MMOL/L (ref 3.5–5.5)
SODIUM BLD-SCNC: 137 MMOL/L (ref 132–146)

## 2017-03-23 PROCEDURE — 74000 HC ABDOMEN KUB: CPT

## 2017-03-23 PROCEDURE — 80048 BASIC METABOLIC PNL TOTAL CA: CPT | Performed by: NURSE PRACTITIONER

## 2017-03-23 PROCEDURE — 82962 GLUCOSE BLOOD TEST: CPT

## 2017-03-23 PROCEDURE — 94640 AIRWAY INHALATION TREATMENT: CPT

## 2017-03-23 PROCEDURE — 25010000002 ENOXAPARIN PER 10 MG: Performed by: INTERNAL MEDICINE

## 2017-03-23 PROCEDURE — 97530 THERAPEUTIC ACTIVITIES: CPT | Performed by: OCCUPATIONAL THERAPIST

## 2017-03-23 PROCEDURE — 94799 UNLISTED PULMONARY SVC/PX: CPT

## 2017-03-23 PROCEDURE — 83735 ASSAY OF MAGNESIUM: CPT | Performed by: NURSE PRACTITIONER

## 2017-03-23 PROCEDURE — 99232 SBSQ HOSP IP/OBS MODERATE 35: CPT | Performed by: FAMILY MEDICINE

## 2017-03-23 PROCEDURE — 63710000001 PREDNISONE PER 5 MG: Performed by: NURSE PRACTITIONER

## 2017-03-23 RX ORDER — PREDNISONE 1 MG/1
5 TABLET ORAL
Status: COMPLETED | OUTPATIENT
Start: 2017-03-23 | End: 2017-03-25

## 2017-03-23 RX ORDER — ATENOLOL 25 MG/1
25 TABLET ORAL DAILY
Status: DISCONTINUED | OUTPATIENT
Start: 2017-03-24 | End: 2017-03-23

## 2017-03-23 RX ORDER — DIAZEPAM 5 MG/1
5 TABLET ORAL EVERY 8 HOURS PRN
Status: DISCONTINUED | OUTPATIENT
Start: 2017-03-23 | End: 2017-03-25 | Stop reason: HOSPADM

## 2017-03-23 RX ORDER — ATENOLOL 25 MG/1
12.5 TABLET ORAL DAILY
Status: DISCONTINUED | OUTPATIENT
Start: 2017-03-24 | End: 2017-03-25 | Stop reason: HOSPADM

## 2017-03-23 RX ORDER — GABAPENTIN 300 MG/1
600 CAPSULE ORAL 4 TIMES DAILY
Status: DISCONTINUED | OUTPATIENT
Start: 2017-03-23 | End: 2017-03-25 | Stop reason: HOSPADM

## 2017-03-23 RX ADMIN — Medication: at 22:13

## 2017-03-23 RX ADMIN — DULOXETINE 60 MG: 60 CAPSULE, DELAYED RELEASE ORAL at 08:52

## 2017-03-23 RX ADMIN — Medication 400 MG: at 17:09

## 2017-03-23 RX ADMIN — ACETAMINOPHEN 650 MG: 325 TABLET, FILM COATED ORAL at 09:05

## 2017-03-23 RX ADMIN — MONTELUKAST SODIUM 10 MG: 10 TABLET, FILM COATED ORAL at 22:12

## 2017-03-23 RX ADMIN — HYDROCODONE BITARTRATE AND ACETAMINOPHEN 1 TABLET: 5; 325 TABLET ORAL at 22:12

## 2017-03-23 RX ADMIN — DULOXETINE 60 MG: 60 CAPSULE, DELAYED RELEASE ORAL at 22:12

## 2017-03-23 RX ADMIN — MICONAZOLE NITRATE: 2 CREAM TOPICAL at 22:10

## 2017-03-23 RX ADMIN — ENOXAPARIN SODIUM 40 MG: 40 INJECTION SUBCUTANEOUS at 08:53

## 2017-03-23 RX ADMIN — PANTOPRAZOLE SODIUM 40 MG: 40 TABLET, DELAYED RELEASE ORAL at 17:09

## 2017-03-23 RX ADMIN — Medication 2 TABLET: at 17:08

## 2017-03-23 RX ADMIN — Medication: at 05:51

## 2017-03-23 RX ADMIN — GABAPENTIN 600 MG: 300 CAPSULE ORAL at 22:11

## 2017-03-23 RX ADMIN — HYDROCODONE BITARTRATE AND ACETAMINOPHEN 1 TABLET: 5; 325 TABLET ORAL at 03:01

## 2017-03-23 RX ADMIN — FUROSEMIDE 40 MG: 40 TABLET ORAL at 08:53

## 2017-03-23 RX ADMIN — THEOPHYLLINE 600 MG: 300 TABLET, EXTENDED RELEASE ORAL at 08:53

## 2017-03-23 RX ADMIN — Medication 5 MG: at 22:12

## 2017-03-23 RX ADMIN — Medication 2 TABLET: at 08:51

## 2017-03-23 RX ADMIN — LEVOTHYROXINE SODIUM 50 MCG: 25 TABLET ORAL at 05:50

## 2017-03-23 RX ADMIN — HYDROCODONE BITARTRATE AND ACETAMINOPHEN 1 TABLET: 5; 325 TABLET ORAL at 13:04

## 2017-03-23 RX ADMIN — PANTOPRAZOLE SODIUM 40 MG: 40 TABLET, DELAYED RELEASE ORAL at 08:51

## 2017-03-23 RX ADMIN — IPRATROPIUM BROMIDE AND ALBUTEROL SULFATE 3 ML: .5; 3 SOLUTION RESPIRATORY (INHALATION) at 18:53

## 2017-03-23 RX ADMIN — Medication 400 MG: at 08:51

## 2017-03-23 RX ADMIN — CETIRIZINE HYDROCHLORIDE 10 MG: 10 TABLET, FILM COATED ORAL at 08:51

## 2017-03-23 RX ADMIN — CYCLOBENZAPRINE HYDROCHLORIDE 10 MG: 10 TABLET, FILM COATED ORAL at 17:09

## 2017-03-23 RX ADMIN — QUETIAPINE 300 MG: 200 TABLET, EXTENDED RELEASE ORAL at 22:10

## 2017-03-23 RX ADMIN — CYCLOBENZAPRINE HYDROCHLORIDE 10 MG: 10 TABLET, FILM COATED ORAL at 08:52

## 2017-03-23 RX ADMIN — NICOTINE 1 PATCH: 21 PATCH, EXTENDED RELEASE TRANSDERMAL at 08:53

## 2017-03-23 RX ADMIN — GABAPENTIN 800 MG: 400 CAPSULE ORAL at 17:08

## 2017-03-23 RX ADMIN — PREDNISONE 5 MG: 5 TABLET ORAL at 08:51

## 2017-03-23 RX ADMIN — THEOPHYLLINE 600 MG: 300 TABLET, EXTENDED RELEASE ORAL at 22:12

## 2017-03-23 RX ADMIN — BUDESONIDE AND FORMOTEROL FUMARATE DIHYDRATE 2 PUFF: 80; 4.5 AEROSOL RESPIRATORY (INHALATION) at 12:38

## 2017-03-23 RX ADMIN — DIAZEPAM 5 MG: 5 TABLET ORAL at 15:07

## 2017-03-23 RX ADMIN — PRAVASTATIN SODIUM 80 MG: 40 TABLET ORAL at 22:11

## 2017-03-23 RX ADMIN — IPRATROPIUM BROMIDE AND ALBUTEROL SULFATE 3 ML: .5; 3 SOLUTION RESPIRATORY (INHALATION) at 16:30

## 2017-03-23 RX ADMIN — MICONAZOLE NITRATE: 2 CREAM TOPICAL at 08:54

## 2017-03-23 RX ADMIN — IPRATROPIUM BROMIDE AND ALBUTEROL SULFATE 3 ML: .5; 3 SOLUTION RESPIRATORY (INHALATION) at 12:56

## 2017-03-23 RX ADMIN — CYCLOBENZAPRINE HYDROCHLORIDE 10 MG: 10 TABLET, FILM COATED ORAL at 22:11

## 2017-03-23 RX ADMIN — SIMETHICONE CHEW TAB 80 MG 80 MG: 80 TABLET ORAL at 09:05

## 2017-03-23 RX ADMIN — BUDESONIDE AND FORMOTEROL FUMARATE DIHYDRATE 2 PUFF: 80; 4.5 AEROSOL RESPIRATORY (INHALATION) at 18:53

## 2017-03-23 RX ADMIN — DIAZEPAM 5 MG: 5 TABLET ORAL at 23:48

## 2017-03-23 RX ADMIN — SODIUM CHLORIDE 250 ML: 9 INJECTION, SOLUTION INTRAVENOUS at 13:04

## 2017-03-23 RX ADMIN — GABAPENTIN 800 MG: 400 CAPSULE ORAL at 12:47

## 2017-03-23 RX ADMIN — SIMETHICONE CHEW TAB 80 MG 80 MG: 80 TABLET ORAL at 03:04

## 2017-03-23 RX ADMIN — GABAPENTIN 800 MG: 400 CAPSULE ORAL at 05:50

## 2017-03-23 RX ADMIN — ATENOLOL 50 MG: 50 TABLET ORAL at 08:52

## 2017-03-23 RX ADMIN — FLUTICASONE PROPIONATE 2 SPRAY: 50 SPRAY, METERED NASAL at 08:53

## 2017-03-23 NOTE — NURSING NOTE
NP called concerning pt report of right quad pain and now dizziness.  VS stable - KUB came back normal.  Orders to watch pt and leave note with dayshift concerning pt lack of compliance with medical care (refuses tele, IV, DVT prophylaxis) and continued request for pain meds, and labile behavior for last part of shift

## 2017-03-23 NOTE — PROGRESS NOTES
Continued Stay Note  [facility]     Patient Name: Colleen Gonzalez  MRN: [id number]  Today's Date: 3/23/2017    Admit Date: 3/1/2017          Discharge Plan       03/23/17 1213    Case Management/Social Work Plan    Plan discharge plan    Patient/Family In Agreement With Plan yes    Additional Comments Per MD note, awaiting results from state lab. CM/SW will cont to follow.              Discharge Codes     None        Expected Discharge Date and Time     Expected Discharge Date Expected Discharge Time    Mar 4, 2017             Sun Jose RN

## 2017-03-23 NOTE — PLAN OF CARE
Problem: Patient Care Overview (Adult)  Goal: Plan of Care Review  Outcome: Outcome(s) achieved Date Met:  03/23/17 03/23/17 1531   Coping/Psychosocial Response Interventions   Plan Of Care Reviewed With patient   Patient Care Overview   Progress improving   Outcome Evaluation   Outcome Summary/Follow up Plan Pt independent with ADLS and ADL transfers in room. Pt reports she is at baseline status. Pt has achieved all goals and no further skilled OT intervention is no longer indicated. Discussed this with patient, and she is angreement with OT plans to DC services.          Problem: Inpatient Occupational Therapy  Goal: Transfer Training Goal 1 LTG- OT  Outcome: Outcome(s) achieved Date Met:  03/23/17    03/10/17 1146 03/23/17 1531   Transfer Training OT LTG   Transfer Training OT LTG, Date Established 03/10/17 --    Transfer Training OT LTG, Time to Achieve 1 wk --    Transfer Training OT LTG, Activity Type bed to chair /chair to bed;sit to stand/stand to sit;toilet --    Transfer Training OT LTG, [place] Level supervision required --    Transfer Training OT LTG, Assist Device (AAD) --    Transfer Training OT LTG, Outcome --  goal met       Goal: Activity Tolerance Goal LTG- OT  Outcome: Outcome(s) achieved Date Met:  03/23/17    03/10/17 1146 03/23/17 1531   Activity Tolerance OT LTG   Activity Tolerance Goal OT LTG, Date Established 03/10/17 --    Activity Tolerance Goal OT LTG, Time to Achieve 1 wk --    Activity Tolerance Goal OT LTG, Activity Level 10 min activity  (with 1 rest break) --    Activity Tolerance Goal OT LTG, Outcome --  goal met

## 2017-03-23 NOTE — THERAPY DISCHARGE NOTE
Acute Care - Occupational Therapy Treatment Note/Discharge   [place]     Patient Name: Colleen Gonzalez  : 1965  MRN: [id number]  Today's Date: 3/23/2017  Onset of Illness/Injury or Date of Surgery Date: 17  Date of Referral to OT: 17  Referring Physician: Dr Moore      Admit Date: 3/1/2017    Visit Dx:     ICD-10-CM ICD-9-CM   1. Community acquired pneumonia J18.9 486   2. COPD exacerbation J44.1 491.21   3. Hypoxia R09.02 799.02   4. Impaired functional mobility, balance, gait, and endurance Z74.09 V49.89   5. Impaired mobility and ADLs Z74.09 799.89     Patient Active Problem List   Diagnosis   • COPD (chronic obstructive pulmonary disease)   • Tobacco abuse   • Acute on chronic respiratory failure   • Leukocytosis   • HCAP (healthcare-associated pneumonia), RUL, with history of recurrent PNA, however no data on location; Currently growing MRSA and PSA in sputum.   • Hypothyroid   • Sepsis   • Anxiety and depression   • Homelessness   • Dysphagia, by history; does not comply with swallowing instructions             Adult Rehabilitation Note       17 1446          Rehab Assessment/Intervention    Discipline occupational therapist  -AR      Document Type therapy note (daily note);discharge summary  -AR      Subjective Information no complaints;agree to therapy  -AR      Precautions/Limitations no known precautions/limitations  -AR      Recorded by [AR] Iwona Boudreaux OT      Pain Assessment    Pain Assessment No/denies pain  -AR      Recorded by [AR] Iwona Boudreaux OT      Cognitive Assessment/Intervention    Current Cognitive/Communication Assessment functional  -AR      Orientation Status oriented x 4  -AR      Follows Commands/Answers Questions 100% of the time;able to follow multi-step instructions  -AR      Personal Safety WNL/WFL  -AR      Personal Safety Interventions gait belt;nonskid shoes/slippers when out of bed;supervised activity  -AR      Recorded by [AR] Iwona Godinez  Janusz OT      Bed Mobility, Assessment/Treatment    Bed Mobility, Scoot/Bridge, [place] independent  -AR      Bed Mob, Supine to Sit, [place] independent  -AR      Recorded by [AR] Iwona Boudreaux OT      Transfer Assessment/Treatment    Transfers, Bed-Chair [place] independent  -AR      Transfers, Sit-Stand [place] independent  -AR      Transfers, Stand-Sit [place] independent  -AR      Toilet Transfer, [place] conditional independence  -AR      Toilet Transfer, Assistive Device other (see comments)   grab bars  -AR      Recorded by [AR] Iwona Boudreaux OT      Functional Mobility    Functional Mobility- Ind. Level independent  -AR      Recorded by [AR] Iwona Boudreaux OT      Lower Body Dressing Assessment/Training    LB Dressing Assess/Train, Clothing Type doffing:;donning:;slipper socks   simuulated  -AR      LB Dressing Assess/Train, Position edge of bed  -AR      LB Dressing Assess/Train, [place] independent  -AR      Recorded by [AR] Iwona Boudreaux OT      Balance Skills Training    Sitting-Level of Assistance Independent  -AR      Standing-Level of Assistance Independent  -AR      Recorded by [AR] Iwona Boudreaux OT      Positioning and Restraints    Pre-Treatment Position in bed  -AR      Post Treatment Position bed  -AR      In Bed sitting EOB;call light within reach;encouraged to call for assist  -AR      Recorded by [AR] Iwona Boudreaux OT        User Key  (r) = Recorded By, (t) = Taken By, (c) = Cosigned By    Initials Name Effective Dates    AR Iwona Boudreaux OT 06/22/15 -                 OT Goals       03/23/17 1531 03/20/17 1612 03/13/17 1332    Transfer Training OT LTG    Transfer Training OT LTG, Outcome goal met  -AR goal partially met  -EL goal partially met   Met for STS, toilet this date.  -TA    Toileting OT LTG    Toileting Goal OT LTG, Outcome   goal met  -TA    Activity Tolerance OT LTG    Activity Tolerance Goal OT LTG,  Outcome goal met  -AR goal partially met  -EL goal partially met   Met for 10 mins this date; cont' for consistency.  -TA      03/10/17 1146          Transfer Training OT LTG    Transfer Training OT LTG, Date Established 03/10/17  -TA      Transfer Training OT LTG, Time to Achieve 1 wk  -TA      Transfer Training OT LTG, Activity Type bed to chair /chair to bed;sit to stand/stand to sit;toilet  -TA      Transfer Training OT LTG, [place] Level supervision required  -TA      Transfer Training OT LTG, Assist Device --   AAD  -TA      Transfer Training OT LTG, Outcome goal ongoing  -TA      Toileting OT LTG    Toileting Goal OT LTG, Date Established 03/10/17  -TA      Toileting Goal OT LTG, Time to Achieve 1 wk  -TA      Toileting Goal OT LTG, [place] Level conditional independence  -TA      Toileting Goal OT LTG, Outcome goal ongoing  -TA      Activity Tolerance OT LTG    Activity Tolerance Goal OT LTG, Date Established 03/10/17  -TA      Activity Tolerance Goal OT LTG, Time to Achieve 1 wk  -TA      Activity Tolerance Goal OT LTG, Activity Level 10 min activity   with 1 rest break  -TA      Activity Tolerance Goal OT LTG, Outcome goal ongoing  -TA        User Key  (r) = Recorded By, (t) = Taken By, (c) = Cosigned By    Initials Name Provider Type    AR Iwona Boudreaux, OT Occupational Therapist    MICAH Eric, OT Occupational Therapist    MANUEL Mattson, OT Occupational Therapist          Occupational Therapy Education     Title: PT OT SLP Therapies (Done)     Topic: Occupational Therapy (Done)     Point: ADL training (Done)    Description: Instruct learner(s) on proper safety adaptation and remediation techniques during self care or transfers.   Instruct in proper use of assistive devices.    Learning Progress Summary    Learner Readiness Method Response Comment Documented by Status   Patient Eager E,D ALBARO,NR reviewed goals of care, tole of OT, home safety AR 03/23/17 1530 Done    Acceptance  E VU  KB 03/21/17 2155 Done               Point: Home exercise program (Done)    Description: Instruct learner(s) on appropriate technique for monitoring, assisting and/or progressing therapeutic exercises/activities.    Learning Progress Summary    Learner Readiness Method Response Comment Documented by Status   Patient Acceptance E VU  KB 03/21/17 2155 Done    Acceptance E CLIFFORD IRVIN Educated on benefits of activity and HEP for B UE strengthening. EL 03/20/17 1611 Done    Acceptance E VU  CE 03/20/17 0037 Done    Acceptance E VU  CE 03/18/17 2352 Done    Acceptance DWIGHT NUR D VU, DU,NR Didactic education re adaptive breathing with fxl ambulation this date. TA 03/13/17 1331 Done    Acceptance DWIGHT NUR D VU,NR Initiated education with adaptive breathing, BUE HEP; reinforced need for call for assist with OOB activities. TA 03/10/17 1145 Done               Point: Precautions (Done)    Description: Instruct learner(s) on prescribed precautions during self-care and functional transfers.    Learning Progress Summary    Learner Readiness Method Response Comment Documented by Status   Patient Eager ISMAEL NUR,NR reviewed goals of care, tole of OT, home safety AR 03/23/17 1530 Done    Acceptance E VU  KB 03/21/17 2155 Done    Acceptance E VU  CE 03/20/17 0037 Done    Acceptance E VU  CE 03/18/17 2352 Done    Acceptance DWIGHT NUR D VU,NR Initiated education with adaptive breathing, BUE HEP; reinforced need for call for assist with OOB activities. TA 03/10/17 1145 Done               Point: Body mechanics (Done)    Description: Instruct learner(s) on proper positioning and spine alignment during self-care, functional mobility activities and/or exercises.    Learning Progress Summary    Learner Readiness Method Response Comment Documented by Status   Patient Eager ISMAEL NURNR reviewed goals of care, tole of OT, home safety AR 03/23/17 1530 Done    Acceptance E VU  KB 03/21/17 2155 Done                      User Key     Initials Effective Dates Name  Provider Type Discipline    AR 06/22/15 -  Iwona Boudreaux OT Occupational Therapist OT    TA 03/14/16 -  Mike Eric, OT Occupational Therapist OT    KB 06/16/16 -  Kiana Holman, RN Registered Nurse Nurse    EL 06/08/16 -  Talisha Mattson, OT Occupational Therapist OT    CE 10/17/16 -  Nathalie Mcelroy RN Registered Nurse Nurse                OT Recommendation and Plan  Anticipated Discharge Disposition: home  Planned Therapy Interventions: activity intolerance, ADL retraining, energy conservation, home exercise program, strengthening, transfer training  Therapy Frequency: daily (Per priority policy)  Plan of Care Review  Plan Of Care Reviewed With: patient  Progress: improving  Outcome Summary/Follow up Plan: Pt independent with ADLS and ADL transfers in room. Pt reports she is at baseline status. Pt has achieved all goals and no further skilled  OT intervention is no longer indicated. Discussed this with patient, and she is angreement with OT plans to DC services.           Outcome Measures       03/23/17 1446          How much help from another is currently needed...    Putting on and taking off regular lower body clothing? 4  -AR      Bathing (including washing, rinsing, and drying) 4  -AR      Toileting (which includes using toilet bed pan or urinal) 4  -AR      Putting on and taking off regular upper body clothing 4  -AR      Taking care of personal grooming (such as brushing teeth) 4  -AR      Eating meals 4  -AR      Score 24  -AR      Functional Assessment    Outcome Measure Options AM-PAC 6 Clicks Daily Activity (OT)  -AR        User Key  (r) = Recorded By, (t) = Taken By, (c) = Cosigned By    Initials Name Provider Type    AR Iwona Boudreaux OT Occupational Therapist           Time Calculation:          Time Calculation- OT       03/23/17 1534          Time Calculation- OT    OT Start Time 1446  -AR      Total Timed Code Minutes- OT 17 minute(s)  -AR      OT Received On 03/23/17  -AR       OT Goal Re-Cert Due Date 03/30/17  -AR        User Key  (r) = Recorded By, (t) = Taken By, (c) = Cosigned By    Initials Name Provider Type    SILVESTRE Boudreaux OT Occupational Therapist          Therapy Charges for Today     Code Description Service Date Service Provider Modifiers Qty    [id number]  OT THERAPEUTIC ACT EA 15 MIN 3/23/2017 Iwona Boudreaux OT GO 1               OT Discharge Summary  Anticipated Discharge Disposition: home  Reason for Discharge: Independent  Outcomes Achieved: Able to achieve all goals within established timeline  Discharge Destination: Home    Iwona Boudreaux OT  3/23/2017

## 2017-03-23 NOTE — PLAN OF CARE
Problem: Patient Care Overview (Adult)  Goal: Adult Individualization and Mutuality  Outcome: Ongoing (interventions implemented as appropriate)    03/03/17 0650 03/15/17 1736 03/20/17 0404   Individualization   Patient Specific Preferences --  red jello, gatorade, chocolate ice cream for snacks --    Patient Specific Goals to have a place to go when she leaves here --  --    Patient Specific Interventions --  airborne precautions --    Mutuality/Individual Preferences   What Anxieties, Fears or Concerns Do You Have About Your Health or Care? concerned about medicine and where she will go when she leaves here --  --    What Questions Do You Have About Your Health or Care? --  --  Has concerns what her ultimate diagnosis is going to be and how that will effect the rest of her life   What Information Would Help Us Give You More Personalized Care? none at this time --  --        Goal: Discharge Needs Assessment  Outcome: Ongoing (interventions implemented as appropriate)    03/01/17 1548 03/03/17 0650 03/09/17 1743   Discharge Needs Assessment   Concerns To Be Addressed --  homelessness;financial/insurance concerns;mental health concerns;transportation;medication concerns --    Readmission Within The Last 30 Days --  --  no previous admission in last 30 days   Equipment Needed After Discharge --  --  --    Current Discharge Risk homeless --  --    Discharge Disposition --  --  --    Current Health   Anticipated Changes Related to Illness --  --  none   Self-Care   Equipment Currently Used at Home --  --  --    Living Environment   Transportation Available --  --  --      03/11/17 1845 03/15/17 0904 03/22/17 1604   Discharge Needs Assessment   Concerns To Be Addressed --  --  --    Readmission Within The Last 30 Days --  --  --    Equipment Needed After Discharge none --  --    Current Discharge Risk --  --  --    Discharge Disposition --  --  still a patient   Current Health   Anticipated Changes Related to Illness --   --  --    Self-Care   Equipment Currently Used at Home --  bath bench --    Living Environment   Transportation Available --  --  taxi         Problem: Pneumonia (Adult)  Goal: Signs and Symptoms of Listed Potential Problems Will be Absent or Manageable (Pneumonia)  Outcome: Ongoing (interventions implemented as appropriate)    03/23/17 1658   Pneumonia   Problems Assessed (Pneumonia) all   Problems Present (Pneumonia) progression of infection         Problem: Wound, Traumatic, Nonburn (Adult)  Goal: Signs and Symptoms of Listed Potential Problems Will be Absent or Manageable (Wound, Traumatic, Nonburn)  Outcome: Ongoing (interventions implemented as appropriate)    03/23/17 0307   Wound, Traumatic, Nonburn   Problems Assessed (Wound) all   Problems Present (Wound) pain;skin breakdown         Problem: Pain, Acute (Adult)  Goal: Acceptable Pain Control/Comfort Level  Outcome: Ongoing (interventions implemented as appropriate)    03/23/17 1658   Pain, Acute (Adult)   Acceptable Pain Control/Comfort Level making progress toward outcome         03/23/17 1658   Pain, Acute (Adult)   Acceptable Pain Control/Comfort Level making progress toward outcome         Problem: Anxiety (Adult)  Goal: Reduction/Resolution  Outcome: Ongoing (interventions implemented as appropriate)    03/23/17 1658   Anxiety (Adult)   Reduction/Resolution making progress toward outcome         Problem: Bronchoscopy (Adult)  Goal: Signs and Symptoms of Listed Potential Problems Will be Absent or Manageable (Bronchoscopy)  Outcome: Ongoing (interventions implemented as appropriate)    03/23/17 0307   Bronchoscopy   Problems Assessed (Bronchoscopy) all   Problems Present (Bronchoscopy) none         Problem: Pulmonary Tuberculosis (Adult)  Goal: Signs and Symptoms of Listed Potential Problems Will be Absent or Manageable (Pulmonary Tuberculosis)  Outcome: Ongoing (interventions implemented as appropriate)    03/23/17 0307   Pulmonary Tuberculosis    Problems Assessed (Pulmonary Tuberculosis) all   Problems Present (Pulmonary Tuberculosis) pain;situational response

## 2017-03-23 NOTE — PROGRESS NOTES
Adult Nutrition  Assessment/PES    Patient Name:  Colleen Gonzalez  YOB: 1965  MRN: [id number]  Admit Date:  3/1/2017    Assessment Date:  3/23/2017        Reason for Assessment       03/23/17 1305    Reason for Assessment    Reason For Assessment/Visit follow up protocol    Time Spent (min) 5                        Nutrition Prescription Ordered       03/23/17 1305    Nutrition Prescription PO    Current PO Diet Regular    Common Modifiers Cardiac            Evaluation of Received Nutrient/Fluid Intake       03/23/17 1306    PO Evaluation    Number of Meals 7    % PO Intake 100              Problem/Interventions:        Problem 1       03/23/17 1306    Nutrition Diagnoses Problem 1    Problem 1 Nutrition Appropriate for Condition at this Time    Etiology (related to) Factors Affecting Nutrition    Appetite Good    Percent (%) intake recorded 100 %    Over number of meals 7                    Intervention Goal       03/23/17 1306    Intervention Goal    General Nutrition support treatment    PO Maintain intake            Nutrition Intervention       03/23/17 1306    Nutrition Intervention    RD/Tech Action Follow Tx progress    assisting pt. with menu selections.              Education/Evaluation       03/23/17 1307    Monitor/Evaluation    Monitor Per protocol        Electronically signed by:  Chelsea Ruffin RD  03/23/17 1:07 PM

## 2017-03-23 NOTE — PROGRESS NOTES
"      HOSPITALIST DAILY PROGRESS NOTE    Chief Complaint: f/u for soa    Subjective   SUBJECTIVE/OVERNIGHT EVENTS   Multiple complaints -- ongoing chronic low back pain - would like lortab restarted (inadvertently dc'd). Using capsicin cream and it is helping some. Still feels constipated despite BM and distended...noted approximately 6 cans of soda on bedside table (nursing reports she is drinking countless cans of soda daily).  Advised patient to avoid excessive carbonation.  BP was low this morning and her atenolol was held.  Reports her breathing is better today, mucinex ordered yesterday is helping.  Would like to have a colonoscopy if possible bc she is overdue for her surveillance scope and can never have it done as an outpatient d/t lack of transportation.    Review of Systems:  Gen-no fevers, no chills, anxious   CV-no chest pain  Resp-no cough, no dyspnea  GI-no N/V/D    Objective   OBJECTIVE   I have reviewed the vital signs.  /70 (BP Location: Left arm, Patient Position: Lying)  Pulse 112  Temp 97.4 °F (36.3 °C) (Oral)   Resp 18  Ht 68\" (172.7 cm)  Wt 265 lb (120 kg)  SpO2 92%  BMI 40.29 kg/m2    Physical Exam:  Gen-no acute distress, comfortable  CV-RRR, S1 S2 normal, no m/r/g  Resp- CTAB today  Abd-soft, NT, ND, +BS  Ext-no edema  Neuro-A&Ox3, no focal deficits  Psych-anxious     Results:  I have reviewed the labs, culture data.      Results from last 7 days  Lab Units 03/22/17  0713 03/16/17  0535   WBC 10*3/mm3 9.00 8.71   HEMOGLOBIN g/dL 11.3* 12.2   HEMATOCRIT % 35.9 38.3   PLATELETS 10*3/mm3 277 302       Results from last 7 days  Lab Units 03/22/17  0713   SODIUM mmol/L 140   POTASSIUM mmol/L 3.7   CHLORIDE mmol/L 96*   TOTAL CO2 mmol/L 39.0*   BUN mg/dL 19   CREATININE mg/dL 0.80   GLUCOSE mg/dL 76   CALCIUM mg/dL 9.6     Culture Data:    RESPIRATORY CULTURE   Date Value Ref Range Status   03/07/2017 Scant growth (1+) Pseudomonas aeruginosa (A)  Final   03/07/2017 Light growth (2+) " Staphylococcus aureus, MRSA (C)  Final     Comment:       Methicillin resistant Staphylococcus aureus, Patient may be an isolation risk.  This isolate does not demonstrate inducible clindamycin resistance in vitro.     03/07/2017 Light growth (2+) Yeast isolated (A)  Final     I have reviewed the medications.    Assessment/Plan   ASSESSMENT/PLAN    52-year-old  female with history of noncompliance. She smokes 2-3 packs of cigarettes per day. She reently went off her psych (Bipolar) meds. She presented to [facility] ED with increased shortness of breath and cough and fever. Reports recent hospitalization to [facility] about a week with no improvement.  Work-up with + AFB, studies sent to state lab and pending.        A/C hypoxic resp failure-improving  -- multifactorial COPD vs Pneumonia NASIM( vs neoplasm less likely)  -- abx per ID, completed treatment for MRSA/PSA  -- resp culture + MRSA and Pseudomona  -- on room air currently  -- tapering prednisone (3 days left)  -- CO2 on BMP elevated today -- repeat in AM    NASIM opacity (6x6cm)  -- infectious vs. Malignant  -- ID/Dr. Self following  -- Quant TB negative. Crypto Negative. Histo negative. Blasto negative. She has completed 2 weeks of therapy at this time with some improvement in respiratory effort and now s/p bronchoscopy with cytology negative for malignancy. Off of antibiotics. AFB x 2 sputum positive and BAL wash fluid with positive AFB in broth. ID pending from state lab.  Mtb PCR from LLL neg. Bronch cx still positive with PSA but feel c/w colonization  -- remain in AFB precautions for now    Low Mag  - on AM labs today, replacing    Acute on chronic back pain   -- on gabapentin, lortab, flexeril, valium  -- capsaicin cream  -- k pad    Sepsis  -- resolved      Constipation  -- continue current bowel regimen  -- resume home linzess  -- mag citrate 150ml x 1 now, repeat in 12 hours if no result    Bipolar disorder/homeless  -- suspect  schizophrenia also given paranoia and hallucination  -- pt recently stopped psych meds a week ago because they were causing her to hallucinate  -- pt needs psych eval, but unfortunately we do not have inpt psych service, when ready for DC, may benefit from [facility] evaluation  -- SW consulted and following  -- patient states she must check on her car, which is impounded, before she goes anywhere after she is discharged.     Hypothyroidism  -- TSH WNL  -- continue levothyroxine  Tobacco abuse  -- counseled, cessation advised  Labial Cyst  -- needs to follow up with Gyn   Tachycardia  --increased atenolol, resolved   Vaginal Yeast  --better per patient  Insomnia   -- seroquel 300, melatonin  -- will discontinue temazepam    DVT prophylaxis: enoxaparin      BP low this AM, atenolol held.  Multiple medications as possible contributors  BP improved later in the day, will decrease valium, monitor BP and adjust meds further if needed.  Looks like home dose of atenolol was 12.5 or 25mg daily and this was perhaps increased due to tachycardia at some point this admission?    Check AM labs  Awaiting results from state lab    Dispo: difficult secondary to homeless. SS working on placement...DONAVAN ID    Lexus Ernie, APRN  03/23/17  2:49 AM

## 2017-03-23 NOTE — PROGRESS NOTES
"[facility] Progress Note    Date of Admission: 3/1/2017      Antibiotics:  None    CC:   Chief Complaint   Patient presents with   • Shortness of Breath       S:  Difficult social situation but medically stable,  No fevers, no n/v/d, abd pain last night.    O:  /79  Pulse 100  Temp 97.8 °F (36.6 °C) (Oral)   Resp 16  Ht 68\" (172.7 cm)  Wt 265 lb (120 kg)  SpO2 92%  BMI 40.29 kg/m2  Temp (24hrs), Av.6 °F (36.4 °C), Min:97.4 °F (36.3 °C), Max:97.8 °F (36.6 °C)      PE:   GENERAL: Chronically ill appearing, unkept appearance. Overweight.   HEENT: Normocephalic, atraumatic. PERRL. EOMI. No conjunctival injection. Moist MM. Raspy voice.   NECK: Supple without nuchal rigidity  LYMPH: No cervical, axillary or inguinal lymphadenopathy. No neck masses  HEART: RRR; No murmur, rubs, gallops.   LUNGS: Coarse breath sounds and Diminished throughout.  Expiratory wheezing. Some improvement and no use of supplemental O2.    ABDOMEN: Soft, nontender, nondistended. Positive bowel sounds. No rebound or guarding.   EXT: No cyanosis, clubbing or edema  MSK: FROM without joint effusions noted   SKIN: No rash or bleeding. PIV in place  NEURO: Oriented to PPT. No focal deficits.       Laboratory Data      Results from last 7 days  Lab Units 17  0713   WBC 10*3/mm3 9.00   HEMOGLOBIN g/dL 11.3*   HEMATOCRIT % 35.9   PLATELETS 10*3/mm3 277       Results from last 7 days  Lab Units 17  0449   SODIUM mmol/L 137   POTASSIUM mmol/L 4.1   CHLORIDE mmol/L 95*   TOTAL CO2 mmol/L 40.0*   BUN mg/dL 18   CREATININE mg/dL 0.90   GLUCOSE mg/dL 80   CALCIUM mg/dL 9.6       Results from last 7 days  Lab Units 17  0713   ALK PHOS U/L 82   BILIRUBIN mg/dL 0.2*   ALT (SGPT) U/L 15   AST (SGOT) U/L 13               Estimated Creatinine Clearance: 98.5 mL/min (by C-G formula based on Cr of 0.9).     Bronch wash 3/15:   Negative for malignancy  Reactive bronchial cells, pulmonary macrophages and acute inflammation.  Plant material " present compatible with food particle.     Path from bronch 3/15: LEFT LOWER LOBE, FOREIGN BODY:  Partially digested vegetable matter and minute fragment of bronchial epithelium.    Microbiology:  Sputum culture on 3/2: PSA and MRSA  Sputum culture on 3/3: MRSA   Bronch wash 3/15 with PSA pan sens.    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 **          PROBLEM LIST:   NASIM pneumonia with MRSA and PSA.  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    Her AFBs isolated from sputum now have been identified as Mycobacterium chelonae with no sign of Mycobacterium tuberculosis.  At this point unsure of colonization of airway versus infection and I am awaiting BAL AFB to decide if treatment necessary but if treatment necessary would use clarithromycin 500 mg by mouth twice a day     PLAN:   Follow off antibiotics for now as feel PSA c/w colonization  Awaiting BAL AFB but if this returns positive for AFB growth then would recommend clarithromycin 500 mg by mouth twice a day ×6 months  Difficult social situation/homeless.  His social situation he worked out with her brother she could be discharged over weekend and we can call in her clarithromycin if treatment decided once BAL AFB cultures return final.    From my perspective medically stable for discharge of social situation can be arranged.    I will see again on Monday if still in  house otherwise recommend follow-up with me in 1 month    UM:  D/c CM and d/c planning    3/23/2017

## 2017-03-23 NOTE — SIGNIFICANT NOTE
Nurse called to report pt calling out with acute significant generalized ABD pain, also having constipation. Ordered KUB. Please f/u.

## 2017-03-23 NOTE — PLAN OF CARE
Problem: Patient Care Overview (Adult)  Goal: Plan of Care Review  Outcome: Ongoing (interventions implemented as appropriate)    03/23/17 0307   Coping/Psychosocial Response Interventions   Plan Of Care Reviewed With patient   Patient Care Overview   Progress no change   Outcome Evaluation   Outcome Summary/Follow up Plan up ad janis, wants to leave room, breathing is worse per pt       Goal: Adult Individualization and Mutuality  Outcome: Ongoing (interventions implemented as appropriate)    Problem: Pneumonia (Adult)  Goal: Signs and Symptoms of Listed Potential Problems Will be Absent or Manageable (Pneumonia)  Outcome: Ongoing (interventions implemented as appropriate)    03/23/17 0307   Pneumonia   Problems Assessed (Pneumonia) all   Problems Present (Pneumonia) respiratory compromise         Problem: COPD, Chronic Bronchitis/Emphysema (Adult)  Goal: Signs and Symptoms of Listed Potential Problems Will be Absent or Manageable (COPD, Chronic Bronchitis/Emphysema)  Outcome: Ongoing (interventions implemented as appropriate)    03/23/17 0307   COPD, Chronic Bronchitis/Emphysema   Problems Assessed (COPD, Chronic Bronchitis/Emphysema) all   Problems Present (COPD, Chronic Bronchitis/Emphysema) depression;dyspnea;situational response         Problem: Wound, Traumatic, Nonburn (Adult)  Goal: Signs and Symptoms of Listed Potential Problems Will be Absent or Manageable (Wound, Traumatic, Nonburn)  Outcome: Ongoing (interventions implemented as appropriate)    03/23/17 0307   Wound, Traumatic, Nonburn   Problems Assessed (Wound) all   Problems Present (Wound) pain;skin breakdown         Problem: Pain, Acute (Adult)  Goal: Identify Related Risk Factors and Signs and Symptoms  Outcome: Outcome(s) achieved Date Met:  03/23/17 03/23/17 0307   Pain, Acute   Related Risk Factors (Acute Pain) disease process;infection;persistent pain   Signs and Symptoms (Acute Pain) BADLs/IADLs reluctance/inability to  perform;fatigue/weakness;facial mask of pain/grimace;constipation/diarrhea;guarding/abnormal posturing/positioning;moaning       Goal: Acceptable Pain Control/Comfort Level  Outcome: Ongoing (interventions implemented as appropriate)    03/23/17 0307   Pain, Acute (Adult)   Acceptable Pain Control/Comfort Level making progress toward outcome         Problem: Anxiety (Adult)  Goal: Identify Related Risk Factors and Signs and Symptoms  Outcome: Outcome(s) achieved Date Met:  03/23/17 03/23/17 0307   Anxiety   Related Risk Factors (Anxiety) environment change;health status change;knowledge deficit;loss of control;pain;prognosis/treatment plan;psychosocial factor   Signs and Symptoms (Anxiety) apprehension/being worried;concentration decreased;physical complaints/symptoms;nervousness/tension/restlessness;sleep disturbance       Goal: Reduction/Resolution  Outcome: Ongoing (interventions implemented as appropriate)    03/23/17 0307   Anxiety (Adult)   Reduction/Resolution making progress toward outcome         Problem: Bronchoscopy (Adult)  Goal: Signs and Symptoms of Listed Potential Problems Will be Absent or Manageable (Bronchoscopy)  Outcome: Ongoing (interventions implemented as appropriate)    03/23/17 0307   Bronchoscopy   Problems Assessed (Bronchoscopy) all   Problems Present (Bronchoscopy) none         Problem: Pulmonary Tuberculosis (Adult)  Goal: Signs and Symptoms of Listed Potential Problems Will be Absent or Manageable (Pulmonary Tuberculosis)  Outcome: Ongoing (interventions implemented as appropriate)    03/23/17 0307   Pulmonary Tuberculosis   Problems Assessed (Pulmonary Tuberculosis) all   Problems Present (Pulmonary Tuberculosis) pain;situational response

## 2017-03-23 NOTE — PROGRESS NOTES
"      HOSPITALIST DAILY PROGRESS NOTE    Chief Complaint: f/u for soa    Subjective   SUBJECTIVE/OVERNIGHT EVENTS   C/o abd pain with miralax, had a bm today. Lying in bed, awake and oriented, asking if she can get a mask and go out of room.     Review of Systems:  Gen-no fevers, no chills, anxious   CV-no chest pain  Resp-no cough, no dyspnea  GI-no N/V/D    Objective   OBJECTIVE   I have reviewed the vital signs.  /79  Pulse 100  Temp 97.8 °F (36.6 °C) (Oral)   Resp 16  Ht 68\" (172.7 cm)  Wt 265 lb (120 kg)  SpO2 92%  BMI 40.29 kg/m2    Physical Exam:  Gen-no acute distress, comfortable. Lying on back in bed,  CV-RRR, S1 S2 normal, no m/r/g  Resp- CTAB today  Abd-soft, NT, ND, +BS  Ext-no edema  Neuro-A&Ox3, no focal deficits  Psych-anxious     Results:  I have reviewed the labs, culture data.      Results from last 7 days  Lab Units 03/22/17  0713   WBC 10*3/mm3 9.00   HEMOGLOBIN g/dL 11.3*   HEMATOCRIT % 35.9   PLATELETS 10*3/mm3 277       Results from last 7 days  Lab Units 03/23/17  0449   SODIUM mmol/L 137   POTASSIUM mmol/L 4.1   CHLORIDE mmol/L 95*   TOTAL CO2 mmol/L 40.0*   BUN mg/dL 18   CREATININE mg/dL 0.90   GLUCOSE mg/dL 80   CALCIUM mg/dL 9.6     Imaging Results (last 24 hours)     Procedure Component Value Units Date/Time    XR Abdomen KUB [[id number]]  (Abnormal) Collected:  03/23/17 0152     Updated:  03/23/17 0241    Narrative:         EXAM:  XR Abdomen, 1 View.    CLINICAL HISTORY:  52 years old, female; Pain; Abdominal pain; Flank; Right lower quadrant (rlq);   Patient HX: Acute abdominal pain, constipation and right side flank pain. HX:   Being tested for tb copd (chronic obstructive pulmonary disease) tobacco abuse   acute on chronic respiratory failure leukocytosis hcap (healthcare-associated   pneumonia), rul, with history of recurrent pna, however no data on location;   Currently growing MRSA and psa in sputum. Hypothyroid sepsis anxiety and   depression homelessness dysphagia, " by history; Does not comply with swallo;   Additional info: Acute abdominal pain, constipation and right side flank pain.   Last bowel movement was yesterday morning at 11: 00 am.    TECHNIQUE:  Frontal supine view of the abdomen/pelvis.    COMPARISON:  No relevant prior studies available.    FINDINGS:  Gastrointestinal tract:  Moderate amount of stool throughout the colon, without   obstruction.  Organs:  Surgical clips project over the right upper abdomen, post   cholecystectomy.  Bones/joints:  Degenerative changes of the hips and sacroiliac joints.      Impression:         1. No acute findings.    2. Non-acute findings are described above.      THIS DOCUMENT HAS BEEN ELECTRONICALLY SIGNED BY MEME PHILLIP MD          Culture Data:    RESPIRATORY CULTURE   Date Value Ref Range Status   03/07/2017 Scant growth (1+) Pseudomonas aeruginosa (A)  Final   03/07/2017 Light growth (2+) Staphylococcus aureus, MRSA (C)  Final     Comment:       Methicillin resistant Staphylococcus aureus, Patient may be an isolation risk.  This isolate does not demonstrate inducible clindamycin resistance in vitro.     03/07/2017 Light growth (2+) Yeast isolated (A)  Final     I have reviewed the medications.    Assessment/Plan   ASSESSMENT/PLAN    52-year-old  female with history of noncompliance. She smokes 2-3 packs of cigarettes per day. She reently went off her psych (Bipolar) meds. She presented to [facility] ED with increased shortness of breath and cough and fever. Reports recent hospitalization to [facility] about a week with no improvement.  Work-up with + AFB, studies sent to state lab and pending.        A/C hypoxic resp failure-improving  -- multifactorial COPD vs Pneumonia NASIM( vs neoplasm less likely)  -- abx per ID, completed treatment for MRSA/PSA  -- resp culture + MRSA and Pseudomona  -- on room air currently  -- tapering prednisone (3 days left)  --moniter resp status, CO2 trending up, may need CPAP  at HS.     NASIM opacity (6x6cm)  -- infectious vs. Malignant  -- ID/Dr. Self following  -- Quant TB negative. Crypto Negative. Histo negative. Blasto negative. She has completed 2 weeks of therapy at this time with some improvement in respiratory effort and now s/p bronchoscopy with cytology negative for malignancy. Off of antibiotics. AFB x 2 sputum positive and BAL wash fluid with positive AFB in broth. ID pending from state lab.  Mtb PCR from LLL neg. Bronch cx still positive with PSA but feel c/w colonization  -- remain in AFB precautions for now, further plans per ID     Low Mag  -  Replacing, follow     Acute on chronic back pain   -- on gabapentin, lortab, flexeril, valium, moniter for oversedation  -- capsaicin cream  -- k pad    Sepsis  -- resolved      Constipation  -- continue current bowel regimen, appears improving with increased regimine  -- resume home linzess  -- mag citrate yesterday with good results.     Bipolar disorder/homeless  -- suspect schizophrenia also given paranoia and hallucination  -- pt recently stopped psych meds a week ago because they were causing her to hallucinate  -- pt needs psych eval, but unfortunately we do not have inpt psych service, when ready for DC, may benefit from [facility] evaluation  -- SW consulted and following  -- patient states she must check on her car, which is impounded, before she goes anywhere after she is discharged.     Hypothyroidism  -- TSH WNL  -- continue levothyroxine  Tobacco abuse  -- counseled, cessation advised  Labial Cyst  -- needs to follow up with Gyn   Tachycardia  --moniter, I have decreased atenolol to 12.5mg due to hypotension  Vaginal Yeast  --better per patient  Insomnia   -- seroquel 300, melatonin  -- stopped temazepam    DVT prophylaxis: enoxaparin      BP borderline, atenolol held again today likely due to pain meds, will taper atenolol and decrease neurontin dose to 600. Decreased Valium yesterday.   Multiple medications as possible  contributors   monitor BP and adjust meds further if needed.      Check AM labs  Awaiting results from state lab    Dispo: difficult secondary to homeless. SS working on placement...DONAVAN ID    Mary Moore,   03/23/17  5:34 PM

## 2017-03-24 LAB — MAGNESIUM SERPL-MCNC: 2.3 MG/DL (ref 1.3–2.7)

## 2017-03-24 PROCEDURE — 99232 SBSQ HOSP IP/OBS MODERATE 35: CPT | Performed by: HOSPITALIST

## 2017-03-24 PROCEDURE — 94799 UNLISTED PULMONARY SVC/PX: CPT

## 2017-03-24 PROCEDURE — 63710000001 PREDNISONE PER 5 MG: Performed by: NURSE PRACTITIONER

## 2017-03-24 PROCEDURE — 94640 AIRWAY INHALATION TREATMENT: CPT

## 2017-03-24 PROCEDURE — 25010000002 ENOXAPARIN PER 10 MG: Performed by: INTERNAL MEDICINE

## 2017-03-24 RX ADMIN — GABAPENTIN 600 MG: 300 CAPSULE ORAL at 22:17

## 2017-03-24 RX ADMIN — Medication 400 MG: at 16:47

## 2017-03-24 RX ADMIN — Medication 400 MG: at 08:04

## 2017-03-24 RX ADMIN — GABAPENTIN 600 MG: 300 CAPSULE ORAL at 05:24

## 2017-03-24 RX ADMIN — MICONAZOLE NITRATE: 2 CREAM TOPICAL at 08:10

## 2017-03-24 RX ADMIN — PRAVASTATIN SODIUM 80 MG: 40 TABLET ORAL at 22:16

## 2017-03-24 RX ADMIN — Medication 2 TABLET: at 16:47

## 2017-03-24 RX ADMIN — Medication 10 ML: at 08:10

## 2017-03-24 RX ADMIN — FUROSEMIDE 40 MG: 40 TABLET ORAL at 08:04

## 2017-03-24 RX ADMIN — CYCLOBENZAPRINE HYDROCHLORIDE 10 MG: 10 TABLET, FILM COATED ORAL at 16:47

## 2017-03-24 RX ADMIN — HYDROCODONE BITARTRATE AND ACETAMINOPHEN 1 TABLET: 5; 325 TABLET ORAL at 13:01

## 2017-03-24 RX ADMIN — MONTELUKAST SODIUM 10 MG: 10 TABLET, FILM COATED ORAL at 22:16

## 2017-03-24 RX ADMIN — Medication: at 22:19

## 2017-03-24 RX ADMIN — QUETIAPINE 300 MG: 200 TABLET, EXTENDED RELEASE ORAL at 22:16

## 2017-03-24 RX ADMIN — DULOXETINE 60 MG: 60 CAPSULE, DELAYED RELEASE ORAL at 08:09

## 2017-03-24 RX ADMIN — Medication: at 14:59

## 2017-03-24 RX ADMIN — DULOXETINE 60 MG: 60 CAPSULE, DELAYED RELEASE ORAL at 22:16

## 2017-03-24 RX ADMIN — THEOPHYLLINE 600 MG: 300 TABLET, EXTENDED RELEASE ORAL at 22:15

## 2017-03-24 RX ADMIN — GABAPENTIN 600 MG: 300 CAPSULE ORAL at 16:47

## 2017-03-24 RX ADMIN — Medication 2 TABLET: at 08:04

## 2017-03-24 RX ADMIN — IPRATROPIUM BROMIDE AND ALBUTEROL SULFATE 3 ML: .5; 3 SOLUTION RESPIRATORY (INHALATION) at 16:42

## 2017-03-24 RX ADMIN — GUAIFENESIN 1200 MG: 600 TABLET, EXTENDED RELEASE ORAL at 11:09

## 2017-03-24 RX ADMIN — IPRATROPIUM BROMIDE AND ALBUTEROL SULFATE 3 ML: .5; 3 SOLUTION RESPIRATORY (INHALATION) at 12:38

## 2017-03-24 RX ADMIN — DIAZEPAM 5 MG: 5 TABLET ORAL at 16:47

## 2017-03-24 RX ADMIN — FLUTICASONE PROPIONATE 2 SPRAY: 50 SPRAY, METERED NASAL at 08:04

## 2017-03-24 RX ADMIN — THEOPHYLLINE 600 MG: 300 TABLET, EXTENDED RELEASE ORAL at 08:04

## 2017-03-24 RX ADMIN — PREDNISONE 5 MG: 5 TABLET ORAL at 08:04

## 2017-03-24 RX ADMIN — CYCLOBENZAPRINE HYDROCHLORIDE 10 MG: 10 TABLET, FILM COATED ORAL at 08:09

## 2017-03-24 RX ADMIN — Medication 10 ML: at 11:10

## 2017-03-24 RX ADMIN — Medication: at 05:24

## 2017-03-24 RX ADMIN — CETIRIZINE HYDROCHLORIDE 10 MG: 10 TABLET, FILM COATED ORAL at 08:04

## 2017-03-24 RX ADMIN — ENOXAPARIN SODIUM 40 MG: 40 INJECTION SUBCUTANEOUS at 08:04

## 2017-03-24 RX ADMIN — MICONAZOLE NITRATE: 2 CREAM TOPICAL at 22:19

## 2017-03-24 RX ADMIN — NICOTINE 1 PATCH: 21 PATCH, EXTENDED RELEASE TRANSDERMAL at 08:11

## 2017-03-24 RX ADMIN — GABAPENTIN 600 MG: 300 CAPSULE ORAL at 11:09

## 2017-03-24 RX ADMIN — CYCLOBENZAPRINE HYDROCHLORIDE 10 MG: 10 TABLET, FILM COATED ORAL at 22:17

## 2017-03-24 RX ADMIN — BUDESONIDE AND FORMOTEROL FUMARATE DIHYDRATE 2 PUFF: 80; 4.5 AEROSOL RESPIRATORY (INHALATION) at 07:34

## 2017-03-24 RX ADMIN — ATENOLOL 12.5 MG: 25 TABLET ORAL at 11:04

## 2017-03-24 RX ADMIN — HYDROCODONE BITARTRATE AND ACETAMINOPHEN 1 TABLET: 5; 325 TABLET ORAL at 04:04

## 2017-03-24 RX ADMIN — PANTOPRAZOLE SODIUM 40 MG: 40 TABLET, DELAYED RELEASE ORAL at 16:47

## 2017-03-24 RX ADMIN — LEVOTHYROXINE SODIUM 50 MCG: 25 TABLET ORAL at 05:24

## 2017-03-24 RX ADMIN — IPRATROPIUM BROMIDE AND ALBUTEROL SULFATE 3 ML: .5; 3 SOLUTION RESPIRATORY (INHALATION) at 07:34

## 2017-03-24 RX ADMIN — PANTOPRAZOLE SODIUM 40 MG: 40 TABLET, DELAYED RELEASE ORAL at 08:11

## 2017-03-24 RX ADMIN — DIAZEPAM 5 MG: 5 TABLET ORAL at 08:11

## 2017-03-24 NOTE — PROGRESS NOTES
Continued Stay Note  [facility]     Patient Name: Colleen Gonzalez  MRN: [id number]  Today's Date: 3/24/2017    Admit Date: 3/1/2017          Discharge Plan       03/24/17 1526    Case Management/Social Work Plan    Plan [facility] eval not needed    Additional Comments SW received phone call from LUIS MIGUEL that Hospitalist had reported the [facility] evaluation was no longer needed.  Any further mental health treatment will need to be managed by pt. on an outpatient basis following discharge.  Pt. has NOT demonstrated any behaviors suggesting she is a threat to herself or others during this encounter.      03/24/17 1506    Case Management/Social Work Plan    Plan discharge equipment    Patient/Family In Agreement With Plan yes    Additional Comments Pt requesting a rollator and has no DME company preference. Referral made [employer]. Provided with Christiane from [employer] with order, face sheet, and H/P and she delivered rollator to pt room.       03/24/17 1505    Case Management/Social Work Plan    Plan discharge plan    Patient/Family In Agreement With Plan yes    Additional Comments SHANITA met with pt. today.  Pt. states she feels better and understands she will be discharged soon.  Pt. continues to state that she needs to go first to [facility] once she's discharged to learn what she can do to get her car back, which is where all of her belongings are located.  Pt. states that she then plans to go stay with her brother in [place] (address is [address] [place] [address]).   will provide 2 cab vouchers to assist pt. in getting to both of these locations. RN will call for the first voucher to go to [facility] and pt. will be responsible for calling the second to go to her brother's home.  Pt. has also requested a letter from SHANITA which only states the days in which she has been in the hospital.  SHANITA can assist with this letter.  Pt. also requested assistance in aquiring a rollater and shower chair.  SHANITA has spoken with LUIS MIGUEL  about these items and CM will assist with DME needs.  Pt. has also asked for resources/contact information for lower income apartments in the [facility].  SW will provide pt. with these items.              Discharge Codes     None        Expected Discharge Date and Time     Expected Discharge Date Expected Discharge Time    Mar 4, 2017             SOPHIE Betancourt

## 2017-03-24 NOTE — PROGRESS NOTES
Continued Stay Note  [facility]     Patient Name: Colleen Gonzalez  MRN: [id number]  Today's Date: 3/24/2017    Admit Date: 3/1/2017          Discharge Plan       03/24/17 1506    Case Management/Social Work Plan    Plan discharge equipment    Patient/Family In Agreement With Plan yes    Additional Comments Pt requesting a rollator and has no DME company preference. Referral made [employer]. Provided with Christiane from [employer] with order, face sheet, and H/P and she delivered rollator to pt room.               Discharge Codes     None        Expected Discharge Date and Time     Expected Discharge Date Expected Discharge Time    Mar 4, 2017             Sun Jose RN

## 2017-03-24 NOTE — PLAN OF CARE
Problem: Patient Care Overview (Adult)  Goal: Adult Individualization and Mutuality  Outcome: Outcome(s) achieved Date Met:  03/24/17 03/03/17 0650 03/15/17 1736 03/20/17 0404   Individualization   Patient Specific Preferences --  red jello, gatorade, chocolate ice cream for snacks --    Patient Specific Goals to have a place to go when she leaves here --  --    Patient Specific Interventions --  airborne precautions --    Mutuality/Individual Preferences   What Anxieties, Fears or Concerns Do You Have About Your Health or Care? concerned about medicine and where she will go when she leaves here --  --    What Questions Do You Have About Your Health or Care? --  --  Has concerns what her ultimate diagnosis is going to be and how that will effect the rest of her life   What Information Would Help Us Give You More Personalized Care? none at this time --  --        Goal: Discharge Needs Assessment  Outcome: Ongoing (interventions implemented as appropriate)    Problem: Pneumonia (Adult)  Goal: Signs and Symptoms of Listed Potential Problems Will be Absent or Manageable (Pneumonia)  Outcome: Ongoing (interventions implemented as appropriate)    Problem: COPD, Chronic Bronchitis/Emphysema (Adult)  Goal: Signs and Symptoms of Listed Potential Problems Will be Absent or Manageable (COPD, Chronic Bronchitis/Emphysema)  Outcome: Ongoing (interventions implemented as appropriate)    Problem: Wound, Traumatic, Nonburn (Adult)  Goal: Signs and Symptoms of Listed Potential Problems Will be Absent or Manageable (Wound, Traumatic, Nonburn)  Outcome: Ongoing (interventions implemented as appropriate)    Problem: Pain, Acute (Adult)  Goal: Acceptable Pain Control/Comfort Level  Outcome: Ongoing (interventions implemented as appropriate)    Problem: Anxiety (Adult)  Goal: Reduction/Resolution  Outcome: Ongoing (interventions implemented as appropriate)    Problem: Bronchoscopy (Adult)  Goal: Signs and Symptoms of Listed Potential  Problems Will be Absent or Manageable (Bronchoscopy)  Outcome: Ongoing (interventions implemented as appropriate)    Problem: Pulmonary Tuberculosis (Adult)  Goal: Signs and Symptoms of Listed Potential Problems Will be Absent or Manageable (Pulmonary Tuberculosis)  Outcome: Ongoing (interventions implemented as appropriate)

## 2017-03-24 NOTE — PROGRESS NOTES
Continued Stay Note  [facility]     Patient Name: Colleen Gonzalez  MRN: [id number]  Today's Date: 3/24/2017    Admit Date: 3/1/2017          Discharge Plan       03/24/17 1506    Case Management/Social Work Plan    Plan discharge equipment    Patient/Family In Agreement With Plan yes    Additional Comments Pt requesting a rollator and has no DME company preference. Referral made [facility]. Provided with Christiane from [employer] with order, face sheet, and H/P and she delivered rollator to pt room.       03/24/17 1505    Case Management/Social Work Plan    Plan discharge plan    Patient/Family In Agreement With Plan yes    Additional Comments SW met with pt. today.  Pt. states she feels better and understands she will be discharged soon.  Pt. continues to state that she needs to go first to [facility] once she's discharged to learn what she can do to get her car back, which is where all of her belongings are located.  Pt. states that she then plans to go stay with her brother in [place] (address is [address] [place] [address]).   will provide 2 cab vouchers to assist pt. in getting to both of these locations. RN will call for the first voucher to go to [facility] and pt. will be responsible for calling the second to go to her brother's home.  Pt. has also requested a letter from  which only states the days in which she has been in the hospital.  SW can assist with this letter.  Pt. also requested assistance in aquiring a rollater and shower chair.  SHANITA has spoken with CM about these items and CM will assist with DME needs.  Pt. has also asked for resources/contact information for lower income apartments in the [facility] area.   will provide pt. with these items.              Discharge Codes     None        Expected Discharge Date and Time     Expected Discharge Date Expected Discharge Time    Mar 4, 2017             SOPHIE Betancourt

## 2017-03-24 NOTE — PROGRESS NOTES
"      HOSPITALIST DAILY PROGRESS NOTE    Chief Complaint: f/u for soa    Subjective   SUBJECTIVE/OVERNIGHT EVENTS   Patient reports ongoing sensation of wheezing and chest congestion.  Denies significant cough.  Reports dyspnea is mild.  No fevers, chills.  Remains on room air.  Feels that she will be well enough to go home tomorrow.  Denies active suicidal or homicidal ideations.  No active auditory or visual hallucinations.  Reports that she is doing great off of her psychiatric medications and does not want further psychiatric evaluation at this time.     Review of Systems:  Gen-no fevers, no chills  CV-no chest pain  Resp-no cough,   GI-no N/V/D, no abdominal pain  MSK-chronic arthritis     Objective   OBJECTIVE   I have reviewed the vital signs.  /71  Pulse 110  Temp 97.9 °F (36.6 °C) (Oral)   Resp 20  Ht 68\" (172.7 cm)  Wt 265 lb (120 kg)  SpO2 92%  BMI 40.29 kg/m2    Physical Exam:  Constitutional: no acute distress, awake, alert  Respiratory: Mild bilateral expiratory wheezing   Cardiovascular: RRR, no murmurs, rubs, or gallops, palpable pedal pulses bilaterally  Gastrointestinal: Positive bowel sounds, soft, nontender, nondistended, obese   Musculoskeletal: No bilateral ankle edema  Psychiatric: oriented x 3, odd affect, cooperative, pressured speech, flight of ideas   Neurologic: Strength symmetric in all extremities       Results:  I have reviewed the labs, culture data.      Results from last 7 days  Lab Units 03/22/17  0713   WBC 10*3/mm3 9.00   HEMOGLOBIN g/dL 11.3*   HEMATOCRIT % 35.9   PLATELETS 10*3/mm3 277       Results from last 7 days  Lab Units 03/23/17  0449   SODIUM mmol/L 137   POTASSIUM mmol/L 4.1   CHLORIDE mmol/L 95*   TOTAL CO2 mmol/L 40.0*   BUN mg/dL 18   CREATININE mg/dL 0.90   GLUCOSE mg/dL 80   CALCIUM mg/dL 9.6         Culture Data:    RESPIRATORY CULTURE   Date Value Ref Range Status   03/07/2017 Scant growth (1+) Pseudomonas aeruginosa (A)  Final   03/07/2017 Light " growth (2+) Staphylococcus aureus, MRSA (C)  Final     Comment:       Methicillin resistant Staphylococcus aureus, Patient may be an isolation risk.  This isolate does not demonstrate inducible clindamycin resistance in vitro.     03/07/2017 Light growth (2+) Yeast isolated (A)  Final     All available AFB cultures/smears negative     I have reviewed the medications.    Assessment/Plan   ASSESSMENT/PLAN    52-year-old  female with history of noncompliance. She smokes 2-3 packs of cigarettes per day. She recently went off her psych (Bipolar) meds. She presented to [facility] ED with increased shortness of breath and cough and fever. Reports recent hospitalization to [facility] about a week with no improvement.  Work-up with + AFB, studies sent to state lab and pending.        A/C hypoxic resp failure-resolved  -- multifactorial COPD vs Pneumonia NASIM  -- abx per ID, completed treatment for MRSA/PSA pneumonia  -- resp culture + MRSA and Pseudomona  -- on room air currently  -- tapering prednisone (2 days left), likely ok to discontinue at discharge     NASIM opacity (6x6cm)  -- infectious vs. Malignant  -- ID/Dr. Self following  -- Quant TB negative. Crypto Negative. Histo negative. Blasto negative. She has completed 2 weeks of therapy at this time with some improvement in respiratory effort and now s/p bronchoscopy with cytology negative for malignancy. Off of antibiotics. AFB x 2 sputum positive and BAL wash fluid appears to be negative for AFB.  Mtb PCR from LLL neg. Bronch cx still positive with PSA but feel c/w colonization    Low Mag  -  resolved    Acute on chronic back pain   -- on gabapentin, lortab, flexeril, valium, moniter for oversedation  -- capsaicin cream  -- k pad  -- Pt counseled that she will not be prescribed longterm narcotics at discharge     Sepsis due to pneumonia   -- resolved      Constipation  -- continue current bowel regimen    Bipolar disorder/homeless  -- pt has had  some paranoia and hallucinations but denies active psychosis/HI/SI. Does not appear to be a harm to herself or others  -- She voluntarily refuses further psych treatment   -- d/w SW    Hypothyroidism  -- TSH WNL  -- continue levothyroxine  Tobacco abuse  -- counseled, cessation advised  Labial Cyst  -- needs to follow up with Gyn   Tachycardia  --improved with atenolol   Vaginal Yeast  --better per patient  Insomnia   -- seroquel 300, melatonin  -- stopped temazepam    DVT prophylaxis: enoxaparin    Dispo: Discharge AM 3/25 with two cab vouchers (has to go check on car at [facility]). She plans to stay with her brother in [place]. Will provide pt with 1 month Rx for essential meds.     Nathanael Sloan MD  03/24/17  7:11 PM

## 2017-03-25 VITALS
DIASTOLIC BLOOD PRESSURE: 54 MMHG | HEIGHT: 68 IN | RESPIRATION RATE: 20 BRPM | TEMPERATURE: 97.9 F | WEIGHT: 265 LBS | HEART RATE: 111 BPM | BODY MASS INDEX: 40.16 KG/M2 | SYSTOLIC BLOOD PRESSURE: 102 MMHG | OXYGEN SATURATION: 92 %

## 2017-03-25 PROBLEM — A41.9 SEPSIS (HCC): Status: RESOLVED | Noted: 2017-03-01 | Resolved: 2017-03-25

## 2017-03-25 LAB
ANION GAP SERPL CALCULATED.3IONS-SCNC: 3 MMOL/L (ref 3–11)
BUN BLD-MCNC: 14 MG/DL (ref 9–23)
BUN/CREAT SERPL: 17.5 (ref 7–25)
CALCIUM SPEC-SCNC: 9.9 MG/DL (ref 8.7–10.4)
CHLORIDE SERPL-SCNC: 101 MMOL/L (ref 99–109)
CO2 SERPL-SCNC: 39 MMOL/L (ref 20–31)
CREAT BLD-MCNC: 0.8 MG/DL (ref 0.6–1.3)
GFR SERPL CREATININE-BSD FRML MDRD: 75 ML/MIN/1.73
GLUCOSE BLD-MCNC: 79 MG/DL (ref 70–100)
MAGNESIUM SERPL-MCNC: 2.5 MG/DL (ref 1.3–2.7)
POTASSIUM BLD-SCNC: 3.8 MMOL/L (ref 3.5–5.5)
SODIUM BLD-SCNC: 143 MMOL/L (ref 132–146)

## 2017-03-25 PROCEDURE — 99239 HOSP IP/OBS DSCHRG MGMT >30: CPT | Performed by: PHYSICIAN ASSISTANT

## 2017-03-25 PROCEDURE — 83735 ASSAY OF MAGNESIUM: CPT | Performed by: HOSPITALIST

## 2017-03-25 PROCEDURE — 63710000001 PREDNISONE PER 5 MG: Performed by: NURSE PRACTITIONER

## 2017-03-25 PROCEDURE — 80048 BASIC METABOLIC PNL TOTAL CA: CPT | Performed by: HOSPITALIST

## 2017-03-25 PROCEDURE — 94640 AIRWAY INHALATION TREATMENT: CPT

## 2017-03-25 PROCEDURE — 25010000002 ENOXAPARIN PER 10 MG: Performed by: INTERNAL MEDICINE

## 2017-03-25 RX ORDER — IPRATROPIUM BROMIDE AND ALBUTEROL SULFATE 2.5; .5 MG/3ML; MG/3ML
3 SOLUTION RESPIRATORY (INHALATION) EVERY 4 HOURS PRN
Qty: 3 ML | Refills: 12 | Status: ON HOLD | OUTPATIENT
Start: 2017-03-25 | End: 2017-05-23

## 2017-03-25 RX ORDER — TROLAMINE SALICYLATE 10 G/100G
1 CREAM TOPICAL 2 TIMES DAILY PRN
Qty: 170 G | Refills: 0 | Status: SHIPPED | OUTPATIENT
Start: 2017-03-25 | End: 2017-06-09 | Stop reason: HOSPADM

## 2017-03-25 RX ORDER — QUETIAPINE 300 MG/1
300 TABLET, FILM COATED, EXTENDED RELEASE ORAL NIGHTLY
Qty: 30 TABLET | Refills: 0 | Status: ON HOLD | OUTPATIENT
Start: 2017-03-25 | End: 2017-05-25

## 2017-03-25 RX ORDER — ALBUTEROL SULFATE 2.5 MG/3ML
2.5 SOLUTION RESPIRATORY (INHALATION) EVERY 4 HOURS PRN
Qty: 3 ML | Refills: 12 | Status: SHIPPED | OUTPATIENT
Start: 2017-03-25 | End: 2017-05-23 | Stop reason: HOSPADM

## 2017-03-25 RX ORDER — HYDROXYZINE 50 MG/1
50 TABLET, FILM COATED ORAL 4 TIMES DAILY PRN
Qty: 120 TABLET | Refills: 0 | Status: ON HOLD | OUTPATIENT
Start: 2017-03-25 | End: 2017-09-25

## 2017-03-25 RX ORDER — GABAPENTIN 300 MG/1
600 CAPSULE ORAL 4 TIMES DAILY
Qty: 120 CAPSULE | Refills: 0 | Status: ON HOLD | OUTPATIENT
Start: 2017-03-25 | End: 2017-05-19 | Stop reason: DRUGHIGH

## 2017-03-25 RX ORDER — MONTELUKAST SODIUM 10 MG/1
10 TABLET ORAL NIGHTLY
Qty: 30 TABLET | Refills: 0 | Status: ON HOLD | OUTPATIENT
Start: 2017-03-25 | End: 2017-09-25 | Stop reason: SDDI

## 2017-03-25 RX ORDER — FLUTICASONE PROPIONATE 50 MCG
2 SPRAY, SUSPENSION (ML) NASAL DAILY
Qty: 1 EACH | Refills: 0 | Status: ON HOLD | OUTPATIENT
Start: 2017-03-25 | End: 2017-07-05

## 2017-03-25 RX ORDER — SIMETHICONE 80 MG
80 TABLET,CHEWABLE ORAL 4 TIMES DAILY PRN
Qty: 120 TABLET | Refills: 0 | Status: ON HOLD | OUTPATIENT
Start: 2017-03-25 | End: 2017-05-19

## 2017-03-25 RX ORDER — FUROSEMIDE 40 MG/1
40 TABLET ORAL DAILY
Qty: 30 TABLET | Refills: 0 | Status: ON HOLD | OUTPATIENT
Start: 2017-03-25 | End: 2017-06-09

## 2017-03-25 RX ORDER — PRAVASTATIN SODIUM 80 MG/1
80 TABLET ORAL NIGHTLY
Qty: 30 TABLET | Refills: 0 | Status: SHIPPED | OUTPATIENT
Start: 2017-03-25

## 2017-03-25 RX ORDER — CYCLOBENZAPRINE HCL 10 MG
10 TABLET ORAL 3 TIMES DAILY
Qty: 60 TABLET | Refills: 0 | Status: SHIPPED | OUTPATIENT
Start: 2017-03-25 | End: 2017-05-09

## 2017-03-25 RX ORDER — LEVOTHYROXINE SODIUM 0.05 MG/1
50 TABLET ORAL DAILY
Qty: 30 TABLET | Refills: 0 | Status: ON HOLD | OUTPATIENT
Start: 2017-03-25 | End: 2017-05-19 | Stop reason: DRUGHIGH

## 2017-03-25 RX ORDER — PANTOPRAZOLE SODIUM 40 MG/1
40 TABLET, DELAYED RELEASE ORAL
Qty: 30 TABLET | Refills: 0 | Status: ON HOLD | OUTPATIENT
Start: 2017-03-25 | End: 2017-05-19

## 2017-03-25 RX ORDER — THEOPHYLLINE 300 MG/1
600 TABLET, EXTENDED RELEASE ORAL EVERY 12 HOURS SCHEDULED
Qty: 60 TABLET | Refills: 0 | Status: ON HOLD | OUTPATIENT
Start: 2017-03-25 | End: 2017-09-25 | Stop reason: ALTCHOICE

## 2017-03-25 RX ORDER — DULOXETIN HYDROCHLORIDE 60 MG/1
60 CAPSULE, DELAYED RELEASE ORAL EVERY 12 HOURS SCHEDULED
Qty: 60 CAPSULE | Refills: 0 | Status: SHIPPED | OUTPATIENT
Start: 2017-03-25

## 2017-03-25 RX ORDER — CETIRIZINE HYDROCHLORIDE 10 MG/1
10 TABLET ORAL DAILY
Qty: 30 TABLET | Refills: 0 | Status: ON HOLD | OUTPATIENT
Start: 2017-03-25 | End: 2017-09-25 | Stop reason: SDUPTHER

## 2017-03-25 RX ORDER — NICOTINE 21 MG/24HR
1 PATCH, TRANSDERMAL 24 HOURS TRANSDERMAL
Qty: 28 PATCH | Refills: 0 | Status: ON HOLD | OUTPATIENT
Start: 2017-03-25 | End: 2017-09-25 | Stop reason: SDDI

## 2017-03-25 RX ORDER — GUAIFENESIN 600 MG/1
1200 TABLET, EXTENDED RELEASE ORAL 2 TIMES DAILY PRN
Qty: 28 TABLET | Refills: 0 | Status: SHIPPED | OUTPATIENT
Start: 2017-03-25 | End: 2017-06-09 | Stop reason: HOSPADM

## 2017-03-25 RX ORDER — ATENOLOL 25 MG/1
12.5 TABLET ORAL DAILY
Qty: 30 TABLET | Refills: 0 | Status: ON HOLD | OUTPATIENT
Start: 2017-03-25 | End: 2017-05-19

## 2017-03-25 RX ORDER — CAPSAICIN 0.75 MG/G
CREAM TOPICAL EVERY 8 HOURS
Qty: 28.3 G | Refills: 0 | Status: SHIPPED | OUTPATIENT
Start: 2017-03-25

## 2017-03-25 RX ADMIN — PREDNISONE 5 MG: 5 TABLET ORAL at 08:28

## 2017-03-25 RX ADMIN — Medication 2 TABLET: at 08:27

## 2017-03-25 RX ADMIN — Medication: at 06:42

## 2017-03-25 RX ADMIN — BUDESONIDE AND FORMOTEROL FUMARATE DIHYDRATE 2 PUFF: 80; 4.5 AEROSOL RESPIRATORY (INHALATION) at 08:48

## 2017-03-25 RX ADMIN — NICOTINE 1 PATCH: 21 PATCH, EXTENDED RELEASE TRANSDERMAL at 08:30

## 2017-03-25 RX ADMIN — HYDROCODONE BITARTRATE AND ACETAMINOPHEN 1 TABLET: 5; 325 TABLET ORAL at 10:16

## 2017-03-25 RX ADMIN — DIAZEPAM 5 MG: 5 TABLET ORAL at 08:28

## 2017-03-25 RX ADMIN — GABAPENTIN 600 MG: 300 CAPSULE ORAL at 06:39

## 2017-03-25 RX ADMIN — ENOXAPARIN SODIUM 40 MG: 40 INJECTION SUBCUTANEOUS at 08:27

## 2017-03-25 RX ADMIN — FLUTICASONE PROPIONATE 2 SPRAY: 50 SPRAY, METERED NASAL at 08:27

## 2017-03-25 RX ADMIN — CETIRIZINE HYDROCHLORIDE 10 MG: 10 TABLET, FILM COATED ORAL at 08:28

## 2017-03-25 RX ADMIN — LEVOTHYROXINE SODIUM 50 MCG: 25 TABLET ORAL at 06:39

## 2017-03-25 RX ADMIN — PANTOPRAZOLE SODIUM 40 MG: 40 TABLET, DELAYED RELEASE ORAL at 06:39

## 2017-03-25 RX ADMIN — CYCLOBENZAPRINE HYDROCHLORIDE 10 MG: 10 TABLET, FILM COATED ORAL at 08:28

## 2017-03-25 RX ADMIN — THEOPHYLLINE 600 MG: 300 TABLET, EXTENDED RELEASE ORAL at 08:28

## 2017-03-25 RX ADMIN — Medication 400 MG: at 08:28

## 2017-03-25 RX ADMIN — DULOXETINE 60 MG: 60 CAPSULE, DELAYED RELEASE ORAL at 08:27

## 2017-03-25 RX ADMIN — ATENOLOL 12.5 MG: 25 TABLET ORAL at 08:28

## 2017-03-25 RX ADMIN — MICONAZOLE NITRATE: 2 CREAM TOPICAL at 08:30

## 2017-03-25 RX ADMIN — FUROSEMIDE 40 MG: 40 TABLET ORAL at 08:28

## 2017-03-25 NOTE — PAYOR COMM NOTE
"Colleen Gonzalez (52 y.o. Female) Auth # [id number] Discharge notification Awaiting additional days of approval    Date of Birth Social Security Number Address Home Phone MRN    1965  [id number] [facility] [address] [phone] [id number]    Restorationist Marital Status          None Single       Admission Date Admission Type Admitting Provider Attending Provider Department, Room/Bed    3/1/17 Emergency Nathanael Sloan MD  [facility] 5H, S567/1    Discharge Date Discharge Disposition Discharge Destination        3/25/2017 Home or Self Care             Attending Provider: (none)    Allergies:  Aspirin, Codeine, Ibuprofen    Isolation:  None   Infection:  MRSA (03/09/17), Tuberculosis (rule out) (03/24/17)   Code Status:  FULL    Ht:  68\" (172.7 cm)   Wt:  265 lb (120 kg)    Admission Cmt:  None   Principal Problem:  HCAP (healthcare-associated pneumonia), NASIM, with history of recurrent PNA, however no data on location; Currently growing MRSA and PSA in sputum. [J18.9]                 Active Insurance as of 3/1/2017     Primary Coverage     Payor Plan Insurance Group Employer/Plan Group    [employer] [place] [employer] [place]ICAID      Payor Plan Address Payor Plan Phone Number Effective From Effective To    [address] [address] [phone] 3/1/2017     [place] [address]       Subscriber Name Subscriber Birth Date Member ID       COLLEEN GONZALEZ 1965 [id number]                 Emergency Contacts      (Rel.) Home Phone Work Phone Mobile Phone    E[address] (Sister) -- -- [phone]    Raquel Dumont (Sister) -- -- [phone]            Insurance Information                [facility] MEDICAID Phone: [phone]    Subscriber: Colleen Gonzalez Subscriber#: [id number]    Group#:  Precert#:           "

## 2017-03-25 NOTE — PLAN OF CARE
Problem: Wound, Traumatic, Nonburn (Adult)  Goal: Signs and Symptoms of Listed Potential Problems Will be Absent or Manageable (Wound, Traumatic, Nonburn)  Outcome: Ongoing (interventions implemented as appropriate)

## 2017-03-25 NOTE — PROGRESS NOTES
Continued Stay Note  [facility]     Patient Name: Colleen Gonzalez  MRN: [id number]  Today's Date: 3/25/2017    Admit Date: 3/1/2017          Discharge Plan       03/25/17 0856    Case Management/Social Work Plan    Additional Comments Letter provided to pt documenting pt's stay in [facility].               Discharge Codes     None        Expected Discharge Date and Time     Expected Discharge Date Expected Discharge Time    Mar 4, 2017             SOPHIE Nassar

## 2017-03-25 NOTE — DISCHARGE INSTR - APPOINTMENTS
Follow up   Patient needs to establish GYN care to be seen in 1 month.    Patient to make appt w/ Dr Robert Self to be seen in 1 month.     [facility]   [address], [facility] [address]  [place] [address]   P: [phone]   F: [phone]

## 2017-03-25 NOTE — DISCHARGE SUMMARY
[facility] Medicine Services  DISCHARGE SUMMARY       Date of Admission: 3/1/2017  Date of Discharge:  3/25/2017  Primary Care Physician: RADHA Pedraza    Discharge Diagnoses:  Active Hospital Problems (** Indicates Principal Problem)    Diagnosis Date Noted   • **HCAP (healthcare-associated pneumonia), NASIM, with history of recurrent PNA, however no data on location; Currently growing MRSA and PSA in sputum. [J18.9] 03/01/2017   • Dysphagia, by history; does not comply with swallowing instructions [R13.10] 03/14/2017   • COPD (chronic obstructive pulmonary disease) [J44.9] 03/01/2017   • Tobacco abuse [Z72.0] 03/01/2017   • Acute on chronic respiratory failure [J96.20] 03/01/2017   • Leukocytosis [D72.829] 03/01/2017   • Hypothyroid [E03.9] 03/01/2017   • Anxiety and depression [F41.9, F32.9] 03/01/2017   • Homelessness [Z59.0] 03/01/2017      Resolved Hospital Problems    Diagnosis Date Noted Date Resolved   • Sepsis [A41.9] 03/01/2017 03/25/2017       Presenting Problem/History of Present Illness  Community acquired pneumonia [J18.9]       History of Present Illness on Day of Discharge:   Patient resting in bed, no new complaints, states she is ready for dc. She requested pain medication and benzo rx, understands she is to establish care with PCP for further management of chronic co morbidities. Understands needs to keep followup appts. Encouraged smoke cessation, pt stated she wanted to use nicotine patches at dc. No fever, chills overnight, tolerating po well w/o N/V, breathing improved without O2.     Hospital Course  Patient is a 52 y.o. female with PMH of COPD, hypertension, IBS, bipolar disorder and OCD who is followed by [facility] by psychiatrist.  Patient recently been off her medications for psychiatric therapy ×1 week, was complaining of hallucinations.  At time of presentation to ED patient had been homeless and staying with friends and family.  She  does smoke 2-3 packs per day.  Patient presented to the ED via EMS 3/1/17 secondary to complaining of increased SOA with productive cough had become progressive over the past month.  She reported subjective fevers and chills as well as nausea vomiting and poor by mouth intake.  Upon presentation patient was noted to be hypoxic, tachycardic, with fever temperature 102.3.  Chest x-ray showed left UL pneumonia.  White blood cell count was elevated 15.4.  Patient was managed hospitalist service for further evaluation and management.    Patient was treated for acute on chronic hypoxic respiratory failure which has now resolved most likely multifactorial COPD versus pneumonia.ID was consulted during course of stay.  Sputum cultures showed Pseudomonas and MRSA.  Blood cultures have remained negative.   Strep pneumo and legionella urine antigens were negative.  Pulmonology was consulted during course of stay, patient was initially resistant but agreed to bronchoscopy performed 3/15/17 per Dr. Salomon.  Findings showed inflamed laryngeal structures, retained foreign object likely aspiration of food stuff noted anterior basal bronchus of left lower lobe, bilateral erythema consistent with bronchitis.  Pulmonology recommended continuing antibiotics following cultures and cytology.  Cytology has been negative. AFB ×3 negative.  Patient received vancomycin and cefepime for MRSA and pseudomonas coverage.  Patient has been evaluated for this patient in the past however refuses to comply with recommendations swallowing techniques.  Pulmonology recommends follow-up CT scan in 6 weeks without contrast to look for full clearance of left upper lobe pneumonia.    AFBs isolated from sputum has now been identified as Mycobacterium gluconate with no signs of Mycobacterium tuberculosis.  ID ,  BAL AFB showed no acid-fast bacilli at 1 week.  Dr. Self recommends following patient off antibiotics for now as he feels pseudomonas is colonized.   From an ID standpoint patient is appropriate for discharge his social situation could be worked on patient to stay with her brother.  Plan is to follow up on final cultures from the BAL AFB culture, ID will call patient in clarithromycin treatment if BAL AFB comes back positive at that time would treat with clarithromycin 500 mg twice a day ×6 months.  Patient is to follow-up with Dr. Self in 1 month.  Patient is completed steroid taper during course of stay will discontinue at discharge.    Patient remained him home medication regimen for hypothyroidism, smoke cessation was encouraged.  Patient has a labial cyst needs follow-up with gynecology as outpatient.  She completed treatment for vaginal candida.  Patient currently is refusing voluntary psychiatric treatment.  She does have some paranoia and hallucinations but denies active psychosis/HI/SI.  She appears to be of no harm to herself or others.  Patient has been given Seroquel 300 mg/melatonin for insomnia.  She will not be prescribed any controlled substances at time of discharge.  Patient essentially needs to establish care with primary care for further management of comorbidities.  We will provide one month prescription for all essential medications at time of discharge.    She is currently satting in the in mid 90s on room air, she is afebrile and white blood cell count has normalized.  She is clinically improved and medically appropriate for discharge.    Social work has been working with medical team to help arrange for disposition.  Plan is for patient to follow-up as needed on an outpatient basis for mental health treatment.  She has not discussed demonstrating any behavior suggesting she is apparent to herself or others.  Plan is for patient to be discharged to stay with her brother in [place].  Social work has helped arrange for vocher to [facility] who has impounded her car to obtain her belongings, second voucher will take her to her  brother's residence for she plans to stay.  Social work is also provided patient per her request for resources/contact information regarding following compartments and to [facility].  We appreciate case management and social work's help with this position.    Patient will need to establish care with PCP we'll provide list of [facility] medical [facility] providers.  Patient needs to have follow-up appointment within 1 week of discharge to establish new patient care posthospitalization evaluation.  His recommended patient follow-up as outpatient for psychiatric treatment however she at this time has refused.  Patient will need to follow-up with Dr. Self in 1 month.  Patient will need a follow-up CT scan in 6 weeks to monitor for full clearance of left upper lobe pneumonia per pulmonology will schedule.  Results will need to be followed up with PCP/ID.  Patient is to establish care with gynecology for evaluation of labial cyst.    Procedures Performed  Procedure(s):  BRONCHOSCOPY WITH FLUORO       Consults:   Consults     Date and Time Order Name Status Description    3/18/2017 1411 Inpatient Consult to Orthopedic Surgery      3/14/2017 0833 Inpatient Consult to Pulmonology Completed     3/5/2017 0837 Inpatient Consult to Infectious Diseases Completed           Pertinent Test Results:     Component      Latest Ref Rng & Units 3/1/2017 3/2/2017 3/16/2017 3/22/2017   WBC      3.50 - 10.80 10*3/mm3 15.41 (H)  8.71    RBC      3.89 - 5.14 10*6/mm3 4.09  4.05    Hemoglobin      11.5 - 15.5 g/dL 12.6  12.2    Hematocrit      34.5 - 44.0 % 38.2  38.3    MCV      80.0 - 99.0 fL 93.4  94.6    MCH      27.0 - 31.0 pg 30.8  30.1    MCHC      32.0 - 36.0 g/dL 33.0  31.9 (L)    RDW      11.3 - 14.5 % 15.4 (H)  14.2    RDW-SD      37.0 - 54.0 fl 53.3  49.1    MPV      6.0 - 12.0 fL 9.2  10.3    Platelets      150 - 450 10*3/mm3 196  302    Neutrophil %      41.0 - 71.0 % 77.7 (H)  47.8    Lymphocyte %      24.0 -  44.0 % 8.2 (L)  33.2    Monocyte %      0.0 - 12.0 % 13.2 (H)  15.0 (H)    Eosinophil %      0.0 - 3.0 % 0.0  1.8    Basophil %      0.0 - 1.0 % 0.1  1.3 (H)    Immature Grans %      0.0 - 0.6 % 0.8 (H)  0.9 (H)    Neutrophils, Absolute      1.50 - 8.30 10*3/mm3 11.98 (H)  4.16    Lymphocytes, Absolute      0.60 - 4.80 10*3/mm3 1.26  2.89    Monocytes, Absolute      0.00 - 1.00 10*3/mm3 2.03 (H)  1.31 (H)    Eosinophils, Absolute      0.10 - 0.30 10*3/mm3 0.00 (L)  0.16    Basophils, Absolute      0.00 - 0.20 10*3/mm3 0.02  0.11    Immature Grans, Absolute      0.00 - 0.03 10*3/mm3 0.12 (H)  0.08 (H)    Glucose      70 - 100 mg/dL 118 (H)   76   BUN      9 - 23 mg/dL 6 (L)   19   Creatinine      0.60 - 1.30 mg/dL 1.00   0.80   Sodium      132 - 146 mmol/L 133   140   Potassium      3.5 - 5.5 mmol/L 3.7   3.7   Chloride      99 - 109 mmol/L 98 (L)   96 (L)   CO2      20.0 - 31.0 mmol/L 31.0   39.0 (H)   Calcium      8.7 - 10.4 mg/dL 9.4   9.6   Total Protein      5.7 - 8.2 g/dL 7.1   7.1   Albumin      3.20 - 4.80 g/dL 3.70   3.60   ALT (SGPT)      7 - 40 U/L 11   15   AST (SGOT)      0 - 33 U/L 19   13   Alkaline Phosphatase      25 - 100 U/L 91   82   Total Bilirubin      0.3 - 1.2 mg/dL 0.3   0.2 (L)   eGFR Non African Amer      >60 mL/min/1.73 58 (L)   75   Globulin      gm/dL 3.4   3.5   A/G Ratio      1.5 - 2.5 g/dL 1.1 (L)   1.0 (L)   BUN/Creatinine Ratio      7.0 - 25.0 6.0 (L)   23.8   Anion Gap      3.0 - 11.0 mmol/L 4.0   5.0   RBC Morphology      Normal   Normal    WBC Morphology      Normal   Normal    Platelet Morphology      Normal   Normal    TSH Baseline      0.350 - 5.350 mIU/mL  0.484     Magnesium      1.3 - 2.7 mg/dL         Component      Latest Ref Rng & Units 3/25/2017   WBC      3.50 - 10.80 10*3/mm3    RBC      3.89 - 5.14 10*6/mm3    Hemoglobin      11.5 - 15.5 g/dL    Hematocrit      34.5 - 44.0 %    MCV      80.0 - 99.0 fL    MCH      27.0 - 31.0 pg    MCHC      32.0 - 36.0 g/dL    RDW       11.3 - 14.5 %    RDW-SD      37.0 - 54.0 fl    MPV      6.0 - 12.0 fL    Platelets      150 - 450 10*3/mm3    Neutrophil %      41.0 - 71.0 %    Lymphocyte %      24.0 - 44.0 %    Monocyte %      0.0 - 12.0 %    Eosinophil %      0.0 - 3.0 %    Basophil %      0.0 - 1.0 %    Immature Grans %      0.0 - 0.6 %    Neutrophils, Absolute      1.50 - 8.30 10*3/mm3    Lymphocytes, Absolute      0.60 - 4.80 10*3/mm3    Monocytes, Absolute      0.00 - 1.00 10*3/mm3    Eosinophils, Absolute      0.10 - 0.30 10*3/mm3    Basophils, Absolute      0.00 - 0.20 10*3/mm3    Immature Grans, Absolute      0.00 - 0.03 10*3/mm3    Glucose      70 - 100 mg/dL 79   BUN      9 - 23 mg/dL 14   Creatinine      0.60 - 1.30 mg/dL 0.80   Sodium      132 - 146 mmol/L 143   Potassium      3.5 - 5.5 mmol/L 3.8   Chloride      99 - 109 mmol/L 101   CO2      20.0 - 31.0 mmol/L 39.0 (H)   Calcium      8.7 - 10.4 mg/dL 9.9   Total Protein      5.7 - 8.2 g/dL    Albumin      3.20 - 4.80 g/dL    ALT (SGPT)      7 - 40 U/L    AST (SGOT)      0 - 33 U/L    Alkaline Phosphatase      25 - 100 U/L    Total Bilirubin      0.3 - 1.2 mg/dL    eGFR Non African Amer      >60 mL/min/1.73 75   Globulin      gm/dL    A/G Ratio      1.5 - 2.5 g/dL    BUN/Creatinine Ratio      7.0 - 25.0 17.5   Anion Gap      3.0 - 11.0 mmol/L 3.0   RBC Morphology      Normal    WBC Morphology      Normal    Platelet Morphology      Normal    TSH Baseline      0.350 - 5.350 mIU/mL    Magnesium      1.3 - 2.7 mg/dL 2.5     Fungus Culture [[id number]] (Abnormal) Collected: 03/15/17 1105        Lab Status: Preliminary result Specimen: Wash from Bronchus Updated: 03/24/17 1035        Fungus Culture Scant growth (1+) Candida albicans (A)       AFB Culture [[id number]] (Normal) Collected: 03/15/17 1105       Lab Status: Preliminary result Specimen: Wash from Bronchus Updated: 03/22/17 1301        AFB Culture No AFB isolated at 1 week        AFB Stain No acid fast bacilli seen on concentrated  smear       Fungus Smear [[id number]] Collected: 03/15/17 1105       Lab Status: Final result Specimen: Wash from Bronchus Updated: 03/20/17 1053        GMS Stain No yeast or hyphal elements seen         No Pneumocystis jirovecci (formerly Pneumocystis carinii) noted on smear.       Respiratory Culture [[id number]] (Abnormal)  Collected: 03/15/17 1105       Lab Status: Final result Specimen: Wash from Bronchus Updated: 03/19/17 0852        Respiratory Culture Scant growth (1+) Normal Respiratory Patience         Scant growth (1+) Pseudomonas aeruginosa (A)        Gram Stain Result Few (2+) WBCs seen         Few (2+) Epithelial cells seen         Rare (1+) Gram positive cocci in pairs and clusters         Rare (1+) Endogenous respiratory patience         Susceptibility         Pseudomonas aeruginosa         AMY         Aztreonam <=8  Susceptible         Cefepime <=8  Susceptible         Ceftazidime 4  Susceptible         Gentamicin <=4  Susceptible         Levofloxacin <=2  Susceptible         Meropenem <=1  Susceptible         Piperacillin + Tazobactam <=16  Susceptible         Tobramycin <=4  Susceptible                               MTB AARON Without AFB Culture [[id number]] Collected: 03/15/17 1105       Lab Status: Final result Specimen: Cerebrospinal Fluid from Lung, Left Lower Lobe Updated: 03/17/17 1429        M tuberculosis, AARON Negative         Validation studies at [facility] have demonstrated that PCR testing is   highly sensitive for the detection of Mycobacterium tuberculosis   complex in smear negative or smear positive specimens. PCR results   should be used in conjunction with laboratory test results and the   patient's clinical profile.   Per the College of American Pathologists (CAP) guidelines, a culture   should be performed on all samples regardless of the molecular test   result. By ordering this test code, the client has assumed   responsibility for the performance of the culture.   This test was developed and  its performance characteristics determined   by [facility]. It has not been cleared or approved by the Food and Drug   Administration.           AFB Specimen Processing Concentration       Narrative:         Performed at:  01 - [facility]  [address] [place] [address]  [id number]  : Ender Sanchez MD, Phone:  [phone]       AFB Culture [[id number]] Collected: 03/08/17 161       Lab Status: Preliminary result Specimen: Sputum from Lung Updated: 03/24/17 1341        AFB Culture Acid Fast Bacilli noted on smear from broth culture.           AFB Stain No acid fast bacilli seen on concentrated smear       Narrative:         REFER TO CULTURE [id number] FOR IDENTIFICATION       Blastomyces Antigen [[id number]] Collected: 03/07/17 1530       Lab Status: Final result Specimen: Urine from Urine, Clean Catch Updated: 03/13/17 1513        MVista(R) Blastomyces Ag None Detected ng/mL          Results reported as ng/mL in 0.2 - 14.7 ng/mL range   Results above the limit of detection but below 0.2 ng/mL   are reported as 'Positive, Below the Limit of   Quantification'   Results above 14.7 ng/mL are reported as 'Positive,   Above the Limit of Quantification'           Specimen Type URINE       Narrative:         Performed at:  01 - [facility]  [facility] [place]  [id number]  : Mateus Rm MD, Phone:  [phone]       Respiratory Culture [[id number]] (Abnormal)  Collected: 03/07/17 1000       Lab Status: Final result Specimen: Sputum from Cough Updated: 03/10/17 1329        Respiratory Culture Scant growth (1+) Pseudomonas aeruginosa (A)         Light growth (2+) Staphylococcus aureus, MRSA (C)           Methicillin resistant Staphylococcus aureus, Patient may be an isolation risk.   This isolate does not demonstrate inducible clindamycin resistance in vitro.               Light growth (2+) Yeast isolated (A)        Gram Stain Result Many (4+) WBCs seen         Rare  (1+) Epithelial cells seen         Rare (1+) Yeast         Rare (1+) Endogenous respiratory patience         Susceptibility         Pseudomonas aeruginosa Staphylococcus aureus, MRSA         AMY AMY         Aztreonam <=8  Susceptible          Cefepime <=8  Susceptible          Ceftazidime 4  Susceptible          Ciprofloxacin  >2  Resistant         Clindamycin  <=0.5  Susceptible         Erythromycin  >4  Resistant         Gentamicin <=4  Susceptible <=4  Susceptible         Levofloxacin >4  Resistant >4  Resistant         Linezolid  2  Susceptible         Meropenem <=1  Susceptible          Oxacillin  >2  Resistant         Penicillin G  >8  Resistant         Piperacillin + Tazobactam <=16  Susceptible          Quinupristin + Dalfopristin  <=0.5  Susceptible         Rifampin  <=1  Susceptible         Tetracycline  <=4  Susceptible         Tobramycin <=4  Susceptible          Trimethoprim + Sulfamethoxazole  <=0.5/9.5  Susceptible         Vancomycin  1  Susceptible                                     Imaging Results (most recent)     Procedure Component Value Units Date/Time    CT Angiogram Chest With Contrast [[id number]] Collected:  03/01/17 2234     Updated:  03/02/17 0847    Narrative:       EXAMINATION: CT ANGIOGRAM CHEST W/CONTRAST - 03/01/2017     INDICATION: Left upper lobe mass.     TECHNIQUE: CT scan of the chest was performed following intravenous  contrast.     The radiation dose reduction device was turned on for each scan per the  ALARA (As Low as Reasonably Achievable) protocol.     COMPARISON: Chest x-ray of the same day.     FINDINGS: There is no axillary lymphadenopathy. There are several  minimally enlarged middle mediastinal lymph nodes. There is also mild  left hilar lymphadenopathy. There is no pericardial or pleural effusion.  Limited images of the upper abdomen are normal. Lung window settings  demonstrate a large consolidative mass in the left upper lobe measuring  6.3 x 6.6 cm. Interestingly,  "opacified branches of the left pulmonary  artery pass through this \"mass\" without occlusion or narrowing. The  overall appearance would be more consistent with an inflammatory and  neoplastic process. However this determination cannot be made with  certainty.       Impression:       There is a large, 6.3 x 6.6 cm left upper lobe area of  consolidation with mild left hilar and mediastinal lymphadenopathy. CT  appearance would be slightly more suggestive of an inflammatory as  opposed to a neoplastic process, but this distinction cannot be made  with certainty.     DICTATED:     03/01/2017  EDITED:         03/01/2017     This report was finalized on 3/2/2017 8:45 AM by Dr. Mike Medina MD.       XR Chest 1 View [[id number]] Collected:  03/01/17 1625     Updated:  03/03/17 0948    Narrative:       EXAMINATION: XR CHEST 1 VW- 03/01/2017     INDICATION: SOA triage protocol      COMPARISON: NONE     FINDINGS: Portable chest reveals masslike density in the left upper lobe  which may represent infiltrate or underlying lesion. Consider CT scan of  the chest for further evaluation. Some increased markings at the lung  bases bilaterally. Heart is borderline enlarged. Pulmonary vascularity  is within normal limits. Degenerative changes seen within the spine.           Impression:       Opacification seen in the left upper lobe suggesting either  infiltrate or underlying lesion. Consider CT scan of the chest for  further evaluation.     D:  03/01/2017  E:  03/01/2017     This report was finalized on 3/3/2017 9:46 AM by Dr. Iman Ware MD.       CT Chest Without Contrast [[id number]] Collected:  03/15/17 1456     Updated:  03/15/17 1513    Narrative:       EXAMINATION: CT CHEST WO CONTRAST- 03/15/2017     INDICATION: J18.9-Pneumonia, unspecified organism; J44.1-Chronic  obstructive pulmonary disease with (acute) exacerbation;  R09.02-Hypoxemia; Z74.09-Other reduced mobility     TECHNIQUE: CT scan of the chest was performed " without contrast.     The radiation dose reduction device was turned on for each scan per the  ALARA (As Low as Reasonably Achievable) protocol.     COMPARISON: 03/01/2017     FINDINGS: There is no axillary lymphadenopathy.  There are several small  middle mediastinal nodes.  There is no hilar adenopathy. There is a  small anterior pericardial effusion.  There is no pleural effusion.  Images displayed at lung window settings demonstrate a consolidative  process in the left upper lobe within which there are areas of  bronchiectasis.  This process is slightly smaller but remains a  significant abnormality when compared to 03/01/2017.  There is a very  small patchy airspace opacity in the left lower lobe posteriorly which  is a new finding as well as slightly increased airspace disease in the  lingula.       Impression:       There is a large consolidative process in the left upper  lobe in which there are areas of bronchiectasis. This is improved when  compared with 03/01/2017 and remains markedly abnormal.  There are small  new areas of airspace disease in the left lower lobe and lingula.       D:  03/15/2017  E:  03/15/2017     This report was finalized on 3/15/2017 3:11 PM by Dr. Mike Medina MD.       XR Abdomen KUB [[id number]]  (Abnormal) Collected:  03/23/17 0152     Updated:  03/23/17 0241    Narrative:         EXAM:  XR Abdomen, 1 View.    CLINICAL HISTORY:  52 years old, female; Pain; Abdominal pain; Flank; Right lower quadrant (rlq);   Patient HX: Acute abdominal pain, constipation and right side flank pain. HX:   Being tested for tb copd (chronic obstructive pulmonary disease) tobacco abuse   acute on chronic respiratory failure leukocytosis hcap (healthcare-associated   pneumonia), rul, with history of recurrent pna, however no data on location;   Currently growing MRSA and psa in sputum. Hypothyroid sepsis anxiety and   depression homelessness dysphagia, by history; Does not comply with swallo;  "  Additional info: Acute abdominal pain, constipation and right side flank pain.   Last bowel movement was yesterday morning at 11: 00 am.    TECHNIQUE:  Frontal supine view of the abdomen/pelvis.    COMPARISON:  No relevant prior studies available.    FINDINGS:  Gastrointestinal tract:  Moderate amount of stool throughout the colon, without   obstruction.  Organs:  Surgical clips project over the right upper abdomen, post   cholecystectomy.  Bones/joints:  Degenerative changes of the hips and sacroiliac joints.      Impression:         1. No acute findings.    2. Non-acute findings are described above.      THIS DOCUMENT HAS BEEN ELECTRONICALLY SIGNED BY MEME PHILLIP MD        Condition on Discharge:  stable    Physical Exam on Discharge:/54  Pulse 111  Temp 97.9 °F (36.6 °C) (Oral)   Resp 20  Ht 68\" (172.7 cm)  Wt 265 lb (120 kg)  SpO2 92%  BMI 40.29 kg/m2  Physical Exam  Temp:  [97.9 °F (36.6 °C)] 97.9 °F (36.6 °C)  Heart Rate:  [108-124] 111  Resp:  [20-22] 20  BP: (102-125)/(54-83) 102/54  Constitutional: female with disheveled appearance, resting appears in no acute distress, awake, alert  Respiratory: bilat expiratory wheezes, no rhonchi or rales,  nonlabored respirations   Cardiovascular: RRR, no murmurs, rubs, or gallops  Gastrointestinal: Positive bowel sounds, soft, nontender, nondistended  Musculoskeletal: active ROM  Psychiatric: oriented x 3, appropriate affect, cooperative, flat affect  Neurologic:speech is clear, follows commands    Discharge Disposition  Home or Self Care    Discharge Medications   Colleen Gonzalez   Home Medication Instructions MAC:[id number]    Printed on:03/25/17 1034   Medication Information                      albuterol (PROVENTIL) (2.5 MG/3ML) 0.083% nebulizer solution  Take 2.5 mg by nebulization Every 4 (Four) Hours As Needed for Wheezing.             atenolol (TENORMIN) 25 MG tablet  Take 0.5 tablets by mouth Daily.             capsicum (ZOSTRIX) 0.075 % topical " cream  Apply  topically Every 8 (Eight) Hours.             cetirizine (zyrTEC) 10 MG tablet  Take 1 tablet by mouth Daily.             cyclobenzaprine (FLEXERIL) 10 MG tablet  Take 1 tablet by mouth 3 (Three) Times a Day.             DULoxetine (CYMBALTA) 60 MG capsule  Take 1 capsule by mouth Every 12 (Twelve) Hours.             fluticasone (FLONASE) 50 MCG/ACT nasal spray  2 sprays into each nostril Daily.             fluticasone-salmeterol (ADVAIR) 250-50 MCG/DOSE DISKUS  Inhale 1 puff 2 (Two) Times a Day.             furosemide (LASIX) 40 MG tablet  Take 1 tablet by mouth Daily.             gabapentin (NEURONTIN) 300 MG capsule  Take 2 capsules by mouth 4 (Four) Times a Day.             guaiFENesin (MUCINEX) 600 MG 12 hr tablet  Take 2 tablets by mouth 2 (Two) Times a Day As Needed for Cough.             hydrOXYzine (ATARAX) 50 MG tablet  Take 1 tablet by mouth 4 (Four) Times a Day As Needed for Itching or Anxiety.             ipratropium-albuterol (DUO-NEB) 0.5-2.5 mg/mL nebulizer  Take 3 mL by nebulization Every 4 (Four) Hours As Needed for Wheezing or Shortness of Air.             levothyroxine (SYNTHROID, LEVOTHROID) 50 MCG tablet  Take 50 mcg by mouth Daily.             levothyroxine (SYNTHROID, LEVOTHROID) 50 MCG tablet  Take 1 tablet by mouth Daily.             melatonin 5 MG sublingual tablet sublingual tablet  Place 1 tablet under the tongue At Night As Needed (insomnia).             miconazole (MICOTIN) 2 % cream  Apply  topically Every 12 (Twelve) Hours.             montelukast (SINGULAIR) 10 MG tablet  Take 1 tablet by mouth Every Night.             nicotine (NICODERM CQ) 21 MG/24HR patch  Place 1 patch on the skin Daily.             pantoprazole (PROTONIX) 40 MG EC tablet  Take 1 tablet by mouth 2 (Two) Times a Day Before Meals.             pravastatin (PRAVACHOL) 80 MG tablet  Take 1 tablet by mouth Every Night.             QUEtiapine XR (SEROquel XR) 300 MG 24 hr tablet  Take 1 tablet by mouth  Every Night.             simethicone (MYLICON) 80 MG chewable tablet  Chew 1 tablet 4 (Four) Times a Day As Needed for flatulence.             theophylline (THEODUR) 300 MG 12 hr tablet  Take 2 tablets by mouth Every 12 (Twelve) Hours.             trolamine salicylate (ASPERCREME) 10 % cream  Apply 1 application topically 2 (Two) Times a Day As Needed for Muscle / Joint Pain.                 Discharge Diet:   Diet Instructions     Advance Diet As Tolerated                     Discharge Care Plan / Instructions:    Activity at Discharge:   Activity Instructions     Activity as Tolerated                     Follow-up Appointments  No future appointments.  Additional Instructions for the Follow-ups that You Need to Schedule     Additional Discharge Follow-Up (Specify Provider)    As directed    To:  Dr Robert Self   Follow Up:  1 Month   Follow Up Details:  Patient to contact office Monday and make 1 month appointment with Dr Self       Discharge Follow-up with PCP    As directed    Follow Up Details:  please provide with list of [facility] PCP, patient to make appointment to establish care/post hospitalization eval to be seen in 1 week       Discharge Follow-up with Specialty    As directed    Specialty:  Gynecology   Follow Up:  1 Month   Follow Up Details:  Patient needs to establish care with gynecology       CT Chest Without Contrast    Apr 19, 2017    Results to TANVI Delarosa and patient's PCP   Reason for Exam:  Follow up to monitor resolution of left upper lobe pneumonia   Is the patient pregnant?:  Unknown   Results to TANVI Delarosa and patient's PCP                 Test Results Pending at Discharge   Order Current Status    AFB Identification In process    AFB Culture Preliminary result    AFB Culture Preliminary result    AFB Culture Preliminary result    AFB Culture Preliminary result    Fungus Culture Preliminary result           Casie M Mayne, PA 03/25/17 10:34 AM    Time: Discharge 60 min    Please  note that portions of this note may have been completed with a voice recognition program. Efforts were made to edit the dictations, but occasionally words are mistranscribed.

## 2017-04-10 LAB
CHYMOTRYP AB SER-ACNC: ABNORMAL
CHYMOTRYP AB SER-ACNC: NORMAL
NIGHT BLUE STAIN TISS: ABNORMAL
NIGHT BLUE STAIN TISS: NORMAL

## 2017-04-15 ENCOUNTER — HOSPITAL ENCOUNTER (EMERGENCY)
Facility: HOSPITAL | Age: 52
Discharge: HOME | End: 2017-04-15
Attending: EMERGENCY MEDICINE | Admitting: EMERGENCY MEDICINE

## 2017-04-15 ENCOUNTER — APPOINTMENT (OUTPATIENT)
Dept: GENERAL RADIOLOGY | Facility: HOSPITAL | Age: 52
End: 2017-04-15

## 2017-04-15 VITALS
HEIGHT: 68 IN | OXYGEN SATURATION: 85 % | RESPIRATION RATE: 20 BRPM | HEART RATE: 79 BPM | DIASTOLIC BLOOD PRESSURE: 70 MMHG | TEMPERATURE: 98.1 F | WEIGHT: 245 LBS | BODY MASS INDEX: 37.13 KG/M2 | SYSTOLIC BLOOD PRESSURE: 106 MMHG

## 2017-04-15 DIAGNOSIS — N30.00 ACUTE CYSTITIS WITHOUT HEMATURIA: ICD-10-CM

## 2017-04-15 DIAGNOSIS — J41.0 SIMPLE CHRONIC BRONCHITIS (HCC): Primary | ICD-10-CM

## 2017-04-15 DIAGNOSIS — Z91.89 AT RISK FOR POLYPHARMACY: ICD-10-CM

## 2017-04-15 DIAGNOSIS — R41.82 ALTERED MENTAL STATUS, UNSPECIFIED: ICD-10-CM

## 2017-04-15 LAB
ALBUMIN SERPL-MCNC: 4 G/DL (ref 3.2–4.8)
ALBUMIN/GLOB SERPL: 0.9 G/DL (ref 1.5–2.5)
ALP SERPL-CCNC: 123 U/L (ref 25–100)
ALT SERPL W P-5'-P-CCNC: 12 U/L (ref 7–40)
AMPHET+METHAMPHET UR QL: NEGATIVE
AMPHETAMINES UR QL: NEGATIVE
ANION GAP SERPL CALCULATED.3IONS-SCNC: 2 MMOL/L (ref 3–11)
ARTERIAL PATENCY WRIST A: ABNORMAL
AST SERPL-CCNC: 17 U/L (ref 0–33)
ATMOSPHERIC PRESS: ABNORMAL MMHG
BACTERIA UR QL AUTO: ABNORMAL /HPF
BARBITURATES UR QL SCN: NEGATIVE
BASE EXCESS BLDA CALC-SCNC: 3.5 MMOL/L (ref 0–2)
BASOPHILS # BLD AUTO: 0.05 10*3/MM3 (ref 0–0.2)
BASOPHILS NFR BLD AUTO: 0.6 % (ref 0–1)
BDY SITE: ABNORMAL
BENZODIAZ UR QL SCN: POSITIVE
BILIRUB SERPL-MCNC: 0.4 MG/DL (ref 0.3–1.2)
BILIRUB UR QL STRIP: NEGATIVE
BUN BLD-MCNC: 7 MG/DL (ref 9–23)
BUN/CREAT SERPL: 6.4 (ref 7–25)
BUPRENORPHINE SERPL-MCNC: NEGATIVE NG/ML
CALCIUM SPEC-SCNC: 10.1 MG/DL (ref 8.7–10.4)
CANNABINOIDS SERPL QL: NEGATIVE
CHLORIDE SERPL-SCNC: 102 MMOL/L (ref 99–109)
CLARITY UR: ABNORMAL
CO2 BLDA-SCNC: 29.6 MMOL/L (ref 22–33)
CO2 SERPL-SCNC: 32 MMOL/L (ref 20–31)
COCAINE UR QL: NEGATIVE
COHGB MFR BLD: 3.7 % (ref 0–2)
COLOR UR: YELLOW
CREAT BLD-MCNC: 1.1 MG/DL (ref 0.6–1.3)
DEPRECATED RDW RBC AUTO: 53.5 FL (ref 37–54)
EOSINOPHIL # BLD AUTO: 0.22 10*3/MM3 (ref 0.1–0.3)
EOSINOPHIL NFR BLD AUTO: 2.7 % (ref 0–3)
ERYTHROCYTE [DISTWIDTH] IN BLOOD BY AUTOMATED COUNT: 15 % (ref 11.3–14.5)
GFR SERPL CREATININE-BSD FRML MDRD: 52 ML/MIN/1.73
GLOBULIN UR ELPH-MCNC: 4.3 GM/DL
GLUCOSE BLD-MCNC: 90 MG/DL (ref 70–100)
GLUCOSE BLDC GLUCOMTR-MCNC: 134 MG/DL (ref 70–130)
GLUCOSE UR STRIP-MCNC: NEGATIVE MG/DL
HCO3 BLDA-SCNC: 28.2 MMOL/L (ref 20–26)
HCT VFR BLD AUTO: 39.6 % (ref 34.5–44)
HCT VFR BLD CALC: 37.5 %
HGB BLD-MCNC: 12.4 G/DL (ref 11.5–15.5)
HGB BLDA-MCNC: 12.2 G/DL (ref 14–18)
HGB UR QL STRIP.AUTO: NEGATIVE
HOLD SPECIMEN: NORMAL
HOROWITZ INDEX BLD+IHG-RTO: 60 %
HYALINE CASTS UR QL AUTO: ABNORMAL /LPF
IMM GRANULOCYTES # BLD: 0.03 10*3/MM3 (ref 0–0.03)
IMM GRANULOCYTES NFR BLD: 0.4 % (ref 0–0.6)
KETONES UR QL STRIP: NEGATIVE
LEUKOCYTE ESTERASE UR QL STRIP.AUTO: ABNORMAL
LYMPHOCYTES # BLD AUTO: 2.84 10*3/MM3 (ref 0.6–4.8)
LYMPHOCYTES NFR BLD AUTO: 35.4 % (ref 24–44)
MCH RBC QN AUTO: 30 PG (ref 27–31)
MCHC RBC AUTO-ENTMCNC: 31.3 G/DL (ref 32–36)
MCV RBC AUTO: 95.9 FL (ref 80–99)
METHADONE UR QL SCN: NEGATIVE
METHGB BLD QL: 0.4 % (ref 0–1.5)
MODALITY: ABNORMAL
MONOCYTES # BLD AUTO: 0.91 10*3/MM3 (ref 0–1)
MONOCYTES NFR BLD AUTO: 11.3 % (ref 0–12)
NEUTROPHILS # BLD AUTO: 3.97 10*3/MM3 (ref 1.5–8.3)
NEUTROPHILS NFR BLD AUTO: 49.6 % (ref 41–71)
NITRITE UR QL STRIP: NEGATIVE
OPIATES UR QL: NEGATIVE
OXYCODONE UR QL SCN: NEGATIVE
OXYHGB MFR BLDV: 95.7 % (ref 94–99)
PCO2 BLDA: 43.2 MM HG (ref 35–45)
PCP UR QL SCN: NEGATIVE
PH BLDA: 7.42 PH UNITS (ref 7.35–7.45)
PH UR STRIP.AUTO: 6 [PH] (ref 5–8)
PLATELET # BLD AUTO: 367 10*3/MM3 (ref 150–450)
PMV BLD AUTO: 9.5 FL (ref 6–12)
PO2 BLDA: 170 MM HG (ref 83–108)
POTASSIUM BLD-SCNC: 3.8 MMOL/L (ref 3.5–5.5)
PROPOXYPH UR QL: NEGATIVE
PROT SERPL-MCNC: 8.3 G/DL (ref 5.7–8.2)
PROT UR QL STRIP: NEGATIVE
RBC # BLD AUTO: 4.13 10*6/MM3 (ref 3.89–5.14)
RBC # UR: ABNORMAL /HPF
REF LAB TEST METHOD: ABNORMAL
SAO2 % BLDCOA: 95.7 %
SAO2 % BLDCOA: 95.7 %
SODIUM BLD-SCNC: 136 MMOL/L (ref 132–146)
SP GR UR STRIP: 1.01 (ref 1–1.03)
SQUAMOUS #/AREA URNS HPF: ABNORMAL /HPF
TRICYCLICS UR QL SCN: NEGATIVE
TSH SERPL DL<=0.05 MIU/L-ACNC: 0.68 MIU/ML (ref 0.35–5.35)
UROBILINOGEN UR QL STRIP: ABNORMAL
VALPROATE SERPL-MCNC: 81 MCG/ML (ref 50–150)
WBC NRBC COR # BLD: 8.02 10*3/MM3 (ref 3.5–10.8)
WBC UR QL AUTO: ABNORMAL /HPF
WHOLE BLOOD HOLD SPECIMEN: NORMAL
WHOLE BLOOD HOLD SPECIMEN: NORMAL

## 2017-04-15 PROCEDURE — 71010 HC CHEST PA OR AP: CPT

## 2017-04-15 PROCEDURE — 87077 CULTURE AEROBIC IDENTIFY: CPT | Performed by: EMERGENCY MEDICINE

## 2017-04-15 PROCEDURE — 99284 EMERGENCY DEPT VISIT MOD MDM: CPT

## 2017-04-15 PROCEDURE — 94640 AIRWAY INHALATION TREATMENT: CPT

## 2017-04-15 PROCEDURE — 87086 URINE CULTURE/COLONY COUNT: CPT | Performed by: EMERGENCY MEDICINE

## 2017-04-15 PROCEDURE — 80306 DRUG TEST PRSMV INSTRMNT: CPT | Performed by: EMERGENCY MEDICINE

## 2017-04-15 PROCEDURE — 87147 CULTURE TYPE IMMUNOLOGIC: CPT | Performed by: EMERGENCY MEDICINE

## 2017-04-15 PROCEDURE — 80164 ASSAY DIPROPYLACETIC ACD TOT: CPT | Performed by: EMERGENCY MEDICINE

## 2017-04-15 PROCEDURE — 82962 GLUCOSE BLOOD TEST: CPT

## 2017-04-15 PROCEDURE — 36600 WITHDRAWAL OF ARTERIAL BLOOD: CPT | Performed by: EMERGENCY MEDICINE

## 2017-04-15 PROCEDURE — 96374 THER/PROPH/DIAG INJ IV PUSH: CPT

## 2017-04-15 PROCEDURE — P9612 CATHETERIZE FOR URINE SPEC: HCPCS

## 2017-04-15 PROCEDURE — 25010000002 METHYLPREDNISOLONE PER 125 MG: Performed by: EMERGENCY MEDICINE

## 2017-04-15 PROCEDURE — 81001 URINALYSIS AUTO W/SCOPE: CPT | Performed by: EMERGENCY MEDICINE

## 2017-04-15 PROCEDURE — 87186 SC STD MICRODIL/AGAR DIL: CPT | Performed by: EMERGENCY MEDICINE

## 2017-04-15 PROCEDURE — 80050 GENERAL HEALTH PANEL: CPT | Performed by: EMERGENCY MEDICINE

## 2017-04-15 PROCEDURE — 94799 UNLISTED PULMONARY SVC/PX: CPT

## 2017-04-15 PROCEDURE — 82805 BLOOD GASES W/O2 SATURATION: CPT | Performed by: EMERGENCY MEDICINE

## 2017-04-15 RX ORDER — METHYLPREDNISOLONE SODIUM SUCCINATE 125 MG/2ML
125 INJECTION, POWDER, LYOPHILIZED, FOR SOLUTION INTRAMUSCULAR; INTRAVENOUS ONCE
Status: COMPLETED | OUTPATIENT
Start: 2017-04-15 | End: 2017-04-15

## 2017-04-15 RX ORDER — SODIUM CHLORIDE 0.9 % (FLUSH) 0.9 %
10 SYRINGE (ML) INJECTION AS NEEDED
Status: DISCONTINUED | OUTPATIENT
Start: 2017-04-15 | End: 2017-04-15 | Stop reason: HOSPADM

## 2017-04-15 RX ORDER — PREDNISONE 50 MG/1
50 TABLET ORAL DAILY
Qty: 5 TABLET | Refills: 0 | Status: SHIPPED | OUTPATIENT
Start: 2017-04-15 | End: 2017-04-20

## 2017-04-15 RX ORDER — IPRATROPIUM BROMIDE AND ALBUTEROL SULFATE 2.5; .5 MG/3ML; MG/3ML
3 SOLUTION RESPIRATORY (INHALATION) ONCE
Status: COMPLETED | OUTPATIENT
Start: 2017-04-15 | End: 2017-04-15

## 2017-04-15 RX ORDER — CEFDINIR 300 MG/1
300 CAPSULE ORAL 2 TIMES DAILY
Qty: 14 CAPSULE | Refills: 0 | Status: SHIPPED | OUTPATIENT
Start: 2017-04-15 | End: 2017-04-22

## 2017-04-15 RX ADMIN — METHYLPREDNISOLONE SODIUM SUCCINATE 125 MG: 125 INJECTION, POWDER, FOR SOLUTION INTRAMUSCULAR; INTRAVENOUS at 01:44

## 2017-04-15 RX ADMIN — IPRATROPIUM BROMIDE AND ALBUTEROL SULFATE 3 ML: .5; 3 SOLUTION RESPIRATORY (INHALATION) at 01:34
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
## 2017-04-15 NOTE — ED NOTES
Pt became alert and oriented times 4 after ABG stick. Pt's oxygen saturation return to within normal limits on nasal cannula 4 LPM.      Roge Chandler RN  04/15/17 0207       Roge Chandler RN  04/15/17 0235       Roge Chandler RN  04/15/17 0238

## 2017-04-15 NOTE — ED NOTES
PT STATES THAT SHE DOES NOT HAVE A RIDE HOME. PT OFFERED BUS TOKEN. PT REFUSED. NURSE ATTEMPTED TO CONTACT 4 FAMILY MEMBERS AND WAS UNSUCCESSFUL IN CONTACTING FAMILY TO PICK PT UP. PER CHARGE PT ALLOWED TO SIT IN WAITING ROOM AND TRY AGAIN LATER TO CALL FAMILY. PT TAKEN TO LOBBY.     Amanda Fabian RN  04/15/17 9516

## 2017-04-15 NOTE — ED PROVIDER NOTES
Subjective   HPI Comments: Colleen Gonzalez is a 52 y.o.female who presents to the ED with altered mental status. The patient is homeless and lives out of her van. Today, she was found by the police after she was reported for knocking on doors and appearing lethargic. Upon arrival to the ED, the patient is asleep but rousable and oriented to self only. She is currently taking klonopin. Patient is noted to be wearing a hospital gown and socks; she states that she was recently seen at [facility]. She denies any other acute sx at this time.    Patient is a 52 y.o. female presenting with altered mental status.   History provided by:  Patient  History limited by:  Mental status change  Altered Mental Status   Presenting symptoms: disorientation and lethargy    Presenting symptoms: no confusion    Severity:  Unable to specify  Episode history:  Unable to specify  Timing:  Unable to specify  Chronicity:  New  Context: homelessness    Associated symptoms: no abdominal pain, no agitation, no fever, no headaches and no rash        Review of Systems   Constitutional: Negative for chills, fatigue and fever.   HENT: Negative for congestion and sore throat.    Respiratory: Negative for shortness of breath.    Cardiovascular: Negative for chest pain.   Gastrointestinal: Negative for abdominal pain.   Genitourinary: Negative for dysuria.   Musculoskeletal: Negative for back pain.   Skin: Negative for rash.   Neurological: Negative for headaches.        Lethargic, oriented to self only.   Psychiatric/Behavioral: Negative for agitation and confusion.   All other systems reviewed and are negative.      Past Medical History:   Diagnosis Date   • Anxiety    • COPD (chronic obstructive pulmonary disease)    • Depression    • Hyperlipidemia    • Hypertension    • Hypothyroid    • Obesity    • Seizures        Allergies   Allergen Reactions   • Aspirin GI Intolerance   • Codeine Itching   • Ibuprofen GI Intolerance       Past Surgical  History:   Procedure Laterality Date   • BACK SURGERY     • BRONCHOSCOPY Left 3/15/2017    Procedure: BRONCHOSCOPY WITH FLUORO;  Surgeon: Ankur Salomon MD;  Location: [facility] ENDOSCOPY;  Service:    • CHOLECYSTECTOMY     • CYST REMOVAL     • HYSTERECTOMY      partial   • KNEE SURGERY Bilateral        History reviewed. No pertinent family history.    Social History     Social History   • Marital status: Single     Spouse name: N/A   • Number of children: N/A   • Years of education: N/A     Social History Main Topics   • Smoking status: Heavy Tobacco Smoker     Packs/day: 3.00   • Smokeless tobacco: None   • Alcohol use No   • Drug use: No   • Sexual activity: Defer     Other Topics Concern   • None     Social History Narrative    Patient is homeless and moves around staying with friends and family.  Recently was staying at the "[employer]".           Objective   Physical Exam   Constitutional: No distress.   HENT:   Head: Normocephalic and atraumatic.   Eyes: Conjunctivae and EOM are normal. Pupils are equal, round, and reactive to light.   Neck: Normal range of motion. Neck supple. No thyromegaly present.   Cardiovascular: Normal rate, regular rhythm and normal heart sounds.  Exam reveals no gallop and no friction rub.    No murmur heard.  Pulmonary/Chest: Effort normal. No respiratory distress. She has wheezes (moderate wheezing bilaterally).   Abdominal: Soft. Bowel sounds are normal. There is no tenderness.   Musculoskeletal: Normal range of motion.   Lymphadenopathy:     She has no cervical adenopathy.   Neurological: She is alert.   Oriented to self only   Skin: Skin is warm and dry.   Psychiatric: She has a normal mood and affect.   Nursing note and vitals reviewed.      Procedures         ED Course  ED Course       No results found for this or any previous visit (from the past 24 hour(s)).  Note: In addition to lab results from this visit, the labs listed above may include labs taken at another  facility or during a different encounter within the last 24 hours. Please correlate lab times with ED admission and discharge times for further clarification of the services performed during this visit.    XR Chest 1 View   Final Result   Improvement in the aeration of the left upper lobe. Chronic   changes seen in the lung fields with no new focal parenchymal   opacification present.       FAX TO: [facility]       DICTATED:     04/15/2017   EDITED:         04/15/2017       This report was finalized on 4/16/2017 4:56 PM by Dr. Iman Ware MD.            Vitals:    04/15/17 0126 04/15/17 0134 04/15/17 0330 04/15/17 0430   BP:   108/76 106/70   BP Location:       Patient Position:       Pulse: 78 79     Resp:  20     Temp:       TempSrc:       SpO2: 99% 99% 90% (!) 85%   Weight:       Height:         Medications   ipratropium-albuterol (DUO-NEB) nebulizer solution 3 mL (3 mL Nebulization Given 4/15/17 0134)   methylPREDNISolone sodium succinate (SOLU-Medrol) injection 125 mg (125 mg Intravenous Given 4/15/17 0144)     ECG/EMG Results (last 24 hours)     ** No results found for the last 24 hours. **        PT ADMITS TAKING HER SEROQUEL PTA, AND HER INITIAL PRESENTATION WAS CONSISTENT WITH SEDATIVE ADMINISTRATION.  FOLLOWING ED OBSERVATION, SHE IS MUCH MORE ALERT, APPROPRIATELY CONVERSANT AND WALKING WITHOUT DIFFICULTY.  PT'S FAIRLY SIGNIFICANT WHEEZING PERSISTS, BUT PT STATES THAT THIS IS HER BASELINE.  SHE STILL SMOKES MULTIPLE PACKS OF CIGARETTES DAILY.  WE EXTENSIVELY DISCUSSED THIS AND SHE FULLY UNDERSTANDS THE RISK.              MDM    Final diagnoses:   Simple chronic bronchitis   Altered mental status, unspecified   At risk for polypharmacy   Acute cystitis without hematuria       Documentation assistance provided by nancy Hutchinson.  Information recorded by the elaineibjustin was done at my direction and has been verified and validated by me.     Monica Hutchinson  04/15/17 0210       Rashaun Rob DO  04/17/17  2031

## 2017-04-15 NOTE — ED NOTES
Pt's UA was a clean catch specimen. Catheterized specimen documented in error.      Roge Chandler RN  04/15/17 6595

## 2017-04-15 NOTE — ED NOTES
UPON ENTERING ROOM TO DISCHARGE PT, PT STATED THAT SHE WAS MISSING HER CLONAZAPAM 1MG TABLETS. NURSE SEARCHED ROOM AND CHECKED MEDS THAT WERE BROUGHT IN WITH PT. NO CLONAZAPAM FOUND IN ROOM. CHARGE NURSE, DAVID ROACH RN NOTIFIED. CHARGE NURSE TO PT ROOM, RECHECKED ROOM FOR MEDICATION AND NO OTHER MEDS FOUND.      Amanda Fabian RN  04/15/17 0621

## 2017-04-17 LAB — GLUCOSE BLDC GLUCOMTR-MCNC: >599 MG/DL (ref 70–130)

## 2017-04-18 ENCOUNTER — TELEPHONE (OUTPATIENT)
Dept: EMERGENCY DEPT | Facility: HOSPITAL | Age: 52
End: 2017-04-18

## 2017-04-18 LAB
BACTERIA SPEC AEROBE CULT: ABNORMAL
MYCOBACTERIUM SPEC CULT: NORMAL
NIGHT BLUE STAIN TISS: NORMAL

## 2017-04-19 ENCOUNTER — TELEPHONE (OUTPATIENT)
Dept: EMERGENCY DEPT | Facility: HOSPITAL | Age: 52
End: 2017-04-19

## 2017-04-25 LAB — FUNGUS WND CULT: ABNORMAL

## 2017-04-26 LAB
MYCOBACTERIUM SPEC CULT: NORMAL
NIGHT BLUE STAIN TISS: NORMAL

## 2017-05-01 ENCOUNTER — APPOINTMENT (OUTPATIENT)
Dept: GENERAL RADIOLOGY | Facility: HOSPITAL | Age: 52
End: 2017-05-01

## 2017-05-01 ENCOUNTER — HOSPITAL ENCOUNTER (EMERGENCY)
Facility: HOSPITAL | Age: 52
Discharge: HOME | End: 2017-05-02
Attending: EMERGENCY MEDICINE | Admitting: EMERGENCY MEDICINE

## 2017-05-01 DIAGNOSIS — R11.2 NON-INTRACTABLE VOMITING WITH NAUSEA, UNSPECIFIED VOMITING TYPE: ICD-10-CM

## 2017-05-01 DIAGNOSIS — S93.401A SPRAIN OF RIGHT ANKLE, UNSPECIFIED LIGAMENT, INITIAL ENCOUNTER: ICD-10-CM

## 2017-05-01 DIAGNOSIS — B37.49 CANDIDA INFECTION OF GENITAL REGION: ICD-10-CM

## 2017-05-01 DIAGNOSIS — J44.1 COPD EXACERBATION (HCC): ICD-10-CM

## 2017-05-01 DIAGNOSIS — J18.9 PNEUMONIA OF LEFT LOWER LOBE DUE TO INFECTIOUS ORGANISM: Primary | ICD-10-CM

## 2017-05-01 LAB
BILIRUB UR QL STRIP: NEGATIVE
CLARITY UR: CLEAR
COLOR UR: YELLOW
GLUCOSE UR STRIP-MCNC: NEGATIVE MG/DL
HGB UR QL STRIP.AUTO: NEGATIVE
KETONES UR QL STRIP: NEGATIVE
LEUKOCYTE ESTERASE UR QL STRIP.AUTO: NEGATIVE
NITRITE UR QL STRIP: NEGATIVE
PH UR STRIP.AUTO: 5.5 [PH] (ref 5–8)
PROT UR QL STRIP: NEGATIVE
SP GR UR STRIP: 1.01 (ref 1–1.03)
UROBILINOGEN UR QL STRIP: NORMAL

## 2017-05-01 PROCEDURE — 71020 HC CHEST PA AND LATERAL: CPT

## 2017-05-01 PROCEDURE — 96374 THER/PROPH/DIAG INJ IV PUSH: CPT

## 2017-05-01 PROCEDURE — P9612 CATHETERIZE FOR URINE SPEC: HCPCS

## 2017-05-01 PROCEDURE — 93005 ELECTROCARDIOGRAM TRACING: CPT | Performed by: EMERGENCY MEDICINE

## 2017-05-01 PROCEDURE — 99284 EMERGENCY DEPT VISIT MOD MDM: CPT

## 2017-05-01 PROCEDURE — 25010000002 METHYLPREDNISOLONE PER 125 MG: Performed by: EMERGENCY MEDICINE

## 2017-05-01 PROCEDURE — 81003 URINALYSIS AUTO W/O SCOPE: CPT | Performed by: EMERGENCY MEDICINE

## 2017-05-01 PROCEDURE — 25010000002 ONDANSETRON PER 1 MG: Performed by: EMERGENCY MEDICINE

## 2017-05-01 PROCEDURE — 96375 TX/PRO/DX INJ NEW DRUG ADDON: CPT

## 2017-05-01 PROCEDURE — 81025 URINE PREGNANCY TEST: CPT | Performed by: EMERGENCY MEDICINE

## 2017-05-01 PROCEDURE — 94640 AIRWAY INHALATION TREATMENT: CPT

## 2017-05-01 RX ORDER — IPRATROPIUM BROMIDE AND ALBUTEROL SULFATE 2.5; .5 MG/3ML; MG/3ML
3 SOLUTION RESPIRATORY (INHALATION) ONCE
Status: COMPLETED | OUTPATIENT
Start: 2017-05-01 | End: 2017-05-01

## 2017-05-01 RX ORDER — SODIUM CHLORIDE 0.9 % (FLUSH) 0.9 %
10 SYRINGE (ML) INJECTION AS NEEDED
Status: DISCONTINUED | OUTPATIENT
Start: 2017-05-01 | End: 2017-05-02 | Stop reason: HOSPADM

## 2017-05-01 RX ORDER — METHYLPREDNISOLONE SODIUM SUCCINATE 125 MG/2ML
125 INJECTION, POWDER, LYOPHILIZED, FOR SOLUTION INTRAMUSCULAR; INTRAVENOUS ONCE
Status: DISCONTINUED | OUTPATIENT
Start: 2017-05-01 | End: 2017-05-02

## 2017-05-01 RX ORDER — ONDANSETRON 2 MG/ML
4 INJECTION INTRAMUSCULAR; INTRAVENOUS ONCE
Status: COMPLETED | OUTPATIENT
Start: 2017-05-01 | End: 2017-05-01

## 2017-05-01 RX ORDER — METHYLPREDNISOLONE SODIUM SUCCINATE 125 MG/2ML
125 INJECTION, POWDER, LYOPHILIZED, FOR SOLUTION INTRAMUSCULAR; INTRAVENOUS ONCE
Status: COMPLETED | OUTPATIENT
Start: 2017-05-01 | End: 2017-05-01

## 2017-05-01 RX ADMIN — METHYLPREDNISOLONE SODIUM SUCCINATE 125 MG: 125 INJECTION, POWDER, FOR SOLUTION INTRAMUSCULAR; INTRAVENOUS at 23:21

## 2017-05-01 RX ADMIN — ONDANSETRON 4 MG: 2 INJECTION INTRAMUSCULAR; INTRAVENOUS at 23:23

## 2017-05-01 RX ADMIN — IPRATROPIUM BROMIDE AND ALBUTEROL SULFATE 3 ML: .5; 3 SOLUTION RESPIRATORY (INHALATION) at 23:10

## 2017-05-02 VITALS
OXYGEN SATURATION: 97 % | DIASTOLIC BLOOD PRESSURE: 82 MMHG | BODY MASS INDEX: 39.4 KG/M2 | SYSTOLIC BLOOD PRESSURE: 110 MMHG | HEART RATE: 82 BPM | RESPIRATION RATE: 18 BRPM | TEMPERATURE: 98 F | HEIGHT: 68 IN | WEIGHT: 260 LBS

## 2017-05-02 LAB
ALBUMIN SERPL-MCNC: 3.71 G/DL (ref 3.2–4.8)
ALBUMIN/GLOB SERPL: 1.1 G/DL (ref 1.5–2.5)
ALP SERPL-CCNC: 102 U/L (ref 25–100)
ALT SERPL W P-5'-P-CCNC: 20 U/L (ref 7–40)
ANION GAP SERPL CALCULATED.3IONS-SCNC: 9.5 MMOL/L (ref 3–11)
AST SERPL-CCNC: 25 U/L (ref 0–33)
B-HCG UR QL: NEGATIVE
BASOPHILS # BLD AUTO: 0.06 10*3/MM3 (ref 0–0.2)
BASOPHILS NFR BLD AUTO: 0.9 % (ref 0–1)
BILIRUB SERPL-MCNC: 0.3 MG/DL (ref 0.3–1.2)
BNP SERPL-MCNC: 22 PG/ML (ref 0–100)
BUN BLD-MCNC: 7 MG/DL (ref 9–23)
BUN/CREAT SERPL: 6.7 (ref 7–25)
CALCIUM SPEC-SCNC: 9.8 MG/DL (ref 8.7–10.4)
CHLORIDE SERPL-SCNC: 102 MMOL/L (ref 99–109)
CO2 SERPL-SCNC: 26.5 MMOL/L (ref 20–31)
CREAT BLD-MCNC: 1.05 MG/DL (ref 0.6–1.3)
DEPRECATED RDW RBC AUTO: 50.6 FL (ref 37–54)
EOSINOPHIL # BLD AUTO: 0.13 10*3/MM3 (ref 0.1–0.3)
EOSINOPHIL NFR BLD AUTO: 2 % (ref 0–3)
ERYTHROCYTE [DISTWIDTH] IN BLOOD BY AUTOMATED COUNT: 14.9 % (ref 11.3–14.5)
GFR SERPL CREATININE-BSD FRML MDRD: 55 ML/MIN/1.73
GLOBULIN UR ELPH-MCNC: 3.4 GM/DL
GLUCOSE BLD-MCNC: 100 MG/DL (ref 70–100)
HCT VFR BLD AUTO: 38 % (ref 34.5–44)
HGB BLD-MCNC: 12.4 G/DL (ref 11.5–15.5)
HOLD SPECIMEN: NORMAL
IMM GRANULOCYTES # BLD: 0.01 10*3/MM3 (ref 0–0.03)
IMM GRANULOCYTES NFR BLD: 0.2 % (ref 0–0.6)
LIPASE SERPL-CCNC: 46 U/L (ref 6–51)
LYMPHOCYTES # BLD AUTO: 2.69 10*3/MM3 (ref 0.6–4.8)
LYMPHOCYTES NFR BLD AUTO: 41.3 % (ref 24–44)
MCH RBC QN AUTO: 30.2 PG (ref 27–31)
MCHC RBC AUTO-ENTMCNC: 32.6 G/DL (ref 32–36)
MCV RBC AUTO: 92.7 FL (ref 80–99)
MONOCYTES # BLD AUTO: 0.54 10*3/MM3 (ref 0–1)
MONOCYTES NFR BLD AUTO: 8.3 % (ref 0–12)
NEUTROPHILS # BLD AUTO: 3.08 10*3/MM3 (ref 1.5–8.3)
NEUTROPHILS NFR BLD AUTO: 47.3 % (ref 41–71)
PLATELET # BLD AUTO: 272 10*3/MM3 (ref 150–450)
PMV BLD AUTO: 10.4 FL (ref 6–12)
POTASSIUM BLD-SCNC: 3.2 MMOL/L (ref 3.5–5.5)
PROT SERPL-MCNC: 7.1 G/DL (ref 5.7–8.2)
RBC # BLD AUTO: 4.1 10*6/MM3 (ref 3.89–5.14)
SODIUM BLD-SCNC: 138 MMOL/L (ref 132–146)
THEOPHYLLINE SERPL-MCNC: <2 MCG/ML (ref 10–20)
TROPONIN I SERPL-MCNC: 0 NG/ML (ref 0–0.07)
TROPONIN I SERPL-MCNC: <0.006 NG/ML
TSH SERPL DL<=0.05 MIU/L-ACNC: 1.31 MIU/ML (ref 0.35–5.35)
WBC NRBC COR # BLD: 6.51 10*3/MM3 (ref 3.5–10.8)
WHOLE BLOOD HOLD SPECIMEN: NORMAL
WHOLE BLOOD HOLD SPECIMEN: NORMAL

## 2017-05-02 PROCEDURE — 84484 ASSAY OF TROPONIN QUANT: CPT | Performed by: EMERGENCY MEDICINE

## 2017-05-02 PROCEDURE — 83690 ASSAY OF LIPASE: CPT | Performed by: EMERGENCY MEDICINE

## 2017-05-02 PROCEDURE — 80050 GENERAL HEALTH PANEL: CPT | Performed by: EMERGENCY MEDICINE

## 2017-05-02 PROCEDURE — 84484 ASSAY OF TROPONIN QUANT: CPT

## 2017-05-02 PROCEDURE — 83880 ASSAY OF NATRIURETIC PEPTIDE: CPT | Performed by: EMERGENCY MEDICINE

## 2017-05-02 PROCEDURE — 80198 ASSAY OF THEOPHYLLINE: CPT | Performed by: EMERGENCY MEDICINE

## 2017-05-02 RX ORDER — ACETAMINOPHEN 325 MG/1
TABLET ORAL
Status: COMPLETED
Start: 2017-05-02 | End: 2017-05-02

## 2017-05-02 RX ORDER — ACETAMINOPHEN 325 MG/1
650 TABLET ORAL ONCE
Status: COMPLETED | OUTPATIENT
Start: 2017-05-02 | End: 2017-05-02

## 2017-05-02 RX ORDER — AMOXICILLIN AND CLAVULANATE POTASSIUM 875; 125 MG/1; MG/1
1 TABLET, FILM COATED ORAL EVERY 12 HOURS
Qty: 14 TABLET | Refills: 0 | Status: SHIPPED | OUTPATIENT
Start: 2017-05-02 | End: 2017-05-09

## 2017-05-02 RX ORDER — PREDNISONE 50 MG/1
50 TABLET ORAL DAILY
Qty: 5 TABLET | Refills: 0 | Status: SHIPPED | OUTPATIENT
Start: 2017-05-02 | End: 2017-05-07

## 2017-05-02 RX ORDER — ALBUTEROL SULFATE 90 UG/1
2 AEROSOL, METERED RESPIRATORY (INHALATION) EVERY 4 HOURS PRN
Qty: 1 INHALER | Refills: 0 | Status: SHIPPED | OUTPATIENT
Start: 2017-05-02 | End: 2017-05-09

## 2017-05-02 RX ADMIN — ACETAMINOPHEN 650 MG: 325 TABLET ORAL at 00:29

## 2017-05-08 PROCEDURE — 99283 EMERGENCY DEPT VISIT LOW MDM: CPT

## 2017-05-09 ENCOUNTER — APPOINTMENT (OUTPATIENT)
Dept: GENERAL RADIOLOGY | Facility: HOSPITAL | Age: 52
End: 2017-05-09

## 2017-05-09 ENCOUNTER — HOSPITAL ENCOUNTER (EMERGENCY)
Facility: HOSPITAL | Age: 52
Discharge: HOME | End: 2017-05-09
Attending: EMERGENCY MEDICINE | Admitting: EMERGENCY MEDICINE

## 2017-05-09 VITALS
BODY MASS INDEX: 39.4 KG/M2 | OXYGEN SATURATION: 93 % | HEIGHT: 68 IN | SYSTOLIC BLOOD PRESSURE: 102 MMHG | HEART RATE: 55 BPM | DIASTOLIC BLOOD PRESSURE: 61 MMHG | WEIGHT: 260 LBS | RESPIRATION RATE: 14 BRPM | TEMPERATURE: 98.1 F

## 2017-05-09 DIAGNOSIS — M47.812 ARTHRITIS OF NECK: ICD-10-CM

## 2017-05-09 DIAGNOSIS — J44.1 CHRONIC OBSTRUCTIVE PULMONARY DISEASE WITH ACUTE EXACERBATION (HCC): Primary | ICD-10-CM

## 2017-05-09 DIAGNOSIS — J20.9 ACUTE BRONCHITIS, UNSPECIFIED ORGANISM: ICD-10-CM

## 2017-05-09 PROCEDURE — 94640 AIRWAY INHALATION TREATMENT: CPT

## 2017-05-09 PROCEDURE — 72040 X-RAY EXAM NECK SPINE 2-3 VW: CPT

## 2017-05-09 PROCEDURE — 94760 N-INVAS EAR/PLS OXIMETRY 1: CPT

## 2017-05-09 PROCEDURE — 93005 ELECTROCARDIOGRAM TRACING: CPT | Performed by: PHYSICIAN ASSISTANT

## 2017-05-09 PROCEDURE — 71020 HC CHEST PA AND LATERAL: CPT

## 2017-05-09 RX ORDER — NAPROXEN 500 MG/1
500 TABLET ORAL 2 TIMES DAILY PRN
Qty: 20 TABLET | Refills: 0 | Status: SHIPPED | OUTPATIENT
Start: 2017-05-09 | End: 2017-05-23 | Stop reason: HOSPADM

## 2017-05-09 RX ORDER — CYCLOBENZAPRINE HCL 10 MG
10 TABLET ORAL 3 TIMES DAILY
Qty: 60 TABLET | Refills: 0 | Status: SHIPPED | OUTPATIENT
Start: 2017-05-09 | End: 2017-06-09 | Stop reason: HOSPADM

## 2017-05-09 RX ORDER — ALBUTEROL SULFATE 90 UG/1
2 AEROSOL, METERED RESPIRATORY (INHALATION) EVERY 4 HOURS PRN
Qty: 1 INHALER | Refills: 0 | Status: ON HOLD | OUTPATIENT
Start: 2017-05-09 | End: 2017-06-09

## 2017-05-09 RX ORDER — AZITHROMYCIN 250 MG/1
250 TABLET, FILM COATED ORAL DAILY
Qty: 6 TABLET | Refills: 0 | Status: ON HOLD | OUTPATIENT
Start: 2017-05-09 | End: 2017-05-19

## 2017-05-09 RX ORDER — PREDNISONE 10 MG/1
TABLET ORAL
Qty: 21 TABLET | Refills: 0 | Status: ON HOLD | OUTPATIENT
Start: 2017-05-09 | End: 2017-05-19

## 2017-05-09 RX ORDER — IPRATROPIUM BROMIDE AND ALBUTEROL SULFATE 2.5; .5 MG/3ML; MG/3ML
3 SOLUTION RESPIRATORY (INHALATION) ONCE
Status: COMPLETED | OUTPATIENT
Start: 2017-05-09 | End: 2017-05-09

## 2017-05-09 RX ADMIN — IPRATROPIUM BROMIDE AND ALBUTEROL SULFATE 3 ML: .5; 3 SOLUTION RESPIRATORY (INHALATION) at 03:44

## 2017-05-18 ENCOUNTER — HOSPITAL ENCOUNTER (INPATIENT)
Facility: HOSPITAL | Age: 52
LOS: 4 days | Discharge: HOME | End: 2017-05-23
Attending: EMERGENCY MEDICINE | Admitting: HOSPITALIST

## 2017-05-18 ENCOUNTER — APPOINTMENT (OUTPATIENT)
Dept: GENERAL RADIOLOGY | Facility: HOSPITAL | Age: 52
End: 2017-05-18

## 2017-05-18 DIAGNOSIS — J44.1 ACUTE EXACERBATION OF COPD WITH ASTHMA (HCC): Primary | ICD-10-CM

## 2017-05-18 DIAGNOSIS — Z74.09 IMPAIRED MOBILITY AND ADLS: ICD-10-CM

## 2017-05-18 DIAGNOSIS — Z74.09 IMPAIRED FUNCTIONAL MOBILITY, BALANCE, GAIT, AND ENDURANCE: ICD-10-CM

## 2017-05-18 DIAGNOSIS — J45.901 ACUTE EXACERBATION OF COPD WITH ASTHMA (HCC): Primary | ICD-10-CM

## 2017-05-18 DIAGNOSIS — J96.20 ACUTE ON CHRONIC RESPIRATORY FAILURE, UNSPECIFIED WHETHER WITH HYPOXIA OR HYPERCAPNIA (HCC): ICD-10-CM

## 2017-05-18 DIAGNOSIS — J96.21 ACUTE ON CHRONIC RESPIRATORY FAILURE WITH HYPOXIA (HCC): ICD-10-CM

## 2017-05-18 DIAGNOSIS — Z78.9 IMPAIRED MOBILITY AND ADLS: ICD-10-CM

## 2017-05-18 LAB
ALBUMIN SERPL-MCNC: 4.1 G/DL (ref 3.2–4.8)
ALBUMIN/GLOB SERPL: 1.3 G/DL (ref 1.5–2.5)
ALP SERPL-CCNC: 64 U/L (ref 25–100)
ALT SERPL W P-5'-P-CCNC: 15 U/L (ref 7–40)
AMPHET+METHAMPHET UR QL: NEGATIVE
AMPHETAMINES UR QL: NEGATIVE
ANION GAP SERPL CALCULATED.3IONS-SCNC: 4 MMOL/L (ref 3–11)
AST SERPL-CCNC: 19 U/L (ref 0–33)
BARBITURATES UR QL SCN: NEGATIVE
BASOPHILS # BLD AUTO: 0.03 10*3/MM3 (ref 0–0.2)
BASOPHILS NFR BLD AUTO: 0.2 % (ref 0–1)
BENZODIAZ UR QL SCN: NEGATIVE
BILIRUB SERPL-MCNC: 0.2 MG/DL (ref 0.3–1.2)
BILIRUB UR QL STRIP: NEGATIVE
BNP SERPL-MCNC: 54 PG/ML (ref 0–100)
BUN BLD-MCNC: 23 MG/DL (ref 9–23)
BUN/CREAT SERPL: 20.9 (ref 7–25)
BUPRENORPHINE SERPL-MCNC: NEGATIVE NG/ML
CALCIUM SPEC-SCNC: 9.9 MG/DL (ref 8.7–10.4)
CANNABINOIDS SERPL QL: NEGATIVE
CHLORIDE SERPL-SCNC: 89 MMOL/L (ref 99–109)
CLARITY UR: CLEAR
CO2 SERPL-SCNC: 33 MMOL/L (ref 20–31)
COCAINE UR QL: NEGATIVE
COLOR UR: YELLOW
CREAT BLD-MCNC: 1.1 MG/DL (ref 0.6–1.3)
D DIMER PPP FEU-MCNC: <0.19 MG/L (FEU) (ref 0–0.5)
DEPRECATED RDW RBC AUTO: 55 FL (ref 37–54)
EOSINOPHIL # BLD AUTO: 0 10*3/MM3 (ref 0.1–0.3)
EOSINOPHIL NFR BLD AUTO: 0 % (ref 0–3)
ERYTHROCYTE [DISTWIDTH] IN BLOOD BY AUTOMATED COUNT: 15.4 % (ref 11.3–14.5)
GFR SERPL CREATININE-BSD FRML MDRD: 52 ML/MIN/1.73
GLOBULIN UR ELPH-MCNC: 3.2 GM/DL
GLUCOSE BLD-MCNC: 113 MG/DL (ref 70–100)
GLUCOSE UR STRIP-MCNC: NEGATIVE MG/DL
HCT VFR BLD AUTO: 38.4 % (ref 34.5–44)
HGB BLD-MCNC: 11.9 G/DL (ref 11.5–15.5)
HGB UR QL STRIP.AUTO: NEGATIVE
IMM GRANULOCYTES # BLD: 0.16 10*3/MM3 (ref 0–0.03)
IMM GRANULOCYTES NFR BLD: 0.9 % (ref 0–0.6)
INR PPP: 1.1
KETONES UR QL STRIP: NEGATIVE
LEUKOCYTE ESTERASE UR QL STRIP.AUTO: NEGATIVE
LYMPHOCYTES # BLD AUTO: 0.67 10*3/MM3 (ref 0.6–4.8)
LYMPHOCYTES NFR BLD AUTO: 3.6 % (ref 24–44)
MCH RBC QN AUTO: 30.1 PG (ref 27–31)
MCHC RBC AUTO-ENTMCNC: 31 G/DL (ref 32–36)
MCV RBC AUTO: 97.2 FL (ref 80–99)
METHADONE UR QL SCN: NEGATIVE
MONOCYTES # BLD AUTO: 1.13 10*3/MM3 (ref 0–1)
MONOCYTES NFR BLD AUTO: 6.1 % (ref 0–12)
NEUTROPHILS # BLD AUTO: 16.68 10*3/MM3 (ref 1.5–8.3)
NEUTROPHILS NFR BLD AUTO: 89.2 % (ref 41–71)
NITRITE UR QL STRIP: NEGATIVE
OPIATES UR QL: POSITIVE
OXYCODONE UR QL SCN: NEGATIVE
PCP UR QL SCN: NEGATIVE
PH UR STRIP.AUTO: 5.5 [PH] (ref 5–8)
PLATELET # BLD AUTO: 234 10*3/MM3 (ref 150–450)
PMV BLD AUTO: 9.8 FL (ref 6–12)
POTASSIUM BLD-SCNC: 4.6 MMOL/L (ref 3.5–5.5)
PROCALCITONIN SERPL-MCNC: <0.05 NG/ML
PROPOXYPH UR QL: NEGATIVE
PROT SERPL-MCNC: 7.3 G/DL (ref 5.7–8.2)
PROT UR QL STRIP: NEGATIVE
PROTHROMBIN TIME: 12 SECONDS (ref 9.6–11.5)
RBC # BLD AUTO: 3.95 10*6/MM3 (ref 3.89–5.14)
SODIUM BLD-SCNC: 126 MMOL/L (ref 132–146)
SP GR UR STRIP: 1.02 (ref 1–1.03)
TRICYCLICS UR QL SCN: POSITIVE
TROPONIN I SERPL-MCNC: 0.01 NG/ML (ref 0–0.07)
UROBILINOGEN UR QL STRIP: NORMAL
WBC NRBC COR # BLD: 18.67 10*3/MM3 (ref 3.5–10.8)

## 2017-05-18 PROCEDURE — 94760 N-INVAS EAR/PLS OXIMETRY 1: CPT

## 2017-05-18 PROCEDURE — 84145 PROCALCITONIN (PCT): CPT | Performed by: EMERGENCY MEDICINE

## 2017-05-18 PROCEDURE — 94640 AIRWAY INHALATION TREATMENT: CPT

## 2017-05-18 PROCEDURE — 83935 ASSAY OF URINE OSMOLALITY: CPT | Performed by: NURSE PRACTITIONER

## 2017-05-18 PROCEDURE — 99284 EMERGENCY DEPT VISIT MOD MDM: CPT

## 2017-05-18 PROCEDURE — 84484 ASSAY OF TROPONIN QUANT: CPT

## 2017-05-18 PROCEDURE — 94799 UNLISTED PULMONARY SVC/PX: CPT

## 2017-05-18 PROCEDURE — 85025 COMPLETE CBC W/AUTO DIFF WBC: CPT | Performed by: EMERGENCY MEDICINE

## 2017-05-18 PROCEDURE — 85610 PROTHROMBIN TIME: CPT | Performed by: EMERGENCY MEDICINE

## 2017-05-18 PROCEDURE — 80306 DRUG TEST PRSMV INSTRMNT: CPT | Performed by: EMERGENCY MEDICINE

## 2017-05-18 PROCEDURE — 71010 HC CHEST PA OR AP: CPT

## 2017-05-18 PROCEDURE — 81003 URINALYSIS AUTO W/O SCOPE: CPT | Performed by: EMERGENCY MEDICINE

## 2017-05-18 PROCEDURE — 83880 ASSAY OF NATRIURETIC PEPTIDE: CPT | Performed by: EMERGENCY MEDICINE

## 2017-05-18 PROCEDURE — 85379 FIBRIN DEGRADATION QUANT: CPT | Performed by: EMERGENCY MEDICINE

## 2017-05-18 PROCEDURE — 80053 COMPREHEN METABOLIC PANEL: CPT | Performed by: EMERGENCY MEDICINE

## 2017-05-18 PROCEDURE — 93005 ELECTROCARDIOGRAM TRACING: CPT | Performed by: EMERGENCY MEDICINE

## 2017-05-18 RX ORDER — IPRATROPIUM BROMIDE AND ALBUTEROL SULFATE 2.5; .5 MG/3ML; MG/3ML
3 SOLUTION RESPIRATORY (INHALATION) ONCE
Status: COMPLETED | OUTPATIENT
Start: 2017-05-18 | End: 2017-05-18

## 2017-05-18 RX ORDER — SODIUM CHLORIDE 9 MG/ML
125 INJECTION, SOLUTION INTRAVENOUS CONTINUOUS
Status: DISCONTINUED | OUTPATIENT
Start: 2017-05-18 | End: 2017-05-19

## 2017-05-18 RX ADMIN — IPRATROPIUM BROMIDE AND ALBUTEROL SULFATE 3 ML: .5; 3 SOLUTION RESPIRATORY (INHALATION) at 23:46

## 2017-05-19 ENCOUNTER — APPOINTMENT (OUTPATIENT)
Dept: CT IMAGING | Facility: HOSPITAL | Age: 52
End: 2017-05-19

## 2017-05-19 PROBLEM — J44.9 COPD (CHRONIC OBSTRUCTIVE PULMONARY DISEASE) (HCC): Status: RESOLVED | Noted: 2017-03-01 | Resolved: 2017-05-19

## 2017-05-19 PROBLEM — G40.909 SEIZURE DISORDER (HCC): Status: ACTIVE | Noted: 2017-05-19

## 2017-05-19 PROBLEM — J44.1 ACUTE EXACERBATION OF COPD WITH ASTHMA (HCC): Status: ACTIVE | Noted: 2017-05-19

## 2017-05-19 PROBLEM — J45.901 ACUTE EXACERBATION OF COPD WITH ASTHMA (HCC): Status: ACTIVE | Noted: 2017-05-19

## 2017-05-19 PROBLEM — I10 ESSENTIAL HYPERTENSION: Status: ACTIVE | Noted: 2017-05-19

## 2017-05-19 PROBLEM — E87.1 HYPONATREMIA: Status: ACTIVE | Noted: 2017-05-19

## 2017-05-19 LAB
ANION GAP SERPL CALCULATED.3IONS-SCNC: 6 MMOL/L (ref 3–11)
BUN BLD-MCNC: 22 MG/DL (ref 9–23)
BUN/CREAT SERPL: 22 (ref 7–25)
CALCIUM SPEC-SCNC: 9.6 MG/DL (ref 8.7–10.4)
CHLORIDE SERPL-SCNC: 95 MMOL/L (ref 99–109)
CO2 SERPL-SCNC: 33 MMOL/L (ref 20–31)
CORTIS SERPL-MCNC: 27.2 MCG/DL
CREAT BLD-MCNC: 1 MG/DL (ref 0.6–1.3)
D-LACTATE SERPL-SCNC: 1.5 MMOL/L (ref 0.5–2)
GFR SERPL CREATININE-BSD FRML MDRD: 58 ML/MIN/1.73
GLUCOSE BLD-MCNC: 125 MG/DL (ref 70–100)
HOLD SPECIMEN: NORMAL
HOLD SPECIMEN: NORMAL
OSMOLALITY SERPL: 283 MOSM/KG (ref 275–295)
OSMOLALITY UR: 486 MOSM/KG (ref 300–1100)
POTASSIUM BLD-SCNC: 4.7 MMOL/L (ref 3.5–5.5)
SODIUM BLD-SCNC: 134 MMOL/L (ref 132–146)
WHOLE BLOOD HOLD SPECIMEN: NORMAL
WHOLE BLOOD HOLD SPECIMEN: NORMAL

## 2017-05-19 PROCEDURE — 99223 1ST HOSP IP/OBS HIGH 75: CPT | Performed by: INTERNAL MEDICINE

## 2017-05-19 PROCEDURE — 94799 UNLISTED PULMONARY SVC/PX: CPT

## 2017-05-19 PROCEDURE — 94760 N-INVAS EAR/PLS OXIMETRY 1: CPT

## 2017-05-19 PROCEDURE — 94640 AIRWAY INHALATION TREATMENT: CPT

## 2017-05-19 PROCEDURE — 0 IOPAMIDOL 61 % SOLUTION: Performed by: FAMILY MEDICINE

## 2017-05-19 PROCEDURE — 82533 TOTAL CORTISOL: CPT | Performed by: NURSE PRACTITIONER

## 2017-05-19 PROCEDURE — 83930 ASSAY OF BLOOD OSMOLALITY: CPT | Performed by: NURSE PRACTITIONER

## 2017-05-19 PROCEDURE — 25010000002 PIPERACILLIN-TAZOBACTAM: Performed by: INTERNAL MEDICINE

## 2017-05-19 PROCEDURE — 25010000002 METHYLPREDNISOLONE PER 125 MG: Performed by: NURSE PRACTITIONER

## 2017-05-19 PROCEDURE — 25010000002 MAGNESIUM SULFATE 2 GM/50ML SOLUTION: Performed by: EMERGENCY MEDICINE

## 2017-05-19 PROCEDURE — 87206 SMEAR FLUORESCENT/ACID STAI: CPT | Performed by: INTERNAL MEDICINE

## 2017-05-19 PROCEDURE — 71260 CT THORAX DX C+: CPT

## 2017-05-19 PROCEDURE — 83605 ASSAY OF LACTIC ACID: CPT | Performed by: EMERGENCY MEDICINE

## 2017-05-19 PROCEDURE — 80048 BASIC METABOLIC PNL TOTAL CA: CPT | Performed by: NURSE PRACTITIONER

## 2017-05-19 PROCEDURE — 87116 MYCOBACTERIA CULTURE: CPT | Performed by: INTERNAL MEDICINE

## 2017-05-19 PROCEDURE — 25010000002 METHYLPREDNISOLONE PER 125 MG: Performed by: EMERGENCY MEDICINE

## 2017-05-19 PROCEDURE — 25010000002 ENOXAPARIN PER 10 MG: Performed by: NURSE PRACTITIONER

## 2017-05-19 RX ORDER — BUDESONIDE AND FORMOTEROL FUMARATE DIHYDRATE 160; 4.5 UG/1; UG/1
2 AEROSOL RESPIRATORY (INHALATION)
Status: DISCONTINUED | OUTPATIENT
Start: 2017-05-19 | End: 2017-05-23 | Stop reason: HOSPADM

## 2017-05-19 RX ORDER — BENZTROPINE MESYLATE 1 MG/1
1 TABLET ORAL 3 TIMES DAILY
COMMUNITY

## 2017-05-19 RX ORDER — DOXYCYCLINE HYCLATE 100 MG/1
100 CAPSULE ORAL 2 TIMES DAILY
COMMUNITY
Start: 2017-05-18 | End: 2017-05-28 | Stop reason: HOSPADM

## 2017-05-19 RX ORDER — MONTELUKAST SODIUM 10 MG/1
10 TABLET ORAL NIGHTLY
Status: DISCONTINUED | OUTPATIENT
Start: 2017-05-19 | End: 2017-05-23 | Stop reason: HOSPADM

## 2017-05-19 RX ORDER — DOCUSATE SODIUM 100 MG/1
100 CAPSULE, LIQUID FILLED ORAL 2 TIMES DAILY
Status: DISCONTINUED | OUTPATIENT
Start: 2017-05-19 | End: 2017-05-23 | Stop reason: HOSPADM

## 2017-05-19 RX ORDER — ALPRAZOLAM 0.5 MG/1
0.5 TABLET ORAL 3 TIMES DAILY PRN
Status: DISCONTINUED | OUTPATIENT
Start: 2017-05-19 | End: 2017-05-23 | Stop reason: HOSPADM

## 2017-05-19 RX ORDER — LANOLIN ALCOHOL/MO/W.PET/CERES
3 CREAM (GRAM) TOPICAL NIGHTLY PRN
COMMUNITY

## 2017-05-19 RX ORDER — THEOPHYLLINE 300 MG/1
600 TABLET, EXTENDED RELEASE ORAL EVERY 12 HOURS SCHEDULED
Status: DISCONTINUED | OUTPATIENT
Start: 2017-05-19 | End: 2017-05-23 | Stop reason: HOSPADM

## 2017-05-19 RX ORDER — ZIPRASIDONE HYDROCHLORIDE 80 MG/1
80 CAPSULE ORAL 2 TIMES DAILY WITH MEALS
COMMUNITY

## 2017-05-19 RX ORDER — IBUPROFEN 200 MG
200 TABLET ORAL EVERY 6 HOURS PRN
COMMUNITY
End: 2017-06-20 | Stop reason: HOSPADM

## 2017-05-19 RX ORDER — ONDANSETRON 4 MG/1
4 TABLET, FILM COATED ORAL EVERY 6 HOURS PRN
Status: DISCONTINUED | OUTPATIENT
Start: 2017-05-19 | End: 2017-05-23 | Stop reason: HOSPADM

## 2017-05-19 RX ORDER — LANSOPRAZOLE 30 MG/1
60 CAPSULE, DELAYED RELEASE ORAL DAILY
COMMUNITY

## 2017-05-19 RX ORDER — DULOXETIN HYDROCHLORIDE 60 MG/1
60 CAPSULE, DELAYED RELEASE ORAL EVERY 12 HOURS SCHEDULED
Status: DISCONTINUED | OUTPATIENT
Start: 2017-05-19 | End: 2017-05-23 | Stop reason: HOSPADM

## 2017-05-19 RX ORDER — CLONAZEPAM 1 MG/1
1 TABLET ORAL 2 TIMES DAILY PRN
COMMUNITY
End: 2017-05-28 | Stop reason: HOSPADM

## 2017-05-19 RX ORDER — PREDNISONE 10 MG/1
10 TABLET ORAL DAILY
COMMUNITY
Start: 2017-05-18 | End: 2017-05-23 | Stop reason: HOSPADM

## 2017-05-19 RX ORDER — SODIUM CHLORIDE 9 MG/ML
125 INJECTION, SOLUTION INTRAVENOUS CONTINUOUS
Status: DISCONTINUED | OUTPATIENT
Start: 2017-05-19 | End: 2017-05-19

## 2017-05-19 RX ORDER — METHYLPREDNISOLONE SODIUM SUCCINATE 125 MG/2ML
125 INJECTION, POWDER, LYOPHILIZED, FOR SOLUTION INTRAMUSCULAR; INTRAVENOUS ONCE
Status: COMPLETED | OUTPATIENT
Start: 2017-05-19 | End: 2017-05-19

## 2017-05-19 RX ORDER — CETIRIZINE HYDROCHLORIDE 10 MG/1
10 TABLET ORAL DAILY
Status: DISCONTINUED | OUTPATIENT
Start: 2017-05-19 | End: 2017-05-23 | Stop reason: HOSPADM

## 2017-05-19 RX ORDER — MAGNESIUM HYDROXIDE/ALUMINUM HYDROXICE/SIMETHICONE 120; 1200; 1200 MG/30ML; MG/30ML; MG/30ML
30 SUSPENSION ORAL EVERY 6 HOURS PRN
Status: DISCONTINUED | OUTPATIENT
Start: 2017-05-19 | End: 2017-05-23 | Stop reason: HOSPADM

## 2017-05-19 RX ORDER — HYDROXYZINE HYDROCHLORIDE 25 MG/1
50 TABLET, FILM COATED ORAL 4 TIMES DAILY PRN
Status: DISCONTINUED | OUTPATIENT
Start: 2017-05-19 | End: 2017-05-23 | Stop reason: HOSPADM

## 2017-05-19 RX ORDER — VALACYCLOVIR HYDROCHLORIDE 500 MG/1
500 TABLET, FILM COATED ORAL EVERY 12 HOURS SCHEDULED
Status: DISCONTINUED | OUTPATIENT
Start: 2017-05-19 | End: 2017-05-23 | Stop reason: HOSPADM

## 2017-05-19 RX ORDER — ACETAMINOPHEN 325 MG/1
TABLET ORAL
Status: COMPLETED
Start: 2017-05-19 | End: 2017-05-19

## 2017-05-19 RX ORDER — GABAPENTIN 800 MG/1
800 TABLET ORAL 4 TIMES DAILY
COMMUNITY
End: 2017-05-23 | Stop reason: HOSPADM

## 2017-05-19 RX ORDER — ALBUTEROL SULFATE 2.5 MG/3ML
10 SOLUTION RESPIRATORY (INHALATION) CONTINUOUS
Status: DISPENSED | OUTPATIENT
Start: 2017-05-19 | End: 2017-05-19

## 2017-05-19 RX ORDER — ATENOLOL 50 MG/1
50 TABLET ORAL DAILY
Status: ON HOLD | COMMUNITY
End: 2017-06-09

## 2017-05-19 RX ORDER — PANTOPRAZOLE SODIUM 40 MG/1
40 TABLET, DELAYED RELEASE ORAL
Status: DISCONTINUED | OUTPATIENT
Start: 2017-05-19 | End: 2017-05-23 | Stop reason: HOSPADM

## 2017-05-19 RX ORDER — CLOMIPRAMINE HYDROCHLORIDE 50 MG/1
100 CAPSULE ORAL NIGHTLY
Status: ON HOLD | COMMUNITY
End: 2017-09-25 | Stop reason: SDDI

## 2017-05-19 RX ORDER — MAGNESIUM SULFATE HEPTAHYDRATE 40 MG/ML
2 INJECTION, SOLUTION INTRAVENOUS ONCE
Status: COMPLETED | OUTPATIENT
Start: 2017-05-19 | End: 2017-05-19

## 2017-05-19 RX ORDER — PRAVASTATIN SODIUM 40 MG
80 TABLET ORAL NIGHTLY
Status: DISCONTINUED | OUTPATIENT
Start: 2017-05-19 | End: 2017-05-23 | Stop reason: HOSPADM

## 2017-05-19 RX ORDER — FLUTICASONE PROPIONATE 50 MCG
2 SPRAY, SUSPENSION (ML) NASAL DAILY
Status: DISCONTINUED | OUTPATIENT
Start: 2017-05-19 | End: 2017-05-23 | Stop reason: HOSPADM

## 2017-05-19 RX ORDER — NICOTINE 21 MG/24HR
1 PATCH, TRANSDERMAL 24 HOURS TRANSDERMAL
Status: DISCONTINUED | OUTPATIENT
Start: 2017-05-19 | End: 2017-05-23 | Stop reason: HOSPADM

## 2017-05-19 RX ORDER — METHYLPREDNISOLONE SODIUM SUCCINATE 125 MG/2ML
30 INJECTION, POWDER, LYOPHILIZED, FOR SOLUTION INTRAMUSCULAR; INTRAVENOUS EVERY 12 HOURS
Status: DISCONTINUED | OUTPATIENT
Start: 2017-05-19 | End: 2017-05-23

## 2017-05-19 RX ORDER — ATENOLOL 25 MG/1
12.5 TABLET ORAL DAILY
Status: DISCONTINUED | OUTPATIENT
Start: 2017-05-19 | End: 2017-05-22

## 2017-05-19 RX ORDER — ACETAMINOPHEN 325 MG/1
650 TABLET ORAL EVERY 6 HOURS PRN
Status: DISCONTINUED | OUTPATIENT
Start: 2017-05-19 | End: 2017-05-23 | Stop reason: HOSPADM

## 2017-05-19 RX ORDER — LEVOFLOXACIN 750 MG/1
750 TABLET ORAL DAILY
COMMUNITY
Start: 2017-05-18 | End: 2017-05-23 | Stop reason: HOSPADM

## 2017-05-19 RX ORDER — FUROSEMIDE 40 MG/1
40 TABLET ORAL DAILY
Status: DISCONTINUED | OUTPATIENT
Start: 2017-05-19 | End: 2017-05-23 | Stop reason: HOSPADM

## 2017-05-19 RX ORDER — DOXYCYCLINE HYCLATE 100 MG/1
100 CAPSULE ORAL EVERY 12 HOURS SCHEDULED
Status: DISCONTINUED | OUTPATIENT
Start: 2017-05-19 | End: 2017-05-22

## 2017-05-19 RX ORDER — ONDANSETRON 2 MG/ML
4 INJECTION INTRAMUSCULAR; INTRAVENOUS EVERY 6 HOURS PRN
Status: DISCONTINUED | OUTPATIENT
Start: 2017-05-19 | End: 2017-05-23 | Stop reason: HOSPADM

## 2017-05-19 RX ORDER — ALBUTEROL SULFATE 2.5 MG/3ML
2.5 SOLUTION RESPIRATORY (INHALATION) ONCE
Status: COMPLETED | OUTPATIENT
Start: 2017-05-19 | End: 2017-05-19

## 2017-05-19 RX ORDER — GUAIFENESIN 600 MG/1
1200 TABLET, EXTENDED RELEASE ORAL 2 TIMES DAILY PRN
Status: DISCONTINUED | OUTPATIENT
Start: 2017-05-19 | End: 2017-05-23 | Stop reason: HOSPADM

## 2017-05-19 RX ORDER — CYCLOBENZAPRINE HCL 10 MG
10 TABLET ORAL 3 TIMES DAILY
Status: DISCONTINUED | OUTPATIENT
Start: 2017-05-19 | End: 2017-05-23 | Stop reason: HOSPADM

## 2017-05-19 RX ORDER — SIMETHICONE 80 MG
80 TABLET,CHEWABLE ORAL 4 TIMES DAILY PRN
Status: DISCONTINUED | OUTPATIENT
Start: 2017-05-19 | End: 2017-05-23 | Stop reason: HOSPADM

## 2017-05-19 RX ORDER — LEVOTHYROXINE SODIUM 0.05 MG/1
50 TABLET ORAL
Status: DISCONTINUED | OUTPATIENT
Start: 2017-05-19 | End: 2017-05-23 | Stop reason: HOSPADM

## 2017-05-19 RX ORDER — ERGOCALCIFEROL 1.25 MG/1
50000 CAPSULE ORAL WEEKLY
COMMUNITY

## 2017-05-19 RX ORDER — DIVALPROEX SODIUM 500 MG/1
1500 TABLET, EXTENDED RELEASE ORAL DAILY
COMMUNITY

## 2017-05-19 RX ORDER — GABAPENTIN 300 MG/1
600 CAPSULE ORAL 4 TIMES DAILY
Status: DISCONTINUED | OUTPATIENT
Start: 2017-05-19 | End: 2017-05-23 | Stop reason: HOSPADM

## 2017-05-19 RX ORDER — SODIUM CHLORIDE 0.9 % (FLUSH) 0.9 %
1-10 SYRINGE (ML) INJECTION AS NEEDED
Status: DISCONTINUED | OUTPATIENT
Start: 2017-05-19 | End: 2017-05-23 | Stop reason: HOSPADM

## 2017-05-19 RX ORDER — SODIUM CHLORIDE 9 MG/ML
75 INJECTION, SOLUTION INTRAVENOUS CONTINUOUS
Status: DISCONTINUED | OUTPATIENT
Start: 2017-05-19 | End: 2017-05-23 | Stop reason: HOSPADM

## 2017-05-19 RX ADMIN — PRAVASTATIN SODIUM 80 MG: 40 TABLET ORAL at 22:23

## 2017-05-19 RX ADMIN — ALBUTEROL SULFATE 2.5 MG: 2.5 SOLUTION RESPIRATORY (INHALATION) at 17:34

## 2017-05-19 RX ADMIN — DULOXETINE 60 MG: 60 CAPSULE, DELAYED RELEASE ORAL at 22:22

## 2017-05-19 RX ADMIN — TAZOBACTAM SODIUM AND PIPERACILLIN SODIUM 4.5 G: .5; 4 INJECTION, POWDER, LYOPHILIZED, FOR SOLUTION INTRAVENOUS at 15:58

## 2017-05-19 RX ADMIN — IOPAMIDOL 95 ML: 612 INJECTION, SOLUTION INTRAVENOUS at 19:16

## 2017-05-19 RX ADMIN — ALBUTEROL SULFATE 10 MG: 2.5 SOLUTION RESPIRATORY (INHALATION) at 01:18

## 2017-05-19 RX ADMIN — GUAIFENESIN 1200 MG: 600 TABLET, EXTENDED RELEASE ORAL at 22:38

## 2017-05-19 RX ADMIN — FUROSEMIDE 40 MG: 40 TABLET ORAL at 08:09

## 2017-05-19 RX ADMIN — QUETIAPINE 300 MG: 200 TABLET, EXTENDED RELEASE ORAL at 22:23

## 2017-05-19 RX ADMIN — ALBUTEROL SULFATE 2.5 MG: 2.5 SOLUTION RESPIRATORY (INHALATION) at 01:18

## 2017-05-19 RX ADMIN — ALPRAZOLAM 0.5 MG: 0.5 TABLET ORAL at 22:38

## 2017-05-19 RX ADMIN — VALACYCLOVIR HYDROCHLORIDE 500 MG: 500 TABLET, FILM COATED ORAL at 22:21

## 2017-05-19 RX ADMIN — SODIUM CHLORIDE 75 ML/HR: 9 INJECTION, SOLUTION INTRAVENOUS at 10:09

## 2017-05-19 RX ADMIN — ACETAMINOPHEN 325 MG: 325 TABLET ORAL at 00:22

## 2017-05-19 RX ADMIN — GABAPENTIN 600 MG: 300 CAPSULE ORAL at 22:21

## 2017-05-19 RX ADMIN — ALBUTEROL SULFATE 2.5 MG: 2.5 SOLUTION RESPIRATORY (INHALATION) at 19:37

## 2017-05-19 RX ADMIN — METHYLPREDNISOLONE SODIUM SUCCINATE 125 MG: 125 INJECTION, POWDER, FOR SOLUTION INTRAMUSCULAR; INTRAVENOUS at 01:26

## 2017-05-19 RX ADMIN — DOXYCYCLINE HYCLATE 100 MG: 100 CAPSULE ORAL at 14:29

## 2017-05-19 RX ADMIN — NICOTINE 1 PATCH: 21 PATCH, EXTENDED RELEASE TRANSDERMAL at 08:10

## 2017-05-19 RX ADMIN — ACETAMINOPHEN 650 MG: 325 TABLET, FILM COATED ORAL at 11:21

## 2017-05-19 RX ADMIN — BUDESONIDE AND FORMOTEROL FUMARATE DIHYDRATE 2 PUFF: 160; 4.5 AEROSOL RESPIRATORY (INHALATION) at 09:44

## 2017-05-19 RX ADMIN — CYCLOBENZAPRINE HYDROCHLORIDE 10 MG: 10 TABLET, FILM COATED ORAL at 22:22

## 2017-05-19 RX ADMIN — ALBUTEROL SULFATE 2.5 MG: 2.5 SOLUTION RESPIRATORY (INHALATION) at 22:45

## 2017-05-19 RX ADMIN — ALPRAZOLAM 0.5 MG: 0.5 TABLET ORAL at 14:29

## 2017-05-19 RX ADMIN — PANTOPRAZOLE SODIUM 40 MG: 40 TABLET, DELAYED RELEASE ORAL at 17:19

## 2017-05-19 RX ADMIN — GABAPENTIN 600 MG: 300 CAPSULE ORAL at 11:17

## 2017-05-19 RX ADMIN — METHYLPREDNISOLONE SODIUM SUCCINATE 30 MG: 125 INJECTION, POWDER, FOR SOLUTION INTRAMUSCULAR; INTRAVENOUS at 17:19

## 2017-05-19 RX ADMIN — DOCUSATE SODIUM 100 MG: 100 CAPSULE, LIQUID FILLED ORAL at 08:10

## 2017-05-19 RX ADMIN — SODIUM CHLORIDE 1000 ML: 9 INJECTION, SOLUTION INTRAVENOUS at 01:26

## 2017-05-19 RX ADMIN — VALACYCLOVIR HYDROCHLORIDE 500 MG: 500 TABLET, FILM COATED ORAL at 11:17

## 2017-05-19 RX ADMIN — SODIUM CHLORIDE 125 ML/HR: 9 INJECTION, SOLUTION INTRAVENOUS at 01:27

## 2017-05-19 RX ADMIN — DOCUSATE SODIUM 100 MG: 100 CAPSULE, LIQUID FILLED ORAL at 17:19

## 2017-05-19 RX ADMIN — THEOPHYLLINE 600 MG: 300 TABLET, EXTENDED RELEASE ORAL at 11:50

## 2017-05-19 RX ADMIN — CYCLOBENZAPRINE HYDROCHLORIDE 10 MG: 10 TABLET, FILM COATED ORAL at 10:10

## 2017-05-19 RX ADMIN — GABAPENTIN 600 MG: 300 CAPSULE ORAL at 17:19

## 2017-05-19 RX ADMIN — DOXYCYCLINE HYCLATE 100 MG: 100 CAPSULE ORAL at 22:22

## 2017-05-19 RX ADMIN — MONTELUKAST SODIUM 10 MG: 10 TABLET, FILM COATED ORAL at 22:21

## 2017-05-19 RX ADMIN — FLUTICASONE PROPIONATE 2 SPRAY: 50 SPRAY, METERED NASAL at 10:09

## 2017-05-19 RX ADMIN — MAGNESIUM SULFATE HEPTAHYDRATE 2 G: 40 INJECTION, SOLUTION INTRAVENOUS at 01:27

## 2017-05-19 RX ADMIN — GUAIFENESIN 1200 MG: 600 TABLET, EXTENDED RELEASE ORAL at 11:17

## 2017-05-19 RX ADMIN — ENOXAPARIN SODIUM 40 MG: 40 INJECTION SUBCUTANEOUS at 10:09

## 2017-05-19 RX ADMIN — ACETAMINOPHEN 650 MG: 325 TABLET, FILM COATED ORAL at 18:18

## 2017-05-19 RX ADMIN — DULOXETINE 60 MG: 60 CAPSULE, DELAYED RELEASE ORAL at 08:07

## 2017-05-19 RX ADMIN — BUDESONIDE AND FORMOTEROL FUMARATE DIHYDRATE 2 PUFF: 160; 4.5 AEROSOL RESPIRATORY (INHALATION) at 19:37

## 2017-05-19 RX ADMIN — CYCLOBENZAPRINE HYDROCHLORIDE 10 MG: 10 TABLET, FILM COATED ORAL at 16:04

## 2017-05-19 RX ADMIN — TAZOBACTAM SODIUM AND PIPERACILLIN SODIUM 4.5 G: .5; 4 INJECTION, POWDER, LYOPHILIZED, FOR SOLUTION INTRAVENOUS at 22:20

## 2017-05-19 RX ADMIN — GABAPENTIN 600 MG: 300 CAPSULE ORAL at 08:07

## 2017-05-19 RX ADMIN — LEVOTHYROXINE SODIUM 50 MCG: 50 TABLET ORAL at 08:06

## 2017-05-19 RX ADMIN — ATENOLOL 12.5 MG: 25 TABLET ORAL at 08:08

## 2017-05-19 RX ADMIN — ALBUTEROL SULFATE 2.5 MG: 2.5 SOLUTION RESPIRATORY (INHALATION) at 09:44

## 2017-05-19 RX ADMIN — CETIRIZINE HYDROCHLORIDE 10 MG: 10 TABLET, FILM COATED ORAL at 08:09

## 2017-05-19 RX ADMIN — PANTOPRAZOLE SODIUM 40 MG: 40 TABLET, DELAYED RELEASE ORAL at 08:08

## 2017-05-19 RX ADMIN — METHYLPREDNISOLONE SODIUM SUCCINATE 30 MG: 125 INJECTION, POWDER, FOR SOLUTION INTRAMUSCULAR; INTRAVENOUS at 06:38

## 2017-05-19 RX ADMIN — THEOPHYLLINE 600 MG: 300 TABLET, EXTENDED RELEASE ORAL at 22:23

## 2017-05-20 ENCOUNTER — APPOINTMENT (OUTPATIENT)
Dept: GENERAL RADIOLOGY | Facility: HOSPITAL | Age: 52
End: 2017-05-20

## 2017-05-20 LAB
ANION GAP SERPL CALCULATED.3IONS-SCNC: 4 MMOL/L (ref 3–11)
BASOPHILS # BLD AUTO: 0.02 10*3/MM3 (ref 0–0.2)
BASOPHILS NFR BLD AUTO: 0.2 % (ref 0–1)
BUN BLD-MCNC: 16 MG/DL (ref 9–23)
BUN/CREAT SERPL: 16 (ref 7–25)
CALCIUM SPEC-SCNC: 10 MG/DL (ref 8.7–10.4)
CHLORIDE SERPL-SCNC: 96 MMOL/L (ref 99–109)
CO2 SERPL-SCNC: 38 MMOL/L (ref 20–31)
CREAT BLD-MCNC: 1 MG/DL (ref 0.6–1.3)
DEPRECATED RDW RBC AUTO: 57.3 FL (ref 37–54)
EOSINOPHIL # BLD AUTO: 0 10*3/MM3 (ref 0.1–0.3)
EOSINOPHIL NFR BLD AUTO: 0 % (ref 0–3)
ERYTHROCYTE [DISTWIDTH] IN BLOOD BY AUTOMATED COUNT: 15.6 % (ref 11.3–14.5)
GFR SERPL CREATININE-BSD FRML MDRD: 58 ML/MIN/1.73
GLUCOSE BLD-MCNC: 142 MG/DL (ref 70–100)
HCT VFR BLD AUTO: 38.8 % (ref 34.5–44)
HGB BLD-MCNC: 11.5 G/DL (ref 11.5–15.5)
IMM GRANULOCYTES # BLD: 0.11 10*3/MM3 (ref 0–0.03)
IMM GRANULOCYTES NFR BLD: 1.4 % (ref 0–0.6)
LYMPHOCYTES # BLD AUTO: 0.95 10*3/MM3 (ref 0.6–4.8)
LYMPHOCYTES NFR BLD AUTO: 11.8 % (ref 24–44)
MCH RBC QN AUTO: 29.7 PG (ref 27–31)
MCHC RBC AUTO-ENTMCNC: 29.6 G/DL (ref 32–36)
MCV RBC AUTO: 100.3 FL (ref 80–99)
MONOCYTES # BLD AUTO: 0.58 10*3/MM3 (ref 0–1)
MONOCYTES NFR BLD AUTO: 7.2 % (ref 0–12)
NEUTROPHILS # BLD AUTO: 6.42 10*3/MM3 (ref 1.5–8.3)
NEUTROPHILS NFR BLD AUTO: 79.4 % (ref 41–71)
PLATELET # BLD AUTO: 188 10*3/MM3 (ref 150–450)
PMV BLD AUTO: 9.6 FL (ref 6–12)
POTASSIUM BLD-SCNC: 4.6 MMOL/L (ref 3.5–5.5)
RBC # BLD AUTO: 3.87 10*6/MM3 (ref 3.89–5.14)
SODIUM BLD-SCNC: 138 MMOL/L (ref 132–146)
SODIUM UR-SCNC: 44 MMOL/L (ref 30–90)
WBC NRBC COR # BLD: 8.08 10*3/MM3 (ref 3.5–10.8)

## 2017-05-20 PROCEDURE — 99233 SBSQ HOSP IP/OBS HIGH 50: CPT | Performed by: FAMILY MEDICINE

## 2017-05-20 PROCEDURE — 94760 N-INVAS EAR/PLS OXIMETRY 1: CPT

## 2017-05-20 PROCEDURE — 71010 HC CHEST PA OR AP: CPT

## 2017-05-20 PROCEDURE — 93010 ELECTROCARDIOGRAM REPORT: CPT | Performed by: INTERNAL MEDICINE

## 2017-05-20 PROCEDURE — 94799 UNLISTED PULMONARY SVC/PX: CPT

## 2017-05-20 PROCEDURE — 25010000002 METHYLPREDNISOLONE PER 125 MG: Performed by: NURSE PRACTITIONER

## 2017-05-20 PROCEDURE — 25010000002 ENOXAPARIN PER 10 MG: Performed by: NURSE PRACTITIONER

## 2017-05-20 PROCEDURE — 85025 COMPLETE CBC W/AUTO DIFF WBC: CPT | Performed by: FAMILY MEDICINE

## 2017-05-20 PROCEDURE — 25010000002 PIPERACILLIN-TAZOBACTAM: Performed by: INTERNAL MEDICINE

## 2017-05-20 PROCEDURE — 94640 AIRWAY INHALATION TREATMENT: CPT

## 2017-05-20 PROCEDURE — 80048 BASIC METABOLIC PNL TOTAL CA: CPT | Performed by: FAMILY MEDICINE

## 2017-05-20 PROCEDURE — 84300 ASSAY OF URINE SODIUM: CPT | Performed by: NURSE PRACTITIONER

## 2017-05-20 PROCEDURE — 93005 ELECTROCARDIOGRAM TRACING: CPT | Performed by: NURSE PRACTITIONER

## 2017-05-20 RX ADMIN — CYCLOBENZAPRINE HYDROCHLORIDE 10 MG: 10 TABLET, FILM COATED ORAL at 14:27

## 2017-05-20 RX ADMIN — Medication 5 MG: at 22:56

## 2017-05-20 RX ADMIN — GABAPENTIN 600 MG: 300 CAPSULE ORAL at 21:49

## 2017-05-20 RX ADMIN — CYCLOBENZAPRINE HYDROCHLORIDE 10 MG: 10 TABLET, FILM COATED ORAL at 21:48

## 2017-05-20 RX ADMIN — BUDESONIDE AND FORMOTEROL FUMARATE DIHYDRATE 2 PUFF: 160; 4.5 AEROSOL RESPIRATORY (INHALATION) at 20:50

## 2017-05-20 RX ADMIN — DULOXETINE 60 MG: 60 CAPSULE, DELAYED RELEASE ORAL at 08:51

## 2017-05-20 RX ADMIN — LEVOTHYROXINE SODIUM 50 MCG: 50 TABLET ORAL at 08:53

## 2017-05-20 RX ADMIN — ALBUTEROL SULFATE 2.5 MG: 2.5 SOLUTION RESPIRATORY (INHALATION) at 20:50

## 2017-05-20 RX ADMIN — HYDROXYZINE HYDROCHLORIDE 50 MG: 25 TABLET, FILM COATED ORAL at 22:56

## 2017-05-20 RX ADMIN — CYCLOBENZAPRINE HYDROCHLORIDE 10 MG: 10 TABLET, FILM COATED ORAL at 08:52

## 2017-05-20 RX ADMIN — PANTOPRAZOLE SODIUM 40 MG: 40 TABLET, DELAYED RELEASE ORAL at 18:18

## 2017-05-20 RX ADMIN — ACETAMINOPHEN 650 MG: 325 TABLET, FILM COATED ORAL at 14:28

## 2017-05-20 RX ADMIN — QUETIAPINE 300 MG: 200 TABLET, EXTENDED RELEASE ORAL at 21:49

## 2017-05-20 RX ADMIN — NICOTINE 1 PATCH: 21 PATCH, EXTENDED RELEASE TRANSDERMAL at 11:10

## 2017-05-20 RX ADMIN — ALBUTEROL SULFATE 2.5 MG: 2.5 SOLUTION RESPIRATORY (INHALATION) at 09:15

## 2017-05-20 RX ADMIN — MONTELUKAST SODIUM 10 MG: 10 TABLET, FILM COATED ORAL at 21:48

## 2017-05-20 RX ADMIN — ENOXAPARIN SODIUM 40 MG: 40 INJECTION SUBCUTANEOUS at 08:51

## 2017-05-20 RX ADMIN — VALACYCLOVIR HYDROCHLORIDE 500 MG: 500 TABLET, FILM COATED ORAL at 21:49

## 2017-05-20 RX ADMIN — VALACYCLOVIR HYDROCHLORIDE 500 MG: 500 TABLET, FILM COATED ORAL at 08:51

## 2017-05-20 RX ADMIN — DOCUSATE SODIUM 100 MG: 100 CAPSULE, LIQUID FILLED ORAL at 08:52

## 2017-05-20 RX ADMIN — TAZOBACTAM SODIUM AND PIPERACILLIN SODIUM 4.5 G: .5; 4 INJECTION, POWDER, LYOPHILIZED, FOR SOLUTION INTRAVENOUS at 14:33

## 2017-05-20 RX ADMIN — TAZOBACTAM SODIUM AND PIPERACILLIN SODIUM 4.5 G: .5; 4 INJECTION, POWDER, LYOPHILIZED, FOR SOLUTION INTRAVENOUS at 21:44

## 2017-05-20 RX ADMIN — DULOXETINE 60 MG: 60 CAPSULE, DELAYED RELEASE ORAL at 21:48

## 2017-05-20 RX ADMIN — PRAVASTATIN SODIUM 80 MG: 40 TABLET ORAL at 21:49

## 2017-05-20 RX ADMIN — BUDESONIDE AND FORMOTEROL FUMARATE DIHYDRATE 2 PUFF: 160; 4.5 AEROSOL RESPIRATORY (INHALATION) at 14:23

## 2017-05-20 RX ADMIN — METHYLPREDNISOLONE SODIUM SUCCINATE 30 MG: 125 INJECTION, POWDER, FOR SOLUTION INTRAMUSCULAR; INTRAVENOUS at 06:32

## 2017-05-20 RX ADMIN — DOXYCYCLINE HYCLATE 100 MG: 100 CAPSULE ORAL at 08:52

## 2017-05-20 RX ADMIN — CETIRIZINE HYDROCHLORIDE 10 MG: 10 TABLET, FILM COATED ORAL at 11:09

## 2017-05-20 RX ADMIN — GUAIFENESIN 1200 MG: 600 TABLET, EXTENDED RELEASE ORAL at 08:51

## 2017-05-20 RX ADMIN — ALPRAZOLAM 0.5 MG: 0.5 TABLET ORAL at 22:56

## 2017-05-20 RX ADMIN — FUROSEMIDE 40 MG: 40 TABLET ORAL at 08:51

## 2017-05-20 RX ADMIN — METHYLPREDNISOLONE SODIUM SUCCINATE 30 MG: 125 INJECTION, POWDER, FOR SOLUTION INTRAMUSCULAR; INTRAVENOUS at 18:18

## 2017-05-20 RX ADMIN — PANTOPRAZOLE SODIUM 40 MG: 40 TABLET, DELAYED RELEASE ORAL at 08:51

## 2017-05-20 RX ADMIN — HYDROCODONE POLISTIREX AND CHLORPHENIRAMINE POLISTIREX 5 ML: 10; 8 SUSPENSION, EXTENDED RELEASE ORAL at 21:44

## 2017-05-20 RX ADMIN — ALPRAZOLAM 0.5 MG: 0.5 TABLET ORAL at 14:37

## 2017-05-20 RX ADMIN — DOXYCYCLINE HYCLATE 100 MG: 100 CAPSULE ORAL at 21:48

## 2017-05-20 RX ADMIN — HYDROCODONE POLISTIREX AND CHLORPHENIRAMINE POLISTIREX 5 ML: 10; 8 SUSPENSION, EXTENDED RELEASE ORAL at 14:26

## 2017-05-20 RX ADMIN — THEOPHYLLINE 600 MG: 300 TABLET, EXTENDED RELEASE ORAL at 21:44

## 2017-05-20 RX ADMIN — ALBUTEROL SULFATE 2.5 MG: 2.5 SOLUTION RESPIRATORY (INHALATION) at 02:25

## 2017-05-20 RX ADMIN — FLUTICASONE PROPIONATE 2 SPRAY: 50 SPRAY, METERED NASAL at 08:50

## 2017-05-20 RX ADMIN — THEOPHYLLINE 600 MG: 300 TABLET, EXTENDED RELEASE ORAL at 14:33

## 2017-05-20 RX ADMIN — ACETAMINOPHEN 650 MG: 325 TABLET, FILM COATED ORAL at 08:51

## 2017-05-20 RX ADMIN — TAZOBACTAM SODIUM AND PIPERACILLIN SODIUM 4.5 G: .5; 4 INJECTION, POWDER, LYOPHILIZED, FOR SOLUTION INTRAVENOUS at 06:32

## 2017-05-20 RX ADMIN — ALPRAZOLAM 0.5 MG: 0.5 TABLET ORAL at 08:52

## 2017-05-20 RX ADMIN — GABAPENTIN 600 MG: 300 CAPSULE ORAL at 14:26

## 2017-05-20 RX ADMIN — LEVOTHYROXINE SODIUM 50 MCG: 50 TABLET ORAL at 06:33

## 2017-05-20 RX ADMIN — ATENOLOL 12.5 MG: 25 TABLET ORAL at 08:52

## 2017-05-21 LAB
ANION GAP SERPL CALCULATED.3IONS-SCNC: 1 MMOL/L (ref 3–11)
BASOPHILS # BLD AUTO: 0.08 10*3/MM3 (ref 0–0.2)
BASOPHILS NFR BLD AUTO: 1 % (ref 0–1)
BUN BLD-MCNC: 17 MG/DL (ref 9–23)
BUN/CREAT SERPL: 18.9 (ref 7–25)
CALCIUM SPEC-SCNC: 10 MG/DL (ref 8.7–10.4)
CHLORIDE SERPL-SCNC: 93 MMOL/L (ref 99–109)
CO2 SERPL-SCNC: 46 MMOL/L (ref 20–31)
CREAT BLD-MCNC: 0.9 MG/DL (ref 0.6–1.3)
DEPRECATED RDW RBC AUTO: 56.1 FL (ref 37–54)
EOSINOPHIL # BLD AUTO: 0 10*3/MM3 (ref 0.1–0.3)
EOSINOPHIL NFR BLD AUTO: 0 % (ref 0–3)
ERYTHROCYTE [DISTWIDTH] IN BLOOD BY AUTOMATED COUNT: 15 % (ref 11.3–14.5)
GFR SERPL CREATININE-BSD FRML MDRD: 65 ML/MIN/1.73
GLUCOSE BLD-MCNC: 112 MG/DL (ref 70–100)
HCT VFR BLD AUTO: 35.8 % (ref 34.5–44)
HGB BLD-MCNC: 10.7 G/DL (ref 11.5–15.5)
IMM GRANULOCYTES # BLD: 0.34 10*3/MM3 (ref 0–0.03)
IMM GRANULOCYTES NFR BLD: 4.4 % (ref 0–0.6)
LYMPHOCYTES # BLD AUTO: 1.1 10*3/MM3 (ref 0.6–4.8)
LYMPHOCYTES NFR BLD AUTO: 14.2 % (ref 24–44)
MAGNESIUM SERPL-MCNC: 2.2 MG/DL (ref 1.3–2.7)
MCH RBC QN AUTO: 30.1 PG (ref 27–31)
MCHC RBC AUTO-ENTMCNC: 29.9 G/DL (ref 32–36)
MCV RBC AUTO: 100.6 FL (ref 80–99)
MONOCYTES # BLD AUTO: 1.35 10*3/MM3 (ref 0–1)
MONOCYTES NFR BLD AUTO: 17.4 % (ref 0–12)
NEUTROPHILS # BLD AUTO: 4.87 10*3/MM3 (ref 1.5–8.3)
NEUTROPHILS NFR BLD AUTO: 63 % (ref 41–71)
PLATELET # BLD AUTO: 202 10*3/MM3 (ref 150–450)
PMV BLD AUTO: 9.5 FL (ref 6–12)
POTASSIUM BLD-SCNC: 4.7 MMOL/L (ref 3.5–5.5)
RBC # BLD AUTO: 3.56 10*6/MM3 (ref 3.89–5.14)
SODIUM BLD-SCNC: 140 MMOL/L (ref 132–146)
WBC NRBC COR # BLD: 7.74 10*3/MM3 (ref 3.5–10.8)

## 2017-05-21 PROCEDURE — 94640 AIRWAY INHALATION TREATMENT: CPT

## 2017-05-21 PROCEDURE — 94799 UNLISTED PULMONARY SVC/PX: CPT

## 2017-05-21 PROCEDURE — 85025 COMPLETE CBC W/AUTO DIFF WBC: CPT | Performed by: FAMILY MEDICINE

## 2017-05-21 PROCEDURE — 25010000002 PIPERACILLIN-TAZOBACTAM: Performed by: INTERNAL MEDICINE

## 2017-05-21 PROCEDURE — 80048 BASIC METABOLIC PNL TOTAL CA: CPT | Performed by: FAMILY MEDICINE

## 2017-05-21 PROCEDURE — 83735 ASSAY OF MAGNESIUM: CPT | Performed by: FAMILY MEDICINE

## 2017-05-21 PROCEDURE — 94760 N-INVAS EAR/PLS OXIMETRY 1: CPT

## 2017-05-21 PROCEDURE — 99223 1ST HOSP IP/OBS HIGH 75: CPT | Performed by: INTERNAL MEDICINE

## 2017-05-21 PROCEDURE — 25010000002 METHYLPREDNISOLONE PER 125 MG: Performed by: NURSE PRACTITIONER

## 2017-05-21 PROCEDURE — 99233 SBSQ HOSP IP/OBS HIGH 50: CPT | Performed by: FAMILY MEDICINE

## 2017-05-21 PROCEDURE — 25010000002 ENOXAPARIN PER 10 MG: Performed by: NURSE PRACTITIONER

## 2017-05-21 RX ORDER — ALBUTEROL SULFATE 90 UG/1
2 AEROSOL, METERED RESPIRATORY (INHALATION) EVERY 4 HOURS PRN
Status: DISCONTINUED | OUTPATIENT
Start: 2017-05-21 | End: 2017-05-23 | Stop reason: HOSPADM

## 2017-05-21 RX ORDER — ALBUTEROL SULFATE 90 UG/1
2 AEROSOL, METERED RESPIRATORY (INHALATION) EVERY 4 HOURS PRN
Status: DISCONTINUED | OUTPATIENT
Start: 2017-05-21 | End: 2017-05-22 | Stop reason: SDUPTHER

## 2017-05-21 RX ADMIN — HYDROXYZINE HYDROCHLORIDE 50 MG: 25 TABLET, FILM COATED ORAL at 10:13

## 2017-05-21 RX ADMIN — GABAPENTIN 600 MG: 300 CAPSULE ORAL at 17:05

## 2017-05-21 RX ADMIN — HYDROXYZINE HYDROCHLORIDE 50 MG: 25 TABLET, FILM COATED ORAL at 14:22

## 2017-05-21 RX ADMIN — VALACYCLOVIR HYDROCHLORIDE 500 MG: 500 TABLET, FILM COATED ORAL at 20:25

## 2017-05-21 RX ADMIN — METHYLPREDNISOLONE SODIUM SUCCINATE 30 MG: 125 INJECTION, POWDER, FOR SOLUTION INTRAMUSCULAR; INTRAVENOUS at 06:55

## 2017-05-21 RX ADMIN — GUAIFENESIN 1200 MG: 600 TABLET, EXTENDED RELEASE ORAL at 17:04

## 2017-05-21 RX ADMIN — METHYLPREDNISOLONE SODIUM SUCCINATE 30 MG: 125 INJECTION, POWDER, FOR SOLUTION INTRAMUSCULAR; INTRAVENOUS at 17:10

## 2017-05-21 RX ADMIN — VALACYCLOVIR HYDROCHLORIDE 500 MG: 500 TABLET, FILM COATED ORAL at 10:13

## 2017-05-21 RX ADMIN — DOCUSATE SODIUM 100 MG: 100 CAPSULE, LIQUID FILLED ORAL at 10:13

## 2017-05-21 RX ADMIN — CETIRIZINE HYDROCHLORIDE 10 MG: 10 TABLET, FILM COATED ORAL at 10:13

## 2017-05-21 RX ADMIN — Medication 5 MG: at 22:28

## 2017-05-21 RX ADMIN — ALPRAZOLAM 0.5 MG: 0.5 TABLET ORAL at 14:21

## 2017-05-21 RX ADMIN — TAZOBACTAM SODIUM AND PIPERACILLIN SODIUM 4.5 G: .5; 4 INJECTION, POWDER, LYOPHILIZED, FOR SOLUTION INTRAVENOUS at 06:55

## 2017-05-21 RX ADMIN — TAZOBACTAM SODIUM AND PIPERACILLIN SODIUM 4.5 G: .5; 4 INJECTION, POWDER, LYOPHILIZED, FOR SOLUTION INTRAVENOUS at 20:31

## 2017-05-21 RX ADMIN — GUAIFENESIN 1200 MG: 600 TABLET, EXTENDED RELEASE ORAL at 10:13

## 2017-05-21 RX ADMIN — HYDROCODONE POLISTIREX AND CHLORPHENIRAMINE POLISTIREX 5 ML: 10; 8 SUSPENSION, EXTENDED RELEASE ORAL at 14:22

## 2017-05-21 RX ADMIN — TAZOBACTAM SODIUM AND PIPERACILLIN SODIUM 4.5 G: .5; 4 INJECTION, POWDER, LYOPHILIZED, FOR SOLUTION INTRAVENOUS at 14:32

## 2017-05-21 RX ADMIN — DULOXETINE 60 MG: 60 CAPSULE, DELAYED RELEASE ORAL at 10:11

## 2017-05-21 RX ADMIN — CYCLOBENZAPRINE HYDROCHLORIDE 10 MG: 10 TABLET, FILM COATED ORAL at 20:26

## 2017-05-21 RX ADMIN — CYCLOBENZAPRINE HYDROCHLORIDE 10 MG: 10 TABLET, FILM COATED ORAL at 10:12

## 2017-05-21 RX ADMIN — DULOXETINE 60 MG: 60 CAPSULE, DELAYED RELEASE ORAL at 20:26

## 2017-05-21 RX ADMIN — LEVOTHYROXINE SODIUM 50 MCG: 50 TABLET ORAL at 06:30

## 2017-05-21 RX ADMIN — FUROSEMIDE 40 MG: 40 TABLET ORAL at 10:11

## 2017-05-21 RX ADMIN — BUDESONIDE AND FORMOTEROL FUMARATE DIHYDRATE 2 PUFF: 160; 4.5 AEROSOL RESPIRATORY (INHALATION) at 19:27

## 2017-05-21 RX ADMIN — DOXYCYCLINE HYCLATE 100 MG: 100 CAPSULE ORAL at 20:25

## 2017-05-21 RX ADMIN — MONTELUKAST SODIUM 10 MG: 10 TABLET, FILM COATED ORAL at 20:26

## 2017-05-21 RX ADMIN — DOXYCYCLINE HYCLATE 100 MG: 100 CAPSULE ORAL at 10:12

## 2017-05-21 RX ADMIN — ATENOLOL 12.5 MG: 25 TABLET ORAL at 10:11

## 2017-05-21 RX ADMIN — GABAPENTIN 600 MG: 300 CAPSULE ORAL at 14:32

## 2017-05-21 RX ADMIN — GABAPENTIN 600 MG: 300 CAPSULE ORAL at 10:11

## 2017-05-21 RX ADMIN — ALBUTEROL SULFATE 2.5 MG: 2.5 SOLUTION RESPIRATORY (INHALATION) at 23:55

## 2017-05-21 RX ADMIN — GABAPENTIN 600 MG: 300 CAPSULE ORAL at 20:26

## 2017-05-21 RX ADMIN — BUDESONIDE AND FORMOTEROL FUMARATE DIHYDRATE 2 PUFF: 160; 4.5 AEROSOL RESPIRATORY (INHALATION) at 10:01

## 2017-05-21 RX ADMIN — PRAVASTATIN SODIUM 80 MG: 40 TABLET ORAL at 20:26

## 2017-05-21 RX ADMIN — THEOPHYLLINE 600 MG: 300 TABLET, EXTENDED RELEASE ORAL at 10:14

## 2017-05-21 RX ADMIN — THEOPHYLLINE 600 MG: 300 TABLET, EXTENDED RELEASE ORAL at 20:25

## 2017-05-21 RX ADMIN — ALBUTEROL SULFATE 2.5 MG: 2.5 SOLUTION RESPIRATORY (INHALATION) at 04:05

## 2017-05-21 RX ADMIN — ALBUTEROL SULFATE 2.5 MG: 2.5 SOLUTION RESPIRATORY (INHALATION) at 19:26

## 2017-05-21 RX ADMIN — ALPRAZOLAM 0.5 MG: 0.5 TABLET ORAL at 10:13

## 2017-05-21 RX ADMIN — FLUTICASONE PROPIONATE 2 SPRAY: 50 SPRAY, METERED NASAL at 10:15

## 2017-05-21 RX ADMIN — ALPRAZOLAM 0.5 MG: 0.5 TABLET ORAL at 22:28

## 2017-05-21 RX ADMIN — HYDROCODONE POLISTIREX AND CHLORPHENIRAMINE POLISTIREX 5 ML: 10; 8 SUSPENSION, EXTENDED RELEASE ORAL at 06:30

## 2017-05-21 RX ADMIN — DOCUSATE SODIUM 100 MG: 100 CAPSULE, LIQUID FILLED ORAL at 17:04

## 2017-05-21 RX ADMIN — QUETIAPINE 300 MG: 200 TABLET, EXTENDED RELEASE ORAL at 20:26

## 2017-05-21 RX ADMIN — ACETAMINOPHEN 650 MG: 325 TABLET, FILM COATED ORAL at 14:22

## 2017-05-21 RX ADMIN — ACETAMINOPHEN 650 MG: 325 TABLET, FILM COATED ORAL at 23:06

## 2017-05-21 RX ADMIN — NICOTINE 1 PATCH: 21 PATCH, EXTENDED RELEASE TRANSDERMAL at 10:14

## 2017-05-21 RX ADMIN — PANTOPRAZOLE SODIUM 40 MG: 40 TABLET, DELAYED RELEASE ORAL at 10:11

## 2017-05-21 RX ADMIN — ENOXAPARIN SODIUM 40 MG: 40 INJECTION SUBCUTANEOUS at 10:13

## 2017-05-21 RX ADMIN — PANTOPRAZOLE SODIUM 40 MG: 40 TABLET, DELAYED RELEASE ORAL at 17:06

## 2017-05-21 RX ADMIN — CYCLOBENZAPRINE HYDROCHLORIDE 10 MG: 10 TABLET, FILM COATED ORAL at 17:04

## 2017-05-22 LAB
ANION GAP SERPL CALCULATED.3IONS-SCNC: 7 MMOL/L (ref 3–11)
BASOPHILS # BLD AUTO: 0.08 10*3/MM3 (ref 0–0.2)
BASOPHILS NFR BLD AUTO: 0.6 % (ref 0–1)
BUN BLD-MCNC: 19 MG/DL (ref 9–23)
BUN/CREAT SERPL: 23.8 (ref 7–25)
CALCIUM SPEC-SCNC: 9.7 MG/DL (ref 8.7–10.4)
CHLORIDE SERPL-SCNC: 92 MMOL/L (ref 99–109)
CO2 SERPL-SCNC: 42 MMOL/L (ref 20–31)
CREAT BLD-MCNC: 0.8 MG/DL (ref 0.6–1.3)
DEPRECATED RDW RBC AUTO: 56.3 FL (ref 37–54)
EOSINOPHIL # BLD AUTO: 0.01 10*3/MM3 (ref 0.1–0.3)
EOSINOPHIL NFR BLD AUTO: 0.1 % (ref 0–3)
ERYTHROCYTE [DISTWIDTH] IN BLOOD BY AUTOMATED COUNT: 15.2 % (ref 11.3–14.5)
GFR SERPL CREATININE-BSD FRML MDRD: 75 ML/MIN/1.73
GLUCOSE BLD-MCNC: 110 MG/DL (ref 70–100)
HCT VFR BLD AUTO: 37.3 % (ref 34.5–44)
HGB BLD-MCNC: 11 G/DL (ref 11.5–15.5)
IMM GRANULOCYTES # BLD: 0.54 10*3/MM3 (ref 0–0.03)
IMM GRANULOCYTES NFR BLD: 4.4 % (ref 0–0.6)
LYMPHOCYTES # BLD AUTO: 1.92 10*3/MM3 (ref 0.6–4.8)
LYMPHOCYTES NFR BLD AUTO: 15.5 % (ref 24–44)
MAGNESIUM SERPL-MCNC: 1.7 MG/DL (ref 1.3–2.7)
MCH RBC QN AUTO: 29.9 PG (ref 27–31)
MCHC RBC AUTO-ENTMCNC: 29.5 G/DL (ref 32–36)
MCV RBC AUTO: 101.4 FL (ref 80–99)
MONOCYTES # BLD AUTO: 1.59 10*3/MM3 (ref 0–1)
MONOCYTES NFR BLD AUTO: 12.9 % (ref 0–12)
NEUTROPHILS # BLD AUTO: 8.22 10*3/MM3 (ref 1.5–8.3)
NEUTROPHILS NFR BLD AUTO: 66.5 % (ref 41–71)
PLATELET # BLD AUTO: 239 10*3/MM3 (ref 150–450)
PMV BLD AUTO: 9.7 FL (ref 6–12)
POTASSIUM BLD-SCNC: 4.3 MMOL/L (ref 3.5–5.5)
RBC # BLD AUTO: 3.68 10*6/MM3 (ref 3.89–5.14)
SODIUM BLD-SCNC: 141 MMOL/L (ref 132–146)
WBC NRBC COR # BLD: 12.36 10*3/MM3 (ref 3.5–10.8)

## 2017-05-22 PROCEDURE — 80048 BASIC METABOLIC PNL TOTAL CA: CPT | Performed by: FAMILY MEDICINE

## 2017-05-22 PROCEDURE — 25010000002 ONDANSETRON PER 1 MG: Performed by: NURSE PRACTITIONER

## 2017-05-22 PROCEDURE — 83735 ASSAY OF MAGNESIUM: CPT | Performed by: FAMILY MEDICINE

## 2017-05-22 PROCEDURE — 25010000002 PIPERACILLIN-TAZOBACTAM: Performed by: INTERNAL MEDICINE

## 2017-05-22 PROCEDURE — 25010000002 ENOXAPARIN PER 10 MG: Performed by: NURSE PRACTITIONER

## 2017-05-22 PROCEDURE — 94640 AIRWAY INHALATION TREATMENT: CPT

## 2017-05-22 PROCEDURE — 99232 SBSQ HOSP IP/OBS MODERATE 35: CPT | Performed by: HOSPITALIST

## 2017-05-22 PROCEDURE — 25010000002 METHYLPREDNISOLONE PER 125 MG: Performed by: NURSE PRACTITIONER

## 2017-05-22 PROCEDURE — 85025 COMPLETE CBC W/AUTO DIFF WBC: CPT | Performed by: FAMILY MEDICINE

## 2017-05-22 PROCEDURE — 87116 MYCOBACTERIA CULTURE: CPT | Performed by: FAMILY MEDICINE

## 2017-05-22 PROCEDURE — 94799 UNLISTED PULMONARY SVC/PX: CPT

## 2017-05-22 PROCEDURE — 94760 N-INVAS EAR/PLS OXIMETRY 1: CPT

## 2017-05-22 PROCEDURE — 87206 SMEAR FLUORESCENT/ACID STAI: CPT | Performed by: FAMILY MEDICINE

## 2017-05-22 RX ORDER — DIVALPROEX SODIUM 500 MG/1
1000 TABLET, EXTENDED RELEASE ORAL NIGHTLY
Status: DISCONTINUED | OUTPATIENT
Start: 2017-05-22 | End: 2017-05-23 | Stop reason: HOSPADM

## 2017-05-22 RX ORDER — ZIPRASIDONE HYDROCHLORIDE 20 MG/1
80 CAPSULE ORAL 2 TIMES DAILY WITH MEALS
Status: DISCONTINUED | OUTPATIENT
Start: 2017-05-22 | End: 2017-05-23 | Stop reason: HOSPADM

## 2017-05-22 RX ORDER — CLOMIPRAMINE HYDROCHLORIDE 25 MG/1
100 CAPSULE ORAL NIGHTLY
Status: DISCONTINUED | OUTPATIENT
Start: 2017-05-22 | End: 2017-05-23 | Stop reason: HOSPADM

## 2017-05-22 RX ORDER — CLONAZEPAM 1 MG/1
1 TABLET ORAL DAILY PRN
Status: DISCONTINUED | OUTPATIENT
Start: 2017-05-22 | End: 2017-05-23 | Stop reason: HOSPADM

## 2017-05-22 RX ORDER — BENZTROPINE MESYLATE 1 MG/1
1 TABLET ORAL 2 TIMES DAILY PRN
Status: DISCONTINUED | OUTPATIENT
Start: 2017-05-22 | End: 2017-05-23 | Stop reason: HOSPADM

## 2017-05-22 RX ORDER — ATENOLOL 50 MG/1
50 TABLET ORAL DAILY
Status: DISCONTINUED | OUTPATIENT
Start: 2017-05-23 | End: 2017-05-23 | Stop reason: HOSPADM

## 2017-05-22 RX ORDER — GABAPENTIN 400 MG/1
800 CAPSULE ORAL EVERY 6 HOURS
Status: DISCONTINUED | OUTPATIENT
Start: 2017-05-22 | End: 2017-05-22

## 2017-05-22 RX ORDER — AMOXICILLIN 250 MG/1
500 CAPSULE ORAL EVERY 12 HOURS SCHEDULED
Status: DISCONTINUED | OUTPATIENT
Start: 2017-05-22 | End: 2017-05-23 | Stop reason: HOSPADM

## 2017-05-22 RX ADMIN — HYDROCODONE POLISTIREX AND CHLORPHENIRAMINE POLISTIREX 5 ML: 10; 8 SUSPENSION, EXTENDED RELEASE ORAL at 01:59

## 2017-05-22 RX ADMIN — ALPRAZOLAM 0.5 MG: 0.5 TABLET ORAL at 05:45

## 2017-05-22 RX ADMIN — CYCLOBENZAPRINE HYDROCHLORIDE 10 MG: 10 TABLET, FILM COATED ORAL at 16:15

## 2017-05-22 RX ADMIN — HYDROXYZINE HYDROCHLORIDE 50 MG: 25 TABLET, FILM COATED ORAL at 01:59

## 2017-05-22 RX ADMIN — GABAPENTIN 600 MG: 300 CAPSULE ORAL at 18:48

## 2017-05-22 RX ADMIN — VALACYCLOVIR HYDROCHLORIDE 500 MG: 500 TABLET, FILM COATED ORAL at 20:55

## 2017-05-22 RX ADMIN — CLONAZEPAM 1 MG: 1 TABLET ORAL at 18:47

## 2017-05-22 RX ADMIN — HYDROCODONE POLISTIREX AND CHLORPHENIRAMINE POLISTIREX 5 ML: 10; 8 SUSPENSION, EXTENDED RELEASE ORAL at 09:33

## 2017-05-22 RX ADMIN — CETIRIZINE HYDROCHLORIDE 10 MG: 10 TABLET, FILM COATED ORAL at 09:32

## 2017-05-22 RX ADMIN — THEOPHYLLINE 600 MG: 300 TABLET, EXTENDED RELEASE ORAL at 11:07

## 2017-05-22 RX ADMIN — LEVOTHYROXINE SODIUM 50 MCG: 50 TABLET ORAL at 05:45

## 2017-05-22 RX ADMIN — ALPRAZOLAM 0.5 MG: 0.5 TABLET ORAL at 22:38

## 2017-05-22 RX ADMIN — CYCLOBENZAPRINE HYDROCHLORIDE 10 MG: 10 TABLET, FILM COATED ORAL at 20:57

## 2017-05-22 RX ADMIN — FLUTICASONE PROPIONATE 2 SPRAY: 50 SPRAY, METERED NASAL at 09:34

## 2017-05-22 RX ADMIN — GABAPENTIN 600 MG: 300 CAPSULE ORAL at 20:57

## 2017-05-22 RX ADMIN — PANTOPRAZOLE SODIUM 40 MG: 40 TABLET, DELAYED RELEASE ORAL at 05:45

## 2017-05-22 RX ADMIN — METHYLPREDNISOLONE SODIUM SUCCINATE 30 MG: 125 INJECTION, POWDER, FOR SOLUTION INTRAMUSCULAR; INTRAVENOUS at 18:43

## 2017-05-22 RX ADMIN — ENOXAPARIN SODIUM 40 MG: 40 INJECTION SUBCUTANEOUS at 09:33

## 2017-05-22 RX ADMIN — MONTELUKAST SODIUM 10 MG: 10 TABLET, FILM COATED ORAL at 20:57

## 2017-05-22 RX ADMIN — METHYLPREDNISOLONE SODIUM SUCCINATE 30 MG: 125 INJECTION, POWDER, FOR SOLUTION INTRAMUSCULAR; INTRAVENOUS at 06:57

## 2017-05-22 RX ADMIN — GABAPENTIN 600 MG: 300 CAPSULE ORAL at 09:33

## 2017-05-22 RX ADMIN — FUROSEMIDE 40 MG: 40 TABLET ORAL at 09:32

## 2017-05-22 RX ADMIN — DOXYCYCLINE HYCLATE 100 MG: 100 CAPSULE ORAL at 09:32

## 2017-05-22 RX ADMIN — DULOXETINE 60 MG: 60 CAPSULE, DELAYED RELEASE ORAL at 09:32

## 2017-05-22 RX ADMIN — BUDESONIDE AND FORMOTEROL FUMARATE DIHYDRATE 2 PUFF: 160; 4.5 AEROSOL RESPIRATORY (INHALATION) at 23:53

## 2017-05-22 RX ADMIN — CYCLOBENZAPRINE HYDROCHLORIDE 10 MG: 10 TABLET, FILM COATED ORAL at 09:32

## 2017-05-22 RX ADMIN — PANTOPRAZOLE SODIUM 40 MG: 40 TABLET, DELAYED RELEASE ORAL at 17:01

## 2017-05-22 RX ADMIN — THEOPHYLLINE 600 MG: 300 TABLET, EXTENDED RELEASE ORAL at 20:57

## 2017-05-22 RX ADMIN — ALPRAZOLAM 0.5 MG: 0.5 TABLET ORAL at 14:24

## 2017-05-22 RX ADMIN — CLOMIPRAMINE HYDROCHLORIDE 100 MG: 25 CAPSULE ORAL at 20:55

## 2017-05-22 RX ADMIN — BUDESONIDE AND FORMOTEROL FUMARATE DIHYDRATE 2 PUFF: 160; 4.5 AEROSOL RESPIRATORY (INHALATION) at 09:19

## 2017-05-22 RX ADMIN — PRAVASTATIN SODIUM 80 MG: 40 TABLET ORAL at 20:55

## 2017-05-22 RX ADMIN — DIVALPROEX SODIUM 1000 MG: 500 TABLET, FILM COATED, EXTENDED RELEASE ORAL at 20:55

## 2017-05-22 RX ADMIN — AMOXICILLIN 500 MG: 250 CAPSULE ORAL at 20:56

## 2017-05-22 RX ADMIN — VALACYCLOVIR HYDROCHLORIDE 500 MG: 500 TABLET, FILM COATED ORAL at 09:32

## 2017-05-22 RX ADMIN — DOCUSATE SODIUM 100 MG: 100 CAPSULE, LIQUID FILLED ORAL at 09:32

## 2017-05-22 RX ADMIN — DOCUSATE SODIUM 100 MG: 100 CAPSULE, LIQUID FILLED ORAL at 18:42

## 2017-05-22 RX ADMIN — GABAPENTIN 600 MG: 300 CAPSULE ORAL at 14:23

## 2017-05-22 RX ADMIN — NICOTINE 1 PATCH: 21 PATCH, EXTENDED RELEASE TRANSDERMAL at 11:06

## 2017-05-22 RX ADMIN — QUETIAPINE 300 MG: 200 TABLET, EXTENDED RELEASE ORAL at 21:06

## 2017-05-22 RX ADMIN — AMOXICILLIN 500 MG: 250 CAPSULE ORAL at 11:12

## 2017-05-22 RX ADMIN — GUAIFENESIN 1200 MG: 600 TABLET, EXTENDED RELEASE ORAL at 01:58

## 2017-05-22 RX ADMIN — ZIPRASIDONE HYDROCHLORIDE 80 MG: 20 CAPSULE ORAL at 19:42

## 2017-05-22 RX ADMIN — DULOXETINE 60 MG: 60 CAPSULE, DELAYED RELEASE ORAL at 20:56

## 2017-05-22 RX ADMIN — ATENOLOL 12.5 MG: 25 TABLET ORAL at 09:33

## 2017-05-22 RX ADMIN — TAZOBACTAM SODIUM AND PIPERACILLIN SODIUM 4.5 G: .5; 4 INJECTION, POWDER, LYOPHILIZED, FOR SOLUTION INTRAVENOUS at 06:58

## 2017-05-22 RX ADMIN — ONDANSETRON 4 MG: 2 INJECTION INTRAMUSCULAR; INTRAVENOUS at 18:47

## 2017-05-22 RX ADMIN — GUAIFENESIN 1200 MG: 600 TABLET, EXTENDED RELEASE ORAL at 17:00

## 2017-05-23 VITALS
BODY MASS INDEX: 40.94 KG/M2 | RESPIRATION RATE: 16 BRPM | DIASTOLIC BLOOD PRESSURE: 96 MMHG | TEMPERATURE: 97.7 F | HEIGHT: 68 IN | WEIGHT: 270.13 LBS | SYSTOLIC BLOOD PRESSURE: 144 MMHG | HEART RATE: 112 BPM | OXYGEN SATURATION: 93 %

## 2017-05-23 PROCEDURE — 97165 OT EVAL LOW COMPLEX 30 MIN: CPT

## 2017-05-23 PROCEDURE — 63710000001 PREDNISONE PER 1 MG: Performed by: HOSPITALIST

## 2017-05-23 PROCEDURE — 25010000002 ENOXAPARIN PER 10 MG: Performed by: NURSE PRACTITIONER

## 2017-05-23 PROCEDURE — 25010000002 METHYLPREDNISOLONE PER 125 MG: Performed by: NURSE PRACTITIONER

## 2017-05-23 PROCEDURE — 97161 PT EVAL LOW COMPLEX 20 MIN: CPT

## 2017-05-23 PROCEDURE — 97110 THERAPEUTIC EXERCISES: CPT

## 2017-05-23 PROCEDURE — 99239 HOSP IP/OBS DSCHRG MGMT >30: CPT | Performed by: HOSPITALIST

## 2017-05-23 PROCEDURE — 94640 AIRWAY INHALATION TREATMENT: CPT

## 2017-05-23 PROCEDURE — 97116 GAIT TRAINING THERAPY: CPT

## 2017-05-23 RX ORDER — PREDNISONE 20 MG/1
40 TABLET ORAL
Status: DISCONTINUED | OUTPATIENT
Start: 2017-05-23 | End: 2017-05-23 | Stop reason: HOSPADM

## 2017-05-23 RX ORDER — AMOXICILLIN 500 MG/1
500 CAPSULE ORAL EVERY 12 HOURS SCHEDULED
Qty: 18 CAPSULE | Refills: 0 | Status: SHIPPED | OUTPATIENT
Start: 2017-05-23 | End: 2017-05-28 | Stop reason: HOSPADM

## 2017-05-23 RX ORDER — PREDNISONE 10 MG/1
TABLET ORAL
Qty: 16 TABLET | Refills: 0 | Status: SHIPPED | OUTPATIENT
Start: 2017-05-23 | End: 2017-05-28 | Stop reason: HOSPADM

## 2017-05-23 RX ORDER — GABAPENTIN 300 MG/1
600 CAPSULE ORAL 4 TIMES DAILY
Qty: 240 CAPSULE | Refills: 0 | Status: SHIPPED | OUTPATIENT
Start: 2017-05-23

## 2017-05-23 RX ORDER — IPRATROPIUM BROMIDE AND ALBUTEROL SULFATE 2.5; .5 MG/3ML; MG/3ML
3 SOLUTION RESPIRATORY (INHALATION) EVERY 4 HOURS PRN
Qty: 300 ML | Refills: 0 | Status: SHIPPED | OUTPATIENT
Start: 2017-05-23 | End: 2017-06-09 | Stop reason: HOSPADM

## 2017-05-23 RX ADMIN — CYCLOBENZAPRINE HYDROCHLORIDE 10 MG: 10 TABLET, FILM COATED ORAL at 09:15

## 2017-05-23 RX ADMIN — HYDROCODONE POLISTIREX AND CHLORPHENIRAMINE POLISTIREX 5 ML: 10; 8 SUSPENSION, EXTENDED RELEASE ORAL at 06:33

## 2017-05-23 RX ADMIN — BUDESONIDE AND FORMOTEROL FUMARATE DIHYDRATE 2 PUFF: 160; 4.5 AEROSOL RESPIRATORY (INHALATION) at 08:03

## 2017-05-23 RX ADMIN — NICOTINE 1 PATCH: 21 PATCH, EXTENDED RELEASE TRANSDERMAL at 09:16

## 2017-05-23 RX ADMIN — METHYLPREDNISOLONE SODIUM SUCCINATE 30 MG: 125 INJECTION, POWDER, FOR SOLUTION INTRAMUSCULAR; INTRAVENOUS at 05:15

## 2017-05-23 RX ADMIN — ATENOLOL 50 MG: 50 TABLET ORAL at 09:14

## 2017-05-23 RX ADMIN — FUROSEMIDE 40 MG: 40 TABLET ORAL at 09:15

## 2017-05-23 RX ADMIN — VALACYCLOVIR HYDROCHLORIDE 500 MG: 500 TABLET, FILM COATED ORAL at 09:15

## 2017-05-23 RX ADMIN — GABAPENTIN 600 MG: 300 CAPSULE ORAL at 09:15

## 2017-05-23 RX ADMIN — CETIRIZINE HYDROCHLORIDE 10 MG: 10 TABLET, FILM COATED ORAL at 09:14

## 2017-05-23 RX ADMIN — PREDNISONE 40 MG: 20 TABLET ORAL at 09:15

## 2017-05-23 RX ADMIN — DOCUSATE SODIUM 100 MG: 100 CAPSULE, LIQUID FILLED ORAL at 09:15

## 2017-05-23 RX ADMIN — THEOPHYLLINE 600 MG: 300 TABLET, EXTENDED RELEASE ORAL at 09:51

## 2017-05-23 RX ADMIN — ENOXAPARIN SODIUM 40 MG: 40 INJECTION SUBCUTANEOUS at 09:16

## 2017-05-23 RX ADMIN — DULOXETINE 60 MG: 60 CAPSULE, DELAYED RELEASE ORAL at 09:15

## 2017-05-23 RX ADMIN — AMOXICILLIN 500 MG: 250 CAPSULE ORAL at 09:51

## 2017-05-23 RX ADMIN — ZIPRASIDONE HYDROCHLORIDE 80 MG: 20 CAPSULE ORAL at 09:51

## 2017-05-23 RX ADMIN — PANTOPRAZOLE SODIUM 40 MG: 40 TABLET, DELAYED RELEASE ORAL at 09:15

## 2017-05-23 RX ADMIN — LEVOTHYROXINE SODIUM 50 MCG: 50 TABLET ORAL at 05:15

## 2017-05-23 RX ADMIN — GABAPENTIN 600 MG: 300 CAPSULE ORAL at 16:55

## 2017-05-23 RX ADMIN — ALPRAZOLAM 0.5 MG: 0.5 TABLET ORAL at 09:15

## 2017-05-23 RX ADMIN — FLUTICASONE PROPIONATE 2 SPRAY: 50 SPRAY, METERED NASAL at 09:20

## 2017-05-23 RX ADMIN — SIMETHICONE CHEW TAB 80 MG 80 MG: 80 TABLET ORAL at 09:16

## 2017-05-24 ENCOUNTER — APPOINTMENT (OUTPATIENT)
Dept: GENERAL RADIOLOGY | Facility: HOSPITAL | Age: 52
End: 2017-05-24

## 2017-05-24 ENCOUNTER — HOSPITAL ENCOUNTER (OUTPATIENT)
Facility: HOSPITAL | Age: 52
Setting detail: OBSERVATION
Discharge: HOME | End: 2017-05-28
Attending: EMERGENCY MEDICINE | Admitting: INTERNAL MEDICINE

## 2017-05-24 DIAGNOSIS — J96.01 ACUTE RESPIRATORY FAILURE WITH HYPOXIA AND HYPERCARBIA (HCC): ICD-10-CM

## 2017-05-24 DIAGNOSIS — J96.02 ACUTE RESPIRATORY FAILURE WITH HYPOXIA AND HYPERCARBIA (HCC): ICD-10-CM

## 2017-05-24 DIAGNOSIS — J44.1 COPD EXACERBATION (HCC): Primary | ICD-10-CM

## 2017-05-24 DIAGNOSIS — Z72.0 TOBACCO ABUSE: ICD-10-CM

## 2017-05-24 LAB
ARTERIAL PATENCY WRIST A: POSITIVE
ATMOSPHERIC PRESS: ABNORMAL MMHG
BASE EXCESS BLDA CALC-SCNC: 15.2 MMOL/L (ref 0–2)
BASOPHILS # BLD AUTO: 0.14 10*3/MM3 (ref 0–0.2)
BASOPHILS NFR BLD AUTO: 0.7 % (ref 0–1)
BDY SITE: ABNORMAL
BNP SERPL-MCNC: 38 PG/ML (ref 0–100)
CO2 BLDA-SCNC: 43 MMOL/L (ref 22–33)
COHGB MFR BLD: 7 % (ref 0–2)
DEPRECATED RDW RBC AUTO: 57.5 FL (ref 37–54)
EOSINOPHIL # BLD AUTO: 0.03 10*3/MM3 (ref 0.1–0.3)
EOSINOPHIL NFR BLD AUTO: 0.1 % (ref 0–3)
ERYTHROCYTE [DISTWIDTH] IN BLOOD BY AUTOMATED COUNT: 16 % (ref 11.3–14.5)
HCO3 BLDA-SCNC: 41.1 MMOL/L (ref 20–26)
HCT VFR BLD AUTO: 41.4 % (ref 34.5–44)
HCT VFR BLD CALC: 38.5 %
HGB BLD-MCNC: 13 G/DL (ref 11.5–15.5)
HGB BLDA-MCNC: 12.6 G/DL (ref 14–18)
HOROWITZ INDEX BLD+IHG-RTO: 28 %
IMM GRANULOCYTES # BLD: 1.01 10*3/MM3 (ref 0–0.03)
IMM GRANULOCYTES NFR BLD: 4.8 % (ref 0–0.6)
LYMPHOCYTES # BLD AUTO: 4.19 10*3/MM3 (ref 0.6–4.8)
LYMPHOCYTES NFR BLD AUTO: 20.1 % (ref 24–44)
MCH RBC QN AUTO: 30.7 PG (ref 27–31)
MCHC RBC AUTO-ENTMCNC: 31.4 G/DL (ref 32–36)
MCV RBC AUTO: 97.9 FL (ref 80–99)
METHGB BLD QL: 1 % (ref 0–1.5)
MODALITY: ABNORMAL
MONOCYTES # BLD AUTO: 1.58 10*3/MM3 (ref 0–1)
MONOCYTES NFR BLD AUTO: 7.6 % (ref 0–12)
NEUTROPHILS # BLD AUTO: 13.91 10*3/MM3 (ref 1.5–8.3)
NEUTROPHILS NFR BLD AUTO: 66.7 % (ref 41–71)
OXYHGB MFR BLDV: 84.4 % (ref 94–99)
PCO2 BLDA: 60.5 MM HG (ref 35–45)
PH BLDA: 7.44 PH UNITS (ref 7.35–7.45)
PLATELET # BLD AUTO: 333 10*3/MM3 (ref 150–450)
PMV BLD AUTO: 9.4 FL (ref 6–12)
PO2 BLDA: 59 MM HG (ref 83–108)
RBC # BLD AUTO: 4.23 10*6/MM3 (ref 3.89–5.14)
TROPONIN I SERPL-MCNC: 0 NG/ML (ref 0–0.07)
WBC NRBC COR # BLD: 20.86 10*3/MM3 (ref 3.5–10.8)

## 2017-05-24 PROCEDURE — 94760 N-INVAS EAR/PLS OXIMETRY 1: CPT

## 2017-05-24 PROCEDURE — 96374 THER/PROPH/DIAG INJ IV PUSH: CPT

## 2017-05-24 PROCEDURE — 80053 COMPREHEN METABOLIC PANEL: CPT | Performed by: EMERGENCY MEDICINE

## 2017-05-24 PROCEDURE — 94799 UNLISTED PULMONARY SVC/PX: CPT

## 2017-05-24 PROCEDURE — 99285 EMERGENCY DEPT VISIT HI MDM: CPT

## 2017-05-24 PROCEDURE — 84484 ASSAY OF TROPONIN QUANT: CPT

## 2017-05-24 PROCEDURE — 85025 COMPLETE CBC W/AUTO DIFF WBC: CPT | Performed by: EMERGENCY MEDICINE

## 2017-05-24 PROCEDURE — 83880 ASSAY OF NATRIURETIC PEPTIDE: CPT | Performed by: EMERGENCY MEDICINE

## 2017-05-24 PROCEDURE — 71010 HC CHEST PA OR AP: CPT

## 2017-05-24 PROCEDURE — 36600 WITHDRAWAL OF ARTERIAL BLOOD: CPT | Performed by: EMERGENCY MEDICINE

## 2017-05-24 PROCEDURE — 94640 AIRWAY INHALATION TREATMENT: CPT

## 2017-05-24 PROCEDURE — 25010000002 METHYLPREDNISOLONE PER 125 MG: Performed by: EMERGENCY MEDICINE

## 2017-05-24 PROCEDURE — 82805 BLOOD GASES W/O2 SATURATION: CPT | Performed by: EMERGENCY MEDICINE

## 2017-05-24 PROCEDURE — 80198 ASSAY OF THEOPHYLLINE: CPT | Performed by: EMERGENCY MEDICINE

## 2017-05-24 PROCEDURE — 93005 ELECTROCARDIOGRAM TRACING: CPT | Performed by: EMERGENCY MEDICINE

## 2017-05-24 RX ORDER — IPRATROPIUM BROMIDE AND ALBUTEROL SULFATE 2.5; .5 MG/3ML; MG/3ML
3 SOLUTION RESPIRATORY (INHALATION) ONCE
Status: COMPLETED | OUTPATIENT
Start: 2017-05-24 | End: 2017-05-24

## 2017-05-24 RX ORDER — METHYLPREDNISOLONE SODIUM SUCCINATE 125 MG/2ML
125 INJECTION, POWDER, LYOPHILIZED, FOR SOLUTION INTRAMUSCULAR; INTRAVENOUS ONCE
Status: COMPLETED | OUTPATIENT
Start: 2017-05-24 | End: 2017-05-24

## 2017-05-24 RX ADMIN — METHYLPREDNISOLONE SODIUM SUCCINATE 125 MG: 125 INJECTION, POWDER, FOR SOLUTION INTRAMUSCULAR; INTRAVENOUS at 22:38

## 2017-05-24 RX ADMIN — IPRATROPIUM BROMIDE AND ALBUTEROL SULFATE 3 ML: .5; 3 SOLUTION RESPIRATORY (INHALATION) at 22:28

## 2017-05-25 ENCOUNTER — APPOINTMENT (OUTPATIENT)
Dept: CARDIOLOGY | Facility: HOSPITAL | Age: 52
End: 2017-05-25
Attending: INTERNAL MEDICINE

## 2017-05-25 PROBLEM — J96.20 ACUTE ON CHRONIC RESPIRATORY FAILURE (HCC): Chronic | Status: ACTIVE | Noted: 2017-03-01

## 2017-05-25 PROBLEM — Z59.00 HOMELESSNESS: Chronic | Status: ACTIVE | Noted: 2017-03-01

## 2017-05-25 PROBLEM — J96.22 ACUTE ON CHRONIC RESPIRATORY FAILURE WITH HYPOXIA AND HYPERCAPNIA (HCC): Chronic | Status: ACTIVE | Noted: 2017-03-01

## 2017-05-25 PROBLEM — J96.21 ACUTE ON CHRONIC RESPIRATORY FAILURE WITH HYPOXIA AND HYPERCAPNIA (HCC): Chronic | Status: ACTIVE | Noted: 2017-03-01

## 2017-05-25 PROBLEM — J44.1 ACUTE EXACERBATION OF COPD WITH ASTHMA (HCC): Chronic | Status: ACTIVE | Noted: 2017-05-19

## 2017-05-25 PROBLEM — J44.1 COPD EXACERBATION (HCC): Status: ACTIVE | Noted: 2017-05-25

## 2017-05-25 PROBLEM — D72.829 LEUKOCYTOSIS: Chronic | Status: ACTIVE | Noted: 2017-03-01

## 2017-05-25 PROBLEM — E87.20 LACTIC ACIDOSIS: Status: ACTIVE | Noted: 2017-05-25

## 2017-05-25 PROBLEM — J45.901 ACUTE EXACERBATION OF COPD WITH ASTHMA (HCC): Chronic | Status: ACTIVE | Noted: 2017-05-19

## 2017-05-25 PROBLEM — I10 ESSENTIAL HYPERTENSION: Chronic | Status: ACTIVE | Noted: 2017-05-19

## 2017-05-25 PROBLEM — R13.10 DYSPHAGIA: Chronic | Status: ACTIVE | Noted: 2017-03-14

## 2017-05-25 PROBLEM — F41.9 ANXIETY AND DEPRESSION: Chronic | Status: ACTIVE | Noted: 2017-03-01

## 2017-05-25 PROBLEM — F32.A ANXIETY AND DEPRESSION: Chronic | Status: ACTIVE | Noted: 2017-03-01

## 2017-05-25 PROBLEM — N17.9 AKI (ACUTE KIDNEY INJURY) (HCC): Status: ACTIVE | Noted: 2017-05-25

## 2017-05-25 PROBLEM — Z72.0 TOBACCO ABUSE: Chronic | Status: ACTIVE | Noted: 2017-03-01

## 2017-05-25 LAB
ALBUMIN SERPL-MCNC: 4.1 G/DL (ref 3.2–4.8)
ALBUMIN/GLOB SERPL: 1.4 G/DL (ref 1.5–2.5)
ALP SERPL-CCNC: 58 U/L (ref 25–100)
ALT SERPL W P-5'-P-CCNC: 20 U/L (ref 7–40)
ANION GAP SERPL CALCULATED.3IONS-SCNC: 6 MMOL/L (ref 3–11)
ANION GAP SERPL CALCULATED.3IONS-SCNC: 8 MMOL/L (ref 3–11)
AST SERPL-CCNC: 22 U/L (ref 0–33)
BASOPHILS # BLD AUTO: 0.05 10*3/MM3 (ref 0–0.2)
BASOPHILS NFR BLD AUTO: 0.3 % (ref 0–1)
BH CV ECHO MEAS - AO MAX PG (FULL): 4.6 MMHG
BH CV ECHO MEAS - AO MAX PG: 12.7 MMHG
BH CV ECHO MEAS - AO MEAN PG (FULL): 3 MMHG
BH CV ECHO MEAS - AO MEAN PG: 7 MMHG
BH CV ECHO MEAS - AO V2 MAX: 178 CM/SEC
BH CV ECHO MEAS - AO V2 MEAN: 121 CM/SEC
BH CV ECHO MEAS - AO V2 VTI: 34.4 CM
BH CV ECHO MEAS - BSA(HAYCOCK): 2.4 M^2
BH CV ECHO MEAS - BSA: 2.3 M^2
BH CV ECHO MEAS - BZI_BMI: 39.7 KILOGRAMS/M^2
BH CV ECHO MEAS - BZI_METRIC_HEIGHT: 172.7 CM
BH CV ECHO MEAS - BZI_METRIC_WEIGHT: 118.4 KG
BH CV ECHO MEAS - EDV(CUBED): 48.6 ML
BH CV ECHO MEAS - EDV(TEICH): 56.3 ML
BH CV ECHO MEAS - EF(CUBED): 63 %
BH CV ECHO MEAS - EF(TEICH): 55.4 %
BH CV ECHO MEAS - ESV(CUBED): 18 ML
BH CV ECHO MEAS - ESV(TEICH): 25.1 ML
BH CV ECHO MEAS - FS: 28.2 %
BH CV ECHO MEAS - IVS/LVPW: 1.3
BH CV ECHO MEAS - IVSD: 1.2 CM
BH CV ECHO MEAS - LA DIMENSION: 3.1 CM
BH CV ECHO MEAS - LV MASS(C)D: 116.2 GRAMS
BH CV ECHO MEAS - LV MASS(C)DI: 50.8 GRAMS/M^2
BH CV ECHO MEAS - LV MAX PG: 8.1 MMHG
BH CV ECHO MEAS - LV MEAN PG: 4 MMHG
BH CV ECHO MEAS - LV V1 MAX: 142 CM/SEC
BH CV ECHO MEAS - LV V1 MEAN: 90.7 CM/SEC
BH CV ECHO MEAS - LV V1 VTI: 32.2 CM
BH CV ECHO MEAS - LVIDD: 3.7 CM
BH CV ECHO MEAS - LVIDS: 2.6 CM
BH CV ECHO MEAS - LVPWD: 0.88 CM
BH CV ECHO MEAS - MV A MAX VEL: 95.8 CM/SEC
BH CV ECHO MEAS - MV E MAX VEL: 87.9 CM/SEC
BH CV ECHO MEAS - MV E/A: 0.92
BH CV ECHO MEAS - SI(CUBED): 13.4 ML/M^2
BH CV ECHO MEAS - SI(TEICH): 13.6 ML/M^2
BH CV ECHO MEAS - SV(CUBED): 30.6 ML
BH CV ECHO MEAS - SV(TEICH): 31.2 ML
BILIRUB SERPL-MCNC: 0.3 MG/DL (ref 0.3–1.2)
BILIRUB UR QL STRIP: NEGATIVE
BUN BLD-MCNC: 17 MG/DL (ref 9–23)
BUN BLD-MCNC: 23 MG/DL (ref 9–23)
BUN/CREAT SERPL: 17 (ref 7–25)
BUN/CREAT SERPL: 17.7 (ref 7–25)
CALCIUM SPEC-SCNC: 9.6 MG/DL (ref 8.7–10.4)
CALCIUM SPEC-SCNC: 9.6 MG/DL (ref 8.7–10.4)
CHLORIDE SERPL-SCNC: 85 MMOL/L (ref 99–109)
CHLORIDE SERPL-SCNC: 87 MMOL/L (ref 99–109)
CLARITY UR: CLEAR
CO2 SERPL-SCNC: 37 MMOL/L (ref 20–31)
CO2 SERPL-SCNC: 40 MMOL/L (ref 20–31)
COLOR UR: YELLOW
CREAT BLD-MCNC: 1 MG/DL (ref 0.6–1.3)
CREAT BLD-MCNC: 1.3 MG/DL (ref 0.6–1.3)
CREAT UR-MCNC: 55.9 MG/DL
D-LACTATE SERPL-SCNC: 2.8 MMOL/L (ref 0.5–2)
D-LACTATE SERPL-SCNC: 3.5 MMOL/L (ref 0.5–2)
DEPRECATED RDW RBC AUTO: 54.6 FL (ref 37–54)
EOSINOPHIL # BLD AUTO: 0 10*3/MM3 (ref 0.1–0.3)
EOSINOPHIL NFR BLD AUTO: 0 % (ref 0–3)
ERYTHROCYTE [DISTWIDTH] IN BLOOD BY AUTOMATED COUNT: 15.7 % (ref 11.3–14.5)
FLUAV SUBTYP SPEC NAA+PROBE: NOT DETECTED
FLUBV RNA ISLT QL NAA+PROBE: NOT DETECTED
GFR SERPL CREATININE-BSD FRML MDRD: 43 ML/MIN/1.73
GFR SERPL CREATININE-BSD FRML MDRD: 58 ML/MIN/1.73
GLOBULIN UR ELPH-MCNC: 3 GM/DL
GLUCOSE BLD-MCNC: 128 MG/DL (ref 70–100)
GLUCOSE BLD-MCNC: 97 MG/DL (ref 70–100)
GLUCOSE BLDC GLUCOMTR-MCNC: 112 MG/DL (ref 70–130)
GLUCOSE BLDC GLUCOMTR-MCNC: 120 MG/DL (ref 70–130)
GLUCOSE BLDC GLUCOMTR-MCNC: 163 MG/DL (ref 70–130)
GLUCOSE BLDC GLUCOMTR-MCNC: 173 MG/DL (ref 70–130)
GLUCOSE BLDC GLUCOMTR-MCNC: 193 MG/DL (ref 70–130)
GLUCOSE UR STRIP-MCNC: NEGATIVE MG/DL
HBA1C MFR BLD: 4.9 % (ref 4.8–5.6)
HCT VFR BLD AUTO: 39.9 % (ref 34.5–44)
HGB BLD-MCNC: 12.4 G/DL (ref 11.5–15.5)
HGB UR QL STRIP.AUTO: NEGATIVE
IMM GRANULOCYTES # BLD: 0.79 10*3/MM3 (ref 0–0.03)
IMM GRANULOCYTES NFR BLD: 4.2 % (ref 0–0.6)
KETONES UR QL STRIP: NEGATIVE
LEUKOCYTE ESTERASE UR QL STRIP.AUTO: NEGATIVE
LIPASE SERPL-CCNC: 30 U/L (ref 6–51)
LV EF 2D ECHO EST: 65 %
LYMPHOCYTES # BLD AUTO: 1.29 10*3/MM3 (ref 0.6–4.8)
LYMPHOCYTES NFR BLD AUTO: 6.8 % (ref 24–44)
MAGNESIUM SERPL-MCNC: 2 MG/DL (ref 1.3–2.7)
MCH RBC QN AUTO: 30 PG (ref 27–31)
MCHC RBC AUTO-ENTMCNC: 31.1 G/DL (ref 32–36)
MCV RBC AUTO: 96.4 FL (ref 80–99)
MONOCYTES # BLD AUTO: 0.75 10*3/MM3 (ref 0–1)
MONOCYTES NFR BLD AUTO: 3.9 % (ref 0–12)
NEUTROPHILS # BLD AUTO: 16.13 10*3/MM3 (ref 1.5–8.3)
NEUTROPHILS NFR BLD AUTO: 84.8 % (ref 41–71)
NITRITE UR QL STRIP: NEGATIVE
PH UR STRIP.AUTO: 7 [PH] (ref 5–8)
PLATELET # BLD AUTO: 298 10*3/MM3 (ref 150–450)
PMV BLD AUTO: 9.3 FL (ref 6–12)
POTASSIUM BLD-SCNC: 4.2 MMOL/L (ref 3.5–5.5)
POTASSIUM BLD-SCNC: 4.6 MMOL/L (ref 3.5–5.5)
PROT SERPL-MCNC: 7.1 G/DL (ref 5.7–8.2)
PROT UR QL STRIP: NEGATIVE
RBC # BLD AUTO: 4.14 10*6/MM3 (ref 3.89–5.14)
SODIUM BLD-SCNC: 130 MMOL/L (ref 132–146)
SODIUM BLD-SCNC: 133 MMOL/L (ref 132–146)
SODIUM UR-SCNC: 48 MMOL/L (ref 30–90)
SP GR UR STRIP: 1.01 (ref 1–1.03)
THEOPHYLLINE SERPL-MCNC: 12 MCG/ML (ref 10–20)
TROPONIN I SERPL-MCNC: <0.006 NG/ML
UROBILINOGEN UR QL STRIP: NORMAL
WBC NRBC COR # BLD: 19.01 10*3/MM3 (ref 3.5–10.8)

## 2017-05-25 PROCEDURE — 93306 TTE W/DOPPLER COMPLETE: CPT | Performed by: INTERNAL MEDICINE

## 2017-05-25 PROCEDURE — 25010000002 HEPARIN (PORCINE) PER 1000 UNITS: Performed by: NURSE PRACTITIONER

## 2017-05-25 PROCEDURE — 94640 AIRWAY INHALATION TREATMENT: CPT

## 2017-05-25 PROCEDURE — 83690 ASSAY OF LIPASE: CPT | Performed by: NURSE PRACTITIONER

## 2017-05-25 PROCEDURE — 96376 TX/PRO/DX INJ SAME DRUG ADON: CPT

## 2017-05-25 PROCEDURE — 94799 UNLISTED PULMONARY SVC/PX: CPT

## 2017-05-25 PROCEDURE — 81003 URINALYSIS AUTO W/O SCOPE: CPT | Performed by: NURSE PRACTITIONER

## 2017-05-25 PROCEDURE — G8996 SWALLOW CURRENT STATUS: HCPCS

## 2017-05-25 PROCEDURE — 93306 TTE W/DOPPLER COMPLETE: CPT

## 2017-05-25 PROCEDURE — 99220 PR INITIAL OBSERVATION CARE/DAY 70 MINUTES: CPT | Performed by: INTERNAL MEDICINE

## 2017-05-25 PROCEDURE — G8997 SWALLOW GOAL STATUS: HCPCS

## 2017-05-25 PROCEDURE — G0378 HOSPITAL OBSERVATION PER HR: HCPCS

## 2017-05-25 PROCEDURE — 25010000002 AZITHROMYCIN: Performed by: NURSE PRACTITIONER

## 2017-05-25 PROCEDURE — 94660 CPAP INITIATION&MGMT: CPT

## 2017-05-25 PROCEDURE — 85025 COMPLETE CBC W/AUTO DIFF WBC: CPT | Performed by: NURSE PRACTITIONER

## 2017-05-25 PROCEDURE — 87502 INFLUENZA DNA AMP PROBE: CPT | Performed by: NURSE PRACTITIONER

## 2017-05-25 PROCEDURE — G8998 SWALLOW D/C STATUS: HCPCS

## 2017-05-25 PROCEDURE — 80048 BASIC METABOLIC PNL TOTAL CA: CPT | Performed by: NURSE PRACTITIONER

## 2017-05-25 PROCEDURE — 84300 ASSAY OF URINE SODIUM: CPT | Performed by: NURSE PRACTITIONER

## 2017-05-25 PROCEDURE — 63710000001 INSULIN LISPRO (HUMAN) PER 5 UNITS: Performed by: NURSE PRACTITIONER

## 2017-05-25 PROCEDURE — 83605 ASSAY OF LACTIC ACID: CPT | Performed by: NURSE PRACTITIONER

## 2017-05-25 PROCEDURE — 82962 GLUCOSE BLOOD TEST: CPT

## 2017-05-25 PROCEDURE — 82570 ASSAY OF URINE CREATININE: CPT | Performed by: NURSE PRACTITIONER

## 2017-05-25 PROCEDURE — 83605 ASSAY OF LACTIC ACID: CPT | Performed by: HOSPITALIST

## 2017-05-25 PROCEDURE — 83735 ASSAY OF MAGNESIUM: CPT | Performed by: NURSE PRACTITIONER

## 2017-05-25 PROCEDURE — 84484 ASSAY OF TROPONIN QUANT: CPT | Performed by: NURSE PRACTITIONER

## 2017-05-25 PROCEDURE — 96372 THER/PROPH/DIAG INJ SC/IM: CPT

## 2017-05-25 PROCEDURE — 92610 EVALUATE SWALLOWING FUNCTION: CPT

## 2017-05-25 PROCEDURE — 25010000002 METHYLPREDNISOLONE PER 125 MG: Performed by: NURSE PRACTITIONER

## 2017-05-25 PROCEDURE — 94760 N-INVAS EAR/PLS OXIMETRY 1: CPT

## 2017-05-25 PROCEDURE — 83036 HEMOGLOBIN GLYCOSYLATED A1C: CPT | Performed by: NURSE PRACTITIONER

## 2017-05-25 RX ORDER — TROLAMINE SALICYLATE 10 G/100G
1 CREAM TOPICAL 2 TIMES DAILY PRN
Status: DISCONTINUED | OUTPATIENT
Start: 2017-05-25 | End: 2017-05-28 | Stop reason: HOSPADM

## 2017-05-25 RX ORDER — CETIRIZINE HYDROCHLORIDE 10 MG/1
10 TABLET ORAL DAILY
Status: DISCONTINUED | OUTPATIENT
Start: 2017-05-25 | End: 2017-05-28 | Stop reason: HOSPADM

## 2017-05-25 RX ORDER — MONTELUKAST SODIUM 10 MG/1
10 TABLET ORAL NIGHTLY
Status: DISCONTINUED | OUTPATIENT
Start: 2017-05-25 | End: 2017-05-28 | Stop reason: HOSPADM

## 2017-05-25 RX ORDER — PANTOPRAZOLE SODIUM 40 MG/1
40 TABLET, DELAYED RELEASE ORAL
Status: DISCONTINUED | OUTPATIENT
Start: 2017-05-25 | End: 2017-05-28 | Stop reason: HOSPADM

## 2017-05-25 RX ORDER — GUAIFENESIN 600 MG/1
600 TABLET, EXTENDED RELEASE ORAL EVERY 12 HOURS SCHEDULED
Status: DISCONTINUED | OUTPATIENT
Start: 2017-05-25 | End: 2017-05-28 | Stop reason: HOSPADM

## 2017-05-25 RX ORDER — CAPSAICIN 0.75 MG/G
CREAM TOPICAL EVERY 8 HOURS SCHEDULED
Status: DISCONTINUED | OUTPATIENT
Start: 2017-05-25 | End: 2017-05-28 | Stop reason: HOSPADM

## 2017-05-25 RX ORDER — CLONAZEPAM 0.5 MG/1
0.5 TABLET ORAL 2 TIMES DAILY PRN
Status: DISCONTINUED | OUTPATIENT
Start: 2017-05-25 | End: 2017-05-27

## 2017-05-25 RX ORDER — ONDANSETRON 4 MG/1
4 TABLET, FILM COATED ORAL EVERY 6 HOURS PRN
Status: DISCONTINUED | OUTPATIENT
Start: 2017-05-25 | End: 2017-05-28 | Stop reason: HOSPADM

## 2017-05-25 RX ORDER — CLOMIPRAMINE HYDROCHLORIDE 25 MG/1
100 CAPSULE ORAL NIGHTLY
Status: DISCONTINUED | OUTPATIENT
Start: 2017-05-25 | End: 2017-05-28 | Stop reason: HOSPADM

## 2017-05-25 RX ORDER — METHYLPREDNISOLONE SODIUM SUCCINATE 125 MG/2ML
60 INJECTION, POWDER, LYOPHILIZED, FOR SOLUTION INTRAMUSCULAR; INTRAVENOUS EVERY 8 HOURS
Status: DISCONTINUED | OUTPATIENT
Start: 2017-05-25 | End: 2017-05-26

## 2017-05-25 RX ORDER — THEOPHYLLINE 300 MG/1
600 TABLET, EXTENDED RELEASE ORAL EVERY 12 HOURS SCHEDULED
Status: DISCONTINUED | OUTPATIENT
Start: 2017-05-25 | End: 2017-05-28 | Stop reason: HOSPADM

## 2017-05-25 RX ORDER — ATENOLOL 50 MG/1
50 TABLET ORAL DAILY
Status: DISCONTINUED | OUTPATIENT
Start: 2017-05-25 | End: 2017-05-28 | Stop reason: HOSPADM

## 2017-05-25 RX ORDER — HYDROXYZINE HYDROCHLORIDE 25 MG/1
25 TABLET, FILM COATED ORAL 3 TIMES DAILY PRN
Status: DISCONTINUED | OUTPATIENT
Start: 2017-05-25 | End: 2017-05-28 | Stop reason: HOSPADM

## 2017-05-25 RX ORDER — NAPROXEN 500 MG/1
500 TABLET ORAL 2 TIMES DAILY PRN
COMMUNITY
End: 2017-06-09 | Stop reason: HOSPADM

## 2017-05-25 RX ORDER — BENZONATATE 100 MG/1
100 CAPSULE ORAL 3 TIMES DAILY PRN
Status: DISCONTINUED | OUTPATIENT
Start: 2017-05-25 | End: 2017-05-28 | Stop reason: HOSPADM

## 2017-05-25 RX ORDER — PRAVASTATIN SODIUM 40 MG
80 TABLET ORAL NIGHTLY
Status: DISCONTINUED | OUTPATIENT
Start: 2017-05-25 | End: 2017-05-28 | Stop reason: HOSPADM

## 2017-05-25 RX ORDER — GABAPENTIN 300 MG/1
300 CAPSULE ORAL 4 TIMES DAILY
Status: DISCONTINUED | OUTPATIENT
Start: 2017-05-25 | End: 2017-05-27

## 2017-05-25 RX ORDER — SODIUM CHLORIDE 0.9 % (FLUSH) 0.9 %
1-10 SYRINGE (ML) INJECTION AS NEEDED
Status: DISCONTINUED | OUTPATIENT
Start: 2017-05-25 | End: 2017-05-28 | Stop reason: HOSPADM

## 2017-05-25 RX ORDER — BUDESONIDE AND FORMOTEROL FUMARATE DIHYDRATE 160; 4.5 UG/1; UG/1
2 AEROSOL RESPIRATORY (INHALATION)
Status: ON HOLD | COMMUNITY
End: 2017-05-25

## 2017-05-25 RX ORDER — ZIPRASIDONE HYDROCHLORIDE 20 MG/1
80 CAPSULE ORAL 2 TIMES DAILY WITH MEALS
Status: DISCONTINUED | OUTPATIENT
Start: 2017-05-25 | End: 2017-05-28 | Stop reason: HOSPADM

## 2017-05-25 RX ORDER — FLUTICASONE PROPIONATE 50 MCG
2 SPRAY, SUSPENSION (ML) NASAL DAILY
Status: DISCONTINUED | OUTPATIENT
Start: 2017-05-25 | End: 2017-05-28 | Stop reason: HOSPADM

## 2017-05-25 RX ORDER — BUDESONIDE AND FORMOTEROL FUMARATE DIHYDRATE 160; 4.5 UG/1; UG/1
2 AEROSOL RESPIRATORY (INHALATION)
Status: DISCONTINUED | OUTPATIENT
Start: 2017-05-25 | End: 2017-05-28 | Stop reason: HOSPADM

## 2017-05-25 RX ORDER — CYCLOBENZAPRINE HCL 10 MG
10 TABLET ORAL 3 TIMES DAILY PRN
Status: DISCONTINUED | OUTPATIENT
Start: 2017-05-25 | End: 2017-05-28 | Stop reason: HOSPADM

## 2017-05-25 RX ORDER — DULOXETIN HYDROCHLORIDE 60 MG/1
60 CAPSULE, DELAYED RELEASE ORAL EVERY 12 HOURS SCHEDULED
Status: DISCONTINUED | OUTPATIENT
Start: 2017-05-25 | End: 2017-05-28 | Stop reason: HOSPADM

## 2017-05-25 RX ORDER — IPRATROPIUM BROMIDE AND ALBUTEROL SULFATE 2.5; .5 MG/3ML; MG/3ML
3 SOLUTION RESPIRATORY (INHALATION)
Status: DISCONTINUED | OUTPATIENT
Start: 2017-05-25 | End: 2017-05-28 | Stop reason: HOSPADM

## 2017-05-25 RX ORDER — LEVOTHYROXINE SODIUM 0.05 MG/1
50 TABLET ORAL DAILY
Status: DISCONTINUED | OUTPATIENT
Start: 2017-05-25 | End: 2017-05-28 | Stop reason: HOSPADM

## 2017-05-25 RX ORDER — DEXTROSE MONOHYDRATE 25 G/50ML
25 INJECTION, SOLUTION INTRAVENOUS
Status: DISCONTINUED | OUTPATIENT
Start: 2017-05-25 | End: 2017-05-28 | Stop reason: HOSPADM

## 2017-05-25 RX ORDER — ACETAMINOPHEN 325 MG/1
650 TABLET ORAL EVERY 4 HOURS PRN
Status: DISCONTINUED | OUTPATIENT
Start: 2017-05-25 | End: 2017-05-28 | Stop reason: HOSPADM

## 2017-05-25 RX ORDER — DIVALPROEX SODIUM 500 MG/1
1000 TABLET, EXTENDED RELEASE ORAL DAILY
Status: DISCONTINUED | OUTPATIENT
Start: 2017-05-25 | End: 2017-05-28 | Stop reason: HOSPADM

## 2017-05-25 RX ORDER — HEPARIN SODIUM 5000 [USP'U]/ML
5000 INJECTION, SOLUTION INTRAVENOUS; SUBCUTANEOUS EVERY 8 HOURS SCHEDULED
Status: DISCONTINUED | OUTPATIENT
Start: 2017-05-25 | End: 2017-05-28 | Stop reason: HOSPADM

## 2017-05-25 RX ORDER — BENZTROPINE MESYLATE 1 MG/1
1 TABLET ORAL 2 TIMES DAILY PRN
Status: DISCONTINUED | OUTPATIENT
Start: 2017-05-25 | End: 2017-05-28 | Stop reason: HOSPADM

## 2017-05-25 RX ORDER — SODIUM CHLORIDE 9 MG/ML
75 INJECTION, SOLUTION INTRAVENOUS CONTINUOUS
Status: ACTIVE | OUTPATIENT
Start: 2017-05-25 | End: 2017-05-25

## 2017-05-25 RX ORDER — QUETIAPINE FUMARATE 300 MG/1
300 TABLET, FILM COATED ORAL NIGHTLY
COMMUNITY

## 2017-05-25 RX ORDER — NICOTINE POLACRILEX 4 MG
15 LOZENGE BUCCAL
Status: DISCONTINUED | OUTPATIENT
Start: 2017-05-25 | End: 2017-05-28 | Stop reason: HOSPADM

## 2017-05-25 RX ORDER — NICOTINE 21 MG/24HR
1 PATCH, TRANSDERMAL 24 HOURS TRANSDERMAL
Status: DISCONTINUED | OUTPATIENT
Start: 2017-05-25 | End: 2017-05-28 | Stop reason: HOSPADM

## 2017-05-25 RX ADMIN — GABAPENTIN 300 MG: 300 CAPSULE ORAL at 12:06

## 2017-05-25 RX ADMIN — THEOPHYLLINE 600 MG: 300 TABLET, EXTENDED RELEASE ORAL at 08:46

## 2017-05-25 RX ADMIN — GABAPENTIN 300 MG: 300 CAPSULE ORAL at 08:48

## 2017-05-25 RX ADMIN — NICOTINE 1 PATCH: 21 PATCH, EXTENDED RELEASE TRANSDERMAL at 08:46

## 2017-05-25 RX ADMIN — AZITHROMYCIN 500 MG: 500 INJECTION, POWDER, LYOPHILIZED, FOR SOLUTION INTRAVENOUS at 04:46

## 2017-05-25 RX ADMIN — INSULIN LISPRO 2 UNITS: 100 INJECTION, SOLUTION INTRAVENOUS; SUBCUTANEOUS at 21:08

## 2017-05-25 RX ADMIN — BUDESONIDE AND FORMOTEROL FUMARATE DIHYDRATE 2 PUFF: 160; 4.5 AEROSOL RESPIRATORY (INHALATION) at 19:31

## 2017-05-25 RX ADMIN — BENZTROPINE MESYLATE 1 MG: 1 TABLET ORAL at 12:06

## 2017-05-25 RX ADMIN — DOXYCYCLINE 100 MG: 100 INJECTION, POWDER, LYOPHILIZED, FOR SOLUTION INTRAVENOUS at 05:39

## 2017-05-25 RX ADMIN — HEPARIN SODIUM 5000 UNITS: 5000 INJECTION, SOLUTION INTRAVENOUS; SUBCUTANEOUS at 20:57

## 2017-05-25 RX ADMIN — GUAIFENESIN 600 MG: 600 TABLET, EXTENDED RELEASE ORAL at 08:48

## 2017-05-25 RX ADMIN — HEPARIN SODIUM 5000 UNITS: 5000 INJECTION, SOLUTION INTRAVENOUS; SUBCUTANEOUS at 14:28

## 2017-05-25 RX ADMIN — METHYLPREDNISOLONE SODIUM SUCCINATE 60 MG: 125 INJECTION, POWDER, FOR SOLUTION INTRAMUSCULAR; INTRAVENOUS at 14:28

## 2017-05-25 RX ADMIN — HEPARIN SODIUM 5000 UNITS: 5000 INJECTION, SOLUTION INTRAVENOUS; SUBCUTANEOUS at 05:39

## 2017-05-25 RX ADMIN — IPRATROPIUM BROMIDE AND ALBUTEROL SULFATE 3 ML: .5; 3 SOLUTION RESPIRATORY (INHALATION) at 15:45

## 2017-05-25 RX ADMIN — ATENOLOL 50 MG: 50 TABLET ORAL at 08:47

## 2017-05-25 RX ADMIN — DULOXETINE 60 MG: 60 CAPSULE, DELAYED RELEASE ORAL at 08:47

## 2017-05-25 RX ADMIN — METHYLPREDNISOLONE SODIUM SUCCINATE 60 MG: 125 INJECTION, POWDER, FOR SOLUTION INTRAMUSCULAR; INTRAVENOUS at 20:58

## 2017-05-25 RX ADMIN — IPRATROPIUM BROMIDE AND ALBUTEROL SULFATE 3 ML: .5; 3 SOLUTION RESPIRATORY (INHALATION) at 19:31

## 2017-05-25 RX ADMIN — ONDANSETRON 4 MG: 4 TABLET, FILM COATED ORAL at 12:06

## 2017-05-25 RX ADMIN — ZIPRASIDONE HYDROCHLORIDE 80 MG: 20 CAPSULE ORAL at 08:46

## 2017-05-25 RX ADMIN — MICONAZOLE NITRATE 1 APPLICATION: 2 CREAM TOPICAL at 08:46

## 2017-05-25 RX ADMIN — ZIPRASIDONE HYDROCHLORIDE 80 MG: 20 CAPSULE ORAL at 17:28

## 2017-05-25 RX ADMIN — IPRATROPIUM BROMIDE AND ALBUTEROL SULFATE 3 ML: .5; 3 SOLUTION RESPIRATORY (INHALATION) at 03:49

## 2017-05-25 RX ADMIN — GABAPENTIN 300 MG: 300 CAPSULE ORAL at 20:59

## 2017-05-25 RX ADMIN — INSULIN LISPRO 2 UNITS: 100 INJECTION, SOLUTION INTRAVENOUS; SUBCUTANEOUS at 12:07

## 2017-05-25 RX ADMIN — THEOPHYLLINE 600 MG: 300 TABLET, EXTENDED RELEASE ORAL at 20:59

## 2017-05-25 RX ADMIN — DOXYCYCLINE 100 MG: 100 INJECTION, POWDER, LYOPHILIZED, FOR SOLUTION INTRAVENOUS at 17:27

## 2017-05-25 RX ADMIN — INSULIN LISPRO 2 UNITS: 100 INJECTION, SOLUTION INTRAVENOUS; SUBCUTANEOUS at 03:00

## 2017-05-25 RX ADMIN — FLUTICASONE PROPIONATE 2 SPRAY: 50 SPRAY, METERED NASAL at 08:46

## 2017-05-25 RX ADMIN — QUETIAPINE 300 MG: 200 TABLET, EXTENDED RELEASE ORAL at 21:26

## 2017-05-25 RX ADMIN — GUAIFENESIN 600 MG: 600 TABLET, EXTENDED RELEASE ORAL at 21:04

## 2017-05-25 RX ADMIN — PANTOPRAZOLE SODIUM 40 MG: 40 TABLET, DELAYED RELEASE ORAL at 05:38

## 2017-05-25 RX ADMIN — GABAPENTIN 300 MG: 300 CAPSULE ORAL at 17:28

## 2017-05-25 RX ADMIN — SODIUM CHLORIDE 75 ML/HR: 9 INJECTION, SOLUTION INTRAVENOUS at 04:47

## 2017-05-25 RX ADMIN — DIVALPROEX SODIUM 1000 MG: 500 TABLET, FILM COATED, EXTENDED RELEASE ORAL at 08:47

## 2017-05-25 RX ADMIN — Medication: at 14:29

## 2017-05-25 RX ADMIN — MONTELUKAST SODIUM 10 MG: 10 TABLET, FILM COATED ORAL at 21:09

## 2017-05-25 RX ADMIN — LEVOTHYROXINE SODIUM 50 MCG: 50 TABLET ORAL at 05:38

## 2017-05-25 RX ADMIN — DULOXETINE 60 MG: 60 CAPSULE, DELAYED RELEASE ORAL at 21:04

## 2017-05-25 RX ADMIN — CETIRIZINE HYDROCHLORIDE 10 MG: 10 TABLET, FILM COATED ORAL at 08:48

## 2017-05-25 RX ADMIN — IPRATROPIUM BROMIDE AND ALBUTEROL SULFATE 3 ML: .5; 3 SOLUTION RESPIRATORY (INHALATION) at 12:26

## 2017-05-25 RX ADMIN — Medication: at 21:01

## 2017-05-25 RX ADMIN — CLOMIPRAMINE HYDROCHLORIDE 100 MG: 25 CAPSULE ORAL at 20:58

## 2017-05-25 RX ADMIN — CLONAZEPAM 0.5 MG: 0.5 TABLET ORAL at 15:37

## 2017-05-25 RX ADMIN — BUDESONIDE AND FORMOTEROL FUMARATE DIHYDRATE 2 PUFF: 160; 4.5 AEROSOL RESPIRATORY (INHALATION) at 07:30

## 2017-05-25 RX ADMIN — IPRATROPIUM BROMIDE AND ALBUTEROL SULFATE 3 ML: .5; 3 SOLUTION RESPIRATORY (INHALATION) at 07:22

## 2017-05-25 RX ADMIN — MICONAZOLE NITRATE: 2 CREAM TOPICAL at 20:56

## 2017-05-25 RX ADMIN — PRAVASTATIN SODIUM 80 MG: 40 TABLET ORAL at 20:59

## 2017-05-25 RX ADMIN — METHYLPREDNISOLONE SODIUM SUCCINATE 60 MG: 125 INJECTION, POWDER, FOR SOLUTION INTRAMUSCULAR; INTRAVENOUS at 05:38

## 2017-05-26 LAB
ANION GAP SERPL CALCULATED.3IONS-SCNC: 6 MMOL/L (ref 3–11)
BUN BLD-MCNC: 20 MG/DL (ref 9–23)
BUN/CREAT SERPL: 20 (ref 7–25)
CALCIUM SPEC-SCNC: 10.2 MG/DL (ref 8.7–10.4)
CHLORIDE SERPL-SCNC: 95 MMOL/L (ref 99–109)
CO2 SERPL-SCNC: 40 MMOL/L (ref 20–31)
CREAT BLD-MCNC: 1 MG/DL (ref 0.6–1.3)
D-LACTATE SERPL-SCNC: 2.5 MMOL/L (ref 0.5–2)
DEPRECATED RDW RBC AUTO: 57 FL (ref 37–54)
ERYTHROCYTE [DISTWIDTH] IN BLOOD BY AUTOMATED COUNT: 15.7 % (ref 11.3–14.5)
GFR SERPL CREATININE-BSD FRML MDRD: 58 ML/MIN/1.73
GLUCOSE BLD-MCNC: 162 MG/DL (ref 70–100)
GLUCOSE BLDC GLUCOMTR-MCNC: 108 MG/DL (ref 70–130)
GLUCOSE BLDC GLUCOMTR-MCNC: 129 MG/DL (ref 70–130)
GLUCOSE BLDC GLUCOMTR-MCNC: 142 MG/DL (ref 70–130)
GLUCOSE BLDC GLUCOMTR-MCNC: 88 MG/DL (ref 70–130)
HCT VFR BLD AUTO: 41 % (ref 34.5–44)
HGB BLD-MCNC: 12.4 G/DL (ref 11.5–15.5)
MCH RBC QN AUTO: 30 PG (ref 27–31)
MCHC RBC AUTO-ENTMCNC: 30.2 G/DL (ref 32–36)
MCV RBC AUTO: 99 FL (ref 80–99)
PLATELET # BLD AUTO: 272 10*3/MM3 (ref 150–450)
PMV BLD AUTO: 9.3 FL (ref 6–12)
POTASSIUM BLD-SCNC: 4.6 MMOL/L (ref 3.5–5.5)
RBC # BLD AUTO: 4.14 10*6/MM3 (ref 3.89–5.14)
SODIUM BLD-SCNC: 141 MMOL/L (ref 132–146)
WBC NRBC COR # BLD: 10.97 10*3/MM3 (ref 3.5–10.8)

## 2017-05-26 PROCEDURE — 94799 UNLISTED PULMONARY SVC/PX: CPT

## 2017-05-26 PROCEDURE — 96376 TX/PRO/DX INJ SAME DRUG ADON: CPT

## 2017-05-26 PROCEDURE — 99226 PR SBSQ OBSERVATION CARE/DAY 35 MINUTES: CPT | Performed by: INTERNAL MEDICINE

## 2017-05-26 PROCEDURE — 82962 GLUCOSE BLOOD TEST: CPT

## 2017-05-26 PROCEDURE — 87186 SC STD MICRODIL/AGAR DIL: CPT | Performed by: NURSE PRACTITIONER

## 2017-05-26 PROCEDURE — 94640 AIRWAY INHALATION TREATMENT: CPT

## 2017-05-26 PROCEDURE — 87077 CULTURE AEROBIC IDENTIFY: CPT | Performed by: NURSE PRACTITIONER

## 2017-05-26 PROCEDURE — 80048 BASIC METABOLIC PNL TOTAL CA: CPT | Performed by: HOSPITALIST

## 2017-05-26 PROCEDURE — 87070 CULTURE OTHR SPECIMN AEROBIC: CPT | Performed by: NURSE PRACTITIONER

## 2017-05-26 PROCEDURE — G0378 HOSPITAL OBSERVATION PER HR: HCPCS

## 2017-05-26 PROCEDURE — 87205 SMEAR GRAM STAIN: CPT | Performed by: NURSE PRACTITIONER

## 2017-05-26 PROCEDURE — 25010000002 HEPARIN (PORCINE) PER 1000 UNITS: Performed by: NURSE PRACTITIONER

## 2017-05-26 PROCEDURE — 25010000002 METHYLPREDNISOLONE PER 125 MG: Performed by: INTERNAL MEDICINE

## 2017-05-26 PROCEDURE — C1751 CATH, INF, PER/CENT/MIDLINE: HCPCS

## 2017-05-26 PROCEDURE — C1894 INTRO/SHEATH, NON-LASER: HCPCS

## 2017-05-26 PROCEDURE — 85027 COMPLETE CBC AUTOMATED: CPT | Performed by: HOSPITALIST

## 2017-05-26 PROCEDURE — 83605 ASSAY OF LACTIC ACID: CPT | Performed by: HOSPITALIST

## 2017-05-26 PROCEDURE — 96372 THER/PROPH/DIAG INJ SC/IM: CPT

## 2017-05-26 RX ORDER — METHYLPREDNISOLONE SODIUM SUCCINATE 125 MG/2ML
60 INJECTION, POWDER, LYOPHILIZED, FOR SOLUTION INTRAMUSCULAR; INTRAVENOUS EVERY 12 HOURS
Status: DISCONTINUED | OUTPATIENT
Start: 2017-05-26 | End: 2017-05-27

## 2017-05-26 RX ORDER — SODIUM CHLORIDE 0.9 % (FLUSH) 0.9 %
10-20 SYRINGE (ML) INJECTION AS NEEDED
Status: DISCONTINUED | OUTPATIENT
Start: 2017-05-26 | End: 2017-05-28 | Stop reason: HOSPADM

## 2017-05-26 RX ORDER — DOXYCYCLINE HYCLATE 100 MG/1
100 CAPSULE ORAL EVERY 12 HOURS SCHEDULED
Status: DISCONTINUED | OUTPATIENT
Start: 2017-05-26 | End: 2017-05-28 | Stop reason: HOSPADM

## 2017-05-26 RX ORDER — FUROSEMIDE 40 MG/1
40 TABLET ORAL DAILY
Status: DISCONTINUED | OUTPATIENT
Start: 2017-05-26 | End: 2017-05-28 | Stop reason: HOSPADM

## 2017-05-26 RX ADMIN — CETIRIZINE HYDROCHLORIDE 10 MG: 10 TABLET, FILM COATED ORAL at 09:39

## 2017-05-26 RX ADMIN — MICONAZOLE NITRATE: 2 CREAM TOPICAL at 21:29

## 2017-05-26 RX ADMIN — QUETIAPINE 300 MG: 200 TABLET, EXTENDED RELEASE ORAL at 21:31

## 2017-05-26 RX ADMIN — MICONAZOLE NITRATE: 2 CREAM TOPICAL at 09:40

## 2017-05-26 RX ADMIN — GUAIFENESIN 600 MG: 600 TABLET, EXTENDED RELEASE ORAL at 21:31

## 2017-05-26 RX ADMIN — NICOTINE 1 PATCH: 21 PATCH, EXTENDED RELEASE TRANSDERMAL at 09:38

## 2017-05-26 RX ADMIN — METHYLPREDNISOLONE SODIUM SUCCINATE 60 MG: 125 INJECTION, POWDER, FOR SOLUTION INTRAMUSCULAR; INTRAVENOUS at 21:29

## 2017-05-26 RX ADMIN — DULOXETINE 60 MG: 60 CAPSULE, DELAYED RELEASE ORAL at 21:31

## 2017-05-26 RX ADMIN — FUROSEMIDE 40 MG: 40 TABLET ORAL at 14:22

## 2017-05-26 RX ADMIN — DOXYCYCLINE HYCLATE 100 MG: 100 CAPSULE ORAL at 14:22

## 2017-05-26 RX ADMIN — IPRATROPIUM BROMIDE AND ALBUTEROL SULFATE 3 ML: .5; 3 SOLUTION RESPIRATORY (INHALATION) at 03:38

## 2017-05-26 RX ADMIN — DULOXETINE 60 MG: 60 CAPSULE, DELAYED RELEASE ORAL at 09:39

## 2017-05-26 RX ADMIN — CLONAZEPAM 0.5 MG: 0.5 TABLET ORAL at 17:24

## 2017-05-26 RX ADMIN — BENZTROPINE MESYLATE 1 MG: 1 TABLET ORAL at 22:09

## 2017-05-26 RX ADMIN — Medication: at 12:07

## 2017-05-26 RX ADMIN — DOXYCYCLINE HYCLATE 100 MG: 100 CAPSULE ORAL at 21:31

## 2017-05-26 RX ADMIN — ZIPRASIDONE HYDROCHLORIDE 80 MG: 20 CAPSULE ORAL at 09:38

## 2017-05-26 RX ADMIN — THEOPHYLLINE 600 MG: 300 TABLET, EXTENDED RELEASE ORAL at 21:31

## 2017-05-26 RX ADMIN — ATENOLOL 50 MG: 50 TABLET ORAL at 09:39

## 2017-05-26 RX ADMIN — PANTOPRAZOLE SODIUM 40 MG: 40 TABLET, DELAYED RELEASE ORAL at 06:12

## 2017-05-26 RX ADMIN — GABAPENTIN 300 MG: 300 CAPSULE ORAL at 21:31

## 2017-05-26 RX ADMIN — HEPARIN SODIUM 5000 UNITS: 5000 INJECTION, SOLUTION INTRAVENOUS; SUBCUTANEOUS at 21:30

## 2017-05-26 RX ADMIN — PRAVASTATIN SODIUM 80 MG: 40 TABLET ORAL at 21:31

## 2017-05-26 RX ADMIN — THEOPHYLLINE 600 MG: 300 TABLET, EXTENDED RELEASE ORAL at 09:39

## 2017-05-26 RX ADMIN — HEPARIN SODIUM 5000 UNITS: 5000 INJECTION, SOLUTION INTRAVENOUS; SUBCUTANEOUS at 06:12

## 2017-05-26 RX ADMIN — Medication: at 06:11

## 2017-05-26 RX ADMIN — Medication 1 APPLICATION: at 21:29

## 2017-05-26 RX ADMIN — MONTELUKAST SODIUM 10 MG: 10 TABLET, FILM COATED ORAL at 21:31

## 2017-05-26 RX ADMIN — BUDESONIDE AND FORMOTEROL FUMARATE DIHYDRATE 2 PUFF: 160; 4.5 AEROSOL RESPIRATORY (INHALATION) at 06:48

## 2017-05-26 RX ADMIN — IPRATROPIUM BROMIDE AND ALBUTEROL SULFATE 3 ML: .5; 3 SOLUTION RESPIRATORY (INHALATION) at 06:48

## 2017-05-26 RX ADMIN — BUDESONIDE AND FORMOTEROL FUMARATE DIHYDRATE 2 PUFF: 160; 4.5 AEROSOL RESPIRATORY (INHALATION) at 19:13

## 2017-05-26 RX ADMIN — IPRATROPIUM BROMIDE AND ALBUTEROL SULFATE 3 ML: .5; 3 SOLUTION RESPIRATORY (INHALATION) at 19:12

## 2017-05-26 RX ADMIN — FLUTICASONE PROPIONATE 2 SPRAY: 50 SPRAY, METERED NASAL at 09:39

## 2017-05-26 RX ADMIN — DIVALPROEX SODIUM 1000 MG: 500 TABLET, FILM COATED, EXTENDED RELEASE ORAL at 09:39

## 2017-05-26 RX ADMIN — ACETAMINOPHEN 650 MG: 325 TABLET, FILM COATED ORAL at 18:46

## 2017-05-26 RX ADMIN — LEVOTHYROXINE SODIUM 50 MCG: 50 TABLET ORAL at 06:12

## 2017-05-26 RX ADMIN — CLONAZEPAM 0.5 MG: 0.5 TABLET ORAL at 04:29

## 2017-05-26 RX ADMIN — CLOMIPRAMINE HYDROCHLORIDE 100 MG: 25 CAPSULE ORAL at 21:30

## 2017-05-26 RX ADMIN — GUAIFENESIN 600 MG: 600 TABLET, EXTENDED RELEASE ORAL at 09:39

## 2017-05-26 RX ADMIN — GABAPENTIN 300 MG: 300 CAPSULE ORAL at 09:39

## 2017-05-26 RX ADMIN — ZIPRASIDONE HYDROCHLORIDE 80 MG: 20 CAPSULE ORAL at 17:24

## 2017-05-26 RX ADMIN — HEPARIN SODIUM 5000 UNITS: 5000 INJECTION, SOLUTION INTRAVENOUS; SUBCUTANEOUS at 12:03

## 2017-05-26 RX ADMIN — GABAPENTIN 300 MG: 300 CAPSULE ORAL at 12:02

## 2017-05-26 RX ADMIN — IPRATROPIUM BROMIDE AND ALBUTEROL SULFATE 3 ML: .5; 3 SOLUTION RESPIRATORY (INHALATION) at 15:29

## 2017-05-26 RX ADMIN — CYCLOBENZAPRINE HYDROCHLORIDE 10 MG: 10 TABLET, FILM COATED ORAL at 22:09

## 2017-05-26 RX ADMIN — GABAPENTIN 300 MG: 300 CAPSULE ORAL at 17:24

## 2017-05-27 LAB
ANION GAP SERPL CALCULATED.3IONS-SCNC: 6 MMOL/L (ref 3–11)
BUN BLD-MCNC: 18 MG/DL (ref 9–23)
BUN/CREAT SERPL: 20 (ref 7–25)
CALCIUM SPEC-SCNC: 9.7 MG/DL (ref 8.7–10.4)
CHLORIDE SERPL-SCNC: 91 MMOL/L (ref 99–109)
CO2 SERPL-SCNC: 39 MMOL/L (ref 20–31)
CREAT BLD-MCNC: 0.9 MG/DL (ref 0.6–1.3)
GFR SERPL CREATININE-BSD FRML MDRD: 65 ML/MIN/1.73
GLUCOSE BLD-MCNC: 123 MG/DL (ref 70–100)
GLUCOSE BLDC GLUCOMTR-MCNC: 118 MG/DL (ref 70–130)
GLUCOSE BLDC GLUCOMTR-MCNC: 149 MG/DL (ref 70–130)
GLUCOSE BLDC GLUCOMTR-MCNC: 156 MG/DL (ref 70–130)
GLUCOSE BLDC GLUCOMTR-MCNC: 168 MG/DL (ref 70–130)
POTASSIUM BLD-SCNC: 4.4 MMOL/L (ref 3.5–5.5)
SODIUM BLD-SCNC: 136 MMOL/L (ref 132–146)

## 2017-05-27 PROCEDURE — 25010000002 HEPARIN (PORCINE) PER 1000 UNITS: Performed by: NURSE PRACTITIONER

## 2017-05-27 PROCEDURE — 96376 TX/PRO/DX INJ SAME DRUG ADON: CPT

## 2017-05-27 PROCEDURE — G0378 HOSPITAL OBSERVATION PER HR: HCPCS

## 2017-05-27 PROCEDURE — 99226 PR SBSQ OBSERVATION CARE/DAY 35 MINUTES: CPT | Performed by: INTERNAL MEDICINE

## 2017-05-27 PROCEDURE — 94799 UNLISTED PULMONARY SVC/PX: CPT

## 2017-05-27 PROCEDURE — 96372 THER/PROPH/DIAG INJ SC/IM: CPT

## 2017-05-27 PROCEDURE — 80048 BASIC METABOLIC PNL TOTAL CA: CPT | Performed by: INTERNAL MEDICINE

## 2017-05-27 PROCEDURE — 82962 GLUCOSE BLOOD TEST: CPT

## 2017-05-27 PROCEDURE — 25010000002 METHYLPREDNISOLONE PER 125 MG: Performed by: INTERNAL MEDICINE

## 2017-05-27 RX ORDER — METHYLPREDNISOLONE SODIUM SUCCINATE 125 MG/2ML
40 INJECTION, POWDER, LYOPHILIZED, FOR SOLUTION INTRAMUSCULAR; INTRAVENOUS EVERY 12 HOURS
Status: DISCONTINUED | OUTPATIENT
Start: 2017-05-27 | End: 2017-05-28 | Stop reason: HOSPADM

## 2017-05-27 RX ORDER — HYDROCODONE BITARTRATE AND ACETAMINOPHEN 5; 325 MG/1; MG/1
1 TABLET ORAL EVERY 6 HOURS PRN
Status: DISCONTINUED | OUTPATIENT
Start: 2017-05-27 | End: 2017-05-27

## 2017-05-27 RX ORDER — GABAPENTIN 300 MG/1
600 CAPSULE ORAL 4 TIMES DAILY
Status: DISCONTINUED | OUTPATIENT
Start: 2017-05-27 | End: 2017-05-28 | Stop reason: HOSPADM

## 2017-05-27 RX ORDER — HYDROCODONE BITARTRATE AND ACETAMINOPHEN 5; 325 MG/1; MG/1
0.5 TABLET ORAL EVERY 6 HOURS PRN
Status: DISCONTINUED | OUTPATIENT
Start: 2017-05-27 | End: 2017-05-28 | Stop reason: HOSPADM

## 2017-05-27 RX ORDER — CLONAZEPAM 0.5 MG/1
0.5 TABLET ORAL EVERY 8 HOURS PRN
Status: DISCONTINUED | OUTPATIENT
Start: 2017-05-27 | End: 2017-05-28 | Stop reason: HOSPADM

## 2017-05-27 RX ADMIN — MICONAZOLE NITRATE: 2 CREAM TOPICAL at 08:59

## 2017-05-27 RX ADMIN — CYCLOBENZAPRINE HYDROCHLORIDE 10 MG: 10 TABLET, FILM COATED ORAL at 21:24

## 2017-05-27 RX ADMIN — METHYLPREDNISOLONE SODIUM SUCCINATE 60 MG: 125 INJECTION, POWDER, FOR SOLUTION INTRAMUSCULAR; INTRAVENOUS at 08:35

## 2017-05-27 RX ADMIN — FUROSEMIDE 40 MG: 40 TABLET ORAL at 08:58

## 2017-05-27 RX ADMIN — CLONAZEPAM 0.5 MG: 0.5 TABLET ORAL at 10:35

## 2017-05-27 RX ADMIN — GABAPENTIN 600 MG: 300 CAPSULE ORAL at 17:06

## 2017-05-27 RX ADMIN — BUDESONIDE AND FORMOTEROL FUMARATE DIHYDRATE 2 PUFF: 160; 4.5 AEROSOL RESPIRATORY (INHALATION) at 19:25

## 2017-05-27 RX ADMIN — HEPARIN SODIUM 5000 UNITS: 5000 INJECTION, SOLUTION INTRAVENOUS; SUBCUTANEOUS at 21:24

## 2017-05-27 RX ADMIN — DULOXETINE 60 MG: 60 CAPSULE, DELAYED RELEASE ORAL at 08:35

## 2017-05-27 RX ADMIN — PANTOPRAZOLE SODIUM 40 MG: 40 TABLET, DELAYED RELEASE ORAL at 04:10

## 2017-05-27 RX ADMIN — DIVALPROEX SODIUM 1000 MG: 500 TABLET, FILM COATED, EXTENDED RELEASE ORAL at 08:35

## 2017-05-27 RX ADMIN — HEPARIN SODIUM 5000 UNITS: 5000 INJECTION, SOLUTION INTRAVENOUS; SUBCUTANEOUS at 04:10

## 2017-05-27 RX ADMIN — ZIPRASIDONE HYDROCHLORIDE 80 MG: 20 CAPSULE ORAL at 08:36

## 2017-05-27 RX ADMIN — HYDROCODONE BITARTRATE AND ACETAMINOPHEN 0.5 TABLET: 5; 325 TABLET ORAL at 17:06

## 2017-05-27 RX ADMIN — ATENOLOL 50 MG: 50 TABLET ORAL at 08:37

## 2017-05-27 RX ADMIN — GUAIFENESIN 600 MG: 600 TABLET, EXTENDED RELEASE ORAL at 21:23

## 2017-05-27 RX ADMIN — INSULIN LISPRO 2 UNITS: 100 INJECTION, SOLUTION INTRAVENOUS; SUBCUTANEOUS at 11:56

## 2017-05-27 RX ADMIN — LEVOTHYROXINE SODIUM 50 MCG: 50 TABLET ORAL at 04:10

## 2017-05-27 RX ADMIN — Medication: at 21:25

## 2017-05-27 RX ADMIN — DOXYCYCLINE HYCLATE 100 MG: 100 CAPSULE ORAL at 08:48

## 2017-05-27 RX ADMIN — IPRATROPIUM BROMIDE AND ALBUTEROL SULFATE 3 ML: .5; 3 SOLUTION RESPIRATORY (INHALATION) at 19:24

## 2017-05-27 RX ADMIN — METHYLPREDNISOLONE SODIUM SUCCINATE 40 MG: 125 INJECTION, POWDER, FOR SOLUTION INTRAMUSCULAR; INTRAVENOUS at 21:23

## 2017-05-27 RX ADMIN — MONTELUKAST SODIUM 10 MG: 10 TABLET, FILM COATED ORAL at 21:24

## 2017-05-27 RX ADMIN — GABAPENTIN 300 MG: 300 CAPSULE ORAL at 08:58

## 2017-05-27 RX ADMIN — NICOTINE 1 PATCH: 21 PATCH, EXTENDED RELEASE TRANSDERMAL at 08:34

## 2017-05-27 RX ADMIN — CETIRIZINE HYDROCHLORIDE 10 MG: 10 TABLET, FILM COATED ORAL at 08:37

## 2017-05-27 RX ADMIN — Medication: at 08:48

## 2017-05-27 RX ADMIN — DOXYCYCLINE HYCLATE 100 MG: 100 CAPSULE ORAL at 21:24

## 2017-05-27 RX ADMIN — ZIPRASIDONE HYDROCHLORIDE 80 MG: 20 CAPSULE ORAL at 17:06

## 2017-05-27 RX ADMIN — DULOXETINE 60 MG: 60 CAPSULE, DELAYED RELEASE ORAL at 21:24

## 2017-05-27 RX ADMIN — QUETIAPINE 300 MG: 200 TABLET, EXTENDED RELEASE ORAL at 21:24

## 2017-05-27 RX ADMIN — CLOMIPRAMINE HYDROCHLORIDE 100 MG: 25 CAPSULE ORAL at 21:24

## 2017-05-27 RX ADMIN — CLONAZEPAM 0.5 MG: 0.5 TABLET ORAL at 21:24

## 2017-05-27 RX ADMIN — THEOPHYLLINE 600 MG: 300 TABLET, EXTENDED RELEASE ORAL at 21:24

## 2017-05-27 RX ADMIN — Medication 5 MG: at 21:24

## 2017-05-27 RX ADMIN — FLUTICASONE PROPIONATE 2 SPRAY: 50 SPRAY, METERED NASAL at 08:57

## 2017-05-27 RX ADMIN — INSULIN LISPRO 2 UNITS: 100 INJECTION, SOLUTION INTRAVENOUS; SUBCUTANEOUS at 08:36

## 2017-05-27 RX ADMIN — ONDANSETRON 4 MG: 4 TABLET, FILM COATED ORAL at 15:07

## 2017-05-27 RX ADMIN — GABAPENTIN 600 MG: 300 CAPSULE ORAL at 21:24

## 2017-05-27 RX ADMIN — Medication: at 13:36

## 2017-05-27 RX ADMIN — THEOPHYLLINE 600 MG: 300 TABLET, EXTENDED RELEASE ORAL at 08:34

## 2017-05-27 RX ADMIN — IPRATROPIUM BROMIDE AND ALBUTEROL SULFATE 3 ML: .5; 3 SOLUTION RESPIRATORY (INHALATION) at 03:25

## 2017-05-27 RX ADMIN — CYCLOBENZAPRINE HYDROCHLORIDE 10 MG: 10 TABLET, FILM COATED ORAL at 10:35

## 2017-05-27 RX ADMIN — MICONAZOLE NITRATE: 2 CREAM TOPICAL at 21:25

## 2017-05-27 RX ADMIN — IPRATROPIUM BROMIDE AND ALBUTEROL SULFATE 3 ML: .5; 3 SOLUTION RESPIRATORY (INHALATION) at 16:07

## 2017-05-27 RX ADMIN — GUAIFENESIN 600 MG: 600 TABLET, EXTENDED RELEASE ORAL at 08:58

## 2017-05-27 RX ADMIN — PRAVASTATIN SODIUM 80 MG: 40 TABLET ORAL at 21:23

## 2017-05-27 RX ADMIN — GABAPENTIN 600 MG: 300 CAPSULE ORAL at 11:56

## 2017-05-27 RX ADMIN — CLONAZEPAM 0.5 MG: 0.5 TABLET ORAL at 04:10

## 2017-05-28 VITALS
HEART RATE: 109 BPM | RESPIRATION RATE: 18 BRPM | WEIGHT: 260 LBS | BODY MASS INDEX: 39.4 KG/M2 | HEIGHT: 68 IN | OXYGEN SATURATION: 90 % | TEMPERATURE: 98.1 F | DIASTOLIC BLOOD PRESSURE: 80 MMHG | SYSTOLIC BLOOD PRESSURE: 124 MMHG

## 2017-05-28 PROBLEM — J96.21 ACUTE ON CHRONIC RESPIRATORY FAILURE WITH HYPOXIA AND HYPERCAPNIA (HCC): Chronic | Status: RESOLVED | Noted: 2017-03-01 | Resolved: 2017-05-28

## 2017-05-28 PROBLEM — J96.22 ACUTE ON CHRONIC RESPIRATORY FAILURE WITH HYPOXIA AND HYPERCAPNIA (HCC): Chronic | Status: RESOLVED | Noted: 2017-03-01 | Resolved: 2017-05-28

## 2017-05-28 LAB
ANION GAP SERPL CALCULATED.3IONS-SCNC: 7 MMOL/L (ref 3–11)
BACTERIA SPEC RESP CULT: ABNORMAL
BACTERIA SPEC RESP CULT: ABNORMAL
BUN BLD-MCNC: 15 MG/DL (ref 9–23)
BUN/CREAT SERPL: 16.7 (ref 7–25)
CALCIUM SPEC-SCNC: 9.9 MG/DL (ref 8.7–10.4)
CHLORIDE SERPL-SCNC: 93 MMOL/L (ref 99–109)
CO2 SERPL-SCNC: 35 MMOL/L (ref 20–31)
CREAT BLD-MCNC: 0.9 MG/DL (ref 0.6–1.3)
GFR SERPL CREATININE-BSD FRML MDRD: 65 ML/MIN/1.73
GLUCOSE BLD-MCNC: 149 MG/DL (ref 70–100)
GLUCOSE BLDC GLUCOMTR-MCNC: 127 MG/DL (ref 70–130)
GLUCOSE BLDC GLUCOMTR-MCNC: 147 MG/DL (ref 70–130)
GRAM STN SPEC: ABNORMAL
POTASSIUM BLD-SCNC: 4.4 MMOL/L (ref 3.5–5.5)
SODIUM BLD-SCNC: 135 MMOL/L (ref 132–146)

## 2017-05-28 PROCEDURE — 94799 UNLISTED PULMONARY SVC/PX: CPT

## 2017-05-28 PROCEDURE — 25010000002 HEPARIN (PORCINE) PER 1000 UNITS: Performed by: NURSE PRACTITIONER

## 2017-05-28 PROCEDURE — 94640 AIRWAY INHALATION TREATMENT: CPT

## 2017-05-28 PROCEDURE — 99217 PR OBSERVATION CARE DISCHARGE MANAGEMENT: CPT | Performed by: NURSE PRACTITIONER

## 2017-05-28 PROCEDURE — 80048 BASIC METABOLIC PNL TOTAL CA: CPT | Performed by: INTERNAL MEDICINE

## 2017-05-28 PROCEDURE — G0378 HOSPITAL OBSERVATION PER HR: HCPCS

## 2017-05-28 PROCEDURE — 82962 GLUCOSE BLOOD TEST: CPT

## 2017-05-28 PROCEDURE — 96376 TX/PRO/DX INJ SAME DRUG ADON: CPT

## 2017-05-28 PROCEDURE — 25010000002 METHYLPREDNISOLONE PER 125 MG: Performed by: INTERNAL MEDICINE

## 2017-05-28 PROCEDURE — 96372 THER/PROPH/DIAG INJ SC/IM: CPT

## 2017-05-28 RX ORDER — CLONAZEPAM 0.5 MG/1
0.5 TABLET ORAL 3 TIMES DAILY PRN
Qty: 15 TABLET | Refills: 0 | Status: ON HOLD | OUTPATIENT
Start: 2017-05-28 | End: 2017-07-07

## 2017-05-28 RX ORDER — METHYLPREDNISOLONE 4 MG/1
TABLET ORAL
Qty: 21 TABLET | Refills: 0 | Status: SHIPPED | OUTPATIENT
Start: 2017-05-28 | End: 2017-06-09 | Stop reason: HOSPADM

## 2017-05-28 RX ORDER — DOXYCYCLINE HYCLATE 100 MG/1
100 CAPSULE ORAL EVERY 12 HOURS SCHEDULED
Qty: 8 CAPSULE | Refills: 0 | Status: SHIPPED | OUTPATIENT
Start: 2017-05-28 | End: 2017-06-01

## 2017-05-28 RX ORDER — BENZONATATE 100 MG/1
100 CAPSULE ORAL 3 TIMES DAILY PRN
Qty: 15 CAPSULE | Refills: 0 | Status: SHIPPED | OUTPATIENT
Start: 2017-05-28 | End: 2017-06-09 | Stop reason: HOSPADM

## 2017-05-28 RX ADMIN — PANTOPRAZOLE SODIUM 40 MG: 40 TABLET, DELAYED RELEASE ORAL at 04:55

## 2017-05-28 RX ADMIN — BENZTROPINE MESYLATE 1 MG: 1 TABLET ORAL at 01:40

## 2017-05-28 RX ADMIN — LEVOTHYROXINE SODIUM 50 MCG: 50 TABLET ORAL at 04:55

## 2017-05-28 RX ADMIN — FUROSEMIDE 40 MG: 40 TABLET ORAL at 09:20

## 2017-05-28 RX ADMIN — METHYLPREDNISOLONE SODIUM SUCCINATE 40 MG: 125 INJECTION, POWDER, FOR SOLUTION INTRAMUSCULAR; INTRAVENOUS at 09:23

## 2017-05-28 RX ADMIN — HEPARIN SODIUM 5000 UNITS: 5000 INJECTION, SOLUTION INTRAVENOUS; SUBCUTANEOUS at 04:55

## 2017-05-28 RX ADMIN — CLONAZEPAM 0.5 MG: 0.5 TABLET ORAL at 05:01

## 2017-05-28 RX ADMIN — CLONAZEPAM 0.5 MG: 0.5 TABLET ORAL at 09:32

## 2017-05-28 RX ADMIN — HYDROCODONE BITARTRATE AND ACETAMINOPHEN 0.5 TABLET: 5; 325 TABLET ORAL at 12:50

## 2017-05-28 RX ADMIN — FLUTICASONE PROPIONATE 2 SPRAY: 50 SPRAY, METERED NASAL at 09:21

## 2017-05-28 RX ADMIN — ZIPRASIDONE HYDROCHLORIDE 80 MG: 20 CAPSULE ORAL at 09:24

## 2017-05-28 RX ADMIN — GABAPENTIN 600 MG: 300 CAPSULE ORAL at 12:50

## 2017-05-28 RX ADMIN — BUDESONIDE AND FORMOTEROL FUMARATE DIHYDRATE 2 PUFF: 160; 4.5 AEROSOL RESPIRATORY (INHALATION) at 10:50

## 2017-05-28 RX ADMIN — DIVALPROEX SODIUM 1000 MG: 500 TABLET, FILM COATED, EXTENDED RELEASE ORAL at 09:25

## 2017-05-28 RX ADMIN — DULOXETINE 60 MG: 60 CAPSULE, DELAYED RELEASE ORAL at 09:20

## 2017-05-28 RX ADMIN — CETIRIZINE HYDROCHLORIDE 10 MG: 10 TABLET, FILM COATED ORAL at 09:20

## 2017-05-28 RX ADMIN — IPRATROPIUM BROMIDE AND ALBUTEROL SULFATE 3 ML: .5; 3 SOLUTION RESPIRATORY (INHALATION) at 10:50

## 2017-05-28 RX ADMIN — ATENOLOL 50 MG: 50 TABLET ORAL at 09:20

## 2017-05-28 RX ADMIN — BENZONATATE 100 MG: 100 CAPSULE ORAL at 04:55

## 2017-05-28 RX ADMIN — GUAIFENESIN 600 MG: 600 TABLET, EXTENDED RELEASE ORAL at 09:22

## 2017-05-28 RX ADMIN — NICOTINE 1 PATCH: 21 PATCH, EXTENDED RELEASE TRANSDERMAL at 09:18

## 2017-05-28 RX ADMIN — THEOPHYLLINE 600 MG: 300 TABLET, EXTENDED RELEASE ORAL at 09:24

## 2017-05-28 RX ADMIN — Medication: at 05:57

## 2017-05-28 RX ADMIN — CYCLOBENZAPRINE HYDROCHLORIDE 10 MG: 10 TABLET, FILM COATED ORAL at 12:56

## 2017-05-28 RX ADMIN — GABAPENTIN 600 MG: 300 CAPSULE ORAL at 09:20

## 2017-05-28 RX ADMIN — MICONAZOLE NITRATE: 2 CREAM TOPICAL at 09:22

## 2017-05-28 RX ADMIN — ERTAPENEM SODIUM 1 G: 1 INJECTION, POWDER, LYOPHILIZED, FOR SOLUTION INTRAMUSCULAR; INTRAVENOUS at 06:42

## 2017-05-28 RX ADMIN — DOXYCYCLINE HYCLATE 100 MG: 100 CAPSULE ORAL at 09:20

## 2017-05-28 RX ADMIN — HYDROCODONE BITARTRATE AND ACETAMINOPHEN 0.5 TABLET: 5; 325 TABLET ORAL at 05:01

## 2017-06-08 ENCOUNTER — APPOINTMENT (OUTPATIENT)
Dept: GENERAL RADIOLOGY | Facility: HOSPITAL | Age: 52
End: 2017-06-08

## 2017-06-08 ENCOUNTER — HOSPITAL ENCOUNTER (OUTPATIENT)
Facility: HOSPITAL | Age: 52
Setting detail: OBSERVATION
Discharge: HOME | End: 2017-06-09
Attending: EMERGENCY MEDICINE | Admitting: INTERNAL MEDICINE

## 2017-06-08 DIAGNOSIS — R40.1 STUPOR: Primary | ICD-10-CM

## 2017-06-08 DIAGNOSIS — R09.02 HYPOXIA: ICD-10-CM

## 2017-06-08 LAB
APTT PPP: 30.3 SECONDS (ref 24–31)
ARTERIAL PATENCY WRIST A: ABNORMAL
ATMOSPHERIC PRESS: ABNORMAL MMHG
BASE EXCESS BLDA CALC-SCNC: 9.8 MMOL/L (ref 0–2)
BASOPHILS # BLD AUTO: 0.01 10*3/MM3 (ref 0–0.2)
BASOPHILS NFR BLD AUTO: 0.2 % (ref 0–1)
BDY SITE: ABNORMAL
BILIRUB UR QL STRIP: NEGATIVE
CLARITY UR: CLEAR
CO2 BLDA-SCNC: 36.8 MMOL/L (ref 22–33)
COHGB MFR BLD: 4.9 % (ref 0–2)
COLOR UR: YELLOW
D-LACTATE SERPL-SCNC: 1 MMOL/L (ref 0.5–2)
DEPRECATED RDW RBC AUTO: 60.4 FL (ref 37–54)
EOSINOPHIL # BLD AUTO: 0.12 10*3/MM3 (ref 0.1–0.3)
EOSINOPHIL NFR BLD AUTO: 2.2 % (ref 0–3)
ERYTHROCYTE [DISTWIDTH] IN BLOOD BY AUTOMATED COUNT: 17 % (ref 11.3–14.5)
GLUCOSE UR STRIP-MCNC: NEGATIVE MG/DL
HCO3 BLDA-SCNC: 35.2 MMOL/L (ref 20–26)
HCT VFR BLD AUTO: 38.8 % (ref 34.5–44)
HCT VFR BLD CALC: 38 %
HGB BLD-MCNC: 12.1 G/DL (ref 11.5–15.5)
HGB BLDA-MCNC: 12.4 G/DL (ref 14–18)
HGB UR QL STRIP.AUTO: NEGATIVE
HOROWITZ INDEX BLD+IHG-RTO: 32 %
IMM GRANULOCYTES # BLD: 0.02 10*3/MM3 (ref 0–0.03)
IMM GRANULOCYTES NFR BLD: 0.4 % (ref 0–0.6)
INR PPP: 1.03
KETONES UR QL STRIP: NEGATIVE
LEUKOCYTE ESTERASE UR QL STRIP.AUTO: NEGATIVE
LYMPHOCYTES # BLD AUTO: 2.02 10*3/MM3 (ref 0.6–4.8)
LYMPHOCYTES NFR BLD AUTO: 36.8 % (ref 24–44)
MCH RBC QN AUTO: 30.3 PG (ref 27–31)
MCHC RBC AUTO-ENTMCNC: 31.2 G/DL (ref 32–36)
MCV RBC AUTO: 97.2 FL (ref 80–99)
METHGB BLD QL: 0.9 % (ref 0–1.5)
MODALITY: ABNORMAL
MONOCYTES # BLD AUTO: 0.43 10*3/MM3 (ref 0–1)
MONOCYTES NFR BLD AUTO: 7.8 % (ref 0–12)
NEUTROPHILS # BLD AUTO: 2.89 10*3/MM3 (ref 1.5–8.3)
NEUTROPHILS NFR BLD AUTO: 52.6 % (ref 41–71)
NITRITE UR QL STRIP: NEGATIVE
OXYHGB MFR BLDV: 90.7 % (ref 94–99)
PCO2 BLDA: 52.6 MM HG (ref 35–45)
PH BLDA: 7.43 PH UNITS (ref 7.35–7.45)
PH UR STRIP.AUTO: 6.5 [PH] (ref 5–8)
PLATELET # BLD AUTO: 177 10*3/MM3 (ref 150–450)
PMV BLD AUTO: 9.9 FL (ref 6–12)
PO2 BLDA: 77.6 MM HG (ref 83–108)
PROCALCITONIN SERPL-MCNC: <0.05 NG/ML
PROT UR QL STRIP: NEGATIVE
PROTHROMBIN TIME: 11.2 SECONDS (ref 9.6–11.5)
RBC # BLD AUTO: 3.99 10*6/MM3 (ref 3.89–5.14)
SAO2 % BLDCOA: 90.7 %
SAO2 % BLDCOA: 90.7 %
SP GR UR STRIP: 1.01 (ref 1–1.03)
UROBILINOGEN UR QL STRIP: NORMAL
WBC NRBC COR # BLD: 5.49 10*3/MM3 (ref 3.5–10.8)

## 2017-06-08 PROCEDURE — 93005 ELECTROCARDIOGRAM TRACING: CPT | Performed by: EMERGENCY MEDICINE

## 2017-06-08 PROCEDURE — 84145 PROCALCITONIN (PCT): CPT | Performed by: EMERGENCY MEDICINE

## 2017-06-08 PROCEDURE — 99284 EMERGENCY DEPT VISIT MOD MDM: CPT

## 2017-06-08 PROCEDURE — 81003 URINALYSIS AUTO W/O SCOPE: CPT | Performed by: EMERGENCY MEDICINE

## 2017-06-08 PROCEDURE — 83880 ASSAY OF NATRIURETIC PEPTIDE: CPT | Performed by: NURSE PRACTITIONER

## 2017-06-08 PROCEDURE — 83605 ASSAY OF LACTIC ACID: CPT | Performed by: EMERGENCY MEDICINE

## 2017-06-08 PROCEDURE — 85610 PROTHROMBIN TIME: CPT | Performed by: EMERGENCY MEDICINE

## 2017-06-08 PROCEDURE — 80198 ASSAY OF THEOPHYLLINE: CPT | Performed by: EMERGENCY MEDICINE

## 2017-06-08 PROCEDURE — 80050 GENERAL HEALTH PANEL: CPT | Performed by: EMERGENCY MEDICINE

## 2017-06-08 PROCEDURE — 85730 THROMBOPLASTIN TIME PARTIAL: CPT | Performed by: EMERGENCY MEDICINE

## 2017-06-08 PROCEDURE — 96360 HYDRATION IV INFUSION INIT: CPT

## 2017-06-08 PROCEDURE — 80164 ASSAY DIPROPYLACETIC ACD TOT: CPT | Performed by: EMERGENCY MEDICINE

## 2017-06-08 PROCEDURE — 82805 BLOOD GASES W/O2 SATURATION: CPT | Performed by: EMERGENCY MEDICINE

## 2017-06-08 PROCEDURE — 36600 WITHDRAWAL OF ARTERIAL BLOOD: CPT | Performed by: EMERGENCY MEDICINE

## 2017-06-08 PROCEDURE — 82140 ASSAY OF AMMONIA: CPT | Performed by: EMERGENCY MEDICINE

## 2017-06-08 PROCEDURE — 71010 HC CHEST PA OR AP: CPT

## 2017-06-08 RX ADMIN — SODIUM CHLORIDE 1000 ML: 9 INJECTION, SOLUTION INTRAVENOUS at 23:14

## 2017-06-09 ENCOUNTER — APPOINTMENT (OUTPATIENT)
Dept: CT IMAGING | Facility: HOSPITAL | Age: 52
End: 2017-06-09

## 2017-06-09 VITALS
HEART RATE: 86 BPM | TEMPERATURE: 97.6 F | SYSTOLIC BLOOD PRESSURE: 112 MMHG | RESPIRATION RATE: 20 BRPM | HEIGHT: 67 IN | DIASTOLIC BLOOD PRESSURE: 82 MMHG | WEIGHT: 249.6 LBS | BODY MASS INDEX: 39.18 KG/M2 | OXYGEN SATURATION: 97 %

## 2017-06-09 PROBLEM — R41.82 ALTERED MENTAL STATUS: Status: ACTIVE | Noted: 2017-06-09

## 2017-06-09 PROBLEM — J96.12 CHRONIC RESPIRATORY FAILURE WITH HYPOXIA AND HYPERCAPNIA (HCC): Chronic | Status: ACTIVE | Noted: 2017-03-01

## 2017-06-09 PROBLEM — J44.9 COPD (CHRONIC OBSTRUCTIVE PULMONARY DISEASE) (HCC): Chronic | Status: ACTIVE | Noted: 2017-03-01

## 2017-06-09 PROBLEM — J96.11 CHRONIC RESPIRATORY FAILURE WITH HYPOXIA AND HYPERCAPNIA (HCC): Chronic | Status: ACTIVE | Noted: 2017-03-01

## 2017-06-09 PROBLEM — E11.9 DIABETES MELLITUS, TYPE II (HCC): Chronic | Status: ACTIVE | Noted: 2017-06-09

## 2017-06-09 PROBLEM — N28.9 RENAL INSUFFICIENCY: Chronic | Status: ACTIVE | Noted: 2017-06-09

## 2017-06-09 PROBLEM — R73.02 IGT (IMPAIRED GLUCOSE TOLERANCE): Chronic | Status: ACTIVE | Noted: 2017-06-09

## 2017-06-09 PROBLEM — R40.1 STUPOR: Status: ACTIVE | Noted: 2017-06-09

## 2017-06-09 LAB
ALBUMIN SERPL-MCNC: 3.7 G/DL (ref 3.2–4.8)
ALBUMIN/GLOB SERPL: 1.6 G/DL (ref 1.5–2.5)
ALP SERPL-CCNC: 66 U/L (ref 25–100)
ALT SERPL W P-5'-P-CCNC: 14 U/L (ref 7–40)
AMMONIA BLD-SCNC: 32 UMOL/L (ref 19–60)
AMPHET+METHAMPHET UR QL: NEGATIVE
AMPHETAMINES UR QL: NEGATIVE
ANION GAP SERPL CALCULATED.3IONS-SCNC: 6 MMOL/L (ref 3–11)
AST SERPL-CCNC: 10 U/L (ref 0–33)
BARBITURATES UR QL SCN: NEGATIVE
BENZODIAZ UR QL SCN: NEGATIVE
BILIRUB SERPL-MCNC: 0.3 MG/DL (ref 0.3–1.2)
BNP SERPL-MCNC: 7 PG/ML (ref 0–100)
BUN BLD-MCNC: 9 MG/DL (ref 9–23)
BUN/CREAT SERPL: 9 (ref 7–25)
BUPRENORPHINE SERPL-MCNC: NEGATIVE NG/ML
CALCIUM SPEC-SCNC: 9.6 MG/DL (ref 8.7–10.4)
CANNABINOIDS SERPL QL: NEGATIVE
CHLORIDE SERPL-SCNC: 98 MMOL/L (ref 99–109)
CO2 SERPL-SCNC: 36 MMOL/L (ref 20–31)
COCAINE UR QL: NEGATIVE
CREAT BLD-MCNC: 1 MG/DL (ref 0.6–1.3)
ETHANOL BLD-MCNC: <10 MG/DL (ref 0–10)
GFR SERPL CREATININE-BSD FRML MDRD: 58 ML/MIN/1.73
GLOBULIN UR ELPH-MCNC: 2.3 GM/DL
GLUCOSE BLD-MCNC: 108 MG/DL (ref 70–100)
GLUCOSE BLDC GLUCOMTR-MCNC: 208 MG/DL (ref 70–130)
MAGNESIUM SERPL-MCNC: 2.1 MG/DL (ref 1.3–2.7)
METHADONE UR QL SCN: NEGATIVE
OPIATES UR QL: NEGATIVE
OXYCODONE UR QL SCN: NEGATIVE
PCP UR QL SCN: NEGATIVE
POTASSIUM BLD-SCNC: 3.1 MMOL/L (ref 3.5–5.5)
PROPOXYPH UR QL: NEGATIVE
PROT SERPL-MCNC: 6 G/DL (ref 5.7–8.2)
SODIUM BLD-SCNC: 140 MMOL/L (ref 132–146)
THEOPHYLLINE SERPL-MCNC: 13 MCG/ML (ref 10–20)
TRICYCLICS UR QL SCN: POSITIVE
TSH SERPL DL<=0.05 MIU/L-ACNC: 1.03 MIU/ML (ref 0.35–5.35)
VALPROATE SERPL-MCNC: 83 MCG/ML (ref 50–150)

## 2017-06-09 PROCEDURE — 51701 INSERT BLADDER CATHETER: CPT

## 2017-06-09 PROCEDURE — 94640 AIRWAY INHALATION TREATMENT: CPT

## 2017-06-09 PROCEDURE — 83735 ASSAY OF MAGNESIUM: CPT | Performed by: NURSE PRACTITIONER

## 2017-06-09 PROCEDURE — 99220 PR INITIAL OBSERVATION CARE/DAY 70 MINUTES: CPT | Performed by: INTERNAL MEDICINE

## 2017-06-09 PROCEDURE — 25010000002 HEPARIN (PORCINE) PER 1000 UNITS: Performed by: NURSE PRACTITIONER

## 2017-06-09 PROCEDURE — G0378 HOSPITAL OBSERVATION PER HR: HCPCS

## 2017-06-09 PROCEDURE — 82962 GLUCOSE BLOOD TEST: CPT

## 2017-06-09 PROCEDURE — 51798 US URINE CAPACITY MEASURE: CPT

## 2017-06-09 PROCEDURE — 63710000001 PREDNISONE PER 1 MG: Performed by: NURSE PRACTITIONER

## 2017-06-09 PROCEDURE — 80306 DRUG TEST PRSMV INSTRMNT: CPT | Performed by: NURSE PRACTITIONER

## 2017-06-09 PROCEDURE — 96361 HYDRATE IV INFUSION ADD-ON: CPT

## 2017-06-09 PROCEDURE — 96372 THER/PROPH/DIAG INJ SC/IM: CPT

## 2017-06-09 PROCEDURE — 94799 UNLISTED PULMONARY SVC/PX: CPT

## 2017-06-09 PROCEDURE — 94760 N-INVAS EAR/PLS OXIMETRY 1: CPT

## 2017-06-09 PROCEDURE — 80307 DRUG TEST PRSMV CHEM ANLYZR: CPT | Performed by: NURSE PRACTITIONER

## 2017-06-09 PROCEDURE — 70450 CT HEAD/BRAIN W/O DYE: CPT

## 2017-06-09 RX ORDER — ALBUTEROL SULFATE 90 UG/1
2 AEROSOL, METERED RESPIRATORY (INHALATION) EVERY 4 HOURS PRN
Qty: 1 INHALER | Refills: 11 | Status: SHIPPED | OUTPATIENT
Start: 2017-06-09 | End: 2017-06-09

## 2017-06-09 RX ORDER — CAPSAICIN 0.75 MG/G
CREAM TOPICAL EVERY 8 HOURS SCHEDULED
Status: DISCONTINUED | OUTPATIENT
Start: 2017-06-09 | End: 2017-06-09 | Stop reason: CLARIF

## 2017-06-09 RX ORDER — POTASSIUM CHLORIDE 750 MG/1
40 CAPSULE, EXTENDED RELEASE ORAL AS NEEDED
Status: DISCONTINUED | OUTPATIENT
Start: 2017-06-09 | End: 2017-06-09 | Stop reason: HOSPADM

## 2017-06-09 RX ORDER — NICOTINE 21 MG/24HR
1 PATCH, TRANSDERMAL 24 HOURS TRANSDERMAL
Status: DISCONTINUED | OUTPATIENT
Start: 2017-06-09 | End: 2017-06-09 | Stop reason: HOSPADM

## 2017-06-09 RX ORDER — MONTELUKAST SODIUM 10 MG/1
10 TABLET ORAL NIGHTLY
Status: DISCONTINUED | OUTPATIENT
Start: 2017-06-09 | End: 2017-06-09 | Stop reason: HOSPADM

## 2017-06-09 RX ORDER — THEOPHYLLINE 300 MG/1
600 TABLET, EXTENDED RELEASE ORAL EVERY 12 HOURS SCHEDULED
Status: DISCONTINUED | OUTPATIENT
Start: 2017-06-09 | End: 2017-06-09 | Stop reason: HOSPADM

## 2017-06-09 RX ORDER — ATENOLOL 50 MG/1
50 TABLET ORAL DAILY
Qty: 30 TABLET | Refills: 11 | Status: SHIPPED | OUTPATIENT
Start: 2017-06-09 | End: 2017-06-09

## 2017-06-09 RX ORDER — PREDNISONE 20 MG/1
40 TABLET ORAL
Status: DISCONTINUED | OUTPATIENT
Start: 2017-06-09 | End: 2017-06-09 | Stop reason: HOSPADM

## 2017-06-09 RX ORDER — CETIRIZINE HYDROCHLORIDE 10 MG/1
10 TABLET ORAL NIGHTLY
Status: DISCONTINUED | OUTPATIENT
Start: 2017-06-09 | End: 2017-06-09 | Stop reason: HOSPADM

## 2017-06-09 RX ORDER — DIVALPROEX SODIUM 500 MG/1
1000 TABLET, EXTENDED RELEASE ORAL DAILY
Status: DISCONTINUED | OUTPATIENT
Start: 2017-06-09 | End: 2017-06-09 | Stop reason: HOSPADM

## 2017-06-09 RX ORDER — ATENOLOL 50 MG/1
50 TABLET ORAL DAILY
Qty: 30 TABLET | Refills: 11 | Status: SHIPPED | OUTPATIENT
Start: 2017-06-09 | End: 2017-06-20 | Stop reason: HOSPADM

## 2017-06-09 RX ORDER — ARFORMOTEROL TARTRATE 15 UG/2ML
15 SOLUTION RESPIRATORY (INHALATION)
Status: DISCONTINUED | OUTPATIENT
Start: 2017-06-09 | End: 2017-06-09 | Stop reason: HOSPADM

## 2017-06-09 RX ORDER — LEVOTHYROXINE SODIUM 0.05 MG/1
50 TABLET ORAL DAILY
Status: DISCONTINUED | OUTPATIENT
Start: 2017-06-09 | End: 2017-06-09 | Stop reason: HOSPADM

## 2017-06-09 RX ORDER — ATORVASTATIN CALCIUM 20 MG/1
20 TABLET, FILM COATED ORAL NIGHTLY
Status: DISCONTINUED | OUTPATIENT
Start: 2017-06-09 | End: 2017-06-09 | Stop reason: HOSPADM

## 2017-06-09 RX ORDER — SODIUM CHLORIDE 0.9 % (FLUSH) 0.9 %
1-10 SYRINGE (ML) INJECTION AS NEEDED
Status: DISCONTINUED | OUTPATIENT
Start: 2017-06-09 | End: 2017-06-09 | Stop reason: HOSPADM

## 2017-06-09 RX ORDER — FUROSEMIDE 40 MG/1
40 TABLET ORAL DAILY
Qty: 30 TABLET | Refills: 3 | Status: SHIPPED | OUTPATIENT
Start: 2017-06-09 | End: 2021-11-12 | Stop reason: SDUPTHER

## 2017-06-09 RX ORDER — SODIUM CHLORIDE 9 MG/ML
75 INJECTION, SOLUTION INTRAVENOUS CONTINUOUS
Status: DISCONTINUED | OUTPATIENT
Start: 2017-06-09 | End: 2017-06-09

## 2017-06-09 RX ORDER — FUROSEMIDE 40 MG/1
40 TABLET ORAL DAILY
Qty: 30 TABLET | Refills: 3 | Status: SHIPPED | OUTPATIENT
Start: 2017-06-09 | End: 2017-06-09

## 2017-06-09 RX ORDER — BENZTROPINE MESYLATE 1 MG/1
1 TABLET ORAL 2 TIMES DAILY PRN
Status: DISCONTINUED | OUTPATIENT
Start: 2017-06-09 | End: 2017-06-09 | Stop reason: HOSPADM

## 2017-06-09 RX ORDER — TROLAMINE SALICYLATE 10 G/100G
1 CREAM TOPICAL 2 TIMES DAILY PRN
Status: DISCONTINUED | OUTPATIENT
Start: 2017-06-09 | End: 2017-06-09 | Stop reason: HOSPADM

## 2017-06-09 RX ORDER — GUAIFENESIN 600 MG/1
1200 TABLET, EXTENDED RELEASE ORAL 2 TIMES DAILY PRN
Status: DISCONTINUED | OUTPATIENT
Start: 2017-06-09 | End: 2017-06-09 | Stop reason: HOSPADM

## 2017-06-09 RX ORDER — ALBUTEROL SULFATE 90 UG/1
2 AEROSOL, METERED RESPIRATORY (INHALATION) EVERY 4 HOURS PRN
Qty: 1 INHALER | Refills: 11 | Status: ON HOLD | OUTPATIENT
Start: 2017-06-09 | End: 2017-06-20

## 2017-06-09 RX ORDER — BUDESONIDE 0.5 MG/2ML
0.5 INHALANT ORAL
Status: DISCONTINUED | OUTPATIENT
Start: 2017-06-09 | End: 2017-06-09 | Stop reason: HOSPADM

## 2017-06-09 RX ORDER — POTASSIUM CHLORIDE 7.45 MG/ML
10 INJECTION INTRAVENOUS
Status: DISCONTINUED | OUTPATIENT
Start: 2017-06-09 | End: 2017-06-09 | Stop reason: HOSPADM

## 2017-06-09 RX ORDER — FLUTICASONE PROPIONATE 50 MCG
2 SPRAY, SUSPENSION (ML) NASAL DAILY
Status: DISCONTINUED | OUTPATIENT
Start: 2017-06-09 | End: 2017-06-09 | Stop reason: HOSPADM

## 2017-06-09 RX ORDER — POTASSIUM CHLORIDE 1.5 G/1.77G
40 POWDER, FOR SOLUTION ORAL AS NEEDED
Status: DISCONTINUED | OUTPATIENT
Start: 2017-06-09 | End: 2017-06-09 | Stop reason: HOSPADM

## 2017-06-09 RX ORDER — PANTOPRAZOLE SODIUM 40 MG/1
40 TABLET, DELAYED RELEASE ORAL
Status: DISCONTINUED | OUTPATIENT
Start: 2017-06-09 | End: 2017-06-09 | Stop reason: HOSPADM

## 2017-06-09 RX ORDER — CAPSAICIN 0.025 %
CREAM (GRAM) TOPICAL EVERY 8 HOURS SCHEDULED
Status: DISCONTINUED | OUTPATIENT
Start: 2017-06-09 | End: 2017-06-09 | Stop reason: HOSPADM

## 2017-06-09 RX ORDER — HEPARIN SODIUM 5000 [USP'U]/ML
5000 INJECTION, SOLUTION INTRAVENOUS; SUBCUTANEOUS EVERY 8 HOURS SCHEDULED
Status: DISCONTINUED | OUTPATIENT
Start: 2017-06-09 | End: 2017-06-09 | Stop reason: HOSPADM

## 2017-06-09 RX ADMIN — PREDNISONE 40 MG: 20 TABLET ORAL at 08:51

## 2017-06-09 RX ADMIN — ARFORMOTEROL TARTRATE 15 MCG: 15 SOLUTION RESPIRATORY (INHALATION) at 09:24

## 2017-06-09 RX ADMIN — BUDESONIDE 0.5 MG: 0.5 INHALANT RESPIRATORY (INHALATION) at 09:24

## 2017-06-09 RX ADMIN — POTASSIUM CHLORIDE 40 MEQ: 750 CAPSULE, EXTENDED RELEASE ORAL at 09:56

## 2017-06-09 RX ADMIN — HEPARIN SODIUM 5000 UNITS: 5000 INJECTION, SOLUTION INTRAVENOUS; SUBCUTANEOUS at 05:53

## 2017-06-09 RX ADMIN — NICOTINE 1 PATCH: 21 PATCH, EXTENDED RELEASE TRANSDERMAL at 04:37

## 2017-06-09 RX ADMIN — FLUTICASONE PROPIONATE 2 SPRAY: 50 SPRAY, METERED NASAL at 08:51

## 2017-06-09 RX ADMIN — MICONAZOLE NITRATE: 2 CREAM TOPICAL at 08:51

## 2017-06-09 RX ADMIN — IPRATROPIUM BROMIDE 0.5 MG: 0.5 SOLUTION RESPIRATORY (INHALATION) at 09:24

## 2017-06-09 RX ADMIN — THEOPHYLLINE 600 MG: 300 TABLET, EXTENDED RELEASE ORAL at 08:51

## 2017-06-09 RX ADMIN — SODIUM CHLORIDE 75 ML/HR: 9 INJECTION, SOLUTION INTRAVENOUS at 04:30

## 2017-06-09 RX ADMIN — DIVALPROEX SODIUM 1000 MG: 500 TABLET, FILM COATED, EXTENDED RELEASE ORAL at 08:51

## 2017-06-09 NOTE — ED PROVIDER NOTES
Subjective   HPI Comments: Colleen Gonzalez is a 52 y.o.female who presents to the ED presents to the ED with AMS.  Per EMS, the pt was at the homeless shelter when she was seen to take a large amount of pills. This prompted EMS to be called to bring her to the ED. Upon arrival by EMS, she stated that she only took her prescribed medications. The baseline of the pt prior to taking the medication is unknown, and at the ED, the pt is partially responsive and knows her name and location, but believes that the president is Ugo Mendez.  She presents to the ED with three large bags of medications.    Patient is a 52 y.o. female presenting with altered mental status.   History provided by:  EMS personnel  History limited by:  Acuity of condition  Altered Mental Status   Presenting symptoms: confusion and partial responsiveness    Episode number: unknown.  Duration: unknown.  Timing:  Constant  Progression since onset: unknown.  Context: drug use and homelessness    Associated symptoms: no difficulty breathing, no fever and no vomiting    Associated symptoms comment:  Speech difficulty      Review of Systems   Unable to perform ROS: Acuity of condition   Constitutional: Negative for fever.   Respiratory: Negative for cough and shortness of breath.    Gastrointestinal: Negative for vomiting.   Neurological: Positive for speech difficulty.   Psychiatric/Behavioral: Positive for confusion.       Past Medical History:   Diagnosis Date   • TOM (acute kidney injury) 5/25/2017   • Allergic    • Altered mental status 6/9/2017   • Anxiety    • Asthma    • Chronic kidney disease    • COPD (chronic obstructive pulmonary disease)    • Depression    • Diabetes mellitus    • Emphysema lung    • Fibromyalgia    • Herpes    • Hyperlipidemia    • Hypertension    • Hypothyroid    • Neuropathy    • Obesity    • Pneumonia    • Seizures        Allergies   Allergen Reactions   • Aspirin GI Intolerance   • Codeine Itching   • Ibuprofen GI Intolerance        Past Surgical History:   Procedure Laterality Date   • BACK SURGERY     • BRONCHOSCOPY Left 3/15/2017    Procedure: BRONCHOSCOPY WITH FLUORO;  Surgeon: Ankur Salomon MD;  [facility]: [facility] ENDOSCOPY;  Service:    • CHOLECYSTECTOMY     • CYST REMOVAL     • HYSTERECTOMY      partial   • KNEE SURGERY Bilateral        Family History   Problem Relation Age of Onset   • Heart failure Mother    • COPD Mother    • Heart attack Mother    • Diabetes Father        Social History     Social History   • Marital status: Single     Spouse name: N/A   • Number of children: N/A   • Years of education: N/A     Social History Main Topics   • Smoking status: Heavy Tobacco Smoker     Packs/day: 3.00   • Smokeless tobacco: None   • Alcohol use No   • Drug use: No   • Sexual activity: Defer     Other Topics Concern   • None     Social History Narrative    Patient is homeless and moves around staying with friends and family.  Recently was staying at the [employer].           Objective   Physical Exam   Constitutional: She appears well-developed and well-nourished. No distress.   Somnolent female that is difficult to arouse. Arousable only by deep sternal rubs. Does not answer questions. Groans. Eyes open slowly.  Pt quickly falls back asleep.   HENT:   Head: Normocephalic and atraumatic.   Eyes: Conjunctivae are normal. No scleral icterus.   Pupils sluggish at 6mm   Neck: Normal range of motion. Neck supple. No JVD present.   Cardiovascular: Normal rate, regular rhythm and normal heart sounds.    Pulmonary/Chest: Effort normal and breath sounds normal. No respiratory distress.   Abdominal: Soft. Bowel sounds are normal. She exhibits no distension. There is no tenderness. There is no rebound and no guarding.   No inguinal trauma   Genitourinary:   Genitourinary Comments: No urinary incontinence   Musculoskeletal: Normal range of motion. She exhibits no edema.   Moves all extremities equally. Contusion to anterior lateral  chest wall which does not appear very acute.   Skin: Skin is warm and dry. She is not diaphoretic.   Nursing note and vitals reviewed.      Procedures         ED Course  ED Course   Comment By Time   On recheck pt is still somnolent. Titi Fenton 06/09 0305     Recent Results (from the past 24 hour(s))   Protime-INR    Collection Time: 06/08/17 10:41 PM   Result Value Ref Range    Protime 11.2 9.6 - 11.5 Seconds    INR 1.03    aPTT    Collection Time: 06/08/17 10:41 PM   Result Value Ref Range    PTT 30.3 24.0 - 31.0 seconds   CBC Auto Differential    Collection Time: 06/08/17 10:41 PM   Result Value Ref Range    WBC 5.49 3.50 - 10.80 10*3/mm3    RBC 3.99 3.89 - 5.14 10*6/mm3    Hemoglobin 12.1 11.5 - 15.5 g/dL    Hematocrit 38.8 34.5 - 44.0 %    MCV 97.2 80.0 - 99.0 fL    MCH 30.3 27.0 - 31.0 pg    MCHC 31.2 (L) 32.0 - 36.0 g/dL    RDW 17.0 (H) 11.3 - 14.5 %    RDW-SD 60.4 (H) 37.0 - 54.0 fl    MPV 9.9 6.0 - 12.0 fL    Platelets 177 150 - 450 10*3/mm3    Neutrophil % 52.6 41.0 - 71.0 %    Lymphocyte % 36.8 24.0 - 44.0 %    Monocyte % 7.8 0.0 - 12.0 %    Eosinophil % 2.2 0.0 - 3.0 %    Basophil % 0.2 0.0 - 1.0 %    Immature Grans % 0.4 0.0 - 0.6 %    Neutrophils, Absolute 2.89 1.50 - 8.30 10*3/mm3    Lymphocytes, Absolute 2.02 0.60 - 4.80 10*3/mm3    Monocytes, Absolute 0.43 0.00 - 1.00 10*3/mm3    Eosinophils, Absolute 0.12 0.10 - 0.30 10*3/mm3    Basophils, Absolute 0.01 0.00 - 0.20 10*3/mm3    Immature Grans, Absolute 0.02 0.00 - 0.03 10*3/mm3   Lactic Acid, Plasma    Collection Time: 06/08/17 10:41 PM   Result Value Ref Range    Lactate 1.0 0.5 - 2.0 mmol/L   Procalcitonin    Collection Time: 06/08/17 10:41 PM   Result Value Ref Range    Procalcitonin <0.05 ng/mL   Ammonia    Collection Time: 06/08/17 10:45 PM   Result Value Ref Range    Ammonia 32 19 - 60 umol/L   Blood Gas, Arterial    Collection Time: 06/08/17 11:21 PM   Result Value Ref Range    Site Arterial: left radial     Aramis's Test N/A     pH,  Arterial 7.433 7.350 - 7.450 pH units    pCO2, Arterial 52.6 (H) 35.0 - 45.0 mm Hg    pO2, Arterial 77.6 (L) 83.0 - 108.0 mm Hg    HCO3, Arterial 35.2 (H) 20.0 - 26.0 mmol/L    Base Excess, Arterial 9.8 (H) 0.0 - 2.0 mmol/L    O2 Saturation, Arterial 90.7 %    O2 Saturation Calculated 90.7 %    Hemoglobin, Blood Gas 12.4 (L) 14 - 18 g/dL    Hematocrit, Blood Gas 38.0 %    Oxyhemoglobin 90.7 (L) 94 - 99 %    Methemoglobin 0.9 0 - 1.5 %    Carboxyhemoglobin 4.9 (H) 0 - 2 %    CO2 Content 36.8 (H) 22 - 33    Barometric Pressure for Blood Gas  mmHg    Modality Cannula - Nasal     FIO2 32 %   Urinalysis With / Culture If Indicated    Collection Time: 06/08/17 11:38 PM   Result Value Ref Range    Color, UA Yellow Yellow, Straw    Appearance, UA Clear Clear    pH, UA 6.5 5.0 - 8.0    Specific Gravity, UA 1.009 1.001 - 1.030    Glucose, UA Negative Negative    Ketones, UA Negative Negative    Bilirubin, UA Negative Negative    Blood, UA Negative Negative    Protein, UA Negative Negative    Leuk Esterase, UA Negative Negative    Nitrite, UA Negative Negative    Urobilinogen, UA 0.2 E.U./dL 0.2 - 1.0 E.U./dL     Note: In addition to lab results from this visit, the labs listed above may include labs taken at another facility or during a different encounter within the last 24 hours. Please correlate lab times with ED admission and discharge times for further clarification of the services performed during this visit.    CT Head Without Contrast   Final Result   Abnormal      Normal head/brain CT.      THIS DOCUMENT HAS BEEN ELECTRONICALLY SIGNED BY MEME PHILLIP MD      XR Chest 1 View   Final Result   Abnormal      1. No acute findings.      2. Non-acute findings are described above.      THIS DOCUMENT HAS BEEN ELECTRONICALLY SIGNED BY MEME PHILLIP MD        Vitals:    06/09/17 0130 06/09/17 0230 06/09/17 0231 06/09/17 0307   BP: 106/76 100/85  107/82   Pulse:       Resp:       Temp:       TempSrc:       SpO2: 94%  94% 94%    Weight:       Height:         Medications   sodium chloride 0.9 % bolus 1,000 mL (0 mL Intravenous Stopped 6/9/17 0015)     ECG/EMG Results (last 24 hours)     Procedure Component Value Units Date/Time    ECG 12 Lead [[id number]] Collected:  06/08/17 2251     Updated:  06/09/17 0135    Narrative:       Test Reason : ams  Blood Pressure : **/** mmHG  Vent. Rate : 101 BPM     Atrial Rate : 101 BPM     P-R Int : 140 ms          QRS Dur : 074 ms      QT Int : 368 ms       P-R-T Axes : 064 077 048 degrees     QTc Int : 477 ms    Sinus tachycardia  Otherwise normal ECG  When compared with ECG of 24-MAY-2017 22:19,  No significant change was found  Confirmed by LISSETH LOERA MD ([id number]) on 6/9/2017 1:35:32 AM    Referred By:  EVARISTO           Confirmed By:LISSETH LOERA MD                        [facility]  EMR Dragon/Transcription disclaimer:   Much of this encounter note is an electronic transcription/translation of spoken language to printed text. The electronic translation of spoken language may permit erroneous, or at times, nonsensical words or phrases to be inadvertently transcribed; Although I have reviewed the note for such errors, some may still exist.     Final diagnoses:   Stupor   Hypoxia       Documentation assistance provided by nancy Fenton.  Information recorded by the nancy was done at my direction and has been verified and validated by me.     Titi Fenton  06/09/17 0210       Titi Fenton  06/09/17 0247       Titi Fenton  06/09/17 0329       Lisseth Loera MD  06/09/17 0585

## 2017-06-09 NOTE — NURSING NOTE
Pt reports that she is going downstairs for a while. She is adamant and went off the floor stating that whenever the papers are ready, she ll come upstairs. Pt is alert and oriented states that she is taking the responsibility by herself despite giving advice not to go until we give discharge papers.

## 2017-06-09 NOTE — PLAN OF CARE
Problem: Fall Risk (Adult)  Goal: Identify Related Risk Factors and Signs and Symptoms  Outcome: Outcome(s) achieved Date Met:  06/09/17 06/09/17 1353   Fall Risk   Fall Risk: Related Risk Factors confusion/agitation;depression/anxiety   Fall Risk: Signs and Symptoms presence of risk factors         Problem: Mobility, Physical Impaired (Adult)  Goal: Identify Related Risk Factors and Signs and Symptoms  Outcome: Outcome(s) achieved Date Met:  06/09/17 06/09/17 1353   Mobility, Physical Impaired   Physical Mobility, Impaired: Related Risk Factors knowledge deficit;obesity         Problem: Pain, Acute (Adult)  Goal: Identify Related Risk Factors and Signs and Symptoms  Outcome: Outcome(s) achieved Date Met:  06/09/17 06/09/17 1353   Pain, Acute   Related Risk Factors (Acute Pain) patient perception;knowledge deficit;infection         Problem: Pressure Ulcer Risk (Clement Scale) (Adult,Obstetrics,Pediatric)  Goal: Identify Related Risk Factors and Signs and Symptoms  Outcome: Outcome(s) achieved Date Met:  06/09/17    Problem: Respiratory Insufficiency (Adult)  Goal: Identify Related Risk Factors and Signs and Symptoms  Outcome: Outcome(s) achieved Date Met:  06/09/17 06/09/17 1353   Respiratory Insufficiency   Related Risk Factors (Respiratory Insufficiency) obesity;pain;physiological factors;smoking   Signs and Symptoms (Respiratory Insufficiency) abnormal breath sounds;breathing pattern changes;blood pressure/heart rate changes;cough (productive/ineffective);decreased oxygen saturation;dyspnea         Problem: Patient Care Overview (Adult)  Goal: Plan of Care Review  Outcome: Outcome(s) achieved Date Met:  06/09/17 06/09/17 1353   Coping/Psychosocial Response Interventions   Plan Of Care Reviewed With patient   Patient Care Overview   Progress progress toward functional goals as expected

## 2017-06-09 NOTE — PROGRESS NOTES
Discharge Planning Assessment  [facility]     Patient Name: Colleen Gonzalez  MRN: [id number]  Today's Date: 6/9/2017    Admit Date: 6/8/2017          Discharge Needs Assessment       06/09/17 1144    Living Environment    Living Arrangements shelter    Transportation Available taxi    Living Environment    Provides Primary Care For no one    Able to Return to Prior Living Arrangements yes    Discharge Needs Assessment    Equipment Currently Used at Home rollator    Equipment Needed After Discharge --   Declines oxygen            Discharge Plan       06/09/17 1147    Case Management/Social Work Plan    Plan [facility]    Patient/Family In Agreement With Plan yes    Additional Comments Spoke with pt at bedside. Pt reports she wants to discharge back to the [facility]. Pt declines oxygen and reports she will discharge to [facility]. Pt qualifies for Home o2 but [facility] will not allow pt to go there with oxygen. Pt is aware of this and also aware the [facility] will accept her and they can accommodate her oxygen. Pt declines and reports she is discharging to [facility] and will need a ride. SW will provide cab voucher for pt to discharge back to [facility]. This is pt's third admission in the last 30 days and pt has been given community resources each time as well. SW will provide cab voucher for pt to get to shelter when medcially ready.    Final Note    Final Note SW is following.        Discharge Placement     No information found        Expected Discharge Date and Time     Expected Discharge Date Expected Discharge Time    Jun 9, 2017               Demographic Summary       06/09/17 1143    Referral Information    Arrived From --   [facility], [facility]            Functional Status       06/09/17 1144    Functional Status Prior    Ambulation 1-->assistive equipment    Transferring 1-->assistive equipment    Toileting  0-->independent    Bathing 0-->independent    Dressing 0-->independent    Eating 0-->independent    Communication 0-->understands/communicates without difficulty    IADL    Medications independent    Meal Preparation independent    Housekeeping independent    Laundry independent    Shopping independent    Oral Care independent            Psychosocial     None            Abuse/Neglect     None            Legal     None            Substance Abuse     None            Patient Forms     None          SOPHIE Vázquez

## 2017-06-09 NOTE — PROGRESS NOTES
Continued Stay Note  [facility]     Patient Name: Colleen Gonzalez  MRN: [id number]  Today's Date: 6/9/2017    Admit Date: 6/8/2017          Discharge Plan       06/09/17 1209    Case Management/Social Work Plan    Additional Comments SW had also addressed with pt taking some pills as reported in the H&P as why pt was brought to [facility]. Pt's UDS tested positive for antidepressents which pt is prescribed and pt reports she did not take any more than she is prescribed and she has never had any thoughts or ideations or attempts to hurt herself.      06/09/17 1147    Case Management/Social Work Plan    Plan [facility]    Patient/Family In Agreement With Plan yes    Additional Comments Spoke with pt at bedside. Pt reports she wants to discharge back to the [facility]. Pt declines oxygen and reports she will discharge to [facility]. Pt qualifies for Home o2 but [facility] will not allow pt to go there with oxygen. Pt is aware of this and also aware the [facility] army will accept her and they can accommodate her oxygen. Pt declines and reports she is discharging to [facility] and will need a ride. SW will provide cab voucher for pt to discharge back to [facility]. This is pt's third admission in the last 30 days and pt has been given community resources each time as well. SW will provide cab voucher for pt to get to shelter when medcially ready.    Final Note    Final Note SW is following.              Discharge Codes     None        Expected Discharge Date and Time     Expected Discharge Date Expected Discharge Time    Jun 9, 2017             SOPHIE Vázquez

## 2017-06-09 NOTE — H&P
[facility] Medicine Services  HISTORY AND PHYSICAL    Primary Care Physician: RADHA Pedraza    Subjective     Chief Complaint: found taking a large amount of pills followed by reduced responsiveness    History of Present Illness    Ms. Gonzalez presented to the ED 6/8/17 evening after someone at her homeless shelter or nearby observed her taking a large amount of pills. She was partially responsive when EMS arrived and moved into a stupor. There was suspected drug use. Moderate-severe. Constant. Has not improved with time. She does have a known hx of mental health disorders, as well as COPD with chronic non-compliance to tobacco cessation, dysphagia diet, continuous oxygen, and NPPV. Ms. Gonzalez only opens her eyes to sternal rub/painful stimuli for a few seconds but otherwise does not respond at this time and unable to engage in evaluation.    Review of Systems   Unable to perform ROS: Patient unresponsive     Otherwise complete ROS reviewed and negative except as mentioned in the HPI.      Past Medical History:   Past Medical History:   Diagnosis Date   • TOM (acute kidney injury) 5/25/2017   • Allergic    • Altered mental status 6/9/2017   • Anxiety    • Asthma    • Chronic kidney disease    • COPD (chronic obstructive pulmonary disease)    • Depression    • Diabetes mellitus    • Emphysema lung    • Fibromyalgia    • Herpes    • Hyperlipidemia    • Hypertension    • Hypothyroid    • Neuropathy    • Obesity    • Pneumonia    • Renal insufficiency 6/9/2017   • Seizures        Past Surgical History:  Past Surgical History:   Procedure Laterality Date   • BACK SURGERY     • BRONCHOSCOPY Left 3/15/2017    Procedure: BRONCHOSCOPY WITH FLUORO;  Surgeon: Ankur Salomon MD;  Location: [facility];  Service:    • CHOLECYSTECTOMY     • CYST REMOVAL     • HYSTERECTOMY      partial   • KNEE SURGERY Bilateral        Family History:   Family History   Problem Relation Age of Onset   •  "Heart failure Mother    • COPD Mother    • Heart attack Mother    • Diabetes Father      Social History:   Social History     Social History   • Marital status: Single     Spouse name: N/A   • Number of children: N/A   • Years of education: N/A     Occupational History   • Not on file.     Social History Main Topics   • Smoking status: Heavy Tobacco Smoker     Packs/day: 3.00   • Smokeless tobacco: Not on file   • Alcohol use No   • Drug use: No   • Sexual activity: Defer     Other Topics Concern   • Not on file     Social History Narrative    Patient is homeless and moves around staying with friends and family.  Recently was staying at the [employer].         Medications:    (Not in a hospital admission)  Allergies:  Allergies   Allergen Reactions   • Aspirin GI Intolerance   • Codeine Itching   • Ibuprofen GI Intolerance       Objective     Vital Signs: /82  Pulse 93  Temp 98.3 °F (36.8 °C) (Oral)   Resp 16  Ht 67\" (170.2 cm)  Wt 260 lb (118 kg)  SpO2 94%  BMI 40.72 kg/m2  Physical Exam   Constitutional: She appears well-developed and well-nourished. No distress.   Obese. Limited responsiveness, in stupor.   HENT:   Head: Normocephalic and atraumatic.   Mouth/Throat: Oropharynx is clear and moist. No oropharyngeal exudate.   MM pink/dry.   Eyes: Conjunctivae are normal. Left eye exhibits no discharge. No scleral icterus.   Does not open eyes enough for evaluation. Pupils 3 mm and reactive to light.   Neck: No JVD present. No tracheal deviation present.   Cardiovascular: Normal rate, regular rhythm, normal heart sounds and intact distal pulses.    No murmur heard.  Right foot moderate edema pitting 1+, ankle trace non-pitting.   Pulmonary/Chest: Effort normal. No respiratory distress. She has wheezes.   Purse lip breathing. Diminished inspiration, expiratory wheezing. Intermittent snoring, no apnea observed during visit.   Abdominal: Soft. She exhibits no distension and no mass. Bowel sounds are " "decreased. There is no guarding. No hernia.   Large abdominal girth. No objective signs of tenderness.   Genitourinary:   Genitourinary Comments: Bladder non-distended, no objective signs of tenderness.   Musculoskeletal: She exhibits no edema or deformity.   Neurological:   Opens eyes for about one second to firm painful stimuli. Stupor. Unable to communicate or follow commands other than saying no to \"are you feeling short of breath.\" No resting tremor noted.   Skin: Skin is warm. No rash noted. She is diaphoretic. No erythema. No pallor.   Chronic discoloration to BUE.   Psychiatric:   Stupor.       Results Reviewed:    Lab Results (last 24 hours)     Procedure Component Value Units Date/Time    CBC & Differential [[id number]] Collected:  06/08/17 2241    Specimen:  Blood Updated:  06/08/17 2306    Narrative:       The following orders were created for panel order CBC & Differential.  Procedure                               Abnormality         Status                     ---------                               -----------         ------                     CBC Auto Differential[[id number]]        Abnormal            Final result                 Please view results for these tests on the individual orders.    Protime-INR [[id number]] Collected:  06/08/17 2241    Specimen:  Blood Updated:  06/08/17 2333     Protime 11.2 Seconds      INR 1.03    Narrative:       Therapeutic Ranges for INR: 2.0-3.0 (PT 20-30)                              2.5-3.5 (PT 25-34)    aPTT [[id number]]  (Normal) Collected:  06/08/17 2241    Specimen:  Blood Updated:  06/08/17 2333     PTT 30.3 seconds     Narrative:       PTT = The equivalent PTT values for the therapeutic range of heparin levels at 0.3 to 0.5 U/ml are 45 to 60 seconds.    CBC Auto Differential [[id number]]  (Abnormal) Collected:  06/08/17 2241    Specimen:  Blood Updated:  06/08/17 2306     WBC 5.49 10*3/mm3      RBC 3.99 10*6/mm3      Hemoglobin 12.1 g/dL      Hematocrit 38.8 %  "     MCV 97.2 fL      MCH 30.3 pg      MCHC 31.2 (L) g/dL      RDW 17.0 (H) %      RDW-SD 60.4 (H) fl      MPV 9.9 fL      Platelets 177 10*3/mm3      Neutrophil % 52.6 %      Lymphocyte % 36.8 %      Monocyte % 7.8 %      Eosinophil % 2.2 %      Basophil % 0.2 %      Immature Grans % 0.4 %      Neutrophils, Absolute 2.89 10*3/mm3      Lymphocytes, Absolute 2.02 10*3/mm3      Monocytes, Absolute 0.43 10*3/mm3      Eosinophils, Absolute 0.12 10*3/mm3      Basophils, Absolute 0.01 10*3/mm3      Immature Grans, Absolute 0.02 10*3/mm3     Lactic Acid, Plasma [[id number]]  (Normal) Collected:  06/08/17 2241    Specimen:  Blood Updated:  06/08/17 2314     Lactate 1.0 mmol/L       Falsely depressed results may occur on samples drawn from patients receiving N-Acetylcysteine (NAC) or Metamizole.       Procalcitonin [[id number]] Collected:  06/08/17 2241    Specimen:  Blood Updated:  06/08/17 2340     Procalcitonin <0.05 ng/mL     Narrative:       As a Marker for Sepsis (Non-Neonates):   1. <0.5 ng/mL represents a low risk of severe sepsis and/or septic shock.  2. >2 ng/mL represents a high risk of severe sepsis and/or septic shock.    As a Marker for Lower Respiratory Tract Infections that require antibiotic therapy:    PCT on Admission     Antibiotic Therapy       6-12 Hrs later  > 0.5                Strongly Recommended             >0.25 - <0.5         Recommended  0.1 - 0.25           Discouraged              Remeasure/reassess PCT  <0.1                 Strongly Discouraged     Remeasure/reassess PCT                     PCT values of < 0.5 ng/mL do not exclude an infection, because localized infections (without systemic signs) may be associated with such low concentrations, or a systemic infection in its initial stages (< 6 hours). Furthermore, increased PCT can occur without infection. PCT concentrations between 0.5 and 2.0 ng/mL should be interpreted taking into account the patient's history. It is recommended to retest  PCT within 6-24 hours if any concentrations < 2 ng/mL are obtained.    BNP [[id number]]  (Normal) Collected:  06/08/17 2241    Specimen:  Blood Updated:  06/09/17 0411     BNP 7.0 pg/mL     Ammonia [[id number]]  (Normal) Collected:  06/08/17 2245    Specimen:  Blood Updated:  06/09/17 0100     Ammonia 32 umol/L     Blood Gas, Arterial [[id number]]  (Abnormal) Collected:  06/08/17 2321    Specimen:  Arterial Blood Updated:  06/08/17 2331     Site Arterial: left radial     Aramis's Test N/A     pH, Arterial 7.433 pH units      pCO2, Arterial 52.6 (H) mm Hg      pO2, Arterial 77.6 (L) mm Hg      HCO3, Arterial 35.2 (H) mmol/L      Base Excess, Arterial 9.8 (H) mmol/L      O2 Saturation, Arterial 90.7 %      O2 Saturation Calculated 90.7 %      Hemoglobin, Blood Gas 12.4 (L) g/dL      Hematocrit, Blood Gas 38.0 %      Oxyhemoglobin 90.7 (L) %      Methemoglobin 0.9 %      Carboxyhemoglobin 4.9 (H) %      CO2 Content 36.8 (H)     Barometric Pressure for Blood Gas -- mmHg       N/A        Modality Cannula - Nasal     FIO2 32 %     Urinalysis With / Culture If Indicated [[id number]]  (Normal) Collected:  06/08/17 2338    Specimen:  Urine from Urine, Catheter Updated:  06/08/17 4019     Color, UA Yellow     Appearance, UA Clear     pH, UA 6.5     Specific Gravity, UA 1.009     Glucose, UA Negative     Ketones, UA Negative     Bilirubin, UA Negative     Blood, UA Negative     Protein, UA Negative     Leuk Esterase, UA Negative     Nitrite, UA Negative     Urobilinogen, UA 0.2 E.U./dL    Narrative:       Urine microscopic not indicated.        ECG 12 Lead   Status:  Final result   Visible to patient:  No (Not Released) Next appt:  None Order: [id number]     Narrative      Test Reason : ams  Blood Pressure : **/** mmHG  Vent. Rate : 101 BPM     Atrial Rate : 101 BPM     P-R Int : 140 ms          QRS Dur : 074 ms      QT Int : 368 ms       P-R-T Axes : 064 077 048 degrees     QTc Int : 477 ms    Sinus tachycardia  Otherwise normal  ECG  When compared with ECG of 24-MAY-2017 22:19,  No significant change was found  Confirmed by LISSETH LOERA MD ([id number]) on 6/9/2017 1:35:32 AM                    XR Chest, 1 View     CLINICAL HISTORY:  52 years old, female; Signs and symptoms; Other: AMS     TECHNIQUE:  Frontal view of the chest.     COMPARISON:  CR - XR CHEST 1 VW 5/24/2017 10:25:19 PM     FINDINGS:  Lungs: Minimal bibasilar ground glass linear opacities, likely areas of   atelectasis and/or minimal pneumonitis.  Pleural space: Normal. No pneumothorax.  Heart: Normal. No cardiomegaly.  Mediastinum: Normal.  Bones/joints: Normal.     IMPRESSION:     1. No acute findings.     2. Non-acute findings are described above.    CT Head Without Intravenous Contrast     CLINICAL HISTORY:  52 years old, female; Signs and symptoms; Altered mental status/memory loss;   Confusion or disorientation; Additional info: AMS     TECHNIQUE:  Axial computed tomography images of the head/brain without intravenous   contrast. This CT exam was performed using one or more of the following dose   reduction techniques: automated exposure control, adjustment of the mA and/or   kV according to patient size, and/or use of iterative reconstruction technique.     COMPARISON:  No relevant prior studies available.     FINDINGS:  Brain: Normal.  Ventricles: Normal.  Bones/joints: Normal. No acute fracture.  Soft tissues: Normal.  Sinuses: Normal.  Mastoid air cells: Normal as visualized. No mastoid effusion.     IMPRESSION:     Normal head/brain CT      I have personally reviewed and interpreted the radiology studies and ECG obtained at time of admission.     Assessment / Plan      Assessment & Plan  Principal Problem:    Altered mental status  Active Problems:    COPD (chronic obstructive pulmonary disease)    Tobacco abuse    Chronic respiratory failure with hypoxia and hypercapnia    Anxiety and depression    Homelessness    Seizure disorder    Stupor    Renal insufficiency    IGT  (impaired glucose tolerance)      1. Altered mental status / stupor:  - CT head negative.  - Has not received reversal medication for opioid/benzo.  - Await drug screen and labs. Note that small supply norco in past last weeks ago, and klonopin month supplies when she can keep appts for it. Her 4/15/17 UDS was appropriate, her 5/18/17 drug screen is unclear as should have been out of the small norco supply yet +opiate and she had klonopin at this time but negative.  - NPO until alert enough for PO.  - Chronic respiratory failure with hypercapnia and hypoxia appear at baseline but can place NPPV.  - Hold all sedative medications until out of stupor, WILL NEED TO RESTART SOON. Eval if doses should be reduced. I have continued her depakote however for seizures.  - Sinus tach in ED, received 1L NS bolus. Will continue NS @ 100 ml/hr x1.5 liters. Monitor for fluid volume excess. Clinical dehydration on exam.  - One hypotensive reading SBP 90s. Now 107/82, HR 83, 95% on 2L NC, RR 20. Hold BB and diuretic for now.    2. Chronic respiratory failure with hypoxia and hypercapnia / chronic obstructive pulmonary disease:  - RECURRENT ED/HOSPITALIZATIONS R/T NON-COMPLIANCE AND HOMELESS, NEEDS TOBACCO CESSATION.  - Does NOT appear to have exacerbation though and at baseline.  - O2/NPPV.  - Will change her LABA+ICS to nebs while unable to use handheld - change to symbicort when alert. While on that will drop albuterol to daily, and continue ipratropium QID (we do not stock Spiriva and would be unable to use at this time). Continue theophylline, lab pending.  - Do not feel abx indicated at this time. Can try prednisone 40 mg PO x5 days but do not feel higher, if that, is indicated - consider to d/c.  - Nicotine patch.    3. Impaired glucose tolerance:  - 96 with EMS. Recheck now. Is mildly diaphoretic.  - Low dose humalog SSI for now if is high enough to cover with steroid.  - A1C 4.9 5/25/17.    4. Anxiety / depression / chronic  pain:  - RESTART PSYCH MEDS WHEN ALERT.  - Restart cymbalta/neurontin when alert; defer norco as not on this.  - Consult  to assist with homelessness.  - Concern for drug use today, has prior denied any drug use.    5. Renal insuffiencey:  - At baseline, monitor.  - No known CHF.  - Chronic BLE edema.    6. Seizure disorder:  - Continue depakote, check level.  - Consider EEG if no improvement in status.    DVT prophylaxis: Teds, scuds, heparin SQ.    Code Status: Full code, full support. Pt unable to discuss further, no LW or AD.     I discussed the patients findings and my recommendations with: the primary care team (pt unable to participate in discussion).    RADHA Jones 06/09/17 4:14 AM      Brief Attending Note   I have seen and examined the patient, performing an independent face-to-face diagnostic evaluation.  The plan of care reviewed and developed with the advanced practice clinician (APC).    Brief Summary Statement/HPI:   Patient is a 52-year-old female well-known to me with 2 admissions in the last month.  She has a history of ongoing tobacco abuse, psychiatric illness, COPD, lung infections now thought to be colonized, and homelessness who presents to the emergency room after apparently being witness taking a large quantity of pills.  Details are unclear as the patient is now somnolent and unable to provide any history.  According to the EMS and they arrived at her shelter she denied taking anything extra but since has been very somnolent.  On my exam she does arouse to tactile stim but goes quickly back to sleep.  Workup in the emergency room has been unremarkable.  Drug screen is still pending.  I am asked to admit for observation at this time.    Attending Physical Exam:  Temp:  [97.6 °F (36.4 °C)-98.3 °F (36.8 °C)] 97.6 °F (36.4 °C)  Heart Rate:  [] 84  Resp:  [16-18] 18  BP: ()/(61-85) 89/61     Constitutional: sleeping in stretcher, snoring, no acute  distress  Eyes: PERRLA, sclerae anicteric, no conjunctival injection  Neck: supple, no thyromegaly, trachea midline  Respiratory: snoring, slight bilateral wheeze, no resp distress.  Cardiovascular: RRR, no murmurs, rubs, or gallops, palpable pedal pulses bilaterally  Gastrointestinal: Positive bowel sounds, soft, nontender, non-distended, obese  Musculoskeletal: No bilateral ankle edema, no clubbing or cyanosis to bilateral lower extremities  Psychiatric: unable to assess  Neurologic: somnelent and briefly arouses to verbal and tactile stim.  No facial droop.  Follows commands, no obviouse deficits.      Brief Assessment/Plan :  Patient is a chronically ill 52-year-old with history of COPD, tobacco abuse, homelessness, psychiatric illness who presents after witnessed ingestion of unknown type and quantity of pills.  Intent is unclear.  Currently she is somnolent but arousable and hemodynamically stable.  She has bags full of mostly full pill bottles with her.  Drug screen is pending.  For now she'll be admitted under observation until she wakes up.  At that time we'll need to figure out exactly what her intent was.   will be consult did.  Ultimately plan will be back to her homeless shelter most likely.  We'll continue her home medications and inhalers.  Provided nicotine patch.    See above for further detailed assessment and plan developed with [facility] which I have reviewed and/or edited.    I believe this patient requires OBSERVATION status, however if further evaluation or treatment plans warrant, status may change.  Based upon current information, I predict patient's care encounter to be less than or equal to 2 midnights.    MD Monica Arzola, APRN 06/09/17 4:14 AM

## 2017-06-09 NOTE — PLAN OF CARE
Problem: Patient Care Overview (Adult)  Goal: Discharge Needs Assessment  Outcome: Outcome(s) achieved Date Met:  06/09/17

## 2017-06-09 NOTE — DISCHARGE SUMMARY
6/8/2017  Date of Discharge:  6/9/2017  RADHA Pedraza    Problem List:  Active Hospital Problems (** Indicates Principal Problem)    Diagnosis Date Noted   • **Altered mental status [R41.82] 06/09/2017   • Stupor [R40.1] 06/09/2017   • Renal insufficiency [N28.9] 06/09/2017   • IGT (impaired glucose tolerance) [R73.02] 06/09/2017   • Seizure disorder [G40.909] 05/19/2017   • Chronic respiratory failure with hypoxia and hypercapnia [J96.11, J96.12] 03/01/2017   • COPD (chronic obstructive pulmonary disease) [J44.9] 03/01/2017   • Anxiety and depression [F41.9, F32.9] 03/01/2017   • Homelessness [Z59.0] 03/01/2017   • Tobacco abuse [Z72.0] 03/01/2017      Resolved Hospital Problems    Diagnosis Date Noted Date Resolved   No resolved problems to display.       Presenting Problem/History of Present Illness  Stupor [R40.1]  Stupor [R40.1]  Stupor [R40.1]      Hospital Course  Patient is a 52 y.o. female presented with minimal responsiveness.  She lives in a shelter and was reported to have taken a handful of pills prior to becoming stuporous.  In the [facility] ER, she remained minimally responsive to sternal rub, but hemodynamically stable.  A UDS showed only tricyclics.    By morning she is awake, alert, and appropriate.  She feels at her baseline.  She denies having taken any unpresecribed medicines.  Her CXR is reassuring.      She met with a .  She thinks she may be nearly out of some of her meds, so I will prescribe refills of these.      Procedures Performed         Consults:   Consults     Date and Time Order Name Status Description    5/21/2017 0852 Inpatient Consult to Pulmonology Completed     5/19/2017 1108 Inpatient Consult to Infectious Diseases Completed           Pertinent Test Results:    Results from last 7 days  Lab Units 06/08/17  2241   WBC 10*3/mm3 5.49   HEMOGLOBIN g/dL 12.1   HEMATOCRIT % 38.8   PLATELETS 10*3/mm3 177       Results from last 7 days  Lab Units 06/08/17  2241   SODIUM  "mmol/L 140   POTASSIUM mmol/L 3.1*   CHLORIDE mmol/L 98*   TOTAL CO2 mmol/L 36.0*   BUN mg/dL 9   CREATININE mg/dL 1.00   GLUCOSE mg/dL 108*   CALCIUM mg/dL 9.6             Physical Exam on Discharge:    /82 (BP Location: Right arm, Patient Position: Sitting)  Pulse 86  Temp 97.6 °F (36.4 °C) (Oral)   Resp 20  Ht 67\" (170.2 cm)  Wt 249 lb 9.6 oz (113 kg)  SpO2 97%  BMI 39.09 kg/m2    Gen:  WD/WN obese womanin NAD, sitting  Up on EOB, talking about her medicines and asking appropriate questions.  Neuro: alert and oriented, clear speech, follows commands, grossly nonfocal  HEENT:  NC/AT PERRL, OP benign  Neck:  Supple, no LAD  Heart RRR no murmur, rub, or gallop  Lungs CTA nonlabored no w r r  Abd:  Soft, nontender, no rebound or guarding, pos BS  Extrem:  No c/c/e  Psychiatric:  Nl mood and affect.  Talkative but not pressured speech.  No evidence of auditory or vis hallucinations.       Discharge Disposition  Home or Self Care    Discharge Medications   Colleen Gonzalez   Home Medication Instructions MAC:[id number]    Printed on:06/09/17 1145   Medication Information                      albuterol (PROVENTIL HFA;VENTOLIN HFA) 108 (90 BASE) MCG/ACT inhaler  Inhale 2 puffs Every 4 (Four) Hours As Needed for Wheezing.             atenolol (TENORMIN) 50 MG tablet  Take 1 tablet by mouth Daily.             benztropine (COGENTIN) 1 MG tablet  Take 1 mg by mouth 2 (Two) Times a Day As Needed for Tremors.             capsicum (ZOSTRIX) 0.075 % topical cream  Apply  topically Every 8 (Eight) Hours.             cetirizine (zyrTEC) 10 MG tablet  Take 1 tablet by mouth Daily.             clomiPRAMINE (ANAFRANIL) 50 MG capsule  Take 100 mg by mouth Every Night. Take two capsule at bedtime              clonazePAM (KlonoPIN) 0.5 MG tablet  Take 1 tablet by mouth 3 (Three) Times a Day As Needed for Anxiety.             divalproex (DEPAKOTE) 500 MG 24 hr tablet  Take 1,000 mg by mouth Daily. Take two tablets once a day     "          DULoxetine (CYMBALTA) 60 MG capsule  Take 1 capsule by mouth Every 12 (Twelve) Hours.             ergocalciferol (DRISDOL) 38763 UNITS capsule  Take 50,000 Units by mouth 1 (One) Time Per Week.             fluticasone (FLONASE) 50 MCG/ACT nasal spray  2 sprays into each nostril Daily.             fluticasone-salmeterol (ADVAIR) 250-50 MCG/DOSE DISKUS  Inhale 1 puff 2 (Two) Times a Day.             furosemide (LASIX) 40 MG tablet  Take 1 tablet by mouth Daily.             gabapentin (NEURONTIN) 300 MG capsule  Take 2 capsules by mouth 4 (Four) Times a Day.             hydrOXYzine (ATARAX) 50 MG tablet  Take 1 tablet by mouth 4 (Four) Times a Day As Needed for Itching or Anxiety.             ibuprofen (ADVIL,MOTRIN) 200 MG tablet  Take 200 mg by mouth Every 6 (Six) Hours As Needed for Mild Pain (1-3).             lansoprazole (PREVACID) 30 MG capsule  Take 60 mg by mouth Daily.             levothyroxine (SYNTHROID, LEVOTHROID) 50 MCG tablet  Take 50 mcg by mouth Daily.             melatonin 3 MG tablet  Take 3 mg by mouth At Night As Needed for Sleep.             montelukast (SINGULAIR) 10 MG tablet  Take 1 tablet by mouth Every Night.             nicotine (NICODERM CQ) 21 MG/24HR patch  Place 1 patch on the skin Daily.             pravastatin (PRAVACHOL) 80 MG tablet  Take 1 tablet by mouth Every Night.             QUEtiapine (SEROquel) 300 MG tablet  Take 300 mg by mouth Every Night.             theophylline (THEODUR) 300 MG 12 hr tablet  Take 2 tablets by mouth Every 12 (Twelve) Hours.             Tiotropium Bromide Monohydrate (SPIRIVA RESPIMAT) 2.5 MCG/ACT aerosol solution  Inhale 1 dose Daily.             ziprasidone (GEODON) 80 MG capsule  Take 80 mg by mouth 2 (Two) Times a Day With Meals.                 Discharge Diet  routine      Activity at Discharge routine      Follow-up Appointments  Follow up with PCP as usual      Test Results Pending at Discharge      Time:25 mins

## 2017-06-15 ENCOUNTER — APPOINTMENT (OUTPATIENT)
Dept: GENERAL RADIOLOGY | Facility: HOSPITAL | Age: 52
End: 2017-06-15

## 2017-06-15 ENCOUNTER — HOSPITAL ENCOUNTER (INPATIENT)
Facility: HOSPITAL | Age: 52
LOS: 4 days | Discharge: HOME | End: 2017-06-20
Attending: EMERGENCY MEDICINE | Admitting: HOSPITALIST

## 2017-06-15 DIAGNOSIS — Z59.00 HOMELESSNESS: ICD-10-CM

## 2017-06-15 DIAGNOSIS — R06.02 SHORTNESS OF BREATH: ICD-10-CM

## 2017-06-15 DIAGNOSIS — J44.1 COPD EXACERBATION (HCC): ICD-10-CM

## 2017-06-15 DIAGNOSIS — Z72.0 TOBACCO USE: ICD-10-CM

## 2017-06-15 DIAGNOSIS — L03.115 CELLULITIS OF RIGHT LEG: Primary | ICD-10-CM

## 2017-06-15 PROBLEM — D75.1 POLYCYTHEMIA: Status: ACTIVE | Noted: 2017-06-15

## 2017-06-15 PROBLEM — N39.44 NOCTURNAL ENURESIS: Status: ACTIVE | Noted: 2017-06-15

## 2017-06-15 PROBLEM — D69.6 THROMBOCYTOPENIA (HCC): Status: ACTIVE | Noted: 2017-06-15

## 2017-06-15 LAB
ALBUMIN SERPL-MCNC: 3.9 G/DL (ref 3.2–4.8)
ALBUMIN/GLOB SERPL: 1.3 G/DL (ref 1.5–2.5)
ALP SERPL-CCNC: 74 U/L (ref 25–100)
ALT SERPL W P-5'-P-CCNC: 14 U/L (ref 7–40)
ANION GAP SERPL CALCULATED.3IONS-SCNC: 0 MMOL/L (ref 3–11)
AST SERPL-CCNC: 14 U/L (ref 0–33)
BASOPHILS # BLD AUTO: 0.01 10*3/MM3 (ref 0–0.2)
BASOPHILS NFR BLD AUTO: 0.3 % (ref 0–1)
BILIRUB SERPL-MCNC: 0.5 MG/DL (ref 0.3–1.2)
BNP SERPL-MCNC: 10 PG/ML (ref 0–100)
BUN BLD-MCNC: 10 MG/DL (ref 9–23)
BUN/CREAT SERPL: 7.7 (ref 7–25)
CALCIUM SPEC-SCNC: 9.9 MG/DL (ref 8.7–10.4)
CHLORIDE SERPL-SCNC: 107 MMOL/L (ref 99–109)
CO2 SERPL-SCNC: 31 MMOL/L (ref 20–31)
CREAT BLD-MCNC: 1.3 MG/DL (ref 0.6–1.3)
CRP SERPL-MCNC: 8.78 MG/DL (ref 0–1)
D-LACTATE SERPL-SCNC: 1 MMOL/L (ref 0.5–2)
DEPRECATED RDW RBC AUTO: 59.9 FL (ref 37–54)
EOSINOPHIL # BLD AUTO: 0.05 10*3/MM3 (ref 0.1–0.3)
EOSINOPHIL NFR BLD AUTO: 1.4 % (ref 0–3)
ERYTHROCYTE [DISTWIDTH] IN BLOOD BY AUTOMATED COUNT: 17 % (ref 11.3–14.5)
GFR SERPL CREATININE-BSD FRML MDRD: 43 ML/MIN/1.73
GLOBULIN UR ELPH-MCNC: 2.9 GM/DL
GLUCOSE BLD-MCNC: 88 MG/DL (ref 70–100)
HCT VFR BLD AUTO: 50.3 % (ref 34.5–44)
HGB BLD-MCNC: 16.1 G/DL (ref 11.5–15.5)
HOLD SPECIMEN: NORMAL
HOLD SPECIMEN: NORMAL
IMM GRANULOCYTES # BLD: 0.05 10*3/MM3 (ref 0–0.03)
IMM GRANULOCYTES NFR BLD: 1.4 % (ref 0–0.6)
LYMPHOCYTES # BLD AUTO: 0.97 10*3/MM3 (ref 0.6–4.8)
LYMPHOCYTES NFR BLD AUTO: 27.6 % (ref 24–44)
MCH RBC QN AUTO: 31 PG (ref 27–31)
MCHC RBC AUTO-ENTMCNC: 32 G/DL (ref 32–36)
MCV RBC AUTO: 96.9 FL (ref 80–99)
MONOCYTES # BLD AUTO: 0.62 10*3/MM3 (ref 0–1)
MONOCYTES NFR BLD AUTO: 17.6 % (ref 0–12)
NEUTROPHILS # BLD AUTO: 1.82 10*3/MM3 (ref 1.5–8.3)
NEUTROPHILS NFR BLD AUTO: 51.7 % (ref 41–71)
PLATELET # BLD AUTO: 127 10*3/MM3 (ref 150–450)
PMV BLD AUTO: 9.5 FL (ref 6–12)
POTASSIUM BLD-SCNC: 3.8 MMOL/L (ref 3.5–5.5)
PROCALCITONIN SERPL-MCNC: 0.05 NG/ML
PROT SERPL-MCNC: 6.8 G/DL (ref 5.7–8.2)
RBC # BLD AUTO: 5.19 10*6/MM3 (ref 3.89–5.14)
SODIUM BLD-SCNC: 138 MMOL/L (ref 132–146)
THEOPHYLLINE SERPL-MCNC: 4 MCG/ML (ref 10–20)
TROPONIN I SERPL-MCNC: 0.01 NG/ML (ref 0–0.07)
WBC NRBC COR # BLD: 3.52 10*3/MM3 (ref 3.5–10.8)
WHOLE BLOOD HOLD SPECIMEN: NORMAL
WHOLE BLOOD HOLD SPECIMEN: NORMAL

## 2017-06-15 PROCEDURE — 99285 EMERGENCY DEPT VISIT HI MDM: CPT

## 2017-06-15 PROCEDURE — 85025 COMPLETE CBC W/AUTO DIFF WBC: CPT | Performed by: EMERGENCY MEDICINE

## 2017-06-15 PROCEDURE — 25010000002 PIPERACILLIN-TAZOBACTAM: Performed by: EMERGENCY MEDICINE

## 2017-06-15 PROCEDURE — 25010000002 VANCOMYCIN: Performed by: EMERGENCY MEDICINE

## 2017-06-15 PROCEDURE — 83605 ASSAY OF LACTIC ACID: CPT | Performed by: EMERGENCY MEDICINE

## 2017-06-15 PROCEDURE — 93005 ELECTROCARDIOGRAM TRACING: CPT

## 2017-06-15 PROCEDURE — 25010000002 AZITHROMYCIN: Performed by: EMERGENCY MEDICINE

## 2017-06-15 PROCEDURE — 94640 AIRWAY INHALATION TREATMENT: CPT

## 2017-06-15 PROCEDURE — 25010000002 METHYLPREDNISOLONE PER 125 MG: Performed by: EMERGENCY MEDICINE

## 2017-06-15 PROCEDURE — 84145 PROCALCITONIN (PCT): CPT | Performed by: EMERGENCY MEDICINE

## 2017-06-15 PROCEDURE — 84484 ASSAY OF TROPONIN QUANT: CPT

## 2017-06-15 PROCEDURE — 83880 ASSAY OF NATRIURETIC PEPTIDE: CPT | Performed by: EMERGENCY MEDICINE

## 2017-06-15 PROCEDURE — 86140 C-REACTIVE PROTEIN: CPT | Performed by: EMERGENCY MEDICINE

## 2017-06-15 PROCEDURE — 80053 COMPREHEN METABOLIC PANEL: CPT | Performed by: EMERGENCY MEDICINE

## 2017-06-15 PROCEDURE — 87040 BLOOD CULTURE FOR BACTERIA: CPT | Performed by: EMERGENCY MEDICINE

## 2017-06-15 PROCEDURE — 99223 1ST HOSP IP/OBS HIGH 75: CPT | Performed by: FAMILY MEDICINE

## 2017-06-15 PROCEDURE — 80198 ASSAY OF THEOPHYLLINE: CPT | Performed by: EMERGENCY MEDICINE

## 2017-06-15 PROCEDURE — 71010 HC CHEST PA OR AP: CPT

## 2017-06-15 RX ORDER — IPRATROPIUM BROMIDE AND ALBUTEROL SULFATE 2.5; .5 MG/3ML; MG/3ML
3 SOLUTION RESPIRATORY (INHALATION) ONCE
Status: COMPLETED | OUTPATIENT
Start: 2017-06-15 | End: 2017-06-15

## 2017-06-15 RX ORDER — METHYLPREDNISOLONE SODIUM SUCCINATE 125 MG/2ML
125 INJECTION, POWDER, LYOPHILIZED, FOR SOLUTION INTRAMUSCULAR; INTRAVENOUS ONCE
Status: COMPLETED | OUTPATIENT
Start: 2017-06-15 | End: 2017-06-15

## 2017-06-15 RX ORDER — SODIUM CHLORIDE 0.9 % (FLUSH) 0.9 %
10 SYRINGE (ML) INJECTION AS NEEDED
Status: DISCONTINUED | OUTPATIENT
Start: 2017-06-15 | End: 2017-06-20 | Stop reason: HOSPADM

## 2017-06-15 RX ADMIN — VANCOMYCIN HYDROCHLORIDE 2000 MG: 1 INJECTION, POWDER, LYOPHILIZED, FOR SOLUTION INTRAVENOUS at 20:25

## 2017-06-15 RX ADMIN — IPRATROPIUM BROMIDE AND ALBUTEROL SULFATE 3 ML: .5; 3 SOLUTION RESPIRATORY (INHALATION) at 18:59

## 2017-06-15 RX ADMIN — AZITHROMYCIN 500 MG: 500 INJECTION, POWDER, LYOPHILIZED, FOR SOLUTION INTRAVENOUS at 22:51

## 2017-06-15 RX ADMIN — TAZOBACTAM SODIUM AND PIPERACILLIN SODIUM 4.5 G: .5; 4 INJECTION, POWDER, LYOPHILIZED, FOR SOLUTION INTRAVENOUS at 19:16

## 2017-06-15 RX ADMIN — METHYLPREDNISOLONE SODIUM SUCCINATE 125 MG: 125 INJECTION, POWDER, FOR SOLUTION INTRAMUSCULAR; INTRAVENOUS at 19:06

## 2017-06-15 RX ADMIN — SODIUM CHLORIDE 1000 ML: 9 INJECTION, SOLUTION INTRAVENOUS at 19:04

## 2017-06-15 SDOH — ECONOMIC STABILITY - HOUSING INSECURITY: HOMELESSNESS UNSPECIFIED: Z59.00

## 2017-06-15 NOTE — ED PROVIDER NOTES
"Subjective   HPI Comments: Ms. Colleen Gonzalez is a 52 year old female who presents to the ED with c/o SOA with onset two weeks ago. She reports she is chronically SOA, however, over the last two weeks her SOA has progressively worsened from baseline. She notes having an associated cough producing clear sputum as well. She has been seen multiple times recently for her current symptoms and has been admitted for them. She also c/o extremity pain but is unable to specify when the pain began because she \"has the pain all the time\". She denies N/V/D, fevers, chills, or any other acute sx at this time.           Patient is a 52 y.o. female presenting with shortness of breath.   History provided by:  Patient  Shortness of Breath   Severity:  Moderate  Onset quality:  Gradual  Timing:  Constant  Progression:  Worsening  Chronicity:  Chronic  Relieved by:  None tried  Worsened by:  Nothing  Ineffective treatments:  None tried  Associated symptoms: cough (Productive) and sputum production    Associated symptoms: no abdominal pain, no chest pain, no fever, no headaches, no neck pain, no sore throat and no vomiting    Cough:     Cough characteristics:  Productive    Sputum characteristics:  Clear    Severity:  Mild  Risk factors: obesity and tobacco use        Review of Systems   Constitutional: Negative for chills and fever.   HENT: Negative for congestion, rhinorrhea and sore throat.    Respiratory: Positive for cough (Productive), sputum production and shortness of breath.    Cardiovascular: Negative for chest pain.   Gastrointestinal: Negative for abdominal pain, diarrhea, nausea and vomiting.   Musculoskeletal: Negative for back pain and neck pain.   Neurological: Negative for dizziness, weakness, light-headedness and headaches.   All other systems reviewed and are negative.      Past Medical History:   Diagnosis Date   • TOM (acute kidney injury) 5/25/2017   • Allergic    • Altered mental status 6/9/2017   • Anxiety    • " Asthma    • Chronic kidney disease    • COPD (chronic obstructive pulmonary disease)    • Depression    • Diabetes mellitus    • Emphysema lung    • Fibromyalgia    • Herpes    • Hyperlipidemia    • Hypertension    • Hypothyroid    • Neuropathy    • Obesity    • Pneumonia    • Renal insufficiency 6/9/2017   • Seizures        Allergies   Allergen Reactions   • Aspirin GI Intolerance   • Codeine Itching   • Ibuprofen GI Intolerance       Past Surgical History:   Procedure Laterality Date   • BACK SURGERY     • BRONCHOSCOPY Left 3/15/2017    Procedure: BRONCHOSCOPY WITH FLUORO;  Surgeon: Ankur Salomon MD;  Location: [facility] ENDOSCOPY;  Service:    • CHOLECYSTECTOMY     • CYST REMOVAL     • HYSTERECTOMY      partial   • KNEE SURGERY Bilateral        Family History   Problem Relation Age of Onset   • Heart failure Mother    • COPD Mother    • Heart attack Mother    • Diabetes Father        Social History     Social History   • Marital status: Single     Spouse name: N/A   • Number of children: N/A   • Years of education: N/A     Social History Main Topics   • Smoking status: Heavy Tobacco Smoker     Packs/day: 3.00   • Smokeless tobacco: None   • Alcohol use No   • Drug use: No   • Sexual activity: Defer     Other Topics Concern   • None     Social History Narrative    Patient is homeless and moves around staying with friends and family.  Recently was staying at the [employer].           Objective   Physical Exam   Constitutional: She is oriented to person, place, and time. She appears well-developed and well-nourished. No distress.   HENT:   Head: Normocephalic and atraumatic.   Mouth/Throat: Oropharynx is clear and moist.   Eyes: Conjunctivae are normal. No scleral icterus.   Neck: Normal range of motion. Neck supple. No thyroid mass present.   Cardiovascular: Normal rate, regular rhythm, normal heart sounds and intact distal pulses.  Exam reveals no gallop and no friction rub.    No murmur  heard.  Pulmonary/Chest: Effort normal. Tachypnea noted. She has wheezes. She has rhonchi (Right greater than left).   Abdominal: Soft. There is no tenderness. There is no rebound and no guarding.   Musculoskeletal: Normal range of motion. She exhibits edema (3+ LE edema).   Lymphadenopathy:     She has no cervical adenopathy.   Neurological: She is alert and oriented to person, place, and time.   Skin: Skin is warm and dry. Lesion (Deep, well healed. Extends medially passed knee. Both legs equally.) noted. She is not diaphoretic. There is erythema (Legs. Right greater than left).   Nursing note and vitals reviewed.      Procedures         ED Course  ED Course   Comment By Time   Patient's treated with a neb here in the ED.  Her white count is not elevated.  He does have significant fever.  She had tachypnea and wheezing on presentationDiscussed findings with Dr. Gonzalez.  The patient has no means for consideration of outpatient discharge due to her baseline status and her means of support.  She is additionally febrile with erythema of her right foot and lower extremity.  She is treated with azithromycin for respiratory symptoms and Zosyn and Vanco for her right lower extremity.Dr. Gonzalez will admit for definitive inpatient care. John Wells MD 06/15 2046     Recent Results (from the past 24 hour(s))   Comprehensive Metabolic Panel    Collection Time: 06/15/17  6:46 PM   Result Value Ref Range    Glucose 88 70 - 100 mg/dL    BUN 10 9 - 23 mg/dL    Creatinine 1.30 0.60 - 1.30 mg/dL    Sodium 138 132 - 146 mmol/L    Potassium 3.8 3.5 - 5.5 mmol/L    Chloride 107 99 - 109 mmol/L    CO2 31.0 20.0 - 31.0 mmol/L    Calcium 9.9 8.7 - 10.4 mg/dL    Total Protein 6.8 5.7 - 8.2 g/dL    Albumin 3.90 3.20 - 4.80 g/dL    ALT (SGPT) 14 7 - 40 U/L    AST (SGOT) 14 0 - 33 U/L    Alkaline Phosphatase 74 25 - 100 U/L    Total Bilirubin 0.5 0.3 - 1.2 mg/dL    eGFR Non African Amer 43 (L) >60 mL/min/1.73    Globulin 2.9 gm/dL    A/G  Ratio 1.3 (L) 1.5 - 2.5 g/dL    BUN/Creatinine Ratio 7.7 7.0 - 25.0    Anion Gap 0.0 (L) 3.0 - 11.0 mmol/L   Light Blue Top    Collection Time: 06/15/17  6:46 PM   Result Value Ref Range    Extra Tube hold for add-on    Green Top (Gel)    Collection Time: 06/15/17  6:46 PM   Result Value Ref Range    Extra Tube Hold for add-ons.    Lavender Top    Collection Time: 06/15/17  6:46 PM   Result Value Ref Range    Extra Tube hold for add-on    Gold Top - SST    Collection Time: 06/15/17  6:46 PM   Result Value Ref Range    Extra Tube Hold for add-ons.    CBC Auto Differential    Collection Time: 06/15/17  6:46 PM   Result Value Ref Range    WBC 3.52 3.50 - 10.80 10*3/mm3    RBC 5.19 (H) 3.89 - 5.14 10*6/mm3    Hemoglobin 16.1 (H) 11.5 - 15.5 g/dL    Hematocrit 50.3 (H) 34.5 - 44.0 %    MCV 96.9 80.0 - 99.0 fL    MCH 31.0 27.0 - 31.0 pg    MCHC 32.0 32.0 - 36.0 g/dL    RDW 17.0 (H) 11.3 - 14.5 %    RDW-SD 59.9 (H) 37.0 - 54.0 fl    MPV 9.5 6.0 - 12.0 fL    Platelets 127 (L) 150 - 450 10*3/mm3    Neutrophil % 51.7 41.0 - 71.0 %    Lymphocyte % 27.6 24.0 - 44.0 %    Monocyte % 17.6 (H) 0.0 - 12.0 %    Eosinophil % 1.4 0.0 - 3.0 %    Basophil % 0.3 0.0 - 1.0 %    Immature Grans % 1.4 (H) 0.0 - 0.6 %    Neutrophils, Absolute 1.82 1.50 - 8.30 10*3/mm3    Lymphocytes, Absolute 0.97 0.60 - 4.80 10*3/mm3    Monocytes, Absolute 0.62 0.00 - 1.00 10*3/mm3    Eosinophils, Absolute 0.05 (L) 0.10 - 0.30 10*3/mm3    Basophils, Absolute 0.01 0.00 - 0.20 10*3/mm3    Immature Grans, Absolute 0.05 (H) 0.00 - 0.03 10*3/mm3   Lactic Acid, Plasma    Collection Time: 06/15/17  6:46 PM   Result Value Ref Range    Lactate 1.0 0.5 - 2.0 mmol/L   Theophylline Level    Collection Time: 06/15/17  6:46 PM   Result Value Ref Range    Theophylline Level 4.0 (L) 10.0 - 20.0 mcg/mL   Procalcitonin    Collection Time: 06/15/17  6:46 PM   Result Value Ref Range    Procalcitonin 0.05 ng/mL   C-reactive Protein    Collection Time: 06/15/17  6:46 PM   Result  "Value Ref Range    C-Reactive Protein 8.78 (H) 0.00 - 1.00 mg/dL   POC Troponin, Rapid    Collection Time: 06/15/17  6:52 PM   Result Value Ref Range    Troponin I 0.01 0.00 - 0.07 ng/mL     Note: In addition to lab results from this visit, the labs listed above may include labs taken at another facility or during a different encounter within the last 24 hours. Please correlate lab times with ED admission and discharge times for further clarification of the services performed during this visit.    XR Chest 1 View   Final Result   Abnormal     Stable chronic cardiopulmonary changes without evidence of an active lung    parenchymal lesion.          THIS DOCUMENT HAS BEEN ELECTRONICALLY SIGNED BY ELISE REYNOSO MD        Vitals:    06/15/17 1817 06/15/17 1818 06/15/17 1859 06/15/17 1930   BP: 107/77   107/72   BP Location: Right arm      Patient Position: Sitting      Pulse: 113  106 103   Resp: 20  22    Temp: (!) 102.2 °F (39 °C)      TempSrc: Oral      SpO2: 92% 94% 92% 90%   Weight: 260 lb (118 kg)      Height: 68\" (172.7 cm)        Medications   sodium chloride 0.9 % flush 10 mL (not administered)   sodium chloride 0.9 % bolus 3,540 mL (1,000 mL Intravenous New Bag 6/15/17 1904)   AZITHROMYCIN 500 MG/250 ML 0.9% NS IVPB (MBP) (not administered)   ipratropium-albuterol (DUO-NEB) nebulizer solution 3 mL (3 mL Nebulization Given 6/15/17 1859)   methylPREDNISolone sodium succinate (SOLU-Medrol) injection 125 mg (125 mg Intravenous Given 6/15/17 1906)   piperacillin-tazobactam (ZOSYN) 4.5 g/100 mL 0.9% NS IVPB (mbp) (0 g Intravenous Stopped 6/15/17 1959)   vancomycin 2000 mg/500 mL 0.9% NS IVPB (BHS) (2,000 mg Intravenous New Bag 6/15/17 2025)     ECG/EMG Results (last 24 hours)     Procedure Component Value Units Date/Time    ECG 12 Lead [[id number]] Collected:  06/15/17 1818     Updated:  06/15/17 1947    Narrative:       Test Reason : SOA Protocol  Blood Pressure : **/** mmHG  Vent. Rate : 113 BPM     Atrial Rate : 113 " BPM     P-R Int : 134 ms          QRS Dur : 066 ms      QT Int : 318 ms       P-R-T Axes : 069 089 078 degrees     QTc Int : 436 ms    Sinus tachycardia  Otherwise normal ECG  When compared with ECG of 08-JUN-2017 22:51,  No significant change was found  Confirmed by BRYCE ANTONY, GUEVARA ([id number]) on 6/15/2017 7:47:02 PM    Referred By:  ERMD           Confirmed By:GUEVARA WU MD                        MDM    Final diagnoses:   COPD exacerbation   Cellulitis of right leg   Shortness of breath   Tobacco use   Homelessness       Documentation assistance provided by nancy Farr.  Information recorded by the scribe was done at my direction and has been verified and validated by me.     Ankur Farr  06/15/17 2011       Ashley Joseph  06/15/17 2019       John Wu MD  06/15/17 2046

## 2017-06-16 PROBLEM — J44.1 COPD EXACERBATION (HCC): Status: ACTIVE | Noted: 2017-06-16

## 2017-06-16 PROBLEM — J40 BRONCHITIS: Status: ACTIVE | Noted: 2017-06-16

## 2017-06-16 PROBLEM — L03.115 CELLULITIS OF RIGHT LEG: Status: ACTIVE | Noted: 2017-06-16

## 2017-06-16 PROCEDURE — 99233 SBSQ HOSP IP/OBS HIGH 50: CPT | Performed by: INTERNAL MEDICINE

## 2017-06-16 PROCEDURE — 25010000002 VANCOMYCIN

## 2017-06-16 PROCEDURE — 25010000002 ENOXAPARIN PER 10 MG: Performed by: FAMILY MEDICINE

## 2017-06-16 PROCEDURE — 63710000001 PREDNISONE PER 1 MG: Performed by: FAMILY MEDICINE

## 2017-06-16 PROCEDURE — 87081 CULTURE SCREEN ONLY: CPT | Performed by: INTERNAL MEDICINE

## 2017-06-16 PROCEDURE — 25010000002 PIPERACILLIN-TAZOBACTAM: Performed by: NURSE PRACTITIONER

## 2017-06-16 RX ORDER — SACCHAROMYCES BOULARDII 250 MG
250 CAPSULE ORAL 2 TIMES DAILY
Status: DISCONTINUED | OUTPATIENT
Start: 2017-06-16 | End: 2017-06-18

## 2017-06-16 RX ORDER — NICOTINE 21 MG/24HR
1 PATCH, TRANSDERMAL 24 HOURS TRANSDERMAL
Status: DISCONTINUED | OUTPATIENT
Start: 2017-06-16 | End: 2017-06-20 | Stop reason: HOSPADM

## 2017-06-16 RX ORDER — DULOXETIN HYDROCHLORIDE 60 MG/1
60 CAPSULE, DELAYED RELEASE ORAL EVERY 12 HOURS SCHEDULED
Status: DISCONTINUED | OUTPATIENT
Start: 2017-06-16 | End: 2017-06-20 | Stop reason: HOSPADM

## 2017-06-16 RX ORDER — GABAPENTIN 300 MG/1
300 CAPSULE ORAL EVERY 8 HOURS SCHEDULED
Status: DISCONTINUED | OUTPATIENT
Start: 2017-06-16 | End: 2017-06-17

## 2017-06-16 RX ORDER — PREDNISONE 20 MG/1
40 TABLET ORAL
Status: DISCONTINUED | OUTPATIENT
Start: 2017-06-16 | End: 2017-06-18

## 2017-06-16 RX ORDER — ZIPRASIDONE HYDROCHLORIDE 20 MG/1
80 CAPSULE ORAL 2 TIMES DAILY WITH MEALS
Status: DISCONTINUED | OUTPATIENT
Start: 2017-06-16 | End: 2017-06-20 | Stop reason: HOSPADM

## 2017-06-16 RX ORDER — HYDROCODONE BITARTRATE AND ACETAMINOPHEN 5; 325 MG/1; MG/1
1 TABLET ORAL ONCE AS NEEDED
Status: COMPLETED | OUTPATIENT
Start: 2017-06-16 | End: 2017-06-17

## 2017-06-16 RX ORDER — SODIUM CHLORIDE 0.9 % (FLUSH) 0.9 %
1-10 SYRINGE (ML) INJECTION AS NEEDED
Status: DISCONTINUED | OUTPATIENT
Start: 2017-06-16 | End: 2017-06-20 | Stop reason: HOSPADM

## 2017-06-16 RX ORDER — QUETIAPINE FUMARATE 100 MG/1
300 TABLET, FILM COATED ORAL NIGHTLY
Status: DISCONTINUED | OUTPATIENT
Start: 2017-06-16 | End: 2017-06-20 | Stop reason: HOSPADM

## 2017-06-16 RX ORDER — THEOPHYLLINE 300 MG/1
600 TABLET, EXTENDED RELEASE ORAL EVERY 12 HOURS SCHEDULED
Status: DISCONTINUED | OUTPATIENT
Start: 2017-06-16 | End: 2017-06-20 | Stop reason: HOSPADM

## 2017-06-16 RX ORDER — DOXYCYCLINE HYCLATE 100 MG/1
100 CAPSULE ORAL EVERY 12 HOURS SCHEDULED
Status: DISCONTINUED | OUTPATIENT
Start: 2017-06-16 | End: 2017-06-20 | Stop reason: HOSPADM

## 2017-06-16 RX ORDER — FUROSEMIDE 40 MG/1
40 TABLET ORAL DAILY
Status: DISCONTINUED | OUTPATIENT
Start: 2017-06-16 | End: 2017-06-20 | Stop reason: HOSPADM

## 2017-06-16 RX ORDER — LEVOTHYROXINE SODIUM 0.05 MG/1
50 TABLET ORAL DAILY
Status: DISCONTINUED | OUTPATIENT
Start: 2017-06-16 | End: 2017-06-20 | Stop reason: HOSPADM

## 2017-06-16 RX ORDER — IPRATROPIUM BROMIDE AND ALBUTEROL SULFATE 2.5; .5 MG/3ML; MG/3ML
3 SOLUTION RESPIRATORY (INHALATION)
Status: DISCONTINUED | OUTPATIENT
Start: 2017-06-16 | End: 2017-06-17

## 2017-06-16 RX ORDER — ATENOLOL 50 MG/1
50 TABLET ORAL DAILY
Status: DISCONTINUED | OUTPATIENT
Start: 2017-06-16 | End: 2017-06-19

## 2017-06-16 RX ORDER — GABAPENTIN 300 MG/1
600 CAPSULE ORAL EVERY 8 HOURS SCHEDULED
Status: DISCONTINUED | OUTPATIENT
Start: 2017-06-16 | End: 2017-06-16

## 2017-06-16 RX ORDER — CLONAZEPAM 0.5 MG/1
0.5 TABLET ORAL EVERY 12 HOURS SCHEDULED
Status: DISCONTINUED | OUTPATIENT
Start: 2017-06-16 | End: 2017-06-17

## 2017-06-16 RX ORDER — DIVALPROEX SODIUM 500 MG/1
1000 TABLET, EXTENDED RELEASE ORAL DAILY
Status: DISCONTINUED | OUTPATIENT
Start: 2017-06-16 | End: 2017-06-20 | Stop reason: HOSPADM

## 2017-06-16 RX ADMIN — VANCOMYCIN HYDROCHLORIDE 1500 MG: 1 INJECTION, POWDER, LYOPHILIZED, FOR SOLUTION INTRAVENOUS at 21:44

## 2017-06-16 RX ADMIN — FUROSEMIDE 40 MG: 40 TABLET ORAL at 08:59

## 2017-06-16 RX ADMIN — THEOPHYLLINE 600 MG: 300 TABLET, EXTENDED RELEASE ORAL at 08:58

## 2017-06-16 RX ADMIN — ZIPRASIDONE HYDROCHLORIDE 80 MG: 20 CAPSULE ORAL at 18:17

## 2017-06-16 RX ADMIN — PIPERACILLIN SODIUM,TAZOBACTAM SODIUM 3.38 G: 3; .375 INJECTION, POWDER, FOR SOLUTION INTRAVENOUS at 20:45

## 2017-06-16 RX ADMIN — CLONAZEPAM 0.5 MG: 0.5 TABLET ORAL at 13:29

## 2017-06-16 RX ADMIN — PIPERACILLIN SODIUM,TAZOBACTAM SODIUM 3.38 G: 3; .375 INJECTION, POWDER, FOR SOLUTION INTRAVENOUS at 17:07

## 2017-06-16 RX ADMIN — PREDNISONE 40 MG: 20 TABLET ORAL at 08:59

## 2017-06-16 RX ADMIN — DULOXETINE 60 MG: 60 CAPSULE, DELAYED RELEASE ORAL at 20:45

## 2017-06-16 RX ADMIN — PIPERACILLIN SODIUM,TAZOBACTAM SODIUM 3.38 G: 3; .375 INJECTION, POWDER, FOR SOLUTION INTRAVENOUS at 03:03

## 2017-06-16 RX ADMIN — ATENOLOL 50 MG: 50 TABLET ORAL at 08:59

## 2017-06-16 RX ADMIN — GABAPENTIN 600 MG: 300 CAPSULE ORAL at 05:08

## 2017-06-16 RX ADMIN — CLONAZEPAM 0.5 MG: 0.5 TABLET ORAL at 20:44

## 2017-06-16 RX ADMIN — Medication 250 MG: at 13:29

## 2017-06-16 RX ADMIN — ZIPRASIDONE HYDROCHLORIDE 80 MG: 20 CAPSULE ORAL at 08:58

## 2017-06-16 RX ADMIN — DIVALPROEX SODIUM 1000 MG: 500 TABLET, FILM COATED, EXTENDED RELEASE ORAL at 08:58

## 2017-06-16 RX ADMIN — DOXYCYCLINE HYCLATE 100 MG: 100 CAPSULE ORAL at 13:29

## 2017-06-16 RX ADMIN — DULOXETINE 60 MG: 60 CAPSULE, DELAYED RELEASE ORAL at 08:59

## 2017-06-16 RX ADMIN — THEOPHYLLINE 600 MG: 300 TABLET, EXTENDED RELEASE ORAL at 20:44

## 2017-06-16 RX ADMIN — GABAPENTIN 300 MG: 300 CAPSULE ORAL at 20:45

## 2017-06-16 RX ADMIN — NICOTINE 1 PATCH: 21 PATCH, EXTENDED RELEASE TRANSDERMAL at 02:25

## 2017-06-16 RX ADMIN — LEVOTHYROXINE SODIUM 50 MCG: 50 TABLET ORAL at 05:08

## 2017-06-16 RX ADMIN — Medication 250 MG: at 18:17

## 2017-06-16 RX ADMIN — GABAPENTIN 300 MG: 300 CAPSULE ORAL at 13:29

## 2017-06-16 RX ADMIN — ENOXAPARIN SODIUM 40 MG: 40 INJECTION SUBCUTANEOUS at 09:15

## 2017-06-16 RX ADMIN — QUETIAPINE FUMARATE 300 MG: 100 TABLET ORAL at 20:44

## 2017-06-16 RX ADMIN — PIPERACILLIN SODIUM,TAZOBACTAM SODIUM 3.38 G: 3; .375 INJECTION, POWDER, FOR SOLUTION INTRAVENOUS at 08:00

## 2017-06-16 RX ADMIN — DOXYCYCLINE HYCLATE 100 MG: 100 CAPSULE ORAL at 20:53

## 2017-06-16 NOTE — PROGRESS NOTES
"Adult Nutrition  Assessment/PES    Patient Name:  Colleen Gonzalez  YOB: 1965  MRN: [id number]  Admit Date:  6/15/2017    Assessment Date:  6/16/2017        Reason for Assessment       06/16/17 1152    Reason for Assessment    Reason For Assessment/Visit identified at risk by screening criteria   Patient does not meet screen criteria for nutrition risk per reported weight and intake history. Continue to follow per protocol. Pt states she is not Type 2 diabetic.    Identified At Risk By Screening Criteria MST SCORE 2+                Anthropometrics       06/16/17 1153    Anthropometrics    Height 172.7 cm (68\")    Weight 118 kg (260 lb 2.3 oz)    Ideal Body Weight (IBW)    Ideal Body Weight (IBW), Female 64.55    % Ideal Body Weight 183.19    Usual Body Weight (UBW)    Usual Body Weight 118 kg (260 lb)   per pt ranges from 240-260 lbs    % Usual Body Weight 100.05    Weight Loss --   pt denies any recent unintentional wt loss    Body Mass Index (BMI)    BMI (kg/m2) 39.64                        Problem/Interventions:                      Comments:      Electronically signed by:  Eleanor Abraham RD  06/16/17 11:56 AM  "

## 2017-06-16 NOTE — PLAN OF CARE
Problem: Patient Care Overview (Adult)  Goal: Plan of Care Review  Outcome: Ongoing (interventions implemented as appropriate)    06/16/17 0243   Coping/Psychosocial Response Interventions   Plan Of Care Reviewed With patient   Patient Care Overview   Progress progress towards functional goals is fair   Outcome Evaluation   Outcome Summary/Follow up Plan admit from er, vss, sleeping well, 2l nc,

## 2017-06-16 NOTE — PROGRESS NOTES
Discharge Planning Assessment  [facility]     Patient Name: Colleen Gonzalez  MRN: [id number]  Today's Date: 6/16/2017    Admit Date: 6/15/2017          Discharge Needs Assessment       06/16/17 1048    Living Environment    Lives With alone    Living Arrangements homeless    Home Accessibility no concerns    Type of Financial/Environmental Concern no permanent residence    Transportation Available taxi    Discharge Needs Assessment    Concerns To Be Addressed homelessness    Anticipated Changes Related to Illness none    Equipment Currently Used at Home none    Equipment Needed After Discharge none    Current Discharge Risk homeless            Discharge Plan       06/16/17 1049    Case Management/Social Work Plan    Plan Social work spoke with Ms. Gonzalez and her goal is to go to the [facility] when she is discharged.  She does not have any other needs at this time.  She will require assistance with transporation to the shelter.    Patient/Family In Agreement With Plan yes        Discharge Placement     No information found        Expected Discharge Date and Time     Expected Discharge Date Expected Discharge Time    Jun 17, 2017               Demographic Summary       06/16/17 1040    Primary Care Physician Information    Name Desire Amaya            Functional Status       06/16/17 1041    Functional Status Current    Ambulation 0-->independent    Transferring 0-->independent    Toileting 2-->assistive person    Bathing 0-->independent    Dressing 0-->independent    Eating 0-->independent    Communication 0-->understands/communicates without difficulty    Swallowing (if score 2 or more for any item, consult Rehab Services) 0-->swallows foods/liquids without difficulty    Change in Functional Status Since Onset of Current Illness/Injury yes    Functional Status Prior    Ambulation 0-->independent    Transferring 0-->independent    Toileting 0-->independent    Bathing 0-->independent    Dressing  0-->independent    Eating 0-->independent    Communication 0-->understands/communicates without difficulty    Swallowing 0-->swallows foods/liquids without difficulty    IADL    Medications independent    Meal Preparation independent    Housekeeping independent    Laundry independent    Shopping independent    Oral Care independent    Activity Tolerance    Current Activity Limitations none    Usual Activity Tolerance good    Current Activity Tolerance good    Cognitive/Perceptual/Developmental    Current Mental Status/Cognitive Functioning no deficits noted    Recent Changes in Mental Status/Cognitive Functioning no changes    Developmental Stage (Eriksson's Stages of Development) Stage 7 (35-65 years/Middle Adulthood) Generativity vs. Stagnation            Psychosocial     None            Abuse/Neglect     None            Legal     None            Substance Abuse     None            Patient Forms     None          SOPHIE Rivera

## 2017-06-16 NOTE — PROGRESS NOTES
Continued Stay Note  [facility]     Patient Name: Colleen Gonzalez  MRN: [id number]  Today's Date: 6/16/2017    Admit Date: 6/15/2017          Discharge Plan       06/16/17 1707    Case Management/Social Work Plan    Plan Provided shirts to Ms. Gonzalez and a cab voucher is in her chart for transportation the [facility].              Discharge Codes     None        Expected Discharge Date and Time     Expected Discharge Date Expected Discharge Time    Jun 17, 2017             SOPHIE Rivera

## 2017-06-16 NOTE — PROGRESS NOTES
Continued Stay Note  [facility]     Patient Name: Colleen Gonzalez  MRN: [id number]  Today's Date: 6/16/2017    Admit Date: 6/15/2017          Discharge Plan       06/16/17 1049    Case Management/Social Work Plan    Plan Social work spoke with Ms. Gonzalez and her goal is to go to the [facility] when she is discharged.  She does not have any other needs at this time.  She will require assistance with transporation to the shelter.    Patient/Family In Agreement With Plan yes              Discharge Codes     None        Expected Discharge Date and Time     Expected Discharge Date Expected Discharge Time    Jun 17, 2017             SOPHIE Rivera

## 2017-06-16 NOTE — PROGRESS NOTES
Pharmacokinetic Consult - Vancomycin Dosing  Colleen Gonzalez is a 52 y.o. female who has been consulted for vancomycin dosing for skin and soft tissue infection  (goal trough 10-15 mcg/mL).    Current Antimicrobial Therapy  Azithromycin 500mg IV q24h  Zosyn 3.375 gm IV q6h  Vancomycin 1500mg IV q24h    Relevant clinical data and objective history reviewed:  Creatinine   Date Value Ref Range Status   06/15/2017 1.30 0.60 - 1.30 mg/dL Final     Estimated Creatinine Clearance: 67.6 mL/min (by C-G formula based on Cr of 1.3).     Patient weight: 260 lb (118 kg)    Asessment/Plan  Will initiate dose at 2000 mg IV once                  (given in ED 6/15 at 2025)       followed by  Vancomycin 1500mg IV q 24 hours    Vancomycin trough is scheduled 6/17 at 2030 prior to the 3rd dose    Krishna Mukherjee [facility]  6/16/2017  12:33 AM

## 2017-06-16 NOTE — H&P
[facility] Medicine Services  HISTORY AND PHYSICAL    Primary Care Physician: RADHA Pedraza    Subjective     Chief Complaint: Shortness of breath    History of Present Illness    52 year old female who presents to the ED with c/o SOA with onset two weeks ago. She reports she is chronically SOA, however, over the last two weeks her SOA has progressively worsened from baseline. She notes having an associated cough producing clear sputum as well. Also c/o lower extremity pain.    She is homeless and is currently residing in a shelter. She has not been sleeping outdoors. To her knowledge she has not been exposed to animals, insects, arthropods, livestock, or roosting birds.     Medical history significant for anxiety, asthma, CKD, COPD, depression, diabetes mellitus diet controlled, fibromyalgia, hypertension, hyperlipidemia, hypothyroidism, obesity, seizures, neuropathy. Continues to smoke.     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 (temp 102.2; ; RLE soft tissue infection).    Review of Systems   Constitutional: Positive for activity change and fatigue. Negative for appetite change, chills and fever.   HENT: Negative for trouble swallowing.    Eyes: Negative for visual disturbance.   Respiratory: Positive for cough and shortness of breath (chronic). Negative for choking and chest tightness.    Cardiovascular: Negative for chest pain and leg swelling.   Gastrointestinal: Negative for abdominal distention, constipation, diarrhea, nausea and vomiting.   Genitourinary: Positive for enuresis.   Musculoskeletal: Positive for back pain and myalgias.   Neurological: Positive for weakness.   Psychiatric/Behavioral: Negative for hallucinations. The patient is not nervous/anxious.       Otherwise complete ROS reviewed  and negative except as mentioned in the HPI.      Past Medical History:   Past Medical History:   Diagnosis Date   • TOM (acute kidney injury) 5/25/2017   • Allergic    • Altered mental status 6/9/2017   • Anxiety    • Asthma    • Chronic kidney disease    • COPD (chronic obstructive pulmonary disease)    • Depression    • Diabetes mellitus    • Emphysema lung    • Fibromyalgia    • Herpes    • Hyperlipidemia    • Hypertension    • Hypothyroid    • Neuropathy    • Obesity    • Pneumonia    • Renal insufficiency 6/9/2017   • Seizures        Past Surgical History:  Past Surgical History:   Procedure Laterality Date   • BACK SURGERY     • BRONCHOSCOPY Left 3/15/2017    Procedure: BRONCHOSCOPY WITH FLUORO;  Surgeon: Ankur Salomon MD;  Location: [facility] ENDOSCOPY;  Service:    • CHOLECYSTECTOMY     • CYST REMOVAL     • HYSTERECTOMY      partial   • KNEE SURGERY Bilateral        Family History:   Family History   Problem Relation Age of Onset   • Heart failure Mother    • COPD Mother    • Heart attack Mother    • Diabetes Father      Social History:   Social History     Social History   • Marital status: Single     Spouse name: N/A   • Number of children: N/A   • Years of education: N/A     Occupational History   • Not on file.     Social History Main Topics   • Smoking status: Heavy Tobacco Smoker     Packs/day: 3.00   • Smokeless tobacco: Not on file   • Alcohol use No   • Drug use: No   • Sexual activity: Defer     Other Topics Concern   • Not on file     Social History Narrative    Patient is homeless and moves around staying with friends and family.  Recently was staying at the [facility]         Medications:  Prescriptions Prior to Admission   Medication Sig Dispense Refill Last Dose   • albuterol (PROVENTIL HFA;VENTOLIN HFA) 108 (90 BASE) MCG/ACT inhaler Inhale 2 puffs Every 4 (Four) Hours As Needed for Wheezing. 1 inhaler 11    • atenolol (TENORMIN) 50 MG tablet Take 1 tablet by mouth Daily. 30 tablet 11     • benztropine (COGENTIN) 1 MG tablet Take 1 mg by mouth 2 (Two) Times a Day As Needed for Tremors.      • capsicum (ZOSTRIX) 0.075 % topical cream Apply  topically Every 8 (Eight) Hours. 28.3 g 0    • cetirizine (zyrTEC) 10 MG tablet Take 1 tablet by mouth Daily. 30 tablet 0    • clomiPRAMINE (ANAFRANIL) 50 MG capsule Take 100 mg by mouth Every Night. Take two capsule at bedtime       • clonazePAM (KlonoPIN) 0.5 MG tablet Take 1 tablet by mouth 3 (Three) Times a Day As Needed for Anxiety. 15 tablet 0    • divalproex (DEPAKOTE) 500 MG 24 hr tablet Take 1,000 mg by mouth Daily. Take two tablets once a day       • DULoxetine (CYMBALTA) 60 MG capsule Take 1 capsule by mouth Every 12 (Twelve) Hours. 60 capsule 0    • ergocalciferol (DRISDOL) 71086 UNITS capsule Take 50,000 Units by mouth 1 (One) Time Per Week.      • fluticasone (FLONASE) 50 MCG/ACT nasal spray 2 sprays into each nostril Daily. 1 each 0    • fluticasone-salmeterol (ADVAIR) 250-50 MCG/DOSE DISKUS Inhale 1 puff 2 (Two) Times a Day. 60 each 0    • furosemide (LASIX) 40 MG tablet Take 1 tablet by mouth Daily. 30 tablet 3    • gabapentin (NEURONTIN) 300 MG capsule Take 2 capsules by mouth 4 (Four) Times a Day. 240 capsule 0    • hydrOXYzine (ATARAX) 50 MG tablet Take 1 tablet by mouth 4 (Four) Times a Day As Needed for Itching or Anxiety. 120 tablet 0    • ibuprofen (ADVIL,MOTRIN) 200 MG tablet Take 200 mg by mouth Every 6 (Six) Hours As Needed for Mild Pain (1-3).      • lansoprazole (PREVACID) 30 MG capsule Take 60 mg by mouth Daily.      • levothyroxine (SYNTHROID, LEVOTHROID) 50 MCG tablet Take 50 mcg by mouth Daily.      • melatonin 3 MG tablet Take 3 mg by mouth At Night As Needed for Sleep.      • montelukast (SINGULAIR) 10 MG tablet Take 1 tablet by mouth Every Night. 30 tablet 0    • nicotine (NICODERM CQ) 21 MG/24HR patch Place 1 patch on the skin Daily. 28 patch 0    • PHARMACY MEDS TO BED CONSULT Daily (Monday-Friday). 1 each 0    • pravastatin  "(PRAVACHOL) 80 MG tablet Take 1 tablet by mouth Every Night. 30 tablet 0 5/19/2017 at Unknown time   • QUEtiapine (SEROquel) 300 MG tablet Take 300 mg by mouth Every Night.      • theophylline (THEODUR) 300 MG 12 hr tablet Take 2 tablets by mouth Every 12 (Twelve) Hours. 60 tablet 0    • Tiotropium Bromide Monohydrate (SPIRIVA RESPIMAT) 2.5 MCG/ACT aerosol solution Inhale 1 dose Daily. 1 inhaler 11    • ziprasidone (GEODON) 80 MG capsule Take 80 mg by mouth 2 (Two) Times a Day With Meals.        Allergies:  Allergies   Allergen Reactions   • Aspirin GI Intolerance   • Codeine Itching   • Ibuprofen GI Intolerance       Objective     Vital Signs: /77  Pulse 81  Temp (!) 102.2 °F (39 °C) (Oral)   Resp 22  Ht 68\" (172.7 cm)  Wt 260 lb (118 kg)  SpO2 93%  BMI 39.53 kg/m2  Physical Exam   Constitutional: No distress.   Neck: No tracheal deviation present.   Cardiovascular: Normal rate.    HR per pulse 110-115  Difficult to auscultate HT 2/2 respiratory acoustics   Pulmonary/Chest: Effort normal. No respiratory distress.   A/P: inspir/expir rhonchi with mild rales noted posterior on expiration bilateral bases equally   Abdominal: Soft. She exhibits no distension. There is no tenderness.   Genitourinary:   Genitourinary Comments: Incontinent of bladder. States this happens nightly; controlled during the day.   Musculoskeletal: She exhibits edema (RLU pedal edema +2-3 pitting). She exhibits no tenderness.   Neurological:   Pt was initially resting with eyes closed. Awoken to name.    Skin: Skin is warm. Rash (RLE) noted. She is not diaphoretic.   RLE shin - + erythema, trace edema (nonpitting), warmth, pain  Unsure etiology or timeframe     Results Reviewed:    Results from last 7 days  Lab Units 06/15/17  1846   WBC 10*3/mm3 3.52   HEMOGLOBIN g/dL 16.1*   PLATELETS 10*3/mm3 127*       Results from last 7 days  Lab Units 06/15/17  1846   SODIUM mmol/L 138   POTASSIUM mmol/L 3.8   TOTAL CO2 mmol/L 31.0 "   CREATININE mg/dL 1.30   GLUCOSE mg/dL 88   CALCIUM mg/dL 9.9       I have personally reviewed and interpreted the radiology studies and ECG obtained at time of admission.     Assessment / Plan      Assessment & Plan  Principal Problem:    Sepsis  Active Problems:    COPD (chronic obstructive pulmonary disease)    Tobacco abuse    Homelessness    Renal insufficiency    Polycythemia    Cellulitis of leg, right    Nocturnal enuresis    Cellulitis of right leg    Bronchitis    Plan:  - Continue Azith/Zosyn/Vanc to cover for COPD bronchitis as well as Right foot cellulitis (mild).  Suspect can quickly convert to PO Augmentin and Doxy to cover for same issues within the next 24 hrs if improves (nontoxic, not hypoxic, right foot minimally erythematous and edematous)  - Duonebs, steroids  - pt has long hx of noncompliance, leaving AMA bc wants to smoke etc  - homeless  - Nicotine Patch, Smoking cessation education  - Case mgmt for discharge planning (recently has been staying at a shelter)  - Oxygen at 4L NC in ED but by time of attending exam was off O2 with sats 91%.  Unclear if patient is supposed to have chronic O2 but she is homeless so unlikely she can be compliant.  Also still smokes 2 packs a day.       I discussed the patients findings and my recommendations with: the patient        Brief Attending Note   I have seen and examined the patient, performing an independent face-to-face diagnostic evaluation.    The plan of care reviewed and developed with the advanced practice clinician (APC).    Brief Summary Statement/HPI:   See history above as per APC.  Briefly, 52-year-old homeless female who has COPD and continues to smoke 2 packs of cigarettes per day brought to the ED for 2 weeks of progressive shortness of breath.  Patient is a very poor historian, she is currently living at a local homeless shelter.  Caregivers at homeless shelter became concerned about patient's audible wheezing and red upper respiratory  sounds as well as a swollen red right foot therefore she was sent by EMS to [facility] ED for evaluation.  In the emergency department, laboratory values overall unremarkable and patient's chest x-ray shows no evidence of active infection.  She does have productive sputum, wet upper respiratory congestion consistent with COPD bronchitis.  She also has a mildly erythematous and slightly edematous right foot compared to the left, with multiple areas of skin breaks throughout the bilateral lower extremities.      Attending Physical Exam:  Temp:  [102.2 °F (39 °C)] 102.2 °F (39 °C)  Heart Rate:  [] 81  Resp:  [20-22] 22  BP: (107-118)/(59-77) 114/77  Constitutional: no acute distress, awake, alert, disheveled and chronically ill-appearing smells of urine  Eyes: PERRLA, sclerae anicteric, no conjunctival injection  Neck: supple, no thyromegaly, trachea midline  Respiratory: Wet upper and rate congestion, wheezes throughout posterior fields, coarse breath sounds throughout, mildly labored respirations.  At time of my exam not on O2 and satting 91% on room air.   Cardiovascular: RRR, no murmur  Gastrointestinal: Positive bowel sounds, soft, nontender, nondistended  Musculoskeletal: No bilateral ankle edema, no clubbing or cyanosis to bilateral lower extremities  Psychiatric: oriented x 3, appropriate affect, cooperative  Neurologic: Strength symmetric in all extremities, Cranial Nerves grossly intact to confrontation  Derm: Faint erythema and nonpitting edema to right dorsum of foot       Brief Assessment/Plan :      See above for further detailed assessment and plan developed with APC which I have reviewed and/or edited.    I believe this patient requires OBSERVATION status, however if further evaluation or treatment plans warrant, status may change.  Based upon current information, I predict patient's care encounter to be less than or equal to 2 midnights.        Ashlee Gonzalez MD 06/16/17 1:53 AM

## 2017-06-16 NOTE — PLAN OF CARE
Problem: Patient Care Overview (Adult)  Goal: Plan of Care Review  Outcome: Ongoing (interventions implemented as appropriate)    06/16/17 0412   Coping/Psychosocial Response Interventions   Plan Of Care Reviewed With patient   Patient Care Overview   Progress no change   Outcome Evaluation   Outcome Summary/Follow up Plan admit from er, soa, 2lnc, vss, drowsy

## 2017-06-16 NOTE — PROGRESS NOTES
[facility] Medicine Services  INPATIENT PROGRESS NOTE    Date of Admission: 6/15/2017  Length of Stay: 0  Primary Care Physician: RADHA Pedraza    Subjective   CC: copd exac, cellulitis  HPI:  Fever resolved, wheezing better, leg less red. Overall feels a little better    Review Of Systems:   Review of Systems  No headache, no vomiting, no diarrhea, no abd pain. Chronic mild Le edema. No vision changes.    Objective      Temp:  [97.6 °F (36.4 °C)-102.2 °F (39 °C)] 97.6 °F (36.4 °C)  Heart Rate:  [] 92  Resp:  [18-22] 20  BP: (100-125)/(59-84) 100/69  Physical Exam  Alert, ox4  Slightly pressure speech, but otherwise pleasant and appropriate  Speech clear, equal  and leg raise  rrr  Prolonged expiratory phase w/ bilateral wheezes, no distress  abd soft, nontender  Right mild pretibial erythema and minimal warmth, trace BLE edema 9but equal)  Palpable DP pulses bilaterally    Results Review:    I have reviewed the labs, radiology results and diagnostic studies.      Results from last 7 days  Lab Units 06/15/17  1846   WBC 10*3/mm3 3.52   HEMOGLOBIN g/dL 16.1*   PLATELETS 10*3/mm3 127*       Results from last 7 days  Lab Units 06/15/17  1846   SODIUM mmol/L 138   POTASSIUM mmol/L 3.8   CHLORIDE mmol/L 107   TOTAL CO2 mmol/L 31.0   BUN mg/dL 10   CREATININE mg/dL 1.30   GLUCOSE mg/dL 88   CALCIUM mg/dL 9.9       Culture Data: Cultures:    Blood Culture   Date Value Ref Range Status   06/15/2017 No growth at less than 24 hours  Preliminary   06/15/2017 No growth at less than 24 hours  Preliminary       Radiology Data: reviewed    I have reviewed the medications.    Assessment/Plan     Problem List  Hospital Problem List     * (Principal)Sepsis    Cellulitis of right leg    COPD exacerbation    Bronchitis    Tobacco abuse (Chronic)    Anxiety and depression (Chronic)    Homelessness (Chronic)    Seizure disorder    Renal insufficiency (Chronic)    Polycythemia    Nocturnal  enuresis               Assessment/Plan:  -stop zithromax, change to doxy; continue empiric vanc, zosyn  -mrsa nasal swab (if negative then likely d/c vanc since respiratory status and cellulitis improving)  -cbc, bmp in a.m.  -continue scheduled duonebs  -prednisone and taper as tolerates  -teds  -if cellulitis continues to improve switch to oral abx (see above)  -homeless, social work to see  -follow blood cultures      DVT prophylaxis: sq lovenox  Discharge Planning: I expect patient to be discharged to homeless shelter unknown timeframe.    Jim Zabala MD   06/16/17   10:25 AM

## 2017-06-16 NOTE — PLAN OF CARE
Problem: Respiratory Insufficiency (Adult)  Goal: Identify Related Risk Factors and Signs and Symptoms  Outcome: Ongoing (interventions implemented as appropriate)  Goal: Acid/Base Balance  Outcome: Ongoing (interventions implemented as appropriate)  Goal: Effective Ventilation  Outcome: Ongoing (interventions implemented as appropriate)    Problem: Infection, Risk/Actual (Adult)  Goal: Identify Related Risk Factors and Signs and Symptoms  Outcome: Ongoing (interventions implemented as appropriate)  Goal: Infection Prevention/Resolution  Outcome: Ongoing (interventions implemented as appropriate)    Problem: Patient Care Overview (Adult)  Goal: Plan of Care Review  Outcome: Ongoing (interventions implemented as appropriate)  Goal: Adult Individualization and Mutuality  Outcome: Ongoing (interventions implemented as appropriate)  Goal: Discharge Needs Assessment  Outcome: Ongoing (interventions implemented as appropriate)

## 2017-06-17 LAB
ANION GAP SERPL CALCULATED.3IONS-SCNC: 4 MMOL/L (ref 3–11)
BACTERIA UR QL AUTO: ABNORMAL /HPF
BILIRUB UR QL STRIP: NEGATIVE
BUN BLD-MCNC: 8 MG/DL (ref 9–23)
BUN/CREAT SERPL: 10 (ref 7–25)
CALCIUM SPEC-SCNC: 9.4 MG/DL (ref 8.7–10.4)
CHLORIDE SERPL-SCNC: 110 MMOL/L (ref 99–109)
CLARITY UR: CLEAR
CO2 SERPL-SCNC: 32 MMOL/L (ref 20–31)
COLOR UR: YELLOW
CREAT BLD-MCNC: 0.8 MG/DL (ref 0.6–1.3)
DEPRECATED RDW RBC AUTO: 62.1 FL (ref 37–54)
ERYTHROCYTE [DISTWIDTH] IN BLOOD BY AUTOMATED COUNT: 16.7 % (ref 11.3–14.5)
GFR SERPL CREATININE-BSD FRML MDRD: 75 ML/MIN/1.73
GLUCOSE BLD-MCNC: 100 MG/DL (ref 70–100)
GLUCOSE UR STRIP-MCNC: NEGATIVE MG/DL
HCT VFR BLD AUTO: 35.9 % (ref 34.5–44)
HGB BLD-MCNC: 10.9 G/DL (ref 11.5–15.5)
HGB UR QL STRIP.AUTO: NEGATIVE
HYALINE CASTS UR QL AUTO: ABNORMAL /LPF
KETONES UR QL STRIP: NEGATIVE
LEUKOCYTE ESTERASE UR QL STRIP.AUTO: ABNORMAL
MAGNESIUM SERPL-MCNC: 2.1 MG/DL (ref 1.3–2.7)
MCH RBC QN AUTO: 30.8 PG (ref 27–31)
MCHC RBC AUTO-ENTMCNC: 30.4 G/DL (ref 32–36)
MCV RBC AUTO: 101.4 FL (ref 80–99)
NITRITE UR QL STRIP: NEGATIVE
PH UR STRIP.AUTO: 7 [PH] (ref 5–8)
PLATELET # BLD AUTO: 206 10*3/MM3 (ref 150–450)
PMV BLD AUTO: 9.3 FL (ref 6–12)
POTASSIUM BLD-SCNC: 3.7 MMOL/L (ref 3.5–5.5)
PROT UR QL STRIP: NEGATIVE
RBC # BLD AUTO: 3.54 10*6/MM3 (ref 3.89–5.14)
RBC # UR: ABNORMAL /HPF
REF LAB TEST METHOD: ABNORMAL
SODIUM BLD-SCNC: 146 MMOL/L (ref 132–146)
SP GR UR STRIP: <=1.005 (ref 1–1.03)
SQUAMOUS #/AREA URNS HPF: ABNORMAL /HPF
UROBILINOGEN UR QL STRIP: ABNORMAL
VANCOMYCIN TROUGH SERPL-MCNC: 9.2 MCG/ML (ref 10–20)
WBC NRBC COR # BLD: 7.34 10*3/MM3 (ref 3.5–10.8)
WBC UR QL AUTO: ABNORMAL /HPF

## 2017-06-17 PROCEDURE — 63710000001 PREDNISONE PER 1 MG: Performed by: FAMILY MEDICINE

## 2017-06-17 PROCEDURE — 80202 ASSAY OF VANCOMYCIN: CPT

## 2017-06-17 PROCEDURE — 25010000002 ENOXAPARIN PER 10 MG: Performed by: FAMILY MEDICINE

## 2017-06-17 PROCEDURE — 85027 COMPLETE CBC AUTOMATED: CPT | Performed by: INTERNAL MEDICINE

## 2017-06-17 PROCEDURE — 25010000002 PIPERACILLIN-TAZOBACTAM: Performed by: NURSE PRACTITIONER

## 2017-06-17 PROCEDURE — 80048 BASIC METABOLIC PNL TOTAL CA: CPT | Performed by: INTERNAL MEDICINE

## 2017-06-17 PROCEDURE — 83735 ASSAY OF MAGNESIUM: CPT | Performed by: INTERNAL MEDICINE

## 2017-06-17 PROCEDURE — 99233 SBSQ HOSP IP/OBS HIGH 50: CPT | Performed by: HOSPITALIST

## 2017-06-17 PROCEDURE — 25010000002 VANCOMYCIN

## 2017-06-17 PROCEDURE — 81001 URINALYSIS AUTO W/SCOPE: CPT | Performed by: HOSPITALIST

## 2017-06-17 RX ORDER — SACCHAROMYCES BOULARDII 250 MG
250 CAPSULE ORAL 2 TIMES DAILY
Status: DISCONTINUED | OUTPATIENT
Start: 2017-06-17 | End: 2017-06-20 | Stop reason: HOSPADM

## 2017-06-17 RX ORDER — CLONAZEPAM 0.5 MG/1
0.5 TABLET ORAL EVERY 8 HOURS SCHEDULED
Status: DISCONTINUED | OUTPATIENT
Start: 2017-06-17 | End: 2017-06-20 | Stop reason: HOSPADM

## 2017-06-17 RX ORDER — TRAMADOL HYDROCHLORIDE 50 MG/1
50 TABLET ORAL EVERY 6 HOURS PRN
Status: DISCONTINUED | OUTPATIENT
Start: 2017-06-17 | End: 2017-06-20 | Stop reason: HOSPADM

## 2017-06-17 RX ORDER — GABAPENTIN 300 MG/1
600 CAPSULE ORAL EVERY 6 HOURS
Status: DISCONTINUED | OUTPATIENT
Start: 2017-06-17 | End: 2017-06-20 | Stop reason: HOSPADM

## 2017-06-17 RX ORDER — IPRATROPIUM BROMIDE AND ALBUTEROL SULFATE 2.5; .5 MG/3ML; MG/3ML
3 SOLUTION RESPIRATORY (INHALATION) EVERY 4 HOURS PRN
Status: DISCONTINUED | OUTPATIENT
Start: 2017-06-17 | End: 2017-06-20 | Stop reason: HOSPADM

## 2017-06-17 RX ADMIN — DIVALPROEX SODIUM 1000 MG: 500 TABLET, FILM COATED, EXTENDED RELEASE ORAL at 08:21

## 2017-06-17 RX ADMIN — Medication 250 MG: at 08:22

## 2017-06-17 RX ADMIN — ENOXAPARIN SODIUM 40 MG: 40 INJECTION SUBCUTANEOUS at 08:22

## 2017-06-17 RX ADMIN — CLONAZEPAM 0.5 MG: 0.5 TABLET ORAL at 08:22

## 2017-06-17 RX ADMIN — DOXYCYCLINE HYCLATE 100 MG: 100 CAPSULE ORAL at 22:34

## 2017-06-17 RX ADMIN — DULOXETINE 60 MG: 60 CAPSULE, DELAYED RELEASE ORAL at 08:20

## 2017-06-17 RX ADMIN — THEOPHYLLINE 600 MG: 300 TABLET, EXTENDED RELEASE ORAL at 08:25

## 2017-06-17 RX ADMIN — HYDROCODONE BITARTRATE AND ACETAMINOPHEN 1 TABLET: 5; 325 TABLET ORAL at 00:37

## 2017-06-17 RX ADMIN — ZIPRASIDONE HYDROCHLORIDE 80 MG: 20 CAPSULE ORAL at 18:41

## 2017-06-17 RX ADMIN — CLONAZEPAM 0.5 MG: 0.5 TABLET ORAL at 14:38

## 2017-06-17 RX ADMIN — GABAPENTIN 600 MG: 300 CAPSULE ORAL at 14:38

## 2017-06-17 RX ADMIN — QUETIAPINE FUMARATE 300 MG: 100 TABLET ORAL at 22:33

## 2017-06-17 RX ADMIN — PIPERACILLIN SODIUM,TAZOBACTAM SODIUM 3.38 G: 3; .375 INJECTION, POWDER, FOR SOLUTION INTRAVENOUS at 08:23

## 2017-06-17 RX ADMIN — Medication 250 MG: at 18:41

## 2017-06-17 RX ADMIN — FUROSEMIDE 40 MG: 40 TABLET ORAL at 08:22

## 2017-06-17 RX ADMIN — PIPERACILLIN SODIUM,TAZOBACTAM SODIUM 3.38 G: 3; .375 INJECTION, POWDER, FOR SOLUTION INTRAVENOUS at 14:45

## 2017-06-17 RX ADMIN — VANCOMYCIN HYDROCHLORIDE 1500 MG: 1 INJECTION, POWDER, LYOPHILIZED, FOR SOLUTION INTRAVENOUS at 22:33

## 2017-06-17 RX ADMIN — ZIPRASIDONE HYDROCHLORIDE 80 MG: 20 CAPSULE ORAL at 08:25

## 2017-06-17 RX ADMIN — TRAMADOL HYDROCHLORIDE 50 MG: 50 TABLET, COATED ORAL at 14:06

## 2017-06-17 RX ADMIN — NICOTINE 1 PATCH: 21 PATCH, EXTENDED RELEASE TRANSDERMAL at 08:22

## 2017-06-17 RX ADMIN — ATENOLOL 50 MG: 50 TABLET ORAL at 08:21

## 2017-06-17 RX ADMIN — DOXYCYCLINE HYCLATE 100 MG: 100 CAPSULE ORAL at 08:20

## 2017-06-17 RX ADMIN — GABAPENTIN 300 MG: 300 CAPSULE ORAL at 06:56

## 2017-06-17 RX ADMIN — PREDNISONE 40 MG: 20 TABLET ORAL at 08:22

## 2017-06-17 RX ADMIN — GABAPENTIN 600 MG: 300 CAPSULE ORAL at 22:33

## 2017-06-17 RX ADMIN — LEVOTHYROXINE SODIUM 50 MCG: 50 TABLET ORAL at 06:56

## 2017-06-17 RX ADMIN — Medication 10 ML: at 08:25

## 2017-06-17 RX ADMIN — PIPERACILLIN SODIUM,TAZOBACTAM SODIUM 3.38 G: 3; .375 INJECTION, POWDER, FOR SOLUTION INTRAVENOUS at 04:36

## 2017-06-17 RX ADMIN — THEOPHYLLINE 600 MG: 300 TABLET, EXTENDED RELEASE ORAL at 22:33

## 2017-06-17 RX ADMIN — TRAMADOL HYDROCHLORIDE 50 MG: 50 TABLET, COATED ORAL at 08:21

## 2017-06-17 RX ADMIN — TRAMADOL HYDROCHLORIDE 50 MG: 50 TABLET, COATED ORAL at 22:47

## 2017-06-17 RX ADMIN — DULOXETINE 60 MG: 60 CAPSULE, DELAYED RELEASE ORAL at 22:35

## 2017-06-17 RX ADMIN — CLONAZEPAM 0.5 MG: 0.5 TABLET ORAL at 22:34

## 2017-06-17 RX ADMIN — PIPERACILLIN SODIUM,TAZOBACTAM SODIUM 3.38 G: 3; .375 INJECTION, POWDER, FOR SOLUTION INTRAVENOUS at 22:32

## 2017-06-17 NOTE — PROGRESS NOTES
[facility] Medicine Services  INPATIENT PROGRESS NOTE    Date of Admission: 6/15/2017  Length of Stay: 1  Primary Care Physician: RADHA Pedraza    Subjective   CC: copd exac, cellulitis  HPI:  Fevers better. Still with some R LE redness/swelling. No n/v. Some pain in R LE.    Review Of Systems:   Review of Systems  No headache, no vomiting, no diarrhea, no abd pain. Chronic mild Le edema. No vision changes.    Objective      Temp:  [97.5 °F (36.4 °C)-98.5 °F (36.9 °C)] 98.5 °F (36.9 °C)  Heart Rate:  [80-92] 80  Resp:  [18-20] 18  BP: ()/(54-77) 118/77  Physical Exam  Alert, ox4  Slightly pressure speech, but otherwise pleasant and appropriate  Speech clear, equal  and leg raise  rrr  Prolonged expiratory phase w/ bilateral wheezes, no distress  abd soft, nontender  Right mild pretibial erythema and minimal warmth, trace R LE edema, none on L  Palpable DP pulses bilaterally    Results Review:    I have reviewed the labs, radiology results and diagnostic studies.      Results from last 7 days  Lab Units 06/15/17  1846   WBC 10*3/mm3 3.52   HEMOGLOBIN g/dL 16.1*   PLATELETS 10*3/mm3 127*       Results from last 7 days  Lab Units 06/15/17  1846   SODIUM mmol/L 138   POTASSIUM mmol/L 3.8   CHLORIDE mmol/L 107   TOTAL CO2 mmol/L 31.0   BUN mg/dL 10   CREATININE mg/dL 1.30   GLUCOSE mg/dL 88   CALCIUM mg/dL 9.9       Culture Data: Cultures:    Blood Culture   Date Value Ref Range Status   06/15/2017 No growth at less than 24 hours  Preliminary   06/15/2017 No growth at less than 24 hours  Preliminary       Radiology Data: reviewed    I have reviewed the medications.    Assessment/Plan     Problem List  Hospital Problem List     * (Principal)Sepsis    Tobacco abuse (Chronic)    Anxiety and depression (Chronic)    Homelessness (Chronic)    Seizure disorder    Renal insufficiency (Chronic)    Polycythemia    Nocturnal enuresis    Cellulitis of right leg    Bronchitis    COPD  exacerbation               Assessment/Plan:  -continue abx, await MRSA screen  -monitor LE edema for resolution/improvement  -continue nebulizers/steroids  -home in 1-2 days      DVT prophylaxis: sq lovenox  Discharge Planning: I expect patient to be discharged to homeless shelter unknown timeframe.    Mauricio Reaves MD   06/17/17   7:33 AM

## 2017-06-17 NOTE — PLAN OF CARE
Problem: Infection, Risk/Actual (Adult)  Goal: Identify Related Risk Factors and Signs and Symptoms  Outcome: Ongoing (interventions implemented as appropriate)    06/16/17 1636   Infection, Risk/Actual   Infection, Risk/Actual: Related Risk Factors chronic illness/condition;poor personal hygiene   Signs and Symptoms (Infection, Risk/Actual) edema;pain;weakness       Goal: Infection Prevention/Resolution  Outcome: Ongoing (interventions implemented as appropriate)    06/17/17 0537   Infection, Risk/Actual (Adult)   Infection Prevention/Resolution making progress toward outcome

## 2017-06-18 LAB — MRSA SPEC QL CULT: NORMAL

## 2017-06-18 PROCEDURE — 25010000002 PIPERACILLIN-TAZOBACTAM: Performed by: NURSE PRACTITIONER

## 2017-06-18 PROCEDURE — 99232 SBSQ HOSP IP/OBS MODERATE 35: CPT | Performed by: HOSPITALIST

## 2017-06-18 PROCEDURE — 25010000002 VANCOMYCIN

## 2017-06-18 PROCEDURE — 63710000001 PREDNISONE PER 5 MG: Performed by: HOSPITALIST

## 2017-06-18 PROCEDURE — 63710000001 PREDNISONE PER 1 MG: Performed by: HOSPITALIST

## 2017-06-18 PROCEDURE — 25010000002 ENOXAPARIN PER 10 MG: Performed by: FAMILY MEDICINE

## 2017-06-18 RX ORDER — AMOXICILLIN AND CLAVULANATE POTASSIUM 875; 125 MG/1; MG/1
1 TABLET, FILM COATED ORAL EVERY 12 HOURS SCHEDULED
Status: DISCONTINUED | OUTPATIENT
Start: 2017-06-18 | End: 2017-06-20 | Stop reason: HOSPADM

## 2017-06-18 RX ORDER — ACYCLOVIR 200 MG/1
200 CAPSULE ORAL
Status: DISCONTINUED | OUTPATIENT
Start: 2017-06-18 | End: 2017-06-20 | Stop reason: HOSPADM

## 2017-06-18 RX ORDER — FLUCONAZOLE 100 MG/1
200 TABLET ORAL ONCE
Status: COMPLETED | OUTPATIENT
Start: 2017-06-18 | End: 2017-06-18

## 2017-06-18 RX ADMIN — NICOTINE 1 PATCH: 21 PATCH, EXTENDED RELEASE TRANSDERMAL at 09:06

## 2017-06-18 RX ADMIN — GABAPENTIN 600 MG: 300 CAPSULE ORAL at 09:05

## 2017-06-18 RX ADMIN — DULOXETINE 60 MG: 60 CAPSULE, DELAYED RELEASE ORAL at 09:05

## 2017-06-18 RX ADMIN — AMOXICILLIN AND CLAVULANATE POTASSIUM 1 TABLET: 875; 125 TABLET, FILM COATED ORAL at 09:08

## 2017-06-18 RX ADMIN — QUETIAPINE FUMARATE 300 MG: 100 TABLET ORAL at 21:00

## 2017-06-18 RX ADMIN — DIVALPROEX SODIUM 1000 MG: 500 TABLET, FILM COATED, EXTENDED RELEASE ORAL at 09:05

## 2017-06-18 RX ADMIN — THEOPHYLLINE 600 MG: 300 TABLET, EXTENDED RELEASE ORAL at 21:08

## 2017-06-18 RX ADMIN — ZIPRASIDONE HYDROCHLORIDE 80 MG: 20 CAPSULE ORAL at 17:51

## 2017-06-18 RX ADMIN — Medication 250 MG: at 12:04

## 2017-06-18 RX ADMIN — ENOXAPARIN SODIUM 40 MG: 40 INJECTION SUBCUTANEOUS at 09:04

## 2017-06-18 RX ADMIN — PIPERACILLIN SODIUM,TAZOBACTAM SODIUM 3.38 G: 3; .375 INJECTION, POWDER, FOR SOLUTION INTRAVENOUS at 04:48

## 2017-06-18 RX ADMIN — DOXYCYCLINE HYCLATE 100 MG: 100 CAPSULE ORAL at 09:05

## 2017-06-18 RX ADMIN — AMOXICILLIN AND CLAVULANATE POTASSIUM 1 TABLET: 875; 125 TABLET, FILM COATED ORAL at 21:08

## 2017-06-18 RX ADMIN — GABAPENTIN 600 MG: 300 CAPSULE ORAL at 21:00

## 2017-06-18 RX ADMIN — GABAPENTIN 600 MG: 300 CAPSULE ORAL at 14:44

## 2017-06-18 RX ADMIN — FUROSEMIDE 40 MG: 40 TABLET ORAL at 09:05

## 2017-06-18 RX ADMIN — CLONAZEPAM 0.5 MG: 0.5 TABLET ORAL at 21:00

## 2017-06-18 RX ADMIN — ACYCLOVIR 200 MG: 200 CAPSULE ORAL at 14:43

## 2017-06-18 RX ADMIN — ACYCLOVIR 200 MG: 200 CAPSULE ORAL at 12:04

## 2017-06-18 RX ADMIN — Medication 250 MG: at 17:52

## 2017-06-18 RX ADMIN — GABAPENTIN 600 MG: 300 CAPSULE ORAL at 04:48

## 2017-06-18 RX ADMIN — TRAMADOL HYDROCHLORIDE 50 MG: 50 TABLET, COATED ORAL at 17:56

## 2017-06-18 RX ADMIN — VANCOMYCIN HYDROCHLORIDE 1500 MG: 1 INJECTION, POWDER, LYOPHILIZED, FOR SOLUTION INTRAVENOUS at 21:08

## 2017-06-18 RX ADMIN — THEOPHYLLINE 600 MG: 300 TABLET, EXTENDED RELEASE ORAL at 09:09

## 2017-06-18 RX ADMIN — DOXYCYCLINE HYCLATE 100 MG: 100 CAPSULE ORAL at 21:01

## 2017-06-18 RX ADMIN — LEVOTHYROXINE SODIUM 50 MCG: 50 TABLET ORAL at 04:48

## 2017-06-18 RX ADMIN — FLUCONAZOLE 200 MG: 100 TABLET ORAL at 09:05

## 2017-06-18 RX ADMIN — CLONAZEPAM 0.5 MG: 0.5 TABLET ORAL at 04:48

## 2017-06-18 RX ADMIN — ATENOLOL 50 MG: 50 TABLET ORAL at 09:05

## 2017-06-18 RX ADMIN — CLONAZEPAM 0.5 MG: 0.5 TABLET ORAL at 14:43

## 2017-06-18 RX ADMIN — ACYCLOVIR 200 MG: 200 CAPSULE ORAL at 17:52

## 2017-06-18 RX ADMIN — PREDNISONE 30 MG: 20 TABLET ORAL at 09:06

## 2017-06-18 RX ADMIN — DULOXETINE 60 MG: 60 CAPSULE, DELAYED RELEASE ORAL at 21:00

## 2017-06-18 RX ADMIN — ACYCLOVIR 200 MG: 200 CAPSULE ORAL at 21:08

## 2017-06-18 RX ADMIN — ZIPRASIDONE HYDROCHLORIDE 80 MG: 20 CAPSULE ORAL at 09:10

## 2017-06-18 NOTE — PLAN OF CARE
Problem: Respiratory Insufficiency (Adult)  Goal: Identify Related Risk Factors and Signs and Symptoms  Outcome: Ongoing (interventions implemented as appropriate)    06/18/17 1627   Respiratory Insufficiency   Related Risk Factors (Respiratory Insufficiency) activity intolerance;pain;medication effects;psychosocial factor;second-hand smoke exposure;sleep disturbance;smoking   Signs and Symptoms (Respiratory Insufficiency) abnormal breath sounds;cough (productive/ineffective);dyspnea         Problem: Infection, Risk/Actual (Adult)  Goal: Infection Prevention/Resolution  Outcome: Ongoing (interventions implemented as appropriate)    06/18/17 1627   Infection, Risk/Actual (Adult)   Infection Prevention/Resolution making progress toward outcome         Problem: Patient Care Overview (Adult)  Goal: Plan of Care Review  Outcome: Ongoing (interventions implemented as appropriate)    06/18/17 1627   Coping/Psychosocial Response Interventions   Plan Of Care Reviewed With patient   Patient Care Overview   Progress improving   Outcome Evaluation   Outcome Summary/Follow up Plan resp unlabored @rest, less frequent wheezing, BLE skin improving, still has urinary symptoms       Goal: Adult Individualization and Mutuality    06/18/17 1627   Mutuality/Individual Preferences   What Anxieties, Fears or Concerns Do You Have About Your Health or Care? homeless, lack of cleanliness   What Questions Do You Have About Your Health or Care? how long will i be in the hospital? how long will I need antibiotics?   What Information Would Help Us Give You More Personalized Care? homeless, financial concerns   Individualization   Patient Specific Goals get over whatever infection I have   Patient Specific Interventions case management consulted       Goal: Discharge Needs Assessment    06/16/17 1048 06/18/17 1627   Self-Care   Equipment Currently Used at Home --  none   Discharge Needs Assessment   Concerns To Be Addressed homelessness --     Current Discharge Risk homeless --

## 2017-06-18 NOTE — PROGRESS NOTES
[facility] Medicine Services  INPATIENT PROGRESS NOTE    Date of Admission: 6/15/2017  Length of Stay: 2  Primary Care Physician: RADHA Pedraza    Subjective   CC: copd exac, cellulitis  HPI:  Fevers better. Legs less swollen.  Legs less red. Noted genital herpes outbreak. Otherwise no issues.    Review Of Systems:   Review of Systems  No headache, no vomiting, no diarrhea, no abd pain. Chronic mild Le edema. No vision changes.    Objective      Temp:  [97.6 °F (36.4 °C)-98.5 °F (36.9 °C)] 97.6 °F (36.4 °C)  Heart Rate:  [85-90] 88  Resp:  [16-18] 18  BP: (105-138)/(63-86) 138/86  Physical Exam  Alert, ox4  Slightly pressure speech, but otherwise pleasant and appropriate  Speech clear, equal  and leg raise  rrr  Prolonged expiratory phase w/ bilateral wheezes, no distress  abd soft, nontender  Right mild pretibial erythema and minimal warmth, trace R LE edema, none on L, but edema and redness better today.  Palpable DP pulses bilaterally    Results Review:    I have reviewed the labs, radiology results and diagnostic studies.      Results from last 7 days  Lab Units 06/17/17  0844   WBC 10*3/mm3 7.34   HEMOGLOBIN g/dL 10.9*   PLATELETS 10*3/mm3 206       Results from last 7 days  Lab Units 06/17/17  0658   SODIUM mmol/L 146   POTASSIUM mmol/L 3.7   CHLORIDE mmol/L 110*   TOTAL CO2 mmol/L 32.0*   BUN mg/dL 8*   CREATININE mg/dL 0.80   GLUCOSE mg/dL 100   CALCIUM mg/dL 9.4       Culture Data: Cultures:    Blood Culture   Date Value Ref Range Status   06/15/2017 No growth at less than 24 hours  Preliminary   06/15/2017 No growth at less than 24 hours  Preliminary       Radiology Data: reviewed    I have reviewed the medications.    Assessment/Plan     Problem List  Hospital Problem List     * (Principal)Sepsis    Tobacco abuse (Chronic)    Anxiety and depression (Chronic)    Homelessness (Chronic)    Seizure disorder    Renal insufficiency (Chronic)    Polycythemia    Nocturnal  enuresis    Cellulitis of right leg    Bronchitis    COPD exacerbation               Assessment/Plan:  -continue abx, MRSA screen negative, transition to oral abx  -acyclovir for herpes  -diflucan for suspect yeast co-infection  -monitor LE edema for resolution/improvement  -continue nebulizers/steroids  -home in 1-2 days      DVT prophylaxis: sq lovenox  Discharge Planning: I expect patient to be discharged to homeless shelter unknown timeframe.    Mauricio Reaves MD   06/18/17   8:24 AM

## 2017-06-18 NOTE — PLAN OF CARE
Problem: Infection, Risk/Actual (Adult)  Goal: Infection Prevention/Resolution  Outcome: Ongoing (interventions implemented as appropriate)    06/18/17 0215   Infection, Risk/Actual (Adult)   Infection Prevention/Resolution making progress toward out       06/18/17 0215   Infection, Risk/Actual (Adult)   Infection Prevention/Resolution making progress toward outcome   come

## 2017-06-18 NOTE — PLAN OF CARE
Problem: Infection, Risk/Actual (Adult)  Goal: Identify Related Risk Factors and Signs and Symptoms  Outcome: Ongoing (interventions implemented as appropriate)    06/18/17 0216   Infection, Risk/Actual   Infection, Risk/Actual: Related Risk Factors age extremes   Signs and Symptoms (Infection, Risk/Actual) edema       Goal: Infection Prevention/Resolution  Outcome: Ongoing (interventions implemented as appropriate)    06/18/17 0216   Infection, Risk/Actual (Adult)   Infection Prevention/Resolution making progress toward outcome

## 2017-06-19 PROBLEM — A41.9 SEPSIS (HCC): Status: RESOLVED | Noted: 2017-03-01 | Resolved: 2017-06-19

## 2017-06-19 PROCEDURE — 99232 SBSQ HOSP IP/OBS MODERATE 35: CPT | Performed by: PHYSICIAN ASSISTANT

## 2017-06-19 PROCEDURE — 25010000002 ENOXAPARIN PER 10 MG: Performed by: FAMILY MEDICINE

## 2017-06-19 PROCEDURE — 94799 UNLISTED PULMONARY SVC/PX: CPT

## 2017-06-19 PROCEDURE — 63710000001 PREDNISONE PER 5 MG: Performed by: HOSPITALIST

## 2017-06-19 PROCEDURE — 94640 AIRWAY INHALATION TREATMENT: CPT

## 2017-06-19 PROCEDURE — 63710000001 PREDNISONE PER 1 MG: Performed by: HOSPITALIST

## 2017-06-19 RX ORDER — CAPSAICIN 0.75 MG/G
CREAM TOPICAL EVERY 8 HOURS SCHEDULED
Status: DISCONTINUED | OUTPATIENT
Start: 2017-06-19 | End: 2017-06-20 | Stop reason: HOSPADM

## 2017-06-19 RX ORDER — ACETAMINOPHEN 325 MG/1
650 TABLET ORAL EVERY 6 HOURS PRN
Status: DISCONTINUED | OUTPATIENT
Start: 2017-06-19 | End: 2017-06-20 | Stop reason: HOSPADM

## 2017-06-19 RX ORDER — ATENOLOL 25 MG/1
25 TABLET ORAL ONCE
Status: COMPLETED | OUTPATIENT
Start: 2017-06-19 | End: 2017-06-19

## 2017-06-19 RX ORDER — IPRATROPIUM BROMIDE AND ALBUTEROL SULFATE 2.5; .5 MG/3ML; MG/3ML
3 SOLUTION RESPIRATORY (INHALATION)
Status: DISCONTINUED | OUTPATIENT
Start: 2017-06-19 | End: 2017-06-20 | Stop reason: HOSPADM

## 2017-06-19 RX ADMIN — CLONAZEPAM 0.5 MG: 0.5 TABLET ORAL at 14:38

## 2017-06-19 RX ADMIN — QUETIAPINE FUMARATE 300 MG: 100 TABLET ORAL at 20:18

## 2017-06-19 RX ADMIN — GABAPENTIN 600 MG: 300 CAPSULE ORAL at 14:40

## 2017-06-19 RX ADMIN — THEOPHYLLINE 600 MG: 300 TABLET, EXTENDED RELEASE ORAL at 08:55

## 2017-06-19 RX ADMIN — DIVALPROEX SODIUM 1000 MG: 500 TABLET, FILM COATED, EXTENDED RELEASE ORAL at 08:52

## 2017-06-19 RX ADMIN — CAPSAICIN: 0.75 CREAM TOPICAL at 14:51

## 2017-06-19 RX ADMIN — FUROSEMIDE 40 MG: 40 TABLET ORAL at 08:52

## 2017-06-19 RX ADMIN — TRAMADOL HYDROCHLORIDE 50 MG: 50 TABLET, COATED ORAL at 09:03

## 2017-06-19 RX ADMIN — AMOXICILLIN AND CLAVULANATE POTASSIUM 1 TABLET: 875; 125 TABLET, FILM COATED ORAL at 08:55

## 2017-06-19 RX ADMIN — GABAPENTIN 600 MG: 300 CAPSULE ORAL at 08:52

## 2017-06-19 RX ADMIN — ACYCLOVIR 200 MG: 200 CAPSULE ORAL at 14:50

## 2017-06-19 RX ADMIN — IPRATROPIUM BROMIDE AND ALBUTEROL SULFATE 3 ML: .5; 3 SOLUTION RESPIRATORY (INHALATION) at 09:24

## 2017-06-19 RX ADMIN — DOXYCYCLINE HYCLATE 100 MG: 100 CAPSULE ORAL at 20:17

## 2017-06-19 RX ADMIN — GABAPENTIN 600 MG: 300 CAPSULE ORAL at 20:18

## 2017-06-19 RX ADMIN — Medication 10 ML: at 08:59

## 2017-06-19 RX ADMIN — ACYCLOVIR 200 MG: 200 CAPSULE ORAL at 20:20

## 2017-06-19 RX ADMIN — NICOTINE 1 PATCH: 21 PATCH, EXTENDED RELEASE TRANSDERMAL at 08:55

## 2017-06-19 RX ADMIN — THEOPHYLLINE 600 MG: 300 TABLET, EXTENDED RELEASE ORAL at 20:19

## 2017-06-19 RX ADMIN — AMOXICILLIN AND CLAVULANATE POTASSIUM 1 TABLET: 875; 125 TABLET, FILM COATED ORAL at 20:20

## 2017-06-19 RX ADMIN — GABAPENTIN 600 MG: 300 CAPSULE ORAL at 06:00

## 2017-06-19 RX ADMIN — ZIPRASIDONE HYDROCHLORIDE 80 MG: 20 CAPSULE ORAL at 18:00

## 2017-06-19 RX ADMIN — Medication 250 MG: at 20:17

## 2017-06-19 RX ADMIN — DULOXETINE 60 MG: 60 CAPSULE, DELAYED RELEASE ORAL at 08:51

## 2017-06-19 RX ADMIN — DULOXETINE 60 MG: 60 CAPSULE, DELAYED RELEASE ORAL at 20:19

## 2017-06-19 RX ADMIN — IPRATROPIUM BROMIDE AND ALBUTEROL SULFATE 3 ML: .5; 3 SOLUTION RESPIRATORY (INHALATION) at 16:39

## 2017-06-19 RX ADMIN — ATENOLOL 25 MG: 25 TABLET ORAL at 12:00

## 2017-06-19 RX ADMIN — IPRATROPIUM BROMIDE AND ALBUTEROL SULFATE 3 ML: .5; 3 SOLUTION RESPIRATORY (INHALATION) at 20:06

## 2017-06-19 RX ADMIN — CLONAZEPAM 0.5 MG: 0.5 TABLET ORAL at 20:18

## 2017-06-19 RX ADMIN — ACYCLOVIR 200 MG: 200 CAPSULE ORAL at 06:01

## 2017-06-19 RX ADMIN — ENOXAPARIN SODIUM 40 MG: 40 INJECTION SUBCUTANEOUS at 08:53

## 2017-06-19 RX ADMIN — ZIPRASIDONE HYDROCHLORIDE 80 MG: 20 CAPSULE ORAL at 08:55

## 2017-06-19 RX ADMIN — TRAMADOL HYDROCHLORIDE 50 MG: 50 TABLET, COATED ORAL at 20:18

## 2017-06-19 RX ADMIN — ACYCLOVIR 200 MG: 200 CAPSULE ORAL at 11:00

## 2017-06-19 RX ADMIN — ATENOLOL 50 MG: 50 TABLET ORAL at 08:52

## 2017-06-19 RX ADMIN — PREDNISONE 30 MG: 20 TABLET ORAL at 08:51

## 2017-06-19 RX ADMIN — IPRATROPIUM BROMIDE AND ALBUTEROL SULFATE 3 ML: .5; 3 SOLUTION RESPIRATORY (INHALATION) at 12:41

## 2017-06-19 RX ADMIN — DOXYCYCLINE HYCLATE 100 MG: 100 CAPSULE ORAL at 08:51

## 2017-06-19 RX ADMIN — ACYCLOVIR 200 MG: 200 CAPSULE ORAL at 18:00

## 2017-06-19 RX ADMIN — CLONAZEPAM 0.5 MG: 0.5 TABLET ORAL at 06:01

## 2017-06-19 RX ADMIN — LEVOTHYROXINE SODIUM 50 MCG: 50 TABLET ORAL at 06:01

## 2017-06-19 NOTE — PROGRESS NOTES
[facility] Medicine Services  INPATIENT PROGRESS NOTE    Date of Admission: 6/15/2017  Length of Stay: 3  Primary Care Physician: RADHA Pedraza    Subjective   CC: copd exac, cellulitis  HPI:  LE leg swelling and redness continues to improve, c/o chronic aches/pains, audible wheezes, says refused nebs bc tired, long discussion r/t medical compliance, need for nebs. No fever, chills, worsening SOA, CP, N/V.     Review Of Systems:   Review of Systems  No headache, no vomiting, no diarrhea, no abd pain. Chronic mild Le edema. No vision changes.    Objective      Temp:  [98 °F (36.7 °C)-99.2 °F (37.3 °C)] 98 °F (36.7 °C)  Heart Rate:  [100-109] 109  Resp:  [18] 18  BP: (148-154)/() 148/98  Physical Exam  Alert, ox4  Slightly pressure speech, but otherwise pleasant and appropriate  Speech clear, equal  and leg raise  Mildly tachcardic, regular rhythm   Prolonged expiratory phase w/ bilateral wheezes, no distress  abd soft, nontender, nondistended  Right mild pretibial erythema and minimal warmth, no LE noted  Palpable DP pulses bilaterally    Results Review:    I have reviewed the labs, radiology results and diagnostic studies.      Results from last 7 days  Lab Units 06/17/17  0844   WBC 10*3/mm3 7.34   HEMOGLOBIN g/dL 10.9*   PLATELETS 10*3/mm3 206       Results from last 7 days  Lab Units 06/17/17  0658   SODIUM mmol/L 146   POTASSIUM mmol/L 3.7   CHLORIDE mmol/L 110*   TOTAL CO2 mmol/L 32.0*   BUN mg/dL 8*   CREATININE mg/dL 0.80   GLUCOSE mg/dL 100   CALCIUM mg/dL 9.4       Culture Data: Cultures:    MRSA Screen Culture [[id number]] (Normal) Collected: 06/16/17 1503       Lab Status: Final result Specimen: Swab from Nares Updated: 06/18/17 0651        MRSA SCREEN CX No Methicillin Resistant Staphylococcus aureus Isolated at 2 days       Blood Culture [[id number]] (Normal) Collected: 06/15/17 1845       Lab Status: Preliminary result Specimen: Blood from Hand, Right  Updated: 06/18/17 2001        Blood Culture No growth at 3 days       Blood Culture [[id number]] (Normal) Collected: 06/15/17 1835       Lab Status: Preliminary result Specimen: Blood from Arm, Right Updated: 06/18/17 2001        Blood Culture No growth at 3 days               Radiology Data: reviewed    I have reviewed the medications.    Assessment/Plan     Problem List  Hospital Problem List     * (Principal)Sepsis    Tobacco abuse (Chronic)    Anxiety and depression (Chronic)    Homelessness (Chronic)    Seizure disorder    Renal insufficiency (Chronic)    Polycythemia    Nocturnal enuresis    Cellulitis of right leg    Bronchitis    COPD exacerbation               Assessment/Plan:  -continue abx (zosyn x2 d, now augmentin d2, continues doxy, MRSA screen negative, transition to oral abx  -acyclovir for herpes  -s/p 1dose diflucan for suspect yeast co-infection  -monitor LE edema for resolution/improvement (on doxy 4 d)  -continue nebulizers/steroids (discussed importance of compliance with nebs, pt has refused all prior)  -increased atenolol to 75mg daily, monitor BP/HR  -bmp,cbc in a.m.  -  [facility]ing with return to [facility]  -supportive care from chronic arthritic pain (Kpad, APAP, capsicin)     DVT prophylaxis: sq lovenox  Discharge Planning: I expect patient to be discharged to homeless shelter tomorrow  Casie M Mayne, PA-C   06/19/17   9:21 AM

## 2017-06-19 NOTE — PLAN OF CARE
Problem: Respiratory Insufficiency (Adult)  Goal: Identify Related Risk Factors and Signs and Symptoms  Outcome: Ongoing (interventions implemented as appropriate)    06/19/17 0141   Respiratory Insufficiency   Related Risk Factors (Respiratory Insufficiency) motivation lacking;obesity;knowledge deficit;psychosocial factor   Signs and Symptoms (Respiratory Insufficiency) abnormal breath sounds;shortness of breath       Goal: Acid/Base Balance  Outcome: Ongoing (interventions implemented as appropriate)    06/19/17 0141   Respiratory Insufficiency (Adult)   Acid/Base Balance making progress toward outcome       Goal: Effective Ventilation  Outcome: Ongoing (interventions implemented as appropriate)    06/19/17 0141   Respiratory Insufficiency (Adult)   Effective Ventilation making progress toward outcome

## 2017-06-19 NOTE — PAYOR COMM NOTE
"Colleen Goznalez (52 y.o. Female)   /UR Cheyenne Ryan,-[phone]    Date of Birth Social Security Number Address Home Phone MRN    1965  [facility] ([facility]) [place] [id number] [phone] [id number]    Nondenominational Marital Status          None Single       Admission Date Admission Type Admitting Provider Attending Provider Department, Room/Bed    6/15/17 Emergency Mauricio Reaves MD Russell, Marc P, MD [facility] 3E, S336/1    Discharge Date Discharge Disposition Discharge Destination                      Attending Provider: Mauricio Reaves MD     Allergies:  Aspirin, Codeine, Ibuprofen    Isolation:  None   Infection:  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), MRSA (03/09/17)   Code Status:  FULL    Ht:  68\" (172.7 cm)   Wt:  260 lb 2.3 oz (118 kg)    Admission Cmt:  None   Principal Problem:  Sepsis [A41.9]                 Active Insurance as of 6/15/2017     Primary Coverage     Payor Plan Insurance Group Employer/Plan Group    [employer] [place] [employer] MEDICAID      Payor Plan Address Payor Plan Phone Number Effective From Effective To    [address] [address] [phone] 3/1/2017     [place] [address]       Subscriber Name Subscriber Birth Date Member ID       COLLEEN GONZALEZ 1965 [id number]                 Emergency Contacts      (Rel.) Home Phone Work Phone Mobile Phone    [address] (Sister) -- -- [phone]               History & Physical      Ashlee Gonzalez MD at 6/15/2017 10:53 PM            [facility] Medicine Services  HISTORY AND PHYSICAL    Primary Care Physician: RADHA Pedraza    Subjective     Chief Complaint: Shortness of breath    History of Present Illness    52 year old female who presents to the ED with c/o SOA with onset two weeks ago. She reports she is chronically SOA, however, over the last two weeks her SOA has progressively worsened from baseline. She notes having an associated cough producing clear sputum as well. Also c/o " lower extremity pain.    She is homeless and is currently residing in a shelter. She has not been sleeping outdoors. To her knowledge she has not been exposed to animals, insects, arthropods, livestock, or roosting birds.     Medical history significant for anxiety, asthma, CKD, COPD, depression, diabetes mellitus diet controlled, fibromyalgia, hypertension, hyperlipidemia, hypothyroidism, obesity, seizures, neuropathy. Continues to smoke.     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 (temp 102.2; ; RLE soft tissue infection).    Review of Systems   Constitutional: Positive for activity change and fatigue. Negative for appetite change, chills and fever.   HENT: Negative for trouble swallowing.    Eyes: Negative for visual disturbance.   Respiratory: Positive for cough and shortness of breath (chronic). Negative for choking and chest tightness.    Cardiovascular: Negative for chest pain and leg swelling.   Gastrointestinal: Negative for abdominal distention, constipation, diarrhea, nausea and vomiting.   Genitourinary: Positive for enuresis.   Musculoskeletal: Positive for back pain and myalgias.   Neurological: Positive for weakness.   Psychiatric/Behavioral: Negative for hallucinations. The patient is not nervous/anxious.       Otherwise complete ROS reviewed and negative except as mentioned in the HPI.      Past Medical History:   Past Medical History:   Diagnosis Date   • TOM (acute kidney injury) 5/25/2017   • Allergic    • Altered mental status 6/9/2017   • Anxiety    • Asthma    • Chronic kidney disease    • COPD (chronic obstructive pulmonary disease)    • Depression    • Diabetes mellitus    • Emphysema lung    • Fibromyalgia    • Herpes    • Hyperlipidemia    • Hypertension    • Hypothyroid    • Neuropathy    • Obesity    • Pneumonia     • Renal insufficiency 6/9/2017   • Seizures        Past Surgical History:  Past Surgical History:   Procedure Laterality Date   • BACK SURGERY     • BRONCHOSCOPY Left 3/15/2017    Procedure: BRONCHOSCOPY WITH FLUORO;  Surgeon: Ankur Salomon MD;  Location: [facility] ENDOSCOPY;  Service:    • CHOLECYSTECTOMY     • CYST REMOVAL     • HYSTERECTOMY      partial   • KNEE SURGERY Bilateral        Family History:   Family History   Problem Relation Age of Onset   • Heart failure Mother    • COPD Mother    • Heart attack Mother    • Diabetes Father      Social History:   Social History     Social History   • Marital status: Single     Spouse name: N/A   • Number of children: N/A   • Years of education: N/A     Occupational History   • Not on file.     Social History Main Topics   • Smoking status: Heavy Tobacco Smoker     Packs/day: 3.00   • Smokeless tobacco: Not on file   • Alcohol use No   • Drug use: No   • Sexual activity: Defer     Other Topics Concern   • Not on file     Social History Narrative    Patient is homeless and moves around staying with friends and family.  Recently was staying at the [employer].         Medications:  Prescriptions Prior to Admission   Medication Sig Dispense Refill Last Dose   • albuterol (PROVENTIL HFA;VENTOLIN HFA) 108 (90 BASE) MCG/ACT inhaler Inhale 2 puffs Every 4 (Four) Hours As Needed for Wheezing. 1 inhaler 11    • atenolol (TENORMIN) 50 MG tablet Take 1 tablet by mouth Daily. 30 tablet 11    • benztropine (COGENTIN) 1 MG tablet Take 1 mg by mouth 2 (Two) Times a Day As Needed for Tremors.      • capsicum (ZOSTRIX) 0.075 % topical cream Apply  topically Every 8 (Eight) Hours. 28.3 g 0    • cetirizine (zyrTEC) 10 MG tablet Take 1 tablet by mouth Daily. 30 tablet 0    • clomiPRAMINE (ANAFRANIL) 50 MG capsule Take 100 mg by mouth Every Night. Take two capsule at bedtime       • clonazePAM (KlonoPIN) 0.5 MG tablet Take 1 tablet by mouth 3 (Three) Times a Day As Needed for  Anxiety. 15 tablet 0    • divalproex (DEPAKOTE) 500 MG 24 hr tablet Take 1,000 mg by mouth Daily. Take two tablets once a day       • DULoxetine (CYMBALTA) 60 MG capsule Take 1 capsule by mouth Every 12 (Twelve) Hours. 60 capsule 0    • ergocalciferol (DRISDOL) 46473 UNITS capsule Take 50,000 Units by mouth 1 (One) Time Per Week.      • fluticasone (FLONASE) 50 MCG/ACT nasal spray 2 sprays into each nostril Daily. 1 each 0    • fluticasone-salmeterol (ADVAIR) 250-50 MCG/DOSE DISKUS Inhale 1 puff 2 (Two) Times a Day. 60 each 0    • furosemide (LASIX) 40 MG tablet Take 1 tablet by mouth Daily. 30 tablet 3    • gabapentin (NEURONTIN) 300 MG capsule Take 2 capsules by mouth 4 (Four) Times a Day. 240 capsule 0    • hydrOXYzine (ATARAX) 50 MG tablet Take 1 tablet by mouth 4 (Four) Times a Day As Needed for Itching or Anxiety. 120 tablet 0    • ibuprofen (ADVIL,MOTRIN) 200 MG tablet Take 200 mg by mouth Every 6 (Six) Hours As Needed for Mild Pain (1-3).      • lansoprazole (PREVACID) 30 MG capsule Take 60 mg by mouth Daily.      • levothyroxine (SYNTHROID, LEVOTHROID) 50 MCG tablet Take 50 mcg by mouth Daily.      • melatonin 3 MG tablet Take 3 mg by mouth At Night As Needed for Sleep.      • montelukast (SINGULAIR) 10 MG tablet Take 1 tablet by mouth Every Night. 30 tablet 0    • nicotine (NICODERM CQ) 21 MG/24HR patch Place 1 patch on the skin Daily. 28 patch 0    • PHARMACY MEDS TO BED CONSULT Daily (Monday-Friday). 1 each 0    • pravastatin (PRAVACHOL) 80 MG tablet Take 1 tablet by mouth Every Night. 30 tablet 0 5/19/2017 at Unknown time   • QUEtiapine (SEROquel) 300 MG tablet Take 300 mg by mouth Every Night.      • theophylline (THEODUR) 300 MG 12 hr tablet Take 2 tablets by mouth Every 12 (Twelve) Hours. 60 tablet 0    • Tiotropium Bromide Monohydrate (SPIRIVA RESPIMAT) 2.5 MCG/ACT aerosol solution Inhale 1 dose Daily. 1 inhaler 11    • ziprasidone (GEODON) 80 MG capsule Take 80 mg by mouth 2 (Two) Times a Day With  "Meals.        Allergies:  Allergies   Allergen Reactions   • Aspirin GI Intolerance   • Codeine Itching   • Ibuprofen GI Intolerance       Objective     Vital Signs: /77  Pulse 81  Temp (!) 102.2 °F (39 °C) (Oral)   Resp 22  Ht 68\" (172.7 cm)  Wt 260 lb (118 kg)  SpO2 93%  BMI 39.53 kg/m2  Physical Exam   Constitutional: No distress.   Neck: No tracheal deviation present.   Cardiovascular: Normal rate.    HR per pulse 110-115  Difficult to auscultate HT 2/2 respiratory acoustics   Pulmonary/Chest: Effort normal. No respiratory distress.   A/P: inspir/expir rhonchi with mild rales noted posterior on expiration bilateral bases equally   Abdominal: Soft. She exhibits no distension. There is no tenderness.   Genitourinary:   Genitourinary Comments: Incontinent of bladder. States this happens nightly; controlled during the day.   Musculoskeletal: She exhibits edema (RLU pedal edema +2-3 pitting). She exhibits no tenderness.   Neurological:   Pt was initially resting with eyes closed. Awoken to name.    Skin: Skin is warm. Rash (RLE) noted. She is not diaphoretic.   RLE shin - + erythema, trace edema (nonpitting), warmth, pain  Unsure etiology or timeframe     Results Reviewed:    Results from last 7 days  Lab Units 06/15/17  1846   WBC 10*3/mm3 3.52   HEMOGLOBIN g/dL 16.1*   PLATELETS 10*3/mm3 127*       Results from last 7 days  Lab Units 06/15/17  1846   SODIUM mmol/L 138   POTASSIUM mmol/L 3.8   TOTAL CO2 mmol/L 31.0   CREATININE mg/dL 1.30   GLUCOSE mg/dL 88   CALCIUM mg/dL 9.9       I have personally reviewed and interpreted the radiology studies and ECG obtained at time of admission.     Assessment / Plan      Assessment & Plan  Principal Problem:    Sepsis  Active Problems:    COPD (chronic obstructive pulmonary disease)    Tobacco abuse    Homelessness    Renal insufficiency    Polycythemia    Cellulitis of leg, right    Nocturnal enuresis    Cellulitis of right leg    Bronchitis    Plan:  - Continue " Azith/Zosyn/Vanc to cover for COPD bronchitis as well as Right foot cellulitis (mild).  Suspect can quickly convert to PO Augmentin and Doxy to cover for same issues within the next 24 hrs if improves (nontoxic, not hypoxic, right foot minimally erythematous and edematous)  - Duonebs, steroids  - pt has long hx of noncompliance, leaving AMA bc wants to smoke etc  - homeless  - Nicotine Patch, Smoking cessation education  - Case mgmt for discharge planning (recently has been staying at a shelter)  - Oxygen at 4L NC in ED but by time of attending exam was off O2 with sats 91%.  Unclear if patient is supposed to have chronic O2 but she is homeless so unlikely she can be compliant.  Also still smokes 2 packs a day.       I discussed the patients findings and my recommendations with: the patient        Brief Attending Note   I have seen and examined the patient, performing an independent face-to-face diagnostic evaluation.    The plan of care reviewed and developed with the advanced practice clinician (APC).    Brief Summary Statement/HPI:   See history above as per APC.  Briefly, 52-year-old homeless female who has COPD and continues to smoke 2 packs of cigarettes per day brought to the ED for 2 weeks of progressive shortness of breath.  Patient is a very poor historian, she is currently living at a local homeless shelter.  Caregivers at homeless shelter became concerned about patient's audible wheezing and red upper respiratory sounds as well as a swollen red right foot therefore she was sent by EMS to [facility] ED for evaluation.  In the emergency department, laboratory values overall unremarkable and patient's chest x-ray shows no evidence of active infection.  She does have productive sputum, wet upper respiratory congestion consistent with COPD bronchitis.  She also has a mildly erythematous and slightly edematous right foot compared to the left, with multiple areas of skin breaks throughout the bilateral lower  extremities.      Attending Physical Exam:  Temp:  [102.2 °F (39 °C)] 102.2 °F (39 °C)  Heart Rate:  [] 81  Resp:  [20-22] 22  BP: (107-118)/(59-77) 114/77  Constitutional: no acute distress, awake, alert, disheveled and chronically ill-appearing smells of urine  Eyes: PERRLA, sclerae anicteric, no conjunctival injection  Neck: supple, no thyromegaly, trachea midline  Respiratory: Wet upper and rate congestion, wheezes throughout posterior fields, coarse breath sounds throughout, mildly labored respirations.  At time of my exam not on O2 and satting 91% on room air.   Cardiovascular: RRR, no murmur  Gastrointestinal: Positive bowel sounds, soft, nontender, nondistended  Musculoskeletal: No bilateral ankle edema, no clubbing or cyanosis to bilateral lower extremities  Psychiatric: oriented x 3, appropriate affect, cooperative  Neurologic: Strength symmetric in all extremities, Cranial Nerves grossly intact to confrontation  Derm: Faint erythema and nonpitting edema to right dorsum of foot       Brief Assessment/Plan :      See above for further detailed assessment and plan developed with [facility] which I have reviewed and/or edited.    I believe this patient requires OBSERVATION status, however if further evaluation or treatment plans warrant, status may change.  Based upon current information, I predict patient's care encounter to be less than or equal to 2 midnights.        Ashlee Gonzalez MD 06/16/17 1:53 AM     Electronically signed by Ashlee Gonzalez MD at 6/16/2017  2:07 AM          Lab Results (last 24 hours)     Procedure Component Value Units Date/Time    Blood Culture [[id number]]  (Normal) Collected:  06/15/17 1835    Specimen:  Blood from Arm, Right Updated:  06/18/17 2001     Blood Culture No growth at 3 days    Blood Culture [[id number]]  (Normal) Collected:  06/15/17 1845    Specimen:  Blood from Hand, Right Updated:  06/18/17 2001     Blood Culture No growth at 3 days        Imaging Results (last 24 hours)      ** No results found for the last 24 hours. **           Physician Progress Notes (last 24 hours) (Notes from 6/18/2017  4:17 PM through 6/19/2017  4:17 PM)      Casie M Mayne, PA-C at 6/19/2017  9:21 AM  Version 2 of 2    Attestation signed by Mauricio Reaves MD at 6/19/2017 10:56 AM        I have reviewed the documentation above and agree.                                     [facility] Medicine Services  INPATIENT PROGRESS NOTE    Date of Admission: 6/15/2017  Length of Stay: 3  Primary Care Physician: RADHA Pedraza    Subjective   CC: copd exac, cellulitis  HPI:  LE leg swelling and redness continues to improve, c/o chronic aches/pains, audible wheezes, says refused nebs bc tired, long discussion r/t medical compliance, need for nebs. No fever, chills, worsening SOA, CP, N/V.     Review Of Systems:   Review of Systems  No headache, no vomiting, no diarrhea, no abd pain. Chronic mild Le edema. No vision changes.    Objective      Temp:  [98 °F (36.7 °C)-99.2 °F (37.3 °C)] 98 °F (36.7 °C)  Heart Rate:  [100-109] 109  Resp:  [18] 18  BP: (148-154)/() 148/98  Physical Exam  Alert, ox4  Slightly pressure speech, but otherwise pleasant and appropriate  Speech clear, equal  and leg raise  Mildly tachcardic, regular rhythm   Prolonged expiratory phase w/ bilateral wheezes, no distress  abd soft, nontender, nondistended  Right mild pretibial erythema and minimal warmth, no LE noted  Palpable DP pulses bilaterally    Results Review:    I have reviewed the labs, radiology results and diagnostic studies.      Results from last 7 days  Lab Units 06/17/17  0844   WBC 10*3/mm3 7.34   HEMOGLOBIN g/dL 10.9*   PLATELETS 10*3/mm3 206       Results from last 7 days  Lab Units 06/17/17  0658   SODIUM mmol/L 146   POTASSIUM mmol/L 3.7   CHLORIDE mmol/L 110*   TOTAL CO2 mmol/L 32.0*   BUN mg/dL 8*   CREATININE mg/dL 0.80   GLUCOSE mg/dL 100   CALCIUM mg/dL 9.4       Culture Data: Cultures:     MRSA Screen Culture [[id number]] (Normal) Collected: 06/16/17 1503       Lab Status: Final result Specimen: Swab from Nares Updated: 06/18/17 0651        MRSA SCREEN CX No Methicillin Resistant Staphylococcus aureus Isolated at 2 days       Blood Culture [[id number]] (Normal) Collected: 06/15/17 1845       Lab Status: Preliminary result Specimen: Blood from Hand, Right Updated: 06/18/17 2001        Blood Culture No growth at 3 days       Blood Culture [[id number]] (Normal) Collected: 06/15/17 1835       Lab Status: Preliminary result Specimen: Blood from Arm, Right Updated: 06/18/17 2001        Blood Culture No growth at 3 days               Radiology Data: reviewed    I have reviewed the medications.    Assessment/Plan     Problem List  Hospital Problem List     * (Principal)Sepsis    Tobacco abuse (Chronic)    Anxiety and depression (Chronic)    Homelessness (Chronic)    Seizure disorder    Renal insufficiency (Chronic)    Polycythemia    Nocturnal enuresis    Cellulitis of right leg    Bronchitis    COPD exacerbation               Assessment/Plan:  -continue abx (zosyn x2 d, now augmentin d2, continues doxy, MRSA screen negative, transition to oral abx  -acyclovir for herpes  -s/p 1dose diflucan for suspect yeast co-infection  -monitor LE edema for resolution/improvement (on doxy 4 d)  -continue nebulizers/steroids (discussed importance of compliance with nebs, pt has refused all prior)  -increased atenolol to 75mg daily, monitor BP/HR  -bmp,cbc in a.m.  - SW helping with return to [facility]  -supportive care from chronic arthritic pain (Kpad, APAP, capsicin)     DVT prophylaxis: sq lovenox  Discharge Planning: I expect patient to be discharged to homeless shelter tomorrow  Casie M Mayne, PA-C   06/19/17   9:21 AM         Electronically signed by Mauricio Reaves MD at 6/19/2017 10:56 AM      Casie M Mayne, PA-C at 6/19/2017  9:21 AM  Version 1 of 2             [facility]  Medicine Services  INPATIENT PROGRESS NOTE    Date of Admission: 6/15/2017  Length of Stay: 3  Primary Care Physician: RADHA Pedraza    Subjective   CC: copd exac, cellulitis  HPI:  LE leg swelling and redness continues to improve, c/o chronic aches/pains, audible wheezes, says refused nebs bc tired, long discussion r/t medical compliance, need for nebs. No fever, chills, worsening SOA, CP, N/V.     Review Of Systems:   Review of Systems  No headache, no vomiting, no diarrhea, no abd pain. Chronic mild Le edema. No vision changes.    Objective      Temp:  [98 °F (36.7 °C)-99.2 °F (37.3 °C)] 98 °F (36.7 °C)  Heart Rate:  [100-109] 109  Resp:  [18] 18  BP: (148-154)/() 148/98  Physical Exam  Alert, ox4  Slightly pressure speech, but otherwise pleasant and appropriate  Speech clear, equal  and leg raise  Mildly tachcardic, regular rhythm   Prolonged expiratory phase w/ bilateral wheezes, no distress  abd soft, nontender, nondistended  Right mild pretibial erythema and minimal warmth, no LE noted  Palpable DP pulses bilaterally    Results Review:    I have reviewed the labs, radiology results and diagnostic studies.      Results from last 7 days  Lab Units 06/17/17  0844   WBC 10*3/mm3 7.34   HEMOGLOBIN g/dL 10.9*   PLATELETS 10*3/mm3 206       Results from last 7 days  Lab Units 06/17/17  0658   SODIUM mmol/L 146   POTASSIUM mmol/L 3.7   CHLORIDE mmol/L 110*   TOTAL CO2 mmol/L 32.0*   BUN mg/dL 8*   CREATININE mg/dL 0.80   GLUCOSE mg/dL 100   CALCIUM mg/dL 9.4       Culture Data: Cultures:    MRSA Screen Culture [[id number]] (Normal) Collected: 06/16/17 1503       Lab Status: Final result Specimen: Swab from Nares Updated: 06/18/17 0651        MRSA SCREEN CX No Methicillin Resistant Staphylococcus aureus Isolated at 2 days       Blood Culture [[id number]] (Normal) Collected: 06/15/17 1845       Lab Status: Preliminary result Specimen: Blood from Hand, Right Updated: 06/18/17 2001        Blood Culture  No growth at 3 days       Blood Culture [[id number]] (Normal) Collected: 06/15/17 1830       Lab Status: Preliminary result Specimen: Blood from Arm, Right Updated: 06/18/17 2001        Blood Culture No growth at 3 days               Radiology Data: reviewed    I have reviewed the medications.    Assessment/Plan     Problem List  Hospital Problem List     * (Principal)Sepsis    Tobacco abuse (Chronic)    Anxiety and depression (Chronic)    Homelessness (Chronic)    Seizure disorder    Renal insufficiency (Chronic)    Polycythemia    Nocturnal enuresis    Cellulitis of right leg    Bronchitis    COPD exacerbation               Assessment/Plan:  -continue abx (zosyn x2 d, now augmentin d2, continues doxy, MRSA screen negative, transition to oral abx  -acyclovir for herpes  -s/p 1dose diflucan for suspect yeast co-infection  -monitor LE edema for resolution/improvement (on doxy 4 d)  -continue nebulizers/steroids (discussed importance of compliance with nebs, pt has refused all prior)  -increased atenolol to 75mg daily, monitor BP/HR  -bmp,cbc in a.m.  - SW helping with return to [facility]  -supportive care from chronic arthritic pain (Kpad, APAP, capsicin)     DVT prophylaxis: sq lovenox  Discharge Planning: I expect patient to be discharged to homeless shelter tomorrow  Casie M Mayne, PA-C   06/19/17   9:21 AM         Electronically signed by Casie M Mayne, PA-C at 6/19/2017  9:34 AM        Consult Notes (last 24 hours) (Notes from 6/18/2017  4:17 PM through 6/19/2017  4:17 PM)     No notes of this type exist for this encounter.        Physical Therapy Notes (last 72 hours) (Notes from 6/16/2017  4:17 PM through 6/19/2017  4:17 PM)     No notes of this type exist for this encounter.

## 2017-06-19 NOTE — PROGRESS NOTES
Continued Stay Note  [facility]     Patient Name: Colleen Gonzalez  MRN: [id number]  Today's Date: 6/19/2017    Admit Date: 6/15/2017          Discharge Plan       06/19/17 0957    Case Management/Social Work Plan    Additional Comments SW continues to follow for any SW needs. Pt has been provided clothes on the 16th as well as two other previous stays in the last month. A cab voucher for pt to get back to the [facility] is in pt's chart for transportation. No further ss needs at this time. If can be of further assist, contact SHANITA [phone].              Discharge Codes     None        Expected Discharge Date and Time     Expected Discharge Date Expected Discharge Time    Jun 20, 2017             SOPHIE Vázquez

## 2017-06-19 NOTE — PROGRESS NOTES
Adult Nutrition  Assessment/PES    Patient Name:  Colleen Gonzalez  YOB: 1965  MRN: [id number]  Admit Date:  6/15/2017    Assessment Date:  6/19/2017        Reason for Assessment       06/19/17 1600    Reason for Assessment    Reason For Assessment/Visit --   Food Prefs    Time Spent (min) 20            Data Eval/ Notes       06/19/17 1601     Notes    Reason Patient Request    Patient Concerns Food choices    Plan Preferences noted    Note add HS snack, encourage healthy options for meals, yogurt, extra vegetables                      Nutrition Prescription Ordered       06/19/17 1602    Nutrition Prescription PO    Current PO Diet Regular    Common Modifiers Cardiac;Consistent Carbohydrate                Problem/Interventions:                    Nutrition Intervention       06/19/17 1602    Nutrition Intervention    RD/Tech Action Interview for preference;Menu provided;Menu adjusted;Supplement provided              Education/Evaluation       06/19/17 1602    Monitor/Evaluation    Monitor Per protocol          Electronically signed by:  Chelle Flores RD  06/19/17 4:02 PM

## 2017-06-20 VITALS
RESPIRATION RATE: 18 BRPM | BODY MASS INDEX: 39.43 KG/M2 | WEIGHT: 260.14 LBS | OXYGEN SATURATION: 96 % | DIASTOLIC BLOOD PRESSURE: 73 MMHG | TEMPERATURE: 97.6 F | SYSTOLIC BLOOD PRESSURE: 113 MMHG | HEART RATE: 83 BPM | HEIGHT: 68 IN

## 2017-06-20 LAB
ANION GAP SERPL CALCULATED.3IONS-SCNC: 6 MMOL/L (ref 3–11)
BACTERIA SPEC AEROBE CULT: NORMAL
BACTERIA SPEC AEROBE CULT: NORMAL
BASOPHILS # BLD MANUAL: 0 10*3/MM3 (ref 0–0.2)
BASOPHILS NFR BLD AUTO: 0 % (ref 0–1)
BUN BLD-MCNC: 18 MG/DL (ref 9–23)
BUN/CREAT SERPL: 18 (ref 7–25)
CALCIUM SPEC-SCNC: 10.6 MG/DL (ref 8.7–10.4)
CHLORIDE SERPL-SCNC: 95 MMOL/L (ref 99–109)
CO2 SERPL-SCNC: 39 MMOL/L (ref 20–31)
CREAT BLD-MCNC: 1 MG/DL (ref 0.6–1.3)
DEPRECATED RDW RBC AUTO: 61 FL (ref 37–54)
EOSINOPHIL # BLD MANUAL: 0 10*3/MM3 (ref 0.1–0.3)
EOSINOPHIL NFR BLD MANUAL: 0 % (ref 0–3)
ERYTHROCYTE [DISTWIDTH] IN BLOOD BY AUTOMATED COUNT: 16.3 % (ref 11.3–14.5)
GFR SERPL CREATININE-BSD FRML MDRD: 58 ML/MIN/1.73
GLUCOSE BLD-MCNC: 89 MG/DL (ref 70–100)
HCT VFR BLD AUTO: 38 % (ref 34.5–44)
HGB BLD-MCNC: 11.6 G/DL (ref 11.5–15.5)
LYMPHOCYTES # BLD MANUAL: 2.64 10*3/MM3 (ref 0.6–4.8)
LYMPHOCYTES NFR BLD MANUAL: 21.2 % (ref 24–44)
LYMPHOCYTES NFR BLD MANUAL: 6.1 % (ref 0–12)
MCH RBC QN AUTO: 31 PG (ref 27–31)
MCHC RBC AUTO-ENTMCNC: 30.5 G/DL (ref 32–36)
MCV RBC AUTO: 101.6 FL (ref 80–99)
MONOCYTES # BLD AUTO: 0.76 10*3/MM3 (ref 0–1)
MYELOCYTES NFR BLD MANUAL: 3 % (ref 0–0)
NEUTROPHILS # BLD AUTO: 8.43 10*3/MM3 (ref 1.5–8.3)
NEUTROPHILS NFR BLD MANUAL: 62.6 % (ref 41–71)
NEUTS BAND NFR BLD MANUAL: 5.1 % (ref 0–5)
PLAT MORPH BLD: NORMAL
PLATELET # BLD AUTO: 274 10*3/MM3 (ref 150–450)
PMV BLD AUTO: 9.4 FL (ref 6–12)
POTASSIUM BLD-SCNC: 4.3 MMOL/L (ref 3.5–5.5)
RBC # BLD AUTO: 3.74 10*6/MM3 (ref 3.89–5.14)
RBC MORPH BLD: NORMAL
SODIUM BLD-SCNC: 140 MMOL/L (ref 132–146)
VARIANT LYMPHS NFR BLD MANUAL: 2 % (ref 0–5)
WBC MORPH BLD: NORMAL
WBC NRBC COR # BLD: 12.46 10*3/MM3 (ref 3.5–10.8)

## 2017-06-20 PROCEDURE — 94799 UNLISTED PULMONARY SVC/PX: CPT

## 2017-06-20 PROCEDURE — 80048 BASIC METABOLIC PNL TOTAL CA: CPT | Performed by: PHYSICIAN ASSISTANT

## 2017-06-20 PROCEDURE — 99239 HOSP IP/OBS DSCHRG MGMT >30: CPT | Performed by: PHYSICIAN ASSISTANT

## 2017-06-20 PROCEDURE — 25010000002 ENOXAPARIN PER 10 MG: Performed by: FAMILY MEDICINE

## 2017-06-20 PROCEDURE — 94640 AIRWAY INHALATION TREATMENT: CPT

## 2017-06-20 PROCEDURE — 63710000001 PREDNISONE PER 5 MG: Performed by: HOSPITALIST

## 2017-06-20 PROCEDURE — 85007 BL SMEAR W/DIFF WBC COUNT: CPT | Performed by: PHYSICIAN ASSISTANT

## 2017-06-20 PROCEDURE — 85025 COMPLETE CBC W/AUTO DIFF WBC: CPT | Performed by: PHYSICIAN ASSISTANT

## 2017-06-20 RX ORDER — ALBUTEROL SULFATE 90 UG/1
2 AEROSOL, METERED RESPIRATORY (INHALATION) EVERY 4 HOURS PRN
Qty: 1 INHALER | Refills: 11 | Status: SHIPPED | OUTPATIENT
Start: 2017-06-20 | End: 2017-07-11

## 2017-06-20 RX ORDER — FAMOTIDINE 20 MG/1
20 TABLET, FILM COATED ORAL DAILY
Status: DISCONTINUED | OUTPATIENT
Start: 2017-06-20 | End: 2017-06-20 | Stop reason: HOSPADM

## 2017-06-20 RX ORDER — SACCHAROMYCES BOULARDII 250 MG
250 CAPSULE ORAL 2 TIMES DAILY
Qty: 14 CAPSULE | Refills: 0 | Status: ON HOLD | OUTPATIENT
Start: 2017-06-20 | End: 2017-09-25

## 2017-06-20 RX ORDER — DOXYCYCLINE HYCLATE 100 MG/1
100 CAPSULE ORAL EVERY 12 HOURS SCHEDULED
Qty: 6 CAPSULE | Refills: 0 | Status: SHIPPED | OUTPATIENT
Start: 2017-06-20 | End: 2017-06-23

## 2017-06-20 RX ORDER — FAMOTIDINE 20 MG/1
20 TABLET, FILM COATED ORAL 2 TIMES DAILY PRN
Qty: 30 TABLET | Refills: 0 | Status: ON HOLD | OUTPATIENT
Start: 2017-06-20 | End: 2017-09-25 | Stop reason: SDDI

## 2017-06-20 RX ORDER — PREDNISONE 10 MG/1
TABLET ORAL
Qty: 6 TABLET | Refills: 0 | Status: ON HOLD | OUTPATIENT
Start: 2017-06-20 | End: 2017-07-06

## 2017-06-20 RX ORDER — AMOXICILLIN AND CLAVULANATE POTASSIUM 875; 125 MG/1; MG/1
1 TABLET, FILM COATED ORAL EVERY 12 HOURS SCHEDULED
Qty: 6 TABLET | Refills: 0 | Status: SHIPPED | OUTPATIENT
Start: 2017-06-20 | End: 2017-06-23

## 2017-06-20 RX ORDER — ACYCLOVIR 200 MG/1
200 CAPSULE ORAL
Qty: 40 CAPSULE | Refills: 0 | Status: SHIPPED | OUTPATIENT
Start: 2017-06-20 | End: 2017-06-28

## 2017-06-20 RX ADMIN — CLONAZEPAM 0.5 MG: 0.5 TABLET ORAL at 05:08

## 2017-06-20 RX ADMIN — DIVALPROEX SODIUM 1000 MG: 500 TABLET, FILM COATED, EXTENDED RELEASE ORAL at 08:16

## 2017-06-20 RX ADMIN — IPRATROPIUM BROMIDE AND ALBUTEROL SULFATE 3 ML: .5; 3 SOLUTION RESPIRATORY (INHALATION) at 07:33

## 2017-06-20 RX ADMIN — IPRATROPIUM BROMIDE AND ALBUTEROL SULFATE 3 ML: .5; 3 SOLUTION RESPIRATORY (INHALATION) at 12:07

## 2017-06-20 RX ADMIN — ACETAMINOPHEN 650 MG: 325 TABLET, FILM COATED ORAL at 13:27

## 2017-06-20 RX ADMIN — GABAPENTIN 600 MG: 300 CAPSULE ORAL at 08:17

## 2017-06-20 RX ADMIN — ACYCLOVIR 200 MG: 200 CAPSULE ORAL at 10:54

## 2017-06-20 RX ADMIN — ENOXAPARIN SODIUM 40 MG: 40 INJECTION SUBCUTANEOUS at 08:16

## 2017-06-20 RX ADMIN — ZIPRASIDONE HYDROCHLORIDE 80 MG: 20 CAPSULE ORAL at 08:18

## 2017-06-20 RX ADMIN — ACYCLOVIR 200 MG: 200 CAPSULE ORAL at 08:17

## 2017-06-20 RX ADMIN — NICOTINE 1 PATCH: 21 PATCH, EXTENDED RELEASE TRANSDERMAL at 08:16

## 2017-06-20 RX ADMIN — ATENOLOL 75 MG: 50 TABLET ORAL at 08:17

## 2017-06-20 RX ADMIN — FUROSEMIDE 40 MG: 40 TABLET ORAL at 08:16

## 2017-06-20 RX ADMIN — DULOXETINE 60 MG: 60 CAPSULE, DELAYED RELEASE ORAL at 08:17

## 2017-06-20 RX ADMIN — THEOPHYLLINE 600 MG: 300 TABLET, EXTENDED RELEASE ORAL at 08:18

## 2017-06-20 RX ADMIN — TRAMADOL HYDROCHLORIDE 50 MG: 50 TABLET, COATED ORAL at 13:27

## 2017-06-20 RX ADMIN — GABAPENTIN 600 MG: 300 CAPSULE ORAL at 02:51

## 2017-06-20 RX ADMIN — AMOXICILLIN AND CLAVULANATE POTASSIUM 1 TABLET: 875; 125 TABLET, FILM COATED ORAL at 08:18

## 2017-06-20 RX ADMIN — Medication 250 MG: at 08:16

## 2017-06-20 RX ADMIN — CAPSAICIN: 0.75 CREAM TOPICAL at 08:20

## 2017-06-20 RX ADMIN — LEVOTHYROXINE SODIUM 50 MCG: 50 TABLET ORAL at 05:08

## 2017-06-20 RX ADMIN — DOXYCYCLINE HYCLATE 100 MG: 100 CAPSULE ORAL at 08:17

## 2017-06-20 RX ADMIN — PREDNISONE 30 MG: 20 TABLET ORAL at 08:17

## 2017-06-20 NOTE — DISCHARGE INSTR - APPOINTMENTS
You have an appointment with RADHA Pedraza on Tuesday 6/27/17 at 10:45Am.   Call them if you have any questions. Phone: [phone] [address] [address]

## 2017-06-20 NOTE — PROGRESS NOTES
Continued Stay Note  [facility]     Patient Name: Colleen Gonzalez  MRN: [id number]  Today's Date: 6/20/2017    Admit Date: 6/15/2017          Discharge Plan       06/20/17 1328    Case Management/Social Work Plan    Additional Comments SW called [facility] and they had some beds left for availability. SW updated pt of this. Pt also requested more clothes. SW had given pt clothes on Friday as well as pt had been given clothes multiple other admissions in the last month. APRN had reported pt would like prescriptions sent to [facility] pharmacy and then go from there to [facility]. SW provided cab vouchers to [facility] pharmacy and then to [facility]. Pt has also been provided with other low income housing, financial, mental health and other shelter resources. No further ss needs at this time.               Discharge Codes     None        Expected Discharge Date and Time     Expected Discharge Date Expected Discharge Time    Jun 20, 2017             SOPHIE Vázquez

## 2017-06-20 NOTE — DISCHARGE SUMMARY
[facility] Medicine Services  DISCHARGE SUMMARY       Date of Admission: 6/15/2017  Date of Discharge:  6/20/2017  Primary Care Physician: RADHA Pedraza  Consulting Physician(s)          None           Discharge Diagnoses:  Active Hospital Problems (** Indicates Principal Problem)    Diagnosis Date Noted   • COPD exacerbation [J44.1] 06/16/2017     Priority: High   • Cellulitis of right leg [L03.115] 06/16/2017     Priority: Medium   • Essential hypertension [I10] 05/19/2017     Priority: Medium   • Tobacco abuse [Z72.0] 03/01/2017     Priority: Medium   • Bronchitis [J40] 06/16/2017   • Polycythemia [D75.1] 06/15/2017   • Nocturnal enuresis [N39.44] 06/15/2017   • Renal insufficiency [N28.9] 06/09/2017   • Seizure disorder [G40.909] 05/19/2017   • Homelessness [Z59.0] 03/01/2017   • Anxiety and depression [F41.9, F32.9] 03/01/2017      Resolved Hospital Problems    Diagnosis Date Noted Date Resolved   • **Sepsis [A41.9] 03/01/2017 06/19/2017       Presenting Problem/History of Present Illness  Cellulitis of right leg [L03.115]  Cellulitis of right leg [L03.115]       History of Present Illness on Day of Discharge:   Pt reports feeling much better, SOA and wheezing significantly improved after agreeing to nebs. Pt denies fever, chills, CP, N/V or difficulty urinating. Notes some dyspepsia, on prevacid pta. Discussed taking steroids with meals, avoid laying down 1hr after meals, avoiding GI irritants including smoking. Again discussed smoke cessation, offered nicotine patches. Pt declined.     Hospital Course  Patient is a 52 y.o. female who lives at homeless shelter with PMH COPD who refused home O2 at time of last admit, ongoing smoking >2ppd, anxiety, CKD presented to ED with 2wk hx SOA, with productive cough and RLE pain/redness and swelling. chest x-ray shows no evidence of active infection was admitted to hospitalist service for further evaluation and management of COPD  exacerbation/acute bronchitis, mild Right LE cellulitis pt placed on  abx (zosyn x2 d, switched to augmentin completed d2, continues doxycyline.  MRSA screen negative. 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 RLE edema continues to improve, minimal erythema. Tapering steroids, significant improvement of resp sx after nebs, had been refusing. Will give rx for inhalers as pt states lost hers at homeless shelter.  Pt is afebrile, normotensive, HF improved, satting 96% on RA. WBC slightly up with steroids. Pt is clinically improved and appropriate for dc. To follow up with PCP in 1 wk. Stressed importance of smoke cessation and pt continues to refuse.     Per social work patient has Voucher for return to [facility], currently there are beds available.  Patient understands per social work that if beds become unavailable she is welcome to the [facility].  Procedures Performed     none    Consults:   Consults     Date and Time Order Name Status Description    5/21/2017 0852 Inpatient Consult to Pulmonology Completed     5/19/2017 1108 Inpatient Consult to Infectious Diseases Completed           Pertinent Test Results:  Imaging Results (most recent)     Procedure Component Value Units Date/Time    XR Chest 1 View [[id number]]  (Abnormal) Collected:  06/15/17 1817     Updated:  06/15/17 2006    Narrative:         EXAM:    XR Chest, 1 View    CLINICAL HISTORY:    52 years old, female; Signs and symptoms; Other: SOA; Additional info: SOA   triage protocol    TECHNIQUE:    Frontal view of the chest.    COMPARISON:    CR - XR CHEST 1 VW 6/8/2017 11:03:01 PM    FINDINGS:    Decreased lung volumes bilaterally with prominent lung markings and bibasal   atelectasis similar to prior study. No pleural effusion or pneumothorax.  Heart   size is normal, mild cental pulmonary vascular congestion.       Impression:         Stable  chronic cardiopulmonary changes without evidence of an active lung   parenchymal lesion.       THIS DOCUMENT HAS BEEN ELECTRONICALLY SIGNED BY ELISE REYNOSO MD        Lab Results (most recent)     Procedure Component Value Units Date/Time    CBC & Differential [[id number]] Collected:  06/15/17 1846    Specimen:  Blood Updated:  06/15/17 1907    Narrative:       The following orders were created for panel order CBC & Differential.  Procedure                               Abnormality         Status                     ---------                               -----------         ------                     CBC Auto Differential[[id number]]        Abnormal            Final result                 Please view results for these tests on the individual orders.    CBC Auto Differential [[id number]]  (Abnormal) Collected:  06/15/17 1846    Specimen:  Blood Updated:  06/15/17 1907     WBC 3.52 10*3/mm3      RBC 5.19 (H) 10*6/mm3      Hemoglobin 16.1 (H) g/dL      Hematocrit 50.3 (H) %      MCV 96.9 fL      MCH 31.0 pg      MCHC 32.0 g/dL      RDW 17.0 (H) %      RDW-SD 59.9 (H) fl      MPV 9.5 fL      Platelets 127 (L) 10*3/mm3      Neutrophil % 51.7 %      Lymphocyte % 27.6 %      Monocyte % 17.6 (H) %      Eosinophil % 1.4 %      Basophil % 0.3 %      Immature Grans % 1.4 (H) %      Neutrophils, Absolute 1.82 10*3/mm3      Lymphocytes, Absolute 0.97 10*3/mm3      Monocytes, Absolute 0.62 10*3/mm3      Eosinophils, Absolute 0.05 (L) 10*3/mm3      Basophils, Absolute 0.01 10*3/mm3      Immature Grans, Absolute 0.05 (H) 10*3/mm3     POC Troponin, Rapid [[id number]]  (Normal) Collected:  06/15/17 1852    Specimen:  Blood Updated:  06/15/17 1910     Troponin I 0.01 ng/mL       Serial Number: [id number]    : [id number]       Lactic Acid, Plasma [[id number]]  (Normal) Collected:  06/15/17 1846    Specimen:  Blood Updated:  06/15/17 1918     Lactate 1.0 mmol/L       Falsely depressed results may occur on samples drawn from patients  receiving N-Acetylcysteine (NAC) or Metamizole.       Comprehensive Metabolic Panel [[id number]]  (Abnormal) Collected:  06/15/17 1846    Specimen:  Blood Updated:  06/15/17 1923     Glucose 88 mg/dL      BUN 10 mg/dL      Creatinine 1.30 mg/dL      Sodium 138 mmol/L      Potassium 3.8 mmol/L      Chloride 107 mmol/L      CO2 31.0 mmol/L      Calcium 9.9 mg/dL      Total Protein 6.8 g/dL      Albumin 3.90 g/dL      ALT (SGPT) 14 U/L      AST (SGOT) 14 U/L      Alkaline Phosphatase 74 U/L      Total Bilirubin 0.5 mg/dL      eGFR Non African Amer 43 (L) mL/min/1.73      Globulin 2.9 gm/dL      A/G Ratio 1.3 (L) g/dL      BUN/Creatinine Ratio 7.7     Anion Gap 0.0 (L) mmol/L     Narrative:       National Kidney [employer] Guidelines    Stage     Description        GFR  1         Normal or High     90+  2         Mild decrease      60-89  3         Moderate decrease  30-59  4         Severe decrease    15-29  5         Kidney failure     <15    Theophylline Level [[id number]]  (Abnormal) Collected:  06/15/17 1846    Specimen:  Blood Updated:  06/15/17 1923     Theophylline Level 4.0 (L) mcg/mL     C-reactive Protein [[id number]]  (Abnormal) Collected:  06/15/17 1846    Specimen:  Blood Updated:  06/15/17 1934     C-Reactive Protein 8.78 (H) mg/dL     Procalcitonin [[id number]] Collected:  06/15/17 1846    Specimen:  Blood Updated:  06/15/17 1939     Procalcitonin 0.05 ng/mL     Basic Metabolic Panel [[id number]]  (Abnormal) Collected:  06/17/17 0658    Specimen:  Blood Updated:  06/17/17 0810     Glucose 100 mg/dL      BUN 8 (L) mg/dL      Creatinine 0.80 mg/dL      Sodium 146 mmol/L      Potassium 3.7 mmol/L      Chloride 110 (H) mmol/L      CO2 32.0 (H) mmol/L      Calcium 9.4 mg/dL      eGFR Non African Amer 75 mL/min/1.73      BUN/Creatinine Ratio 10.0     Anion Gap 4.0 mmol/L     Narrative:       National Kidney Foundation Guidelines    Stage     Description        GFR  1         Normal or High     90+  2         Mild  decrease      60-89  3         Moderate decrease  30-59  4         Severe decrease    15-29  5         Kidney failure     <15    Magnesium [[id number]]  (Normal) Collected:  06/17/17 0658    Specimen:  Blood Updated:  06/17/17 0810     Magnesium 2.1 mg/dL     CBC (No Diff) [[id number]]  (Abnormal) Collected:  06/17/17 0844    Specimen:  Blood Updated:  06/17/17 0928     WBC 7.34 10*3/mm3      RBC 3.54 (L) 10*6/mm3      Hemoglobin 10.9 (L) g/dL      Hematocrit 35.9 %      .4 (H) fL      MCH 30.8 pg      MCHC 30.4 (L) g/dL      RDW 16.7 (H) %      RDW-SD 62.1 (H) fl      MPV 9.3 fL      Platelets 206 10*3/mm3     Narrative:       Verified by recollection    Urinalysis With / Microscopic If Indicated [[id number]]  (Abnormal) Collected:  06/17/17 1626    Specimen:  Urine from Urine, Clean Catch Updated:  06/17/17 1650     Color, UA Yellow     Appearance, UA Clear     pH, UA 7.0     Specific Gravity, UA <=1.005     Glucose, UA Negative     Ketones, UA Negative     Bilirubin, UA Negative     Blood, UA Negative     Protein, UA Negative     Leuk Esterase, UA Small (1+) (A)     Nitrite, UA Negative     Urobilinogen, UA 0.2 E.U./dL    Urinalysis, Microscopic Only [[id number]]  (Abnormal) Collected:  06/17/17 1626    Specimen:  Urine from Urine, Clean Catch Updated:  06/17/17 1710     RBC, UA None Seen /HPF      WBC, UA 3-5 (A) /HPF      Bacteria, UA None Seen /HPF      Squamous Epithelial Cells, UA 0-2 /HPF      Hyaline Casts, UA None Seen /LPF      Methodology Automated Microscopy    Vancomycin, Trough - please drawn prior to dose scheduled 6/17 at 2100 [[id number]]  (Abnormal) Collected:  06/17/17 2025    Specimen:  Blood Updated:  06/17/17 2107     Vancomycin Trough 9.20 (L) mcg/mL     MRSA Screen Culture [[id number]]  (Normal) Collected:  06/16/17 1503    Specimen:  Swab from Nares Updated:  06/18/17 0651     MRSA SCREEN CX No Methicillin Resistant Staphylococcus aureus Isolated at 2 days    Blood Culture [[id number]]   (Normal) Collected:  06/15/17 1835    Specimen:  Blood from Arm, Right Updated:  06/19/17 2001     Blood Culture No growth at 4 days    Blood Culture [[id number]]  (Normal) Collected:  06/15/17 1845    Specimen:  Blood from Hand, Right Updated:  06/19/17 2001     Blood Culture No growth at 4 days    Basic Metabolic Panel [[id number]]  (Abnormal) Collected:  06/20/17 1000    Specimen:  Blood Updated:  06/20/17 1103     Glucose 89 mg/dL      BUN 18 mg/dL      Creatinine 1.00 mg/dL      Sodium 140 mmol/L      Potassium 4.3 mmol/L      Chloride 95 (L) mmol/L      CO2 39.0 (H) mmol/L      Calcium 10.6 (H) mg/dL      eGFR Non African Amer 58 (L) mL/min/1.73      BUN/Creatinine Ratio 18.0     Anion Gap 6.0 mmol/L     Narrative:       National Kidney Foundation Guidelines    Stage     Description        GFR  1         Normal or High     90+  2         Mild decrease      60-89  3         Moderate decrease  30-59  4         Severe decrease    15-29  5         Kidney failure     <15    CBC & Differential [[id number]] Collected:  06/20/17 1000    Specimen:  Blood Updated:  06/20/17 1208    Narrative:       The following orders were created for panel order CBC & Differential.  Procedure                               Abnormality         Status                     ---------                               -----------         ------                     Manual Differential[[id number]]          Abnormal            Final result               Scan Slide[[id number]]                                                                  CBC Auto Differential[[id number]]        Abnormal            Final result                 Please view results for these tests on the individual orders.    CBC Auto Differential [[id number]]  (Abnormal) Collected:  06/20/17 1000    Specimen:  Blood Updated:  06/20/17 1208     WBC 12.46 (H) 10*3/mm3      RBC 3.74 (L) 10*6/mm3      Hemoglobin 11.6 g/dL      Hematocrit 38.0 %      .6 (H) fL      MCH 31.0 pg      MCHC  "30.5 (L) g/dL      RDW 16.3 (H) %      RDW-SD 61.0 (H) fl      MPV 9.4 fL      Platelets 274 10*3/mm3     Manual Differential [[id number]]  (Abnormal) Collected:  06/20/17 1000    Specimen:  Blood Updated:  06/20/17 1208     Neutrophil % 62.6 %      Lymphocyte % 21.2 (L) %      Monocyte % 6.1 %      Eosinophil % 0.0 %      Basophil % 0.0 %      Bands %  5.1 (H) %      Myelocyte % 3.0 (H) %      Atypical Lymphocyte % 2.0 %      Neutrophils Absolute 8.43 (H) 10*3/mm3      Lymphocytes Absolute 2.64 10*3/mm3      Monocytes Absolute 0.76 10*3/mm3      Eosinophils Absolute 0.00 (L) 10*3/mm3      Basophils Absolute 0.00 10*3/mm3      RBC Morphology Normal     WBC Morphology Normal     Platelet Morphology Normal        Condition on Discharge:  stable    Physical Exam on Discharge:/73 (BP Location: Right arm, Patient Position: Sitting)  Pulse 83  Temp 97.6 °F (36.4 °C) (Oral)   Resp 18  Ht 68\" (172.7 cm)  Wt 260 lb 2.3 oz (118 kg)  SpO2 96%  BMI 39.55 kg/m2  Physical Exam    Gen: sitting up in bed, looks much better, smiling appears in NAD, fully dressed  CV: regular rate rhythm no M/R/G  Resp:Diminished throughout with Prolonged expiratory phase w/ few scattered bilateral wheezes much improved from recent exam, no resp distress  abd: soft, nontender, nondistended  Skin:warm dry, erythema noted RLE  MSK: Palpable DP pulses bilaterally, no le edema   Neuro: Alert, ox4, speech is clears, follows commands  Psych:Slightly pressure speech, but otherwise pleasant and appropriate      Discharge Disposition  Home or Self Care    Discharge Medications   Colleen Gonzalez   Home Medication Instructions MAC:[id number]    Printed on:06/20/17 1223   Medication Information                      acyclovir (ZOVIRAX) 200 MG capsule  Take 1 capsule by mouth 5 (Five) Times a Day for 8 days. Indications: Skin and Soft Tissue Infection             albuterol (PROVENTIL HFA;VENTOLIN HFA) 108 (90 BASE) MCG/ACT inhaler  Inhale 2 puffs Every 4 " (Four) Hours As Needed for Wheezing.             amoxicillin-clavulanate (AUGMENTIN) 875-125 MG per tablet  Take 1 tablet by mouth Every 12 (Twelve) Hours for 3 days. Indications: Skin and Soft Tissue Infection             benztropine (COGENTIN) 1 MG tablet  Take 1 mg by mouth 2 (Two) Times a Day As Needed for Tremors.             capsicum (ZOSTRIX) 0.075 % topical cream  Apply  topically Every 8 (Eight) Hours.             cetirizine (zyrTEC) 10 MG tablet  Take 1 tablet by mouth Daily.             clomiPRAMINE (ANAFRANIL) 50 MG capsule  Take 100 mg by mouth Every Night. Take two capsule at bedtime              clonazePAM (KlonoPIN) 0.5 MG tablet  Take 1 tablet by mouth 3 (Three) Times a Day As Needed for Anxiety.             divalproex (DEPAKOTE) 500 MG 24 hr tablet  Take 1,000 mg by mouth Daily. Take two tablets once a day              doxycycline (VIBRAMYCIN) 100 MG capsule  Take 1 capsule by mouth Every 12 (Twelve) Hours for 3 days. Indications: Pneumonia             DULoxetine (CYMBALTA) 60 MG capsule  Take 1 capsule by mouth Every 12 (Twelve) Hours.             ergocalciferol (DRISDOL) 31265 UNITS capsule  Take 50,000 Units by mouth 1 (One) Time Per Week.             famotidine (PEPCID) 20 MG tablet  Take 1 tablet by mouth 2 (Two) Times a Day As Needed for Heartburn.             fluticasone (FLONASE) 50 MCG/ACT nasal spray  2 sprays into each nostril Daily.             fluticasone-salmeterol (ADVAIR) 250-50 MCG/DOSE DISKUS  Inhale 1 puff 2 (Two) Times a Day.             furosemide (LASIX) 40 MG tablet  Take 1 tablet by mouth Daily.             gabapentin (NEURONTIN) 300 MG capsule  Take 2 capsules by mouth 4 (Four) Times a Day.             hydrOXYzine (ATARAX) 50 MG tablet  Take 1 tablet by mouth 4 (Four) Times a Day As Needed for Itching or Anxiety.             lansoprazole (PREVACID) 30 MG capsule  Take 60 mg by mouth Daily.             levothyroxine (SYNTHROID, LEVOTHROID) 50 MCG tablet  Take 50 mcg by mouth  Daily.             melatonin 3 MG tablet  Take 3 mg by mouth At Night As Needed for Sleep.             montelukast (SINGULAIR) 10 MG tablet  Take 1 tablet by mouth Every Night.             nicotine (NICODERM CQ) 21 MG/24HR patch  Place 1 patch on the skin Daily.             pravastatin (PRAVACHOL) 80 MG tablet  Take 1 tablet by mouth Every Night.             predniSONE (DELTASONE) 10 MG tablet  Take 20mg 6/21 and 6/22, take 10mg 6/23 and 6/24.             QUEtiapine (SEROquel) 300 MG tablet  Take 300 mg by mouth Every Night.             saccharomyces boulardii (FLORASTOR) 250 MG capsule  Take 1 capsule by mouth 2 (Two) Times a Day.             theophylline (THEODUR) 300 MG 12 hr tablet  Take 2 tablets by mouth Every 12 (Twelve) Hours.             Tiotropium Bromide Monohydrate (SPIRIVA RESPIMAT) 2.5 MCG/ACT aerosol solution  Inhale 1 dose Daily.             ziprasidone (GEODON) 80 MG capsule  Take 80 mg by mouth 2 (Two) Times a Day With Meals.                 Discharge Diet:   Diet Instructions     Diet: Consistent Carbohydrate, Cardiac; Thin Liquids, No Restrictions       Discharge Diet:   Consistent Carbohydrate  Cardiac      Fluid Consistency:  Thin Liquids, No Restrictions                 Discharge Care Plan / Instructions:    Activity at Discharge:   Activity Instructions     Activity as Tolerated                     Follow-up Appointments  No future appointments.  Additional Instructions for the Follow-ups that You Need to Schedule     Discharge Follow-up with PCP    As directed    Follow Up Details:  Please schedule 1 week follow up with PCP                 Test Results Pending at Discharge   Order Current Status    Blood Culture Preliminary result    Blood Culture Preliminary result           Casie M Mayne, PA-C 06/20/17 12:23 PM    Time: Discharge 40 min    Please note that portions of this note may have been completed with a voice recognition program. Efforts were made to edit the dictations, but  occasionally words are mistranscribed.

## 2017-06-26 ENCOUNTER — APPOINTMENT (OUTPATIENT)
Dept: GENERAL RADIOLOGY | Facility: HOSPITAL | Age: 52
End: 2017-06-26

## 2017-06-26 ENCOUNTER — HOSPITAL ENCOUNTER (EMERGENCY)
Facility: HOSPITAL | Age: 52
Discharge: HOME | End: 2017-06-26
Attending: EMERGENCY MEDICINE | Admitting: EMERGENCY MEDICINE

## 2017-06-26 VITALS
HEART RATE: 83 BPM | OXYGEN SATURATION: 95 % | TEMPERATURE: 97.8 F | WEIGHT: 245 LBS | SYSTOLIC BLOOD PRESSURE: 115 MMHG | RESPIRATION RATE: 18 BRPM | DIASTOLIC BLOOD PRESSURE: 76 MMHG | BODY MASS INDEX: 37.13 KG/M2 | HEIGHT: 68 IN

## 2017-06-26 DIAGNOSIS — S80.11XA CONTUSION OF RIGHT LOWER LEG, INITIAL ENCOUNTER: Primary | ICD-10-CM

## 2017-06-26 DIAGNOSIS — M1A.9XX0 CHRONIC GOUT INVOLVING TOE OF RIGHT FOOT WITHOUT TOPHUS, UNSPECIFIED CAUSE: ICD-10-CM

## 2017-06-26 LAB
CRP SERPL-MCNC: 3.25 MG/DL (ref 0–1)
ERYTHROCYTE [SEDIMENTATION RATE] IN BLOOD: 61 MM/HR (ref 0–30)
URATE SERPL-MCNC: 8.4 MG/DL (ref 3.1–7.8)

## 2017-06-26 PROCEDURE — 85652 RBC SED RATE AUTOMATED: CPT | Performed by: PHYSICIAN ASSISTANT

## 2017-06-26 PROCEDURE — 73630 X-RAY EXAM OF FOOT: CPT

## 2017-06-26 PROCEDURE — 99283 EMERGENCY DEPT VISIT LOW MDM: CPT

## 2017-06-26 PROCEDURE — 86140 C-REACTIVE PROTEIN: CPT | Performed by: PHYSICIAN ASSISTANT

## 2017-06-26 PROCEDURE — 73590 X-RAY EXAM OF LOWER LEG: CPT

## 2017-06-26 PROCEDURE — 84550 ASSAY OF BLOOD/URIC ACID: CPT | Performed by: PHYSICIAN ASSISTANT

## 2017-06-26 PROCEDURE — 73610 X-RAY EXAM OF ANKLE: CPT

## 2017-06-26 RX ORDER — INDOMETHACIN 50 MG/1
50 CAPSULE ORAL
Qty: 15 CAPSULE | Refills: 0 | Status: ON HOLD | OUTPATIENT
Start: 2017-06-26 | End: 2017-07-06

## 2017-06-26 NOTE — PAYOR COMM NOTE
"Colleen Gonzalez (52 y.o. Female)     Date of Birth Social Security Number Address Home Phone MRN    [id number]  [id number] [place] [address] [phone] [id number]    Congregation Marital Status          None Single       Admission Date Admission Type Admitting Provider Attending Provider Department, Room/Bed    6/15/17 Emergency Mauricio Reaves MD  [facility] 3E, S336/1    Discharge Date Discharge Disposition Discharge Destination        6/20/2017 Home or Self Care             Attending Provider: (none)    Allergies:  Aspirin, Codeine, Ibuprofen    Isolation:  Contact   Infection:  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), MRSA (03/09/17)   Code Status:  Prior    Ht:  68\" (172.7 cm)   Wt:  260 lb 2.3 oz (118 kg)    Admission Cmt:  None   Principal Problem:  Sepsis [A41.9]                 Active Insurance as of 6/15/2017     Primary Coverage     Payor Plan Insurance Group Employer/Plan Group    [employer] [place] [employer] [place]ICA[place]      Payor Plan Address Payor Plan Phone Number Effective From Effective To    [address] [address] [phone] 3/1/2017     [place] [address]       Subscriber Name Subscriber Birth Date Member ID       COLLEEN GONZALEZ 1965 [id number]                 Emergency Contacts      (Rel.) Home Phone Work Phone Mobile Phone    [address] (Sister) -- -- [phone]               History & Physical      Ashlee Gonzalez MD at 6/15/2017 10:53 PM            [facility] Medicine Services  HISTORY AND PHYSICAL    Primary Care Physician: RADHA Pedraza    Subjective     Chief Complaint: Shortness of breath    History of Present Illness    52 year old female who presents to the ED with c/o SOA with onset two weeks ago. She reports she is chronically SOA, however, over the last two weeks her SOA has progressively worsened from baseline. She notes having an associated cough producing clear sputum as well. Also c/o lower extremity pain.    She is homeless and is " currently residing in a shelter. She has not been sleeping outdoors. To her knowledge she has not been exposed to animals, insects, arthropods, livestock, or roosting birds.     Medical history significant for anxiety, asthma, CKD, COPD, depression, diabetes mellitus diet controlled, fibromyalgia, hypertension, hyperlipidemia, hypothyroidism, obesity, seizures, neuropathy. Continues to smoke.     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 (temp 102.2; ; RLE soft tissue infection).    Review of Systems   Constitutional: Positive for activity change and fatigue. Negative for appetite change, chills and fever.   HENT: Negative for trouble swallowing.    Eyes: Negative for visual disturbance.   Respiratory: Positive for cough and shortness of breath (chronic). Negative for choking and chest tightness.    Cardiovascular: Negative for chest pain and leg swelling.   Gastrointestinal: Negative for abdominal distention, constipation, diarrhea, nausea and vomiting.   Genitourinary: Positive for enuresis.   Musculoskeletal: Positive for back pain and myalgias.   Neurological: Positive for weakness.   Psychiatric/Behavioral: Negative for hallucinations. The patient is not nervous/anxious.       Otherwise complete ROS reviewed and negative except as mentioned in the HPI.      Past Medical History:   Past Medical History:   Diagnosis Date   • TOM (acute kidney injury) 5/25/2017   • Allergic    • Altered mental status 6/9/2017   • Anxiety    • Asthma    • Chronic kidney disease    • COPD (chronic obstructive pulmonary disease)    • Depression    • Diabetes mellitus    • Emphysema lung    • Fibromyalgia    • Herpes    • Hyperlipidemia    • Hypertension    • Hypothyroid    • Neuropathy    • Obesity    • Pneumonia    • Renal insufficiency 6/9/2017   • Seizures         Past Surgical History:  Past Surgical History:   Procedure Laterality Date   • BACK SURGERY     • BRONCHOSCOPY Left 3/15/2017    Procedure: BRONCHOSCOPY WITH FLUORO;  Surgeon: Ankur Salomon MD;  Location: [facility] ENDOSCOPY;  Service:    • CHOLECYSTECTOMY     • CYST REMOVAL     • HYSTERECTOMY      partial   • KNEE SURGERY Bilateral        Family History:   Family History   Problem Relation Age of Onset   • Heart failure Mother    • COPD Mother    • Heart attack Mother    • Diabetes Father      Social History:   Social History     Social History   • Marital status: Single     Spouse name: N/A   • Number of children: N/A   • Years of education: N/A     Occupational History   • Not on file.     Social History Main Topics   • Smoking status: Heavy Tobacco Smoker     Packs/day: 3.00   • Smokeless tobacco: Not on file   • Alcohol use No   • Drug use: No   • Sexual activity: Defer     Other Topics Concern   • Not on file     Social History Narrative    Patient is homeless and moves around staying with friends and family.  Recently was staying at the [facility].         Medications:  Prescriptions Prior to Admission   Medication Sig Dispense Refill Last Dose   • albuterol (PROVENTIL HFA;VENTOLIN HFA) 108 (90 BASE) MCG/ACT inhaler Inhale 2 puffs Every 4 (Four) Hours As Needed for Wheezing. 1 inhaler 11    • atenolol (TENORMIN) 50 MG tablet Take 1 tablet by mouth Daily. 30 tablet 11    • benztropine (COGENTIN) 1 MG tablet Take 1 mg by mouth 2 (Two) Times a Day As Needed for Tremors.      • capsicum (ZOSTRIX) 0.075 % topical cream Apply  topically Every 8 (Eight) Hours. 28.3 g 0    • cetirizine (zyrTEC) 10 MG tablet Take 1 tablet by mouth Daily. 30 tablet 0    • clomiPRAMINE (ANAFRANIL) 50 MG capsule Take 100 mg by mouth Every Night. Take two capsule at bedtime       • clonazePAM (KlonoPIN) 0.5 MG tablet Take 1 tablet by mouth 3 (Three) Times a Day As Needed for Anxiety. 15 tablet 0    • divalproex (DEPAKOTE) 500  MG 24 hr tablet Take 1,000 mg by mouth Daily. Take two tablets once a day       • DULoxetine (CYMBALTA) 60 MG capsule Take 1 capsule by mouth Every 12 (Twelve) Hours. 60 capsule 0    • ergocalciferol (DRISDOL) 15546 UNITS capsule Take 50,000 Units by mouth 1 (One) Time Per Week.      • fluticasone (FLONASE) 50 MCG/ACT nasal spray 2 sprays into each nostril Daily. 1 each 0    • fluticasone-salmeterol (ADVAIR) 250-50 MCG/DOSE DISKUS Inhale 1 puff 2 (Two) Times a Day. 60 each 0    • furosemide (LASIX) 40 MG tablet Take 1 tablet by mouth Daily. 30 tablet 3    • gabapentin (NEURONTIN) 300 MG capsule Take 2 capsules by mouth 4 (Four) Times a Day. 240 capsule 0    • hydrOXYzine (ATARAX) 50 MG tablet Take 1 tablet by mouth 4 (Four) Times a Day As Needed for Itching or Anxiety. 120 tablet 0    • ibuprofen (ADVIL,MOTRIN) 200 MG tablet Take 200 mg by mouth Every 6 (Six) Hours As Needed for Mild Pain (1-3).      • lansoprazole (PREVACID) 30 MG capsule Take 60 mg by mouth Daily.      • levothyroxine (SYNTHROID, LEVOTHROID) 50 MCG tablet Take 50 mcg by mouth Daily.      • melatonin 3 MG tablet Take 3 mg by mouth At Night As Needed for Sleep.      • montelukast (SINGULAIR) 10 MG tablet Take 1 tablet by mouth Every Night. 30 tablet 0    • nicotine (NICODERM CQ) 21 MG/24HR patch Place 1 patch on the skin Daily. 28 patch 0    • PHARMACY MEDS TO BED CONSULT Daily (Monday-Friday). 1 each 0    • pravastatin (PRAVACHOL) 80 MG tablet Take 1 tablet by mouth Every Night. 30 tablet 0 5/19/2017 at Unknown time   • QUEtiapine (SEROquel) 300 MG tablet Take 300 mg by mouth Every Night.      • theophylline (THEODUR) 300 MG 12 hr tablet Take 2 tablets by mouth Every 12 (Twelve) Hours. 60 tablet 0    • Tiotropium Bromide Monohydrate (SPIRIVA RESPIMAT) 2.5 MCG/ACT aerosol solution Inhale 1 dose Daily. 1 inhaler 11    • ziprasidone (GEODON) 80 MG capsule Take 80 mg by mouth 2 (Two) Times a Day With Meals.        Allergies:  Allergies   Allergen  "Reactions   • Aspirin GI Intolerance   • Codeine Itching   • Ibuprofen GI Intolerance       Objective     Vital Signs: /77  Pulse 81  Temp (!) 102.2 °F (39 °C) (Oral)   Resp 22  Ht 68\" (172.7 cm)  Wt 260 lb (118 kg)  SpO2 93%  BMI 39.53 kg/m2  Physical Exam   Constitutional: No distress.   Neck: No tracheal deviation present.   Cardiovascular: Normal rate.    HR per pulse 110-115  Difficult to auscultate HT 2/2 respiratory acoustics   Pulmonary/Chest: Effort normal. No respiratory distress.   A/P: inspir/expir rhonchi with mild rales noted posterior on expiration bilateral bases equally   Abdominal: Soft. She exhibits no distension. There is no tenderness.   Genitourinary:   Genitourinary Comments: Incontinent of bladder. States this happens nightly; controlled during the day.   Musculoskeletal: She exhibits edema (RLU pedal edema +2-3 pitting). She exhibits no tenderness.   Neurological:   Pt was initially resting with eyes closed. Awoken to name.    Skin: Skin is warm. Rash (RLE) noted. She is not diaphoretic.   RLE shin - + erythema, trace edema (nonpitting), warmth, pain  Unsure etiology or timeframe     Results Reviewed:    Results from last 7 days  Lab Units 06/15/17  1846   WBC 10*3/mm3 3.52   HEMOGLOBIN g/dL 16.1*   PLATELETS 10*3/mm3 127*       Results from last 7 days  Lab Units 06/15/17  1846   SODIUM mmol/L 138   POTASSIUM mmol/L 3.8   TOTAL CO2 mmol/L 31.0   CREATININE mg/dL 1.30   GLUCOSE mg/dL 88   CALCIUM mg/dL 9.9       I have personally reviewed and interpreted the radiology studies and ECG obtained at time of admission.     Assessment / Plan      Assessment & Plan  Principal Problem:    Sepsis  Active Problems:    COPD (chronic obstructive pulmonary disease)    Tobacco abuse    Homelessness    Renal insufficiency    Polycythemia    Cellulitis of leg, right    Nocturnal enuresis    Cellulitis of right leg    Bronchitis    Plan:  - Continue Azith/Zosyn/Vanc to cover for COPD bronchitis " as well as Right foot cellulitis (mild).  Suspect can quickly convert to PO Augmentin and Doxy to cover for same issues within the next 24 hrs if improves (nontoxic, not hypoxic, right foot minimally erythematous and edematous)  - Duonebs, steroids  - pt has long hx of noncompliance, leaving AMA bc wants to smoke etc  - homeless  - Nicotine Patch, Smoking cessation education  - Case mgmt for discharge planning (recently has been staying at a shelter)  - Oxygen at 4L NC in ED but by time of attending exam was off O2 with sats 91%.  Unclear if patient is supposed to have chronic O2 but she is homeless so unlikely she can be compliant.  Also still smokes 2 packs a day.       I discussed the patients findings and my recommendations with: the patient        Brief Attending Note   I have seen and examined the patient, performing an independent face-to-face diagnostic evaluation.    The plan of care reviewed and developed with the advanced practice clinician (APC).    Brief Summary Statement/HPI:   See history above as per APC.  Briefly, 52-year-old homeless female who has COPD and continues to smoke 2 packs of cigarettes per day brought to the ED for 2 weeks of progressive shortness of breath.  Patient is a very poor historian, she is currently living at a local homeless shelter.  Caregivers at homeless shelter became concerned about patient's audible wheezing and red upper respiratory sounds as well as a swollen red right foot therefore she was sent by EMS to [facility] ED for evaluation.  In the emergency department, laboratory values overall unremarkable and patient's chest x-ray shows no evidence of active infection.  She does have productive sputum, wet upper respiratory congestion consistent with COPD bronchitis.  She also has a mildly erythematous and slightly edematous right foot compared to the left, with multiple areas of skin breaks throughout the bilateral lower extremities.      Attending Physical Exam:  Temp:   [102.2 °F (39 °C)] 102.2 °F (39 °C)  Heart Rate:  [] 81  Resp:  [20-22] 22  BP: (107-118)/(59-77) 114/77  Constitutional: no acute distress, awake, alert, disheveled and chronically ill-appearing smells of urine  Eyes: PERRLA, sclerae anicteric, no conjunctival injection  Neck: supple, no thyromegaly, trachea midline  Respiratory: Wet upper and rate congestion, wheezes throughout posterior fields, coarse breath sounds throughout, mildly labored respirations.  At time of my exam not on O2 and satting 91% on room air.   Cardiovascular: RRR, no murmur  Gastrointestinal: Positive bowel sounds, soft, nontender, nondistended  Musculoskeletal: No bilateral ankle edema, no clubbing or cyanosis to bilateral lower extremities  Psychiatric: oriented x 3, appropriate affect, cooperative  Neurologic: Strength symmetric in all extremities, Cranial Nerves grossly intact to confrontation  Derm: Faint erythema and nonpitting edema to right dorsum of foot       Brief Assessment/Plan :      See above for further detailed assessment and plan developed with [facility] which I have reviewed and/or edited.    I believe this patient requires OBSERVATION status, however if further evaluation or treatment plans warrant, status may change.  Based upon current information, I predict patient's care encounter to be less than or equal to 2 midnights.        Ashlee Gonzalez MD 06/16/17 1:53 AM     Electronically signed by Ashlee Gonzalez MD at 6/16/2017  2:07 AM           Emergency Department Notes      John Wells MD at 6/15/2017  6:45 PM          Subjective   HPI Comments: Ms. Colleen Gonzalez is a 52 year old female who presents to the ED with c/o SOA with onset two weeks ago. She reports she is chronically SOA, however, over the last two weeks her SOA has progressively worsened from baseline. She notes having an associated cough producing clear sputum as well. She has been seen multiple times recently for her current symptoms and has been admitted for  "them. She also c/o extremity pain but is unable to specify when the pain began because she \"has the pain all the time\". She denies N/V/D, fevers, chills, or any other acute sx at this time.           Patient is a 52 y.o. female presenting with shortness of breath.   History provided by:  Patient  Shortness of Breath   Severity:  Moderate  Onset quality:  Gradual  Timing:  Constant  Progression:  Worsening  Chronicity:  Chronic  Relieved by:  None tried  Worsened by:  Nothing  Ineffective treatments:  None tried  Associated symptoms: cough (Productive) and sputum production    Associated symptoms: no abdominal pain, no chest pain, no fever, no headaches, no neck pain, no sore throat and no vomiting    Cough:     Cough characteristics:  Productive    Sputum characteristics:  Clear    Severity:  Mild  Risk factors: obesity and tobacco use        Review of Systems   Constitutional: Negative for chills and fever.   HENT: Negative for congestion, rhinorrhea and sore throat.    Respiratory: Positive for cough (Productive), sputum production and shortness of breath.    Cardiovascular: Negative for chest pain.   Gastrointestinal: Negative for abdominal pain, diarrhea, nausea and vomiting.   Musculoskeletal: Negative for back pain and neck pain.   Neurological: Negative for dizziness, weakness, light-headedness and headaches.   All other systems reviewed and are negative.      Past Medical History:   Diagnosis Date   • TOM (acute kidney injury) 5/25/2017   • Allergic    • Altered mental status 6/9/2017   • Anxiety    • Asthma    • Chronic kidney disease    • COPD (chronic obstructive pulmonary disease)    • Depression    • Diabetes mellitus    • Emphysema lung    • Fibromyalgia    • Herpes    • Hyperlipidemia    • Hypertension    • Hypothyroid    • Neuropathy    • Obesity    • Pneumonia    • Renal insufficiency 6/9/2017   • Seizures        Allergies   Allergen Reactions   • Aspirin GI Intolerance   • Codeine Itching   • " Ibuprofen GI Intolerance       Past Surgical History:   Procedure Laterality Date   • BACK SURGERY     • BRONCHOSCOPY Left 3/15/2017    Procedure: BRONCHOSCOPY WITH FLUORO;  Surgeon: Ankur Salomon MD;  Location: [facility] ENDOSCOPY;  Service:    • CHOLECYSTECTOMY     • CYST REMOVAL     • HYSTERECTOMY      partial   • KNEE SURGERY Bilateral        Family History   Problem Relation Age of Onset   • Heart failure Mother    • COPD Mother    • Heart attack Mother    • Diabetes Father        Social History     Social History   • Marital status: Single     Spouse name: N/A   • Number of children: N/A   • Years of education: N/A     Social History Main Topics   • Smoking status: Heavy Tobacco Smoker     Packs/day: 3.00   • Smokeless tobacco: None   • Alcohol use No   • Drug use: No   • Sexual activity: Defer     Other Topics Concern   • None     Social History Narrative    Patient is homeless and moves around staying with friends and family.  Recently was staying at the [facility].           Objective   Physical Exam   Constitutional: She is oriented to person, place, and time. She appears well-developed and well-nourished. No distress.   HENT:   Head: Normocephalic and atraumatic.   Mouth/Throat: Oropharynx is clear and moist.   Eyes: Conjunctivae are normal. No scleral icterus.   Neck: Normal range of motion. Neck supple. No thyroid mass present.   Cardiovascular: Normal rate, regular rhythm, normal heart sounds and intact distal pulses.  Exam reveals no gallop and no friction rub.    No murmur heard.  Pulmonary/Chest: Effort normal. Tachypnea noted. She has wheezes. She has rhonchi (Right greater than left).   Abdominal: Soft. There is no tenderness. There is no rebound and no guarding.   Musculoskeletal: Normal range of motion. She exhibits edema (3+ LE edema).   Lymphadenopathy:     She has no cervical adenopathy.   Neurological: She is alert and oriented to person, place, and time.   Skin: Skin is warm and dry.  Lesion (Deep, well healed. Extends medially passed knee. Both legs equally.) noted. She is not diaphoretic. There is erythema (Legs. Right greater than left).   Nursing note and vitals reviewed.      Procedures        ED Course  ED Course   Comment By Time   Patient's treated with a neb here in the ED.  Her white count is not elevated.  He does have significant fever.  She had tachypnea and wheezing on presentationDiscussed findings with Dr. Gonzalez.  The patient has no means for consideration of outpatient discharge due to her baseline status and her means of support.  She is additionally febrile with erythema of her right foot and lower extremity.  She is treated with azithromycin for respiratory symptoms and Zosyn and Vanco for her right lower extremity.Dr. Gonzalez will admit for definitive inpatient care. John Wells MD 06/15 2046     Recent Results (from the past 24 hour(s))   Comprehensive Metabolic Panel    Collection Time: 06/15/17  6:46 PM   Result Value Ref Range    Glucose 88 70 - 100 mg/dL    BUN 10 9 - 23 mg/dL    Creatinine 1.30 0.60 - 1.30 mg/dL    Sodium 138 132 - 146 mmol/L    Potassium 3.8 3.5 - 5.5 mmol/L    Chloride 107 99 - 109 mmol/L    CO2 31.0 20.0 - 31.0 mmol/L    Calcium 9.9 8.7 - 10.4 mg/dL    Total Protein 6.8 5.7 - 8.2 g/dL    Albumin 3.90 3.20 - 4.80 g/dL    ALT (SGPT) 14 7 - 40 U/L    AST (SGOT) 14 0 - 33 U/L    Alkaline Phosphatase 74 25 - 100 U/L    Total Bilirubin 0.5 0.3 - 1.2 mg/dL    eGFR Non African Amer 43 (L) >60 mL/min/1.73    Globulin 2.9 gm/dL    A/G Ratio 1.3 (L) 1.5 - 2.5 g/dL    BUN/Creatinine Ratio 7.7 7.0 - 25.0    Anion Gap 0.0 (L) 3.0 - 11.0 mmol/L   Light Blue Top    Collection Time: 06/15/17  6:46 PM   Result Value Ref Range    Extra Tube hold for add-on    Green Top (Gel)    Collection Time: 06/15/17  6:46 PM   Result Value Ref Range    Extra Tube Hold for add-ons.    Lavender Top    Collection Time: 06/15/17  6:46 PM   Result Value Ref Range    Extra Tube hold for  add-on    Gold Top - SST    Collection Time: 06/15/17  6:46 PM   Result Value Ref Range    Extra Tube Hold for add-ons.    CBC Auto Differential    Collection Time: 06/15/17  6:46 PM   Result Value Ref Range    WBC 3.52 3.50 - 10.80 10*3/mm3    RBC 5.19 (H) 3.89 - 5.14 10*6/mm3    Hemoglobin 16.1 (H) 11.5 - 15.5 g/dL    Hematocrit 50.3 (H) 34.5 - 44.0 %    MCV 96.9 80.0 - 99.0 fL    MCH 31.0 27.0 - 31.0 pg    MCHC 32.0 32.0 - 36.0 g/dL    RDW 17.0 (H) 11.3 - 14.5 %    RDW-SD 59.9 (H) 37.0 - 54.0 fl    MPV 9.5 6.0 - 12.0 fL    Platelets 127 (L) 150 - 450 10*3/mm3    Neutrophil % 51.7 41.0 - 71.0 %    Lymphocyte % 27.6 24.0 - 44.0 %    Monocyte % 17.6 (H) 0.0 - 12.0 %    Eosinophil % 1.4 0.0 - 3.0 %    Basophil % 0.3 0.0 - 1.0 %    Immature Grans % 1.4 (H) 0.0 - 0.6 %    Neutrophils, Absolute 1.82 1.50 - 8.30 10*3/mm3    Lymphocytes, Absolute 0.97 0.60 - 4.80 10*3/mm3    Monocytes, Absolute 0.62 0.00 - 1.00 10*3/mm3    Eosinophils, Absolute 0.05 (L) 0.10 - 0.30 10*3/mm3    Basophils, Absolute 0.01 0.00 - 0.20 10*3/mm3    Immature Grans, Absolute 0.05 (H) 0.00 - 0.03 10*3/mm3   Lactic Acid, Plasma    Collection Time: 06/15/17  6:46 PM   Result Value Ref Range    Lactate 1.0 0.5 - 2.0 mmol/L   Theophylline Level    Collection Time: 06/15/17  6:46 PM   Result Value Ref Range    Theophylline Level 4.0 (L) 10.0 - 20.0 mcg/mL   Procalcitonin    Collection Time: 06/15/17  6:46 PM   Result Value Ref Range    Procalcitonin 0.05 ng/mL   C-reactive Protein    Collection Time: 06/15/17  6:46 PM   Result Value Ref Range    C-Reactive Protein 8.78 (H) 0.00 - 1.00 mg/dL   POC Troponin, Rapid    Collection Time: 06/15/17  6:52 PM   Result Value Ref Range    Troponin I 0.01 0.00 - 0.07 ng/mL     Note: In addition to lab results from this visit, the labs listed above may include labs taken at another facility or during a different encounter within the last 24 hours. Please correlate lab times with ED admission and discharge times for  "further clarification of the services performed during this visit.    XR Chest 1 View   Final Result   Abnormal     Stable chronic cardiopulmonary changes without evidence of an active lung    parenchymal lesion.          THIS DOCUMENT HAS BEEN ELECTRONICALLY SIGNED BY ELISE REYNOSO MD        Vitals:    06/15/17 1817 06/15/17 1818 06/15/17 1859 06/15/17 1930   BP: 107/77   107/72   BP Location: Right arm      Patient Position: Sitting      Pulse: 113  106 103   Resp: 20  22    Temp: (!) 102.2 °F (39 °C)      TempSrc: Oral      SpO2: 92% 94% 92% 90%   Weight: 260 lb (118 kg)      Height: 68\" (172.7 cm)        Medications   sodium chloride 0.9 % flush 10 mL (not administered)   sodium chloride 0.9 % bolus 3,540 mL (1,000 mL Intravenous New Bag 6/15/17 1904)   AZITHROMYCIN 500 MG/250 ML 0.9% NS IVPB (MBP) (not administered)   ipratropium-albuterol (DUO-NEB) nebulizer solution 3 mL (3 mL Nebulization Given 6/15/17 1859)   methylPREDNISolone sodium succinate (SOLU-Medrol) injection 125 mg (125 mg Intravenous Given 6/15/17 1906)   piperacillin-tazobactam (ZOSYN) 4.5 g/100 mL 0.9% NS IVPB (mbp) (0 g Intravenous Stopped 6/15/17 1959)   vancomycin 2000 mg/500 mL 0.9% NS IVPB ([facility]) (2,000 mg Intravenous New Bag 6/15/17 2025)     ECG/EMG Results (last 24 hours)     Procedure Component Value Units Date/Time    ECG 12 Lead [[id number]] Collected:  06/15/17 1818     Updated:  06/15/17 1947    Narrative:       Test Reason : SOA Protocol  Blood Pressure : **/** mmHG  Vent. Rate : 113 BPM     Atrial Rate : 113 BPM     P-R Int : 134 ms          QRS Dur : 066 ms      QT Int : 318 ms       P-R-T Axes : 069 089 078 degrees     QTc Int : 436 ms    Sinus tachycardia  Otherwise normal ECG  When compared with ECG of 08-JUN-2017 22:51,  No significant change was found  Confirmed by BRYCE ANTONY, GUEVARA ([id number]) on 6/15/2017 7:47:02 PM    Referred By:  KAREEM           Confirmed By:GUEVARA WU MD                        MDM    Final diagnoses:   COPD " exacerbation   Cellulitis of right leg   Shortness of breath   Tobacco use   Homelessness       Documentation assistance provided by nancy Farr.  Information recorded by the elaineibe was done at my direction and has been verified and validated by me.     Ankur Farr  06/15/17 2011       Ashley Joseph  06/15/17 2019       John Wells MD  06/15/17 2046       Electronically signed by John Wells MD at 6/15/2017  8:46 PM           Physician Progress Notes (all)      Jim Zabala MD at 6/16/2017 10:25 AM  Version 1 of 1             [facility] Medicine Services  INPATIENT PROGRESS NOTE    Date of Admission: 6/15/2017  Length of Stay: 0  Primary Care Physician: RADHA Pedraza    Subjective   CC: copd exac, cellulitis  HPI:  Fever resolved, wheezing better, leg less red. Overall feels a little better    Review Of Systems:   Review of Systems  No headache, no vomiting, no diarrhea, no abd pain. Chronic mild Le edema. No vision changes.    Objective      Temp:  [97.6 °F (36.4 °C)-102.2 °F (39 °C)] 97.6 °F (36.4 °C)  Heart Rate:  [] 92  Resp:  [18-22] 20  BP: (100-125)/(59-84) 100/69  Physical Exam  Alert, ox4  Slightly pressure speech, but otherwise pleasant and appropriate  Speech clear, equal  and leg raise  rrr  Prolonged expiratory phase w/ bilateral wheezes, no distress  abd soft, nontender  Right mild pretibial erythema and minimal warmth, trace BLE edema 9but equal)  Palpable DP pulses bilaterally    Results Review:    I have reviewed the labs, radiology results and diagnostic studies.      Results from last 7 days  Lab Units 06/15/17  1846   WBC 10*3/mm3 3.52   HEMOGLOBIN g/dL 16.1*   PLATELETS 10*3/mm3 127*       Results from last 7 days  Lab Units 06/15/17  1846   SODIUM mmol/L 138   POTASSIUM mmol/L 3.8   CHLORIDE mmol/L 107   TOTAL CO2 mmol/L 31.0   BUN mg/dL 10   CREATININE mg/dL 1.30   GLUCOSE mg/dL 88   CALCIUM mg/dL 9.9       Culture Data:  Cultures:    Blood Culture   Date Value Ref Range Status   06/15/2017 No growth at less than 24 hours  Preliminary   06/15/2017 No growth at less than 24 hours  Preliminary       Radiology Data: reviewed    I have reviewed the medications.    Assessment/Plan     Problem List  Hospital Problem List     * (Principal)Sepsis    Cellulitis of right leg    COPD exacerbation    Bronchitis    Tobacco abuse (Chronic)    Anxiety and depression (Chronic)    Homelessness (Chronic)    Seizure disorder    Renal insufficiency (Chronic)    Polycythemia    Nocturnal enuresis               Assessment/Plan:  -stop zithromax, change to doxy; continue empiric vanc, zosyn  -mrsa nasal swab (if negative then likely d/c vanc since respiratory status and cellulitis improving)  -cbc, bmp in a.m.  -continue scheduled duonebs  -prednisone and taper as tolerates  -teds  -if cellulitis continues to improve switch to oral abx (see above)  -homeless, social work to see  -follow blood cultures      DVT prophylaxis: sq lovenox  Discharge Planning: I expect patient to be discharged to homeless shelter unknown timeframe.    Jim Zabala MD   06/16/17   10:25 AM         Electronically signed by Jim Zabala MD at 6/16/2017 10:32 AM      Mauricio Reaves MD at 6/17/2017  7:32 AM  Version 1 of 1             [facility] Medicine Services  INPATIENT PROGRESS NOTE    Date of Admission: 6/15/2017  Length of Stay: 1  Primary Care Physician: RADHA Pedraza    Subjective   CC: copd exac, cellulitis  HPI:  Fevers better. Still with some R LE redness/swelling. No n/v. Some pain in R LE.    Review Of Systems:   Review of Systems  No headache, no vomiting, no diarrhea, no abd pain. Chronic mild Le edema. No vision changes.    Objective      Temp:  [97.5 °F (36.4 °C)-98.5 °F (36.9 °C)] 98.5 °F (36.9 °C)  Heart Rate:  [80-92] 80  Resp:  [18-20] 18  BP: ()/(54-77) 118/77  Physical Exam  Alert, ox4  Slightly pressure  speech, but otherwise pleasant and appropriate  Speech clear, equal  and leg raise  rrr  Prolonged expiratory phase w/ bilateral wheezes, no distress  abd soft, nontender  Right mild pretibial erythema and minimal warmth, trace R LE edema, none on L  Palpable DP pulses bilaterally    Results Review:    I have reviewed the labs, radiology results and diagnostic studies.      Results from last 7 days  Lab Units 06/15/17  1846   WBC 10*3/mm3 3.52   HEMOGLOBIN g/dL 16.1*   PLATELETS 10*3/mm3 127*       Results from last 7 days  Lab Units 06/15/17  1846   SODIUM mmol/L 138   POTASSIUM mmol/L 3.8   CHLORIDE mmol/L 107   TOTAL CO2 mmol/L 31.0   BUN mg/dL 10   CREATININE mg/dL 1.30   GLUCOSE mg/dL 88   CALCIUM mg/dL 9.9       Culture Data: Cultures:    Blood Culture   Date Value Ref Range Status   06/15/2017 No growth at less than 24 hours  Preliminary   06/15/2017 No growth at less than 24 hours  Preliminary       Radiology Data: reviewed    I have reviewed the medications.    Assessment/Plan     Problem List  Hospital Problem List     * (Principal)Sepsis    Tobacco abuse (Chronic)    Anxiety and depression (Chronic)    Homelessness (Chronic)    Seizure disorder    Renal insufficiency (Chronic)    Polycythemia    Nocturnal enuresis    Cellulitis of right leg    Bronchitis    COPD exacerbation               Assessment/Plan:  -continue abx, await MRSA screen  -monitor LE edema for resolution/improvement  -continue nebulizers/steroids  -home in 1-2 days      DVT prophylaxis: sq lovenox  Discharge Planning: I expect patient to be discharged to homeless shelter unknown timeframe.    Mauricio Reaves MD   06/17/17   7:33 AM         Electronically signed by Mauricio Reaves MD at 6/17/2017  7:34 AM      Mauricio Reaves MD at 6/18/2017  8:24 AM  Version 1 of 1             [facility] Medicine Services  INPATIENT PROGRESS NOTE    Date of Admission: 6/15/2017  Length of Stay: 2  Primary Care Physician: Desire  RADHA Laurent    Subjective   CC: copd exac, cellulitis  HPI:  Fevers better. Legs less swollen.  Legs less red. Noted genital herpes outbreak. Otherwise no issues.    Review Of Systems:   Review of Systems  No headache, no vomiting, no diarrhea, no abd pain. Chronic mild Le edema. No vision changes.    Objective      Temp:  [97.6 °F (36.4 °C)-98.5 °F (36.9 °C)] 97.6 °F (36.4 °C)  Heart Rate:  [85-90] 88  Resp:  [16-18] 18  BP: (105-138)/(63-86) 138/86  Physical Exam  Alert, ox4  Slightly pressure speech, but otherwise pleasant and appropriate  Speech clear, equal  and leg raise  rrr  Prolonged expiratory phase w/ bilateral wheezes, no distress  abd soft, nontender  Right mild pretibial erythema and minimal warmth, trace R LE edema, none on L, but edema and redness better today.  Palpable DP pulses bilaterally    Results Review:    I have reviewed the labs, radiology results and diagnostic studies.      Results from last 7 days  Lab Units 06/17/17  0844   WBC 10*3/mm3 7.34   HEMOGLOBIN g/dL 10.9*   PLATELETS 10*3/mm3 206       Results from last 7 days  Lab Units 06/17/17  0658   SODIUM mmol/L 146   POTASSIUM mmol/L 3.7   CHLORIDE mmol/L 110*   TOTAL CO2 mmol/L 32.0*   BUN mg/dL 8*   CREATININE mg/dL 0.80   GLUCOSE mg/dL 100   CALCIUM mg/dL 9.4       Culture Data: Cultures:    Blood Culture   Date Value Ref Range Status   06/15/2017 No growth at less than 24 hours  Preliminary   06/15/2017 No growth at less than 24 hours  Preliminary       Radiology Data: reviewed    I have reviewed the medications.    Assessment/Plan     Problem List  Hospital Problem List     * (Principal)Sepsis    Tobacco abuse (Chronic)    Anxiety and depression (Chronic)    Homelessness (Chronic)    Seizure disorder    Renal insufficiency (Chronic)    Polycythemia    Nocturnal enuresis    Cellulitis of right leg    Bronchitis    COPD exacerbation               Assessment/Plan:  -continue abx, MRSA screen negative, transition to oral  abx  -acyclovir for herpes  -diflucan for suspect yeast co-infection  -monitor LE edema for resolution/improvement  -continue nebulizers/steroids  -home in 1-2 days      DVT prophylaxis: sq lovenox  Discharge Planning: I expect patient to be discharged to homeless shelter unknown timeframe.    Mauricio Reaves MD   06/18/17   8:24 AM         Electronically signed by Mauricio Reaves MD at 6/18/2017  8:26 AM      Casie M Mayne, PA-C at 6/19/2017  9:21 AM  Version 2 of 2    Attestation signed by Mauricio Reaves MD at 6/19/2017 10:56 AM        I have reviewed the documentation above and agree.                                     [facility] Medicine Services  INPATIENT PROGRESS NOTE    Date of Admission: 6/15/2017  Length of Stay: 3  Primary Care Physician: RADHA Pedraza    Subjective   CC: copd exac, cellulitis  HPI:  LE leg swelling and redness continues to improve, c/o chronic aches/pains, audible wheezes, says refused nebs bc tired, long discussion r/t medical compliance, need for nebs. No fever, chills, worsening SOA, CP, N/V.     Review Of Systems:   Review of Systems  No headache, no vomiting, no diarrhea, no abd pain. Chronic mild Le edema. No vision changes.    Objective      Temp:  [98 °F (36.7 °C)-99.2 °F (37.3 °C)] 98 °F (36.7 °C)  Heart Rate:  [100-109] 109  Resp:  [18] 18  BP: (148-154)/() 148/98  Physical Exam  Alert, ox4  Slightly pressure speech, but otherwise pleasant and appropriate  Speech clear, equal  and leg raise  Mildly tachcardic, regular rhythm   Prolonged expiratory phase w/ bilateral wheezes, no distress  abd soft, nontender, nondistended  Right mild pretibial erythema and minimal warmth, no LE noted  Palpable DP pulses bilaterally    Results Review:    I have reviewed the labs, radiology results and diagnostic studies.      Results from last 7 days  Lab Units 06/17/17  0844   WBC 10*3/mm3 7.34   HEMOGLOBIN g/dL 10.9*   PLATELETS 10*3/mm3 206        Results from last 7 days  Lab Units 06/17/17  0658   SODIUM mmol/L 146   POTASSIUM mmol/L 3.7   CHLORIDE mmol/L 110*   TOTAL CO2 mmol/L 32.0*   BUN mg/dL 8*   CREATININE mg/dL 0.80   GLUCOSE mg/dL 100   CALCIUM mg/dL 9.4       Culture Data: Cultures:    MRSA Screen Culture [[id number]] (Normal) Collected: 06/16/17 1503       Lab Status: Final result Specimen: Swab from Nares Updated: 06/18/17 0651        MRSA SCREEN CX No Methicillin Resistant Staphylococcus aureus Isolated at 2 days       Blood Culture [[id number]] (Normal) Collected: 06/15/17 1845       Lab Status: Preliminary result Specimen: Blood from Hand, Right Updated: 06/18/17 2001        Blood Culture No growth at 3 days       Blood Culture [[id number]] (Normal) Collected: 06/15/17 1835       Lab Status: Preliminary result Specimen: Blood from Arm, Right Updated: 06/18/17 2001        Blood Culture No growth at 3 days               Radiology Data: reviewed    I have reviewed the medications.    Assessment/Plan     Problem List  Hospital Problem List     * (Principal)Sepsis    Tobacco abuse (Chronic)    Anxiety and depression (Chronic)    Homelessness (Chronic)    Seizure disorder    Renal insufficiency (Chronic)    Polycythemia    Nocturnal enuresis    Cellulitis of right leg    Bronchitis    COPD exacerbation               Assessment/Plan:  -continue abx (zosyn x2 d, now augmentin d2, continues doxy, MRSA screen negative, transition to oral abx  -acyclovir for herpes  -s/p 1dose diflucan for suspect yeast co-infection  -monitor LE edema for resolution/improvement (on doxy 4 d)  -continue nebulizers/steroids (discussed importance of compliance with nebs, pt has refused all prior)  -increased atenolol to 75mg daily, monitor BP/HR  -bmp,cbc in a.m.  - SW helping with return to [facility]  -supportive care from chronic arthritic pain (Kpad, APAP, capsicin)     DVT prophylaxis: sq lovenox  Discharge Planning: I expect patient to be discharged  to homeless shelter tomorrow  Casie M Mayne, PA-C   06/19/17   9:21 AM         Electronically signed by Mauricio Reaves MD at 6/19/2017 10:56 AM      Casie M Mayne, PA-C at 6/19/2017  9:21 AM  Version 1 of 2             [facility] Medicine Services  INPATIENT PROGRESS NOTE    Date of Admission: 6/15/2017  Length of Stay: 3  Primary Care Physician: RADHA Pedraza    Subjective   CC: copd exac, cellulitis  HPI:  LE leg swelling and redness continues to improve, c/o chronic aches/pains, audible wheezes, says refused nebs bc tired, long discussion r/t medical compliance, need for nebs. No fever, chills, worsening SOA, CP, N/V.     Review Of Systems:   Review of Systems  No headache, no vomiting, no diarrhea, no abd pain. Chronic mild Le edema. No vision changes.    Objective      Temp:  [98 °F (36.7 °C)-99.2 °F (37.3 °C)] 98 °F (36.7 °C)  Heart Rate:  [100-109] 109  Resp:  [18] 18  BP: (148-154)/() 148/98  Physical Exam  Alert, ox4  Slightly pressure speech, but otherwise pleasant and appropriate  Speech clear, equal  and leg raise  Mildly tachcardic, regular rhythm   Prolonged expiratory phase w/ bilateral wheezes, no distress  abd soft, nontender, nondistended  Right mild pretibial erythema and minimal warmth, no LE noted  Palpable DP pulses bilaterally    Results Review:    I have reviewed the labs, radiology results and diagnostic studies.      Results from last 7 days  Lab Units 06/17/17  0844   WBC 10*3/mm3 7.34   HEMOGLOBIN g/dL 10.9*   PLATELETS 10*3/mm3 206       Results from last 7 days  Lab Units 06/17/17  0658   SODIUM mmol/L 146   POTASSIUM mmol/L 3.7   CHLORIDE mmol/L 110*   TOTAL CO2 mmol/L 32.0*   BUN mg/dL 8*   CREATININE mg/dL 0.80   GLUCOSE mg/dL 100   CALCIUM mg/dL 9.4       Culture Data: Cultures:    MRSA Screen Culture [[id number]] (Normal) Collected: 06/16/17 1503       Lab Status: Final result Specimen: Swab from Nares Updated: 06/18/17 0651        MRSA  SCREEN CX No Methicillin Resistant Staphylococcus aureus Isolated at 2 days       Blood Culture [[id number]] (Normal) Collected: 06/15/17 1845       Lab Status: Preliminary result Specimen: Blood from Hand, Right Updated: 06/18/17 2001        Blood Culture No growth at 3 days       Blood Culture [[id number]] (Normal) Collected: 06/15/17 1835       Lab Status: Preliminary result Specimen: Blood from Arm, Right Updated: 06/18/17 2001        Blood Culture No growth at 3 days               Radiology Data: reviewed    I have reviewed the medications.    Assessment/Plan     Problem List  Hospital Problem List     * (Principal)Sepsis    Tobacco abuse (Chronic)    Anxiety and depression (Chronic)    Homelessness (Chronic)    Seizure disorder    Renal insufficiency (Chronic)    Polycythemia    Nocturnal enuresis    Cellulitis of right leg    Bronchitis    COPD exacerbation               Assessment/Plan:  -continue abx (zosyn x2 d, now augmentin d2, continues doxy, MRSA screen negative, transition to oral abx  -acyclovir for herpes  -s/p 1dose diflucan for suspect yeast co-infection  -monitor LE edema for resolution/improvement (on doxy 4 d)  -continue nebulizers/steroids (discussed importance of compliance with nebs, pt has refused all prior)  -increased atenolol to 75mg daily, monitor BP/HR  -bmp,cbc in a.m.  - SW helping with return to [facility]  -supportive care from chronic arthritic pain (Kpad, APAP, capsicin)     DVT prophylaxis: sq lovenox  Discharge Planning: I expect patient to be discharged to homeless shelter tomorrow  Casie M Mayne, PA-C   06/19/17   9:21 AM         Electronically signed by Casie M Mayne, PA-C at 6/19/2017  9:34 AM        Consult Notes (all)     No notes of this type exist for this encounter.

## 2017-06-26 NOTE — ED PROVIDER NOTES
Subjective   HPI Comments: 53 yo female reports RLE pain, mostly to tib/fib, anterior ankle, and dorsal aspect of right foot around the 1st MT.  She reports staying at the [facility] because she is homeless.  About 1week ago, she says another lady was rushing to get a bed for the night and tripped, falling onto patient's RLE.  She has a boot from previous injury and she has been wearing that with some relief. She reports increased pain with weight bearing and ROM.  She also reports increased pain to right 1st MT.  She also reports history of gout.    Patient is a 52 y.o. female presenting with lower extremity pain.   History provided by:  Patient  Lower Extremity Issue   Location:  Leg and foot  Time since incident:  6 days  Leg location:  R leg  Foot location:  R foot and dorsum of R foot  Pain details:     Quality:  Throbbing    Severity:  Moderate    Onset quality:  Sudden    Duration:  6 days    Timing:  Constant  Chronicity:  Recurrent (previous injury to RLE with previous surgeries and old fractures.)  Dislocation: no    Prior injury to area:  No  Relieved by:  Immobilization (wearing a boot to RLE)  Worsened by:  Bearing weight and abduction  Ineffective treatments:  None tried  Associated symptoms: decreased ROM and swelling    Associated symptoms: no back pain, no neck pain and no tingling        Review of Systems   Constitutional: Negative.    HENT: Negative.    Respiratory: Negative.    Cardiovascular: Negative.    Gastrointestinal: Negative.    Genitourinary: Negative.    Musculoskeletal: Positive for arthralgias (Right tib/fib, and Right anterior ankle and dorsal aspect of right foot at 1st MT.) and joint swelling (swelling to right anterior ankle and foot.). Negative for back pain, neck pain and neck stiffness.   Neurological: Negative.    Psychiatric/Behavioral: Negative for agitation and behavioral problems. The patient is nervous/anxious (pt upset about staying at the homeless shelter  and hates staying there.).        Past Medical History:   Diagnosis Date   • TOM (acute kidney injury) 5/25/2017   • Allergic    • Altered mental status 6/9/2017   • Anxiety    • Asthma    • Chronic kidney disease    • COPD (chronic obstructive pulmonary disease)    • Depression    • Diabetes mellitus    • Emphysema lung    • Fibromyalgia    • Herpes    • Homeless    • Hyperlipidemia    • Hypertension    • Hypothyroid    • Neuropathy    • Obesity    • Pneumonia    • Renal insufficiency 6/9/2017   • Seizures        Allergies   Allergen Reactions   • Aspirin GI Intolerance   • Codeine Itching   • Ibuprofen GI Intolerance       Past Surgical History:   Procedure Laterality Date   • BACK SURGERY     • BRONCHOSCOPY Left 3/15/2017    Procedure: BRONCHOSCOPY WITH FLUORO;  Surgeon: Ankur Salomon MD;  Location: [facility] ENDOSCOPY;  Service:    • CHOLECYSTECTOMY     • CYST REMOVAL     • HYSTERECTOMY      partial   • KNEE SURGERY Bilateral        Family History   Problem Relation Age of Onset   • Heart failure Mother    • COPD Mother    • Heart attack Mother    • Diabetes Father        Social History     Social History   • Marital status: Single     Spouse name: N/A   • Number of children: N/A   • Years of education: N/A     Social History Main Topics   • Smoking status: Heavy Tobacco Smoker     Packs/day: 3.00   • Smokeless tobacco: None   • Alcohol use No   • Drug use: No   • Sexual activity: Defer     Other Topics Concern   • None     Social History Narrative    Patient is homeless and moves around staying with friends and family.  Recently was staying at the [facility]             Objective   Physical Exam   Constitutional: She is oriented to person, place, and time. She appears well-developed and well-nourished. No distress.   HENT:   Head: Normocephalic and atraumatic.   Eyes: Conjunctivae are normal. No scleral icterus.   Neck: Normal range of motion. Neck supple.   Cardiovascular: Normal rate, regular rhythm  and normal heart sounds.    Pulmonary/Chest: Effort normal and breath sounds normal. No respiratory distress.   Mild B expiratory wheezing   Abdominal: Soft. She exhibits no distension. There is no tenderness.   Musculoskeletal: Normal range of motion. She exhibits no edema.   Surgical incision to right knee, tib/fib.  TTP to anterior tibia/fibula, as well as right anterior ankle.  Also TTP to dorsal aspect of right foot, as well as 1st MT.  Mild erythema noted to right ankle/foot.  Painful weight bearing.    Lymphadenopathy:     She has no cervical adenopathy.   Neurological: She is alert and oriented to person, place, and time.   Skin: Skin is warm and dry. She is not diaphoretic.   Psychiatric: She has a normal mood and affect. Her behavior is normal.   Nursing note and vitals reviewed.      Procedures         ED Course  ED Course   Comment By Time   Reviewed films with Dr. Hernandez, and due to chronic changes and previous fractures, we will send films to [facility].  No acute bony abnormalities noted on today's exams. Mireya Hsieh PA-C 06/26 2130   Discussed results with patient.  Also discussed elevated uric acid and ESR and CRP.  Patient states she just finished a steroid taper for COPD.  Will prescribe short course of Indocin.  Patient says she is not allergic to NSAIDs.  She says they just irritate her stomach a little.  She says usually Aleve works really well with her joint pain. Mireya Hsieh PA-C 06/26 2212        Recent Results (from the past 24 hour(s))   Uric Acid    Collection Time: 06/26/17  8:02 PM   Result Value Ref Range    Uric Acid 8.4 (H) 3.1 - 7.8 mg/dL   Sedimentation Rate    Collection Time: 06/26/17  8:02 PM   Result Value Ref Range    Sed Rate 61 (H) 0 - 30 mm/hr   C-reactive Protein    Collection Time: 06/26/17  8:02 PM   Result Value Ref Range    C-Reactive Protein 3.25 (H) 0.00 - 1.00 mg/dL     Note: In addition to lab results from this visit, the labs listed above may include labs taken at  "another facility or during a different encounter within the last 24 hours. Please correlate lab times with ED admission and discharge times for further clarification of the services performed during this visit.    XR Foot 3+ View Right   Final Result   Abnormal     Chronic changes without acute bony abnormality or injury.       THIS DOCUMENT HAS BEEN ELECTRONICALLY SIGNED BY ELISE REYNOSO MD      XR Tibia Fibula 2 View Right   Final Result   Abnormal     Chronic changes without acute bony abnormality or injury.       THIS DOCUMENT HAS BEEN ELECTRONICALLY SIGNED BY ELISE REYNOSO MD      XR Ankle 3+ View Right   Final Result   Abnormal     Chronic changes without acute bony abnormality or injury.       THIS DOCUMENT HAS BEEN ELECTRONICALLY SIGNED BY ELISE REYNOSO MD        Vitals:    06/26/17 1857 06/26/17 2034 06/26/17 2036   BP: 114/81 115/76    BP Location: Right arm     Patient Position: Sitting     Pulse: 89 83    Resp: 18     Temp: 97.8 °F (36.6 °C)     TempSrc: Oral     SpO2: 96%  95%   Weight: 245 lb (111 kg)     Height: 68\" (172.7 cm)       Medications - No data to display  ECG/EMG Results (last 24 hours)     ** No results found for the last 24 hours. **                  [facility]    Final diagnoses:   Contusion of right lower leg, initial encounter   Chronic gout involving toe of right foot without tophus, unspecified cause            Mireya Hsieh PA-C  06/26/17 2218    "

## 2017-06-27 ENCOUNTER — DOCUMENTATION (OUTPATIENT)
Dept: INTERNAL MEDICINE | Facility: HOSPITAL | Age: 52
End: 2017-06-27

## 2017-06-27 NOTE — PROGRESS NOTES
Got call from Lab that AFB sputum culture positive , I conveyed this information to Discharging physician Dr Carlos Jaramillo.  Health department is being notified by lab today.   Milind Loyola MD

## 2017-07-03 LAB
MYCOBACTERIUM SPEC CULT: ABNORMAL
MYCOBACTERIUM SPEC CULT: NORMAL
NIGHT BLUE STAIN TISS: ABNORMAL
NIGHT BLUE STAIN TISS: NORMAL

## 2017-07-04 ENCOUNTER — HOSPITAL ENCOUNTER (OUTPATIENT)
Facility: HOSPITAL | Age: 52
Setting detail: OBSERVATION
Discharge: HOME | End: 2017-07-07
Attending: EMERGENCY MEDICINE | Admitting: HOSPITALIST

## 2017-07-04 ENCOUNTER — APPOINTMENT (OUTPATIENT)
Dept: GENERAL RADIOLOGY | Facility: HOSPITAL | Age: 52
End: 2017-07-04

## 2017-07-04 ENCOUNTER — APPOINTMENT (OUTPATIENT)
Dept: CT IMAGING | Facility: HOSPITAL | Age: 52
End: 2017-07-04

## 2017-07-04 DIAGNOSIS — J96.21 ACUTE ON CHRONIC RESPIRATORY FAILURE WITH HYPOXIA (HCC): ICD-10-CM

## 2017-07-04 DIAGNOSIS — J21.9 BRONCHIOLITIS: ICD-10-CM

## 2017-07-04 DIAGNOSIS — J44.1 COPD EXACERBATION (HCC): Primary | ICD-10-CM

## 2017-07-04 PROBLEM — A31.9 MYCOBACTERIAL INFECTION: Status: ACTIVE | Noted: 2017-07-04

## 2017-07-04 LAB
ALBUMIN SERPL-MCNC: 4.1 G/DL (ref 3.2–4.8)
ALBUMIN/GLOB SERPL: 1.3 G/DL (ref 1.5–2.5)
ALP SERPL-CCNC: 94 U/L (ref 25–100)
ALT SERPL W P-5'-P-CCNC: 7 U/L (ref 7–40)
ANION GAP SERPL CALCULATED.3IONS-SCNC: 7 MMOL/L (ref 3–11)
AST SERPL-CCNC: 12 U/L (ref 0–33)
BASOPHILS # BLD AUTO: 0.05 10*3/MM3 (ref 0–0.2)
BASOPHILS NFR BLD AUTO: 0.3 % (ref 0–1)
BILIRUB SERPL-MCNC: 0.5 MG/DL (ref 0.3–1.2)
BNP SERPL-MCNC: 11 PG/ML (ref 0–100)
BUN BLD-MCNC: 7 MG/DL (ref 9–23)
BUN/CREAT SERPL: 7 (ref 7–25)
CALCIUM SPEC-SCNC: 10.5 MG/DL (ref 8.7–10.4)
CHLORIDE SERPL-SCNC: 98 MMOL/L (ref 99–109)
CO2 SERPL-SCNC: 28 MMOL/L (ref 20–31)
CREAT BLD-MCNC: 1 MG/DL (ref 0.6–1.3)
DEPRECATED RDW RBC AUTO: 59.7 FL (ref 37–54)
EOSINOPHIL # BLD AUTO: 0.03 10*3/MM3 (ref 0–0.3)
EOSINOPHIL NFR BLD AUTO: 0.2 % (ref 0–3)
ERYTHROCYTE [DISTWIDTH] IN BLOOD BY AUTOMATED COUNT: 16.6 % (ref 11.3–14.5)
GFR SERPL CREATININE-BSD FRML MDRD: 58 ML/MIN/1.73
GLOBULIN UR ELPH-MCNC: 3.2 GM/DL
GLUCOSE BLD-MCNC: 108 MG/DL (ref 70–100)
HCT VFR BLD AUTO: 37.9 % (ref 34.5–44)
HGB BLD-MCNC: 12.1 G/DL (ref 11.5–15.5)
HOLD SPECIMEN: NORMAL
HOLD SPECIMEN: NORMAL
IMM GRANULOCYTES # BLD: 0.07 10*3/MM3 (ref 0–0.03)
IMM GRANULOCYTES NFR BLD: 0.4 % (ref 0–0.6)
LYMPHOCYTES # BLD AUTO: 3.23 10*3/MM3 (ref 0.6–4.8)
LYMPHOCYTES NFR BLD AUTO: 20 % (ref 24–44)
MCH RBC QN AUTO: 31.4 PG (ref 27–31)
MCHC RBC AUTO-ENTMCNC: 31.9 G/DL (ref 32–36)
MCV RBC AUTO: 98.4 FL (ref 80–99)
MONOCYTES # BLD AUTO: 2.15 10*3/MM3 (ref 0–1)
MONOCYTES NFR BLD AUTO: 13.3 % (ref 0–12)
NEUTROPHILS # BLD AUTO: 10.59 10*3/MM3 (ref 1.5–8.3)
NEUTROPHILS NFR BLD AUTO: 65.8 % (ref 41–71)
PLATELET # BLD AUTO: 336 10*3/MM3 (ref 150–450)
PMV BLD AUTO: 10.1 FL (ref 6–12)
POTASSIUM BLD-SCNC: 3.5 MMOL/L (ref 3.5–5.5)
PROT SERPL-MCNC: 7.3 G/DL (ref 5.7–8.2)
RBC # BLD AUTO: 3.85 10*6/MM3 (ref 3.89–5.14)
S PYO AG THROAT QL: NEGATIVE
SODIUM BLD-SCNC: 133 MMOL/L (ref 132–146)
THEOPHYLLINE SERPL-MCNC: 22 MCG/ML (ref 10–20)
TROPONIN I SERPL-MCNC: 0 NG/ML (ref 0–0.07)
WBC NRBC COR # BLD: 16.12 10*3/MM3 (ref 3.5–10.8)
WHOLE BLOOD HOLD SPECIMEN: NORMAL
WHOLE BLOOD HOLD SPECIMEN: NORMAL

## 2017-07-04 PROCEDURE — 25010000002 METHYLPREDNISOLONE PER 125 MG: Performed by: EMERGENCY MEDICINE

## 2017-07-04 PROCEDURE — 71010 HC CHEST PA OR AP: CPT

## 2017-07-04 PROCEDURE — 96367 TX/PROPH/DG ADDL SEQ IV INF: CPT

## 2017-07-04 PROCEDURE — 85025 COMPLETE CBC W/AUTO DIFF WBC: CPT | Performed by: EMERGENCY MEDICINE

## 2017-07-04 PROCEDURE — 87040 BLOOD CULTURE FOR BACTERIA: CPT | Performed by: EMERGENCY MEDICINE

## 2017-07-04 PROCEDURE — 96365 THER/PROPH/DIAG IV INF INIT: CPT

## 2017-07-04 PROCEDURE — 94760 N-INVAS EAR/PLS OXIMETRY 1: CPT

## 2017-07-04 PROCEDURE — 80198 ASSAY OF THEOPHYLLINE: CPT | Performed by: EMERGENCY MEDICINE

## 2017-07-04 PROCEDURE — 94640 AIRWAY INHALATION TREATMENT: CPT

## 2017-07-04 PROCEDURE — 99285 EMERGENCY DEPT VISIT HI MDM: CPT

## 2017-07-04 PROCEDURE — 25010000002 PIPERACILLIN-TAZOBACTAM: Performed by: EMERGENCY MEDICINE

## 2017-07-04 PROCEDURE — 80053 COMPREHEN METABOLIC PANEL: CPT | Performed by: EMERGENCY MEDICINE

## 2017-07-04 PROCEDURE — 93005 ELECTROCARDIOGRAM TRACING: CPT

## 2017-07-04 PROCEDURE — 87081 CULTURE SCREEN ONLY: CPT | Performed by: EMERGENCY MEDICINE

## 2017-07-04 PROCEDURE — 71275 CT ANGIOGRAPHY CHEST: CPT

## 2017-07-04 PROCEDURE — 96375 TX/PRO/DX INJ NEW DRUG ADDON: CPT

## 2017-07-04 PROCEDURE — 84484 ASSAY OF TROPONIN QUANT: CPT

## 2017-07-04 PROCEDURE — 83880 ASSAY OF NATRIURETIC PEPTIDE: CPT | Performed by: EMERGENCY MEDICINE

## 2017-07-04 PROCEDURE — 25010000002 VANCOMYCIN: Performed by: EMERGENCY MEDICINE

## 2017-07-04 PROCEDURE — 0 IOPAMIDOL PER 1 ML: Performed by: EMERGENCY MEDICINE

## 2017-07-04 PROCEDURE — 87880 STREP A ASSAY W/OPTIC: CPT | Performed by: EMERGENCY MEDICINE

## 2017-07-04 RX ORDER — BENZONATATE 100 MG/1
100 CAPSULE ORAL ONCE
Status: COMPLETED | OUTPATIENT
Start: 2017-07-04 | End: 2017-07-04

## 2017-07-04 RX ORDER — METHYLPREDNISOLONE SODIUM SUCCINATE 125 MG/2ML
125 INJECTION, POWDER, LYOPHILIZED, FOR SOLUTION INTRAMUSCULAR; INTRAVENOUS ONCE
Status: COMPLETED | OUTPATIENT
Start: 2017-07-04 | End: 2017-07-04

## 2017-07-04 RX ORDER — SODIUM CHLORIDE 0.9 % (FLUSH) 0.9 %
10 SYRINGE (ML) INJECTION AS NEEDED
Status: DISCONTINUED | OUTPATIENT
Start: 2017-07-04 | End: 2017-07-07 | Stop reason: HOSPADM

## 2017-07-04 RX ORDER — IPRATROPIUM BROMIDE AND ALBUTEROL SULFATE 2.5; .5 MG/3ML; MG/3ML
3 SOLUTION RESPIRATORY (INHALATION) ONCE
Status: COMPLETED | OUTPATIENT
Start: 2017-07-04 | End: 2017-07-04

## 2017-07-04 RX ADMIN — VANCOMYCIN HYDROCHLORIDE 2000 MG: 1 INJECTION, POWDER, LYOPHILIZED, FOR SOLUTION INTRAVENOUS at 23:40

## 2017-07-04 RX ADMIN — METHYLPREDNISOLONE SODIUM SUCCINATE 125 MG: 125 INJECTION, POWDER, FOR SOLUTION INTRAMUSCULAR; INTRAVENOUS at 21:36

## 2017-07-04 RX ADMIN — TAZOBACTAM SODIUM AND PIPERACILLIN SODIUM 4.5 G: .5; 4 INJECTION, POWDER, LYOPHILIZED, FOR SOLUTION INTRAVENOUS at 23:04

## 2017-07-04 RX ADMIN — IPRATROPIUM BROMIDE AND ALBUTEROL SULFATE 3 ML: .5; 3 SOLUTION RESPIRATORY (INHALATION) at 21:39

## 2017-07-04 RX ADMIN — IOPAMIDOL 55 ML: 755 INJECTION, SOLUTION INTRAVENOUS at 22:24

## 2017-07-04 RX ADMIN — BENZONATATE 100 MG: 100 CAPSULE ORAL at 21:36

## 2017-07-05 PROBLEM — G40.909 SEIZURE DISORDER (HCC): Chronic | Status: ACTIVE | Noted: 2017-05-19

## 2017-07-05 PROBLEM — Z91.199 NON COMPLIANCE WITH MEDICAL TREATMENT: Chronic | Status: ACTIVE | Noted: 2017-07-05

## 2017-07-05 PROBLEM — E03.9 HYPOTHYROID: Chronic | Status: ACTIVE | Noted: 2017-03-01

## 2017-07-05 PROBLEM — J21.9 BRONCHIOLITIS: Status: ACTIVE | Noted: 2017-07-05

## 2017-07-05 LAB
ANION GAP SERPL CALCULATED.3IONS-SCNC: 13 MMOL/L (ref 3–11)
BUN BLD-MCNC: 7 MG/DL (ref 9–23)
BUN/CREAT SERPL: 7.8 (ref 7–25)
CALCIUM SPEC-SCNC: 10.5 MG/DL (ref 8.7–10.4)
CHLORIDE SERPL-SCNC: 100 MMOL/L (ref 99–109)
CO2 SERPL-SCNC: 23 MMOL/L (ref 20–31)
CREAT BLD-MCNC: 0.9 MG/DL (ref 0.6–1.3)
GFR SERPL CREATININE-BSD FRML MDRD: 65 ML/MIN/1.73
GLUCOSE BLD-MCNC: 146 MG/DL (ref 70–100)
POTASSIUM BLD-SCNC: 3.7 MMOL/L (ref 3.5–5.5)
SODIUM BLD-SCNC: 136 MMOL/L (ref 132–146)
THEOPHYLLINE SERPL-MCNC: 14 MCG/ML (ref 10–20)

## 2017-07-05 PROCEDURE — 80048 BASIC METABOLIC PNL TOTAL CA: CPT | Performed by: NURSE PRACTITIONER

## 2017-07-05 PROCEDURE — 94799 UNLISTED PULMONARY SVC/PX: CPT

## 2017-07-05 PROCEDURE — 63710000001 PREDNISONE PER 1 MG: Performed by: NURSE PRACTITIONER

## 2017-07-05 PROCEDURE — G0378 HOSPITAL OBSERVATION PER HR: HCPCS

## 2017-07-05 PROCEDURE — 25010000002 ENOXAPARIN PER 10 MG

## 2017-07-05 PROCEDURE — 94640 AIRWAY INHALATION TREATMENT: CPT

## 2017-07-05 PROCEDURE — 96372 THER/PROPH/DIAG INJ SC/IM: CPT

## 2017-07-05 PROCEDURE — 99220 PR INITIAL OBSERVATION CARE/DAY 70 MINUTES: CPT | Performed by: FAMILY MEDICINE

## 2017-07-05 PROCEDURE — 63710000001 PREDNISONE PER 5 MG: Performed by: NURSE PRACTITIONER

## 2017-07-05 PROCEDURE — 80198 ASSAY OF THEOPHYLLINE: CPT | Performed by: NURSE PRACTITIONER

## 2017-07-05 PROCEDURE — 94760 N-INVAS EAR/PLS OXIMETRY 1: CPT

## 2017-07-05 RX ORDER — IPRATROPIUM BROMIDE AND ALBUTEROL SULFATE 2.5; .5 MG/3ML; MG/3ML
3 SOLUTION RESPIRATORY (INHALATION) EVERY 4 HOURS PRN
Status: DISCONTINUED | OUTPATIENT
Start: 2017-07-05 | End: 2017-07-07 | Stop reason: HOSPADM

## 2017-07-05 RX ORDER — CLONAZEPAM 0.5 MG/1
0.5 TABLET ORAL 3 TIMES DAILY PRN
Status: DISCONTINUED | OUTPATIENT
Start: 2017-07-05 | End: 2017-07-07 | Stop reason: HOSPADM

## 2017-07-05 RX ORDER — SODIUM CHLORIDE 9 MG/ML
75 INJECTION, SOLUTION INTRAVENOUS ONCE
Status: COMPLETED | OUTPATIENT
Start: 2017-07-05 | End: 2017-07-05

## 2017-07-05 RX ORDER — SODIUM CHLORIDE 0.9 % (FLUSH) 0.9 %
1-10 SYRINGE (ML) INJECTION AS NEEDED
Status: DISCONTINUED | OUTPATIENT
Start: 2017-07-05 | End: 2017-07-07 | Stop reason: HOSPADM

## 2017-07-05 RX ORDER — ATENOLOL 50 MG/1
50 TABLET ORAL DAILY
Status: DISCONTINUED | OUTPATIENT
Start: 2017-07-05 | End: 2017-07-07 | Stop reason: HOSPADM

## 2017-07-05 RX ORDER — GABAPENTIN 300 MG/1
600 CAPSULE ORAL 4 TIMES DAILY
Status: DISCONTINUED | OUTPATIENT
Start: 2017-07-05 | End: 2017-07-05

## 2017-07-05 RX ORDER — RIFAMPIN 300 MG/1
600 CAPSULE ORAL
Status: DISCONTINUED | OUTPATIENT
Start: 2017-07-05 | End: 2017-07-07 | Stop reason: HOSPADM

## 2017-07-05 RX ORDER — DULOXETIN HYDROCHLORIDE 60 MG/1
60 CAPSULE, DELAYED RELEASE ORAL EVERY 12 HOURS SCHEDULED
Status: DISCONTINUED | OUTPATIENT
Start: 2017-07-05 | End: 2017-07-07 | Stop reason: HOSPADM

## 2017-07-05 RX ORDER — ETHAMBUTOL HYDROCHLORIDE 400 MG/1
1600 TABLET, FILM COATED ORAL
Status: DISCONTINUED | OUTPATIENT
Start: 2017-07-05 | End: 2017-07-07 | Stop reason: HOSPADM

## 2017-07-05 RX ORDER — THEOPHYLLINE 300 MG/1
450 TABLET, EXTENDED RELEASE ORAL EVERY 12 HOURS SCHEDULED
Status: DISCONTINUED | OUTPATIENT
Start: 2017-07-05 | End: 2017-07-07 | Stop reason: HOSPADM

## 2017-07-05 RX ORDER — DIVALPROEX SODIUM 500 MG/1
1000 TABLET, EXTENDED RELEASE ORAL DAILY
Status: DISCONTINUED | OUTPATIENT
Start: 2017-07-05 | End: 2017-07-07 | Stop reason: HOSPADM

## 2017-07-05 RX ORDER — GABAPENTIN 300 MG/1
600 CAPSULE ORAL EVERY 12 HOURS SCHEDULED
Status: DISCONTINUED | OUTPATIENT
Start: 2017-07-05 | End: 2017-07-07 | Stop reason: HOSPADM

## 2017-07-05 RX ORDER — SACCHAROMYCES BOULARDII 250 MG
250 CAPSULE ORAL 2 TIMES DAILY
Status: DISCONTINUED | OUTPATIENT
Start: 2017-07-05 | End: 2017-07-07 | Stop reason: HOSPADM

## 2017-07-05 RX ORDER — BENZTROPINE MESYLATE 1 MG/1
1 TABLET ORAL 2 TIMES DAILY PRN
Status: DISCONTINUED | OUTPATIENT
Start: 2017-07-05 | End: 2017-07-07 | Stop reason: HOSPADM

## 2017-07-05 RX ORDER — PANTOPRAZOLE SODIUM 40 MG/1
40 TABLET, DELAYED RELEASE ORAL EVERY MORNING
Status: DISCONTINUED | OUTPATIENT
Start: 2017-07-05 | End: 2017-07-07 | Stop reason: HOSPADM

## 2017-07-05 RX ORDER — FUROSEMIDE 40 MG/1
40 TABLET ORAL DAILY
Status: DISCONTINUED | OUTPATIENT
Start: 2017-07-05 | End: 2017-07-07 | Stop reason: HOSPADM

## 2017-07-05 RX ORDER — ATENOLOL 50 MG/1
50 TABLET ORAL DAILY
Status: ON HOLD | COMMUNITY
End: 2017-07-06 | Stop reason: ALTCHOICE

## 2017-07-05 RX ORDER — ZIPRASIDONE HYDROCHLORIDE 20 MG/1
80 CAPSULE ORAL 2 TIMES DAILY WITH MEALS
Status: DISCONTINUED | OUTPATIENT
Start: 2017-07-05 | End: 2017-07-07 | Stop reason: HOSPADM

## 2017-07-05 RX ORDER — MONTELUKAST SODIUM 10 MG/1
10 TABLET ORAL NIGHTLY
Status: DISCONTINUED | OUTPATIENT
Start: 2017-07-05 | End: 2017-07-07 | Stop reason: HOSPADM

## 2017-07-05 RX ORDER — ATORVASTATIN CALCIUM 20 MG/1
20 TABLET, FILM COATED ORAL NIGHTLY
Status: DISCONTINUED | OUTPATIENT
Start: 2017-07-05 | End: 2017-07-07 | Stop reason: HOSPADM

## 2017-07-05 RX ORDER — IPRATROPIUM BROMIDE AND ALBUTEROL SULFATE 2.5; .5 MG/3ML; MG/3ML
3 SOLUTION RESPIRATORY (INHALATION)
Status: DISCONTINUED | OUTPATIENT
Start: 2017-07-05 | End: 2017-07-07 | Stop reason: HOSPADM

## 2017-07-05 RX ORDER — LEVOTHYROXINE SODIUM 0.05 MG/1
50 TABLET ORAL DAILY
Status: DISCONTINUED | OUTPATIENT
Start: 2017-07-05 | End: 2017-07-07 | Stop reason: HOSPADM

## 2017-07-05 RX ORDER — CLOMIPRAMINE HYDROCHLORIDE 25 MG/1
100 CAPSULE ORAL NIGHTLY
Status: DISCONTINUED | OUTPATIENT
Start: 2017-07-05 | End: 2017-07-07 | Stop reason: HOSPADM

## 2017-07-05 RX ORDER — BUDESONIDE AND FORMOTEROL FUMARATE DIHYDRATE 160; 4.5 UG/1; UG/1
2 AEROSOL RESPIRATORY (INHALATION)
Status: DISCONTINUED | OUTPATIENT
Start: 2017-07-05 | End: 2017-07-07 | Stop reason: HOSPADM

## 2017-07-05 RX ORDER — NICOTINE 21 MG/24HR
1 PATCH, TRANSDERMAL 24 HOURS TRANSDERMAL
Status: DISCONTINUED | OUTPATIENT
Start: 2017-07-05 | End: 2017-07-07 | Stop reason: HOSPADM

## 2017-07-05 RX ADMIN — Medication 5 MG: at 20:17

## 2017-07-05 RX ADMIN — DIVALPROEX SODIUM 1000 MG: 500 TABLET, FILM COATED, EXTENDED RELEASE ORAL at 11:33

## 2017-07-05 RX ADMIN — IPRATROPIUM BROMIDE AND ALBUTEROL SULFATE 3 ML: .5; 3 SOLUTION RESPIRATORY (INHALATION) at 19:44

## 2017-07-05 RX ADMIN — NICOTINE 1 PATCH: 21 PATCH, EXTENDED RELEASE TRANSDERMAL at 09:13

## 2017-07-05 RX ADMIN — GABAPENTIN 600 MG: 300 CAPSULE ORAL at 09:12

## 2017-07-05 RX ADMIN — CLONAZEPAM 0.5 MG: 0.5 TABLET ORAL at 11:33

## 2017-07-05 RX ADMIN — CLONAZEPAM 0.5 MG: 0.5 TABLET ORAL at 20:17

## 2017-07-05 RX ADMIN — DULOXETINE 60 MG: 60 CAPSULE, DELAYED RELEASE ORAL at 20:17

## 2017-07-05 RX ADMIN — IPRATROPIUM BROMIDE AND ALBUTEROL SULFATE 3 ML: .5; 3 SOLUTION RESPIRATORY (INHALATION) at 15:39

## 2017-07-05 RX ADMIN — PANTOPRAZOLE SODIUM 40 MG: 40 TABLET, DELAYED RELEASE ORAL at 05:55

## 2017-07-05 RX ADMIN — SODIUM CHLORIDE 75 ML/HR: 9 INJECTION, SOLUTION INTRAVENOUS at 04:00

## 2017-07-05 RX ADMIN — GUAIFENESIN 200 MG: 100 SOLUTION ORAL at 22:59

## 2017-07-05 RX ADMIN — ZIPRASIDONE HYDROCHLORIDE 80 MG: 20 CAPSULE ORAL at 09:12

## 2017-07-05 RX ADMIN — GUAIFENESIN 200 MG: 100 SOLUTION ORAL at 13:05

## 2017-07-05 RX ADMIN — LEVOTHYROXINE SODIUM 50 MCG: 50 TABLET ORAL at 13:05

## 2017-07-05 RX ADMIN — ZIPRASIDONE HYDROCHLORIDE 80 MG: 20 CAPSULE ORAL at 17:29

## 2017-07-05 RX ADMIN — RIFAMPIN 600 MG: 300 CAPSULE ORAL at 13:04

## 2017-07-05 RX ADMIN — FUROSEMIDE 40 MG: 40 TABLET ORAL at 09:13

## 2017-07-05 RX ADMIN — ENOXAPARIN SODIUM 40 MG: 40 INJECTION SUBCUTANEOUS at 05:55

## 2017-07-05 RX ADMIN — IPRATROPIUM BROMIDE AND ALBUTEROL SULFATE 3 ML: .5; 3 SOLUTION RESPIRATORY (INHALATION) at 04:56

## 2017-07-05 RX ADMIN — PREDNISONE 30 MG: 20 TABLET ORAL at 09:12

## 2017-07-05 RX ADMIN — BUDESONIDE AND FORMOTEROL FUMARATE DIHYDRATE 2 PUFF: 160; 4.5 AEROSOL RESPIRATORY (INHALATION) at 19:45

## 2017-07-05 RX ADMIN — ATENOLOL 50 MG: 50 TABLET ORAL at 13:04

## 2017-07-05 RX ADMIN — MONTELUKAST SODIUM 10 MG: 10 TABLET, FILM COATED ORAL at 03:25

## 2017-07-05 RX ADMIN — THEOPHYLLINE 450 MG: 300 TABLET, EXTENDED RELEASE ORAL at 09:13

## 2017-07-05 RX ADMIN — ATORVASTATIN CALCIUM 20 MG: 20 TABLET, FILM COATED ORAL at 03:26

## 2017-07-05 RX ADMIN — GUAIFENESIN 200 MG: 100 SOLUTION ORAL at 18:43

## 2017-07-05 RX ADMIN — MONTELUKAST SODIUM 10 MG: 10 TABLET, FILM COATED ORAL at 20:17

## 2017-07-05 RX ADMIN — CLOMIPRAMINE HYDROCHLORIDE 100 MG: 25 CAPSULE ORAL at 20:17

## 2017-07-05 RX ADMIN — DULOXETINE 60 MG: 60 CAPSULE, DELAYED RELEASE ORAL at 09:12

## 2017-07-05 RX ADMIN — Medication 250 MG: at 20:17

## 2017-07-05 RX ADMIN — IPRATROPIUM BROMIDE AND ALBUTEROL SULFATE 3 ML: .5; 3 SOLUTION RESPIRATORY (INHALATION) at 11:31

## 2017-07-05 RX ADMIN — GABAPENTIN 600 MG: 300 CAPSULE ORAL at 20:17

## 2017-07-05 RX ADMIN — ATORVASTATIN CALCIUM 20 MG: 20 TABLET, FILM COATED ORAL at 20:17

## 2017-07-05 RX ADMIN — IPRATROPIUM BROMIDE AND ALBUTEROL SULFATE 3 ML: .5; 3 SOLUTION RESPIRATORY (INHALATION) at 07:53

## 2017-07-05 RX ADMIN — Medication 250 MG: at 11:53

## 2017-07-05 RX ADMIN — BUDESONIDE AND FORMOTEROL FUMARATE DIHYDRATE 2 PUFF: 160; 4.5 AEROSOL RESPIRATORY (INHALATION) at 07:54

## 2017-07-05 RX ADMIN — THEOPHYLLINE 450 MG: 300 TABLET, EXTENDED RELEASE ORAL at 20:16

## 2017-07-05 RX ADMIN — CLARITHROMYCIN 1000 MG: 500 TABLET, FILM COATED, EXTENDED RELEASE ORAL at 09:14

## 2017-07-05 RX ADMIN — ETHAMBUTOL HYDROCHLORIDE 1600 MG: 400 TABLET, FILM COATED ORAL at 13:03

## 2017-07-05 RX ADMIN — SODIUM CHLORIDE 75 ML/HR: 9 INJECTION, SOLUTION INTRAVENOUS at 03:26

## 2017-07-05 RX ADMIN — CLOMIPRAMINE HYDROCHLORIDE 100 MG: 25 CAPSULE ORAL at 03:26

## 2017-07-05 NOTE — PROGRESS NOTES
Adult Nutrition  Assessment/PES    Patient Name:  Colleen Gonzalez  YOB: 1965  MRN: [id number]  Admit Date:  7/4/2017    Assessment Date:  7/5/2017        Reason for Assessment       07/05/17 1120    Reason for Assessment    Reason For Assessment/Visit identified at risk by screening criteria    Identified At Risk By Screening Criteria MST SCORE 2+   Pt currently does not meet screen criteria for nutrition risk based on reported intake and wt hx. Will continue to follow per protocol.    Time Spent (min) 20                              Problem/Interventions:                          Electronically signed by:  Eleanor bAraham RD  07/05/17 11:24 AM

## 2017-07-05 NOTE — CONSULTS
Colleen Gonzalez  1965  [id number]    Date of Consult: 7/5/2017    Date of Admission: 7/4/2017      Requesting Provider: Ashlee Gonzalez M.D.  Evaluating Physician: Robert Self MD    CC:   Chief Complaint   Patient presents with   • Shortness of Breath       Reason for Consultation:Chronic pneumonia    History of present illness:    Patient is a 52 y.o.  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  She has some leukocytosis and because of history of chronic pneumonia infectious these consultation obtained for further evaluation.  CT scan showing tree-in-bud appearance with bronchiectasis and chronic lung disease    Past Medical History:   Diagnosis Date   • TOM (acute kidney injury) 5/25/2017   • Allergic    • Altered mental status 6/9/2017   • Anxiety    • Asthma    • Bipolar affective disorder     Psychiatrist at [facility]    • Chronic kidney disease    • COPD (chronic obstructive pulmonary disease)    • Depression    • Emphysema lung    • Fibromyalgia    • Herpes    • Homeless    • Hyperlipidemia    • Hypertension    • Hypothyroid    • Neuropathy    • Obesity    • Pneumonia    • Pre-diabetes    • Renal insufficiency 6/9/2017   • Seizures    • Tibia fracture     healed per June 2017 x ray       Past Surgical History:   Procedure Laterality Date   • BACK SURGERY     • BRONCHOSCOPY Left 3/15/2017    Procedure: BRONCHOSCOPY WITH FLUORO;  Surgeon: Ankur Salomon MD;  Location: [facility] ENDOSCOPY;  Service:    •  CHOLECYSTECTOMY     • CYST REMOVAL     • HYSTERECTOMY      partial   • KNEE SURGERY Bilateral        Pediatric History   Patient Guardian Status   • Not on file.     Other Topics Concern   • Not on file     Social History Narrative    Patient is homeless and moves around staying with friends and family.  Recently was staying at the [email or web address].         family history includes COPD in her mother; Diabetes in her father; Heart attack in her mother; Heart failure in her mother.    Allergies   Allergen Reactions   • Aspirin GI Intolerance   • Codeine Itching   • Ibuprofen GI Intolerance       Medication:  Current Facility-Administered Medications   Medication Dose Route Frequency Provider Last Rate Last Dose   • atorvastatin (LIPITOR) tablet 20 mg  20 mg Oral Nightly Kecia Marya V, APRN   20 mg at 07/05/17 0326   • benztropine (COGENTIN) tablet 1 mg  1 mg Oral BID PRN Kecia Marya V, APRN       • budesonide-formoterol (SYMBICORT) 160-4.5 MCG/ACT inhaler 2 puff  2 puff Inhalation BID - RT Kecia Marya V, APRN   2 puff at 07/05/17 0754   • clarithromycin XL (BIAXIN XL) 24 hr tablet 1,000 mg  1,000 mg Oral Q24H Kecia Marya V, APRN   1,000 mg at 07/05/17 0914   • clomiPRAMINE (ANAFRANIL) capsule 100 mg  100 mg Oral Nightly Kecia Plainfield V, APRN   100 mg at 07/05/17 0326   • clonazePAM (KlonoPIN) tablet 0.5 mg  0.5 mg Oral TID PRN Kecia Marya V, APRN       • divalproex (DEPAKOTE) 24 hr tablet 1,000 mg  1,000 mg Oral Daily Kecia Marya V, APRN       • DULoxetine (CYMBALTA) DR capsule 60 mg  60 mg Oral Q12H Kecia Marya V, APRN   60 mg at 07/05/17 0912   • enoxaparin (LOVENOX) syringe 40 mg  40 mg Subcutaneous Q24H Derian Feldman RPH   40 mg at 07/05/17 0555   • furosemide (LASIX) tablet 40 mg  40 mg Oral Daily Kecia Plainfield V, APRN   40 mg at 07/05/17 0913   • gabapentin (NEURONTIN) capsule 600 mg  600 mg Oral Q12H Kecia Plainfield V, APRN   600 mg at 07/05/17 0912   • ipratropium-albuterol (DUO-NEB)  "nebulizer solution 3 mL  3 mL Nebulization 4x Daily - RT Kecia Milford V, APRN   3 mL at 17 0753   • ipratropium-albuterol (DUO-NEB) nebulizer solution 3 mL  3 mL Nebulization Q4H PRN Kecia Milford V, APRN   3 mL at 17 0456   • melatonin sublingual tablet 5 mg  5 mg Sublingual Nightly PRN Kecia Marya V, APRN       • montelukast (SINGULAIR) tablet 10 mg  10 mg Oral Nightly Kecia Marya V, APRN   10 mg at 17 0325   • nicotine (NICODERM CQ) 21 MG/24HR patch 1 patch  1 patch Transdermal Q24H Kecia Marya V, APRN   1 patch at 17 0913   • pantoprazole (PROTONIX) EC tablet 40 mg  40 mg Oral QAM Kecia Marya V, APRN   40 mg at 17 0555   • Pharmacy to Dose enoxaparin (LOVENOX)   Does not apply Continuous PRN Kecia Milford V, APRN       • predniSONE (DELTASONE) tablet 30 mg  30 mg Oral Daily With Breakfast Kecia Marya V, APRN   30 mg at 17 0912   • saccharomyces boulardii (FLORASTOR) capsule 250 mg  250 mg Oral BID Kecia Milford V, APRN       • sodium chloride 0.9 % flush 1-10 mL  1-10 mL Intravenous PRN Kecia Milford V, APRN       • sodium chloride 0.9 % flush 10 mL  10 mL Intravenous PRN Rashaun Rob DO       • theophylline (THEODUR) 12 hr tablet 450 mg  450 mg Oral Q12H Ekcia Milford V, APRN   450 mg at 17 0913   • ziprasidone (GEODON) capsule 80 mg  80 mg Oral BID With Meals Kecia Marya V, APRN   80 mg at 17 0912           Review of Systems  Full 12 point review of systems reviewed and negative except forCough or shortness fair dyspnea on exertion fatigue malaise some confusion.    Physical Exam:   Vital Signs   /69 (BP Location: Left leg, Patient Position: Lying)  Pulse 82  Temp 98 °F (36.7 °C) (Oral)   Resp 18  Ht 68\" (172.7 cm)  Wt 243 lb 4.8 oz (110 kg)  SpO2 90%  BMI 36.99 kg/m2  Temp (24hrs), Av.2 °F (36.8 °C), Min:97.7 °F (36.5 °C), Max:98.8 °F (37.1 °C)    GENERAL: Chronically ill appearing, unkept appearance. Sitting at bedside. "   HEENT: Normocephalic, atraumatic. PERRL. EOMI. No conjunctival injection. No icterus. Oropharynx clear without evidence of thrush or exudate. Dental disease with missing teeth.   NECK: Supple without nuchal rigidity  LYMPH: No cervical, axillary or inguinal lymphadenopathy. No neck masses  HEART: RRR; No murmur, rubs, gallops.   LUNGS: Diminished throughout. Rales in bases. Exp wheezing. Supplemental O2 and increased work of breathing.   ABDOMEN: Soft, nontender, nondistended. Positive bowel sounds. No rebound or guarding.   EXT: No cyanosis, clubbing or edema  : Normal appearing genitalia MSK: FROM without joint effusions noted   SKIN: Plaque like skin lesions of the chest, arms and legs NEURO: Oriented to PPT. No focal deficits.   PSYCHIATRIC: tearful and depressed a    Laboratory Data      Results from last 7 days  Lab Units 07/04/17  2113   WBC 10*3/mm3 16.12*   HEMOGLOBIN g/dL 12.1   HEMATOCRIT % 37.9   PLATELETS 10*3/mm3 336       Results from last 7 days  Lab Units 07/05/17  0436   SODIUM mmol/L 136   POTASSIUM mmol/L 3.7   CHLORIDE mmol/L 100   CO2 mmol/L 23.0   BUN mg/dL 7*   CREATININE mg/dL 0.90   GLUCOSE mg/dL 146*   CALCIUM mg/dL 10.5*       Results from last 7 days  Lab Units 07/04/17  2113   ALK PHOS U/L 94   BILIRUBIN mg/dL 0.5   ALT (SGPT) U/L 7   AST (SGOT) U/L 12               Estimated Creatinine Clearance: 93.9 mL/min (by C-G formula based on Cr of 0.9).      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    COMPARISON:    CR - XR CHEST 1 VW 6/15/2017 7:10:58 PM    FINDINGS:    Lungs:  There is slight increased density in the bilateral lower lungs, which   may represent atelectasis or mild infiltrates.  The lungs are otherwise clear.    Pleural space:  Unremarkable.  No pneumothorax.    Heart:  Unremarkable.  No cardiomegaly.    Mediastinum:  Unremarkable.    Bones/joints:  No acute osseous findings.      Impression:         There is slight increased density in the bilateral lower lungs, which may   represent atelectasis or mild infiltrates.      THIS DOCUMENT HAS BEEN ELECTRONICALLY SIGNED BY DESTINEE GRAY MD    CT Angiogram Chest With Contrast [[id number]]  (Abnormal) Collected:  07/04/17 2123     Updated:  07/04/17 2256    Narrative:       EXAM:    CT Angiography Chest With Intravenous Contrast    CLINICAL HISTORY:    52 years, female; Signs and symptoms; Dyspnea; Additional info: SOA    TECHNIQUE:    Axial computed tomographic angiography images of the chest with intravenous   contrast using pulmonary embolism protocol.  This CT exam was performed using   one or more of the following dose reduction techniques:  automated exposure   control, adjustment of the mA and/or kV according to patient size, and/or use   of iterative reconstruction technique.    MIP reconstructed images were created and reviewed.    CONTRAST:    50 mL of iso 370 administered intravenously.    COMPARISON:    CT CHEST W CONTRAST 5/19/2017 6:58:17 PM    FINDINGS:    Pulmonary arteries:  There are no filling defects identified within the   pulmonary arteries to suggest pulmonary embolism.    Aorta:  No acute findings.  No thoracic aortic aneurysm.    Lungs:  Mild paraseptal emphysematous changes in the upper lungs.  There are   subtle, patchy areas of tree in bud opacification primarily in the mid to lower   lung area, with mild superimposed centrilobular nodularity in the left midlung.    Findings suggest a mild infectious or inflammatory  bronchiolitis.  Linear   density suggesting scarring again noted in the left upper lobe.  There is also   minimal subsegmental atelectasis or scarring in the lung bases.  No focal   consolidation or lung mass.  Small calcified granuloma in the left upper lobe.    Pleural space:  No significant effusion.  No pneumothorax.    Heart:  Trace amount of pericardial fluid, without significant pericardial   effusion.  This is not significantly changed.  No cardiomegaly.    Bones/joints:  No acute fracture.  No dislocation.    Soft tissues:  Unremarkable.    Lymph nodes:  Calcified hilar lymph nodes noted.  No significant lymph node   enlargement.      Impression:       1.  No evidence of pulmonary embolism.   2.  Patchy areas of tree in bud opacification bilaterally with mild   superimposed centrilobular nodularity in the left midlung.  Findings suggest a   mild infectious or inflammatory bronchiolitis.      THIS DOCUMENT HAS BEEN ELECTRONICALLY SIGNED BY DESTINEE GRAY MD        PROBLEM LIST:   Chronic bilateral pneumonia with suspected Mycobacterium avium complex  COPD with acute exacerbation  Prior NASIM pneumonia with MRSA and PSA   Leukocytosis  Tobacco abuse  Chronic skin lesions  HTN  HLD  Hypothyroidism  Psych disorder  Homeless     ASSESSMENT:  52 -year-old female with multiple medical conditions including hypertension, hyperlipidemia, hypothyroidism in the setting of psychiatric disease with OCD and bipolar disorder with chronic lung disease with multiple admissions for COPD exacerbations with medical noncompliance and psychiatric issues with prior AFBs positive for Mycobacterium species and 6 weeks ago with Mycobacterium avium complex with CT scan and symptoms compatible with 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    PLAN:  Agree with treatment of COPD exacerbation  Will need long-term mac treatment and initiate clarithromycin, ethambutol, rifampin  AFB sputum again to try to capture MAC and get  susceptibility testing    Very complex case unsure if we'll be on the treatment on outpatient side.    Robert Self MD  7/5/2017

## 2017-07-05 NOTE — PROGRESS NOTES
"Reviewed H and P from overnight. Patient known to our service and is well known to be noncompliant. She \"feels like shit today.\"     A/P:  --COPD exacerbation: Mild. Continue 5 days of steroids. Nebs prn  --MAC: Noncompliance is a major issue in treatment of this. She admits to noncompliance. She is now back on her antibiotic regimen. I discussed importance of compliance.  --Likely home tomorrow on PO steroids and current MAC regimen.  "

## 2017-07-05 NOTE — ED PROVIDER NOTES
Subjective   HPI Comments: Colleen Gonzalez is a homeless, 52 y.o.female with a history of COPD, DM, emphysema, HLD, HTN, PNA, and tobacco use. She presents to the ED today with c/o shortness of breath. She reports that four days ago, she began feeling worse than baseline with associated productive cough, chills, diaphoresis, and left sided swelling of the ear and eye. She has tried using eye drops with no reduction her eye swelling and irritation. She also c/o left sided chest pain and worsening SOA for the last three days. She has had difficulty walking secondary to SOA. She was given a neb treatment en route to the ED with minimal relief. Additionally she c/o decreased appetite but denies abdominal pain, nausea, vomiting, diarrhea, or any other acute complaints at this time.     Patient is a 52 y.o. female presenting with shortness of breath.   History provided by:  Patient  Shortness of Breath   Severity:  Moderate  Onset quality:  Sudden  Duration:  3 days  Timing:  Constant  Progression:  Worsening  Chronicity:  Chronic  Relieved by:  Nothing  Worsened by:  Nothing  Ineffective treatments: neb treatment.  Associated symptoms: chest pain (left sided), cough (productive, green sputum), diaphoresis and ear pain (left ear )    Associated symptoms: no abdominal pain, no fever, no headaches, no neck pain, no sore throat and no vomiting    Risk factors: obesity        Review of Systems   Constitutional: Positive for appetite change (decreased), chills and diaphoresis. Negative for fever.   HENT: Positive for ear pain (left ear ). Negative for congestion, hearing loss, rhinorrhea, sore throat and trouble swallowing.         Left sided swelling of the ear   Eyes: Positive for pain (left eye).        Left eye swelling   Respiratory: Positive for cough (productive, green sputum) and shortness of breath.    Cardiovascular: Positive for chest pain (left sided).   Gastrointestinal: Negative for abdominal pain, diarrhea, nausea  and vomiting.   Musculoskeletal: Negative for back pain and neck pain.        Difficulty walking secondary to SOA   Neurological: Negative for dizziness, speech difficulty, weakness, numbness and headaches.   All other systems reviewed and are negative.      Past Medical History:   Diagnosis Date   • TOM (acute kidney injury) 5/25/2017   • Allergic    • Altered mental status 6/9/2017   • Anxiety    • Asthma    • Chronic kidney disease    • COPD (chronic obstructive pulmonary disease)    • Depression    • Diabetes mellitus    • Emphysema lung    • Fibromyalgia    • Herpes    • Homeless    • Hyperlipidemia    • Hypertension    • Hypothyroid    • Neuropathy    • Obesity    • Pneumonia    • Renal insufficiency 6/9/2017   • Seizures        Allergies   Allergen Reactions   • Aspirin GI Intolerance   • Codeine Itching   • Ibuprofen GI Intolerance       Past Surgical History:   Procedure Laterality Date   • BACK SURGERY     • BRONCHOSCOPY Left 3/15/2017    Procedure: BRONCHOSCOPY WITH FLUORO;  Surgeon: Ankur Salomon MD;  Location: [facility] ENDOSCOPY;  Service:    • CHOLECYSTECTOMY     • CYST REMOVAL     • HYSTERECTOMY      partial   • KNEE SURGERY Bilateral        Family History   Problem Relation Age of Onset   • Heart failure Mother    • COPD Mother    • Heart attack Mother    • Diabetes Father        Social History     Social History   • Marital status: Single     Spouse name: N/A   • Number of children: N/A   • Years of education: N/A     Social History Main Topics   • Smoking status: Heavy Tobacco Smoker     Packs/day: 3.00   • Smokeless tobacco: None   • Alcohol use No   • Drug use: No   • Sexual activity: Defer     Other Topics Concern   • None     Social History Narrative    Patient is homeless and moves around staying with friends and family.  Recently was staying at the [employer].           Objective   Physical Exam   Constitutional: She is oriented to person, place, and time. She appears well-developed  and well-nourished. No distress.   HENT:   Head: Normocephalic and atraumatic.   Right Ear: Tympanic membrane and external ear normal.   Left Ear: Tympanic membrane and external ear normal.   Nose: Nose normal.   Mouth/Throat: Posterior oropharyngeal erythema present.   Bilateral TM clear. Oropharynx has mild posterior erythema   Eyes: Conjunctivae are normal. No scleral icterus.   Neck: Normal range of motion. Neck supple.   Cardiovascular: Normal rate, regular rhythm and normal heart sounds.    No murmur heard.  Pulmonary/Chest: She is in respiratory distress (mild). She has wheezes (Signigicant expiratory wheezes bilaterally). She has no rales. She exhibits no tenderness.   Abdominal: Soft. Bowel sounds are normal. She exhibits no mass. There is no tenderness. There is no rebound and no guarding.   Musculoskeletal: Normal range of motion. She exhibits no edema or tenderness.   walking boot on right foot   Neurological: She is alert and oriented to person, place, and time.   Skin: Skin is warm and dry. No erythema.   Psychiatric: She has a normal mood and affect. Her behavior is normal.   Nursing note and vitals reviewed.      Procedures         ED Course  ED Course     Recent Results (from the past 24 hour(s))   Comprehensive Metabolic Panel    Collection Time: 07/04/17  9:13 PM   Result Value Ref Range    Glucose 108 (H) 70 - 100 mg/dL    BUN 7 (L) 9 - 23 mg/dL    Creatinine 1.00 0.60 - 1.30 mg/dL    Sodium 133 132 - 146 mmol/L    Potassium 3.5 3.5 - 5.5 mmol/L    Chloride 98 (L) 99 - 109 mmol/L    CO2 28.0 20.0 - 31.0 mmol/L    Calcium 10.5 (H) 8.7 - 10.4 mg/dL    Total Protein 7.3 5.7 - 8.2 g/dL    Albumin 4.10 3.20 - 4.80 g/dL    ALT (SGPT) 7 7 - 40 U/L    AST (SGOT) 12 0 - 33 U/L    Alkaline Phosphatase 94 25 - 100 U/L    Total Bilirubin 0.5 0.3 - 1.2 mg/dL    eGFR Non African Amer 58 (L) >60 mL/min/1.73    Globulin 3.2 gm/dL    A/G Ratio 1.3 (L) 1.5 - 2.5 g/dL    BUN/Creatinine Ratio 7.0 7.0 - 25.0    Anion  Gap 7.0 3.0 - 11.0 mmol/L   BNP    Collection Time: 07/04/17  9:13 PM   Result Value Ref Range    BNP 11.0 0.0 - 100.0 pg/mL   Light Blue Top    Collection Time: 07/04/17  9:13 PM   Result Value Ref Range    Extra Tube hold for add-on    Green Top (Gel)    Collection Time: 07/04/17  9:13 PM   Result Value Ref Range    Extra Tube Hold for add-ons.    Lavender Top    Collection Time: 07/04/17  9:13 PM   Result Value Ref Range    Extra Tube hold for add-on    Gold Top - SST    Collection Time: 07/04/17  9:13 PM   Result Value Ref Range    Extra Tube Hold for add-ons.    CBC Auto Differential    Collection Time: 07/04/17  9:13 PM   Result Value Ref Range    WBC 16.12 (H) 3.50 - 10.80 10*3/mm3    RBC 3.85 (L) 3.89 - 5.14 10*6/mm3    Hemoglobin 12.1 11.5 - 15.5 g/dL    Hematocrit 37.9 34.5 - 44.0 %    MCV 98.4 80.0 - 99.0 fL    MCH 31.4 (H) 27.0 - 31.0 pg    MCHC 31.9 (L) 32.0 - 36.0 g/dL    RDW 16.6 (H) 11.3 - 14.5 %    RDW-SD 59.7 (H) 37.0 - 54.0 fl    MPV 10.1 6.0 - 12.0 fL    Platelets 336 150 - 450 10*3/mm3    Neutrophil % 65.8 41.0 - 71.0 %    Lymphocyte % 20.0 (L) 24.0 - 44.0 %    Monocyte % 13.3 (H) 0.0 - 12.0 %    Eosinophil % 0.2 0.0 - 3.0 %    Basophil % 0.3 0.0 - 1.0 %    Immature Grans % 0.4 0.0 - 0.6 %    Neutrophils, Absolute 10.59 (H) 1.50 - 8.30 10*3/mm3    Lymphocytes, Absolute 3.23 0.60 - 4.80 10*3/mm3    Monocytes, Absolute 2.15 (H) 0.00 - 1.00 10*3/mm3    Eosinophils, Absolute 0.03 0.00 - 0.30 10*3/mm3    Basophils, Absolute 0.05 0.00 - 0.20 10*3/mm3    Immature Grans, Absolute 0.07 (H) 0.00 - 0.03 10*3/mm3   Theophylline Level    Collection Time: 07/04/17  9:13 PM   Result Value Ref Range    Theophylline Level 22.0 (C) 10.0 - 20.0 mcg/mL   POC Troponin, Rapid    Collection Time: 07/04/17  9:16 PM   Result Value Ref Range    Troponin I 0.00 0.00 - 0.07 ng/mL   Rapid Strep A Screen    Collection Time: 07/04/17  9:39 PM   Result Value Ref Range    Strep A Ag Negative Negative     Note: In addition to  "lab results from this visit, the labs listed above may include labs taken at another facility or during a different encounter within the last 24 hours. Please correlate lab times with ED admission and discharge times for further clarification of the services performed during this visit.    CT Angiogram Chest With Contrast   Final Result   Abnormal   1.  No evidence of pulmonary embolism.    2.  Patchy areas of tree in bud opacification bilaterally with mild    superimposed centrilobular nodularity in the left midlung.  Findings suggest a    mild infectious or inflammatory bronchiolitis.        THIS DOCUMENT HAS BEEN ELECTRONICALLY SIGNED BY DESTINEE GRAY MD      XR Chest 1 View   Final Result   Abnormal     There is slight increased density in the bilateral lower lungs, which may    represent atelectasis or mild infiltrates.        THIS DOCUMENT HAS BEEN ELECTRONICALLY SIGNED BY DESTINEE GRAY MD        Vitals:    07/04/17 2102 07/04/17 2139 07/04/17 2159 07/04/17 2200   BP: 114/71   126/76   BP Location: Left arm      Patient Position: Lying      Pulse: 95 86 91    Resp: 20 20     Temp: 98.8 °F (37.1 °C)      TempSrc: Oral      SpO2: 92% 92% 90%    Weight: 250 lb (113 kg)      Height: 63\" (160 cm)        Medications   sodium chloride 0.9 % flush 10 mL (not administered)   piperacillin-tazobactam (ZOSYN) 4.5 g/100 mL 0.9% NS IVPB (mbp) (not administered)   vancomycin 2000 mg/500 mL 0.9% NS IVPB ([facility]) (not administered)   methylPREDNISolone sodium succinate (SOLU-Medrol) injection 125 mg (125 mg Intravenous Given 7/4/17 2136)   ipratropium-albuterol (DUO-NEB) nebulizer solution 3 mL (3 mL Nebulization Given 7/4/17 2139)   benzonatate (TESSALON) capsule 100 mg (100 mg Oral Given 7/4/17 2136)   iopamidol (ISOVUE-370) 76 % injection 100 mL (55 mL Intravenous Given 7/4/17 2224)     ECG/EMG Results (last 24 hours)     Procedure Component Value Units Date/Time    ECG 12 Lead [[id number]] Collected:  07/04/17 2100     Updated:  " 07/04/17 2103         O2 sat 83% on room air with ambulation.                 MDM    Final diagnoses:   COPD exacerbation   Bronchiolitis   Acute on chronic respiratory failure with hypoxia       Documentation assistance provided by nancy Love.  Information recorded by the scribe was done at my direction and has been verified and validated by me.     Eloisa Love  07/04/17 2224       Eloisa Love  07/04/17 2303       Rashaun Rob DO  07/06/17 2249

## 2017-07-05 NOTE — PLAN OF CARE
Problem: Health Knowledge, Opportunity for Enhanced (Adult,NICU,[place],Obstetrics,Pediatric)  Goal: Identify Related Risk Factors and Signs and Symptoms  Outcome: Ongoing (interventions implemented as appropriate)    17 0140   Health Knowledge, Opportunity for Enhanced   Health Knowledge, Opportunity for Enhanced: Related Risk Factors coping skills ineffective;fatigue;socioeconomic status;support inadequate   Signs and Symptoms (Health Knowledge Enhance) lack of adherence to prescribed health behavior;knowledge/skill deficiency

## 2017-07-05 NOTE — PROGRESS NOTES
Discharge Planning Assessment  [facility]     Patient Name: Colleen Gonzalez  MRN: [id number]  Today's Date: 7/5/2017    Admit Date: 7/4/2017          Discharge Needs Assessment       07/05/17 1331    Living Environment    Living Arrangements homeless    Living Environment    Provides Primary Care For no one    Able to Return to Prior Living Arrangements yes    Discharge Needs Assessment    Equipment Currently Used at Home rollator    Equipment Needed After Discharge none            Discharge Plan       07/05/17 1333    Case Management/Social Work Plan    Plan [facility]    Patient/Family In Agreement With Plan yes    Additional Comments Spoke with pt at bedside. Pt reports she plans to discharge back to the [facility]. SW provided pt with [facility] resources from housing, emergency shelters, food and clothing resources as well as other additional resources available in [facility]. Pt accepted the resources. Pt may need help with transportation at discharge but reports she plans to discharge to [facility] when medically ready.     Final Note    Final Note SW/CM is following        Discharge Placement     No information found        Expected Discharge Date and Time     Expected Discharge Date Expected Discharge Time    Jul 6, 2017               Demographic Summary       07/05/17 1330    Referral Information    Reason For Consult discharge planning            Functional Status       07/05/17 1331    Functional Status Prior    Ambulation 1-->assistive equipment    Transferring 1-->assistive equipment    Toileting 0-->independent    Bathing 0-->independent    Dressing 0-->independent    Eating 0-->independent    Communication 0-->understands/communicates without difficulty    IADL    Medications independent    Meal Preparation independent    Housekeeping independent    Laundry independent    Shopping independent    Oral Care independent            Psychosocial     None             Abuse/Neglect     None            Legal     None            Substance Abuse     None            Patient Forms     None          SOPHIE Vázquez

## 2017-07-05 NOTE — H&P
[facility] Medicine Services  HISTORY AND PHYSICAL    Primary Care Physician: RADHA Pedraza    Subjective     Chief Complaint:  Short of breath    History of Present Illness:   Colleen Gonzalez is a homeless, 52 y.o.female with a history of COPD, DM, emphysema, HLD, HTN, PNA, and tobacco use. She presents to the ED today with c/o shortness of breath. She reports that four days ago, she began feeling worse than baseline with associated productive cough, chills, diaphoresis, and left sided swelling of the ear and eye. She has tried using eye drops with no reduction her eye swelling and irritation. She also c/o left sided chest pain and worsening SOA for the last three days. She has had difficulty walking secondary to SOA. She was given a neb treatment en route to the ED with minimal relief. Additionally she c/o decreased appetite but denies abdominal pain, nausea, vomiting, diarrhea, or any other acute complaints at this time.       Review of Systems   Constitutional: Positive for appetite change and fatigue. Negative for activity change, diaphoresis, fever and unexpected weight change.   HENT: Negative.    Eyes: Positive for redness and itching.   Respiratory: Positive for cough, shortness of breath and wheezing. Negative for apnea, choking, chest tightness and stridor.    Cardiovascular: Negative for chest pain and leg swelling.   Gastrointestinal: Positive for diarrhea (loose stool yesterday) and nausea. Negative for abdominal distention, abdominal pain, constipation, rectal pain and vomiting.   Musculoskeletal: Positive for myalgias.   Neurological: Positive for seizures (none recently), weakness and light-headedness. Negative for dizziness.   Hematological: Negative for adenopathy. Does not bruise/bleed easily.   Psychiatric/Behavioral: Positive for confusion, decreased concentration, dysphoric mood and self-injury (hx of neurodermatitis). The patient is nervous/anxious.        Otherwise complete ROS performed and negative except as mentioned in the HPI.    Past Medical History:   Diagnosis Date   • TOM (acute kidney injury) 5/25/2017   • Allergic    • Altered mental status 6/9/2017   • Anxiety    • Asthma    • Bipolar affective disorder     Psychiatrist at [facility]    • Chronic kidney disease    • COPD (chronic obstructive pulmonary disease)    • Depression    • Emphysema lung    • Fibromyalgia    • Herpes    • Homeless    • Hyperlipidemia    • Hypertension    • Hypothyroid    • Neuropathy    • Obesity    • Pneumonia    • Pre-diabetes    • Renal insufficiency 6/9/2017   • Seizures    • Tibia fracture     healed per June 2017 x ray       Past Surgical History:   Procedure Laterality Date   • BACK SURGERY     • BRONCHOSCOPY Left 3/15/2017    Procedure: BRONCHOSCOPY WITH FLUORO;  Surgeon: Ankur Salomon MD;  Location: [facility] ENDOSCOPY;  Service:    • CHOLECYSTECTOMY     • CYST REMOVAL     • HYSTERECTOMY      partial   • KNEE SURGERY Bilateral        Family History   Problem Relation Age of Onset   • Heart failure Mother    • COPD Mother    • Heart attack Mother    • Diabetes Father        Social History     Social History   • Marital status: Single     Spouse name: N/A   • Number of children: N/A   • Years of education: N/A     Occupational History   • Not on file.     Social History Main Topics   • Smoking status: Heavy Tobacco Smoker     Packs/day: 3.00   • Smokeless tobacco: Not on file   • Alcohol use No   • Drug use: No   • Sexual activity: Defer     Other Topics Concern   • Not on file     Social History Narrative    Patient is homeless and moves around staying with friends and family.  Recently was staying at the [facility].         Medications:  Prescriptions Prior to Admission   Medication Sig Dispense Refill Last Dose   • albuterol (PROVENTIL HFA;VENTOLIN HFA) 108 (90 BASE) MCG/ACT inhaler Inhale 2 puffs Every 4 (Four) Hours As Needed for Wheezing. 1 inhaler 11 7/4/2017  at unknown   • benztropine (COGENTIN) 1 MG tablet Take 1 mg by mouth 2 (Two) Times a Day As Needed for Tremors.   7/4/2017 at 0900   • capsicum (ZOSTRIX) 0.075 % topical cream Apply  topically Every 8 (Eight) Hours. 28.3 g 0 7/4/2017 at Unknown time   • clomiPRAMINE (ANAFRANIL) 50 MG capsule Take 100 mg by mouth Every Night. Take two capsule at bedtime    7/4/2017 at Unknown time   • clonazePAM (KlonoPIN) 0.5 MG tablet Take 1 tablet by mouth 3 (Three) Times a Day As Needed for Anxiety. 15 tablet 0 7/4/2017 at 0900   • divalproex (DEPAKOTE) 500 MG 24 hr tablet Take 1,000 mg by mouth Daily. Take two tablets once a day    7/4/2017 at 0900   • famotidine (PEPCID) 20 MG tablet Take 1 tablet by mouth 2 (Two) Times a Day As Needed for Heartburn. 30 tablet 0 7/4/2017 at Unknown time   • fluticasone-salmeterol (ADVAIR) 250-50 MCG/DOSE DISKUS Inhale 1 puff 2 (Two) Times a Day. 60 each 0 7/4/2017 at 0900   • furosemide (LASIX) 40 MG tablet Take 1 tablet by mouth Daily. 30 tablet 3 7/4/2017 at [id number] time   • gabapentin (NEURONTIN) 300 MG capsule Take 2 capsules by mouth 4 (Four) Times a Day. 240 capsule 0 7/4/2017 at 0900   • hydrOXYzine (ATARAX) 50 MG tablet Take 1 tablet by mouth 4 (Four) Times a Day As Needed for Itching or Anxiety. 120 tablet 0 7/4/2017 at Unknown time   • indomethacin (INDOCIN) 50 MG capsule Take 1 capsule by mouth 3 (Three) Times a Day With Meals. 15 capsule 0 7/4/2017 at Unknown time   • lansoprazole (PREVACID) 30 MG capsule Take 60 mg by mouth Daily.   7/4/2017 at Unknown time   • levothyroxine (SYNTHROID, LEVOTHROID) 50 MCG tablet Take 50 mcg by mouth Daily.   7/4/2017 at Unknown time   • melatonin 3 MG tablet Take 3 mg by mouth At Night As Needed for Sleep.   7/4/2017 at Unknown time   • montelukast (SINGULAIR) 10 MG tablet Take 1 tablet by mouth Every Night. 30 tablet 0 7/4/2017 at Unknown time   • nicotine (NICODERM CQ) 21 MG/24HR patch Place 1 patch on the skin Daily. 28 patch 0 7/4/2017 at  "Unknown time   • pravastatin (PRAVACHOL) 80 MG tablet Take 1 tablet by mouth Every Night. 30 tablet 0 7/4/2017 at Unknown time   • predniSONE (DELTASONE) 10 MG tablet Take 20mg 6/21 and 6/22, take 10mg 6/23 and 6/24. 6 tablet 0 7/4/2017 at Unknown time   • QUEtiapine (SEROquel) 300 MG tablet Take 300 mg by mouth Every Night.   7/4/2017 at Unknown time   • saccharomyces boulardii (FLORASTOR) 250 MG capsule Take 1 capsule by mouth 2 (Two) Times a Day. 14 capsule 0 7/4/2017 at Unknown time   • theophylline (THEODUR) 300 MG 12 hr tablet Take 2 tablets by mouth Every 12 (Twelve) Hours. 60 tablet 0 7/4/2017 at Unknown time   • Tiotropium Bromide Monohydrate (SPIRIVA RESPIMAT) 2.5 MCG/ACT aerosol solution Inhale 1 dose Daily. 1 inhaler 11 7/4/2017 at Unknown time   • ziprasidone (GEODON) 80 MG capsule Take 80 mg by mouth 2 (Two) Times a Day With Meals.   7/4/2017 at Unknown time   • cetirizine (zyrTEC) 10 MG tablet Take 1 tablet by mouth Daily. 30 tablet 0 More than a month at Unknown time   • DULoxetine (CYMBALTA) 60 MG capsule Take 1 capsule by mouth Every 12 (Twelve) Hours. 60 capsule 0 7/4/2017 at 0900   • ergocalciferol (DRISDOL) 39386 UNITS capsule Take 50,000 Units by mouth 1 (One) Time Per Week.   Unknown at Unknown time       Allergies:  Allergies   Allergen Reactions   • Aspirin GI Intolerance   • Codeine Itching   • Ibuprofen GI Intolerance         Objective     Physical Exam:  Vital Signs: /71 (BP Location: Right arm, Patient Position: Lying)  Pulse 91  Temp 97.7 °F (36.5 °C)  Resp 20  Ht 68\" (172.7 cm)  Wt 243 lb 4.8 oz (110 kg)  SpO2 94%  BMI 36.99 kg/m2  Physical Exam   Constitutional: She is oriented to person, place, and time.   Alert and oriented. Appears older than stated age. Smells of cigarettes   Eyes: EOM are normal. Pupils are equal, round, and reactive to light. Right eye exhibits no discharge. Left eye exhibits no discharge. No scleral icterus.   Complains of left eye  Area swelling " but none visable   Neck: Normal range of motion. Neck supple. No JVD present. No tracheal deviation present. No thyromegaly present.   Cardiovascular: Normal rate, regular rhythm, normal heart sounds and intact distal pulses.  Exam reveals no gallop and no friction rub.    No murmur heard.  Pulmonary/Chest: Effort normal. She has wheezes. She has no rales. She exhibits no tenderness.   Abdominal: Soft. She exhibits no mass. There is no tenderness. There is no guarding.   Obese, non distended.    Musculoskeletal: Normal range of motion. She exhibits edema (trace lower extremity edema), tenderness and deformity (from old fracture).   NO lower extremity erythema.  No visible open wounds   Neurological: She is alert and oriented to person, place, and time. She displays normal reflexes. No cranial nerve deficit. She exhibits normal muscle tone. Coordination normal.   Skin: Skin is warm and dry. No rash noted. No erythema. No pallor.   multiple old scars from neurodermatitis wounds   Psychiatric:   Poor insight. Limited historian.    Nursing note and vitals reviewed.          Results Reviewed:    Results from last 7 days  Lab Units 07/04/17  2113   WBC 10*3/mm3 16.12*   HEMOGLOBIN g/dL 12.1   PLATELETS 10*3/mm3 336       Results from last 7 days  Lab Units 07/04/17  2113   SODIUM mmol/L 133   POTASSIUM mmol/L 3.5   CO2 mmol/L 28.0   CREATININE mg/dL 1.00   GLUCOSE mg/dL 108*   CALCIUM mg/dL 10.5*       I have personally reviewed and interpreted available lab data, radiology studies and ECG obtained at time of admission.     Assessment / Plan     Assessment/Problem List:   Hospital Problem List     * (Principal)COPD exacerbation    Tobacco abuse (Chronic)    Leukocytosis (Chronic)    Hypothyroid (Chronic)    Anxiety and depression (Chronic)    Homelessness (Chronic)    Seizure disorder (Chronic)    Mycobacterial infection    Overview Addendum 7/5/2017 12:30 AM by Ashlee Gonzalez MD     AFB Culture 5/22/17    Specimen  Information: Trachea; Sputum        Culture   Mycobacterium avium complex (C)   Isolated from broth culture     Identified by [facility]                       Bronchiolitis    Non compliance with medical treatment (Chronic)            Plan:  1. COPD exacerbation with bronchiolitis: Steroids given in ED tonight. Will give just a short burst since she is actually not hypoxic on 2 liters O2. Hx of impaired glucose and certainly at high risk for progressing to DM2. Start Clarithromycin per prior ID note. Smoking cessation encouraged but patient does not express interest.  CTA does not reveal any PE.   2. Tobacco abuse: cessation counseling x 5 min. Nicotine replacement.   3. Leukocytosis: chronic. Likely due to frequent steroids and smoking.   4. Hypothyroid: On replacement.  5. Bipolar Disorder w chronic anxiety and depression. Her medication list is not exactly clear. High risk for polypharmacy. Need to clarify with her psychiatrist what she is supposed to be taking. Does not appear she has been on neurontin, but current ekasper is not available. There had not been a prescription for it in March. So, will decrease dose given all of the other sedating meds at least for now. Need to verify if she is really prescribed it.  6. Mycobacterial infection (MAC) per March note by Dr. Self, will start Clarithromycin. Will need to take precautions given she is on Theophylline.   7. Bronchiolitis: Had CTA tonight with chronic changes. Unfortunately given the patient's homelessness, chronic mental illness and little desire to quit smoking, this is not likely to improve.  8. CKD: did receive IV contrast tonight so will give slow IVF and hold lasix in the am. Will get repeat BMP as well. May need referral for OP followup with nephrology.    DC planning. Multiple issues make follow up difficult for patient. Hopefully our  staff can communicate with the [facility] to help facilitate followup  appointments.         DVT prophylaxis: Pharmacy to dose Lovenox or heparin given renal insuffiency  Code Status: full  Admission Status: Patient will be admitted to OBSERVATION status, however if further evaluation or treatment plans warrant, status may change.  Based upon current information, I predict patient's care encounter to be less than or equal to 2 midnights.       Kecia ANJANAEddie Malhotra, APRN 07/05/17 1:50 AM        Brief Attending Note       I have seen and examined the patient, performing an independent face-to-face diagnostic evaluation with plan of care reviewed and developed with the advanced practice clinician (APC).      Brief Summary Statement/HPI:   Please see full hx above as per APRN.  Ashwin, 53yo homeless F with COPD who continues to smoke and is noncompliant with chronic O2 presents to ED c/o 4days worsened SOA/BLAKE above her baseline.  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  Pt denies fevers, hemoptysis, night sweats. ED evaluation reveals probable bronchiolitis on CT chest and leukocytosis.  Pt's SOA improved after neb treatment in ED.  Pt appears nontoxic, exam consistent with COPD exac. Needs placed on her abx as planned by ID (Clarithromycin x 6 mo), scheduled nebs.       Attending Physical Exam:  Temp:  [97.7 °F (36.5 °C)-98.8 °F (37.1 °C)] 97.7 °F (36.5 °C)  Heart Rate:  [86-95] 91  Resp:  [20] 20  BP: (106-126)/(71-76) 116/71  Constitutional: no acute distress, awake, alert  Eyes: PERRLA, sclerae anicteric, no conjunctival injection  Neck: supple, no thyromegaly, trachea midline  Respiratory: coarse breath sounds, L>R wheezes, no rales or rhonchi, mildly dyspneic with conversatio   Cardiovascular: RRR, no murmur  Gastrointestinal: obese, soft, nontender, nondistended  Musculoskeletal:trace BLE edema to id tibia, no clubbing or cyanosis to bilateral lower extremities  Psychiatric: oriented x 3,  appropriate affect, cooperative  Neurologic: Strength symmetric in all extremities, Cranial Nerves grossly intact to confrontation         Brief Assessment/Plan :      See above for further detailed assessment and plan developed with APC which I have reviewed and/or edited.          Ashlee Gonzalez MD  07/05/17  2:53 AM

## 2017-07-05 NOTE — PLAN OF CARE
"Problem: Patient Care Overview (Adult)  Goal: Plan of Care Review  Outcome: Ongoing (interventions implemented as appropriate)    07/05/17 0130   Coping/Psychosocial Response Interventions   Plan Of Care Reviewed With patient   Patient Care Overview   Progress no change   Outcome Evaluation   Outcome Summary/Follow up Plan Pt. states she \"just wants to sleep\" and \"I don't want to answer any more questions tonight          Problem: Respiratory Insufficiency (Adult)  Goal: Identify Related Risk Factors and Signs and Symptoms  Outcome: Ongoing (interventions implemented as appropriate)    07/05/17 0130   Respiratory Insufficiency   Related Risk Factors (Respiratory Insufficiency) activity intolerance;knowledge deficit;motivation lacking;obesity   Signs and Symptoms (Respiratory Insufficiency) abnormal breath sounds;breathing pattern changes;decreased oxygen saturation;shortness of breath       Goal: Acid/Base Balance  Outcome: Ongoing (interventions implemented as appropriate)    07/05/17 0130   Respiratory Insufficiency (Adult)   Acid/Base Balance making progress toward outcome       Goal: Effective Ventilation  Outcome: Ongoing (interventions implemented as appropriate)    07/05/17 0130   Respiratory Insufficiency (Adult)   Effective Ventilation making progress toward outcome           "

## 2017-07-06 LAB — BACTERIA SPEC AEROBE CULT: NORMAL

## 2017-07-06 PROCEDURE — 94799 UNLISTED PULMONARY SVC/PX: CPT

## 2017-07-06 PROCEDURE — 94760 N-INVAS EAR/PLS OXIMETRY 1: CPT

## 2017-07-06 PROCEDURE — 63710000001 PREDNISONE PER 5 MG: Performed by: NURSE PRACTITIONER

## 2017-07-06 PROCEDURE — 94640 AIRWAY INHALATION TREATMENT: CPT

## 2017-07-06 PROCEDURE — 63710000001 PREDNISONE PER 1 MG: Performed by: NURSE PRACTITIONER

## 2017-07-06 PROCEDURE — 99226 PR SBSQ OBSERVATION CARE/DAY 35 MINUTES: CPT | Performed by: NURSE PRACTITIONER

## 2017-07-06 PROCEDURE — 96372 THER/PROPH/DIAG INJ SC/IM: CPT

## 2017-07-06 PROCEDURE — 25010000002 ENOXAPARIN PER 10 MG

## 2017-07-06 PROCEDURE — G0378 HOSPITAL OBSERVATION PER HR: HCPCS

## 2017-07-06 RX ORDER — BENZONATATE 100 MG/1
200 CAPSULE ORAL 3 TIMES DAILY
Status: DISCONTINUED | OUTPATIENT
Start: 2017-07-06 | End: 2017-07-07 | Stop reason: HOSPADM

## 2017-07-06 RX ADMIN — BENZONATATE 200 MG: 100 CAPSULE ORAL at 20:33

## 2017-07-06 RX ADMIN — IPRATROPIUM BROMIDE AND ALBUTEROL SULFATE 3 ML: .5; 3 SOLUTION RESPIRATORY (INHALATION) at 16:30

## 2017-07-06 RX ADMIN — BUDESONIDE AND FORMOTEROL FUMARATE DIHYDRATE 2 PUFF: 160; 4.5 AEROSOL RESPIRATORY (INHALATION) at 19:21

## 2017-07-06 RX ADMIN — IPRATROPIUM BROMIDE AND ALBUTEROL SULFATE 3 ML: .5; 3 SOLUTION RESPIRATORY (INHALATION) at 13:05

## 2017-07-06 RX ADMIN — NICOTINE 1 PATCH: 21 PATCH, EXTENDED RELEASE TRANSDERMAL at 09:46

## 2017-07-06 RX ADMIN — THEOPHYLLINE 450 MG: 300 TABLET, EXTENDED RELEASE ORAL at 15:08

## 2017-07-06 RX ADMIN — THEOPHYLLINE 450 MG: 300 TABLET, EXTENDED RELEASE ORAL at 20:38

## 2017-07-06 RX ADMIN — RIFAMPIN 600 MG: 300 CAPSULE ORAL at 09:45

## 2017-07-06 RX ADMIN — PANTOPRAZOLE SODIUM 40 MG: 40 TABLET, DELAYED RELEASE ORAL at 05:15

## 2017-07-06 RX ADMIN — CLONAZEPAM 0.5 MG: 0.5 TABLET ORAL at 20:34

## 2017-07-06 RX ADMIN — GABAPENTIN 600 MG: 300 CAPSULE ORAL at 20:33

## 2017-07-06 RX ADMIN — ATENOLOL 50 MG: 50 TABLET ORAL at 09:46

## 2017-07-06 RX ADMIN — ZIPRASIDONE HYDROCHLORIDE 80 MG: 20 CAPSULE ORAL at 17:50

## 2017-07-06 RX ADMIN — FUROSEMIDE 40 MG: 40 TABLET ORAL at 09:46

## 2017-07-06 RX ADMIN — DULOXETINE 60 MG: 60 CAPSULE, DELAYED RELEASE ORAL at 09:45

## 2017-07-06 RX ADMIN — MONTELUKAST SODIUM 10 MG: 10 TABLET, FILM COATED ORAL at 20:34

## 2017-07-06 RX ADMIN — Medication 250 MG: at 20:33

## 2017-07-06 RX ADMIN — PREDNISONE 30 MG: 20 TABLET ORAL at 09:46

## 2017-07-06 RX ADMIN — IPRATROPIUM BROMIDE AND ALBUTEROL SULFATE 3 ML: .5; 3 SOLUTION RESPIRATORY (INHALATION) at 07:58

## 2017-07-06 RX ADMIN — CLOMIPRAMINE HYDROCHLORIDE 100 MG: 25 CAPSULE ORAL at 20:34

## 2017-07-06 RX ADMIN — ETHAMBUTOL HYDROCHLORIDE 1600 MG: 400 TABLET, FILM COATED ORAL at 09:45

## 2017-07-06 RX ADMIN — ENOXAPARIN SODIUM 40 MG: 40 INJECTION SUBCUTANEOUS at 05:15

## 2017-07-06 RX ADMIN — DIVALPROEX SODIUM 1000 MG: 500 TABLET, FILM COATED, EXTENDED RELEASE ORAL at 09:45

## 2017-07-06 RX ADMIN — CLARITHROMYCIN 1000 MG: 500 TABLET, FILM COATED, EXTENDED RELEASE ORAL at 09:45

## 2017-07-06 RX ADMIN — Medication 250 MG: at 15:08

## 2017-07-06 RX ADMIN — LEVOTHYROXINE SODIUM 50 MCG: 50 TABLET ORAL at 09:46

## 2017-07-06 RX ADMIN — ZIPRASIDONE HYDROCHLORIDE 80 MG: 20 CAPSULE ORAL at 09:44

## 2017-07-06 RX ADMIN — GUAIFENESIN 200 MG: 100 SOLUTION ORAL at 11:10

## 2017-07-06 RX ADMIN — GABAPENTIN 600 MG: 300 CAPSULE ORAL at 09:46

## 2017-07-06 RX ADMIN — GUAIFENESIN 200 MG: 100 SOLUTION ORAL at 05:15

## 2017-07-06 RX ADMIN — DULOXETINE 60 MG: 60 CAPSULE, DELAYED RELEASE ORAL at 20:33

## 2017-07-06 RX ADMIN — BUDESONIDE AND FORMOTEROL FUMARATE DIHYDRATE 2 PUFF: 160; 4.5 AEROSOL RESPIRATORY (INHALATION) at 07:59

## 2017-07-06 RX ADMIN — ATORVASTATIN CALCIUM 20 MG: 20 TABLET, FILM COATED ORAL at 20:33

## 2017-07-06 RX ADMIN — CLONAZEPAM 0.5 MG: 0.5 TABLET ORAL at 05:19

## 2017-07-06 RX ADMIN — Medication 5 MG: at 20:33

## 2017-07-06 RX ADMIN — BENZONATATE 200 MG: 100 CAPSULE ORAL at 15:07

## 2017-07-06 RX ADMIN — GUAIFENESIN 200 MG: 100 SOLUTION ORAL at 20:33

## 2017-07-06 RX ADMIN — CLONAZEPAM 0.5 MG: 0.5 TABLET ORAL at 15:21

## 2017-07-06 NOTE — PLAN OF CARE
Problem: Health Knowledge, Opportunity for Enhanced (Adult,NICU,,Obstetrics,Pediatric)  Goal: Knowledgeable about Health Subject/Topic  Outcome: Ongoing (interventions implemented as appropriate)

## 2017-07-06 NOTE — PROGRESS NOTES
"    [facility] Medicine Services  INPATIENT PROGRESS NOTE    Date of Admission: 7/4/2017  Length of Stay: 0  Primary Care Physician: RADHA Pedraza    Subjective   CC:  FU shortness of air    HPI:    Patient resting in bed this morning around 11:00 am.  Complains that her cough is so bad she can't sleep and the \"generic robitussin\" we have been giving her hasn't been working.  Has requested that she stay on prednisone long term because she feels so much better when she is on it.  She has also requested that she have a mri of her bladder because she was told she had grooves in her bladder.  She states that she had a urology appointment at  but didn't go because she doesn't like .        Review Of Systems:   Review of Systems   Constitutional: Positive for fatigue.   Respiratory: Positive for cough, shortness of breath and wheezing.    Cardiovascular: Negative for palpitations.   Genitourinary: Negative for dysuria, flank pain, hematuria and urgency.         Objective      Temp:  [97.3 °F (36.3 °C)-98.1 °F (36.7 °C)] 97.6 °F (36.4 °C)  Heart Rate:  [80-98] 80  Resp:  [16-20] 20  BP: (113-165)/() 113/88  Physical Exam    General: Resting in bed, NAD, no family at bedside  HEENT: Normocephalic, mucous membranes moist, pupils equal  Neck: Supple, trachea midline  Cardiovascular: Regular rate and rhythm, S1-S2, no murmur  Respiratory: Coarse expiratory wheezing throughout, even unlabored breathing on 02 per NC  Abdomen: Soft, nontender, obese, bowel sounds +  Skin: Clean, dry, intact, no lesions or sores  Extremities: CHOU, Symmetrical, pulses +, no edema noted  Neuro: Alert, oriented ×4, appropriate and cooperative,  and pedal pushes equal, sensation intact    Results Review:    I have reviewed the labs, radiology results and diagnostic studies.      Results from last 7 days  Lab Units 07/04/17  2113   WBC 10*3/mm3 16.12*   HEMOGLOBIN g/dL 12.1   PLATELETS 10*3/mm3 336 "       Results from last 7 days  Lab Units 07/05/17  0436   SODIUM mmol/L 136   POTASSIUM mmol/L 3.7   CHLORIDE mmol/L 100   CO2 mmol/L 23.0   BUN mg/dL 7*   CREATININE mg/dL 0.90   GLUCOSE mg/dL 146*   CALCIUM mg/dL 10.5*       Culture Data: Cultures:    Blood Culture   Date Value Ref Range Status   07/04/2017 No growth at 24 hours  Preliminary   07/04/2017 No growth at 24 hours  Preliminary       Radiology Data:     I have reviewed the medications.    Assessment/Plan     Problem List  Hospital Problem List     * (Principal)COPD exacerbation    Tobacco abuse (Chronic)    Leukocytosis (Chronic)    Hypothyroid (Chronic)    Anxiety and depression (Chronic)    Homelessness (Chronic)    Seizure disorder (Chronic)    Renal insufficiency (Chronic)    Mycobacterial infection    Overview Addendum 7/5/2017 12:30 AM by Ashlee Gonzalez MD     AFB Culture 5/22/17    Specimen Information: Trachea; Sputum        Culture   Mycobacterium avium complex (C)   Isolated from broth culture     Identified by [facility]                       Bronchiolitis    Non compliance with medical treatment (Chronic)        -Mild COPD exacerbation-    -Continue nebs and 5 days of prednisone.  Explained to the patient that long term use of steroids has significant side effects and would not be a good idea.    -MAC- previously noncompliant with her antibiotic regimen.  Appreciate ID assistance with antibiotics.  Currently on rifampin, ethambutol, and clarithromycin.   -Will order tessalon perles for cough                 DVT prophylaxis:  Lovenox  Discharge Planning: I expect patient to be discharged back to the [facility] tomorrow.      Evelyn Beck, RADHA   07/06/17   1:52 PM

## 2017-07-06 NOTE — PROGRESS NOTES
"[facility] Progress Note    Date of Admission: 2017      Antibiotics:      CC:   Chief Complaint   Patient presents with   • Shortness of Breath       S: No f/c/s. No n/v/d. Hemodynamically stable. Reports chronic pain with weakness fatigue and dry cough    O:  /88 (BP Location: Left arm, Patient Position: Sitting)  Pulse 80  Temp 97.6 °F (36.4 °C) (Oral)   Resp 20  Ht 68\" (172.7 cm)  Wt 243 lb 4.8 oz (110 kg)  SpO2 (!) 88%  BMI 36.99 kg/m2  Temp (24hrs), Av.6 °F (36.4 °C), Min:97.3 °F (36.3 °C), Max:98.1 °F (36.7 °C)      PE:   GEN: Awake and alert, in no acute distress.  Morbid obesity  HEENT: NCAT.  EOMI. No conjunctival injection. No icterus. Oropharynx clear without evidence of thrush or exudate.   NECK: Supple without nuchal rigidity  HEART: RRR; No murmur, rubs, gallops.   LUNGS: Decreased air movement throughout with bilateral wheezing  ABDOMEN: Soft, nontender, nondistended. Positive bowel sounds. No rebound or guarding.   EXT:  No cyanosis, clubbing or edema  : Normal appearing genitalia without Perdomo catheter.  MSK: FROM without joint effusions noted    SKIN: Warm and dry with cutaneous eruptions.    NEURO: Oriented to PPT. No focal deficits.     Laboratory Data      Results from last 7 days  Lab Units 17  2113   WBC 10*3/mm3 16.12*   HEMOGLOBIN g/dL 12.1   HEMATOCRIT % 37.9   PLATELETS 10*3/mm3 336       Results from last 7 days  Lab Units 17  0436   SODIUM mmol/L 136   POTASSIUM mmol/L 3.7   CHLORIDE mmol/L 100   CO2 mmol/L 23.0   BUN mg/dL 7*   CREATININE mg/dL 0.90   GLUCOSE mg/dL 146*   CALCIUM mg/dL 10.5*       Results from last 7 days  Lab Units 17  2113   ALK PHOS U/L 94   BILIRUBIN mg/dL 0.5   ALT (SGPT) U/L 7   AST (SGOT) U/L 12               Estimated Creatinine Clearance: 93.9 mL/min (by C-G formula based on Cr of 0.9).      Microbiology:  No new    Radiology:  Imaging Results (last 24 hours)     ** No results found for the last 24 hours. 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  COPD with acute exacerbation  Prior NASIM pneumonia with MRSA and PSA   Leukocytosis  Tobacco abuse  Chronic skin lesions  HTN  HLD  Hypothyroidism  Psych disorder  Homeless      ASSESSMENT:  52 -year-old female with multiple medical conditions including hypertension, hyperlipidemia, hypothyroidism in the setting of psychiatric disease with OCD and bipolar disorder with chronic lung disease with multiple admissions for COPD exacerbations with medical noncompliance and psychiatric issues with prior AFBs positive for Mycobacterium species and 6 weeks ago with Mycobacterium avium complex with CT scan and symptoms compatible with 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     PLAN:  Agree with treatment of COPD exacerbation  Will need long-term mac treatment and initiate clarithromycin, ethambutol, rifampin  AFB sputum again to try to capture MAC and get susceptibility testing  Stable today unsure of compliance on OP side.    Robert Self MD  7/6/2017

## 2017-07-06 NOTE — PLAN OF CARE
"Problem: Patient Care Overview (Adult)  Goal: Plan of Care Review    17 0130 17 2200   Coping/Psychosocial Response Interventions   Plan Of Care Reviewed With --  patient   Patient Care Overview   Progress no change --    Outcome Evaluation   Outcome Summary/Follow up Plan Pt. states she \"just wants to sleep\" and \"I don't want to answer any more questions tonight  --        Goal: Adult Individualization and Mutuality  Outcome: Ongoing (interventions implemented as appropriate)    Problem: Respiratory Insufficiency (Adult)  Goal: Acid/Base Balance  Outcome: Ongoing (interventions implemented as appropriate)  Goal: Effective Ventilation  Outcome: Outcome(s) achieved Date Met:  17    Problem: Health Knowledge, Opportunity for Enhanced (Adult,NICU,[place],Obstetrics,Pediatric)  Goal: Identify Related Risk Factors and Signs and Symptoms  Outcome: Outcome(s) achieved Date Met:  17  Goal: Knowledgeable about Health Subject/Topic  Outcome: Ongoing (interventions implemented as appropriate)      "

## 2017-07-07 VITALS
HEART RATE: 100 BPM | BODY MASS INDEX: 36.87 KG/M2 | OXYGEN SATURATION: 93 % | WEIGHT: 243.3 LBS | HEIGHT: 68 IN | SYSTOLIC BLOOD PRESSURE: 129 MMHG | DIASTOLIC BLOOD PRESSURE: 100 MMHG | TEMPERATURE: 97.9 F | RESPIRATION RATE: 20 BRPM

## 2017-07-07 LAB
ALBUMIN SERPL-MCNC: 4 G/DL (ref 3.2–4.8)
ALBUMIN/GLOB SERPL: 1.3 G/DL (ref 1.5–2.5)
ALP SERPL-CCNC: 91 U/L (ref 25–100)
ALT SERPL W P-5'-P-CCNC: 7 U/L (ref 7–40)
ANION GAP SERPL CALCULATED.3IONS-SCNC: 13 MMOL/L (ref 3–11)
AST SERPL-CCNC: 9 U/L (ref 0–33)
BASOPHILS # BLD AUTO: 0.07 10*3/MM3 (ref 0–0.2)
BASOPHILS NFR BLD AUTO: 0.9 % (ref 0–1)
BILIRUB SERPL-MCNC: 0.2 MG/DL (ref 0.3–1.2)
BUN BLD-MCNC: 7 MG/DL (ref 9–23)
BUN/CREAT SERPL: 7.8 (ref 7–25)
CALCIUM SPEC-SCNC: 10.1 MG/DL (ref 8.7–10.4)
CHLORIDE SERPL-SCNC: 95 MMOL/L (ref 99–109)
CO2 SERPL-SCNC: 33 MMOL/L (ref 20–31)
CREAT BLD-MCNC: 0.9 MG/DL (ref 0.6–1.3)
DEPRECATED RDW RBC AUTO: 62.5 FL (ref 37–54)
EOSINOPHIL # BLD AUTO: 0.03 10*3/MM3 (ref 0–0.3)
EOSINOPHIL NFR BLD AUTO: 0.4 % (ref 0–3)
ERYTHROCYTE [DISTWIDTH] IN BLOOD BY AUTOMATED COUNT: 16.7 % (ref 11.3–14.5)
GFR SERPL CREATININE-BSD FRML MDRD: 65 ML/MIN/1.73
GLOBULIN UR ELPH-MCNC: 3 GM/DL
GLUCOSE BLD-MCNC: 95 MG/DL (ref 70–100)
HCT VFR BLD AUTO: 37.9 % (ref 34.5–44)
HGB BLD-MCNC: 11.6 G/DL (ref 11.5–15.5)
IMM GRANULOCYTES # BLD: 0.04 10*3/MM3 (ref 0–0.03)
IMM GRANULOCYTES NFR BLD: 0.5 % (ref 0–0.6)
LYMPHOCYTES # BLD AUTO: 2.97 10*3/MM3 (ref 0.6–4.8)
LYMPHOCYTES NFR BLD AUTO: 37 % (ref 24–44)
MCH RBC QN AUTO: 31.1 PG (ref 27–31)
MCHC RBC AUTO-ENTMCNC: 30.6 G/DL (ref 32–36)
MCV RBC AUTO: 101.6 FL (ref 80–99)
MONOCYTES # BLD AUTO: 1.07 10*3/MM3 (ref 0–1)
MONOCYTES NFR BLD AUTO: 13.3 % (ref 0–12)
NEUTROPHILS # BLD AUTO: 3.85 10*3/MM3 (ref 1.5–8.3)
NEUTROPHILS NFR BLD AUTO: 47.9 % (ref 41–71)
PLATELET # BLD AUTO: 357 10*3/MM3 (ref 150–450)
PMV BLD AUTO: 9.8 FL (ref 6–12)
POTASSIUM BLD-SCNC: 3.1 MMOL/L (ref 3.5–5.5)
PROT SERPL-MCNC: 7 G/DL (ref 5.7–8.2)
RBC # BLD AUTO: 3.73 10*6/MM3 (ref 3.89–5.14)
SODIUM BLD-SCNC: 141 MMOL/L (ref 132–146)
THEOPHYLLINE SERPL-MCNC: 11 MCG/ML (ref 10–20)
WBC NRBC COR # BLD: 8.03 10*3/MM3 (ref 3.5–10.8)

## 2017-07-07 PROCEDURE — 63710000001 PREDNISONE PER 1 MG: Performed by: NURSE PRACTITIONER

## 2017-07-07 PROCEDURE — 99217 PR OBSERVATION CARE DISCHARGE MANAGEMENT: CPT | Performed by: NURSE PRACTITIONER

## 2017-07-07 PROCEDURE — 63710000001 PREDNISONE PER 5 MG: Performed by: NURSE PRACTITIONER

## 2017-07-07 PROCEDURE — 94640 AIRWAY INHALATION TREATMENT: CPT

## 2017-07-07 PROCEDURE — 96372 THER/PROPH/DIAG INJ SC/IM: CPT

## 2017-07-07 PROCEDURE — 94799 UNLISTED PULMONARY SVC/PX: CPT

## 2017-07-07 PROCEDURE — 85025 COMPLETE CBC W/AUTO DIFF WBC: CPT | Performed by: INTERNAL MEDICINE

## 2017-07-07 PROCEDURE — 25010000002 ENOXAPARIN PER 10 MG

## 2017-07-07 PROCEDURE — G0378 HOSPITAL OBSERVATION PER HR: HCPCS

## 2017-07-07 PROCEDURE — 80053 COMPREHEN METABOLIC PANEL: CPT | Performed by: INTERNAL MEDICINE

## 2017-07-07 PROCEDURE — 80198 ASSAY OF THEOPHYLLINE: CPT

## 2017-07-07 RX ORDER — ETHAMBUTOL HYDROCHLORIDE 400 MG/1
1600 TABLET, FILM COATED ORAL
Qty: 120 TABLET | Refills: 0 | Status: SHIPPED | OUTPATIENT
Start: 2017-07-07 | End: 2017-09-25 | Stop reason: HOSPADM

## 2017-07-07 RX ORDER — ACETAMINOPHEN 325 MG/1
650 TABLET ORAL EVERY 6 HOURS PRN
Status: DISCONTINUED | OUTPATIENT
Start: 2017-07-07 | End: 2017-07-07 | Stop reason: HOSPADM

## 2017-07-07 RX ORDER — PREDNISONE 10 MG/1
30 TABLET ORAL
Qty: 15 TABLET | Refills: 0 | Status: SHIPPED | OUTPATIENT
Start: 2017-07-07 | End: 2017-07-12

## 2017-07-07 RX ORDER — RIFAMPIN 300 MG/1
600 CAPSULE ORAL
Qty: 30 CAPSULE | Refills: 0 | Status: SHIPPED | OUTPATIENT
Start: 2017-07-07 | End: 2017-09-25 | Stop reason: HOSPADM

## 2017-07-07 RX ORDER — CLONAZEPAM 0.5 MG/1
0.5 TABLET ORAL 3 TIMES DAILY PRN
Qty: 15 TABLET | Refills: 0 | Status: ON HOLD | OUTPATIENT
Start: 2017-07-07 | End: 2017-09-25 | Stop reason: ALTCHOICE

## 2017-07-07 RX ADMIN — FUROSEMIDE 40 MG: 40 TABLET ORAL at 08:48

## 2017-07-07 RX ADMIN — Medication 250 MG: at 14:16

## 2017-07-07 RX ADMIN — RIFAMPIN 600 MG: 300 CAPSULE ORAL at 08:48

## 2017-07-07 RX ADMIN — LEVOTHYROXINE SODIUM 50 MCG: 50 TABLET ORAL at 08:48

## 2017-07-07 RX ADMIN — GUAIFENESIN 200 MG: 100 SOLUTION ORAL at 06:05

## 2017-07-07 RX ADMIN — BUDESONIDE AND FORMOTEROL FUMARATE DIHYDRATE 2 PUFF: 160; 4.5 AEROSOL RESPIRATORY (INHALATION) at 08:22

## 2017-07-07 RX ADMIN — ETHAMBUTOL HYDROCHLORIDE 1600 MG: 400 TABLET, FILM COATED ORAL at 08:47

## 2017-07-07 RX ADMIN — IPRATROPIUM BROMIDE AND ALBUTEROL SULFATE 3 ML: .5; 3 SOLUTION RESPIRATORY (INHALATION) at 12:48

## 2017-07-07 RX ADMIN — GABAPENTIN 600 MG: 300 CAPSULE ORAL at 08:47

## 2017-07-07 RX ADMIN — CLARITHROMYCIN 1000 MG: 500 TABLET, FILM COATED, EXTENDED RELEASE ORAL at 08:54

## 2017-07-07 RX ADMIN — PANTOPRAZOLE SODIUM 40 MG: 40 TABLET, DELAYED RELEASE ORAL at 04:18

## 2017-07-07 RX ADMIN — PREDNISONE 30 MG: 20 TABLET ORAL at 08:50

## 2017-07-07 RX ADMIN — ACETAMINOPHEN 650 MG: 325 TABLET, FILM COATED ORAL at 08:48

## 2017-07-07 RX ADMIN — DIVALPROEX SODIUM 1000 MG: 500 TABLET, FILM COATED, EXTENDED RELEASE ORAL at 08:48

## 2017-07-07 RX ADMIN — DULOXETINE 60 MG: 60 CAPSULE, DELAYED RELEASE ORAL at 08:50

## 2017-07-07 RX ADMIN — IPRATROPIUM BROMIDE AND ALBUTEROL SULFATE 3 ML: .5; 3 SOLUTION RESPIRATORY (INHALATION) at 08:23

## 2017-07-07 RX ADMIN — CLONAZEPAM 0.5 MG: 0.5 TABLET ORAL at 06:05

## 2017-07-07 RX ADMIN — THEOPHYLLINE 450 MG: 300 TABLET, EXTENDED RELEASE ORAL at 14:16

## 2017-07-07 RX ADMIN — BENZONATATE 200 MG: 100 CAPSULE ORAL at 08:48

## 2017-07-07 RX ADMIN — ZIPRASIDONE HYDROCHLORIDE 80 MG: 20 CAPSULE ORAL at 08:47

## 2017-07-07 RX ADMIN — NICOTINE 1 PATCH: 21 PATCH, EXTENDED RELEASE TRANSDERMAL at 08:51

## 2017-07-07 RX ADMIN — GUAIFENESIN 200 MG: 100 SOLUTION ORAL at 14:18

## 2017-07-07 RX ADMIN — ENOXAPARIN SODIUM 40 MG: 40 INJECTION SUBCUTANEOUS at 04:18

## 2017-07-07 RX ADMIN — ATENOLOL 50 MG: 50 TABLET ORAL at 08:48

## 2017-07-07 NOTE — DISCHARGE INSTR - APPOINTMENTS
You have an appointment with Robert Self MD on July 18, 2017 @ 1:00 PM.   Call them if you have any questions. Phone: [phone] [address] [address]    Dr Amaya's office will call patient when they have a cancellation.

## 2017-07-07 NOTE — PLAN OF CARE
"Problem: Patient Care Overview (Adult)  Goal: Plan of Care Review  Outcome: Outcome(s) achieved Date Met:  17 0130 17 2200   Coping/Psychosocial Response Interventions   Plan Of Care Reviewed With --  patient   Patient Care Overview   Progress no change --    Outcome Evaluation   Outcome Summary/Follow up Plan Pt. states she \"just wants to sleep\" and \"I don't want to answer any more questions tonight  --        Goal: Adult Individualization and Mutuality  Outcome: Outcome(s) achieved Date Met:  17  Goal: Discharge Needs Assessment  Outcome: Outcome(s) achieved Date Met:  17    Problem: Respiratory Insufficiency (Adult)  Goal: Identify Related Risk Factors and Signs and Symptoms  Outcome: Outcome(s) achieved Date Met:  17  Goal: Acid/Base Balance  Outcome: Outcome(s) achieved Date Met:  17    Problem: Health Knowledge, Opportunity for Enhanced (Adult,NICU,,Obstetrics,Pediatric)  Goal: Knowledgeable about Health Subject/Topic  Outcome: Outcome(s) achieved Date Met:  17      "

## 2017-07-07 NOTE — PROGRESS NOTES
Continued Stay Note  [facility]     Patient Name: Colleen Gonzalez  MRN: [id number]  Today's Date: 7/7/2017    Admit Date: 7/4/2017          Discharge Plan       07/07/17 1501    Case Management/Social Work Plan    Additional Comments SW provided cab vouchers for pt to get to her pharmacy and the [facility]. Pt also requested a new rollator. SHANITA previously ordered pt a rollator as in pt's chart on May 23, 2017. SHANITA called [employer] and pt will not qualify for a new one since she just got one. SW updated pt on this. Pt had no further questions or concerns at this time.               Discharge Codes     None        Expected Discharge Date and Time     Expected Discharge Date Expected Discharge Time    Jul 7, 2017             SOPHIE Vázquez

## 2017-07-07 NOTE — PLAN OF CARE
Problem: Respiratory Insufficiency (Adult)  Goal: Acid/Base Balance  Outcome: Ongoing (interventions implemented as appropriate)    Problem: Health Knowledge, Opportunity for Enhanced (Adult,NICU,,Obstetrics,Pediatric)  Goal: Knowledgeable about Health Subject/Topic  Outcome: Ongoing (interventions implemented as appropriate)

## 2017-07-07 NOTE — PROGRESS NOTES
"[facility] Progress Note    Date of Admission: 2017      Antibiotics: Oral Biaxin      CC:   Chief Complaint   Patient presents with   • Shortness of Breath       S: No f/c/s. No n/v/d. Hemodynamically stable, slight tachycardia. Reports chronic pain with weakness fatigue Worsening productive cough - asking for cough syrup.  Remains afebrile without leukocytosis.    O:  /76 (BP Location: Right arm, Patient Position: Lying)  Pulse 104  Temp 97.2 °F (36.2 °C) (Oral)   Resp 20  Ht 68\" (172.7 cm)  Wt 243 lb 4.8 oz (110 kg)  SpO2 93%  BMI 36.99 kg/m2  Temp (24hrs), Av.4 °F (36.3 °C), Min:96.7 °F (35.9 °C), Max:98.2 °F (36.8 °C)      PE:   GEN: Awake and alert, in no acute distress.  Morbid obesity  HEENT: NCAT.  EOMI. No conjunctival injection. No icterus. Oropharynx clear without evidence of thrush or exudate.   NECK: Supple without nuchal rigidity  HEART: RRR; No murmur, rubs, gallops.   LUNGS: Decreased air movement throughout with bilateral wheezing,  ronchi  ABDOMEN: Soft, nontender, nondistended. Positive bowel sounds. No rebound or guarding.   EXT:  No cyanosis, clubbing or edema  : Normal appearing genitalia without Perdomo catheter.  MSK: FROM without joint effusions noted    SKIN: Warm and dry with cutaneous eruptions.    NEURO: Oriented to PPT. No focal deficits.     Laboratory Data      Results from last 7 days  Lab Units 17  0512 17  2113   WBC 10*3/mm3 8.03 16.12*   HEMOGLOBIN g/dL 11.6 12.1   HEMATOCRIT % 37.9 37.9   PLATELETS 10*3/mm3 357 336       Results from last 7 days  Lab Units 17  0512   SODIUM mmol/L 141   POTASSIUM mmol/L 3.1*   CHLORIDE mmol/L 95*   CO2 mmol/L 33.0*   BUN mg/dL 7*   CREATININE mg/dL 0.90   GLUCOSE mg/dL 95   CALCIUM mg/dL 10.1       Results from last 7 days  Lab Units 17  0512   ALK PHOS U/L 91   BILIRUBIN mg/dL 0.2*   ALT (SGPT) U/L 7   AST (SGOT) U/L 9               Estimated Creatinine Clearance: 93.9 mL/min (by C-G formula based on Cr " of 0.9).      Microbiology:  Blood Cx:  No growth at 2 days.    Radiology:  Imaging Results (last 24 hours)     ** No results found for the last 24 hours. 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  Tobacco abuse  Chronic skin lesions  HTN  HLD  Hypothyroidism  Psych disorder  Homeless      ASSESSMENT:  52 -year-old female with multiple medical conditions including hypertension, hyperlipidemia, hypothyroidism in the setting of psychiatric disease with OCD and bipolar disorder with chronic lung disease with multiple admissions for COPD exacerbations with medical noncompliance and psychiatric issues with prior AFBs positive for Mycobacterium species and 6 weeks ago with Mycobacterium avium complex with CT scan and symptoms compatible with 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    Tolerating 3 drug therapy well no signs of adverse drug reactions very complex case     PLAN:  -Agree with treatment of COPD exacerbation  -Will need long-term mac treatment and initiate clarithromycin, ethambutol, rifampin  -AFB sputum again to try to capture MAC and get susceptibility testing  -Stable today unsure of compliance on OP side.    Will follow    Robert Self MD  7/7/2017

## 2017-07-07 NOTE — DISCHARGE SUMMARY
"    [facility] Medicine Services  DISCHARGE SUMMARY       Date of Admission: 7/4/2017  Date of Discharge:  7/7/2017  Primary Care Physician: RADHA Pedraza  Consulting Physician(s)     Provider Relationship    Robert Self MD Consulting Physician          Discharge Diagnoses:  Active Hospital Problems (** Indicates Principal Problem)    Diagnosis Date Noted   • **COPD exacerbation [J44.1] 06/16/2017   • Bronchiolitis [J21.9] 07/05/2017   • Non compliance with medical treatment [Z91.19] 07/05/2017   • Mycobacterial infection [A31.9] 07/04/2017     AFB Culture 5/22/17    Specimen Information: Trachea; Sputum        Culture   Mycobacterium avium complex (C)   Isolated from broth culture     Identified by [facility]                   • Renal insufficiency [N28.9] 06/09/2017   • Seizure disorder [G40.909] 05/19/2017   • Hypothyroid [E03.9] 03/01/2017   • Homelessness [Z59.0] 03/01/2017   • Anxiety and depression [F41.9, F32.9] 03/01/2017   • Leukocytosis [D72.829] 03/01/2017   • Tobacco abuse [Z72.0] 03/01/2017      Resolved Hospital Problems    Diagnosis Date Noted Date Resolved   No resolved problems to display.       Presenting Problem/History of Present Illness  COPD exacerbation [J44.1]       History of Present Illness on Day of Discharge:   Patient is sitting up on side of bed and reports feeling much better.  Shortness of breath much improved as well as wheezing.  She is requesting steroids at discharge stating \"it helps open me up\".    Hospital Course  Ms. Gonzalez is a 52 y.o. female who lives at homeless shelter with PMH of MAC PNA (noncompliance with long term antimicrobial therapy), COPD who refused home O2 at time of last admit, ongoing smoking >2ppd, anxiety, CKD who  presented to ED on 7/5/17 with c/o shortness of breath. She reports that four days prior, she began feeling worse than baseline with associated productive cough, chills, diaphoresis, and left sided swelling of " the ear and eye. She has tried using eye drops with no reduction her eye swelling and irritation. She also c/o left sided chest pain and worsening SOA for the last three days. She has had difficulty walking secondary to SOA. She was given a neb treatment en route to the ED with minimal relief. Additionally she c/o decreased appetite but denies abdominal pain, nausea, vomiting, diarrhea, or any other acute complaints at that time.    The patient was admitted by hospitalist services for further evaluation and treatment.  Upon admission, CT of chest ruled out any acute processes, only showed chronic changes. She did have leukocytosis but this has been chronic and likely secondary to recurrent use of steroids and smoking.  The patient was treated for COPD exacerbation.  Antibiotics for MAC infection were restarted, as the patient has not been compliant with treatment from 3/2017.  ID was consulted and Dr. Self followed the patient throughout this hospital course.  The patients symptoms have much improved and she is stable for discharge.  I have discussed the need for smoking cessation with the patient and she verbalizes understanding.  Will continue a 5 day course of prednisone.  Additionally, I have discussed the importance of ongoing antibiotic therapy for her condition of mycobacterial infection, and have suggested she see Dr. Self as out patient at earliest appointment for plan moving forward.  We have discussed use of home O2 but patient states that oxygen not allowed at her current homeless shelter.  It is recommended that she follow up with PCP in 1-2 weeks for ongoing monitoring of medications and her complex health issues.      Procedures Performed     NONE    Consults:   Consults     Date and Time Order Name Status Description    7/5/2017 0142 Inpatient Consult to Infectious Diseases Completed           Pertinent Test Results:  Component      Latest Ref Rng & Units 7/7/2017   Glucose      70 - 100 mg/dL 95    BUN      9 - 23 mg/dL 7 (L)   Creatinine      0.60 - 1.30 mg/dL 0.90   Sodium      132 - 146 mmol/L 141   Potassium      3.5 - 5.5 mmol/L 3.1 (L)   Chloride      99 - 109 mmol/L 95 (L)   CO2      20.0 - 31.0 mmol/L 33.0 (H)   Calcium      8.7 - 10.4 mg/dL 10.1   Total Protein      5.7 - 8.2 g/dL 7.0   Albumin      3.20 - 4.80 g/dL 4.00   ALT (SGPT)      7 - 40 U/L 7   AST (SGOT)      0 - 33 U/L 9   Alkaline Phosphatase      25 - 100 U/L 91   Total Bilirubin      0.3 - 1.2 mg/dL 0.2 (L)   eGFR Non African Amer      >60 mL/min/1.73 65   Globulin      gm/dL 3.0   A/G Ratio      1.5 - 2.5 g/dL 1.3 (L)   BUN/Creatinine Ratio      7.0 - 25.0 7.8   Anion Gap      3.0 - 11.0 mmol/L 13.0 (H)     Component      Latest Ref Rng & Units 7/7/2017   Theophylline Level      10.0 - 20.0 mcg/mL 11.0     Component      Latest Ref Rng & Units 7/7/2017   WBC      3.50 - 10.80 10*3/mm3 8.03   RBC      3.89 - 5.14 10*6/mm3 3.73 (L)   Hemoglobin      11.5 - 15.5 g/dL 11.6   Hematocrit      34.5 - 44.0 % 37.9   MCV      80.0 - 99.0 fL 101.6 (H)   MCH      27.0 - 31.0 pg 31.1 (H)   MCHC      32.0 - 36.0 g/dL 30.6 (L)   RDW      11.3 - 14.5 % 16.7 (H)   RDW-SD      37.0 - 54.0 fl 62.5 (H)   MPV      6.0 - 12.0 fL 9.8   Platelets      150 - 450 10*3/mm3 357   Neutrophil %      41.0 - 71.0 % 47.9   Lymphocyte %      24.0 - 44.0 % 37.0   Monocyte %      0.0 - 12.0 % 13.3 (H)   Eosinophil %      0.0 - 3.0 % 0.4   Basophil %      0.0 - 1.0 % 0.9   Immature Grans %      0.0 - 0.6 % 0.5   Neutrophils, Absolute      1.50 - 8.30 10*3/mm3 3.85   Lymphocytes, Absolute      0.60 - 4.80 10*3/mm3 2.97   Monocytes, Absolute      0.00 - 1.00 10*3/mm3 1.07 (H)   Eosinophils, Absolute      0.00 - 0.30 10*3/mm3 0.03   Basophils, Absolute      0.00 - 0.20 10*3/mm3 0.07   Immature Grans, Absolute      0.00 - 0.03 10*3/mm3 0.04 (H)   Blood Culture   Order: [id number]   Status:  Preliminary result   Visible to patient:  No (Not Released)   Culture   No growth at 2  days      Resulting Agency:  [facility]      Specimen Collected: 07/04/17 10:39 PM   Last Resulted: 07/07/17 12:03 AM              Beta Strep Culture, Throat   Order: [id number] - Reflex for Order [id number]   Status:  Final result   Visible to patient:  No (Not Released)   Specimen Information: Throat; Swab        Culture   No Beta Hemolytic Streptococcus Isolated      Resulting Agency:  [facility]      Specimen Collected: 07/04/17  9:39 PM   Last Resulted: 07/06/17  7:32 AM            Rapid Strep A Screen   Order: [id number]   Status:  Final result   Visible to patient:  No (Not Released)   Specimen Information: Throat; Swab           Ref Range & Units 3d ago     Strep A Ag Negative Negative   Resulting Agency   [facility]   Narrative     Test performed by [facility].      Specimen Collected: 07/04/17  9:39 PM   Last Resulted: 07/04/17  9:54 PM              POC Troponin, Rapid   Order: [id number]   Status:  Final result   Visible to patient:  No (Not Released)      Ref Range & Units 3d ago     Troponin I 0.00 - 0.07 ng/mL 0.00   Comments: Serial Number: [id number]    : [id number]   Resulting Agency   [facility]      Specimen Collected: 07/04/17  9:16 PM   Last Resulted: 07/04/17  9:35 PM              Component      Latest Ref Rng & Units 7/4/2017   BNP      0.0 - 100.0 pg/mL 11.0     CT angio chest w/ contrast  IMPRESSION:  1. No evidence of pulmonary embolism.   2. Patchy areas of tree in bud opacification bilaterally with mild   superimposed centrilobular nodularity in the left midlung. Findings suggest a   mild infectious or inflammatory bronchiolitis.      THIS DOCUMENT HAS BEEN ELECTRONICALLY SIGNED BY DESTINEE GRAY MD    CXR  IMPRESSION:  There is slight increased density in the bilateral lower lungs, which may   represent atelectasis or mild infiltrates.      THIS DOCUMENT HAS BEEN ELECTRONICALLY SIGNED BY DESTINEE GRAY MD  Condition on Discharge:  stable    Physical Exam on Discharge:/76 (BP Location:  "Right arm, Patient Position: Lying)  Pulse 104  Temp 97.2 °F (36.2 °C) (Oral)   Resp 20  Ht 68\" (172.7 cm)  Wt 243 lb 4.8 oz (110 kg)  SpO2 93%  BMI 36.99 kg/m2  Physical Exam  GEN: Awake and alert, in no acute distress.  Morbid obesity  HEENT: EOMI. No conjunctival injection. No icterus. Oropharynx clear without evidence of thrush or exudate.   NECK: Supple without nuchal rigidity  HEART: RRR; No murmur, rubs, gallops.   LUNGS: Decreased air movement throughout with bilateral wheezing  ABDOMEN: Soft, nontender, nondistended. Positive bowel sounds. No rebound or guarding.   EXT: No cyanosis, clubbing or edema  SKIN: Warm and dry with cutaneous eruptions.   NEURO: Oriented to PPT. No focal deficits.        Discharge Disposition  Home or Self Care    Discharge Medications   Colleen Gonzalez   Home Medication Instructions MAC:[id number]    Printed on:07/07/17 1230   Medication Information                      albuterol (PROVENTIL HFA;VENTOLIN HFA) 108 (90 BASE) MCG/ACT inhaler  Inhale 2 puffs Every 4 (Four) Hours As Needed for Wheezing.             benztropine (COGENTIN) 1 MG tablet  Take 1 mg by mouth 2 (Two) Times a Day As Needed for Tremors.             capsicum (ZOSTRIX) 0.075 % topical cream  Apply  topically Every 8 (Eight) Hours.             cetirizine (zyrTEC) 10 MG tablet  Take 1 tablet by mouth Daily.             clarithromycin XL (BIAXIN XL) 500 MG 24 hr tablet  Take 2 tablets by mouth Daily. Indications: Pneumonia             clomiPRAMINE (ANAFRANIL) 50 MG capsule  Take 100 mg by mouth Every Night. Take two capsule at bedtime              clonazePAM (KlonoPIN) 0.5 MG tablet  Take 1 tablet by mouth 3 (Three) Times a Day As Needed for Anxiety.             divalproex (DEPAKOTE) 500 MG 24 hr tablet  Take 1,000 mg by mouth Daily. Take two tablets once a day              DULoxetine (CYMBALTA) 60 MG capsule  Take 1 capsule by mouth Every 12 (Twelve) Hours.             ergocalciferol (DRISDOL) 51258 UNITS " capsule  Take 50,000 Units by mouth 1 (One) Time Per Week.             ethambutol (MYAMBUTOL) 400 MG tablet  Take 4 tablets by mouth Daily.             famotidine (PEPCID) 20 MG tablet  Take 1 tablet by mouth 2 (Two) Times a Day As Needed for Heartburn.             fluticasone-salmeterol (ADVAIR) 250-50 MCG/DOSE DISKUS  Inhale 1 puff 2 (Two) Times a Day.             furosemide (LASIX) 40 MG tablet  Take 1 tablet by mouth Daily.             gabapentin (NEURONTIN) 300 MG capsule  Take 2 capsules by mouth 4 (Four) Times a Day.             hydrOXYzine (ATARAX) 50 MG tablet  Take 1 tablet by mouth 4 (Four) Times a Day As Needed for Itching or Anxiety.             lansoprazole (PREVACID) 30 MG capsule  Take 60 mg by mouth Daily.             levothyroxine (SYNTHROID, LEVOTHROID) 50 MCG tablet  Take 50 mcg by mouth Daily.             melatonin 3 MG tablet  Take 3 mg by mouth At Night As Needed for Sleep.             montelukast (SINGULAIR) 10 MG tablet  Take 1 tablet by mouth Every Night.             nicotine (NICODERM CQ) 21 MG/24HR patch  Place 1 patch on the skin Daily.             pharmacy consult - MTM  Daily.             pravastatin (PRAVACHOL) 80 MG tablet  Take 1 tablet by mouth Every Night.             predniSONE (DELTASONE) 10 MG tablet  Take 3 tablets by mouth Daily With Breakfast for 5 days.             QUEtiapine (SEROquel) 300 MG tablet  Take 300 mg by mouth Every Night.             rifAMPin (RIFADIN) 300 MG capsule  Take 2 capsules by mouth Daily. Indications: Pneumonia             saccharomyces boulardii (FLORASTOR) 250 MG capsule  Take 1 capsule by mouth 2 (Two) Times a Day.             theophylline (THEODUR) 300 MG 12 hr tablet  Take 2 tablets by mouth Every 12 (Twelve) Hours.             Tiotropium Bromide Monohydrate (SPIRIVA RESPIMAT) 2.5 MCG/ACT aerosol solution  Inhale 1 dose Daily.             ziprasidone (GEODON) 80 MG capsule  Take 80 mg by mouth 2 (Two) Times a Day With Meals.                  Discharge Diet:   Diet Instructions     Diet: Regular; Thin Liquids, No Restrictions       Discharge Diet:  Regular   Fluid Consistency:  Thin Liquids, No Restrictions                 Discharge Care Plan / Instructions:    Activity at Discharge:   Activity Instructions     Activity as Tolerated                     Follow-up Appointments  No future appointments.  Additional Instructions for the Follow-ups that You Need to Schedule     Additional Discharge Follow-Up (Specify Provider)    As directed    To:  follow up with ID at earliest available appointment (Dr. Self) will need to be arranged prior to discharge.       Discharge Follow-up with PCP    As directed    Follow Up Details:  follow up with PCP in 1-2 weeks.                 Test Results Pending at Discharge   Order Current Status    Blood Culture Preliminary result    Blood Culture Preliminary result           Amanda Bhardwaj, RADHA 07/07/17 12:30 PM    Time: Discharge 35 min    Please note that portions of this note may have been completed with a voice recognition program. Efforts were made to edit the dictations, but occasionally words are mistranscribed.

## 2017-07-10 ENCOUNTER — HOSPITAL ENCOUNTER (EMERGENCY)
Facility: HOSPITAL | Age: 52
Discharge: HOME | End: 2017-07-11
Attending: EMERGENCY MEDICINE | Admitting: EMERGENCY MEDICINE

## 2017-07-10 ENCOUNTER — APPOINTMENT (OUTPATIENT)
Dept: GENERAL RADIOLOGY | Facility: HOSPITAL | Age: 52
End: 2017-07-10

## 2017-07-10 DIAGNOSIS — J42 CHRONIC BRONCHITIS, UNSPECIFIED CHRONIC BRONCHITIS TYPE (HCC): Primary | ICD-10-CM

## 2017-07-10 LAB
ALBUMIN SERPL-MCNC: 4.3 G/DL (ref 3.2–4.8)
ALBUMIN/GLOB SERPL: 1.3 G/DL (ref 1.5–2.5)
ALP SERPL-CCNC: 96 U/L (ref 25–100)
ALT SERPL W P-5'-P-CCNC: 8 U/L (ref 7–40)
ANION GAP SERPL CALCULATED.3IONS-SCNC: 11 MMOL/L (ref 3–11)
AST SERPL-CCNC: 11 U/L (ref 0–33)
BACTERIA SPEC AEROBE CULT: NORMAL
BACTERIA SPEC AEROBE CULT: NORMAL
BASOPHILS # BLD AUTO: 0.11 10*3/MM3 (ref 0–0.2)
BASOPHILS NFR BLD AUTO: 0.8 % (ref 0–1)
BILIRUB SERPL-MCNC: 0.2 MG/DL (ref 0.3–1.2)
BNP SERPL-MCNC: 12 PG/ML (ref 0–100)
BUN BLD-MCNC: 11 MG/DL (ref 9–23)
BUN/CREAT SERPL: 11 (ref 7–25)
CALCIUM SPEC-SCNC: 10.3 MG/DL (ref 8.7–10.4)
CHLORIDE SERPL-SCNC: 98 MMOL/L (ref 99–109)
CO2 SERPL-SCNC: 30 MMOL/L (ref 20–31)
CREAT BLD-MCNC: 1 MG/DL (ref 0.6–1.3)
DEPRECATED RDW RBC AUTO: 59.3 FL (ref 37–54)
EOSINOPHIL # BLD AUTO: 0.03 10*3/MM3 (ref 0–0.3)
EOSINOPHIL NFR BLD AUTO: 0.2 % (ref 0–3)
ERYTHROCYTE [DISTWIDTH] IN BLOOD BY AUTOMATED COUNT: 16.1 % (ref 11.3–14.5)
GFR SERPL CREATININE-BSD FRML MDRD: 58 ML/MIN/1.73
GLOBULIN UR ELPH-MCNC: 3.4 GM/DL
GLUCOSE BLD-MCNC: 132 MG/DL (ref 70–100)
HCT VFR BLD AUTO: 43.3 % (ref 34.5–44)
HGB BLD-MCNC: 13.8 G/DL (ref 11.5–15.5)
HOLD SPECIMEN: NORMAL
HOLD SPECIMEN: NORMAL
IMM GRANULOCYTES # BLD: 0.2 10*3/MM3 (ref 0–0.03)
IMM GRANULOCYTES NFR BLD: 1.4 % (ref 0–0.6)
LYMPHOCYTES # BLD AUTO: 3.74 10*3/MM3 (ref 0.6–4.8)
LYMPHOCYTES NFR BLD AUTO: 26.8 % (ref 24–44)
MCH RBC QN AUTO: 31.9 PG (ref 27–31)
MCHC RBC AUTO-ENTMCNC: 31.9 G/DL (ref 32–36)
MCV RBC AUTO: 100 FL (ref 80–99)
MONOCYTES # BLD AUTO: 0.86 10*3/MM3 (ref 0–1)
MONOCYTES NFR BLD AUTO: 6.2 % (ref 0–12)
NEUTROPHILS # BLD AUTO: 9.01 10*3/MM3 (ref 1.5–8.3)
NEUTROPHILS NFR BLD AUTO: 64.6 % (ref 41–71)
PLAT MORPH BLD: NORMAL
PLATELET # BLD AUTO: 380 10*3/MM3 (ref 150–450)
PMV BLD AUTO: 9.6 FL (ref 6–12)
POTASSIUM BLD-SCNC: 3.7 MMOL/L (ref 3.5–5.5)
PROT SERPL-MCNC: 7.7 G/DL (ref 5.7–8.2)
RBC # BLD AUTO: 4.33 10*6/MM3 (ref 3.89–5.14)
RBC MORPH BLD: NORMAL
SODIUM BLD-SCNC: 139 MMOL/L (ref 132–146)
THEOPHYLLINE SERPL-MCNC: 3 MCG/ML (ref 10–20)
TROPONIN I SERPL-MCNC: 0 NG/ML (ref 0–0.07)
TROPONIN I SERPL-MCNC: 0 NG/ML (ref 0–0.07)
TROPONIN I SERPL-MCNC: <0.006 NG/ML
WBC MORPH BLD: NORMAL
WBC NRBC COR # BLD: 13.95 10*3/MM3 (ref 3.5–10.8)
WHOLE BLOOD HOLD SPECIMEN: NORMAL
WHOLE BLOOD HOLD SPECIMEN: NORMAL

## 2017-07-10 PROCEDURE — 84484 ASSAY OF TROPONIN QUANT: CPT

## 2017-07-10 PROCEDURE — 71020 HC CHEST PA AND LATERAL: CPT

## 2017-07-10 PROCEDURE — 85007 BL SMEAR W/DIFF WBC COUNT: CPT | Performed by: EMERGENCY MEDICINE

## 2017-07-10 PROCEDURE — 84484 ASSAY OF TROPONIN QUANT: CPT | Performed by: EMERGENCY MEDICINE

## 2017-07-10 PROCEDURE — 85025 COMPLETE CBC W/AUTO DIFF WBC: CPT | Performed by: EMERGENCY MEDICINE

## 2017-07-10 PROCEDURE — 83880 ASSAY OF NATRIURETIC PEPTIDE: CPT | Performed by: EMERGENCY MEDICINE

## 2017-07-10 PROCEDURE — 96374 THER/PROPH/DIAG INJ IV PUSH: CPT

## 2017-07-10 PROCEDURE — 96361 HYDRATE IV INFUSION ADD-ON: CPT

## 2017-07-10 PROCEDURE — 99284 EMERGENCY DEPT VISIT MOD MDM: CPT

## 2017-07-10 PROCEDURE — 80053 COMPREHEN METABOLIC PANEL: CPT | Performed by: EMERGENCY MEDICINE

## 2017-07-10 PROCEDURE — 94640 AIRWAY INHALATION TREATMENT: CPT

## 2017-07-10 PROCEDURE — 25010000002 METHYLPREDNISOLONE PER 125 MG: Performed by: PHYSICIAN ASSISTANT

## 2017-07-10 PROCEDURE — 93005 ELECTROCARDIOGRAM TRACING: CPT

## 2017-07-10 PROCEDURE — 80198 ASSAY OF THEOPHYLLINE: CPT | Performed by: EMERGENCY MEDICINE

## 2017-07-10 RX ORDER — IPRATROPIUM BROMIDE AND ALBUTEROL SULFATE 2.5; .5 MG/3ML; MG/3ML
3 SOLUTION RESPIRATORY (INHALATION) ONCE
Status: COMPLETED | OUTPATIENT
Start: 2017-07-10 | End: 2017-07-10

## 2017-07-10 RX ORDER — SODIUM CHLORIDE 0.9 % (FLUSH) 0.9 %
10 SYRINGE (ML) INJECTION AS NEEDED
Status: DISCONTINUED | OUTPATIENT
Start: 2017-07-10 | End: 2017-07-11 | Stop reason: HOSPADM

## 2017-07-10 RX ORDER — METHYLPREDNISOLONE SODIUM SUCCINATE 125 MG/2ML
125 INJECTION, POWDER, LYOPHILIZED, FOR SOLUTION INTRAMUSCULAR; INTRAVENOUS ONCE
Status: COMPLETED | OUTPATIENT
Start: 2017-07-10 | End: 2017-07-10

## 2017-07-10 RX ADMIN — SODIUM CHLORIDE 1000 ML: 9 INJECTION, SOLUTION INTRAVENOUS at 18:55

## 2017-07-10 RX ADMIN — METHYLPREDNISOLONE SODIUM SUCCINATE 125 MG: 125 INJECTION, POWDER, FOR SOLUTION INTRAMUSCULAR; INTRAVENOUS at 18:53

## 2017-07-10 RX ADMIN — IPRATROPIUM BROMIDE AND ALBUTEROL SULFATE 3 ML: .5; 3 SOLUTION RESPIRATORY (INHALATION) at 21:45

## 2017-07-10 RX ADMIN — IPRATROPIUM BROMIDE AND ALBUTEROL SULFATE 3 ML: .5; 3 SOLUTION RESPIRATORY (INHALATION) at 19:02

## 2017-07-11 VITALS
HEIGHT: 68 IN | WEIGHT: 260 LBS | HEART RATE: 95 BPM | DIASTOLIC BLOOD PRESSURE: 92 MMHG | OXYGEN SATURATION: 92 % | SYSTOLIC BLOOD PRESSURE: 144 MMHG | BODY MASS INDEX: 39.4 KG/M2 | TEMPERATURE: 97.9 F | RESPIRATION RATE: 18 BRPM

## 2017-07-11 RX ORDER — ALBUTEROL SULFATE 90 UG/1
2 AEROSOL, METERED RESPIRATORY (INHALATION) EVERY 4 HOURS PRN
Qty: 1 INHALER | Refills: 0 | Status: SHIPPED | OUTPATIENT
Start: 2017-07-11 | End: 2017-11-05

## 2017-07-11 RX ORDER — BENZONATATE 100 MG/1
100 CAPSULE ORAL 3 TIMES DAILY PRN
Qty: 15 CAPSULE | Refills: 0 | Status: ON HOLD | OUTPATIENT
Start: 2017-07-11 | End: 2017-09-25

## 2017-07-11 NOTE — ED PROVIDER NOTES
Subjective   HPI Comments: Pt presenting to ED with cough, SOB, and chest pain. Pt states she has MAC pneumonia and was recently in the hospital. Pt states she has been taking her medications as prescribed but didn't  one of the abx until today. .She states she came to ED today because she isn't able to do her chores at the [facility] where she lives because of the SOB. She is also requesting a wheelchair to help her get around. Pt states she is running out of her inhalers as well. She does have a Nebulizer that she uses at the shelter but states they won't let her wear oxygen and she is supposed to. Pt continues to smoke 2-3 PPD.     Noted patient discharged 7-7-17 after admission for COPD and chronic bilateral pneumonia (MAC). Pt with problems of noncompliance and continued tobacco use. Pt is supposed to f/u with ID and her PCP but hasn't yet. She is supposed to be taking Clarithromycin, Rifampin and Ethambutol. She was sent home on short course of steroids.     Patient is a 52 y.o. female presenting with shortness of breath.   History provided by:  Patient  Shortness of Breath   Severity:  Moderate  Duration: Several days   Chronicity:  Recurrent  Context: activity    Context comment:  While having to do chores at homeless shelter   Associated symptoms: chest pain (Soreness in chest with coughing ), cough and wheezing    Associated symptoms: no abdominal pain, no ear pain, no fever, no headaches, no hemoptysis, no rash, no sore throat, no sputum production, no syncope and no vomiting        Review of Systems   Constitutional: Positive for appetite change (decreased ) and chills. Negative for fever.   HENT: Negative for congestion, ear pain, sore throat and trouble swallowing.    Eyes: Negative for pain, redness and visual disturbance.   Respiratory: Positive for cough, shortness of breath and wheezing. Negative for hemoptysis, sputum production and chest tightness.    Cardiovascular: Positive  for chest pain (Soreness in chest with coughing ). Negative for leg swelling and syncope.   Gastrointestinal: Negative for abdominal pain, constipation, diarrhea, nausea and vomiting.   Genitourinary: Negative for difficulty urinating, dysuria, flank pain, hematuria and vaginal bleeding.   Musculoskeletal: Negative for arthralgias, back pain and joint swelling.   Skin: Negative for rash and wound.   Neurological: Negative for dizziness, syncope, speech difficulty, weakness, numbness and headaches.   Psychiatric/Behavioral: Negative for confusion.   All other systems reviewed and are negative.      Past Medical History:   Diagnosis Date   • TOM (acute kidney injury) 5/25/2017   • Allergic    • Altered mental status 6/9/2017   • Anxiety    • Asthma    • Bipolar affective disorder     Psychiatrist at [facility]    • Chronic kidney disease    • COPD (chronic obstructive pulmonary disease)    • Depression    • Emphysema lung    • Fibromyalgia    • Herpes    • Homeless    • Hyperlipidemia    • Hypertension    • Hypothyroid    • Neuropathy    • Obesity    • Pneumonia    • Pre-diabetes    • Renal insufficiency 6/9/2017   • Seizures    • Tibia fracture     healed per June 2017 x ray       Allergies   Allergen Reactions   • Aspirin GI Intolerance   • Codeine Itching   • Ibuprofen GI Intolerance       Past Surgical History:   Procedure Laterality Date   • BACK SURGERY     • BRONCHOSCOPY Left 3/15/2017    Procedure: BRONCHOSCOPY WITH FLUORO;  Surgeon: Ankur Salomon MD;  Location: [facility] ENDOSCOPY;  Service:    • CHOLECYSTECTOMY     • CYST REMOVAL     • HYSTERECTOMY      partial   • KNEE SURGERY Bilateral        Family History   Problem Relation Age of Onset   • Heart failure Mother    • COPD Mother    • Heart attack Mother    • Diabetes Father        Social History     Social History   • Marital status: Single     Spouse name: N/A   • Number of children: N/A   • Years of education: N/A     Social History Main Topics   •  Smoking status: Heavy Tobacco Smoker     Packs/day: 3.00   • Smokeless tobacco: None   • Alcohol use No   • Drug use: No   • Sexual activity: Defer     Other Topics Concern   • None     Social History Narrative    Patient is homeless and moves around staying with friends and family.  Recently was staying at the [employer].             Objective   Physical Exam   Constitutional: She is oriented to person, place, and time. Vital signs are normal. She appears well-developed.   HENT:   Head: Atraumatic.   Nose: Nose normal.   Mouth/Throat: Mucous membranes are normal.   Eyes: Conjunctivae, EOM and lids are normal. Pupils are equal, round, and reactive to light.   Neck: Normal range of motion. Neck supple.   Cardiovascular: Normal rate, regular rhythm and normal heart sounds.    Pulmonary/Chest: Effort normal. She has wheezes (Diffuse ).   Abdominal: Soft. She exhibits no distension. There is no tenderness. There is no rebound and no guarding.   Musculoskeletal: Normal range of motion. She exhibits no edema or tenderness.        Right lower leg: She exhibits no edema.        Left lower leg: She exhibits no edema.   Neurological: She is alert and oriented to person, place, and time. No sensory deficit.   Skin: Skin is warm and dry. No rash noted. No erythema.   Psychiatric: She has a normal mood and affect. Her speech is normal and behavior is normal.   Nursing note and vitals reviewed.      Procedures         ED Course  ED Course      Reviewed old records and recent admission. No significant change or worsening labs / ED workup from recent discharge 7-7-17.    Discussed results with patient. Vitals stable in ED and maintaining her O2 on RA. She is able to get up and walk to the bathroom with no difficulty. Discussed importance of compliance and to take all medications as prescribed. Counseled on her risks of continued decline with her persistent tobacco use of 2-3 PPD. Explained she needs to f/u with her PCP for  further evaluation regarding a wheelchair for home. Will provide excuse for her chores at the homeless shelter. Will refill her Advair and Spiriva that she states she is out of.  Discussed discharge and need for immediate return to ED if new or worse sx.     Discussed patient with Dr. Wu.     Note patient with high risk for poor outcome due to issues with compliance and her continued tobacco abuse.     Recent Results (from the past 24 hour(s))   Comprehensive Metabolic Panel    Collection Time: 07/10/17  5:47 PM   Result Value Ref Range    Glucose 132 (H) 70 - 100 mg/dL    BUN 11 9 - 23 mg/dL    Creatinine 1.00 0.60 - 1.30 mg/dL    Sodium 139 132 - 146 mmol/L    Potassium 3.7 3.5 - 5.5 mmol/L    Chloride 98 (L) 99 - 109 mmol/L    CO2 30.0 20.0 - 31.0 mmol/L    Calcium 10.3 8.7 - 10.4 mg/dL    Total Protein 7.7 5.7 - 8.2 g/dL    Albumin 4.30 3.20 - 4.80 g/dL    ALT (SGPT) 8 7 - 40 U/L    AST (SGOT) 11 0 - 33 U/L    Alkaline Phosphatase 96 25 - 100 U/L    Total Bilirubin 0.2 (L) 0.3 - 1.2 mg/dL    eGFR Non African Amer 58 (L) >60 mL/min/1.73    Globulin 3.4 gm/dL    A/G Ratio 1.3 (L) 1.5 - 2.5 g/dL    BUN/Creatinine Ratio 11.0 7.0 - 25.0    Anion Gap 11.0 3.0 - 11.0 mmol/L   Theophylline Level    Collection Time: 07/10/17  5:47 PM   Result Value Ref Range    Theophylline Level 3.0 (L) 10.0 - 20.0 mcg/mL   Light Blue Top    Collection Time: 07/10/17  5:47 PM   Result Value Ref Range    Extra Tube hold for add-on    Green Top (Gel)    Collection Time: 07/10/17  5:47 PM   Result Value Ref Range    Extra Tube Hold for add-ons.    Gold Top - SST    Collection Time: 07/10/17  5:47 PM   Result Value Ref Range    Extra Tube Hold for add-ons.    Troponin    Collection Time: 07/10/17  5:47 PM   Result Value Ref Range    Troponin I <0.006 <=0.039 ng/mL   BNP    Collection Time: 07/10/17  5:48 PM   Result Value Ref Range    BNP 12.0 0.0 - 100.0 pg/mL   Lavender Top    Collection Time: 07/10/17  5:48 PM   Result Value Ref  Range    Extra Tube hold for add-on    CBC Auto Differential    Collection Time: 07/10/17  5:48 PM   Result Value Ref Range    WBC 13.95 (H) 3.50 - 10.80 10*3/mm3    RBC 4.33 3.89 - 5.14 10*6/mm3    Hemoglobin 13.8 11.5 - 15.5 g/dL    Hematocrit 43.3 34.5 - 44.0 %    .0 (H) 80.0 - 99.0 fL    MCH 31.9 (H) 27.0 - 31.0 pg    MCHC 31.9 (L) 32.0 - 36.0 g/dL    RDW 16.1 (H) 11.3 - 14.5 %    RDW-SD 59.3 (H) 37.0 - 54.0 fl    MPV 9.6 6.0 - 12.0 fL    Platelets 380 150 - 450 10*3/mm3    Neutrophil % 64.6 41.0 - 71.0 %    Lymphocyte % 26.8 24.0 - 44.0 %    Monocyte % 6.2 0.0 - 12.0 %    Eosinophil % 0.2 0.0 - 3.0 %    Basophil % 0.8 0.0 - 1.0 %    Immature Grans % 1.4 (H) 0.0 - 0.6 %    Neutrophils, Absolute 9.01 (H) 1.50 - 8.30 10*3/mm3    Lymphocytes, Absolute 3.74 0.60 - 4.80 10*3/mm3    Monocytes, Absolute 0.86 0.00 - 1.00 10*3/mm3    Eosinophils, Absolute 0.03 0.00 - 0.30 10*3/mm3    Basophils, Absolute 0.11 0.00 - 0.20 10*3/mm3    Immature Grans, Absolute 0.20 (H) 0.00 - 0.03 10*3/mm3   Scan Slide    Collection Time: 07/10/17  5:48 PM   Result Value Ref Range    RBC Morphology Normal Normal    WBC Morphology Normal Normal    Platelet Morphology Normal Normal   POC Troponin, Rapid    Collection Time: 07/10/17  5:52 PM   Result Value Ref Range    Troponin I 0.00 0.00 - 0.07 ng/mL   POC Troponin, Rapid    Collection Time: 07/10/17 11:22 PM   Result Value Ref Range    Troponin I 0.00 0.00 - 0.07 ng/mL     Note: In addition to lab results from this visit, the labs listed above may include labs taken at another facility or during a different encounter within the last 24 hours. Please correlate lab times with ED admission and discharge times for further clarification of the services performed during this visit.    XR Chest 2 View    (Results Pending)     Vitals:    07/10/17 2045 07/10/17 2057 07/11/17 0021 07/11/17 0025   BP: 129/87  144/92    BP Location:       Patient Position:       Pulse: 96 95  95   Resp:    18    Temp:    97.9 °F (36.6 °C)   TempSrc:    Oral   SpO2: 94% 92%     Weight:       Height:         Medications   sodium chloride 0.9 % flush 10 mL (not administered)   sodium chloride 0.9 % bolus 1,000 mL (0 mL Intravenous Stopped 7/10/17 2130)   ipratropium-albuterol (DUO-NEB) nebulizer solution 3 mL (3 mL Nebulization Given 7/10/17 1902)   methylPREDNISolone sodium succinate (SOLU-Medrol) injection 125 mg (125 mg Intravenous Given 7/10/17 1853)   ipratropium-albuterol (DUO-NEB) nebulizer solution 3 mL (3 mL Nebulization Given 7/10/17 2145)     ECG/EMG Results (last 24 hours)     Procedure Component Value Units Date/Time    ECG 12 Lead [[id number]] Collected:  07/10/17 1739     Updated:  07/10/17 1853    Narrative:       Test Reason : SOA Protocol  Blood Pressure : **/** mmHG  Vent. Rate : 091 BPM     Atrial Rate : 091 BPM     P-R Int : 130 ms          QRS Dur : 080 ms      QT Int : 358 ms       P-R-T Axes : 073 075 075 degrees     QTc Int : 440 ms    Normal sinus rhythm  When compared with ECG of 04-JUL-2017 21:00,  No significant change was found  Confirmed by JULISA NOLASCO MD ([id number]) on 7/10/2017 6:53:04 PM    Referred By:  MARIJA ANTONY           Confirmed By:JULISA NOLASCO MD                    [facility]    Final diagnoses:   Chronic bronchitis, unspecified chronic bronchitis type            MARY Abebe  07/11/17 0144

## 2017-07-16 ENCOUNTER — HOSPITAL ENCOUNTER (EMERGENCY)
Facility: HOSPITAL | Age: 52
Discharge: HOME | End: 2017-07-16
Attending: EMERGENCY MEDICINE | Admitting: EMERGENCY MEDICINE

## 2017-07-16 ENCOUNTER — APPOINTMENT (OUTPATIENT)
Dept: GENERAL RADIOLOGY | Facility: HOSPITAL | Age: 52
End: 2017-07-16

## 2017-07-16 VITALS
DIASTOLIC BLOOD PRESSURE: 80 MMHG | SYSTOLIC BLOOD PRESSURE: 111 MMHG | HEIGHT: 68 IN | OXYGEN SATURATION: 95 % | HEART RATE: 108 BPM | BODY MASS INDEX: 38.65 KG/M2 | RESPIRATION RATE: 18 BRPM | TEMPERATURE: 98 F | WEIGHT: 255 LBS

## 2017-07-16 DIAGNOSIS — J20.9 BRONCHOSPASM WITH BRONCHITIS, ACUTE: ICD-10-CM

## 2017-07-16 DIAGNOSIS — J44.1 COPD EXACERBATION (HCC): Primary | ICD-10-CM

## 2017-07-16 LAB
ALBUMIN SERPL-MCNC: 4.2 G/DL (ref 3.2–4.8)
ALBUMIN/GLOB SERPL: 1.3 G/DL (ref 1.5–2.5)
ALP SERPL-CCNC: 103 U/L (ref 25–100)
ALT SERPL W P-5'-P-CCNC: 9 U/L (ref 7–40)
ANION GAP SERPL CALCULATED.3IONS-SCNC: 7 MMOL/L (ref 3–11)
AST SERPL-CCNC: 14 U/L (ref 0–33)
BASOPHILS # BLD AUTO: 0.09 10*3/MM3 (ref 0–0.2)
BASOPHILS NFR BLD AUTO: 1 % (ref 0–1)
BILIRUB SERPL-MCNC: 0.9 MG/DL (ref 0.3–1.2)
BNP SERPL-MCNC: 7 PG/ML (ref 0–100)
BUN BLD-MCNC: 7 MG/DL (ref 9–23)
BUN/CREAT SERPL: 6.4 (ref 7–25)
CALCIUM SPEC-SCNC: 9.9 MG/DL (ref 8.7–10.4)
CHLORIDE SERPL-SCNC: 98 MMOL/L (ref 99–109)
CO2 SERPL-SCNC: 30 MMOL/L (ref 20–31)
CREAT BLD-MCNC: 1.1 MG/DL (ref 0.6–1.3)
DEPRECATED RDW RBC AUTO: 58.8 FL (ref 37–54)
EOSINOPHIL # BLD AUTO: 0.11 10*3/MM3 (ref 0–0.3)
EOSINOPHIL NFR BLD AUTO: 1.2 % (ref 0–3)
ERYTHROCYTE [DISTWIDTH] IN BLOOD BY AUTOMATED COUNT: 16.6 % (ref 11.3–14.5)
GFR SERPL CREATININE-BSD FRML MDRD: 52 ML/MIN/1.73
GLOBULIN UR ELPH-MCNC: 3.2 GM/DL
GLUCOSE BLD-MCNC: 108 MG/DL (ref 70–100)
HCT VFR BLD AUTO: 45.4 % (ref 34.5–44)
HGB BLD-MCNC: 14.8 G/DL (ref 11.5–15.5)
HOLD SPECIMEN: NORMAL
HOLD SPECIMEN: NORMAL
IMM GRANULOCYTES # BLD: 0.06 10*3/MM3 (ref 0–0.03)
IMM GRANULOCYTES NFR BLD: 0.6 % (ref 0–0.6)
LYMPHOCYTES # BLD AUTO: 2.76 10*3/MM3 (ref 0.6–4.8)
LYMPHOCYTES NFR BLD AUTO: 29.2 % (ref 24–44)
MCH RBC QN AUTO: 32 PG (ref 27–31)
MCHC RBC AUTO-ENTMCNC: 32.6 G/DL (ref 32–36)
MCV RBC AUTO: 98.1 FL (ref 80–99)
MONOCYTES # BLD AUTO: 0.88 10*3/MM3 (ref 0–1)
MONOCYTES NFR BLD AUTO: 9.3 % (ref 0–12)
NEUTROPHILS # BLD AUTO: 5.54 10*3/MM3 (ref 1.5–8.3)
NEUTROPHILS NFR BLD AUTO: 58.7 % (ref 41–71)
PLAT MORPH BLD: NORMAL
PLATELET # BLD AUTO: 254 10*3/MM3 (ref 150–450)
PMV BLD AUTO: 9.9 FL (ref 6–12)
POTASSIUM BLD-SCNC: 4 MMOL/L (ref 3.5–5.5)
PROT SERPL-MCNC: 7.4 G/DL (ref 5.7–8.2)
RBC # BLD AUTO: 4.63 10*6/MM3 (ref 3.89–5.14)
RBC MORPH BLD: NORMAL
SODIUM BLD-SCNC: 135 MMOL/L (ref 132–146)
TROPONIN I SERPL-MCNC: 0.01 NG/ML (ref 0–0.07)
WBC MORPH BLD: NORMAL
WBC NRBC COR # BLD: 9.44 10*3/MM3 (ref 3.5–10.8)
WHOLE BLOOD HOLD SPECIMEN: NORMAL

## 2017-07-16 PROCEDURE — 25010000002 METHYLPREDNISOLONE PER 125 MG: Performed by: PHYSICIAN ASSISTANT

## 2017-07-16 PROCEDURE — 85025 COMPLETE CBC W/AUTO DIFF WBC: CPT | Performed by: EMERGENCY MEDICINE

## 2017-07-16 PROCEDURE — 93005 ELECTROCARDIOGRAM TRACING: CPT | Performed by: EMERGENCY MEDICINE

## 2017-07-16 PROCEDURE — 85007 BL SMEAR W/DIFF WBC COUNT: CPT | Performed by: EMERGENCY MEDICINE

## 2017-07-16 PROCEDURE — 84484 ASSAY OF TROPONIN QUANT: CPT

## 2017-07-16 PROCEDURE — 94760 N-INVAS EAR/PLS OXIMETRY 1: CPT

## 2017-07-16 PROCEDURE — 99284 EMERGENCY DEPT VISIT MOD MDM: CPT

## 2017-07-16 PROCEDURE — 96374 THER/PROPH/DIAG INJ IV PUSH: CPT

## 2017-07-16 PROCEDURE — 83880 ASSAY OF NATRIURETIC PEPTIDE: CPT | Performed by: EMERGENCY MEDICINE

## 2017-07-16 PROCEDURE — 94640 AIRWAY INHALATION TREATMENT: CPT

## 2017-07-16 PROCEDURE — 80053 COMPREHEN METABOLIC PANEL: CPT | Performed by: EMERGENCY MEDICINE

## 2017-07-16 PROCEDURE — 71010 HC CHEST PA OR AP: CPT

## 2017-07-16 RX ORDER — HYDROCODONE BITARTRATE AND ACETAMINOPHEN 5; 325 MG/1; MG/1
1 TABLET ORAL ONCE
Status: COMPLETED | OUTPATIENT
Start: 2017-07-16 | End: 2017-07-16

## 2017-07-16 RX ORDER — DICYCLOMINE HCL 20 MG
20 TABLET ORAL EVERY 6 HOURS
Qty: 12 TABLET | Refills: 0 | Status: SHIPPED | OUTPATIENT
Start: 2017-07-16 | End: 2017-09-28 | Stop reason: HOSPADM

## 2017-07-16 RX ORDER — SODIUM CHLORIDE 0.9 % (FLUSH) 0.9 %
10 SYRINGE (ML) INJECTION AS NEEDED
Status: DISCONTINUED | OUTPATIENT
Start: 2017-07-16 | End: 2017-07-16 | Stop reason: HOSPADM

## 2017-07-16 RX ORDER — IPRATROPIUM BROMIDE AND ALBUTEROL SULFATE 2.5; .5 MG/3ML; MG/3ML
3 SOLUTION RESPIRATORY (INHALATION) ONCE
Status: COMPLETED | OUTPATIENT
Start: 2017-07-16 | End: 2017-07-16

## 2017-07-16 RX ORDER — METHYLPREDNISOLONE SODIUM SUCCINATE 125 MG/2ML
125 INJECTION, POWDER, LYOPHILIZED, FOR SOLUTION INTRAMUSCULAR; INTRAVENOUS ONCE
Status: COMPLETED | OUTPATIENT
Start: 2017-07-16 | End: 2017-07-16

## 2017-07-16 RX ORDER — PREDNISONE 20 MG/1
TABLET ORAL
Qty: 18 TABLET | Refills: 0 | Status: ON HOLD | OUTPATIENT
Start: 2017-07-16 | End: 2017-09-25

## 2017-07-16 RX ADMIN — METHYLPREDNISOLONE SODIUM SUCCINATE 125 MG: 125 INJECTION, POWDER, FOR SOLUTION INTRAMUSCULAR; INTRAVENOUS at 09:59

## 2017-07-16 RX ADMIN — IPRATROPIUM BROMIDE AND ALBUTEROL SULFATE 3 ML: .5; 3 SOLUTION RESPIRATORY (INHALATION) at 10:10

## 2017-07-16 RX ADMIN — HYDROCODONE BITARTRATE AND ACETAMINOPHEN 1 TABLET: 5; 325 TABLET ORAL at 09:57
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
## 2017-07-25 ENCOUNTER — HOSPITAL ENCOUNTER (EMERGENCY)
Facility: HOSPITAL | Age: 52
Discharge: HOME | End: 2017-07-25
Attending: EMERGENCY MEDICINE | Admitting: EMERGENCY MEDICINE

## 2017-07-25 ENCOUNTER — APPOINTMENT (OUTPATIENT)
Dept: CT IMAGING | Facility: HOSPITAL | Age: 52
End: 2017-07-25

## 2017-07-25 VITALS
WEIGHT: 260 LBS | BODY MASS INDEX: 39.4 KG/M2 | DIASTOLIC BLOOD PRESSURE: 80 MMHG | TEMPERATURE: 99.1 F | OXYGEN SATURATION: 95 % | RESPIRATION RATE: 16 BRPM | SYSTOLIC BLOOD PRESSURE: 106 MMHG | HEART RATE: 89 BPM | HEIGHT: 68 IN

## 2017-07-25 DIAGNOSIS — Z72.0 TOBACCO ABUSE: ICD-10-CM

## 2017-07-25 DIAGNOSIS — R11.2 NON-INTRACTABLE VOMITING WITH NAUSEA, UNSPECIFIED VOMITING TYPE: ICD-10-CM

## 2017-07-25 DIAGNOSIS — R10.30 LOWER ABDOMINAL PAIN: Primary | ICD-10-CM

## 2017-07-25 DIAGNOSIS — Z86.19 HISTORY OF MAC INFECTION: ICD-10-CM

## 2017-07-25 LAB
ALBUMIN SERPL-MCNC: 4 G/DL (ref 3.2–4.8)
ALBUMIN/GLOB SERPL: 1.2 G/DL (ref 1.5–2.5)
ALP SERPL-CCNC: 76 U/L (ref 25–100)
ALT SERPL W P-5'-P-CCNC: 10 U/L (ref 7–40)
AMPHET+METHAMPHET UR QL: NEGATIVE
AMPHETAMINES UR QL: NEGATIVE
ANION GAP SERPL CALCULATED.3IONS-SCNC: 6 MMOL/L (ref 3–11)
AST SERPL-CCNC: 14 U/L (ref 0–33)
BARBITURATES UR QL SCN: NEGATIVE
BASOPHILS # BLD AUTO: 0.03 10*3/MM3 (ref 0–0.2)
BASOPHILS NFR BLD AUTO: 0.6 % (ref 0–1)
BENZODIAZ UR QL SCN: NEGATIVE
BILIRUB SERPL-MCNC: 0.3 MG/DL (ref 0.3–1.2)
BILIRUB UR QL STRIP: NEGATIVE
BUN BLD-MCNC: 5 MG/DL (ref 9–23)
BUN/CREAT SERPL: 5.6 (ref 7–25)
BUPRENORPHINE SERPL-MCNC: NEGATIVE NG/ML
CALCIUM SPEC-SCNC: 9.8 MG/DL (ref 8.7–10.4)
CANNABINOIDS SERPL QL: NEGATIVE
CHLORIDE SERPL-SCNC: 106 MMOL/L (ref 99–109)
CLARITY UR: CLEAR
CO2 SERPL-SCNC: 27 MMOL/L (ref 20–31)
COCAINE UR QL: NEGATIVE
COLOR UR: YELLOW
CREAT BLD-MCNC: 0.9 MG/DL (ref 0.6–1.3)
DEPRECATED RDW RBC AUTO: 58.3 FL (ref 37–54)
EOSINOPHIL # BLD AUTO: 0.17 10*3/MM3 (ref 0–0.3)
EOSINOPHIL NFR BLD AUTO: 3.2 % (ref 0–3)
ERYTHROCYTE [DISTWIDTH] IN BLOOD BY AUTOMATED COUNT: 16.7 % (ref 11.3–14.5)
GFR SERPL CREATININE-BSD FRML MDRD: 65 ML/MIN/1.73
GLOBULIN UR ELPH-MCNC: 3.4 GM/DL
GLUCOSE BLD-MCNC: 94 MG/DL (ref 70–100)
GLUCOSE UR STRIP-MCNC: NEGATIVE MG/DL
HCT VFR BLD AUTO: 38.9 % (ref 34.5–44)
HGB BLD-MCNC: 12.8 G/DL (ref 11.5–15.5)
HGB UR QL STRIP.AUTO: NEGATIVE
IMM GRANULOCYTES # BLD: 0.02 10*3/MM3 (ref 0–0.03)
IMM GRANULOCYTES NFR BLD: 0.4 % (ref 0–0.6)
KETONES UR QL STRIP: NEGATIVE
LEUKOCYTE ESTERASE UR QL STRIP.AUTO: NEGATIVE
LIPASE SERPL-CCNC: 27 U/L (ref 6–51)
LYMPHOCYTES # BLD AUTO: 2.14 10*3/MM3 (ref 0.6–4.8)
LYMPHOCYTES NFR BLD AUTO: 40.4 % (ref 24–44)
MCH RBC QN AUTO: 31.4 PG (ref 27–31)
MCHC RBC AUTO-ENTMCNC: 32.9 G/DL (ref 32–36)
MCV RBC AUTO: 95.3 FL (ref 80–99)
METHADONE UR QL SCN: NEGATIVE
MONOCYTES # BLD AUTO: 0.79 10*3/MM3 (ref 0–1)
MONOCYTES NFR BLD AUTO: 14.9 % (ref 0–12)
NEUTROPHILS # BLD AUTO: 2.15 10*3/MM3 (ref 1.5–8.3)
NEUTROPHILS NFR BLD AUTO: 40.5 % (ref 41–71)
NITRITE UR QL STRIP: NEGATIVE
OPIATES UR QL: NEGATIVE
OXYCODONE UR QL SCN: NEGATIVE
PCP UR QL SCN: NEGATIVE
PH UR STRIP.AUTO: 7 [PH] (ref 5–8)
PLATELET # BLD AUTO: 245 10*3/MM3 (ref 150–450)
PMV BLD AUTO: 10 FL (ref 6–12)
POTASSIUM BLD-SCNC: 3.6 MMOL/L (ref 3.5–5.5)
PROPOXYPH UR QL: NEGATIVE
PROT SERPL-MCNC: 7.4 G/DL (ref 5.7–8.2)
PROT UR QL STRIP: NEGATIVE
RBC # BLD AUTO: 4.08 10*6/MM3 (ref 3.89–5.14)
SODIUM BLD-SCNC: 139 MMOL/L (ref 132–146)
SP GR UR STRIP: 1.01 (ref 1–1.03)
TRICYCLICS UR QL SCN: NEGATIVE
UROBILINOGEN UR QL STRIP: NORMAL
WBC NRBC COR # BLD: 5.3 10*3/MM3 (ref 3.5–10.8)

## 2017-07-25 PROCEDURE — 25010000002 ONDANSETRON PER 1 MG: Performed by: PHYSICIAN ASSISTANT

## 2017-07-25 PROCEDURE — 85025 COMPLETE CBC W/AUTO DIFF WBC: CPT | Performed by: PHYSICIAN ASSISTANT

## 2017-07-25 PROCEDURE — 96361 HYDRATE IV INFUSION ADD-ON: CPT

## 2017-07-25 PROCEDURE — 81003 URINALYSIS AUTO W/O SCOPE: CPT | Performed by: PHYSICIAN ASSISTANT

## 2017-07-25 PROCEDURE — 74176 CT ABD & PELVIS W/O CONTRAST: CPT

## 2017-07-25 PROCEDURE — 99284 EMERGENCY DEPT VISIT MOD MDM: CPT

## 2017-07-25 PROCEDURE — 83690 ASSAY OF LIPASE: CPT | Performed by: PHYSICIAN ASSISTANT

## 2017-07-25 PROCEDURE — 80053 COMPREHEN METABOLIC PANEL: CPT | Performed by: PHYSICIAN ASSISTANT

## 2017-07-25 PROCEDURE — 80306 DRUG TEST PRSMV INSTRMNT: CPT | Performed by: PHYSICIAN ASSISTANT

## 2017-07-25 PROCEDURE — 96374 THER/PROPH/DIAG INJ IV PUSH: CPT

## 2017-07-25 RX ORDER — ONDANSETRON 4 MG/1
4 TABLET, ORALLY DISINTEGRATING ORAL EVERY 4 HOURS
Qty: 15 TABLET | Refills: 0 | Status: SHIPPED | OUTPATIENT
Start: 2017-07-25 | End: 2017-09-16

## 2017-07-25 RX ORDER — ONDANSETRON 2 MG/ML
4 INJECTION INTRAMUSCULAR; INTRAVENOUS ONCE
Status: COMPLETED | OUTPATIENT
Start: 2017-07-25 | End: 2017-07-25

## 2017-07-25 RX ORDER — SODIUM CHLORIDE 0.9 % (FLUSH) 0.9 %
10 SYRINGE (ML) INJECTION AS NEEDED
Status: DISCONTINUED | OUTPATIENT
Start: 2017-07-25 | End: 2017-07-26 | Stop reason: HOSPADM

## 2017-07-25 RX ORDER — THEOPHYLLINE 300 MG/1
600 TABLET, EXTENDED RELEASE ORAL 2 TIMES DAILY
Qty: 120 TABLET | Refills: 0 | Status: SHIPPED | OUTPATIENT
Start: 2017-07-25 | End: 2017-07-25 | Stop reason: HOSPADM

## 2017-07-25 RX ORDER — CETIRIZINE HYDROCHLORIDE 10 MG/1
10 TABLET ORAL DAILY
Qty: 30 TABLET | Refills: 0 | Status: SHIPPED | OUTPATIENT
Start: 2017-07-25 | End: 2021-11-12

## 2017-07-25 RX ADMIN — ONDANSETRON 4 MG: 2 INJECTION INTRAMUSCULAR; INTRAVENOUS at 20:51

## 2017-07-25 RX ADMIN — SODIUM CHLORIDE 1000 ML: 9 INJECTION, SOLUTION INTRAVENOUS at 20:51
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
## 2017-07-26 NOTE — DISCHARGE INSTRUCTIONS
Patient had an essentially normal workup at during this ER evaluation.  CT of the abdomen and pelvis showed no acute abnormalities, and labs and urinalysis were within normal limits.  Patient requests refill on theophylline and Zyrtec.  She also requested a prescription for Zofran as needed for nausea with vomiting.  Recommend patient to follow up to establish care with a new primary care physician, since she was just dismissed from Dr. Amaya's practice.  We will give her accepting PCP phone numbers.  She is also to follow-up with Dr. Self, ID specialist, as previously recommended for history of MAC infection.  She is to return sooner if any worsening symptoms of abdominal pain or nausea with vomiting.  If abdominal pain persists, she needs a mandatory recheck in 12 hours.

## 2017-07-31 ENCOUNTER — APPOINTMENT (OUTPATIENT)
Dept: GENERAL RADIOLOGY | Facility: HOSPITAL | Age: 52
End: 2017-07-31

## 2017-07-31 PROCEDURE — 73610 X-RAY EXAM OF ANKLE: CPT

## 2017-07-31 PROCEDURE — 99284 EMERGENCY DEPT VISIT MOD MDM: CPT

## 2017-07-31 PROCEDURE — 73590 X-RAY EXAM OF LOWER LEG: CPT

## 2017-08-01 ENCOUNTER — HOSPITAL ENCOUNTER (EMERGENCY)
Facility: HOSPITAL | Age: 52
Discharge: HOME | End: 2017-08-01
Attending: EMERGENCY MEDICINE | Admitting: EMERGENCY MEDICINE

## 2017-08-01 VITALS
HEART RATE: 98 BPM | TEMPERATURE: 97.8 F | BODY MASS INDEX: 40.16 KG/M2 | OXYGEN SATURATION: 93 % | RESPIRATION RATE: 18 BRPM | HEIGHT: 68 IN | DIASTOLIC BLOOD PRESSURE: 69 MMHG | SYSTOLIC BLOOD PRESSURE: 128 MMHG | WEIGHT: 265 LBS

## 2017-08-01 DIAGNOSIS — M19.071 DJD (DEGENERATIVE JOINT DISEASE), ANKLE AND FOOT, RIGHT: Primary | ICD-10-CM

## 2017-08-01 DIAGNOSIS — I87.2 STASIS DERMATITIS OF BOTH LEGS: ICD-10-CM

## 2017-08-01 PROCEDURE — 63710000001 DIPHENHYDRAMINE PER 50 MG: Performed by: NURSE PRACTITIONER

## 2017-08-01 RX ORDER — DIPHENHYDRAMINE HCL 25 MG
25 CAPSULE ORAL ONCE
Status: COMPLETED | OUTPATIENT
Start: 2017-08-01 | End: 2017-08-01

## 2017-08-01 RX ORDER — NAPROXEN 500 MG/1
500 TABLET ORAL 2 TIMES DAILY WITH MEALS
Qty: 20 TABLET | Refills: 0 | Status: ON HOLD | OUTPATIENT
Start: 2017-08-01 | End: 2017-09-25 | Stop reason: SDDI

## 2017-08-01 RX ORDER — NAPROXEN 250 MG/1
500 TABLET ORAL ONCE
Status: COMPLETED | OUTPATIENT
Start: 2017-08-01 | End: 2017-08-01

## 2017-08-01 RX ADMIN — DIPHENHYDRAMINE HYDROCHLORIDE 25 MG: 25 CAPSULE ORAL at 03:20

## 2017-08-01 RX ADMIN — NAPROXEN 500 MG: 250 TABLET ORAL at 03:19
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
## 2017-08-09 ENCOUNTER — APPOINTMENT (OUTPATIENT)
Dept: CT IMAGING | Facility: HOSPITAL | Age: 52
End: 2017-08-09

## 2017-08-09 ENCOUNTER — HOSPITAL ENCOUNTER (EMERGENCY)
Facility: HOSPITAL | Age: 52
Discharge: HOME | End: 2017-08-10
Attending: EMERGENCY MEDICINE | Admitting: EMERGENCY MEDICINE

## 2017-08-09 ENCOUNTER — APPOINTMENT (OUTPATIENT)
Dept: GENERAL RADIOLOGY | Facility: HOSPITAL | Age: 52
End: 2017-08-09

## 2017-08-09 VITALS
BODY MASS INDEX: 33.8 KG/M2 | OXYGEN SATURATION: 94 % | WEIGHT: 223 LBS | HEIGHT: 68 IN | DIASTOLIC BLOOD PRESSURE: 61 MMHG | SYSTOLIC BLOOD PRESSURE: 94 MMHG | TEMPERATURE: 98.5 F | RESPIRATION RATE: 16 BRPM | HEART RATE: 89 BPM

## 2017-08-09 DIAGNOSIS — R10.84 GENERALIZED ABDOMINAL PAIN: Primary | ICD-10-CM

## 2017-08-09 LAB
ALBUMIN SERPL-MCNC: 3.8 G/DL (ref 3.2–4.8)
ALBUMIN/GLOB SERPL: 1 G/DL (ref 1.5–2.5)
ALP SERPL-CCNC: 90 U/L (ref 25–100)
ALT SERPL W P-5'-P-CCNC: 9 U/L (ref 7–40)
ANION GAP SERPL CALCULATED.3IONS-SCNC: 10 MMOL/L (ref 3–11)
AST SERPL-CCNC: 13 U/L (ref 0–33)
BACTERIA UR QL AUTO: ABNORMAL /HPF
BASOPHILS # BLD AUTO: 0.06 10*3/MM3 (ref 0–0.2)
BASOPHILS NFR BLD AUTO: 1 % (ref 0–1)
BILIRUB SERPL-MCNC: 0.3 MG/DL (ref 0.3–1.2)
BILIRUB UR QL STRIP: NEGATIVE
BUN BLD-MCNC: <5 MG/DL (ref 9–23)
BUN/CREAT SERPL: ABNORMAL (ref 7–25)
CALCIUM SPEC-SCNC: 9.8 MG/DL (ref 8.7–10.4)
CHLORIDE SERPL-SCNC: 99 MMOL/L (ref 99–109)
CLARITY UR: ABNORMAL
CO2 SERPL-SCNC: 25 MMOL/L (ref 20–31)
COLOR UR: YELLOW
CREAT BLD-MCNC: 1 MG/DL (ref 0.6–1.3)
DEPRECATED RDW RBC AUTO: 53.8 FL (ref 37–54)
EOSINOPHIL # BLD AUTO: 0.28 10*3/MM3 (ref 0–0.3)
EOSINOPHIL NFR BLD AUTO: 4.6 % (ref 0–3)
ERYTHROCYTE [DISTWIDTH] IN BLOOD BY AUTOMATED COUNT: 15.2 % (ref 11.3–14.5)
GFR SERPL CREATININE-BSD FRML MDRD: 58 ML/MIN/1.73
GLOBULIN UR ELPH-MCNC: 3.7 GM/DL
GLUCOSE BLD-MCNC: 116 MG/DL (ref 70–100)
GLUCOSE UR STRIP-MCNC: NEGATIVE MG/DL
HCT VFR BLD AUTO: 40.4 % (ref 34.5–44)
HGB BLD-MCNC: 13.5 G/DL (ref 11.5–15.5)
HGB UR QL STRIP.AUTO: NEGATIVE
HOLD SPECIMEN: NORMAL
HOLD SPECIMEN: NORMAL
HYALINE CASTS UR QL AUTO: ABNORMAL /LPF
IMM GRANULOCYTES # BLD: 0.01 10*3/MM3 (ref 0–0.03)
IMM GRANULOCYTES NFR BLD: 0.2 % (ref 0–0.6)
KETONES UR QL STRIP: NEGATIVE
LEUKOCYTE ESTERASE UR QL STRIP.AUTO: ABNORMAL
LIPASE SERPL-CCNC: 59 U/L (ref 6–51)
LYMPHOCYTES # BLD AUTO: 2.49 10*3/MM3 (ref 0.6–4.8)
LYMPHOCYTES NFR BLD AUTO: 40.7 % (ref 24–44)
MCH RBC QN AUTO: 32.1 PG (ref 27–31)
MCHC RBC AUTO-ENTMCNC: 33.4 G/DL (ref 32–36)
MCV RBC AUTO: 96 FL (ref 80–99)
MONOCYTES # BLD AUTO: 0.7 10*3/MM3 (ref 0–1)
MONOCYTES NFR BLD AUTO: 11.4 % (ref 0–12)
NEUTROPHILS # BLD AUTO: 2.58 10*3/MM3 (ref 1.5–8.3)
NEUTROPHILS NFR BLD AUTO: 42.1 % (ref 41–71)
NITRITE UR QL STRIP: NEGATIVE
PH UR STRIP.AUTO: 6 [PH] (ref 5–8)
PLATELET # BLD AUTO: 328 10*3/MM3 (ref 150–450)
PMV BLD AUTO: 9.4 FL (ref 6–12)
POTASSIUM BLD-SCNC: 3.5 MMOL/L (ref 3.5–5.5)
PROT SERPL-MCNC: 7.5 G/DL (ref 5.7–8.2)
PROT UR QL STRIP: NEGATIVE
RBC # BLD AUTO: 4.21 10*6/MM3 (ref 3.89–5.14)
RBC # UR: ABNORMAL /HPF
REF LAB TEST METHOD: ABNORMAL
SODIUM BLD-SCNC: 134 MMOL/L (ref 132–146)
SP GR UR STRIP: <=1.005 (ref 1–1.03)
SQUAMOUS #/AREA URNS HPF: ABNORMAL /HPF
TROPONIN I SERPL-MCNC: 0 NG/ML (ref 0–0.07)
UROBILINOGEN UR QL STRIP: ABNORMAL
WBC NRBC COR # BLD: 6.12 10*3/MM3 (ref 3.5–10.8)
WBC UR QL AUTO: ABNORMAL /HPF
WHOLE BLOOD HOLD SPECIMEN: NORMAL
WHOLE BLOOD HOLD SPECIMEN: NORMAL

## 2017-08-09 PROCEDURE — 96360 HYDRATION IV INFUSION INIT: CPT

## 2017-08-09 PROCEDURE — 93005 ELECTROCARDIOGRAM TRACING: CPT

## 2017-08-09 PROCEDURE — 81001 URINALYSIS AUTO W/SCOPE: CPT | Performed by: EMERGENCY MEDICINE

## 2017-08-09 PROCEDURE — 85025 COMPLETE CBC W/AUTO DIFF WBC: CPT

## 2017-08-09 PROCEDURE — 81003 URINALYSIS AUTO W/O SCOPE: CPT | Performed by: EMERGENCY MEDICINE

## 2017-08-09 PROCEDURE — 80053 COMPREHEN METABOLIC PANEL: CPT

## 2017-08-09 PROCEDURE — 71010 HC CHEST PA OR AP: CPT

## 2017-08-09 PROCEDURE — 99283 EMERGENCY DEPT VISIT LOW MDM: CPT

## 2017-08-09 PROCEDURE — 83690 ASSAY OF LIPASE: CPT

## 2017-08-09 PROCEDURE — 96361 HYDRATE IV INFUSION ADD-ON: CPT

## 2017-08-09 PROCEDURE — 84484 ASSAY OF TROPONIN QUANT: CPT

## 2017-08-09 PROCEDURE — 74176 CT ABD & PELVIS W/O CONTRAST: CPT

## 2017-08-09 RX ORDER — SODIUM CHLORIDE 0.9 % (FLUSH) 0.9 %
10 SYRINGE (ML) INJECTION AS NEEDED
Status: DISCONTINUED | OUTPATIENT
Start: 2017-08-09 | End: 2017-08-10 | Stop reason: HOSPADM

## 2017-08-09 RX ADMIN — SODIUM CHLORIDE 1000 ML: 9 INJECTION, SOLUTION INTRAVENOUS at 22:53
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
## 2017-09-16 ENCOUNTER — APPOINTMENT (OUTPATIENT)
Dept: GENERAL RADIOLOGY | Facility: HOSPITAL | Age: 52
End: 2017-09-16

## 2017-09-16 ENCOUNTER — HOSPITAL ENCOUNTER (EMERGENCY)
Facility: HOSPITAL | Age: 52
Discharge: HOME | End: 2017-09-16
Attending: EMERGENCY MEDICINE | Admitting: EMERGENCY MEDICINE

## 2017-09-16 VITALS
BODY MASS INDEX: 33.34 KG/M2 | OXYGEN SATURATION: 94 % | SYSTOLIC BLOOD PRESSURE: 110 MMHG | RESPIRATION RATE: 20 BRPM | HEIGHT: 68 IN | HEART RATE: 88 BPM | WEIGHT: 220 LBS | DIASTOLIC BLOOD PRESSURE: 67 MMHG | TEMPERATURE: 98.5 F

## 2017-09-16 DIAGNOSIS — Z72.0 TOBACCO ABUSE: Chronic | ICD-10-CM

## 2017-09-16 DIAGNOSIS — J44.1 COPD EXACERBATION (HCC): Primary | ICD-10-CM

## 2017-09-16 DIAGNOSIS — E87.6 HYPOKALEMIA: ICD-10-CM

## 2017-09-16 DIAGNOSIS — Z86.19 HISTORY OF MAC INFECTION: ICD-10-CM

## 2017-09-16 LAB
ALBUMIN SERPL-MCNC: 3.5 G/DL (ref 3.2–4.8)
ALBUMIN/GLOB SERPL: 1.1 G/DL (ref 1.5–2.5)
ALP SERPL-CCNC: 74 U/L (ref 25–100)
ALT SERPL W P-5'-P-CCNC: 6 U/L (ref 7–40)
ANION GAP SERPL CALCULATED.3IONS-SCNC: 5 MMOL/L (ref 3–11)
AST SERPL-CCNC: 11 U/L (ref 0–33)
BASOPHILS # BLD AUTO: 0.04 10*3/MM3 (ref 0–0.2)
BASOPHILS NFR BLD AUTO: 0.6 % (ref 0–1)
BILIRUB SERPL-MCNC: 0.2 MG/DL (ref 0.3–1.2)
BILIRUB UR QL STRIP: NEGATIVE
BNP SERPL-MCNC: 43 PG/ML (ref 0–100)
BUN BLD-MCNC: 8 MG/DL (ref 9–23)
BUN/CREAT SERPL: 8 (ref 7–25)
CALCIUM SPEC-SCNC: 8.8 MG/DL (ref 8.7–10.4)
CHLORIDE SERPL-SCNC: 104 MMOL/L (ref 99–109)
CLARITY UR: CLEAR
CO2 SERPL-SCNC: 29 MMOL/L (ref 20–31)
COLOR UR: YELLOW
CREAT BLD-MCNC: 1 MG/DL (ref 0.6–1.3)
D-LACTATE SERPL-SCNC: 2.1 MMOL/L (ref 0.5–2)
DEPRECATED RDW RBC AUTO: 48.3 FL (ref 37–54)
EOSINOPHIL # BLD AUTO: 0.08 10*3/MM3 (ref 0–0.3)
EOSINOPHIL NFR BLD AUTO: 1.3 % (ref 0–3)
ERYTHROCYTE [DISTWIDTH] IN BLOOD BY AUTOMATED COUNT: 13.8 % (ref 11.3–14.5)
GFR SERPL CREATININE-BSD FRML MDRD: 58 ML/MIN/1.73
GLOBULIN UR ELPH-MCNC: 3.1 GM/DL
GLUCOSE BLD-MCNC: 112 MG/DL (ref 70–100)
GLUCOSE UR STRIP-MCNC: NEGATIVE MG/DL
HCT VFR BLD AUTO: 38 % (ref 34.5–44)
HGB BLD-MCNC: 12.6 G/DL (ref 11.5–15.5)
HGB UR QL STRIP.AUTO: NEGATIVE
HOLD SPECIMEN: NORMAL
HOLD SPECIMEN: NORMAL
IMM GRANULOCYTES # BLD: 0.01 10*3/MM3 (ref 0–0.03)
IMM GRANULOCYTES NFR BLD: 0.2 % (ref 0–0.6)
KETONES UR QL STRIP: NEGATIVE
LEUKOCYTE ESTERASE UR QL STRIP.AUTO: NEGATIVE
LYMPHOCYTES # BLD AUTO: 2.78 10*3/MM3 (ref 0.6–4.8)
LYMPHOCYTES NFR BLD AUTO: 45.1 % (ref 24–44)
MAGNESIUM SERPL-MCNC: 2 MG/DL (ref 1.3–2.7)
MCH RBC QN AUTO: 31.6 PG (ref 27–31)
MCHC RBC AUTO-ENTMCNC: 33.2 G/DL (ref 32–36)
MCV RBC AUTO: 95.2 FL (ref 80–99)
MONOCYTES # BLD AUTO: 0.75 10*3/MM3 (ref 0–1)
MONOCYTES NFR BLD AUTO: 12.2 % (ref 0–12)
NEUTROPHILS # BLD AUTO: 2.5 10*3/MM3 (ref 1.5–8.3)
NEUTROPHILS NFR BLD AUTO: 40.6 % (ref 41–71)
NITRITE UR QL STRIP: NEGATIVE
PH UR STRIP.AUTO: 6 [PH] (ref 5–8)
PLATELET # BLD AUTO: 246 10*3/MM3 (ref 150–450)
PMV BLD AUTO: 9.6 FL (ref 6–12)
POTASSIUM BLD-SCNC: 3.1 MMOL/L (ref 3.5–5.5)
PROT SERPL-MCNC: 6.6 G/DL (ref 5.7–8.2)
PROT UR QL STRIP: NEGATIVE
RBC # BLD AUTO: 3.99 10*6/MM3 (ref 3.89–5.14)
SODIUM BLD-SCNC: 138 MMOL/L (ref 132–146)
SP GR UR STRIP: <=1.005 (ref 1–1.03)
THEOPHYLLINE SERPL-MCNC: <2 MCG/ML (ref 10–20)
TROPONIN I SERPL-MCNC: 0.01 NG/ML (ref 0–0.07)
UROBILINOGEN UR QL STRIP: NORMAL
WBC NRBC COR # BLD: 6.16 10*3/MM3 (ref 3.5–10.8)
WHOLE BLOOD HOLD SPECIMEN: NORMAL
WHOLE BLOOD HOLD SPECIMEN: NORMAL

## 2017-09-16 PROCEDURE — 96361 HYDRATE IV INFUSION ADD-ON: CPT

## 2017-09-16 PROCEDURE — 84484 ASSAY OF TROPONIN QUANT: CPT

## 2017-09-16 PROCEDURE — 94760 N-INVAS EAR/PLS OXIMETRY 1: CPT

## 2017-09-16 PROCEDURE — 83880 ASSAY OF NATRIURETIC PEPTIDE: CPT | Performed by: EMERGENCY MEDICINE

## 2017-09-16 PROCEDURE — 85025 COMPLETE CBC W/AUTO DIFF WBC: CPT | Performed by: EMERGENCY MEDICINE

## 2017-09-16 PROCEDURE — 83735 ASSAY OF MAGNESIUM: CPT | Performed by: EMERGENCY MEDICINE

## 2017-09-16 PROCEDURE — 96360 HYDRATION IV INFUSION INIT: CPT

## 2017-09-16 PROCEDURE — 99284 EMERGENCY DEPT VISIT MOD MDM: CPT

## 2017-09-16 PROCEDURE — 94640 AIRWAY INHALATION TREATMENT: CPT

## 2017-09-16 PROCEDURE — 83605 ASSAY OF LACTIC ACID: CPT | Performed by: EMERGENCY MEDICINE

## 2017-09-16 PROCEDURE — 93005 ELECTROCARDIOGRAM TRACING: CPT | Performed by: EMERGENCY MEDICINE

## 2017-09-16 PROCEDURE — 71020 HC CHEST PA AND LATERAL: CPT

## 2017-09-16 PROCEDURE — 80198 ASSAY OF THEOPHYLLINE: CPT | Performed by: EMERGENCY MEDICINE

## 2017-09-16 PROCEDURE — 81003 URINALYSIS AUTO W/O SCOPE: CPT | Performed by: EMERGENCY MEDICINE

## 2017-09-16 PROCEDURE — 94799 UNLISTED PULMONARY SVC/PX: CPT

## 2017-09-16 PROCEDURE — 80053 COMPREHEN METABOLIC PANEL: CPT | Performed by: EMERGENCY MEDICINE

## 2017-09-16 RX ORDER — POTASSIUM CHLORIDE 750 MG/1
10 TABLET, FILM COATED, EXTENDED RELEASE ORAL DAILY
Qty: 5 TABLET | Refills: 0 | Status: SHIPPED | OUTPATIENT
Start: 2017-09-16

## 2017-09-16 RX ORDER — IPRATROPIUM BROMIDE AND ALBUTEROL SULFATE 2.5; .5 MG/3ML; MG/3ML
3 SOLUTION RESPIRATORY (INHALATION) ONCE
Status: COMPLETED | OUTPATIENT
Start: 2017-09-16 | End: 2017-09-16

## 2017-09-16 RX ORDER — SODIUM CHLORIDE 9 MG/ML
125 INJECTION, SOLUTION INTRAVENOUS CONTINUOUS
Status: DISCONTINUED | OUTPATIENT
Start: 2017-09-16 | End: 2017-09-16

## 2017-09-16 RX ORDER — POTASSIUM CHLORIDE 750 MG/1
40 CAPSULE, EXTENDED RELEASE ORAL ONCE
Status: COMPLETED | OUTPATIENT
Start: 2017-09-16 | End: 2017-09-16

## 2017-09-16 RX ORDER — SODIUM CHLORIDE 0.9 % (FLUSH) 0.9 %
10 SYRINGE (ML) INJECTION AS NEEDED
Status: DISCONTINUED | OUTPATIENT
Start: 2017-09-16 | End: 2017-09-16 | Stop reason: HOSPADM

## 2017-09-16 RX ORDER — ONDANSETRON 4 MG/1
4 TABLET, ORALLY DISINTEGRATING ORAL EVERY 4 HOURS
Qty: 15 TABLET | Refills: 0 | Status: SHIPPED | OUTPATIENT
Start: 2017-09-16 | End: 2017-09-28 | Stop reason: HOSPADM

## 2017-09-16 RX ADMIN — IPRATROPIUM BROMIDE AND ALBUTEROL SULFATE 3 ML: .5; 3 SOLUTION RESPIRATORY (INHALATION) at 09:26

## 2017-09-16 RX ADMIN — IPRATROPIUM BROMIDE AND ALBUTEROL SULFATE 3 ML: .5; 3 SOLUTION RESPIRATORY (INHALATION) at 08:11

## 2017-09-16 RX ADMIN — SODIUM CHLORIDE 1000 ML: 9 INJECTION, SOLUTION INTRAVENOUS at 09:00

## 2017-09-16 RX ADMIN — POTASSIUM CHLORIDE 40 MEQ: 750 CAPSULE, EXTENDED RELEASE ORAL at 09:57
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
## 2017-09-16 NOTE — DISCHARGE INSTRUCTIONS
Stop smoking.  This is the only thing that it's going to improve your symptoms, status, functional mobility, and life.    Continue your antibiotics as per Dr. Self.  These are absolutely mandatory.  Call Dr. Self's office in the morning for instructions in view of the nausea.  I've prescribed Zofran in addition.  He may use this for nausea    Follow-up with your primary care provider at the [facility] this coming week.  Recheck your potassium.  Take potassium daily until that time, and ask your primary care physician if you need to continue potassium, and how much she will need.    Continue your albuterol inhaler and your other medications in doses as last prescribed by your physicians, as your records from other institutions are not completely today.    Return the ED if you have any acute, urgent or emergent symptoms.

## 2017-09-23 ENCOUNTER — HOSPITAL ENCOUNTER (INPATIENT)
Facility: HOSPITAL | Age: 52
LOS: 2 days | Discharge: HOME | End: 2017-09-25
Attending: EMERGENCY MEDICINE | Admitting: INTERNAL MEDICINE

## 2017-09-23 ENCOUNTER — APPOINTMENT (OUTPATIENT)
Dept: GENERAL RADIOLOGY | Facility: HOSPITAL | Age: 52
End: 2017-09-23

## 2017-09-23 DIAGNOSIS — A31.0 PULMONARY MYCOBACTERIUM AVIUM COMPLEX (MAC) INFECTION (HCC): ICD-10-CM

## 2017-09-23 DIAGNOSIS — J44.1 COPD EXACERBATION (HCC): Primary | ICD-10-CM

## 2017-09-23 LAB
ALBUMIN SERPL-MCNC: 3.7 G/DL (ref 3.2–4.8)
ALBUMIN/GLOB SERPL: 1.2 G/DL (ref 1.5–2.5)
ALP SERPL-CCNC: 85 U/L (ref 25–100)
ALT SERPL W P-5'-P-CCNC: 5 U/L (ref 7–40)
ANION GAP SERPL CALCULATED.3IONS-SCNC: 6 MMOL/L (ref 3–11)
ANION GAP SERPL CALCULATED.3IONS-SCNC: 6 MMOL/L (ref 3–11)
AST SERPL-CCNC: 11 U/L (ref 0–33)
BASOPHILS # BLD AUTO: 0.02 10*3/MM3 (ref 0–0.2)
BASOPHILS # BLD AUTO: 0.03 10*3/MM3 (ref 0–0.2)
BASOPHILS NFR BLD AUTO: 0.6 % (ref 0–1)
BASOPHILS NFR BLD AUTO: 0.6 % (ref 0–1)
BILIRUB SERPL-MCNC: 0.5 MG/DL (ref 0.3–1.2)
BNP SERPL-MCNC: 24 PG/ML (ref 0–100)
BUN BLD-MCNC: 5 MG/DL (ref 9–23)
BUN BLD-MCNC: <5 MG/DL (ref 9–23)
BUN/CREAT SERPL: 5.6 (ref 7–25)
BUN/CREAT SERPL: ABNORMAL (ref 7–25)
CALCIUM SPEC-SCNC: 8.9 MG/DL (ref 8.7–10.4)
CALCIUM SPEC-SCNC: 9.1 MG/DL (ref 8.7–10.4)
CHLORIDE SERPL-SCNC: 105 MMOL/L (ref 99–109)
CHLORIDE SERPL-SCNC: 107 MMOL/L (ref 99–109)
CO2 SERPL-SCNC: 25 MMOL/L (ref 20–31)
CO2 SERPL-SCNC: 27 MMOL/L (ref 20–31)
CREAT BLD-MCNC: 0.8 MG/DL (ref 0.6–1.3)
CREAT BLD-MCNC: 0.9 MG/DL (ref 0.6–1.3)
D-LACTATE SERPL-SCNC: 1.3 MMOL/L (ref 0.5–2)
DEPRECATED RDW RBC AUTO: 51.2 FL (ref 37–54)
DEPRECATED RDW RBC AUTO: 51.4 FL (ref 37–54)
EOSINOPHIL # BLD AUTO: 0.04 10*3/MM3 (ref 0–0.3)
EOSINOPHIL # BLD AUTO: 0.17 10*3/MM3 (ref 0–0.3)
EOSINOPHIL NFR BLD AUTO: 1.2 % (ref 0–3)
EOSINOPHIL NFR BLD AUTO: 3.4 % (ref 0–3)
ERYTHROCYTE [DISTWIDTH] IN BLOOD BY AUTOMATED COUNT: 14.3 % (ref 11.3–14.5)
ERYTHROCYTE [DISTWIDTH] IN BLOOD BY AUTOMATED COUNT: 14.4 % (ref 11.3–14.5)
GFR SERPL CREATININE-BSD FRML MDRD: 65 ML/MIN/1.73
GFR SERPL CREATININE-BSD FRML MDRD: 75 ML/MIN/1.73
GLOBULIN UR ELPH-MCNC: 3 GM/DL
GLUCOSE BLD-MCNC: 116 MG/DL (ref 70–100)
GLUCOSE BLD-MCNC: 96 MG/DL (ref 70–100)
HBA1C MFR BLD: 5 % (ref 4.8–5.6)
HCT VFR BLD AUTO: 38.5 % (ref 34.5–44)
HCT VFR BLD AUTO: 40.4 % (ref 34.5–44)
HGB BLD-MCNC: 12.6 G/DL (ref 11.5–15.5)
HGB BLD-MCNC: 13.1 G/DL (ref 11.5–15.5)
HOLD SPECIMEN: NORMAL
HOLD SPECIMEN: NORMAL
IMM GRANULOCYTES # BLD: 0.01 10*3/MM3 (ref 0–0.03)
IMM GRANULOCYTES # BLD: 0.01 10*3/MM3 (ref 0–0.03)
IMM GRANULOCYTES NFR BLD: 0.2 % (ref 0–0.6)
IMM GRANULOCYTES NFR BLD: 0.3 % (ref 0–0.6)
LYMPHOCYTES # BLD AUTO: 0.46 10*3/MM3 (ref 0.6–4.8)
LYMPHOCYTES # BLD AUTO: 1.76 10*3/MM3 (ref 0.6–4.8)
LYMPHOCYTES NFR BLD AUTO: 14.1 % (ref 24–44)
LYMPHOCYTES NFR BLD AUTO: 34.9 % (ref 24–44)
MCH RBC QN AUTO: 31.4 PG (ref 27–31)
MCH RBC QN AUTO: 31.7 PG (ref 27–31)
MCHC RBC AUTO-ENTMCNC: 32.4 G/DL (ref 32–36)
MCHC RBC AUTO-ENTMCNC: 32.7 G/DL (ref 32–36)
MCV RBC AUTO: 96.9 FL (ref 80–99)
MCV RBC AUTO: 97 FL (ref 80–99)
MONOCYTES # BLD AUTO: 0.13 10*3/MM3 (ref 0–1)
MONOCYTES # BLD AUTO: 0.66 10*3/MM3 (ref 0–1)
MONOCYTES NFR BLD AUTO: 13.1 % (ref 0–12)
MONOCYTES NFR BLD AUTO: 4 % (ref 0–12)
NEUTROPHILS # BLD AUTO: 2.41 10*3/MM3 (ref 1.5–8.3)
NEUTROPHILS # BLD AUTO: 2.6 10*3/MM3 (ref 1.5–8.3)
NEUTROPHILS NFR BLD AUTO: 47.8 % (ref 41–71)
NEUTROPHILS NFR BLD AUTO: 79.8 % (ref 41–71)
PLATELET # BLD AUTO: 186 10*3/MM3 (ref 150–450)
PLATELET # BLD AUTO: 214 10*3/MM3 (ref 150–450)
PMV BLD AUTO: 10.3 FL (ref 6–12)
PMV BLD AUTO: 10.4 FL (ref 6–12)
POTASSIUM BLD-SCNC: 3.7 MMOL/L (ref 3.5–5.5)
POTASSIUM BLD-SCNC: 4 MMOL/L (ref 3.5–5.5)
PROT SERPL-MCNC: 6.7 G/DL (ref 5.7–8.2)
RBC # BLD AUTO: 3.97 10*6/MM3 (ref 3.89–5.14)
RBC # BLD AUTO: 4.17 10*6/MM3 (ref 3.89–5.14)
SODIUM BLD-SCNC: 136 MMOL/L (ref 132–146)
SODIUM BLD-SCNC: 140 MMOL/L (ref 132–146)
THEOPHYLLINE SERPL-MCNC: <2 MCG/ML (ref 10–20)
TROPONIN I SERPL-MCNC: 0 NG/ML (ref 0–0.07)
TSH SERPL DL<=0.05 MIU/L-ACNC: 0.99 MIU/ML (ref 0.35–5.35)
WBC NRBC COR # BLD: 3.26 10*3/MM3 (ref 3.5–10.8)
WBC NRBC COR # BLD: 5.04 10*3/MM3 (ref 3.5–10.8)
WHOLE BLOOD HOLD SPECIMEN: NORMAL
WHOLE BLOOD HOLD SPECIMEN: NORMAL

## 2017-09-23 PROCEDURE — 71010 HC CHEST PA OR AP: CPT

## 2017-09-23 PROCEDURE — 25010000002 METHYLPREDNISOLONE PER 125 MG: Performed by: NURSE PRACTITIONER

## 2017-09-23 PROCEDURE — 94760 N-INVAS EAR/PLS OXIMETRY 1: CPT

## 2017-09-23 PROCEDURE — 94640 AIRWAY INHALATION TREATMENT: CPT

## 2017-09-23 PROCEDURE — 99284 EMERGENCY DEPT VISIT MOD MDM: CPT

## 2017-09-23 PROCEDURE — 94799 UNLISTED PULMONARY SVC/PX: CPT

## 2017-09-23 PROCEDURE — 93005 ELECTROCARDIOGRAM TRACING: CPT

## 2017-09-23 PROCEDURE — 84484 ASSAY OF TROPONIN QUANT: CPT

## 2017-09-23 PROCEDURE — 99223 1ST HOSP IP/OBS HIGH 75: CPT | Performed by: HOSPITALIST

## 2017-09-23 PROCEDURE — 36415 COLL VENOUS BLD VENIPUNCTURE: CPT

## 2017-09-23 PROCEDURE — 83036 HEMOGLOBIN GLYCOSYLATED A1C: CPT | Performed by: HOSPITALIST

## 2017-09-23 PROCEDURE — 85025 COMPLETE CBC W/AUTO DIFF WBC: CPT | Performed by: HOSPITALIST

## 2017-09-23 PROCEDURE — 83880 ASSAY OF NATRIURETIC PEPTIDE: CPT | Performed by: EMERGENCY MEDICINE

## 2017-09-23 PROCEDURE — 25010000002 ENOXAPARIN PER 10 MG: Performed by: HOSPITALIST

## 2017-09-23 PROCEDURE — 80050 GENERAL HEALTH PANEL: CPT | Performed by: EMERGENCY MEDICINE

## 2017-09-23 PROCEDURE — 80048 BASIC METABOLIC PNL TOTAL CA: CPT | Performed by: HOSPITALIST

## 2017-09-23 PROCEDURE — 87040 BLOOD CULTURE FOR BACTERIA: CPT | Performed by: NURSE PRACTITIONER

## 2017-09-23 PROCEDURE — 80198 ASSAY OF THEOPHYLLINE: CPT | Performed by: NURSE PRACTITIONER

## 2017-09-23 PROCEDURE — 83605 ASSAY OF LACTIC ACID: CPT | Performed by: NURSE PRACTITIONER

## 2017-09-23 RX ORDER — SODIUM CHLORIDE 0.9 % (FLUSH) 0.9 %
1-10 SYRINGE (ML) INJECTION AS NEEDED
Status: DISCONTINUED | OUTPATIENT
Start: 2017-09-23 | End: 2017-09-25 | Stop reason: HOSPADM

## 2017-09-23 RX ORDER — ONDANSETRON 2 MG/ML
4 INJECTION INTRAMUSCULAR; INTRAVENOUS EVERY 6 HOURS PRN
Status: DISCONTINUED | OUTPATIENT
Start: 2017-09-23 | End: 2017-09-25 | Stop reason: HOSPADM

## 2017-09-23 RX ORDER — POTASSIUM CHLORIDE 750 MG/1
10 CAPSULE, EXTENDED RELEASE ORAL DAILY
Status: DISCONTINUED | OUTPATIENT
Start: 2017-09-23 | End: 2017-09-25 | Stop reason: HOSPADM

## 2017-09-23 RX ORDER — IPRATROPIUM BROMIDE AND ALBUTEROL SULFATE 2.5; .5 MG/3ML; MG/3ML
3 SOLUTION RESPIRATORY (INHALATION) ONCE
Status: DISCONTINUED | OUTPATIENT
Start: 2017-09-23 | End: 2017-09-25

## 2017-09-23 RX ORDER — LORAZEPAM 1 MG/1
1 TABLET ORAL 2 TIMES DAILY PRN
Status: DISCONTINUED | OUTPATIENT
Start: 2017-09-23 | End: 2017-09-25 | Stop reason: HOSPADM

## 2017-09-23 RX ORDER — METHYLPREDNISOLONE SODIUM SUCCINATE 125 MG/2ML
60 INJECTION, POWDER, LYOPHILIZED, FOR SOLUTION INTRAMUSCULAR; INTRAVENOUS DAILY
Status: DISCONTINUED | OUTPATIENT
Start: 2017-09-24 | End: 2017-09-25 | Stop reason: HOSPADM

## 2017-09-23 RX ORDER — ACETAMINOPHEN 325 MG/1
650 TABLET ORAL EVERY 4 HOURS PRN
Status: DISCONTINUED | OUTPATIENT
Start: 2017-09-23 | End: 2017-09-25 | Stop reason: HOSPADM

## 2017-09-23 RX ORDER — SACCHAROMYCES BOULARDII 250 MG
250 CAPSULE ORAL 2 TIMES DAILY
Status: DISCONTINUED | OUTPATIENT
Start: 2017-09-23 | End: 2017-09-25 | Stop reason: HOSPADM

## 2017-09-23 RX ORDER — ZIPRASIDONE HYDROCHLORIDE 20 MG/1
80 CAPSULE ORAL 2 TIMES DAILY WITH MEALS
Status: DISCONTINUED | OUTPATIENT
Start: 2017-09-23 | End: 2017-09-25 | Stop reason: HOSPADM

## 2017-09-23 RX ORDER — SODIUM CHLORIDE 0.9 % (FLUSH) 0.9 %
10 SYRINGE (ML) INJECTION AS NEEDED
Status: DISCONTINUED | OUTPATIENT
Start: 2017-09-23 | End: 2017-09-25 | Stop reason: HOSPADM

## 2017-09-23 RX ORDER — BISACODYL 10 MG
10 SUPPOSITORY, RECTAL RECTAL DAILY PRN
Status: DISCONTINUED | OUTPATIENT
Start: 2017-09-23 | End: 2017-09-25 | Stop reason: HOSPADM

## 2017-09-23 RX ORDER — DICYCLOMINE HCL 20 MG
20 TABLET ORAL EVERY 6 HOURS
Status: DISCONTINUED | OUTPATIENT
Start: 2017-09-23 | End: 2017-09-25 | Stop reason: HOSPADM

## 2017-09-23 RX ORDER — GUAIFENESIN/DEXTROMETHORPHAN 100-10MG/5
5 SYRUP ORAL EVERY 4 HOURS PRN
Status: DISCONTINUED | OUTPATIENT
Start: 2017-09-23 | End: 2017-09-25 | Stop reason: HOSPADM

## 2017-09-23 RX ORDER — ATORVASTATIN CALCIUM 20 MG/1
20 TABLET, FILM COATED ORAL DAILY
Status: DISCONTINUED | OUTPATIENT
Start: 2017-09-23 | End: 2017-09-25 | Stop reason: HOSPADM

## 2017-09-23 RX ORDER — HYDROXYZINE HYDROCHLORIDE 25 MG/1
50 TABLET, FILM COATED ORAL 4 TIMES DAILY PRN
Status: DISCONTINUED | OUTPATIENT
Start: 2017-09-23 | End: 2017-09-25 | Stop reason: HOSPADM

## 2017-09-23 RX ORDER — CLONAZEPAM 0.5 MG/1
0.5 TABLET ORAL 2 TIMES DAILY PRN
Status: DISCONTINUED | OUTPATIENT
Start: 2017-09-23 | End: 2017-09-25 | Stop reason: HOSPADM

## 2017-09-23 RX ORDER — FAMOTIDINE 20 MG/1
20 TABLET, FILM COATED ORAL 2 TIMES DAILY PRN
Status: DISCONTINUED | OUTPATIENT
Start: 2017-09-23 | End: 2017-09-25 | Stop reason: HOSPADM

## 2017-09-23 RX ORDER — CLOMIPRAMINE HYDROCHLORIDE 25 MG/1
100 CAPSULE ORAL NIGHTLY
Status: DISCONTINUED | OUTPATIENT
Start: 2017-09-23 | End: 2017-09-25 | Stop reason: HOSPADM

## 2017-09-23 RX ORDER — DULOXETIN HYDROCHLORIDE 60 MG/1
60 CAPSULE, DELAYED RELEASE ORAL EVERY 12 HOURS SCHEDULED
Status: DISCONTINUED | OUTPATIENT
Start: 2017-09-23 | End: 2017-09-25 | Stop reason: HOSPADM

## 2017-09-23 RX ORDER — METHYLPREDNISOLONE SODIUM SUCCINATE 125 MG/2ML
125 INJECTION, POWDER, LYOPHILIZED, FOR SOLUTION INTRAMUSCULAR; INTRAVENOUS ONCE
Status: COMPLETED | OUTPATIENT
Start: 2017-09-23 | End: 2017-09-23

## 2017-09-23 RX ORDER — RIFAMPIN 300 MG/1
600 CAPSULE ORAL
Status: DISCONTINUED | OUTPATIENT
Start: 2017-09-23 | End: 2017-09-23

## 2017-09-23 RX ORDER — DIVALPROEX SODIUM 500 MG/1
1000 TABLET, EXTENDED RELEASE ORAL DAILY
Status: DISCONTINUED | OUTPATIENT
Start: 2017-09-23 | End: 2017-09-25 | Stop reason: HOSPADM

## 2017-09-23 RX ORDER — BENZONATATE 100 MG/1
100 CAPSULE ORAL 3 TIMES DAILY PRN
Status: DISCONTINUED | OUTPATIENT
Start: 2017-09-23 | End: 2017-09-25 | Stop reason: HOSPADM

## 2017-09-23 RX ORDER — CLARITHROMYCIN 250 MG/1
250 TABLET, FILM COATED ORAL EVERY 12 HOURS SCHEDULED
Status: DISCONTINUED | OUTPATIENT
Start: 2017-09-23 | End: 2017-09-23

## 2017-09-23 RX ORDER — BUDESONIDE AND FORMOTEROL FUMARATE DIHYDRATE 80; 4.5 UG/1; UG/1
2 AEROSOL RESPIRATORY (INHALATION)
Status: DISCONTINUED | OUTPATIENT
Start: 2017-09-23 | End: 2017-09-25 | Stop reason: HOSPADM

## 2017-09-23 RX ORDER — FUROSEMIDE 40 MG/1
40 TABLET ORAL DAILY
Status: DISCONTINUED | OUTPATIENT
Start: 2017-09-23 | End: 2017-09-25 | Stop reason: HOSPADM

## 2017-09-23 RX ORDER — QUETIAPINE FUMARATE 100 MG/1
300 TABLET, FILM COATED ORAL NIGHTLY
Status: DISCONTINUED | OUTPATIENT
Start: 2017-09-23 | End: 2017-09-25 | Stop reason: HOSPADM

## 2017-09-23 RX ORDER — IPRATROPIUM BROMIDE AND ALBUTEROL SULFATE 2.5; .5 MG/3ML; MG/3ML
3 SOLUTION RESPIRATORY (INHALATION) EVERY 4 HOURS PRN
Status: DISCONTINUED | OUTPATIENT
Start: 2017-09-23 | End: 2017-09-25 | Stop reason: HOSPADM

## 2017-09-23 RX ORDER — SENNA AND DOCUSATE SODIUM 50; 8.6 MG/1; MG/1
2 TABLET, FILM COATED ORAL NIGHTLY
Status: DISCONTINUED | OUTPATIENT
Start: 2017-09-23 | End: 2017-09-25 | Stop reason: HOSPADM

## 2017-09-23 RX ORDER — GABAPENTIN 300 MG/1
600 CAPSULE ORAL 4 TIMES DAILY
Status: DISCONTINUED | OUTPATIENT
Start: 2017-09-23 | End: 2017-09-25 | Stop reason: HOSPADM

## 2017-09-23 RX ORDER — IPRATROPIUM BROMIDE AND ALBUTEROL SULFATE 2.5; .5 MG/3ML; MG/3ML
3 SOLUTION RESPIRATORY (INHALATION)
Status: DISCONTINUED | OUTPATIENT
Start: 2017-09-23 | End: 2017-09-25 | Stop reason: HOSPADM

## 2017-09-23 RX ORDER — ETHAMBUTOL HYDROCHLORIDE 400 MG/1
1600 TABLET, FILM COATED ORAL
Status: DISCONTINUED | OUTPATIENT
Start: 2017-09-23 | End: 2017-09-23

## 2017-09-23 RX ORDER — LEVOTHYROXINE SODIUM 0.05 MG/1
50 TABLET ORAL
Status: DISCONTINUED | OUTPATIENT
Start: 2017-09-23 | End: 2017-09-25 | Stop reason: HOSPADM

## 2017-09-23 RX ORDER — CETIRIZINE HYDROCHLORIDE 10 MG/1
10 TABLET ORAL DAILY
Status: DISCONTINUED | OUTPATIENT
Start: 2017-09-23 | End: 2017-09-25 | Stop reason: HOSPADM

## 2017-09-23 RX ORDER — NICOTINE 21 MG/24HR
1 PATCH, TRANSDERMAL 24 HOURS TRANSDERMAL
Status: DISCONTINUED | OUTPATIENT
Start: 2017-09-23 | End: 2017-09-25 | Stop reason: HOSPADM

## 2017-09-23 RX ORDER — SODIUM CHLORIDE 9 MG/ML
125 INJECTION, SOLUTION INTRAVENOUS CONTINUOUS
Status: DISCONTINUED | OUTPATIENT
Start: 2017-09-23 | End: 2017-09-24

## 2017-09-23 RX ADMIN — METHYLPREDNISOLONE SODIUM SUCCINATE 125 MG: 125 INJECTION, POWDER, FOR SOLUTION INTRAMUSCULAR; INTRAVENOUS at 02:18

## 2017-09-23 RX ADMIN — CLARITHROMYCIN 250 MG: 250 TABLET ORAL at 09:30

## 2017-09-23 RX ADMIN — RIFAMPIN 600 MG: 300 CAPSULE ORAL at 09:26

## 2017-09-23 RX ADMIN — QUETIAPINE FUMARATE 300 MG: 100 TABLET, FILM COATED ORAL at 21:16

## 2017-09-23 RX ADMIN — CLOMIPRAMINE HYDROCHLORIDE 100 MG: 25 CAPSULE ORAL at 21:17

## 2017-09-23 RX ADMIN — CETIRIZINE HYDROCHLORIDE 10 MG: 10 TABLET, FILM COATED ORAL at 09:24

## 2017-09-23 RX ADMIN — BUDESONIDE AND FORMOTEROL FUMARATE DIHYDRATE 2 PUFF: 80; 4.5 AEROSOL RESPIRATORY (INHALATION) at 08:03

## 2017-09-23 RX ADMIN — SODIUM CHLORIDE 125 ML/HR: 9 INJECTION, SOLUTION INTRAVENOUS at 12:45

## 2017-09-23 RX ADMIN — IPRATROPIUM BROMIDE AND ALBUTEROL SULFATE 3 ML: .5; 3 SOLUTION RESPIRATORY (INHALATION) at 19:04

## 2017-09-23 RX ADMIN — GABAPENTIN 600 MG: 300 CAPSULE ORAL at 21:16

## 2017-09-23 RX ADMIN — Medication 250 MG: at 09:24

## 2017-09-23 RX ADMIN — ZIPRASIDONE HCL 80 MG: 20 CAPSULE ORAL at 17:48

## 2017-09-23 RX ADMIN — DICYCLOMINE HYDROCHLORIDE 20 MG: 20 TABLET ORAL at 12:45

## 2017-09-23 RX ADMIN — ENOXAPARIN SODIUM 40 MG: 40 INJECTION SUBCUTANEOUS at 06:21

## 2017-09-23 RX ADMIN — BENZONATATE 100 MG: 100 CAPSULE ORAL at 16:29

## 2017-09-23 RX ADMIN — GABAPENTIN 600 MG: 300 CAPSULE ORAL at 17:47

## 2017-09-23 RX ADMIN — DULOXETINE HYDROCHLORIDE 60 MG: 60 CAPSULE, DELAYED RELEASE ORAL at 21:16

## 2017-09-23 RX ADMIN — Medication 2 TABLET: at 21:16

## 2017-09-23 RX ADMIN — DICYCLOMINE HYDROCHLORIDE 20 MG: 20 TABLET ORAL at 06:22

## 2017-09-23 RX ADMIN — DULOXETINE HYDROCHLORIDE 60 MG: 60 CAPSULE, DELAYED RELEASE ORAL at 09:24

## 2017-09-23 RX ADMIN — DIVALPROEX SODIUM 1000 MG: 500 TABLET, FILM COATED, EXTENDED RELEASE ORAL at 09:25

## 2017-09-23 RX ADMIN — Medication 250 MG: at 17:47

## 2017-09-23 RX ADMIN — POTASSIUM CHLORIDE 10 MEQ: 750 CAPSULE, EXTENDED RELEASE ORAL at 09:24

## 2017-09-23 RX ADMIN — ZIPRASIDONE HCL 80 MG: 20 CAPSULE ORAL at 09:27

## 2017-09-23 RX ADMIN — LEVOTHYROXINE SODIUM 50 MCG: 50 TABLET ORAL at 06:22

## 2017-09-23 RX ADMIN — BUDESONIDE AND FORMOTEROL FUMARATE DIHYDRATE 2 PUFF: 80; 4.5 AEROSOL RESPIRATORY (INHALATION) at 19:03

## 2017-09-23 RX ADMIN — IPRATROPIUM BROMIDE AND ALBUTEROL SULFATE 3 ML: .5; 3 SOLUTION RESPIRATORY (INHALATION) at 16:43

## 2017-09-23 RX ADMIN — ATORVASTATIN CALCIUM 20 MG: 20 TABLET, FILM COATED ORAL at 09:24

## 2017-09-23 RX ADMIN — IPRATROPIUM BROMIDE AND ALBUTEROL SULFATE 3 ML: .5; 3 SOLUTION RESPIRATORY (INHALATION) at 08:03

## 2017-09-23 RX ADMIN — SODIUM CHLORIDE 125 ML/HR: 9 INJECTION, SOLUTION INTRAVENOUS at 05:14

## 2017-09-23 RX ADMIN — IPRATROPIUM BROMIDE AND ALBUTEROL SULFATE 3 ML: .5; 3 SOLUTION RESPIRATORY (INHALATION) at 12:40

## 2017-09-23 RX ADMIN — SODIUM CHLORIDE 1000 ML: 9 INJECTION, SOLUTION INTRAVENOUS at 02:19

## 2017-09-23 RX ADMIN — GABAPENTIN 600 MG: 300 CAPSULE ORAL at 12:45

## 2017-09-23 RX ADMIN — LORAZEPAM 1 MG: 1 TABLET ORAL at 12:50

## 2017-09-23 RX ADMIN — GABAPENTIN 600 MG: 300 CAPSULE ORAL at 09:24

## 2017-09-23 RX ADMIN — ETHAMBUTOL HYDROCHLORIDE 1600 MG: 400 TABLET, FILM COATED ORAL at 09:25

## 2017-09-23 RX ADMIN — CLONAZEPAM 0.5 MG: 0.5 TABLET ORAL at 21:21

## 2017-09-23 RX ADMIN — GUAIFENESIN AND DEXTROMETHORPHAN 5 ML: 100; 10 SYRUP ORAL at 22:59

## 2017-09-23 RX ADMIN — FUROSEMIDE 40 MG: 40 TABLET ORAL at 09:24

## 2017-09-23 RX ADMIN — DICYCLOMINE HYDROCHLORIDE 20 MG: 20 TABLET ORAL at 17:47
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
## 2017-09-23 NOTE — PROGRESS NOTES
"Adult Nutrition  Assessment/PES    Patient Name:  Colleen Gonzalez  YOB: 1965  MRN: [id number]  Admit Date:  9/23/2017    Assessment Date:  9/23/2017        Reason for Assessment       09/23/17 1432    Reason for Assessment    Reason For Assessment/Visit identified at risk by screening criteria    Identified At Risk By Screening Criteria MST SCORE 2+    Time Spent (min) 20    Diagnosis Diagnosis    Cardiac HTN    Pulmonary/Critical Care Acute respiratory failure;COPD;Pneumonia   COPD exacerbation, MAC PNA    Substance Use Tobacco    Other diagnosis multiple ED visits since July 2017 for COPD exacerbation; homeless, stays at the [facility]   PMH: She  has a past medical history of TOM (acute kidney injury) (5/25/2017); Allergic; Altered mental status (6/9/2017); Anxiety; Asthma; Bipolar affective disorder; Chronic kidney disease; COPD (chronic obstructive pulmonary disease); Depression; Emphysema lung; Fibromyalgia; Herpes; Homeless; Hyperlipidemia; Hypertension; Hypothyroid; Neuropathy; Obesity; Pneumonia; Pre-diabetes; Renal insufficiency (6/9/2017); Seizures; and Tibia fracture.   PSxH: She  has a past surgical history that includes Hysterectomy; Cholecystectomy; Back surgery; Knee surgery (Bilateral); Cyst Removal; and Bronchoscopy (Left, 3/15/2017).              Nutrition/Diet History       09/23/17 1434    Nutrition/Diet History    Reported/Observed By Patient    Appetite Good            Anthropometrics       09/23/17 1435    Anthropometrics (Special Considerations)    Height Used for Calculations 1.727 m (5' 8\")    Weight Used for Calculations 97.5 kg (215 lb)   per standing scale on (9/23)    RD Calculated BMI (kg/m2) 32.8    Usual Body Weight (UBW)    Usual Body Weight --   per EMR RD note from (6/16/17) pt reports UBW= 240-260#. Per RD note (6/16/17) pt weighed 260#    Weight Loss 22.7 kg (50 lb)   Pt reports that she weighed 265# ~2 months ago. Pt reports that wt loss was " unintentional at first but then became intentional. States that she stopped drinking soda and increased her activity    % Weight Loss  19 %    Weight Loss Time Frame 2 months            Labs/Tests/Procedures/Meds       09/23/17 1437    Labs/Tests/Procedures/Meds    Labs/Tests Review Reviewed    Medication Review Reviewed, pertinent     Results from last 7 days  Lab Units 09/23/17  0605 09/23/17  0119   SODIUM mmol/L 140 136   POTASSIUM mmol/L 4.0 3.7   CHLORIDE mmol/L 107 105   CO2 mmol/L 27.0 25.0   BUN mg/dL <5* 5*   CREATININE mg/dL 0.80 0.90   CALCIUM mg/dL 8.9 9.1   BILIRUBIN mg/dL  --  0.5   ALK PHOS U/L  --  85   ALT (SGPT) U/L  --  5*   AST (SGOT) U/L  --  11   GLUCOSE mg/dL 116* 96                Nutrition Prescription Ordered       09/23/17 1437    Nutrition Prescription PO    Current PO Diet Regular    Common Modifiers Cardiac;Consistent Carbohydrate            Evaluation of Received Nutrient/Fluid Intake       09/23/17 1438    PO Evaluation    Number of Meals 2    % PO Intake 75              Problem/Interventions:        Problem 1       09/23/17 1437    Nutrition Diagnoses Problem 1    Problem 1 Unintended Weight Loss    Etiology (related to) --   clinical condition    Signs/Symptoms (evidenced by) Unintended Weight Change   hard to quantify how much of wt loss was unintentional                    Intervention Goal       09/23/17 1438    Intervention Goal    General Nutrition support treatment    PO Establish PO            Nutrition Intervention       09/23/17 1438    Nutrition Intervention    RD/Tech Action Advise alternate selection;Interview for preference;Menu provided;Supplement offered/refused;Encourage intake;Care plan reviewd;Follow Tx progress;Recommend/ordered    Recommended/Ordered Diet            Nutrition Prescription       09/23/17 1438    Nutrition Prescription PO    PO Prescription Begin/change diet    Begin/Change Diet to Regular    Common Modifiers Cardiac    New PO Prescription  Ordered? Yes            Education/Evaluation       09/23/17 1438    Monitor/Evaluation    Monitor Per protocol;PO intake;Symptoms;Weight          Electronically signed by:  Christiane Nails MS RD/LD [facility]  09/23/17 2:39 PM

## 2017-09-23 NOTE — PLAN OF CARE
Problem: Patient Care Overview (Adult)  Goal: Plan of Care Review  Outcome: Ongoing (interventions implemented as appropriate)    09/23/17 1728   Coping/Psychosocial Response Interventions   Plan Of Care Reviewed With patient   Patient Care Overview   Progress no change   Outcome Evaluation   Outcome Summary/Follow up Plan continues on 02 at 4 liters. ns still infusing. meds ordered for cough prn         Problem: COPD, Chronic Bronchitis/Emphysema (Adult)  Goal: Signs and Symptoms of Listed Potential Problems Will be Absent or Manageable (COPD, Chronic Bronchitis/Emphysema)  Outcome: Ongoing (interventions implemented as appropriate)

## 2017-09-23 NOTE — PLAN OF CARE
Problem: Patient Care Overview (Adult)  Goal: Plan of Care Review    09/23/17 0607   Coping/Psychosocial Response Interventions   Plan Of Care Reviewed With patient   Patient Care Overview   Progress no change   Outcome Evaluation   Outcome Summary/Follow up Plan O2 sat 90% on 4L bnc. bp, heart rhythm stable.
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
## 2017-09-23 NOTE — PROGRESS NOTES
Continued Stay Note  [facility]     Patient Name: Colleen Gonzalez  MRN: [id number]  Today's Date: 9/23/2017    Admit Date: 9/23/2017          Discharge Plan       09/23/17 1158    Case Management/Social Work Plan    Plan Cab voucher left with RN for ride back to [facility] at d/c.     Additional Comments Pt is well known to SW department. She usually stays at [facility] and has previouly been given many resources for food, shelter. On previous admission pt requested a rolling walker- DME was called and pt is not eligible for insurance to cover cost of rolling walker as one has previously been paid for. Pt has medicaid insurance. I attempted to speak with pt but was unable to wake her.               Discharge Codes     None            SOPHIE Nassar

## 2017-09-24 PROCEDURE — 25010000002 ENOXAPARIN PER 10 MG: Performed by: HOSPITALIST

## 2017-09-24 PROCEDURE — 94640 AIRWAY INHALATION TREATMENT: CPT

## 2017-09-24 PROCEDURE — 94799 UNLISTED PULMONARY SVC/PX: CPT

## 2017-09-24 PROCEDURE — 99232 SBSQ HOSP IP/OBS MODERATE 35: CPT | Performed by: INTERNAL MEDICINE

## 2017-09-24 PROCEDURE — 94760 N-INVAS EAR/PLS OXIMETRY 1: CPT

## 2017-09-24 PROCEDURE — 25010000002 METHYLPREDNISOLONE PER 125 MG: Performed by: HOSPITALIST

## 2017-09-24 RX ORDER — GUAIFENESIN 600 MG/1
600 TABLET, EXTENDED RELEASE ORAL EVERY 12 HOURS SCHEDULED
Status: DISCONTINUED | OUTPATIENT
Start: 2017-09-24 | End: 2017-09-25 | Stop reason: HOSPADM

## 2017-09-24 RX ADMIN — GUAIFENESIN 600 MG: 600 TABLET, EXTENDED RELEASE ORAL at 18:21

## 2017-09-24 RX ADMIN — Medication 250 MG: at 08:48

## 2017-09-24 RX ADMIN — IPRATROPIUM BROMIDE AND ALBUTEROL SULFATE 3 ML: .5; 3 SOLUTION RESPIRATORY (INHALATION) at 13:07

## 2017-09-24 RX ADMIN — ZIPRASIDONE HCL 80 MG: 20 CAPSULE ORAL at 17:30

## 2017-09-24 RX ADMIN — QUETIAPINE FUMARATE 300 MG: 100 TABLET, FILM COATED ORAL at 20:34

## 2017-09-24 RX ADMIN — IPRATROPIUM BROMIDE AND ALBUTEROL SULFATE 3 ML: .5; 3 SOLUTION RESPIRATORY (INHALATION) at 16:44

## 2017-09-24 RX ADMIN — SODIUM CHLORIDE 125 ML/HR: 9 INJECTION, SOLUTION INTRAVENOUS at 10:19

## 2017-09-24 RX ADMIN — DICYCLOMINE HYDROCHLORIDE 20 MG: 20 TABLET ORAL at 05:22

## 2017-09-24 RX ADMIN — GABAPENTIN 600 MG: 300 CAPSULE ORAL at 08:49

## 2017-09-24 RX ADMIN — BENZONATATE 100 MG: 100 CAPSULE ORAL at 14:02

## 2017-09-24 RX ADMIN — DICYCLOMINE HYDROCHLORIDE 20 MG: 20 TABLET ORAL at 17:30

## 2017-09-24 RX ADMIN — ENOXAPARIN SODIUM 40 MG: 40 INJECTION SUBCUTANEOUS at 05:22

## 2017-09-24 RX ADMIN — ATORVASTATIN CALCIUM 20 MG: 20 TABLET, FILM COATED ORAL at 08:49

## 2017-09-24 RX ADMIN — FUROSEMIDE 40 MG: 40 TABLET ORAL at 08:49

## 2017-09-24 RX ADMIN — DULOXETINE HYDROCHLORIDE 60 MG: 60 CAPSULE, DELAYED RELEASE ORAL at 08:49

## 2017-09-24 RX ADMIN — BUDESONIDE AND FORMOTEROL FUMARATE DIHYDRATE 2 PUFF: 80; 4.5 AEROSOL RESPIRATORY (INHALATION) at 08:20

## 2017-09-24 RX ADMIN — GUAIFENESIN AND DEXTROMETHORPHAN 5 ML: 100; 10 SYRUP ORAL at 20:34

## 2017-09-24 RX ADMIN — IPRATROPIUM BROMIDE AND ALBUTEROL SULFATE 3 ML: .5; 3 SOLUTION RESPIRATORY (INHALATION) at 08:19

## 2017-09-24 RX ADMIN — BENZONATATE 100 MG: 100 CAPSULE ORAL at 06:23

## 2017-09-24 RX ADMIN — CETIRIZINE HYDROCHLORIDE 10 MG: 10 TABLET, FILM COATED ORAL at 08:49

## 2017-09-24 RX ADMIN — GABAPENTIN 600 MG: 300 CAPSULE ORAL at 17:29

## 2017-09-24 RX ADMIN — GABAPENTIN 600 MG: 300 CAPSULE ORAL at 20:34

## 2017-09-24 RX ADMIN — LORAZEPAM 1 MG: 1 TABLET ORAL at 01:24

## 2017-09-24 RX ADMIN — POTASSIUM CHLORIDE 10 MEQ: 750 CAPSULE, EXTENDED RELEASE ORAL at 08:49

## 2017-09-24 RX ADMIN — LEVOTHYROXINE SODIUM 50 MCG: 50 TABLET ORAL at 05:22

## 2017-09-24 RX ADMIN — ZIPRASIDONE HCL 80 MG: 20 CAPSULE ORAL at 10:19

## 2017-09-24 RX ADMIN — DICYCLOMINE HYDROCHLORIDE 20 MG: 20 TABLET ORAL at 12:14

## 2017-09-24 RX ADMIN — DIVALPROEX SODIUM 1000 MG: 500 TABLET, FILM COATED, EXTENDED RELEASE ORAL at 08:50

## 2017-09-24 RX ADMIN — GABAPENTIN 600 MG: 300 CAPSULE ORAL at 12:14

## 2017-09-24 RX ADMIN — DULOXETINE HYDROCHLORIDE 60 MG: 60 CAPSULE, DELAYED RELEASE ORAL at 20:34

## 2017-09-24 RX ADMIN — METHYLPREDNISOLONE SODIUM SUCCINATE 60 MG: 125 INJECTION, POWDER, FOR SOLUTION INTRAMUSCULAR; INTRAVENOUS at 08:48

## 2017-09-24 RX ADMIN — LORAZEPAM 1 MG: 1 TABLET ORAL at 13:58

## 2017-09-24 RX ADMIN — GUAIFENESIN 600 MG: 600 TABLET, EXTENDED RELEASE ORAL at 20:34

## 2017-09-24 RX ADMIN — CLONAZEPAM 0.5 MG: 0.5 TABLET ORAL at 20:34

## 2017-09-24 NOTE — PLAN OF CARE
Problem: Patient Care Overview (Adult)  Goal: Plan of Care Review  Outcome: Ongoing (interventions implemented as appropriate)    09/24/17 0441   Coping/Psychosocial Response Interventions   Plan Of Care Reviewed With patient   Patient Care Overview   Progress progress toward functional goals as expected   Outcome Evaluation   Outcome Summary/Follow up Plan VSS. Pt continues to be on 4L. No complaints.         Problem: COPD, Chronic Bronchitis/Emphysema (Adult)  Goal: Signs and Symptoms of Listed Potential Problems Will be Absent or Manageable (COPD, Chronic Bronchitis/Emphysema)  Outcome: Ongoing (interventions implemented as appropriate)    09/24/17 0441   COPD, Chronic Bronchitis/Emphysema   Problems Assessed (COPD, Chronic Bronchitis/Emphysema) all   Problems Present (COPD, Chronic Bronchitis/Emphysema) dyspnea;functional decline/self-care deficit

## 2017-09-24 NOTE — PLAN OF CARE
Problem: Patient Care Overview (Adult)  Goal: Plan of Care Review  Outcome: Ongoing (interventions implemented as appropriate)    09/24/17 1702   Coping/Psychosocial Response Interventions   Plan Of Care Reviewed With patient   Patient Care Overview   Progress improving   Outcome Evaluation   Outcome Summary/Follow up Plan NSR on monitor. iv fluids discontinue today . has ambulated today. has had oxygen on and off today.         Problem: COPD, Chronic Bronchitis/Emphysema (Adult)  Goal: Signs and Symptoms of Listed Potential Problems Will be Absent or Manageable (COPD, Chronic Bronchitis/Emphysema)  Outcome: Ongoing (interventions implemented as appropriate)

## 2017-09-24 NOTE — PROGRESS NOTES
Hospitalist Daily Progress Note    Date of Admission: 9/23/2017   LOS: 1 day   PCP: RADHA Pedraza    Chief Complaint: SOB    Subjective    Feels a little better but not much.  Still has severe cough and shortness of breath on exertion.  Denies any fever or chills.  No nausea, vomiting, diarrhea, abdominal pain.  History of Present Illness    Review of Systems  General: no fevers, chills  Respiratory: As above  Cardiovascular: no chest pain, palpitations  Abdomen: No abdominal pain, nausea, vomiting, or diarrhea  Neurologic: No focal weakness    Objective   Physical Exam:  I have reviewed the vital signs.  Temp:  [97.8 °F (36.6 °C)-98.8 °F (37.1 °C)] 98.6 °F (37 °C)  Heart Rate:  [] 109  Resp:  [16-24] 24  BP: ()/(65-86) 123/86    Objective  General Appearance:    Alert, cooperative, no distress  Head:    Normocephalic, atraumatic  Eyes:    Sclerae anicteric  Neck:   Supple, no mass  Lungs: Very harsh breath sounds bilaterally, cough on deep inspiration.  Breathing is without any labor.  Heart: Regular rate and rhythm, S1 and S2 normal, no murmur, rub or gallop  Abdomen:  Very obese, Soft, non-tender, bowel sounds active, nondistended  Extremities: No clubbing, cyanosis.  1+ edema lower extremities bilaterally.  Morbidly obese.  Skin: No rashes   Neurologic: Oriented x3, Normal strength to extremities    Results Review:    I have reviewed the labs, radiology results and diagnostic studies.      Results from last 7 days  Lab Units 09/23/17  0605   WBC 10*3/mm3 3.26*   HEMOGLOBIN g/dL 12.6   PLATELETS 10*3/mm3 186       Results from last 7 days  Lab Units 09/23/17  0605   SODIUM mmol/L 140   POTASSIUM mmol/L 4.0   CO2 mmol/L 27.0   CREATININE mg/dL 0.80   GLUCOSE mg/dL 116*       I have reviewed the medications.    ---------------------------------------------------------------------------------------------  Assessment/Plan   Assessment & Plan  Assessment/Problem List    Active Problems:    COPD  exacerbation    Acute respiratory failure secondary to above.    Recent history of MAC, follows with Dr. Self.    Hyperlipidemia    Morbid obesity.    Depression.     Plan  - Continue current care  - Add Mucinex.        DVT prophylaxis:  Discharge Planning: TBD.    Reggie Boyer MD 09/24/17 5:12 PM

## 2017-09-25 VITALS
HEART RATE: 123 BPM | RESPIRATION RATE: 20 BRPM | TEMPERATURE: 96.5 F | BODY MASS INDEX: 32.67 KG/M2 | WEIGHT: 215.6 LBS | SYSTOLIC BLOOD PRESSURE: 136 MMHG | OXYGEN SATURATION: 92 % | HEIGHT: 68 IN | DIASTOLIC BLOOD PRESSURE: 78 MMHG

## 2017-09-25 PROCEDURE — 25010000002 ENOXAPARIN PER 10 MG: Performed by: HOSPITALIST

## 2017-09-25 PROCEDURE — 25010000002 METHYLPREDNISOLONE PER 125 MG: Performed by: HOSPITALIST

## 2017-09-25 PROCEDURE — 94640 AIRWAY INHALATION TREATMENT: CPT

## 2017-09-25 PROCEDURE — 94760 N-INVAS EAR/PLS OXIMETRY 1: CPT

## 2017-09-25 PROCEDURE — 94799 UNLISTED PULMONARY SVC/PX: CPT

## 2017-09-25 PROCEDURE — 99239 HOSP IP/OBS DSCHRG MGMT >30: CPT | Performed by: NURSE PRACTITIONER

## 2017-09-25 RX ORDER — NICOTINE 21 MG/24HR
1 PATCH, TRANSDERMAL 24 HOURS TRANSDERMAL
Qty: 28 PATCH | Refills: 0 | Status: SHIPPED | OUTPATIENT
Start: 2017-09-26

## 2017-09-25 RX ORDER — CLARITHROMYCIN 500 MG/1
500 TABLET, COATED ORAL EVERY 12 HOURS SCHEDULED
Start: 2017-09-25 | End: 2017-11-05

## 2017-09-25 RX ORDER — GUAIFENESIN 600 MG/1
600 TABLET, EXTENDED RELEASE ORAL EVERY 12 HOURS SCHEDULED
Qty: 20 TABLET | Refills: 0 | Status: SHIPPED | OUTPATIENT
Start: 2017-09-25 | End: 2017-09-28 | Stop reason: HOSPADM

## 2017-09-25 RX ORDER — CLARITHROMYCIN 250 MG/1
500 TABLET, FILM COATED ORAL EVERY 12 HOURS SCHEDULED
Status: DISCONTINUED | OUTPATIENT
Start: 2017-09-25 | End: 2017-09-25 | Stop reason: HOSPADM

## 2017-09-25 RX ORDER — SENNA AND DOCUSATE SODIUM 50; 8.6 MG/1; MG/1
2 TABLET, FILM COATED ORAL NIGHTLY
Qty: 30 TABLET | Refills: 0 | Status: SHIPPED | OUTPATIENT
Start: 2017-09-25

## 2017-09-25 RX ORDER — RIFAMPIN 300 MG/1
600 CAPSULE ORAL
Status: DISCONTINUED | OUTPATIENT
Start: 2017-09-25 | End: 2017-09-25 | Stop reason: HOSPADM

## 2017-09-25 RX ORDER — BENZONATATE 100 MG/1
100 CAPSULE ORAL 3 TIMES DAILY PRN
Qty: 30 CAPSULE | Refills: 0 | Status: SHIPPED | OUTPATIENT
Start: 2017-09-25 | End: 2017-09-28 | Stop reason: HOSPADM

## 2017-09-25 RX ORDER — RIFAMPIN 300 MG/1
600 CAPSULE ORAL
Start: 2017-09-26 | End: 2017-11-05

## 2017-09-25 RX ORDER — BUDESONIDE AND FORMOTEROL FUMARATE DIHYDRATE 80; 4.5 UG/1; UG/1
2 AEROSOL RESPIRATORY (INHALATION)
Qty: 10.2 G | Refills: 12 | Status: SHIPPED | OUTPATIENT
Start: 2017-09-25

## 2017-09-25 RX ORDER — SACCHAROMYCES BOULARDII 250 MG
250 CAPSULE ORAL 2 TIMES DAILY
Qty: 60 CAPSULE | Refills: 0 | Status: SHIPPED | OUTPATIENT
Start: 2017-09-25

## 2017-09-25 RX ORDER — ETHAMBUTOL HYDROCHLORIDE 400 MG/1
1600 TABLET, FILM COATED ORAL
Start: 2017-09-26 | End: 2017-11-05

## 2017-09-25 RX ORDER — LORAZEPAM 1 MG/1
1 TABLET ORAL 2 TIMES DAILY
COMMUNITY

## 2017-09-25 RX ORDER — RANITIDINE 150 MG/1
150 TABLET ORAL DAILY
COMMUNITY

## 2017-09-25 RX ORDER — HYDROXYZINE 50 MG/1
50 TABLET, FILM COATED ORAL 4 TIMES DAILY PRN
Qty: 10 TABLET | Refills: 0 | Status: SHIPPED | OUTPATIENT
Start: 2017-09-25

## 2017-09-25 RX ORDER — ETHAMBUTOL HYDROCHLORIDE 400 MG/1
1600 TABLET, FILM COATED ORAL
Status: DISCONTINUED | OUTPATIENT
Start: 2017-09-25 | End: 2017-09-25 | Stop reason: HOSPADM

## 2017-09-25 RX ADMIN — POTASSIUM CHLORIDE 10 MEQ: 750 CAPSULE, EXTENDED RELEASE ORAL at 08:52

## 2017-09-25 RX ADMIN — Medication 250 MG: at 08:52

## 2017-09-25 RX ADMIN — GABAPENTIN 600 MG: 300 CAPSULE ORAL at 12:21

## 2017-09-25 RX ADMIN — DICYCLOMINE HYDROCHLORIDE 20 MG: 20 TABLET ORAL at 05:48

## 2017-09-25 RX ADMIN — LORAZEPAM 1 MG: 1 TABLET ORAL at 14:45

## 2017-09-25 RX ADMIN — METHYLPREDNISOLONE SODIUM SUCCINATE 60 MG: 125 INJECTION, POWDER, FOR SOLUTION INTRAMUSCULAR; INTRAVENOUS at 08:51

## 2017-09-25 RX ADMIN — ENOXAPARIN SODIUM 40 MG: 40 INJECTION SUBCUTANEOUS at 05:48

## 2017-09-25 RX ADMIN — RIFAMPIN 600 MG: 300 CAPSULE ORAL at 15:42

## 2017-09-25 RX ADMIN — LORAZEPAM 1 MG: 1 TABLET ORAL at 01:31

## 2017-09-25 RX ADMIN — CETIRIZINE HYDROCHLORIDE 10 MG: 10 TABLET, FILM COATED ORAL at 08:52

## 2017-09-25 RX ADMIN — GABAPENTIN 600 MG: 300 CAPSULE ORAL at 08:52

## 2017-09-25 RX ADMIN — CLARITHROMYCIN 500 MG: 250 TABLET ORAL at 15:41

## 2017-09-25 RX ADMIN — DIVALPROEX SODIUM 1000 MG: 500 TABLET, FILM COATED, EXTENDED RELEASE ORAL at 08:52

## 2017-09-25 RX ADMIN — LEVOTHYROXINE SODIUM 50 MCG: 50 TABLET ORAL at 05:48

## 2017-09-25 RX ADMIN — FUROSEMIDE 40 MG: 40 TABLET ORAL at 08:52

## 2017-09-25 RX ADMIN — GUAIFENESIN 600 MG: 600 TABLET, EXTENDED RELEASE ORAL at 08:52

## 2017-09-25 RX ADMIN — DICYCLOMINE HYDROCHLORIDE 20 MG: 20 TABLET ORAL at 01:31

## 2017-09-25 RX ADMIN — DICYCLOMINE HYDROCHLORIDE 20 MG: 20 TABLET ORAL at 12:20

## 2017-09-25 RX ADMIN — BUDESONIDE AND FORMOTEROL FUMARATE DIHYDRATE 2 PUFF: 80; 4.5 AEROSOL RESPIRATORY (INHALATION) at 08:16

## 2017-09-25 RX ADMIN — ATORVASTATIN CALCIUM 20 MG: 20 TABLET, FILM COATED ORAL at 08:52

## 2017-09-25 RX ADMIN — DULOXETINE HYDROCHLORIDE 60 MG: 60 CAPSULE, DELAYED RELEASE ORAL at 08:52

## 2017-09-25 RX ADMIN — IPRATROPIUM BROMIDE AND ALBUTEROL SULFATE 3 ML: .5; 3 SOLUTION RESPIRATORY (INHALATION) at 08:16

## 2017-09-25 RX ADMIN — GUAIFENESIN AND DEXTROMETHORPHAN 5 ML: 100; 10 SYRUP ORAL at 05:48

## 2017-09-25 RX ADMIN — ETHAMBUTOL HYDROCHLORIDE 1600 MG: 400 TABLET, FILM COATED ORAL at 15:43

## 2017-09-25 RX ADMIN — ZIPRASIDONE HCL 80 MG: 20 CAPSULE ORAL at 08:52

## 2017-09-25 NOTE — PLAN OF CARE
Problem: COPD, Chronic Bronchitis/Emphysema (Adult)  Goal: Signs and Symptoms of Listed Potential Problems Will be Absent or Manageable (COPD, Chronic Bronchitis/Emphysema)  Outcome: Ongoing (interventions implemented as appropriate)    09/25/17 045   COPD, Chronic Bronchitis/Emphysema   Problems Assessed (COPD, Chronic Bronchitis/Emphysema) all   Problems Present (COPD, Chronic Bronchitis/Emphysema) dyspnea

## 2017-09-25 NOTE — PROGRESS NOTES
Discharge Planning Assessment  [facility]     Patient Name: Colleen Gonzalez  MRN: [id number]  Today's Date: 9/25/2017    Admit Date: 9/23/2017          Discharge Needs Assessment       09/25/17 1431    Living Environment    Living Arrangements homeless   [facility]    Transportation Available taxi    Discharge Needs Assessment    Anticipated Changes Related to Illness none    Equipment Currently Used at Home none    Equipment Needed After Discharge none            Discharge Plan       09/25/17 1433    Case Management/Social Work Plan    Plan [facility]    Patient/Family In Agreement With Plan yes    Additional Comments Spoke with pt at bedside. SW provided pt with a complete resource book for [facility]. Pt reports she still wants to discharge back to [facility]. Pt can also go to [facility] and this was explained to her as well. Pt requested some clothes. SW provided pt with some clothes from the clothes closet. [employer] has already been put in the chart for discharge to [facility]. Pt has the reosurces and a safe discharge plan and is aware of all of her resources. No further ss needs at this time.     Final Note    Final Note SW is following        Discharge Placement     No information found                Demographic Summary       09/25/17 1431    Referral Information    Reason For Consult discharge planning            Functional Status       09/25/17 1431    Functional Status Prior    Ambulation 0-->independent    Transferring 0-->independent    Toileting 0-->independent    Bathing 0-->independent    Dressing 0-->independent    Eating 0-->independent    Communication 0-->understands/communicates without difficulty    IADL    Medications independent    Meal Preparation independent    Housekeeping independent    Laundry independent    Shopping independent    Oral Care independent            Psychosocial     None            Abuse/Neglect     None             Legal     None            Substance Abuse     None            Patient Forms     None          SOPHIE Vázquez

## 2017-09-25 NOTE — PROGRESS NOTES
Adult Nutrition  Assessment/PES    Patient Name:  Colleen Gonzalez  YOB: 1965  MRN: [id number]  Admit Date:  9/23/2017    Assessment Date:  9/25/2017        Reason for Assessment       09/25/17 0934    Reason for Assessment    Reason For Assessment/Visit follow up protocol    Time Spent (min) 5                                Problem/Interventions:          Problem 2       09/25/17 0934    Nutrition Diagnoses Problem 2    Problem 2 Nutrition Appropriate for Condition at this Time    Signs/Symptoms (evidenced by) PO Intake                        Education/Evaluation       09/25/17 0935    Monitor/Evaluation    Monitor Per protocol        Comments:      Electronically signed by:  Afia Maier RD  09/25/17 9:35 AM

## 2017-09-25 NOTE — PLAN OF CARE
Problem: Patient Care Overview (Adult)  Goal: Plan of Care Review  Outcome: Outcome(s) achieved Date Met:  09/25/17 09/25/17 2317   Coping/Psychosocial Response Interventions   Plan Of Care Reviewed With patient   Outcome Evaluation   Outcome Summary/Follow up Plan being discharge home.       Goal: Adult Individualization and Mutuality  Outcome: Outcome(s) achieved Date Met:  09/25/17  Goal: Discharge Needs Assessment  Outcome: Outcome(s) achieved Date Met:  09/25/17    Problem: COPD, Chronic Bronchitis/Emphysema (Adult)  Goal: Signs and Symptoms of Listed Potential Problems Will be Absent or Manageable (COPD, Chronic Bronchitis/Emphysema)  Outcome: Outcome(s) achieved Date Met:  09/25/17

## 2017-09-25 NOTE — PROGRESS NOTES
"[facility] Progress Note    Date of Admission: 2017      Antibiotics:  None    CC:   Chief Complaint   Patient presents with   • Shortness of Breath       S: C/o cough. Sputum production. Wants her antibiotics given to her. Feeling better on therapy than off. Discharge options not clear at this time.     O:  /98 (BP Location: Right arm, Patient Position: Lying)  Pulse 104  Temp 96.8 °F (36 °C) (Axillary)   Resp 18  Ht 68\" (172.7 cm)  Wt 215 lb 9.6 oz (97.8 kg)  SpO2 92%  BMI 32.78 kg/m2  Temp (24hrs), Av.8 °F (36.6 °C), Min:96.8 °F (36 °C), Max:98.6 °F (37 °C)      PE:   GEN: Awake and alert, in no acute distress. Chronically ill appearing.   HEENT: NCAT.  EOMI. No conjunctival injection. No icterus. Oropharynx clear without evidence of thrush or exudate.   NECK: Supple without nuchal rigidity  HEART: RRR; No murmur, rubs, gallops.   LUNGS: Diminished BS throughout.   Normal respiratory effort.  ABDOMEN: Soft, nontender, nondistended. Positive bowel sounds. No rebound or guarding.   EXT:  No cyanosis, clubbing or edema  : Normal appearing genitalia without Perdomo catheter.  MSK: FROM without joint effusions noted    SKIN: Warm and dry without cutaneous eruptions.    NEURO: Oriented to PPT. No focal deficits.     Laboratory Data      Results from last 7 days  Lab Units 17  0605 17  0119   WBC 10*3/mm3 3.26* 5.04   HEMOGLOBIN g/dL 12.6 13.1   HEMATOCRIT % 38.5 40.4   PLATELETS 10*3/mm3 186 214       Results from last 7 days  Lab Units 17  0605   SODIUM mmol/L 140   POTASSIUM mmol/L 4.0   CHLORIDE mmol/L 107   CO2 mmol/L 27.0   BUN mg/dL <5*   CREATININE mg/dL 0.80   GLUCOSE mg/dL 116*   CALCIUM mg/dL 8.9       Results from last 7 days  Lab Units 17  0119   ALK PHOS U/L 85   BILIRUBIN mg/dL 0.5   ALT (SGPT) U/L 5*   AST (SGOT) U/L 11               Estimated Creatinine Clearance: 99.5 mL/min (by C-G formula based on Cr of 0.8).      Microbiology:  Blood culture NGSF x " 2    Radiology:  Imaging Results (last 24 hours)     ** No results found for the last 24 hours. **          PROBLEM LIST:   Pulmonary MAC infection   COPD exacerbation   Hx recurrent pneumonia with MRSA and PSA in past  Bipolar D/o  Homelessness    ASSESSMENT:  Patient with extensive w/u in past revealing MAC and started on triple drug therapy. She is homeless and inconsistent with regular antibiotic treatment. She recognizes that the a ntibiotics make her feel better but reports she forgets them regularly.  Discussed with patient the treatment process for pulmonary Mac very complicated with the risk significant for toxicities to medications and patient has been noncompliant with follow-ups which makes treating long-term pulmonary Mac nearly impossible.  We have offered follow-up with [facility] she refuses evaluation by [facility] and agrees to comply with f/u appts.   At this point she is completed 3 months of therapy and we will try to complete 9 more months but I explicitly stated to patient today if she misses her next follow-up she will notified officially that we can no longer care for her and she will need to seek care at [facility] for her pulmonary mycobacterial infection.    PLAN:    Patient is now agreeable to oral antibiotic therapy and compliance with follow-up appointments and would recommend following treatment plan:    Clarithromycin 500 mg PO BID   Rifampin 600 mg PO QD   Ethambutol 1600 mg PO QD   Rx in chart for antibiotics at discharge    Follow-up appointment in 1 month with Dr. Self but if misses another apt will no longer be able to see as us due to gross non-compliance with significant wrist for adverse drug reactions including arrhythmias, C. difficile colitis, toxicity for triple drug pulmonary BRONSON infection    UM:  Okay for discharge on oral antibiotics    Dr. Robert Self saw the patient, performed the physical exam, reviewed the laboratory data and guided with the  formulation of the above problem list, assessment and treatment plan.     Robert Self MD  9/25/2017

## 2017-09-25 NOTE — NURSING NOTE
"Pt is refusing to wear O2 \"I cant wear it where I'm living anyway, and its irritating my nose\" RN advised for pt to wear and explained risks of not, pt still refusing.   "

## 2017-09-25 NOTE — PROGRESS NOTES
"Hospitalist Daily Progress Note    Date of Admission: 9/23/2017   LOS: 2 days   PCP: RADHA Pedraza    Chief Complaint: SOB    Subjective    Patient getting ready to leave the unit when I asked her to go back to her room so she can be evaluated.  Spoke to her about going off the unit continuously during the night to smoke and she stated she will try not to do it, but she states she is not going to quit smoking.  Patient talking in complete sentences and in no distress.  states overall she's feeling good.  No nausea, vomiting, diarrhea, abdominal pain.    Shortness of Breath       Review of Systems   Respiratory: Positive for shortness of breath.    General: no fevers, chills  Respiratory: As above  Cardiovascular: no chest pain, palpitations  Abdomen: No abdominal pain, nausea, vomiting, or diarrhea  Neurologic: No focal weakness    Objective   Physical Exam:  I have reviewed the vital signs.  Objective:  Vital signs: (most recent): Blood pressure 136/78, pulse (!) 123, temperature 96.5 °F (35.8 °C), temperature source Axillary, resp. rate 20, height 68\" (172.7 cm), weight 215 lb 9.6 oz (97.8 kg), SpO2 92 %.          General: Well-developed well-nourished female in NAD walking the halls without oxygen and leaving the unit    Head: Normocephalic atraumatic    Eyes: PERRLA, EOMI, nonicteric, conjunctiva normal    ENT: Pink, moist mucous membranes    Neck: Supple, nontender, trachea midline without lymphadenopathy, JVD, nuchal rigidity.      Cardiovascular: RRR  no M/R/G  +1DP pulses    Respiratory: Nonlabored, symmetrical chest expansion, talking in complete sentences.  Course breath sounds bilaterally.  Cough with deep inspiration    Abdomen: Obese, soft, nontender, nondistended,  positive bowel sounds in all 4 quadrants     Extremities: FROM in upper and lower extremities bilaterally.  Negative for edema/cyanosis/clubbing.  Negative calf pain.  Scars up and down legs    Skin: Pink/warm/dry.  No rash or " lesions noted    Neuro: Alert and oriented to person place time and situation, speech is clear, follows all commands, recent and remote memory intact    Psych: Patient is pleasant and cooperative.  Normal affect.  Negative suicidal ideation or homicidal ideation.    Results Review:    I have reviewed the labs, radiology results and diagnostic studies.    Results from last 7 days  Lab Units 09/23/17  0605   WBC 10*3/mm3 3.26*   HEMOGLOBIN g/dL 12.6   PLATELETS 10*3/mm3 186       Results from last 7 days  Lab Units 09/23/17  0605   SODIUM mmol/L 140   POTASSIUM mmol/L 4.0   CO2 mmol/L 27.0   CREATININE mg/dL 0.80   GLUCOSE mg/dL 116*     I have reviewed the medications.  ---------------------------------------------------------------------------------------------  Assessment/Plan   Assessment & Plan  COPD exacerbation  -home meds  -scheduled and PRN nebs  -started abx, but Dr Bullock stopped d/t patient's non-compliance  -Dr Self to see today  -Nicotine patch  -Smoking cessation education  -NOT allowed off floor  -flu vaccine    Acute respiratory failure secondary to above  -resolved    Recent history of MAC, follows with Dr. Self  -Dr Self to see today    Hyperlipidemia  -lipitor    Morbid obesity  Depression  -depokote; cymbalta    Hypothyroid  -continue synthroid      DVT prophylaxis:  Lovenox  Discharge Planning: await Dr Self's note; possible discharge today    Megan Serjamie, RADHA 09/25/17 1:24 PM

## 2017-09-25 NOTE — DISCHARGE SUMMARY
[facility] Medicine Services  DISCHARGE SUMMARY       Date of Admission: 9/23/2017  Date of Discharge:  9/25/2017  Primary Care Physician: RADHA Pedraza  Consulting Physician(s)     Provider Relationship    Robert Self MD Consulting Physician          Discharge Diagnoses:  Active Hospital Problems (** Indicates Principal Problem)    Diagnosis Date Noted   • Non compliance with medical treatment [Z91.19] 07/05/2017   • COPD exacerbation [J44.1] 06/16/2017   • Acute exacerbation of COPD with asthma [J44.1, J45.901] 05/19/2017   • Seizure disorder [G40.909] 05/19/2017   • Anxiety and depression [F41.9, F32.9] 03/01/2017   • Homelessness [Z59.0] 03/01/2017   • Hypothyroid [E03.9] 03/01/2017   • Tobacco abuse [Z72.0] 03/01/2017      Resolved Hospital Problems    Diagnosis Date Noted Date Resolved   No resolved problems to display.     Presenting Problem/History of Present Illness  COPD exacerbation [J44.1]     Chief Complaint on Day of Discharge:   COPD exacerbation improved    History of Present Illness on Day of Discharge:   Patient getting ready to leave the unit when I asked her to go back to her room so she can be evaluated.  Spoke to her about going off the unit continuously during the night to smoke and she stated she will try not to do it, but she states she is not going to quit smoking.  Patient talking in complete sentences and in no distress.  states overall she's feeling good.  No nausea, vomiting, diarrhea, abdominal pain.    Hospital Course  Ms Gonzalez is a 52-year-old female with multiple medical problems including being homeless noncompliant with medical therapy who was brought in from home shelter due to increased SOA and dyspnea.  Patient denied any chest pain, palpitations, fever, but had chills, chronic cough.  Patient smells smoking 2 packs of cigarettes per day and states she is not going to quit.  Patient denies any alcohol or illicit drug use.  Patient admits to  being noncompliant with her antibiotics and states she only takes them occasionally, but states she feels much better when she's taking them.  Patient was admitted in July 2017 for severe COPD exacerbation and since she was discharged, has been seen in the ED 7 times prior to being admitted on day of admission.  Patient has a history of maximal pneumonia, which isn't actually diagnosed in March 2017 was prescribed ethambutol, clarithromycin and rifampin by Dr. Martins.  Patient admits to being noncompliant with her medications and failed to follow up in the ID clinic.  Patient was admitted to the hospital medicine service for further evaluation and treatment.    During patient's hospitalization, she was seen multiple times leaving the unit to go outside and smoke.  Patient was told she could not leave the room and didn't find out the unit, but continue to do so.  It was explained to patient that if she could go off her oxygen and walk off the unit to go outside and smoke, that she was probably okay to discharge, but is felt that Dr. Self, from ID should be consulted prior to patient's discharge.    Infectious disease, Dr. Martins, saw the patient and felt that she could be discharged back to the homeless shelter, and prescribed her the same medications with the understanding that she would be compliant with them and follow-up with him as scheduled.  He did write his notes that if patient failed to be compliant with her medication and follow-up in his office, she would be discharged from the practice.  Patient agreed to his terms and was subsequently discharged home.  Patient also had multiple prescriptions E-prescribed to her [facility] of her choice.  Patient was discharged home with follow-up with Dr. Self, as well as follow-up with her PCP or clinic.    Procedures Performed   None    Consults:   Consults     Date and Time Order Name Status Description    9/23/2017 0503 Inpatient Consult to Infectious Diseases  Completed         Pertinent Test Results:  Imaging Results (most recent)     Procedure Component Value Units Date/Time    XR Chest 1 View [[id number]]  (Abnormal) Collected:  09/23/17 0107     Updated:  09/23/17 0139    Narrative:       EXAM:    XR Chest, 1 View    CLINICAL HISTORY:    52 years old, female; Signs and symptoms; Shortness of breath; Additional   info: SOA triage protocol    TECHNIQUE:    Frontal view of the chest.    COMPARISON:    CR - XR CHEST 2 VW 9/16/2017 9:38:03 AM    FINDINGS:    Lungs:  Unremarkable.  No consolidation.    Pleural space:  Unremarkable.  No pneumothorax.    Heart:  Unremarkable.  No cardiomegaly.    Mediastinum:  Unremarkable.    Bones/joints:  Unremarkable.      Impression:         No acute cardiopulmonary disease.    THIS DOCUMENT HAS BEEN ELECTRONICALLY SIGNED BY FRANCIS STOKES MD        Lab Results (most recent)     Procedure Component Value Units Date/Time    POC Troponin, Rapid [[id number]]  (Normal) Collected:  09/23/17 0119    Specimen:  Blood Updated:  09/23/17 0140     Troponin I 0.00 ng/mL       Serial Number: [id number]:  [id number]       BNP [[id number]]  (Normal) Collected:  09/23/17 0119    Specimen:  Blood Updated:  09/23/17 0202     BNP 24.0 pg/mL     Comprehensive Metabolic Panel [[id number]]  (Abnormal) Collected:  09/23/17 0119    Specimen:  Blood Updated:  09/23/17 0219     Glucose 96 mg/dL      BUN 5 (L) mg/dL      Creatinine 0.90 mg/dL      Sodium 136 mmol/L      Potassium 3.7 mmol/L      Chloride 105 mmol/L      CO2 25.0 mmol/L      Calcium 9.1 mg/dL      Total Protein 6.7 g/dL      Albumin 3.70 g/dL      ALT (SGPT) 5 (L) U/L      AST (SGOT) 11 U/L      Alkaline Phosphatase 85 U/L      Total Bilirubin 0.5 mg/dL      eGFR Non African Amer 65 mL/min/1.73      Globulin 3.0 gm/dL      A/G Ratio 1.2 (L) g/dL      BUN/Creatinine Ratio 5.6 (L)     Anion Gap 6.0 mmol/L     CBC Auto Differential [[id number]]  (Abnormal) Collected:  09/23/17 0119    Specimen:  Blood  Updated:  09/23/17 0220     WBC 5.04 10*3/mm3      RBC 4.17 10*6/mm3      Hemoglobin 13.1 g/dL      Hematocrit 40.4 %      MCV 96.9 fL      MCH 31.4 (H) pg      MCHC 32.4 g/dL      RDW 14.3 %      RDW-SD 51.2 fl      MPV 10.4 fL      Platelets 214 10*3/mm3      Neutrophil % 47.8 %      Lymphocyte % 34.9 %      Monocyte % 13.1 (H) %      Eosinophil % 3.4 (H) %      Basophil % 0.6 %      Immature Grans % 0.2 %      Neutrophils, Absolute 2.41 10*3/mm3      Lymphocytes, Absolute 1.76 10*3/mm3      Monocytes, Absolute 0.66 10*3/mm3      Eosinophils, Absolute 0.17 10*3/mm3      Basophils, Absolute 0.03 10*3/mm3      Immature Grans, Absolute 0.01 10*3/mm3     Theophylline Level [[id number]]  (Abnormal) Collected:  09/23/17 0120    Specimen:  Blood Updated:  09/23/17 0232     Theophylline Level <2.0 (L) mcg/mL     Lactic Acid, Plasma [[id number]]  (Normal) Collected:  09/23/17 0234    Specimen:  Blood Updated:  09/23/17 0258     Lactate 1.3 mmol/L     CBC Auto Differential [[id number]]  (Abnormal) Collected:  09/23/17 0605    Specimen:  Blood Updated:  09/23/17 0632     WBC 3.26 (L) 10*3/mm3      RBC 3.97 10*6/mm3      Hemoglobin 12.6 g/dL      Hematocrit 38.5 %      MCV 97.0 fL      MCH 31.7 (H) pg      MCHC 32.7 g/dL      RDW 14.4 %      RDW-SD 51.4 fl      MPV 10.3 fL      Platelets 186 10*3/mm3      Neutrophil % 79.8 (H) %      Lymphocyte % 14.1 (L) %      Monocyte % 4.0 %      Eosinophil % 1.2 %      Basophil % 0.6 %      Immature Grans % 0.3 %      Neutrophils, Absolute 2.60 10*3/mm3      Lymphocytes, Absolute 0.46 (L) 10*3/mm3      Monocytes, Absolute 0.13 10*3/mm3      Eosinophils, Absolute 0.04 10*3/mm3      Basophils, Absolute 0.02 10*3/mm3      Immature Grans, Absolute 0.01 10*3/mm3     TSH [[id number]]  (Normal) Collected:  09/23/17 0605    Specimen:  Blood Updated:  09/23/17 0730     TSH 0.986 mIU/mL     Basic Metabolic Panel [[id number]]  (Abnormal) Collected:  09/23/17 0605    Specimen:  Blood Updated:  09/23/17  "0734     Glucose 116 (H) mg/dL      BUN <5 (L) mg/dL      Creatinine 0.80 mg/dL      Sodium 140 mmol/L      Potassium 4.0 mmol/L      Chloride 107 mmol/L      CO2 27.0 mmol/L      Calcium 8.9 mg/dL      eGFR Non African Amer 75 mL/min/1.73      BUN/Creatinine Ratio --      Unable to calculate Bun/Crea Ratio.        Anion Gap 6.0 mmol/L     Hemoglobin A1c [[id number]]  (Normal) Collected:  09/23/17 0605    Specimen:  Blood Updated:  09/23/17 0942     Hemoglobin A1C 5.00 %     Narrative:       The American Diabetes Association recommends maintenance of Hemoglobin A1C at 7.0% or lower. Goals for Hemoglobin A1C reduction may need to be modified if hypoglycemia is a problem.    Blood Culture [[id number]]  (Normal) Collected:  09/23/17 0234    Specimen:  Blood from Arm, Left Updated:  09/25/17 0303     Blood Culture No growth at 2 days    Blood Culture [[id number]]  (Normal) Collected:  09/23/17 0120    Specimen:  Blood from Arm, Left Updated:  09/25/17 0401     Blood Culture No growth at 2 days        Condition on Discharge:  Stable    Physical Exam on Discharge:/78 (BP Location: Left arm, Patient Position: Sitting)  Pulse (!) 123  Temp 96.5 °F (35.8 °C) (Axillary)   Resp 20  Ht 68\" (172.7 cm)  Wt 215 lb 9.6 oz (97.8 kg)  SpO2 92%  BMI 32.78 kg/m2  Physical Exam  General: Well-developed well-nourished female in NAD walking the halls without oxygen and leaving the unit     Head: Normocephalic atraumatic     Eyes: PERRLA, EOMI, nonicteric, conjunctiva normal     ENT: Pink, moist mucous membranes     Neck: Supple, nontender, trachea midline without lymphadenopathy, JVD, nuchal rigidity.       Cardiovascular: RRR  no M/R/G  +1DP pulses     Respiratory: Nonlabored, symmetrical chest expansion, talking in complete sentences.  Course breath sounds bilaterally.  Cough with deep inspiration     Abdomen: Obese, soft, nontender, nondistended,  positive bowel sounds in all 4 quadrants      Extremities: FROM in upper and " lower extremities bilaterally.  Negative for edema/cyanosis/clubbing.  Negative calf pain.  Scars up and down legs     Skin: Pink/warm/dry.  No rash or lesions noted     Neuro: Alert and oriented to person place time and situation, speech is clear, follows all commands, recent and remote memory intact     Psych: Patient is pleasant and cooperative.  Normal affect.  Negative suicidal ideation or homicidal ideation.  Discharge Disposition  Home or Self Care    Discharge Medications   Colleen Gonzalez   Home Medication Instructions MAC:[id number]    Printed on:09/25/17 4508   Medication Information                      albuterol (PROVENTIL HFA;VENTOLIN HFA) 108 (90 BASE) MCG/ACT inhaler  Inhale 2 puffs Every 4 (Four) Hours As Needed for Wheezing.             benzonatate (TESSALON) 100 MG capsule  Take 1 capsule by mouth 3 (Three) Times a Day As Needed for Cough.             benztropine (COGENTIN) 1 MG tablet  Take 1 mg by mouth 3 (Three) Times a Day.             budesonide-formoterol (SYMBICORT) 80-4.5 MCG/ACT inhaler  Inhale 2 puffs 2 (Two) Times a Day.             capsicum (ZOSTRIX) 0.075 % topical cream  Apply  topically Every 8 (Eight) Hours.             cetirizine (zyrTEC) 10 MG tablet  Take 1 tablet by mouth Daily.             clarithromycin (BIAXIN) 500 MG tablet  Take 1 tablet by mouth Every 12 (Twelve) Hours. Indications: Pneumonia, Upper Respiratory Tract Infection             dicyclomine (BENTYL) 20 MG tablet  Take 1 tablet by mouth Every 6 (Six) Hours.             divalproex (DEPAKOTE) 500 MG 24 hr tablet  Take 1,500 mg by mouth Daily. Take two tablets once a day              DULoxetine (CYMBALTA) 60 MG capsule  Take 1 capsule by mouth Every 12 (Twelve) Hours.             ergocalciferol (DRISDOL) 27825 UNITS capsule  Take 50,000 Units by mouth 1 (One) Time Per Week.             ethambutol (MYAMBUTOL) 400 MG tablet  Take 4 tablets by mouth Daily.             furosemide (LASIX) 40 MG tablet  Take 1 tablet by  mouth Daily.             gabapentin (NEURONTIN) 300 MG capsule  Take 2 capsules by mouth 4 (Four) Times a Day.             guaiFENesin (MUCINEX) 600 MG 12 hr tablet  Take 1 tablet by mouth Every 12 (Twelve) Hours.             hydrOXYzine (ATARAX) 50 MG tablet  Take 1 tablet by mouth 4 (Four) Times a Day As Needed for Itching or Anxiety.             lansoprazole (PREVACID) 30 MG capsule  Take 60 mg by mouth Daily.             levothyroxine (SYNTHROID, LEVOTHROID) 50 MCG tablet  Take 50 mcg by mouth Daily.             LORazepam (ATIVAN) 1 MG tablet  Take 1 mg by mouth 2 (Two) Times a Day.             melatonin 3 MG tablet  Take 3 mg by mouth At Night As Needed for Sleep.             nicotine (NICODERM CQ) 21 MG/24HR patch  Place 1 patch on the skin Daily.             ondansetron ODT (ZOFRAN-ODT) 4 MG disintegrating tablet  Take 1 tablet by mouth Every 4 (Four) Hours.             potassium chloride (K-DUR) 10 MEQ CR tablet  Take 1 tablet by mouth Daily.             pravastatin (PRAVACHOL) 80 MG tablet  Take 1 tablet by mouth Every Night.             QUEtiapine (SEROquel) 300 MG tablet  Take 300 mg by mouth Every Night.             raNITIdine (ZANTAC) 150 MG tablet  Take 150 mg by mouth Daily.             rifAMPin (RIFADIN) 300 MG capsule  Take 2 capsules by mouth Daily. Indications: Pneumonia             saccharomyces boulardii (FLORASTOR) 250 MG capsule  Take 1 capsule by mouth 2 (Two) Times a Day.             sennosides-docusate sodium (SENOKOT-S) 8.6-50 MG tablet  Take 2 tablets by mouth Every Night.             ziprasidone (GEODON) 80 MG capsule  Take 80 mg by mouth 2 (Two) Times a Day With Meals.               Discharge Diet:   Diet Instructions     Diet: Regular, Cardiac; Thin       Discharge Diet:   Regular  Cardiac      Fluid Consistency:  Thin               Discharge Care Plan / Instructions:  Activity at Discharge:   Activity Instructions     Activity as Tolerated                   Follow-up Appointments  No  future appointments.  Additional Instructions for the Follow-ups that You Need to Schedule     Discharge Follow-Up With Specified Provider    As directed    To:  Dr Self in 1 month; if you miss this appointmetn, you will be dismissed from the infectipous disease practice; please schedule appointment prior to patient's dc.       Discharge Follow-up with PCP    As directed    Follow Up Details:  Follow up with your PCP or clinic doctor mike 7 days for cotinued care               Test Results Pending at Discharge Order Current Status    Blood Culture Preliminary result    Blood Culture Preliminary result        Megan Hernandez, RADHA 09/25/17 4:16 PM    Time: Discharge 50 min    Please note that portions of this note may have been completed with a voice recognition program. Efforts were made to edit the dictations, but occasionally words are mistranscribed.

## 2017-09-26 ENCOUNTER — HOSPITAL ENCOUNTER (OUTPATIENT)
Facility: HOSPITAL | Age: 52
Setting detail: OBSERVATION
Discharge: HOME | End: 2017-09-28
Attending: EMERGENCY MEDICINE | Admitting: INTERNAL MEDICINE

## 2017-09-26 DIAGNOSIS — R09.02 HYPOXIA: ICD-10-CM

## 2017-09-26 DIAGNOSIS — J44.1 COPD EXACERBATION (HCC): Primary | ICD-10-CM

## 2017-09-26 PROCEDURE — 93005 ELECTROCARDIOGRAM TRACING: CPT

## 2017-09-26 PROCEDURE — 99285 EMERGENCY DEPT VISIT HI MDM: CPT

## 2017-09-26 RX ORDER — PROMETHAZINE HYDROCHLORIDE 25 MG/1
25 TABLET ORAL EVERY 6 HOURS PRN
COMMUNITY

## 2017-09-26 RX ORDER — SODIUM CHLORIDE 0.9 % (FLUSH) 0.9 %
10 SYRINGE (ML) INJECTION AS NEEDED
Status: DISCONTINUED | OUTPATIENT
Start: 2017-09-26 | End: 2017-09-28 | Stop reason: HOSPADM

## 2017-09-26 RX ORDER — IPRATROPIUM BROMIDE AND ALBUTEROL SULFATE 2.5; .5 MG/3ML; MG/3ML
3 SOLUTION RESPIRATORY (INHALATION) ONCE
Status: COMPLETED | OUTPATIENT
Start: 2017-09-26 | End: 2017-09-27

## 2017-09-26 NOTE — PAYOR COMM NOTE
"Colleen Gonzalez (52 y.o. Female)   Id # [id number]  Rachelle Fitzgerald RN, BSN  Phone # [phone]  Fax # [phone]    Date of Birth Social Security Number Address Home Phone MRN    1965  [address] [place] [address] [phone] [id number]    [place] Marital Status          None Single       Admission Date Admission Type Admitting Provider Attending Provider Department, Room/Bed    9/23/17 Emergency Reggie Boyer MD  [facility] 6A, N619/1    Discharge Date Discharge Disposition Discharge Destination        9/25/2017 Home or Self Care             Attending Provider: (none)    Allergies:  Aspirin, Codeine, Ibuprofen    Isolation:  Contact   Infection:  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), MRSA (03/09/17)   Code Status:  Prior    Ht:  68\" (172.7 cm)   Wt:  215 lb 9.6 oz (97.8 kg)    Admission Cmt:  None   Principal Problem:  None                Active Insurance as of 9/23/2017     Primary Coverage     Payor Plan Insurance Group Employer/Plan Group    [employer] [place] [employer] [place]      Payor Plan Address Payor Plan Phone Number Effective From Effective To    [address] [address] [phone] 3/1/2017     [place] [address]       Subscriber Name Subscriber Birth Date Member ID       COLLEEN GONZALEZ [id number]                 Emergency Contacts      (Rel.) Home Phone Work Phone Mobile Phone    E[address] (Sister) -- -- [phone]            Discharge Summary     No notes of this type exist for this encounter.        Discharge Order     Start     Ordered    09/25/17 1610  Discharge patient  Once     Expected Discharge Date:  09/25/17    Discharge Disposition:  Home or Self Care    Please choose which facility the patient is currently admitted if they are being discharged to another facility or unit.:  [facility]       Question:  Please choose which facility the patient is currently admitted if they are being discharged to another facility or unit.  Answer:  [facility]    " 09/25/17 1795

## 2017-09-26 NOTE — PROGRESS NOTES
Continued Stay Note  [facility]     Patient Name: Colleen Gonzalez  MRN: [id number]  Today's Date: 9/26/2017    Admit Date: 9/23/2017          Discharge Plan       09/26/17 1059    Case Management/Social Work Plan    Additional Comments SW received a call from pt that reports some of her prescriptions were not received at her pharmacy. SHANITA called pt's pharmacy and pharmacy staff, Ramirez reports that they did not get prescriptions yesterday that he knows of. SHANITA spoke with Megan GARCIA about prescriptions as pt also was requesting lipiotr and gabapentin. APRN reports she did not and told pt she won't write a new prescription for gabapentin and pt has the prevastatin that APRN wrote for instead of the Lipitor. APRN refaxed prescription to ensure pharmacy gets them. SHANITA followed up again with pt's pharmacy, and spoke with pharmacist who reports they had received the prescriptiosn yesterday and pt filled them and it is all taken care of. Pharmacist apologized for the confusion but pt did get what APRN prescribed for her. SHANITA called pt to let her know as well.               Discharge Codes     None        Expected Discharge Date and Time     Expected Discharge Date Expected Discharge Time    Sep 25, 2017             SOPHIE Vázquez

## 2017-09-27 ENCOUNTER — APPOINTMENT (OUTPATIENT)
Dept: GENERAL RADIOLOGY | Facility: HOSPITAL | Age: 52
End: 2017-09-27

## 2017-09-27 PROBLEM — F31.9 BIPOLAR AFFECTIVE DISORDER (HCC): Chronic | Status: ACTIVE | Noted: 2017-09-27

## 2017-09-27 LAB
ALBUMIN SERPL-MCNC: 4 G/DL (ref 3.2–4.8)
ALBUMIN/GLOB SERPL: 1.3 G/DL (ref 1.5–2.5)
ALP SERPL-CCNC: 79 U/L (ref 25–100)
ALT SERPL W P-5'-P-CCNC: 8 U/L (ref 7–40)
AMPHET+METHAMPHET UR QL: NEGATIVE
AMPHETAMINES UR QL: NEGATIVE
ANION GAP SERPL CALCULATED.3IONS-SCNC: 3 MMOL/L (ref 3–11)
AST SERPL-CCNC: 12 U/L (ref 0–33)
BARBITURATES UR QL SCN: NEGATIVE
BASOPHILS # BLD AUTO: 0.07 10*3/MM3 (ref 0–0.2)
BASOPHILS NFR BLD AUTO: 0.9 % (ref 0–1)
BENZODIAZ UR QL SCN: NEGATIVE
BILIRUB SERPL-MCNC: 0.2 MG/DL (ref 0.3–1.2)
BNP SERPL-MCNC: <2 PG/ML (ref 0–100)
BUN BLD-MCNC: 17 MG/DL (ref 9–23)
BUN/CREAT SERPL: 18.9 (ref 7–25)
BUPRENORPHINE SERPL-MCNC: NEGATIVE NG/ML
CALCIUM SPEC-SCNC: 9 MG/DL (ref 8.7–10.4)
CANNABINOIDS SERPL QL: NEGATIVE
CHLORIDE SERPL-SCNC: 100 MMOL/L (ref 99–109)
CO2 SERPL-SCNC: 31 MMOL/L (ref 20–31)
COCAINE UR QL: NEGATIVE
CREAT BLD-MCNC: 0.9 MG/DL (ref 0.6–1.3)
DEPRECATED RDW RBC AUTO: 50.9 FL (ref 37–54)
EOSINOPHIL # BLD AUTO: 0.26 10*3/MM3 (ref 0–0.3)
EOSINOPHIL NFR BLD AUTO: 3.2 % (ref 0–3)
ERYTHROCYTE [DISTWIDTH] IN BLOOD BY AUTOMATED COUNT: 14.2 % (ref 11.3–14.5)
GFR SERPL CREATININE-BSD FRML MDRD: 65 ML/MIN/1.73
GLOBULIN UR ELPH-MCNC: 3.1 GM/DL
GLUCOSE BLD-MCNC: 86 MG/DL (ref 70–100)
HCT VFR BLD AUTO: 43.3 % (ref 34.5–44)
HGB BLD-MCNC: 14.4 G/DL (ref 11.5–15.5)
HOLD SPECIMEN: NORMAL
HOLD SPECIMEN: NORMAL
IMM GRANULOCYTES # BLD: 0.03 10*3/MM3 (ref 0–0.03)
IMM GRANULOCYTES NFR BLD: 0.4 % (ref 0–0.6)
LYMPHOCYTES # BLD AUTO: 4.28 10*3/MM3 (ref 0.6–4.8)
LYMPHOCYTES NFR BLD AUTO: 52.6 % (ref 24–44)
MCH RBC QN AUTO: 32.7 PG (ref 27–31)
MCHC RBC AUTO-ENTMCNC: 33.3 G/DL (ref 32–36)
MCV RBC AUTO: 98.4 FL (ref 80–99)
METHADONE UR QL SCN: NEGATIVE
MONOCYTES # BLD AUTO: 0.78 10*3/MM3 (ref 0–1)
MONOCYTES NFR BLD AUTO: 9.6 % (ref 0–12)
NEUTROPHILS # BLD AUTO: 2.71 10*3/MM3 (ref 1.5–8.3)
NEUTROPHILS NFR BLD AUTO: 33.3 % (ref 41–71)
OPIATES UR QL: NEGATIVE
OXYCODONE UR QL SCN: NEGATIVE
PCP UR QL SCN: NEGATIVE
PLATELET # BLD AUTO: 277 10*3/MM3 (ref 150–450)
PMV BLD AUTO: 10.4 FL (ref 6–12)
POTASSIUM BLD-SCNC: 4.1 MMOL/L (ref 3.5–5.5)
PROPOXYPH UR QL: NEGATIVE
PROT SERPL-MCNC: 7.1 G/DL (ref 5.7–8.2)
RBC # BLD AUTO: 4.4 10*6/MM3 (ref 3.89–5.14)
SODIUM BLD-SCNC: 134 MMOL/L (ref 132–146)
TRICYCLICS UR QL SCN: NEGATIVE
TROPONIN I SERPL-MCNC: 0 NG/ML (ref 0–0.07)
WBC NRBC COR # BLD: 8.13 10*3/MM3 (ref 3.5–10.8)
WHOLE BLOOD HOLD SPECIMEN: NORMAL
WHOLE BLOOD HOLD SPECIMEN: NORMAL

## 2017-09-27 PROCEDURE — 94640 AIRWAY INHALATION TREATMENT: CPT

## 2017-09-27 PROCEDURE — 94760 N-INVAS EAR/PLS OXIMETRY 1: CPT

## 2017-09-27 PROCEDURE — 96376 TX/PRO/DX INJ SAME DRUG ADON: CPT

## 2017-09-27 PROCEDURE — 83880 ASSAY OF NATRIURETIC PEPTIDE: CPT | Performed by: EMERGENCY MEDICINE

## 2017-09-27 PROCEDURE — 85025 COMPLETE CBC W/AUTO DIFF WBC: CPT | Performed by: EMERGENCY MEDICINE

## 2017-09-27 PROCEDURE — 25010000002 METHYLPREDNISOLONE PER 125 MG: Performed by: NURSE PRACTITIONER

## 2017-09-27 PROCEDURE — G0378 HOSPITAL OBSERVATION PER HR: HCPCS

## 2017-09-27 PROCEDURE — 71020 HC CHEST PA AND LATERAL: CPT

## 2017-09-27 PROCEDURE — 94799 UNLISTED PULMONARY SVC/PX: CPT

## 2017-09-27 PROCEDURE — 96372 THER/PROPH/DIAG INJ SC/IM: CPT

## 2017-09-27 PROCEDURE — 99220 PR INITIAL OBSERVATION CARE/DAY 70 MINUTES: CPT | Performed by: FAMILY MEDICINE

## 2017-09-27 PROCEDURE — 80306 DRUG TEST PRSMV INSTRMNT: CPT | Performed by: NURSE PRACTITIONER

## 2017-09-27 PROCEDURE — 25010000002 METHYLPREDNISOLONE PER 125 MG: Performed by: EMERGENCY MEDICINE

## 2017-09-27 PROCEDURE — 80053 COMPREHEN METABOLIC PANEL: CPT | Performed by: EMERGENCY MEDICINE

## 2017-09-27 PROCEDURE — 25010000002 ENOXAPARIN PER 10 MG: Performed by: NURSE PRACTITIONER

## 2017-09-27 PROCEDURE — 84484 ASSAY OF TROPONIN QUANT: CPT

## 2017-09-27 PROCEDURE — 96374 THER/PROPH/DIAG INJ IV PUSH: CPT

## 2017-09-27 RX ORDER — BENZTROPINE MESYLATE 1 MG/1
1 TABLET ORAL 3 TIMES DAILY
Status: DISCONTINUED | OUTPATIENT
Start: 2017-09-27 | End: 2017-09-28 | Stop reason: HOSPADM

## 2017-09-27 RX ORDER — RIFAMPIN 300 MG/1
600 CAPSULE ORAL
Status: DISCONTINUED | OUTPATIENT
Start: 2017-09-27 | End: 2017-09-28 | Stop reason: HOSPADM

## 2017-09-27 RX ORDER — CLONAZEPAM 0.5 MG/1
0.25 TABLET ORAL 2 TIMES DAILY PRN
Status: DISCONTINUED | OUTPATIENT
Start: 2017-09-27 | End: 2017-09-28 | Stop reason: HOSPADM

## 2017-09-27 RX ORDER — POTASSIUM CHLORIDE 750 MG/1
10 CAPSULE, EXTENDED RELEASE ORAL DAILY
Status: DISCONTINUED | OUTPATIENT
Start: 2017-09-27 | End: 2017-09-28 | Stop reason: HOSPADM

## 2017-09-27 RX ORDER — DIVALPROEX SODIUM 500 MG/1
1500 TABLET, EXTENDED RELEASE ORAL DAILY
Status: DISCONTINUED | OUTPATIENT
Start: 2017-09-27 | End: 2017-09-28 | Stop reason: HOSPADM

## 2017-09-27 RX ORDER — GABAPENTIN 100 MG/1
200 CAPSULE ORAL 3 TIMES DAILY
Status: DISCONTINUED | OUTPATIENT
Start: 2017-09-27 | End: 2017-09-28 | Stop reason: HOSPADM

## 2017-09-27 RX ORDER — LEVOTHYROXINE SODIUM 0.05 MG/1
50 TABLET ORAL
Status: DISCONTINUED | OUTPATIENT
Start: 2017-09-27 | End: 2017-09-28 | Stop reason: HOSPADM

## 2017-09-27 RX ORDER — CLARITHROMYCIN 250 MG/1
500 TABLET, FILM COATED ORAL EVERY 12 HOURS SCHEDULED
Status: DISCONTINUED | OUTPATIENT
Start: 2017-09-27 | End: 2017-09-28 | Stop reason: HOSPADM

## 2017-09-27 RX ORDER — METHYLPREDNISOLONE SODIUM SUCCINATE 125 MG/2ML
60 INJECTION, POWDER, LYOPHILIZED, FOR SOLUTION INTRAMUSCULAR; INTRAVENOUS EVERY 12 HOURS
Status: DISCONTINUED | OUTPATIENT
Start: 2017-09-27 | End: 2017-09-28 | Stop reason: HOSPADM

## 2017-09-27 RX ORDER — CALCIUM CARBONATE 200(500)MG
2 TABLET,CHEWABLE ORAL 2 TIMES DAILY PRN
Status: DISCONTINUED | OUTPATIENT
Start: 2017-09-27 | End: 2017-09-28 | Stop reason: HOSPADM

## 2017-09-27 RX ORDER — ALBUTEROL SULFATE 2.5 MG/3ML
2.5 SOLUTION RESPIRATORY (INHALATION) ONCE
Status: COMPLETED | OUTPATIENT
Start: 2017-09-27 | End: 2017-09-27

## 2017-09-27 RX ORDER — ATORVASTATIN CALCIUM 20 MG/1
20 TABLET, FILM COATED ORAL DAILY
Status: DISCONTINUED | OUTPATIENT
Start: 2017-09-27 | End: 2017-09-28 | Stop reason: HOSPADM

## 2017-09-27 RX ORDER — ONDANSETRON 2 MG/ML
4 INJECTION INTRAMUSCULAR; INTRAVENOUS EVERY 6 HOURS PRN
Status: DISCONTINUED | OUTPATIENT
Start: 2017-09-27 | End: 2017-09-28 | Stop reason: HOSPADM

## 2017-09-27 RX ORDER — SACCHAROMYCES BOULARDII 250 MG
250 CAPSULE ORAL 2 TIMES DAILY
Status: DISCONTINUED | OUTPATIENT
Start: 2017-09-27 | End: 2017-09-27

## 2017-09-27 RX ORDER — NICOTINE 21 MG/24HR
1 PATCH, TRANSDERMAL 24 HOURS TRANSDERMAL
Status: DISCONTINUED | OUTPATIENT
Start: 2017-09-27 | End: 2017-09-27

## 2017-09-27 RX ORDER — FUROSEMIDE 40 MG/1
40 TABLET ORAL DAILY
Status: DISCONTINUED | OUTPATIENT
Start: 2017-09-27 | End: 2017-09-28 | Stop reason: HOSPADM

## 2017-09-27 RX ORDER — SODIUM CHLORIDE 0.9 % (FLUSH) 0.9 %
1-10 SYRINGE (ML) INJECTION AS NEEDED
Status: DISCONTINUED | OUTPATIENT
Start: 2017-09-27 | End: 2017-09-28 | Stop reason: HOSPADM

## 2017-09-27 RX ORDER — QUETIAPINE FUMARATE 100 MG/1
300 TABLET, FILM COATED ORAL NIGHTLY
Status: DISCONTINUED | OUTPATIENT
Start: 2017-09-27 | End: 2017-09-28 | Stop reason: HOSPADM

## 2017-09-27 RX ORDER — LORAZEPAM 1 MG/1
1 TABLET ORAL ONCE
Status: COMPLETED | OUTPATIENT
Start: 2017-09-27 | End: 2017-09-27

## 2017-09-27 RX ORDER — BUDESONIDE AND FORMOTEROL FUMARATE DIHYDRATE 80; 4.5 UG/1; UG/1
2 AEROSOL RESPIRATORY (INHALATION)
Status: DISCONTINUED | OUTPATIENT
Start: 2017-09-27 | End: 2017-09-28 | Stop reason: HOSPADM

## 2017-09-27 RX ORDER — SACCHAROMYCES BOULARDII 250 MG
250 CAPSULE ORAL 2 TIMES DAILY
Status: DISCONTINUED | OUTPATIENT
Start: 2017-09-27 | End: 2017-09-28 | Stop reason: HOSPADM

## 2017-09-27 RX ORDER — NICOTINE 21 MG/24HR
1 PATCH, TRANSDERMAL 24 HOURS TRANSDERMAL EVERY 24 HOURS
Status: DISCONTINUED | OUTPATIENT
Start: 2017-09-27 | End: 2017-09-28 | Stop reason: HOSPADM

## 2017-09-27 RX ORDER — GUAIFENESIN 600 MG/1
600 TABLET, EXTENDED RELEASE ORAL EVERY 12 HOURS SCHEDULED
Status: DISCONTINUED | OUTPATIENT
Start: 2017-09-27 | End: 2017-09-28 | Stop reason: HOSPADM

## 2017-09-27 RX ORDER — PANTOPRAZOLE SODIUM 40 MG/1
40 TABLET, DELAYED RELEASE ORAL EVERY MORNING
Status: DISCONTINUED | OUTPATIENT
Start: 2017-09-27 | End: 2017-09-28 | Stop reason: HOSPADM

## 2017-09-27 RX ORDER — IPRATROPIUM BROMIDE AND ALBUTEROL SULFATE 2.5; .5 MG/3ML; MG/3ML
3 SOLUTION RESPIRATORY (INHALATION) EVERY 4 HOURS PRN
Status: DISCONTINUED | OUTPATIENT
Start: 2017-09-27 | End: 2017-09-28 | Stop reason: HOSPADM

## 2017-09-27 RX ORDER — METHYLPREDNISOLONE SODIUM SUCCINATE 125 MG/2ML
125 INJECTION, POWDER, LYOPHILIZED, FOR SOLUTION INTRAMUSCULAR; INTRAVENOUS ONCE
Status: COMPLETED | OUTPATIENT
Start: 2017-09-27 | End: 2017-09-27

## 2017-09-27 RX ORDER — HYDROXYZINE HYDROCHLORIDE 25 MG/1
50 TABLET, FILM COATED ORAL 4 TIMES DAILY PRN
Status: DISCONTINUED | OUTPATIENT
Start: 2017-09-27 | End: 2017-09-28 | Stop reason: HOSPADM

## 2017-09-27 RX ORDER — ETHAMBUTOL HYDROCHLORIDE 400 MG/1
1600 TABLET, FILM COATED ORAL
Status: DISCONTINUED | OUTPATIENT
Start: 2017-09-27 | End: 2017-09-28 | Stop reason: HOSPADM

## 2017-09-27 RX ORDER — IPRATROPIUM BROMIDE AND ALBUTEROL SULFATE 2.5; .5 MG/3ML; MG/3ML
3 SOLUTION RESPIRATORY (INHALATION)
Status: DISCONTINUED | OUTPATIENT
Start: 2017-09-27 | End: 2017-09-28 | Stop reason: HOSPADM

## 2017-09-27 RX ORDER — SENNA AND DOCUSATE SODIUM 50; 8.6 MG/1; MG/1
2 TABLET, FILM COATED ORAL NIGHTLY
Status: DISCONTINUED | OUTPATIENT
Start: 2017-09-27 | End: 2017-09-28 | Stop reason: HOSPADM

## 2017-09-27 RX ADMIN — PANTOPRAZOLE SODIUM 40 MG: 40 TABLET, DELAYED RELEASE ORAL at 07:34

## 2017-09-27 RX ADMIN — BENZTROPINE MESYLATE 1 MG: 1 TABLET ORAL at 20:28

## 2017-09-27 RX ADMIN — GUAIFENESIN 600 MG: 600 TABLET, EXTENDED RELEASE ORAL at 09:42

## 2017-09-27 RX ADMIN — ENOXAPARIN SODIUM 30 MG: 30 INJECTION SUBCUTANEOUS at 20:27

## 2017-09-27 RX ADMIN — RIFAMPIN 600 MG: 300 CAPSULE ORAL at 09:42

## 2017-09-27 RX ADMIN — BUDESONIDE AND FORMOTEROL FUMARATE DIHYDRATE 2 PUFF: 80; 4.5 AEROSOL RESPIRATORY (INHALATION) at 07:35

## 2017-09-27 RX ADMIN — CLARITHROMYCIN 500 MG: 250 TABLET ORAL at 20:26

## 2017-09-27 RX ADMIN — CLONAZEPAM 0.25 MG: 0.5 TABLET ORAL at 14:11

## 2017-09-27 RX ADMIN — LEVOTHYROXINE SODIUM 50 MCG: 50 TABLET ORAL at 07:34

## 2017-09-27 RX ADMIN — ALBUTEROL SULFATE 2.5 MG: 2.5 SOLUTION RESPIRATORY (INHALATION) at 01:39

## 2017-09-27 RX ADMIN — POTASSIUM CHLORIDE 10 MEQ: 750 CAPSULE, EXTENDED RELEASE ORAL at 09:42

## 2017-09-27 RX ADMIN — ATORVASTATIN CALCIUM 20 MG: 20 TABLET, FILM COATED ORAL at 09:46

## 2017-09-27 RX ADMIN — LORAZEPAM 1 MG: 1 TABLET ORAL at 00:44

## 2017-09-27 RX ADMIN — QUETIAPINE FUMARATE 300 MG: 100 TABLET, FILM COATED ORAL at 20:26

## 2017-09-27 RX ADMIN — Medication 250 MG: at 09:42

## 2017-09-27 RX ADMIN — BUDESONIDE AND FORMOTEROL FUMARATE DIHYDRATE 2 PUFF: 80; 4.5 AEROSOL RESPIRATORY (INHALATION) at 18:57

## 2017-09-27 RX ADMIN — IPRATROPIUM BROMIDE AND ALBUTEROL SULFATE 3 ML: .5; 3 SOLUTION RESPIRATORY (INHALATION) at 07:02

## 2017-09-27 RX ADMIN — BENZTROPINE MESYLATE 1 MG: 1 TABLET ORAL at 09:42

## 2017-09-27 RX ADMIN — IPRATROPIUM BROMIDE AND ALBUTEROL SULFATE 3 ML: .5; 3 SOLUTION RESPIRATORY (INHALATION) at 18:57

## 2017-09-27 RX ADMIN — METHYLPREDNISOLONE SODIUM SUCCINATE 60 MG: 125 INJECTION, POWDER, FOR SOLUTION INTRAMUSCULAR; INTRAVENOUS at 20:27

## 2017-09-27 RX ADMIN — CLARITHROMYCIN 500 MG: 250 TABLET ORAL at 09:42

## 2017-09-27 RX ADMIN — NICOTINE 1 PATCH: 21 PATCH, EXTENDED RELEASE TRANSDERMAL at 09:41

## 2017-09-27 RX ADMIN — IPRATROPIUM BROMIDE AND ALBUTEROL SULFATE 3 ML: .5; 3 SOLUTION RESPIRATORY (INHALATION) at 00:15

## 2017-09-27 RX ADMIN — GUAIFENESIN 600 MG: 600 TABLET, EXTENDED RELEASE ORAL at 20:26

## 2017-09-27 RX ADMIN — GABAPENTIN 200 MG: 100 CAPSULE ORAL at 20:26

## 2017-09-27 RX ADMIN — ETHAMBUTOL HYDROCHLORIDE 1600 MG: 400 TABLET, FILM COATED ORAL at 09:42

## 2017-09-27 RX ADMIN — DIVALPROEX SODIUM 1500 MG: 500 TABLET, FILM COATED, EXTENDED RELEASE ORAL at 09:42

## 2017-09-27 RX ADMIN — METHYLPREDNISOLONE SODIUM SUCCINATE 60 MG: 125 INJECTION, POWDER, FOR SOLUTION INTRAMUSCULAR; INTRAVENOUS at 07:37

## 2017-09-27 RX ADMIN — METHYLPREDNISOLONE SODIUM SUCCINATE 125 MG: 125 INJECTION, POWDER, FOR SOLUTION INTRAMUSCULAR; INTRAVENOUS at 00:50

## 2017-09-27 RX ADMIN — ENOXAPARIN SODIUM 30 MG: 30 INJECTION SUBCUTANEOUS at 09:42

## 2017-09-27 RX ADMIN — Medication 250 MG: at 17:09

## 2017-09-27 RX ADMIN — FUROSEMIDE 40 MG: 40 TABLET ORAL at 09:46

## 2017-09-27 RX ADMIN — BENZTROPINE MESYLATE 1 MG: 1 TABLET ORAL at 17:09

## 2017-09-27 RX ADMIN — GABAPENTIN 200 MG: 100 CAPSULE ORAL at 14:11

## 2017-09-27 RX ADMIN — NICOTINE 1 PATCH: 21 PATCH, EXTENDED RELEASE TRANSDERMAL at 07:35

## 2017-09-27 RX ADMIN — IPRATROPIUM BROMIDE AND ALBUTEROL SULFATE 3 ML: .5; 3 SOLUTION RESPIRATORY (INHALATION) at 13:28

## 2017-09-28 VITALS
OXYGEN SATURATION: 92 % | HEART RATE: 95 BPM | TEMPERATURE: 98.3 F | WEIGHT: 218.13 LBS | DIASTOLIC BLOOD PRESSURE: 68 MMHG | RESPIRATION RATE: 14 BRPM | SYSTOLIC BLOOD PRESSURE: 109 MMHG | HEIGHT: 68 IN | BODY MASS INDEX: 33.06 KG/M2

## 2017-09-28 LAB
BACTERIA SPEC AEROBE CULT: NORMAL
BACTERIA SPEC AEROBE CULT: NORMAL

## 2017-09-28 PROCEDURE — 96376 TX/PRO/DX INJ SAME DRUG ADON: CPT

## 2017-09-28 PROCEDURE — 25010000002 ENOXAPARIN PER 10 MG: Performed by: NURSE PRACTITIONER

## 2017-09-28 PROCEDURE — 96372 THER/PROPH/DIAG INJ SC/IM: CPT

## 2017-09-28 PROCEDURE — 25010000002 METHYLPREDNISOLONE PER 125 MG: Performed by: NURSE PRACTITIONER

## 2017-09-28 PROCEDURE — 94799 UNLISTED PULMONARY SVC/PX: CPT

## 2017-09-28 PROCEDURE — 99217 PR OBSERVATION CARE DISCHARGE MANAGEMENT: CPT | Performed by: INTERNAL MEDICINE

## 2017-09-28 PROCEDURE — 94640 AIRWAY INHALATION TREATMENT: CPT

## 2017-09-28 PROCEDURE — G0378 HOSPITAL OBSERVATION PER HR: HCPCS

## 2017-09-28 RX ORDER — DEXTROMETHORPHAN POLISTIREX 30 MG/5ML
30 SUSPENSION ORAL ONCE
Status: DISCONTINUED | OUTPATIENT
Start: 2017-09-28 | End: 2017-09-28 | Stop reason: HOSPADM

## 2017-09-28 RX ORDER — PREDNISONE 50 MG/1
50 TABLET ORAL DAILY
Status: SHIPPED | OUTPATIENT
Start: 2017-09-28 | End: 2017-10-05

## 2017-09-28 RX ORDER — NICOTINE 21-14-7MG
1 KIT TRANSDERMAL DAILY
Qty: 56 EACH | Refills: 1 | Status: SHIPPED | OUTPATIENT
Start: 2017-09-28

## 2017-09-28 RX ORDER — NICOTINE 21-14-7MG
1 KIT TRANSDERMAL DAILY
Qty: 1 EACH | Refills: 1 | Status: SHIPPED | OUTPATIENT
Start: 2017-09-28 | End: 2017-09-28

## 2017-09-28 RX ORDER — OXYCODONE AND ACETAMINOPHEN 7.5; 325 MG/1; MG/1
1 TABLET ORAL ONCE
Status: COMPLETED | OUTPATIENT
Start: 2017-09-28 | End: 2017-09-28

## 2017-09-28 RX ORDER — TROLAMINE SALICYLATE 10 G/100G
CREAM TOPICAL 4 TIMES DAILY PRN
Status: DISCONTINUED | OUTPATIENT
Start: 2017-09-28 | End: 2017-09-28 | Stop reason: HOSPADM

## 2017-09-28 RX ORDER — ACETAMINOPHEN 325 MG/1
650 TABLET ORAL EVERY 6 HOURS PRN
Status: DISCONTINUED | OUTPATIENT
Start: 2017-09-28 | End: 2017-09-28 | Stop reason: HOSPADM

## 2017-09-28 RX ADMIN — FUROSEMIDE 40 MG: 40 TABLET ORAL at 08:41

## 2017-09-28 RX ADMIN — BENZTROPINE MESYLATE 1 MG: 1 TABLET ORAL at 08:43

## 2017-09-28 RX ADMIN — CLONAZEPAM 0.25 MG: 0.5 TABLET ORAL at 08:41

## 2017-09-28 RX ADMIN — POTASSIUM CHLORIDE 10 MEQ: 750 CAPSULE, EXTENDED RELEASE ORAL at 08:42

## 2017-09-28 RX ADMIN — GABAPENTIN 200 MG: 100 CAPSULE ORAL at 08:43

## 2017-09-28 RX ADMIN — IPRATROPIUM BROMIDE AND ALBUTEROL SULFATE 3 ML: .5; 3 SOLUTION RESPIRATORY (INHALATION) at 13:18

## 2017-09-28 RX ADMIN — PANTOPRAZOLE SODIUM 40 MG: 40 TABLET, DELAYED RELEASE ORAL at 06:05

## 2017-09-28 RX ADMIN — ATORVASTATIN CALCIUM 20 MG: 20 TABLET, FILM COATED ORAL at 08:42

## 2017-09-28 RX ADMIN — NICOTINE 1 PATCH: 21 PATCH, EXTENDED RELEASE TRANSDERMAL at 06:05

## 2017-09-28 RX ADMIN — RIFAMPIN 600 MG: 300 CAPSULE ORAL at 08:42

## 2017-09-28 RX ADMIN — CLARITHROMYCIN 500 MG: 250 TABLET ORAL at 08:42

## 2017-09-28 RX ADMIN — IPRATROPIUM BROMIDE AND ALBUTEROL SULFATE 3 ML: .5; 3 SOLUTION RESPIRATORY (INHALATION) at 06:37

## 2017-09-28 RX ADMIN — ETHAMBUTOL HYDROCHLORIDE 1600 MG: 400 TABLET, FILM COATED ORAL at 08:41

## 2017-09-28 RX ADMIN — METHYLPREDNISOLONE SODIUM SUCCINATE 60 MG: 125 INJECTION, POWDER, FOR SOLUTION INTRAMUSCULAR; INTRAVENOUS at 08:42

## 2017-09-28 RX ADMIN — OXYCODONE HYDROCHLORIDE AND ACETAMINOPHEN 1 TABLET: 7.5; 325 TABLET ORAL at 13:44

## 2017-09-28 RX ADMIN — GUAIFENESIN 600 MG: 600 TABLET, EXTENDED RELEASE ORAL at 08:42

## 2017-09-28 RX ADMIN — ENOXAPARIN SODIUM 30 MG: 30 INJECTION SUBCUTANEOUS at 08:41

## 2017-09-28 RX ADMIN — Medication 250 MG: at 08:41

## 2017-09-28 RX ADMIN — ACETAMINOPHEN 650 MG: 325 TABLET, FILM COATED ORAL at 00:37

## 2017-09-28 RX ADMIN — DIVALPROEX SODIUM 1500 MG: 500 TABLET, FILM COATED, EXTENDED RELEASE ORAL at 08:42

## 2017-09-28 RX ADMIN — LEVOTHYROXINE SODIUM 50 MCG: 50 TABLET ORAL at 06:05

## 2017-09-28 RX ADMIN — BUDESONIDE AND FORMOTEROL FUMARATE DIHYDRATE 2 PUFF: 80; 4.5 AEROSOL RESPIRATORY (INHALATION) at 06:37

## 2017-10-06 ENCOUNTER — HOSPITAL ENCOUNTER (EMERGENCY)
Facility: HOSPITAL | Age: 52
Discharge: HOME | End: 2017-10-06
Attending: EMERGENCY MEDICINE | Admitting: EMERGENCY MEDICINE

## 2017-10-06 ENCOUNTER — APPOINTMENT (OUTPATIENT)
Dept: GENERAL RADIOLOGY | Facility: HOSPITAL | Age: 52
End: 2017-10-06

## 2017-10-06 VITALS
BODY MASS INDEX: 34.1 KG/M2 | TEMPERATURE: 98.3 F | HEART RATE: 78 BPM | HEIGHT: 68 IN | OXYGEN SATURATION: 94 % | SYSTOLIC BLOOD PRESSURE: 97 MMHG | RESPIRATION RATE: 18 BRPM | DIASTOLIC BLOOD PRESSURE: 73 MMHG | WEIGHT: 225 LBS

## 2017-10-06 DIAGNOSIS — S82.832A CLOSED FRACTURE OF DISTAL END OF LEFT FIBULA, UNSPECIFIED FRACTURE MORPHOLOGY, INITIAL ENCOUNTER: Primary | ICD-10-CM

## 2017-10-06 PROCEDURE — 99284 EMERGENCY DEPT VISIT MOD MDM: CPT

## 2017-10-06 PROCEDURE — 73630 X-RAY EXAM OF FOOT: CPT

## 2017-10-06 PROCEDURE — 73610 X-RAY EXAM OF ANKLE: CPT
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
## 2017-10-07 NOTE — DISCHARGE INSTRUCTIONS
Apply ice to area of pain with not ambulating.     Use walking boot to aid in ambulation.     Take already prescribed pain medication as needed for pain.

## 2017-10-17 ENCOUNTER — HOSPITAL ENCOUNTER (EMERGENCY)
Facility: HOSPITAL | Age: 52
Discharge: HOME | End: 2017-10-17
Attending: EMERGENCY MEDICINE | Admitting: EMERGENCY MEDICINE

## 2017-10-17 ENCOUNTER — APPOINTMENT (OUTPATIENT)
Dept: GENERAL RADIOLOGY | Facility: HOSPITAL | Age: 52
End: 2017-10-17

## 2017-10-17 VITALS
OXYGEN SATURATION: 90 % | WEIGHT: 215 LBS | HEART RATE: 84 BPM | SYSTOLIC BLOOD PRESSURE: 113 MMHG | DIASTOLIC BLOOD PRESSURE: 66 MMHG | TEMPERATURE: 98.5 F | RESPIRATION RATE: 16 BRPM | BODY MASS INDEX: 32.58 KG/M2 | HEIGHT: 68 IN

## 2017-10-17 DIAGNOSIS — J44.0 COPD WITH ACUTE BRONCHITIS (HCC): Primary | ICD-10-CM

## 2017-10-17 DIAGNOSIS — J20.9 COPD WITH ACUTE BRONCHITIS (HCC): Primary | ICD-10-CM

## 2017-10-17 LAB
ALBUMIN SERPL-MCNC: 4 G/DL (ref 3.2–4.8)
ALBUMIN/GLOB SERPL: 1.2 G/DL (ref 1.5–2.5)
ALP SERPL-CCNC: 93 U/L (ref 25–100)
ALT SERPL W P-5'-P-CCNC: 15 U/L (ref 7–40)
ANION GAP SERPL CALCULATED.3IONS-SCNC: 5 MMOL/L (ref 3–11)
AST SERPL-CCNC: 13 U/L (ref 0–33)
BASOPHILS # BLD AUTO: 0.01 10*3/MM3 (ref 0–0.2)
BASOPHILS NFR BLD AUTO: 0.2 % (ref 0–1)
BILIRUB SERPL-MCNC: 0.4 MG/DL (ref 0.3–1.2)
BNP SERPL-MCNC: 44 PG/ML (ref 0–100)
BUN BLD-MCNC: 12 MG/DL (ref 9–23)
BUN/CREAT SERPL: 13.3 (ref 7–25)
CALCIUM SPEC-SCNC: 9.5 MG/DL (ref 8.7–10.4)
CHLORIDE SERPL-SCNC: 102 MMOL/L (ref 99–109)
CO2 SERPL-SCNC: 30 MMOL/L (ref 20–31)
CREAT BLD-MCNC: 0.9 MG/DL (ref 0.6–1.3)
D-LACTATE SERPL-SCNC: 2 MMOL/L (ref 0.5–2)
DEPRECATED RDW RBC AUTO: 51.4 FL (ref 37–54)
EOSINOPHIL # BLD AUTO: 0.01 10*3/MM3 (ref 0–0.3)
EOSINOPHIL NFR BLD AUTO: 0.2 % (ref 0–3)
ERYTHROCYTE [DISTWIDTH] IN BLOOD BY AUTOMATED COUNT: 14.7 % (ref 11.3–14.5)
GFR SERPL CREATININE-BSD FRML MDRD: 65 ML/MIN/1.73
GLOBULIN UR ELPH-MCNC: 3.3 GM/DL
GLUCOSE BLD-MCNC: 140 MG/DL (ref 70–100)
HCT VFR BLD AUTO: 40.6 % (ref 34.5–44)
HGB BLD-MCNC: 13.5 G/DL (ref 11.5–15.5)
HOLD SPECIMEN: NORMAL
HOLD SPECIMEN: NORMAL
IMM GRANULOCYTES # BLD: 0.02 10*3/MM3 (ref 0–0.03)
IMM GRANULOCYTES NFR BLD: 0.3 % (ref 0–0.6)
LYMPHOCYTES # BLD AUTO: 2.03 10*3/MM3 (ref 0.6–4.8)
LYMPHOCYTES NFR BLD AUTO: 31.1 % (ref 24–44)
MCH RBC QN AUTO: 31.9 PG (ref 27–31)
MCHC RBC AUTO-ENTMCNC: 33.3 G/DL (ref 32–36)
MCV RBC AUTO: 96 FL (ref 80–99)
MONOCYTES # BLD AUTO: 0.31 10*3/MM3 (ref 0–1)
MONOCYTES NFR BLD AUTO: 4.8 % (ref 0–12)
NEUTROPHILS # BLD AUTO: 4.14 10*3/MM3 (ref 1.5–8.3)
NEUTROPHILS NFR BLD AUTO: 63.4 % (ref 41–71)
PLATELET # BLD AUTO: 318 10*3/MM3 (ref 150–450)
PMV BLD AUTO: 9.7 FL (ref 6–12)
POTASSIUM BLD-SCNC: 4.5 MMOL/L (ref 3.5–5.5)
PROCALCITONIN SERPL-MCNC: <0.05 NG/ML
PROT SERPL-MCNC: 7.3 G/DL (ref 5.7–8.2)
RBC # BLD AUTO: 4.23 10*6/MM3 (ref 3.89–5.14)
SODIUM BLD-SCNC: 137 MMOL/L (ref 132–146)
TROPONIN I SERPL-MCNC: 0 NG/ML (ref 0–0.07)
TROPONIN I SERPL-MCNC: <0.006 NG/ML
WBC NRBC COR # BLD: 6.52 10*3/MM3 (ref 3.5–10.8)
WHOLE BLOOD HOLD SPECIMEN: NORMAL
WHOLE BLOOD HOLD SPECIMEN: NORMAL

## 2017-10-17 PROCEDURE — 85025 COMPLETE CBC W/AUTO DIFF WBC: CPT | Performed by: EMERGENCY MEDICINE

## 2017-10-17 PROCEDURE — 94640 AIRWAY INHALATION TREATMENT: CPT

## 2017-10-17 PROCEDURE — 84484 ASSAY OF TROPONIN QUANT: CPT

## 2017-10-17 PROCEDURE — 83880 ASSAY OF NATRIURETIC PEPTIDE: CPT | Performed by: EMERGENCY MEDICINE

## 2017-10-17 PROCEDURE — 83605 ASSAY OF LACTIC ACID: CPT | Performed by: NURSE PRACTITIONER

## 2017-10-17 PROCEDURE — 99284 EMERGENCY DEPT VISIT MOD MDM: CPT

## 2017-10-17 PROCEDURE — 93005 ELECTROCARDIOGRAM TRACING: CPT

## 2017-10-17 PROCEDURE — 84484 ASSAY OF TROPONIN QUANT: CPT | Performed by: NURSE PRACTITIONER

## 2017-10-17 PROCEDURE — 80053 COMPREHEN METABOLIC PANEL: CPT | Performed by: EMERGENCY MEDICINE

## 2017-10-17 PROCEDURE — 84145 PROCALCITONIN (PCT): CPT | Performed by: EMERGENCY MEDICINE

## 2017-10-17 PROCEDURE — 71010 HC CHEST PA OR AP: CPT

## 2017-10-17 RX ORDER — LEVOFLOXACIN 750 MG/1
TABLET ORAL DAILY
COMMUNITY

## 2017-10-17 RX ORDER — SODIUM CHLORIDE 0.9 % (FLUSH) 0.9 %
10 SYRINGE (ML) INJECTION AS NEEDED
Status: DISCONTINUED | OUTPATIENT
Start: 2017-10-17 | End: 2017-10-17

## 2017-10-17 RX ORDER — IPRATROPIUM BROMIDE AND ALBUTEROL SULFATE 2.5; .5 MG/3ML; MG/3ML
3 SOLUTION RESPIRATORY (INHALATION) ONCE
Status: COMPLETED | OUTPATIENT
Start: 2017-10-17 | End: 2017-10-17

## 2017-10-17 RX ORDER — SODIUM CHLORIDE 0.9 % (FLUSH) 0.9 %
10 SYRINGE (ML) INJECTION AS NEEDED
Status: DISCONTINUED | OUTPATIENT
Start: 2017-10-17 | End: 2017-10-17 | Stop reason: HOSPADM

## 2017-10-17 RX ADMIN — IPRATROPIUM BROMIDE AND ALBUTEROL SULFATE 3 ML: .5; 3 SOLUTION RESPIRATORY (INHALATION) at 13:29

## 2017-10-17 RX ADMIN — Medication 10 ML: at 12:46
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
## 2017-11-05 ENCOUNTER — APPOINTMENT (OUTPATIENT)
Dept: GENERAL RADIOLOGY | Facility: HOSPITAL | Age: 52
End: 2017-11-05

## 2017-11-05 ENCOUNTER — HOSPITAL ENCOUNTER (EMERGENCY)
Facility: HOSPITAL | Age: 52
Discharge: HOME | End: 2017-11-05
Attending: EMERGENCY MEDICINE | Admitting: EMERGENCY MEDICINE

## 2017-11-05 VITALS
BODY MASS INDEX: 30.31 KG/M2 | WEIGHT: 200 LBS | OXYGEN SATURATION: 92 % | HEART RATE: 77 BPM | RESPIRATION RATE: 18 BRPM | TEMPERATURE: 98.8 F | SYSTOLIC BLOOD PRESSURE: 120 MMHG | HEIGHT: 68 IN | DIASTOLIC BLOOD PRESSURE: 90 MMHG

## 2017-11-05 DIAGNOSIS — A31.9 MYCOBACTERIAL INFECTION: ICD-10-CM

## 2017-11-05 DIAGNOSIS — Z59.00 HOMELESSNESS: ICD-10-CM

## 2017-11-05 DIAGNOSIS — J44.1 COPD EXACERBATION (HCC): Primary | ICD-10-CM

## 2017-11-05 DIAGNOSIS — Z72.0 TOBACCO ABUSE: ICD-10-CM

## 2017-11-05 LAB
ALBUMIN SERPL-MCNC: 3.4 G/DL (ref 3.2–4.8)
ALBUMIN/GLOB SERPL: 1.2 G/DL (ref 1.5–2.5)
ALP SERPL-CCNC: 85 U/L (ref 25–100)
ALT SERPL W P-5'-P-CCNC: 8 U/L (ref 7–40)
ANION GAP SERPL CALCULATED.3IONS-SCNC: -3 MMOL/L (ref 3–11)
AST SERPL-CCNC: 15 U/L (ref 0–33)
BASOPHILS # BLD AUTO: 0.02 10*3/MM3 (ref 0–0.2)
BASOPHILS NFR BLD AUTO: 0.3 % (ref 0–1)
BILIRUB SERPL-MCNC: 0.2 MG/DL (ref 0.3–1.2)
BNP SERPL-MCNC: 103 PG/ML (ref 0–100)
BUN BLD-MCNC: <5 MG/DL (ref 9–23)
BUN/CREAT SERPL: ABNORMAL (ref 7–25)
CALCIUM SPEC-SCNC: 8.9 MG/DL (ref 8.7–10.4)
CHLORIDE SERPL-SCNC: 112 MMOL/L (ref 99–109)
CO2 SERPL-SCNC: 28 MMOL/L (ref 20–31)
CREAT BLD-MCNC: 0.8 MG/DL (ref 0.6–1.3)
DEPRECATED RDW RBC AUTO: 51.3 FL (ref 37–54)
EOSINOPHIL # BLD AUTO: 0.17 10*3/MM3 (ref 0–0.3)
EOSINOPHIL NFR BLD AUTO: 2.1 % (ref 0–3)
ERYTHROCYTE [DISTWIDTH] IN BLOOD BY AUTOMATED COUNT: 14.5 % (ref 11.3–14.5)
FLUAV AG NPH QL: NEGATIVE
FLUBV AG NPH QL IA: NEGATIVE
GFR SERPL CREATININE-BSD FRML MDRD: 75 ML/MIN/1.73
GLOBULIN UR ELPH-MCNC: 2.8 GM/DL
GLUCOSE BLD-MCNC: 93 MG/DL (ref 70–100)
HCT VFR BLD AUTO: 39.3 % (ref 34.5–44)
HGB BLD-MCNC: 13 G/DL (ref 11.5–15.5)
HOLD SPECIMEN: NORMAL
HOLD SPECIMEN: NORMAL
IMM GRANULOCYTES # BLD: 0.02 10*3/MM3 (ref 0–0.03)
IMM GRANULOCYTES NFR BLD: 0.3 % (ref 0–0.6)
LYMPHOCYTES # BLD AUTO: 2.71 10*3/MM3 (ref 0.6–4.8)
LYMPHOCYTES NFR BLD AUTO: 34 % (ref 24–44)
MCH RBC QN AUTO: 31.9 PG (ref 27–31)
MCHC RBC AUTO-ENTMCNC: 33.1 G/DL (ref 32–36)
MCV RBC AUTO: 96.6 FL (ref 80–99)
MONOCYTES # BLD AUTO: 0.75 10*3/MM3 (ref 0–1)
MONOCYTES NFR BLD AUTO: 9.4 % (ref 0–12)
NEUTROPHILS # BLD AUTO: 4.29 10*3/MM3 (ref 1.5–8.3)
NEUTROPHILS NFR BLD AUTO: 53.9 % (ref 41–71)
PLATELET # BLD AUTO: 192 10*3/MM3 (ref 150–450)
PMV BLD AUTO: 10.5 FL (ref 6–12)
POTASSIUM BLD-SCNC: 3.9 MMOL/L (ref 3.5–5.5)
PROT SERPL-MCNC: 6.2 G/DL (ref 5.7–8.2)
RBC # BLD AUTO: 4.07 10*6/MM3 (ref 3.89–5.14)
SODIUM BLD-SCNC: 137 MMOL/L (ref 132–146)
TROPONIN I SERPL-MCNC: 0.01 NG/ML (ref 0–0.07)
WBC NRBC COR # BLD: 7.96 10*3/MM3 (ref 3.5–10.8)
WHOLE BLOOD HOLD SPECIMEN: NORMAL
WHOLE BLOOD HOLD SPECIMEN: NORMAL

## 2017-11-05 PROCEDURE — 94799 UNLISTED PULMONARY SVC/PX: CPT

## 2017-11-05 PROCEDURE — 94760 N-INVAS EAR/PLS OXIMETRY 1: CPT

## 2017-11-05 PROCEDURE — 80053 COMPREHEN METABOLIC PANEL: CPT | Performed by: EMERGENCY MEDICINE

## 2017-11-05 PROCEDURE — 87804 INFLUENZA ASSAY W/OPTIC: CPT | Performed by: EMERGENCY MEDICINE

## 2017-11-05 PROCEDURE — 85025 COMPLETE CBC W/AUTO DIFF WBC: CPT | Performed by: EMERGENCY MEDICINE

## 2017-11-05 PROCEDURE — 83880 ASSAY OF NATRIURETIC PEPTIDE: CPT | Performed by: EMERGENCY MEDICINE

## 2017-11-05 PROCEDURE — 71020 HC CHEST PA AND LATERAL: CPT

## 2017-11-05 PROCEDURE — 93005 ELECTROCARDIOGRAM TRACING: CPT

## 2017-11-05 PROCEDURE — 84484 ASSAY OF TROPONIN QUANT: CPT

## 2017-11-05 PROCEDURE — 99284 EMERGENCY DEPT VISIT MOD MDM: CPT

## 2017-11-05 PROCEDURE — 94640 AIRWAY INHALATION TREATMENT: CPT

## 2017-11-05 RX ORDER — SODIUM CHLORIDE 0.9 % (FLUSH) 0.9 %
10 SYRINGE (ML) INJECTION AS NEEDED
Status: DISCONTINUED | OUTPATIENT
Start: 2017-11-05 | End: 2017-11-05 | Stop reason: HOSPADM

## 2017-11-05 RX ORDER — CLARITHROMYCIN 500 MG/1
500 TABLET, COATED ORAL EVERY 12 HOURS SCHEDULED
Qty: 30 TABLET | Refills: 0 | OUTPATIENT
Start: 2017-11-05 | End: 2021-11-12

## 2017-11-05 RX ORDER — ALBUTEROL SULFATE 90 UG/1
2 AEROSOL, METERED RESPIRATORY (INHALATION) EVERY 4 HOURS PRN
Qty: 1 INHALER | Refills: 0 | Status: SHIPPED | OUTPATIENT
Start: 2017-11-05

## 2017-11-05 RX ORDER — IPRATROPIUM BROMIDE AND ALBUTEROL SULFATE 2.5; .5 MG/3ML; MG/3ML
3 SOLUTION RESPIRATORY (INHALATION) ONCE
Status: COMPLETED | OUTPATIENT
Start: 2017-11-05 | End: 2017-11-05

## 2017-11-05 RX ORDER — RIFAMPIN 300 MG/1
600 CAPSULE ORAL
Qty: 30 CAPSULE | Refills: 0 | Status: SHIPPED | OUTPATIENT
Start: 2017-11-05

## 2017-11-05 RX ORDER — ACETAMINOPHEN 325 MG/1
650 TABLET ORAL ONCE
Status: COMPLETED | OUTPATIENT
Start: 2017-11-05 | End: 2017-11-05

## 2017-11-05 RX ORDER — ETHAMBUTOL HYDROCHLORIDE 400 MG/1
1600 TABLET, FILM COATED ORAL
Qty: 56 TABLET | Refills: 0 | Status: SHIPPED | OUTPATIENT
Start: 2017-11-05

## 2017-11-05 RX ADMIN — IPRATROPIUM BROMIDE AND ALBUTEROL SULFATE 3 ML: .5; 3 SOLUTION RESPIRATORY (INHALATION) at 17:06

## 2017-11-05 RX ADMIN — ACETAMINOPHEN 650 MG: 325 TABLET ORAL at 16:50

## 2017-11-05 RX ADMIN — IPRATROPIUM BROMIDE AND ALBUTEROL SULFATE 3 ML: .5; 3 SOLUTION RESPIRATORY (INHALATION) at 18:13

## 2017-11-05 SDOH — ECONOMIC STABILITY - HOUSING INSECURITY: HOMELESSNESS UNSPECIFIED: Z59.00

## 2017-11-05 NOTE — DISCHARGE INSTRUCTIONS
Restart her antibiotics for your mycobacterial infection as ordered.  You need to stay on these until your infectious disease doctor tells you to stop.  He been prescribed a 2 week supply.  You need to follow up with your primary care provider within that time to continue your antibiotics with interim monitoring is needed    Follow-up with your primary care provider this week for reevaluation of your COPD exacerbation and your eye contact bacterial infection, and for continued care    Stop smoking immediately this causes a lot of your symptoms.  It's very important that you stop    Continue her other medications as prescribed    immediately return to the emergency department for any new or worsening symptoms.
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
## 2018-07-17 ENCOUNTER — TRANSCRIBE ORDERS (OUTPATIENT)
Dept: LAB | Facility: HOSPITAL | Age: 53
End: 2018-07-17

## 2018-07-17 ENCOUNTER — LAB (OUTPATIENT)
Dept: LAB | Facility: HOSPITAL | Age: 53
End: 2018-07-17

## 2018-07-17 DIAGNOSIS — J44.1 OBSTRUCTIVE CHRONIC BRONCHITIS WITH EXACERBATION (HCC): ICD-10-CM

## 2018-07-17 DIAGNOSIS — J44.1 OBSTRUCTIVE CHRONIC BRONCHITIS WITH EXACERBATION (HCC): Primary | ICD-10-CM

## 2018-07-17 PROCEDURE — 87206 SMEAR FLUORESCENT/ACID STAI: CPT

## 2018-07-17 PROCEDURE — 87116 MYCOBACTERIA CULTURE: CPT

## 2018-08-20 ENCOUNTER — APPOINTMENT (OUTPATIENT)
Dept: LAB | Facility: HOSPITAL | Age: 53
End: 2018-08-20

## 2018-08-20 DIAGNOSIS — A31.0 PULMONARY MYCOBACTERIA (HCC): Primary | ICD-10-CM

## 2018-08-20 PROCEDURE — 87186 SC STD MICRODIL/AGAR DIL: CPT

## 2018-08-20 PROCEDURE — 36415 COLL VENOUS BLD VENIPUNCTURE: CPT

## 2018-08-20 PROCEDURE — 87181 SC STD AGAR DILUTION PER AGT: CPT

## 2018-10-19 LAB — REF LAB TEST RESULTS: NORMAL

## 2021-11-11 ENCOUNTER — APPOINTMENT (OUTPATIENT)
Dept: CT IMAGING | Facility: HOSPITAL | Age: 56
End: 2021-11-11

## 2021-11-11 ENCOUNTER — APPOINTMENT (OUTPATIENT)
Dept: GENERAL RADIOLOGY | Facility: HOSPITAL | Age: 56
End: 2021-11-11

## 2021-11-11 ENCOUNTER — HOSPITAL ENCOUNTER (EMERGENCY)
Facility: HOSPITAL | Age: 56
Discharge: HOME | End: 2021-11-12
Attending: EMERGENCY MEDICINE | Admitting: EMERGENCY MEDICINE

## 2021-11-11 VITALS
BODY MASS INDEX: 37.22 KG/M2 | OXYGEN SATURATION: 95 % | RESPIRATION RATE: 18 BRPM | DIASTOLIC BLOOD PRESSURE: 70 MMHG | HEART RATE: 95 BPM | SYSTOLIC BLOOD PRESSURE: 133 MMHG | WEIGHT: 260 LBS | HEIGHT: 70 IN | TEMPERATURE: 97.3 F

## 2021-11-11 DIAGNOSIS — Z86.79 HISTORY OF HYPERTENSION: ICD-10-CM

## 2021-11-11 DIAGNOSIS — Z59.00 HOMELESS: ICD-10-CM

## 2021-11-11 DIAGNOSIS — Z87.39 HISTORY OF FIBROMYALGIA: ICD-10-CM

## 2021-11-11 DIAGNOSIS — F41.9 ANXIETY AND DEPRESSION: ICD-10-CM

## 2021-11-11 DIAGNOSIS — F17.200 HEAVY TOBACCO SMOKER: ICD-10-CM

## 2021-11-11 DIAGNOSIS — M54.50 ACUTE EXACERBATION OF CHRONIC LOW BACK PAIN: Primary | ICD-10-CM

## 2021-11-11 DIAGNOSIS — G89.29 ACUTE EXACERBATION OF CHRONIC LOW BACK PAIN: Primary | ICD-10-CM

## 2021-11-11 DIAGNOSIS — F32.A ANXIETY AND DEPRESSION: ICD-10-CM

## 2021-11-11 DIAGNOSIS — Z86.39 HISTORY OF HYPOTHYROIDISM: ICD-10-CM

## 2021-11-11 DIAGNOSIS — Z87.09 HISTORY OF COPD: ICD-10-CM

## 2021-11-11 DIAGNOSIS — F31.9 BIPOLAR AFFECTIVE DISORDER, REMISSION STATUS UNSPECIFIED (HCC): ICD-10-CM

## 2021-11-11 LAB
ALBUMIN SERPL-MCNC: 4.2 G/DL (ref 3.5–5.2)
ALBUMIN/GLOB SERPL: 1.2 G/DL
ALP SERPL-CCNC: 167 U/L (ref 39–117)
ALT SERPL W P-5'-P-CCNC: 11 U/L (ref 1–33)
AMPHET+METHAMPHET UR QL: POSITIVE
AMPHETAMINES UR QL: POSITIVE
ANION GAP SERPL CALCULATED.3IONS-SCNC: 14 MMOL/L (ref 5–15)
AST SERPL-CCNC: 21 U/L (ref 1–32)
BACTERIA UR QL AUTO: NORMAL /HPF
BARBITURATES UR QL SCN: NEGATIVE
BASOPHILS # BLD AUTO: 0.05 10*3/MM3 (ref 0–0.2)
BASOPHILS NFR BLD AUTO: 0.6 % (ref 0–1.5)
BENZODIAZ UR QL SCN: NEGATIVE
BILIRUB SERPL-MCNC: 0.5 MG/DL (ref 0–1.2)
BILIRUB UR QL STRIP: NEGATIVE
BUN SERPL-MCNC: 10 MG/DL (ref 6–20)
BUN/CREAT SERPL: 11 (ref 7–25)
BUPRENORPHINE SERPL-MCNC: NEGATIVE NG/ML
CALCIUM SPEC-SCNC: 9.9 MG/DL (ref 8.6–10.5)
CANNABINOIDS SERPL QL: NEGATIVE
CHLORIDE SERPL-SCNC: 97 MMOL/L (ref 98–107)
CLARITY UR: CLEAR
CO2 SERPL-SCNC: 22 MMOL/L (ref 22–29)
COCAINE UR QL: NEGATIVE
COLOR UR: YELLOW
CREAT SERPL-MCNC: 0.91 MG/DL (ref 0.57–1)
D-LACTATE SERPL-SCNC: 1 MMOL/L (ref 0.5–2)
DEPRECATED RDW RBC AUTO: 44.4 FL (ref 37–54)
EOSINOPHIL # BLD AUTO: 0.26 10*3/MM3 (ref 0–0.4)
EOSINOPHIL NFR BLD AUTO: 3 % (ref 0.3–6.2)
ERYTHROCYTE [DISTWIDTH] IN BLOOD BY AUTOMATED COUNT: 13.1 % (ref 12.3–15.4)
FLUAV SUBTYP SPEC NAA+PROBE: NOT DETECTED
FLUBV RNA ISLT QL NAA+PROBE: NOT DETECTED
GFR SERPL CREATININE-BSD FRML MDRD: 64 ML/MIN/1.73
GLOBULIN UR ELPH-MCNC: 3.4 GM/DL
GLUCOSE SERPL-MCNC: 109 MG/DL (ref 65–99)
GLUCOSE UR STRIP-MCNC: NEGATIVE MG/DL
HCT VFR BLD AUTO: 40.9 % (ref 34–46.6)
HGB BLD-MCNC: 13.7 G/DL (ref 12–15.9)
HGB UR QL STRIP.AUTO: NEGATIVE
HYALINE CASTS UR QL AUTO: NORMAL /LPF
IMM GRANULOCYTES # BLD AUTO: 0.03 10*3/MM3 (ref 0–0.05)
IMM GRANULOCYTES NFR BLD AUTO: 0.3 % (ref 0–0.5)
KETONES UR QL STRIP: NEGATIVE
LEUKOCYTE ESTERASE UR QL STRIP.AUTO: ABNORMAL
LYMPHOCYTES # BLD AUTO: 2.35 10*3/MM3 (ref 0.7–3.1)
LYMPHOCYTES NFR BLD AUTO: 27.2 % (ref 19.6–45.3)
MAGNESIUM SERPL-MCNC: 1.9 MG/DL (ref 1.6–2.6)
MCH RBC QN AUTO: 31.2 PG (ref 26.6–33)
MCHC RBC AUTO-ENTMCNC: 33.5 G/DL (ref 31.5–35.7)
MCV RBC AUTO: 93.2 FL (ref 79–97)
METHADONE UR QL SCN: NEGATIVE
MONOCYTES # BLD AUTO: 0.71 10*3/MM3 (ref 0.1–0.9)
MONOCYTES NFR BLD AUTO: 8.2 % (ref 5–12)
NEUTROPHILS NFR BLD AUTO: 5.23 10*3/MM3 (ref 1.7–7)
NEUTROPHILS NFR BLD AUTO: 60.7 % (ref 42.7–76)
NITRITE UR QL STRIP: NEGATIVE
NRBC BLD AUTO-RTO: 0 /100 WBC (ref 0–0.2)
NT-PROBNP SERPL-MCNC: 71.8 PG/ML (ref 0–900)
OPIATES UR QL: NEGATIVE
OXYCODONE UR QL SCN: NEGATIVE
PCP UR QL SCN: NEGATIVE
PH UR STRIP.AUTO: <=5 [PH] (ref 5–8)
PLAT MORPH BLD: NORMAL
PLATELET # BLD AUTO: 345 10*3/MM3 (ref 140–450)
PMV BLD AUTO: 10.4 FL (ref 6–12)
POTASSIUM SERPL-SCNC: 3.9 MMOL/L (ref 3.5–5.2)
PROPOXYPH UR QL: NEGATIVE
PROT SERPL-MCNC: 7.6 G/DL (ref 6–8.5)
PROT UR QL STRIP: NEGATIVE
RBC # BLD AUTO: 4.39 10*6/MM3 (ref 3.77–5.28)
RBC # UR: NORMAL /HPF
RBC MORPH BLD: NORMAL
REF LAB TEST METHOD: NORMAL
SARS-COV-2 RNA PNL SPEC NAA+PROBE: NOT DETECTED
SODIUM SERPL-SCNC: 133 MMOL/L (ref 136–145)
SP GR UR STRIP: 1.01 (ref 1–1.03)
SQUAMOUS #/AREA URNS HPF: NORMAL /HPF
TRICYCLICS UR QL SCN: NEGATIVE
TROPONIN T SERPL-MCNC: <0.01 NG/ML (ref 0–0.03)
UROBILINOGEN UR QL STRIP: ABNORMAL
WBC # BLD AUTO: 8.63 10*3/MM3 (ref 3.4–10.8)
WBC MORPH BLD: NORMAL
WBC UR QL AUTO: NORMAL /HPF

## 2021-11-11 PROCEDURE — 85007 BL SMEAR W/DIFF WBC COUNT: CPT | Performed by: PHYSICIAN ASSISTANT

## 2021-11-11 PROCEDURE — 83735 ASSAY OF MAGNESIUM: CPT | Performed by: PHYSICIAN ASSISTANT

## 2021-11-11 PROCEDURE — 84484 ASSAY OF TROPONIN QUANT: CPT | Performed by: PHYSICIAN ASSISTANT

## 2021-11-11 PROCEDURE — 83880 ASSAY OF NATRIURETIC PEPTIDE: CPT | Performed by: PHYSICIAN ASSISTANT

## 2021-11-11 PROCEDURE — 72131 CT LUMBAR SPINE W/O DYE: CPT

## 2021-11-11 PROCEDURE — 81001 URINALYSIS AUTO W/SCOPE: CPT | Performed by: PHYSICIAN ASSISTANT

## 2021-11-11 PROCEDURE — 80053 COMPREHEN METABOLIC PANEL: CPT | Performed by: PHYSICIAN ASSISTANT

## 2021-11-11 PROCEDURE — 87636 SARSCOV2 & INF A&B AMP PRB: CPT | Performed by: PHYSICIAN ASSISTANT

## 2021-11-11 PROCEDURE — 99283 EMERGENCY DEPT VISIT LOW MDM: CPT

## 2021-11-11 PROCEDURE — 74176 CT ABD & PELVIS W/O CONTRAST: CPT

## 2021-11-11 PROCEDURE — 85025 COMPLETE CBC W/AUTO DIFF WBC: CPT | Performed by: PHYSICIAN ASSISTANT

## 2021-11-11 PROCEDURE — 93005 ELECTROCARDIOGRAM TRACING: CPT | Performed by: PHYSICIAN ASSISTANT

## 2021-11-11 PROCEDURE — 71045 X-RAY EXAM CHEST 1 VIEW: CPT

## 2021-11-11 PROCEDURE — 83605 ASSAY OF LACTIC ACID: CPT | Performed by: PHYSICIAN ASSISTANT

## 2021-11-11 PROCEDURE — 80306 DRUG TEST PRSMV INSTRMNT: CPT | Performed by: PHYSICIAN ASSISTANT

## 2021-11-11 SDOH — ECONOMIC STABILITY - HOUSING INSECURITY: HOMELESSNESS UNSPECIFIED: Z59.00

## 2021-11-12 RX ORDER — FEXOFENADINE HCL 180 MG/1
180 TABLET ORAL DAILY
Qty: 30 TABLET | Refills: 0 | Status: SHIPPED | OUTPATIENT
Start: 2021-11-12

## 2021-11-12 RX ORDER — POTASSIUM CHLORIDE 750 MG/1
10 TABLET, FILM COATED, EXTENDED RELEASE ORAL DAILY
Qty: 30 TABLET | Refills: 0 | Status: SHIPPED | OUTPATIENT
Start: 2021-11-12

## 2021-11-12 RX ORDER — ACETAMINOPHEN 500 MG
500 TABLET ORAL EVERY 6 HOURS PRN
Qty: 30 TABLET | Refills: 0 | Status: SHIPPED | OUTPATIENT
Start: 2021-11-12

## 2021-11-12 RX ORDER — FUROSEMIDE 40 MG/1
40 TABLET ORAL DAILY
Qty: 30 TABLET | Refills: 0 | Status: SHIPPED | OUTPATIENT
Start: 2021-11-12 | End: 2021-12-12

## 2021-11-12 RX ORDER — MONTELUKAST SODIUM 10 MG/1
10 TABLET ORAL NIGHTLY
Qty: 30 TABLET | Refills: 0 | Status: SHIPPED | OUTPATIENT
Start: 2021-11-12
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
## 2021-11-12 NOTE — DISCHARGE INSTRUCTIONS
ER evaluation revealed stable findings on CT scan of the lumbar spine.  There was no acute injury or fracture.  CT of the abdomen/pelvis with contrast revealed no acute infectious process and there was no evidence of kidney infection.  Urinalysis was within normal limits.  CBC, chemistries, EKG, chest x-ray, and troponin were all within normal limits.  I gave patient 1 month refills on Lasix, potassium, Singulair, Allegra, and she also requested prescription for Tylenol to take as needed.  Recommend patient to follow-up closely with PCP for recheck for long-term management of her chronic health conditions.  Return to the ER if worsening symptoms.

## 2021-11-15 LAB
QT INTERVAL: 404 MS
QTC INTERVAL: 469 MS
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
## (undated) DEVICE — SYR SLPTP 10CC

## (undated) DEVICE — SOL IRR NACL 0.9PCT BT 1000ML

## (undated) DEVICE — SINGLE USE SUCTION VALVE MAJ-209: Brand: SINGLE USE SUCTION VALVE (STERILE)

## (undated) DEVICE — TRAP,MUCUS SPECIMEN,40CC: Brand: MEDLINE

## (undated) DEVICE — FRCP BX RADJAW4 PULM WO NDL STD1.8X2 100

## (undated) DEVICE — TBG 02 CRUSH RESIST LF CLR 7FT

## (undated) DEVICE — SINGLE USE GRASPING FORCEPS: Brand: SINGLE USE GRASPING FORCEPS

## (undated) DEVICE — Device

## (undated) DEVICE — NEB SETUP SD STREAM W TBG

## (undated) DEVICE — SOL LR 1000ML

## (undated) DEVICE — Device: Brand: DISPOSABLE GRASPING FORCEPS

## (undated) DEVICE — CANNULA,ADULT,SOFT-TOUCH,7'TUBE,UC: Brand: PENDING

## (undated) DEVICE — BOWL UTIL STRL 32OZ

## (undated) DEVICE — ELECTRODE,ECG,FOAM, 3/PK: Brand: MEDLINE

## (undated) DEVICE — SYR SLPTP 20CC

## (undated) DEVICE — COL SPUTUM SYS 50ML

## (undated) DEVICE — SINGLE USE BIOPSY VALVE MAJ-210: Brand: SINGLE USE BIOPSY VALVE (STERILE)

## (undated) DEVICE — SPEC CONT WIDE MOUTH 3OZ 400/CS 20 CS/SK: Brand: MEDEGEN MEDICAL PRODUCTS, LLC

## (undated) DEVICE — CONN STD FOR/O2 TBG

## (undated) DEVICE — SENSR O2 OXIMAX FNGR A/ 18IN NONSTR